# Patient Record
Sex: MALE | Race: WHITE | Employment: OTHER | URBAN - METROPOLITAN AREA
[De-identification: names, ages, dates, MRNs, and addresses within clinical notes are randomized per-mention and may not be internally consistent; named-entity substitution may affect disease eponyms.]

---

## 2020-06-01 NOTE — LETTER
[address], [place], [place]    Authorization for Surgical Operation and Procedure                               I hereby authorize Dr. Randall, my physician and his/her assistants (if applicable), which may include medical students, residents, and/or fellows, to perform the following surgical operation/ procedure and administer such anesthesia as may be determined necessary by my physician: Operation/Procedure name (s) LAPAROSCOPIC ASSISTED COLON RESECTION on Arnaldo Gutierrez   2.   I recognize that during the surgical operation/procedure, unforeseen conditions may necessitate additional or different procedures than those listed above.  I, therefore, further authorize and request that the above-named surgeon, assistants, or designees perform such procedures as are, in their judgment, necessary and desirable.    3.   My surgeon/physician has discussed prior to my surgery the potential benefits, risks and side effects of this procedure; the likelihood of achieving goals; and potential problems that might occur during recuperation.  They also discussed reasonable alternatives to the procedure, including risks, benefits, and side effects related to the alternatives and risks related to not receiving this procedure.  I have had all my questions answered and I acknowledge that no guarantee has been made as to the result that may be obtained.    4.   Should the need arise during my operation/procedure, which includes change of level of care prior to discharge, I also consent to the administration of blood and/or blood products.  Further, I understand that despite careful testing and screening of blood or blood products by collecting agencies, I may still be subject to ill effects as a result of receiving a blood transfusion and/or blood products.  The following are some, but not all, of the potential risks that can occur: fever and allergic reactions, hemolytic reactions, transmission of  Tolerated PHLEBOTOMY well. Reviewed discharge instruction, voiced understanding. Copies of AVS given. Pt discharged home. Pt to exit via ambulation.     Orders Placed This Encounter   Medications    DISCONTD: denosumab (PROLIA) SC injection 60 mg diseases such as Hepatitis, AIDS and Cytomegalovirus (CMV) and fluid overload.  In the event that I wish to have an autologous transfusion of my own blood, or a directed donor transfusion, I will discuss this with my physician.  Check only if Refusing Blood or Blood Products  I understand refusal of blood or blood products as deemed necessary by my physician may have serious consequences to my condition to include possible death. I hereby assume responsibility for my refusal and release the hospital, its personnel, and my physicians from any responsibility for the consequences of my refusal.    o  Refuse   5.   I authorize the use of any specimen, organs, tissues, body parts or foreign objects that may be removed from my body during the operation/procedure for diagnosis, research or teaching purposes and their subsequent disposal by hospital authorities.  I also authorize the release of specimen test results and/or written reports to my treating physician on the hospital medical staff or other referring or consulting physicians involved in my care, at the discretion of the Pathologist or my treating physician.    6.   I consent to the photographing or videotaping of the operations or procedures to be performed, including appropriate portions of my body for medical, scientific, or educational purposes, provided my identity is not revealed by the pictures or by descriptive texts accompanying them.  If the procedure has been photographed/videotaped, the surgeon will obtain the original picture, image, videotape or CD.  The hospital will not be responsible for storage, release or maintenance of the picture, image, tape or CD.    7.   I consent to the presence of a  or observers in the operating room as deemed necessary by my physician or their designees.    8.   I recognize that in the event my procedure results in extended X-Ray/fluoroscopy time, I may develop a skin reaction.    9. If I have a Do Not  Attempt Resuscitation (DNAR) order in place, that status will be suspended while in the operating room, procedural suite, and during the recovery period unless otherwise explicitly stated by me (or a person authorized to consent on my behalf). The surgeon or my attending physician will determine when the applicable recovery period ends for purposes of reinstating the DNAR order.  10. Patients having a sterilization procedure: I understand that if the procedure is successful the results will be permanent and it will therefore be impossible for me to inseminate, conceive, or bear children.  I also understand that the procedure is intended to result in sterility, although the result has not been guaranteed.   11. I acknowledge that my physician has explained sedation/analgesia administration to me including the risk and benefits I consent to the administration of sedation/analgesia as may be necessary or desirable in the judgment of my physician.    I CERTIFY THAT I HAVE READ AND FULLY UNDERSTAND THE ABOVE CONSENT TO OPERATION and/or OTHER PROCEDURE.     ____________________________________  _________________________________        ______________________________  Signature of Patient    Signature of Responsible Person                Printed Name of Responsible Person                                      ____________________________________  _____________________________                ________________________________  Signature of Witness        Date  Time         Relationship to Patient    STATEMENT OF PHYSICIAN My signature below affirms that prior to the time of the procedure; I have explained to the patient and/or his/her legal representative, the risks and benefits involved in the proposed treatment and any reasonable alternative to the proposed treatment. I have also explained the risks and benefits involved in refusal of the proposed treatment and alternatives to the proposed treatment and have answered the  patient's questions. If I have a significant financial interest in a co-management agreement or a significant financial interest in any product or implant, or other significant relationship used in this procedure/surgery, I have disclosed this and had a discussion with my patient.     _____________________________________________________              _____________________________  (Signature of Physician)                                                                                         (Date)                                   (Time)  Patient Name: Arnaldo Gutierrez      : 4/15/1945      Printed: 2025     Medical Record #: [id number]                                      Page 1 of 1

## 2024-03-16 ENCOUNTER — APPOINTMENT (OUTPATIENT)
Dept: GENERAL RADIOLOGY | Facility: HOSPITAL | Age: 79
End: 2024-03-16
Attending: EMERGENCY MEDICINE
Payer: MEDICARE

## 2024-03-16 ENCOUNTER — HOSPITAL ENCOUNTER (EMERGENCY)
Facility: HOSPITAL | Age: 79
Discharge: HOME | End: 2024-03-17
Attending: EMERGENCY MEDICINE
Payer: MEDICARE

## 2024-03-16 DIAGNOSIS — S89.91XA INJURY OF RIGHT SHIN, INITIAL ENCOUNTER: Primary | ICD-10-CM

## 2024-03-16 DIAGNOSIS — R79.89 ELEVATED SERUM CREATININE: ICD-10-CM

## 2024-03-16 DIAGNOSIS — L03.115 CELLULITIS OF RIGHT LEG: ICD-10-CM

## 2024-03-16 LAB
BASOPHILS # BLD AUTO: 0.05 X10(3) UL (ref 0–0.2)
BASOPHILS NFR BLD AUTO: 0.6 %
DEPRECATED RDW RBC AUTO: 53 FL (ref 35.1–46.3)
EOSINOPHIL # BLD AUTO: 0.15 X10(3) UL (ref 0–0.7)
EOSINOPHIL NFR BLD AUTO: 1.9 %
ERYTHROCYTE [DISTWIDTH] IN BLOOD BY AUTOMATED COUNT: 16 % (ref 11–15)
HCT VFR BLD AUTO: 33.4 %
HGB BLD-MCNC: 10.6 G/DL
IMM GRANULOCYTES # BLD AUTO: 0.03 X10(3) UL (ref 0–1)
IMM GRANULOCYTES NFR BLD: 0.4 %
LYMPHOCYTES # BLD AUTO: 2.68 X10(3) UL (ref 1–4)
LYMPHOCYTES NFR BLD AUTO: 33.9 %
MCH RBC QN AUTO: 29 PG (ref 26–34)
MCHC RBC AUTO-ENTMCNC: 31.7 G/DL (ref 31–37)
MCV RBC AUTO: 91.5 FL
MONOCYTES # BLD AUTO: 1 X10(3) UL (ref 0.1–1)
MONOCYTES NFR BLD AUTO: 12.6 %
NEUTROPHILS # BLD AUTO: 4 X10 (3) UL (ref 1.5–7.7)
NEUTROPHILS # BLD AUTO: 4 X10(3) UL (ref 1.5–7.7)
NEUTROPHILS NFR BLD AUTO: 50.6 %
PLATELET # BLD AUTO: 197 10(3)UL (ref 150–450)
RBC # BLD AUTO: 3.65 X10(6)UL
WBC # BLD AUTO: 7.9 X10(3) UL (ref 4–11)

## 2024-03-16 PROCEDURE — 99284 EMERGENCY DEPT VISIT MOD MDM: CPT

## 2024-03-16 PROCEDURE — 73590 X-RAY EXAM OF LOWER LEG: CPT | Performed by: EMERGENCY MEDICINE

## 2024-03-16 PROCEDURE — 36415 COLL VENOUS BLD VENIPUNCTURE: CPT

## 2024-03-16 PROCEDURE — 73502 X-RAY EXAM HIP UNI 2-3 VIEWS: CPT | Performed by: EMERGENCY MEDICINE

## 2024-03-16 PROCEDURE — 73562 X-RAY EXAM OF KNEE 3: CPT | Performed by: EMERGENCY MEDICINE

## 2024-03-16 PROCEDURE — 80048 BASIC METABOLIC PNL TOTAL CA: CPT | Performed by: EMERGENCY MEDICINE

## 2024-03-16 PROCEDURE — 96372 THER/PROPH/DIAG INJ SC/IM: CPT

## 2024-03-16 PROCEDURE — 85025 COMPLETE CBC W/AUTO DIFF WBC: CPT | Performed by: EMERGENCY MEDICINE

## 2024-03-16 PROCEDURE — 84145 PROCALCITONIN (PCT): CPT | Performed by: EMERGENCY MEDICINE

## 2024-03-16 PROCEDURE — 90471 IMMUNIZATION ADMIN: CPT

## 2024-03-16 RX ORDER — KETOROLAC TROMETHAMINE 30 MG/ML
30 INJECTION, SOLUTION INTRAMUSCULAR; INTRAVENOUS ONCE
Status: COMPLETED | OUTPATIENT
Start: 2024-03-16 | End: 2024-03-16

## 2024-03-17 VITALS
SYSTOLIC BLOOD PRESSURE: 106 MMHG | DIASTOLIC BLOOD PRESSURE: 58 MMHG | TEMPERATURE: 98 F | WEIGHT: 190 LBS | BODY MASS INDEX: 26.6 KG/M2 | HEIGHT: 71 IN | OXYGEN SATURATION: 96 % | RESPIRATION RATE: 19 BRPM | HEART RATE: 61 BPM

## 2024-03-17 LAB
ANION GAP SERPL CALC-SCNC: 5 MMOL/L (ref 0–18)
BUN BLD-MCNC: 36 MG/DL (ref 9–23)
BUN/CREAT SERPL: 15.6 (ref 10–20)
CALCIUM BLD-MCNC: 9 MG/DL (ref 8.7–10.4)
CHLORIDE SERPL-SCNC: 109 MMOL/L (ref 98–112)
CO2 SERPL-SCNC: 25 MMOL/L (ref 21–32)
CREAT BLD-MCNC: 2.31 MG/DL
EGFRCR SERPLBLD CKD-EPI 2021: 29 ML/MIN/1.73M2 (ref 60–?)
GLUCOSE BLD-MCNC: 135 MG/DL (ref 70–99)
OSMOLALITY SERPL CALC.SUM OF ELEC: 298 MOSM/KG (ref 275–295)
POTASSIUM SERPL-SCNC: 4.8 MMOL/L (ref 3.5–5.1)
PROCALCITONIN SERPL-MCNC: 0.15 NG/ML (ref ?–0.05)
SODIUM SERPL-SCNC: 139 MMOL/L (ref 136–145)

## 2024-03-17 RX ORDER — CLINDAMYCIN HYDROCHLORIDE 300 MG/1
600 CAPSULE ORAL ONCE
Status: COMPLETED | OUTPATIENT
Start: 2024-03-17 | End: 2024-03-17

## 2024-03-17 RX ORDER — CLINDAMYCIN HYDROCHLORIDE 300 MG/1
300 CAPSULE ORAL 3 TIMES DAILY
Qty: 21 CAPSULE | Refills: 0 | Status: SHIPPED | OUTPATIENT
Start: 2024-03-17 | End: 2024-03-24

## 2024-03-17 NOTE — ED INITIAL ASSESSMENT (HPI)
Pt reports fall at gym two days ago. +abrasion to right lower leg with redness, warmth, swelling noted to RLE. Pt denies fevers.

## 2024-03-17 NOTE — DISCHARGE INSTRUCTIONS
Thank you for seeking care at [facility] Emergency Department.  You have been seen and evaluated. Your findings were consistent with a skin infection called cellulitis.     We discussed the results of your workup   Please read the instructions provided   If given prescriptions, take as instructed   If you were prescribed antibiotics, ensure that you take these as instructed   If your infection was outlined in ink today, return to the emergency department if the redness extends beyond the area outlined by the pen, if the area affected is increasing, or spreading.    Drink plenty of fluids.    Remember, your care process does not end after your visit today. Please follow-up with your doctor within 1-2 days for a follow-up check to ensure you are  improving, to see if you need any further evaluation/testing, or to evaluate for any alternate diagnoses.    You were found to have an elevated creatinine which is a kidney function level today.  Your case was discussed with nephrologist who would like you to call their office to make an appointment for this coming Monday.    Please also follow up with the orthopedic surgeon within 1 week.     Please return to the emergency department immediately if you develop worsening pain or swelling at the site of your infection, if the redness of your skin is extending beyond the area where it is red today, increasing drainage (pus), worsening discoloration, if you develop high fevers or chills, nausea, vomiting, or severe diarrhea, feeling dizzy or lightheaded, or feeling very ill as these could be signs of worsening infection requiring further medical care. Call or return to the ER if you develop any other new or concerning symptoms as these could be signs of more serious medical illness.

## 2024-03-17 NOTE — ED QUICK NOTES
Pt to ED w/c of wound r/t fall 2 days ago while exercising in a treadmill at the gym. Redness noted around wound. The pt reports pain during ambulation.

## 2024-03-17 NOTE — ED PROVIDER NOTES
[facility] Emergency Department Note  Patient: Anraldo Gutierrez Age: 78 year old Sex: male      MRN: [id number]  : 4/15/1945    Patient Seen in: [facility] Emergency Department    History     Chief Complaint   Patient presents with    Trauma     Stated Complaint: Leg Injury    History obtained from: patient, family members - offered  however family members and pt prefer they assist with translation for pt     This is a very pleasant 78-year-old history of diabetes, hypertension, hyperlipidemia, anxiety and depression, not on blood thinners, presenting to the ER with complaints of a wound to his right shin with redness and pain with ambulating.  Patient states that 2 days ago he was at the treadmill at the gym and he excellently slipped and fell, scraping the front of his right shin against the treadmill.  He did not hit his head on the ground or pass out.  He states that since then he has been having pain in his right shin as well as pain in his right knee and hip and feels that the pain in the shin shoots up into his knee.  He denies numbness or tingling or weakness in his right leg.  He has noticed an increasing area of redness to the anterior portion of his shin.  No fever, chills, or body aches. No CP, SOB, abd pain, v/d, dysuria, difficulty urinating, or other complaints. He does not know last Tdap.       Review of Systems:  Review of Systems  Positive for stated complaint: Leg Injury. Constitutional and vital signs reviewed. All other systems reviewed and negative except as noted above.    Patient History:  Past Medical History:   Diagnosis Date    Anxiety     Depression     Diabetes (HCC)     Essential hypertension     Hyperlipidemia        History reviewed. No pertinent surgical history.     No family history on file.    Specific Social Determinants of Health:   Social History     Socioeconomic History    Marital status: Unknown   Tobacco Use    Smoking status: Never    Smokeless  tobacco: Never   Vaping Use    Vaping Use: Never used   Substance and Sexual Activity    Alcohol use: Never    Drug use: Not Currently           PSFH elements reviewed from today and agreed except as otherwise stated in HPI.    Physical Exam     ED Triage Vitals [03/16/24 2146]   /59   Pulse 64   Resp 19   Temp 97.7 °F (36.5 °C)   Temp src Oral   SpO2 97 %   O2 Device None (Room air)       Current:/58   Pulse 61   Temp 97.7 °F (36.5 °C) (Oral)   Resp 19   Ht 180.3 cm (5' 11\")   Wt 86.2 kg   SpO2 96%   BMI 26.50 kg/m²         Physical Exam  Vitals and nursing note reviewed.   Constitutional:       General: He is not in acute distress.     Appearance: He is well-developed. He is not ill-appearing.   HENT:      Head: Normocephalic and atraumatic.      Right Ear: External ear normal.      Left Ear: External ear normal.      Nose: Nose normal.      Mouth/Throat:      Mouth: Mucous membranes are moist.   Eyes:      Conjunctiva/sclera: Conjunctivae normal.   Cardiovascular:      Rate and Rhythm: Normal rate and regular rhythm.      Heart sounds: Normal heart sounds.      Comments: 2+ radial and DP pulses symmetric bilaterally   Pulmonary:      Effort: Pulmonary effort is normal. No respiratory distress.      Breath sounds: Normal breath sounds. No wheezing, rhonchi or rales.   Abdominal:      General: There is no distension.      Palpations: Abdomen is soft.      Tenderness: There is no abdominal tenderness. There is no guarding or rebound.   Musculoskeletal:         General: Swelling and tenderness present. No deformity. Normal range of motion.      Cervical back: Normal range of motion. No rigidity or tenderness.      Left lower leg: No edema.      Comments: There is mild TTP to the R ASIS without overlying bruising, pt able to flex/extend the L hip without difficulty but reports mild pain on ROM testing. No TTP to the R thigh, there is mild TTP to the R knee along anterior joint line, no deformity,  stable to varus and valgus and A/P drawer, no posterior calf ttp. R thigh and calf compartments soft throughout. There is an approx 5x2 cm superficial skin avulsion with small amount of granulation tissue/scabbing to the R mid shin, see image below, Surrounding area of erythema circumferentially that is warm and tender, no fluctuance, no crepitance throughout the RLE. No TTP to the R ankle or foot throughout and FROM preseved to the R knee, ankle and toes. FROM of entire LLE w/o ttp to the L hip, knee, shin, ankle or foot. superficial abrasion to the L anterior thigh, L thigh and calf compartments soft.    Skin:     General: Skin is warm and dry.   Neurological:      Mental Status: He is alert and oriented to person, place, and time.      Deep Tendon Reflexes: Reflexes are normal and symmetric.         ED Course   Labs:   Labs Reviewed   BASIC METABOLIC PANEL (8) - Abnormal; Notable for the following components:       Result Value    Glucose 135 (*)     BUN 36 (*)     Creatinine 2.31 (*)     Calculated Osmolality 298 (*)     eGFR-Cr 29 (*)     All other components within normal limits   PROCALCITONIN - Abnormal; Notable for the following components:    Procalcitonin 0.15 (*)     All other components within normal limits   CBC W/ DIFFERENTIAL - Abnormal; Notable for the following components:    RBC 3.65 (*)     HGB 10.6 (*)     HCT 33.4 (*)     RDW-SD 53.0 (*)     RDW 16.0 (*)     All other components within normal limits   CBC WITH DIFFERENTIAL WITH PLATELET    Narrative:     The following orders were created for panel order CBC With Differential With Platelet.  Procedure                               Abnormality         Status                     ---------                               -----------         ------                     CBC W/ DIFFERENTIAL[[id number]]          Abnormal            Final result                 Please view results for these tests on the individual orders.     Radiology findings:  I  personally reviewed the images.   No results found.      Cardiac Monitor: Interpreted by me.   Pulse Readings from Last 1 Encounters:   03/17/24 61         MDM   Very pleasant 78-year-old presenting to the ER with family due to wound to his right shin as well as pain in his right leg particularly his right shin worse with ambulating as well as redness and pain.  No fevers or chills.  Arrival patient afebrile hemodynamically stable well-appearing no distress.  Exam is as above.    Ddx  -skin tear/avulsion  -cellulitis  -no crepitance or ecchymosis or pain out of proportion to suggest necrotizing SSTI   -also considering possible fracture vs MSK strain given hip and knee tenderness     Plan: cbc, bmp, procal, XR R shin, knee and hip, toradol for pain, anticipate likely DC on antibiotics and discussed with pt/family importance of wound recheck in 48-72 hours to ensure no progression of cellulitic changes. Tdap updated in ED today as well.   ED Course as of 03/17/24 0809  ------------------------------------------------------------  Time: 03/16 2342  Value: Hemoglobin(!): 10.6  Comment: (Reviewed)  ------------------------------------------------------------  Time: 03/16 2342  Value: WBC: 7.9  Comment: No leukocytosis   ------------------------------------------------------------  Time: 03/16 2342  Comment: I independently reviewed and interpreted patient's imaging including XR shin, hip and knee, I do not see acute fracture or soft tissue gas. Hardware to distal R tib/fib appears intact.   ------------------------------------------------------------  Time: 03/17 0031  Value: PROCALCITONIN(!): 0.15  Comment: (Reviewed)  ------------------------------------------------------------  Time: 03/17 0041  Value: XR HIP W OR WO PELVIS 2 OR 3 VIEWS, RIGHT (CPT=73502)  Comment: X-ray pelvis and right hip, 3 views at 2333 hrs.  X-ray right tibia and fibula, 4 views at 2326 hrs.  X-ray right knee, 3 views at 2326  hrs.      IMPRESSION:  -No acute osseous abnormality identified.  -Plate-screw construct addressing the distal fibula and syndesmosis.  There is evidence of nondisplaced fracturing involving the most distal two trans-syndesmotic screws.    ------------------------------------------------------------  Time: 03/17 0045  Value: CREATININE(!): 2.31  Comment: (Reviewed)  ------------------------------------------------------------  Time: 03/17 0050  Comment: Case d/w simcock wbat and can give walking boot to use prn and f/u outpatient as fx is distal from pt injury site and likely incidental based on pt exam. Pt updated with this recommendation comfortale with plan for outpatient ortho follow up.   ------------------------------------------------------------  Time: 03/17 0100  Comment: Case d/w on call nephrology would like to call on Monday to have appointment with them as outpatient given pt does have reported hx of CKD but no other baseline labs in our system. Pt and family updated with this plan and are agreeable with this plan. Pt placed in postop shoe and given crutches. VSS at this time. First dose of clinda given here counseled pt on diarrhea and to monitor for signs of C diff or diarrheal ilness. Pt stable for DC at this time.             Procedures:  Procedures        Disposition and Plan     Clinical Impression:  1. Injury of right shin, initial encounter    2. Cellulitis of right leg    3. Elevated serum creatinine        Disposition:  Discharge    [facility]  [facility]  [address] [address] [phone]  Schedule an appointment as soon as possible for a visit in 1 week(s)      [facility]  [address]  [facility] [address]  [phone]  Go to  If symptoms worsen, immediately    Odilon Pink MD  [address]  [address]  [facility] [address]  [phone]    Schedule an appointment as soon as  possible for a visit in 2 day(s)  As needed if you do not have a primary doctor. Otherwise follow up with your    Bright Sow  [address] [address]  [phone]    Schedule an appointment as soon as possible for a visit in 1 day(s)        Medications Prescribed:  Discharge Medication List as of 3/17/2024  1:26 AM        START taking these medications    Details   clindamycin 300 MG Oral Cap Take 1 capsule (300 mg total) by mouth 3 (three) times daily for 7 days., Print, Disp-21 capsule, R-0               This note may have been created using voice dictation technology and may include inadvertent errors.      Maria Alejandra Ken,   Attending Physician   Emergency Medicine

## 2024-03-18 ENCOUNTER — TELEPHONE (OUTPATIENT)
Dept: NEPHROLOGY | Facility: CLINIC | Age: 79
End: 2024-03-18

## 2024-03-18 NOTE — TELEPHONE ENCOUNTER
----- Message from Bright Sow MD sent at 3/17/2024  9:57 AM CDT -----  He was recently seen in ED for an URSULA on CKD 3. Please make an appt in April with me. Thanks.

## 2024-03-19 ENCOUNTER — OFFICE VISIT (OUTPATIENT)
Dept: NEPHROLOGY | Facility: CLINIC | Age: 79
End: 2024-03-19

## 2024-03-19 VITALS
WEIGHT: 193 LBS | SYSTOLIC BLOOD PRESSURE: 89 MMHG | DIASTOLIC BLOOD PRESSURE: 47 MMHG | BODY MASS INDEX: 27 KG/M2 | HEART RATE: 76 BPM

## 2024-03-19 DIAGNOSIS — N18.31 STAGE 3A CHRONIC KIDNEY DISEASE (HCC): ICD-10-CM

## 2024-03-19 DIAGNOSIS — E78.2 MIXED HYPERLIPIDEMIA: ICD-10-CM

## 2024-03-19 DIAGNOSIS — N17.9 ACUTE KIDNEY INJURY (HCC): Primary | ICD-10-CM

## 2024-03-19 DIAGNOSIS — I10 PRIMARY HYPERTENSION: ICD-10-CM

## 2024-03-19 DIAGNOSIS — E11.21 DIABETIC NEPHROPATHY ASSOCIATED WITH TYPE 2 DIABETES MELLITUS (HCC): ICD-10-CM

## 2024-03-19 RX ORDER — TRAMADOL HYDROCHLORIDE 50 MG/1
50 TABLET ORAL 2 TIMES DAILY
COMMUNITY

## 2024-03-19 RX ORDER — DULOXETIN HYDROCHLORIDE 30 MG/1
30 CAPSULE, DELAYED RELEASE ORAL DAILY
COMMUNITY

## 2024-03-19 RX ORDER — TAMSULOSIN HYDROCHLORIDE 0.4 MG/1
0.4 CAPSULE ORAL DAILY
COMMUNITY

## 2024-03-19 RX ORDER — DIAZEPAM 10 MG/1
10 TABLET ORAL 2 TIMES DAILY
COMMUNITY

## 2024-03-19 RX ORDER — VIT C/E/CUPERIC/ZINC/LUTEIN 226-90-0.8
1 CAPSULE ORAL DAILY
COMMUNITY

## 2024-03-19 RX ORDER — OLMESARTAN MEDOXOMIL 20 MG/1
20 TABLET ORAL DAILY
COMMUNITY
End: 2024-03-24

## 2024-03-19 RX ORDER — FLUOXETINE 20 MG/1
20 TABLET, FILM COATED ORAL 3 TIMES DAILY
COMMUNITY

## 2024-03-19 RX ORDER — PREGABALIN 100 MG/1
100 CAPSULE ORAL 3 TIMES DAILY
COMMUNITY

## 2024-03-19 RX ORDER — SIMVASTATIN 40 MG
40 TABLET ORAL NIGHTLY
COMMUNITY

## 2024-03-19 RX ORDER — PANTOPRAZOLE SODIUM 40 MG/1
40 TABLET, DELAYED RELEASE ORAL
COMMUNITY

## 2024-03-24 NOTE — PROGRESS NOTES
Nephrology Consultation Note    Reason for Consult:   Chief Complaint   Patient presents with    Consult     Patient is here for consult for URSULA on CKD 3.         HPI:     78 year old male with past medical history of T2DM (dx 2005), HTN, HLD, CKD 3a, BPH, GERD, and anxiety/depression is referred from ED for URSULA on CKD 3.    He was seen in ED on 3/16 with R shin wound after trauma. He was discharged with Clindamycin.   Pt is accompanied by his wife and daughter.     2017: Cr 1.1 12/9  2018: Cr 1.2 4/5 2020: Cr 1.44 5/14, 1.24 8/25 2021: Cr 1.2 5/19 2023: Cr increased to 1.48 11/1 2024: Cr further increased to 2.31 3/16 (ED)    Denies NSAIDs use.     UA neg for protein and blood 12/10/21    T2DM: Taking Metformin 500 mg BID, Januvia 100 mg daily.   Recent a1c 7.3 11/2023. a1c has been in 7 range since 2015  DM is complicated by retinopathy, neuropathy, and nephropathy.    Following with endo at [facility].    HTN: Taking Olmesartan 20 mg daily. Bp 89/47 today. Complains of hypotension and dizziness at home.      HLD: No recent lipid panel available. Taking Simvastatin 40 mg daily.      Anemia: Hb 10.6 3/2024       FHx:   Mother (D)   Father (D)   No family history of CKD.      SHx: History of smoking 2 PPD x 10 yrs, quit 1980. Drinks alcohol rarely.   Lives with his wife. Accompanied by daughter.       HISTORY:  Past Medical History:   Diagnosis Date    Anxiety     BPH (benign prostatic hyperplasia)     CKD (chronic kidney disease) stage 3, GFR 30-59 ml/min (HCC)     Depression     Diabetes mellitus (HCC)     Essential hypertension     GERD (gastroesophageal reflux disease)     Hyperlipidemia       Past Surgical History:   Procedure Laterality Date    FOOT FRACTURE SURGERY Right     2012      Family History   Problem Relation Age of Onset    Other (Other) Father       Social History:   Social History     Socioeconomic History    Marital status: Unknown   Tobacco Use    Smoking status: Former     Packs/day: 2.00      Years: 10.00     Additional pack years: 0.00     Total pack years: 20.00     Types: Cigarettes     Quit date:      Years since quittin.2    Smokeless tobacco: Never   Vaping Use    Vaping Use: Never used   Substance and Sexual Activity    Alcohol use: Yes     Comment: rarely    Drug use: Not Currently        Medications (Active prior to today's visit):  Current Outpatient Medications   Medication Sig Dispense Refill    metFORMIN 500 MG Oral Tab Take 1 tablet (500 mg total) by mouth 2 (two) times daily with meals.      pregabalin 100 MG Oral Cap Take 1 capsule (100 mg total) by mouth in the morning, at noon, and at bedtime.      SITagliptin Phosphate 100 MG Oral Tab Take 1 tablet (100 mg total) by mouth daily.      pantoprazole 40 MG Oral Tab EC Take 1 tablet (40 mg total) by mouth every morning before breakfast.      traMADol 50 MG Oral Tab Take 1 tablet (50 mg total) by mouth in the morning and 1 tablet (50 mg total) before bedtime.      simvastatin 40 MG Oral Tab Take 1 tablet (40 mg total) by mouth nightly.      FLUoxetine HCl 20 MG Oral Tab Take 1 tablet (20 mg total) by mouth in the morning, at noon, and at bedtime.      diazePAM 10 MG Oral Tab Take 1 tablet (10 mg total) by mouth in the morning and 1 tablet (10 mg total) before bedtime.      tamsulosin 0.4 MG Oral Cap Take 1 capsule (0.4 mg total) by mouth daily.      DULoxetine 30 MG Oral Cap DR Particles Take 1 capsule (30 mg total) by mouth daily.      Multiple Vitamins-Minerals (PRESERVISION/LUTEIN) Oral Cap Take 1 capsule by mouth daily.      Polyethyl Glycol-Propyl Glycol (SYSTANE ULTRA OP) Apply 2 drops to eye in the morning and 2 drops before bedtime.      clindamycin 300 MG Oral Cap Take 1 capsule (300 mg total) by mouth 3 (three) times daily for 7 days. 21 capsule 0       Allergies:  No Known Allergies      ROS:     Review of Systems   Constitutional:  Positive for fatigue. Negative for appetite change, chills and fever.   HENT:  Negative  for ear pain, rhinorrhea, sore throat and trouble swallowing.    Eyes:  Negative for pain and discharge.   Respiratory:  Negative for cough, chest tightness and shortness of breath.    Cardiovascular:  Negative for chest pain and leg swelling.   Gastrointestinal:  Negative for abdominal pain, constipation, diarrhea and vomiting.   Genitourinary:  Negative for decreased urine volume, dysuria and flank pain.   Musculoskeletal:  Positive for arthralgias and myalgias (legs). Negative for back pain and gait problem.   Skin:  Negative for rash.   Neurological:  Positive for numbness (feet and legs). Negative for dizziness, speech difficulty, weakness and headaches.   Psychiatric/Behavioral:  Negative for agitation and confusion.           Vitals:    03/19/24 1612   BP: (!) 89/47   Pulse: 76       PHYSICAL EXAM:   Physical Exam  Constitutional:       Appearance: Normal appearance.   HENT:      Head: Normocephalic and atraumatic.      Nose: Nose normal.   Eyes:      General: No scleral icterus.  Cardiovascular:      Rate and Rhythm: Normal rate and regular rhythm.      Heart sounds: Normal heart sounds. No murmur heard.  Pulmonary:      Effort: Pulmonary effort is normal.      Breath sounds: Normal breath sounds.   Abdominal:      General: Bowel sounds are normal. There is no distension.      Palpations: Abdomen is soft.   Musculoskeletal:         General: No tenderness. Normal range of motion.      Cervical back: Normal range of motion and neck supple.      Comments: Neg edema   Skin:     General: Skin is warm and dry.      Findings: No rash.      Comments: R shin wound, healing.    Neurological:      General: No focal deficit present.      Mental Status: He is alert and oriented to person, place, and time. Mental status is at baseline.   Psychiatric:         Mood and Affect: Mood normal.         Behavior: Behavior normal.         Thought Content: Thought content normal.             ASSESSMENT/PLAN:   Assessment    Encounter Diagnoses   Name Primary?    Acute kidney injury (HCC) Yes    Stage 3a chronic kidney disease (HCC)     Diabetic nephropathy associated with type 2 diabetes mellitus (HCC)     Primary hypertension     Mixed hyperlipidemia        URSULA/CKD 3:   URSULA can be due to hypotension. Will stop Olmesartan.   CKD likely due to diabetic nephropathy  Repeat CMP and UA/Microalb/Cr ordered.   Renal US ordered.   Advised to avoid NSAIDs and take Tylenol/acetominophen for pain.   Asked to restrict sodium to 2 grams per day and instructions on low protein diet given.   Advised tight control of DM to prevent complications including progressive CKD, CAD or CVA.    T2DM:   Well controlled. Cont meds.      HTN:   Hypotensive, will stop Olmesartan.   Asked to check BP at home and call office if BP greater than 140/80.      HLD:   Cont statin.       Labs ordered to be done sone.   Follow up in 3 months.        Orders This Visit:  Orders Placed This Encounter   Procedures    CBC With Differential With Platelet    Comp Metabolic Panel (14)    Phosphorus    Vitamin D    PTH, Intact    Urinalysis, Routine    Microalb/Creat Ratio, Random Urine       Meds This Visit:  Requested Prescriptions      No prescriptions requested or ordered in this encounter       Imaging & Referrals:  [facility] (CPT=76770)       50 minutes spent in the care of the patient which included: reviewing prior records, obtaining history and examining patient, discussing lab results and treatment plan including diet, ordering labs, and documentation.      Bright Sow MD  3/19/2024

## 2024-03-29 ENCOUNTER — OFFICE VISIT (OUTPATIENT)
Dept: WOUND CARE | Facility: HOSPITAL | Age: 79
End: 2024-03-29
Attending: NURSE PRACTITIONER
Payer: MEDICARE

## 2024-03-29 VITALS
BODY MASS INDEX: 28.79 KG/M2 | SYSTOLIC BLOOD PRESSURE: 98 MMHG | WEIGHT: 190 LBS | HEIGHT: 68 IN | OXYGEN SATURATION: 94 % | TEMPERATURE: 98 F | RESPIRATION RATE: 17 BRPM | HEART RATE: 52 BPM | DIASTOLIC BLOOD PRESSURE: 58 MMHG

## 2024-03-29 DIAGNOSIS — E11.8 TYPE II DIABETES MELLITUS WITH MANIFESTATIONS (HCC): Primary | ICD-10-CM

## 2024-03-29 DIAGNOSIS — S80.811A ABRASION OF RIGHT LOWER EXTREMITY, INITIAL ENCOUNTER: ICD-10-CM

## 2024-03-29 PROBLEM — G89.29 CHRONIC PAIN OF BOTH KNEES: Status: ACTIVE | Noted: 2020-11-12

## 2024-03-29 PROBLEM — M54.9 ARTHRALGIA OF BACK: Status: ACTIVE | Noted: 2021-04-22

## 2024-03-29 PROBLEM — F41.8 DEPRESSION WITH ANXIETY: Status: ACTIVE | Noted: 2021-04-22

## 2024-03-29 PROBLEM — G89.29 CHRONIC LOW BACK PAIN: Status: ACTIVE | Noted: 2020-08-21

## 2024-03-29 PROBLEM — M25.561 CHRONIC PAIN OF BOTH KNEES: Status: ACTIVE | Noted: 2020-11-12

## 2024-03-29 PROBLEM — E78.5 HYPERLIPIDEMIA: Status: ACTIVE | Noted: 2018-04-04

## 2024-03-29 PROBLEM — I10 HYPERTENSION: Status: ACTIVE | Noted: 2018-04-04

## 2024-03-29 PROBLEM — S81.801A TRAUMATIC OPEN WOUND OF LOWER LEG, RIGHT, INITIAL ENCOUNTER: Status: ACTIVE | Noted: 2024-03-29

## 2024-03-29 PROBLEM — M25.562 CHRONIC PAIN OF BOTH KNEES: Status: ACTIVE | Noted: 2020-11-12

## 2024-03-29 PROBLEM — M54.50 CHRONIC LOW BACK PAIN: Status: ACTIVE | Noted: 2020-08-21

## 2024-03-29 PROCEDURE — 97597 DBRDMT OPN WND 1ST 20 CM/<: CPT | Performed by: NURSE PRACTITIONER

## 2024-03-29 PROCEDURE — 99215 OFFICE O/P EST HI 40 MIN: CPT | Performed by: NURSE PRACTITIONER

## 2024-03-29 RX ORDER — OLMESARTAN MEDOXOMIL 20 MG/1
20 TABLET ORAL DAILY
COMMUNITY

## 2024-03-29 NOTE — PROGRESS NOTES
Patient ID: Arnaldo Gutierrez is a 78 year old male.    Debridement Traumatic Right;Anterior;Lower Leg   Wound 03/29/24 1 Leg Right;Anterior;Lower    Performed by: Raji Rojas APRN  Authorized by: Raji Rojas APRN      Consent   Consent obtained? verbal  Consent given by: patient  Risks discussed? procedural risks discussed  Time out called at 3/29/2024 10:30 AM  Immediately prior to the procedure a time out was called and the performing provider verified the correct patient, procedure, equipment, support staff, and site/side marked as required.    Debridement Details  Performed by: NP  Debridement type: conservative sharp  Pain control: benzocaine 20%  Pain control administration type: topical    Pre-debridement measurements  Length (cm): 5  Width (cm): 1.1  Depth (cm): 0  Surface Area (cm^2): 5.5    Post-debridement measurements  Length (cm): 5  Width (cm): 0.9  Depth (cm): 0.1  Percent debrided: 100%  Surface Area (cm^2): 4.5  Area Debrided (cm^2): 4.5  Volume (cm^3): 0.45    Devitalized tissue debrided: exudate and fibrin  Instrument(s) utilized: blade  Bleeding: small  Hemostasis obtained with: pressure  Procedural pain (0-10): 3  Post-procedural pain: 1   Response to treatment: procedure was tolerated well

## 2024-03-29 NOTE — PROGRESS NOTES
Subjective   Arnaldo Gutierrez is a 78 year old male.    Chief Complaint   Patient presents with    Wound Care     Rt anterior lower leg, B/S this      HPI  3/29/2024: Patient is a 78-year-old male with history of diabetes, CKD stage 3, hypertension, hyperlipidemia, anxiety and depression with recent trauma injury to right leg on 3/14/2024. Patient states he was at the [place] using treadmill at the gym and he excellently slipped and fell, scraping the front of his right shin against the treadmill. He did not hit his head on the ground or pass out.  He is not on blood thinner. He went to [facility] ED for evaluation XR was negative for fracture, he was started on clindamycin 300mg tid for 7 days.   Last  a1c 7.1 on 11/9/2023     Review of Systems   Constitutional:  Negative for activity change, fatigue and fever.   HENT:  Negative for congestion.    Eyes:  Positive for visual disturbance.   Respiratory:  Negative for cough and shortness of breath.    Cardiovascular:  Negative for chest pain and leg swelling.   Gastrointestinal:  Negative for abdominal pain.   Musculoskeletal:  Positive for arthralgias.   Skin:  Positive for wound.        Right shin wound   Neurological:  Negative for weakness.   Psychiatric/Behavioral:  Negative for agitation.         Hx of anxiety and depression     Objective   Objective  Physical Exam  Vitals reviewed.   Constitutional:       Appearance: Normal appearance.   HENT:      Head: Normocephalic.   Cardiovascular:      Rate and Rhythm: Normal rate and regular rhythm.      Pulses: Normal pulses.   Pulmonary:      Effort: Pulmonary effort is normal.      Breath sounds: Normal breath sounds.   Abdominal:      General: Bowel sounds are normal.   Musculoskeletal:      Cervical back: Normal range of motion and neck supple.   Skin:     General: Skin is warm and dry.      Capillary Refill: Capillary refill takes 2 to 3 seconds.      Findings: No erythema.   Neurological:      Mental Status: He is  alert and oriented to person, place, and time.      Sensory: Sensory deficit present.   Psychiatric:         Mood and Affect: Mood normal.     Wound Assessment  Wound 03/29/24 1 Leg Right;Anterior;Lower (Active)   Date First Assessed: 03/29/24    Wound Number (Wound Clinic Only): 1  Primary Wound Type: Traumatic  Location: Leg  Wound Location Orientation: Right;Anterior;Lower      Assessments 3/29/2024 10:02 AM 3/29/2024 10:03 AM   Wound Image             Site Assessment Dry;Brown --   Drainage Amount None --   Drainage Description Scabbed --   Treatments Cleansed;Wound ;Honey Gel;Compression --   Dressing Changed New --   Dressing Status Clean;Dry;Intact --   Wound Length (cm) 5 cm 5 cm   Wound Width (cm) 1.1 cm 0.9 cm   Wound Surface Area (cm^2) 5.5 cm^2 4.5 cm^2   Wound Depth (cm) 0 cm 0.1 cm   Wound Volume (cm^3) 0 cm^3 0.45 cm^3   Margins Poorly defined --   Becca-wound Assessment Dry;Hemosiderin staining;Edema --   Wound Bed Granulation (%) 0 % --   Wound Bed Epithelium (%) 0 % --   Wound Bed Slough (%) 0 % --   Wound Bed Eschar (%) 0 % --   Wound Bed Fibrin (%) 100 % --   State of Healing Non-healing --   Wound Odor None --       Inactive Orders   Date Order Priority Status Authorizing Provider   03/29/24 1029 Debridement Traumatic Right;Anterior;Lower Leg Routine Completed Raji Rojas, FAIZAN      Vital Signs  Vital Signs    03/29/24 1015   BP: 98/58   Pulse: 52   Resp: 17   Temp: 97.9 °F (36.6 °C)   Allergies  No Known Allergies    Assessment   Right lower leg wound, trauma injury on 3/14/2024:  -dry scab debrided today  -skin discoloration on anterior shin, hemosiderin staining  -resolving erythema or other S&S of soft tissue infection  Warm feet with less than 3 sec cap refill indicating adequate circulation to heal these wounds.    Reviewed note, lab, imaging in Epic and Care Every Where.  Recent labs: 3/16/2024: H&H 10.6&33.4, BUN 36, Creatinine 2.31  11/9/2024: A1c 7.1  3/16/2024: right lower leg  XR:  Impression   CONCLUSION:  No acute fracture or dislocation.  Changes of prior distal syndesmotic fixation, with fracture of the 2 most inferior transsyndesmotic screws.  Chronic appearing osseous irregularity along the medial malleolus with overlying soft tissue swelling.  Correlate with point tenderness at this location to exclude fracture.  A preliminary report was issued by the [facility] teleradiology service. There are no clinically actionable discrepancies.     Encounter Diagnosis  1. Type II diabetes mellitus with manifestations (HCC)    2. Abrasion of right lower extremity, initial encounter      Problem List  Patient Active Problem List   Diagnosis    Traumatic open wound of lower leg, right, initial encounter    Abrasion of right leg    Arthralgia of back    BPH (benign prostatic hyperplasia)    Chronic low back pain    Chronic pain of both knees    Depression with anxiety    Generalized osteoarthritis    Hyperlipidemia    Hypertension    Type II diabetes mellitus with manifestations (HCC)     Plan  Orders  Orders Placed This Encounter   Procedures    Debridement Traumatic Right;Anterior;Lower Leg    OP Wound Dressing   Discussed the process of wound healing, creating moist, clean environment for wound healing, treatment of this wound that may include advanced dressing, compression therapy and series of debridement.   Initiated medi-honey for autolytic debridement and Hydrofera ready (non stick with antimicrobial property), utilizing two layer compression wraps for edema management.   Discussed signs and symptoms of infection, encourage patient to assess skin daily.  Discussed moisturizing skin, refrain from itching or scratching the skin.  Discussed nutrition for wound healing, encourage reduce carbohydrate intake, increase protein intake, and healthy diet. Recommended increase vitamin intake (either with foods or vitamin supplements), need vitamin A, Bs, C, D and zinc for wound healing.    Discussed the treatment plan with patient, patient verbalized understanding and agreed to these plan.  Total face to face time was 40min, more than 50% of the time was spent in counseling and/or coordination of care related to his diagnosis and plan of care.   Follow-Up  Return in about 1 week (around 4/5/2024) for APN x 30 minutes, Wrapped weekly.

## 2024-04-05 ENCOUNTER — OFFICE VISIT (OUTPATIENT)
Dept: WOUND CARE | Facility: HOSPITAL | Age: 79
End: 2024-04-05
Attending: NURSE PRACTITIONER
Payer: MEDICARE

## 2024-04-05 VITALS
HEART RATE: 73 BPM | TEMPERATURE: 98 F | OXYGEN SATURATION: 94 % | SYSTOLIC BLOOD PRESSURE: 92 MMHG | DIASTOLIC BLOOD PRESSURE: 55 MMHG | RESPIRATION RATE: 18 BRPM

## 2024-04-05 DIAGNOSIS — S80.811D ABRASION OF RIGHT LOWER EXTREMITY, SUBSEQUENT ENCOUNTER: Primary | ICD-10-CM

## 2024-04-05 DIAGNOSIS — E11.8 TYPE II DIABETES MELLITUS WITH MANIFESTATIONS (HCC): ICD-10-CM

## 2024-04-05 PROCEDURE — 99213 OFFICE O/P EST LOW 20 MIN: CPT | Performed by: NURSE PRACTITIONER

## 2024-04-05 NOTE — PROGRESS NOTES
Subjective   Arnaldo Gutierrez is a 78 year old male.    Chief Complaint   Patient presents with    Wound Recheck     Rt leg. Pts Bs are @ 112     HPI  4/5/2024: Patient is here for follow up, tolerated compression therapy well, no new concern.   3/29/2024: Patient is a 78-year-old male with history of diabetes, CKD stage 3, hypertension, hyperlipidemia, anxiety and depression with recent trauma injury to right leg on 3/14/2024. Patient states he was at the [employer] using treadmill at the gym and he excellently slipped and fell, scraping the front of his right shin against the treadmill. He did not hit his head on the ground or pass out.  He is not on blood thinner. He went to [facility] ED for evaluation XR was negative for fracture, he was started on clindamycin 300mg tid for 7 days.   Last  a1c 7.1 on 11/9/2023      Review of Systems   Constitutional:  Negative for activity change, fatigue and fever.   HENT:  Negative for congestion.    Eyes:  Positive for visual disturbance.   Respiratory:  Negative for cough and shortness of breath.    Cardiovascular:  Negative for chest pain and leg swelling.   Gastrointestinal:  Negative for abdominal pain.   Musculoskeletal:  Positive for arthralgias.   Skin:  Positive for wound.        Right shin wound   Neurological:  Negative for weakness.   Psychiatric/Behavioral:  Negative for agitation.         Hx of anxiety and depression      Objective  Physical Exam  Vitals reviewed.   Constitutional:       Appearance: Normal appearance.   HENT:      Head: Normocephalic.   Cardiovascular:      Rate and Rhythm: Normal rate and regular rhythm.      Pulses: Normal pulses.   Pulmonary:      Effort: Pulmonary effort is normal.      Breath sounds: Normal breath sounds.   Abdominal:      General: Bowel sounds are normal.   Musculoskeletal:      Cervical back: Normal range of motion and neck supple.   Skin:     General: Skin is warm and dry.      Capillary Refill: Capillary refill takes 2 to 3 seconds.       Findings: No erythema.   Neurological:      Mental Status: He is alert and oriented to person, place, and time.      Sensory: Sensory deficit present.   Psychiatric:         Mood and Affect: Mood normal.     Wound Assessment  Wound 03/29/24 1 Leg Right;Anterior;Lower (Active)   Date First Assessed: 03/29/24    Wound Number (Wound Clinic Only): 1  Primary Wound Type: Traumatic  Location: Leg  Wound Location Orientation: Right;Anterior;Lower      Assessments 4/5/2024  9:53 AM   Wound Image     Site Assessment Dry;Pink;Red   Closure Not approximated   Drainage Amount Small   Drainage Description Serosanguineous   Dressing Changed Changed   Dressing Status Clean;Dry;Intact   Wound Length (cm) 5 cm   Wound Width (cm) 1.2 cm   Wound Surface Area (cm^2) 6 cm^2   Wound Depth (cm) 0.1 cm   Wound Volume (cm^3) 0.6 cm^3   Margins Attached edges   Non-staged Wound Description Full thickness   Becca-wound Assessment Dry;Hemosiderin staining;Edema   Wound Granulation Tissue Pink   Wound Bed Granulation (%) 50 %   Wound Bed Epithelium (%) 50 %   Wound Bed Slough (%) 0 %   Wound Bed Eschar (%) 0 %   Wound Bed Fibrin (%) 0 %   State of Healing Early/partial granulation   Wound Odor None       Active Orders   Date Order Priority Status Authorizing Provider   03/29/24 1036 OP Wound Dressing Routine Active Raji Rojas APRN     - Cleansing:    Cleanse with normal saline or wound cleanser     - Dressing:    Honey gel     - Dressing:    Hydrofera transfer     - Dressing:    Kerlix     - Dressing:    Medipore tape (no substitution)     - Frequency:    Change dressing weekly       Inactive Orders   Date Order Priority Status Authorizing Provider   03/29/24 1029 Debridement Traumatic Right;Anterior;Lower Leg Routine Completed Raji Rojas APRN      Vital Signs  Vital Signs    04/05/24 0947   BP: 92/55   Pulse: 73   Resp: 18   Temp: 97.8 °F (36.6 °C)   PainSc: 0 - (None)   Allergies  No Known Allergies    Assessment   Right lower leg  wound, trauma injury on 3/14/2024:  -new fragile new skin   -skin discoloration on anterior shin, hemosiderin staining  -resolving erythema or other S&S of soft tissue infection  Warm feet with less than 3 sec cap refill indicating adequate circulation to heal these wounds.     Reviewed note, lab, imaging in [facility] and Care Every Where.  Recent labs: 3/16/2024: H&H 10.6&33.4, BUN 36, Creatinine 2.31  11/9/2024: A1c 7.1  3/16/2024: right lower leg XR:  Impression   CONCLUSION:  No acute fracture or dislocation.  Changes of prior distal syndesmotic fixation, with fracture of the 2 most inferior transsyndesmotic screws.  Chronic appearing osseous irregularity along the medial malleolus with overlying soft tissue swelling.  Correlate with point tenderness at this location to exclude fracture.  A preliminary report was issued by the [facility] teleradiology service. There are no clinically actionable discrepancies.     Encounter Diagnosis  1. Abrasion of right lower extremity, subsequent encounter    2. Type II diabetes mellitus with manifestations (HCC)      Problem List  Patient Active Problem List   Diagnosis    Traumatic open wound of lower leg, right, initial encounter    Abrasion of right leg    Arthralgia of back    BPH (benign prostatic hyperplasia)    Chronic low back pain    Chronic pain of both knees    Depression with anxiety    Generalized osteoarthritis    Hyperlipidemia    Hypertension    Type II diabetes mellitus with manifestations (HCC)     Plan  Orders  Orders Placed This Encounter   Procedures    OP Wound Dressing   Initiated xeroform dressing (non stick with antimicrobial property) to support the new skin, utilizing one layer compression wraps for edema management.   Monitor for signs and symptoms of infection, encourage patient to assess skin daily.  Continue moisturizing skin, refrain from itching or scratching the skin.  Continue nutrition for wound healing, encourage reduce carbohydrate  intake, increase protein intake, and healthy diet. Recommended increase vitamin intake (either with foods or vitamin supplements), need vitamin A, Bs, C, D and zinc for wound healing.   Discussed the treatment plan with patient, patient verbalized understanding and agreed to these plan.  Total face to face time was 20min, more than 50% of the time was spent in counseling and/or coordination of care related to his diagnosis and plan of care.   Follow-Up  Return in about 1 week (around 4/12/2024) for APN x 30 minutes, Wrapped weekly. Almost healed..

## 2024-04-10 ENCOUNTER — LAB ENCOUNTER (OUTPATIENT)
Dept: LAB | Facility: HOSPITAL | Age: 79
End: 2024-04-10
Attending: INTERNAL MEDICINE
Payer: MEDICARE

## 2024-04-10 ENCOUNTER — HOSPITAL ENCOUNTER (OUTPATIENT)
Dept: ULTRASOUND IMAGING | Facility: HOSPITAL | Age: 79
Discharge: HOME | End: 2024-04-10
Attending: INTERNAL MEDICINE
Payer: MEDICARE

## 2024-04-10 DIAGNOSIS — N17.9 ACUTE KIDNEY INJURY (HCC): ICD-10-CM

## 2024-04-10 DIAGNOSIS — N18.31 STAGE 3A CHRONIC KIDNEY DISEASE (HCC): ICD-10-CM

## 2024-04-10 DIAGNOSIS — E11.21 DIABETIC NEPHROPATHY ASSOCIATED WITH TYPE 2 DIABETES MELLITUS (HCC): ICD-10-CM

## 2024-04-10 LAB
ALBUMIN SERPL-MCNC: 4.2 G/DL (ref 3.2–4.8)
ALBUMIN/GLOB SERPL: 1.5 {RATIO} (ref 1–2)
ALP LIVER SERPL-CCNC: 78 U/L
ALT SERPL-CCNC: 12 U/L
ANION GAP SERPL CALC-SCNC: 2 MMOL/L (ref 0–18)
AST SERPL-CCNC: 14 U/L (ref ?–34)
BASOPHILS # BLD AUTO: 0.07 X10(3) UL (ref 0–0.2)
BASOPHILS NFR BLD AUTO: 1.1 %
BILIRUB SERPL-MCNC: 0.3 MG/DL (ref 0.2–1.1)
BILIRUB UR QL: NEGATIVE
BUN BLD-MCNC: 24 MG/DL (ref 9–23)
BUN/CREAT SERPL: 14.5 (ref 10–20)
CALCIUM BLD-MCNC: 9.5 MG/DL (ref 8.7–10.4)
CHLORIDE SERPL-SCNC: 109 MMOL/L (ref 98–112)
CLARITY UR: CLEAR
CO2 SERPL-SCNC: 27 MMOL/L (ref 21–32)
CREAT BLD-MCNC: 1.65 MG/DL
CREAT UR-SCNC: 71.9 MG/DL
DEPRECATED RDW RBC AUTO: 54.2 FL (ref 35.1–46.3)
EGFRCR SERPLBLD CKD-EPI 2021: 43 ML/MIN/1.73M2 (ref 60–?)
EOSINOPHIL # BLD AUTO: 0.11 X10(3) UL (ref 0–0.7)
EOSINOPHIL NFR BLD AUTO: 1.7 %
ERYTHROCYTE [DISTWIDTH] IN BLOOD BY AUTOMATED COUNT: 16.8 % (ref 11–15)
FASTING STATUS PATIENT QL REPORTED: NO
GLOBULIN PLAS-MCNC: 2.8 G/DL (ref 2.8–4.4)
GLUCOSE BLD-MCNC: 130 MG/DL (ref 70–99)
GLUCOSE UR-MCNC: >1000 MG/DL
HCT VFR BLD AUTO: 36.9 %
HGB BLD-MCNC: 11.9 G/DL
HGB UR QL STRIP.AUTO: NEGATIVE
IMM GRANULOCYTES # BLD AUTO: 0.03 X10(3) UL (ref 0–1)
IMM GRANULOCYTES NFR BLD: 0.5 %
KETONES UR-MCNC: NEGATIVE MG/DL
LEUKOCYTE ESTERASE UR QL STRIP.AUTO: NEGATIVE
LYMPHOCYTES # BLD AUTO: 2.15 X10(3) UL (ref 1–4)
LYMPHOCYTES NFR BLD AUTO: 32.7 %
MCH RBC QN AUTO: 28.7 PG (ref 26–34)
MCHC RBC AUTO-ENTMCNC: 32.2 G/DL (ref 31–37)
MCV RBC AUTO: 89.1 FL
MICROALBUMIN UR-MCNC: <0.3 MG/DL
MONOCYTES # BLD AUTO: 0.77 X10(3) UL (ref 0.1–1)
MONOCYTES NFR BLD AUTO: 11.7 %
NEUTROPHILS # BLD AUTO: 3.44 X10 (3) UL (ref 1.5–7.7)
NEUTROPHILS # BLD AUTO: 3.44 X10(3) UL (ref 1.5–7.7)
NEUTROPHILS NFR BLD AUTO: 52.3 %
NITRITE UR QL STRIP.AUTO: NEGATIVE
OSMOLALITY SERPL CALC.SUM OF ELEC: 292 MOSM/KG (ref 275–295)
PH UR: 6 [PH] (ref 5–8)
PHOSPHATE SERPL-MCNC: 3 MG/DL (ref 2.4–5.1)
PLATELET # BLD AUTO: 200 10(3)UL (ref 150–450)
POTASSIUM SERPL-SCNC: 5.2 MMOL/L (ref 3.5–5.1)
PROT SERPL-MCNC: 7 G/DL (ref 5.7–8.2)
PROT UR-MCNC: NEGATIVE MG/DL
PTH-INTACT SERPL-MCNC: 66.9 PG/ML (ref 18.5–88)
RBC # BLD AUTO: 4.14 X10(6)UL
SODIUM SERPL-SCNC: 138 MMOL/L (ref 136–145)
SP GR UR STRIP: 1.02 (ref 1–1.03)
UROBILINOGEN UR STRIP-ACNC: NORMAL
VIT D+METAB SERPL-MCNC: 31.6 NG/ML (ref 30–100)
WBC # BLD AUTO: 6.6 X10(3) UL (ref 4–11)

## 2024-04-10 PROCEDURE — 82570 ASSAY OF URINE CREATININE: CPT

## 2024-04-10 PROCEDURE — 80053 COMPREHEN METABOLIC PANEL: CPT | Performed by: INTERNAL MEDICINE

## 2024-04-10 PROCEDURE — 83970 ASSAY OF PARATHORMONE: CPT

## 2024-04-10 PROCEDURE — 82043 UR ALBUMIN QUANTITATIVE: CPT

## 2024-04-10 PROCEDURE — 76770 US EXAM ABDO BACK WALL COMP: CPT | Performed by: INTERNAL MEDICINE

## 2024-04-10 PROCEDURE — 85025 COMPLETE CBC W/AUTO DIFF WBC: CPT

## 2024-04-10 PROCEDURE — 84100 ASSAY OF PHOSPHORUS: CPT

## 2024-04-10 PROCEDURE — 36415 COLL VENOUS BLD VENIPUNCTURE: CPT

## 2024-04-10 PROCEDURE — 82306 VITAMIN D 25 HYDROXY: CPT

## 2024-04-10 PROCEDURE — 81003 URINALYSIS AUTO W/O SCOPE: CPT

## 2024-04-12 ENCOUNTER — OFFICE VISIT (OUTPATIENT)
Dept: WOUND CARE | Facility: HOSPITAL | Age: 79
End: 2024-04-12
Attending: NURSE PRACTITIONER
Payer: MEDICARE

## 2024-04-12 VITALS
DIASTOLIC BLOOD PRESSURE: 53 MMHG | SYSTOLIC BLOOD PRESSURE: 83 MMHG | OXYGEN SATURATION: 95 % | HEART RATE: 96 BPM | TEMPERATURE: 98 F | RESPIRATION RATE: 16 BRPM

## 2024-04-12 DIAGNOSIS — S80.811D ABRASION OF RIGHT LOWER EXTREMITY, SUBSEQUENT ENCOUNTER: ICD-10-CM

## 2024-04-12 DIAGNOSIS — S81.801A TRAUMATIC OPEN WOUND OF LOWER LEG, RIGHT, INITIAL ENCOUNTER: Primary | ICD-10-CM

## 2024-04-12 PROCEDURE — 99213 OFFICE O/P EST LOW 20 MIN: CPT | Performed by: NURSE PRACTITIONER

## 2024-04-12 NOTE — PROGRESS NOTES
Subjective   Arnaldo Gutierrez is a 78 year old male.    Chief Complaint   Patient presents with    Wound Care     Right Lower Leg     HPI  4/12/2024: Patient is here for follow up, no new concern.  4/5/2024: Patient is here for follow up, tolerated compression therapy well, no new concern.   3/29/2024: Patient is a 78-year-old male with history of diabetes, CKD stage 3, hypertension, hyperlipidemia, anxiety and depression with recent trauma injury to right leg on 3/14/2024. Patient states he was at the [employer] using treadmill at the gym and he excellently slipped and fell, scraping the front of his right shin against the treadmill. He did not hit his head on the ground or pass out.  He is not on blood thinner. He went to [facility] ED for evaluation XR was negative for fracture, he was started on clindamycin 300mg tid for 7 days.   Last  a1c 7.1 on 11/9/2023      Review of Systems   Constitutional:  Negative for activity change, fatigue and fever.   HENT:  Negative for congestion.    Eyes:  Positive for visual disturbance.   Respiratory:  Negative for cough and shortness of breath.    Cardiovascular:  Negative for chest pain and leg swelling.   Gastrointestinal:  Negative for abdominal pain.   Musculoskeletal:  Positive for arthralgias.   Skin:  Positive for wound.        Right shin wound   Neurological:  Negative for weakness.   Psychiatric/Behavioral:  Negative for agitation.         Hx of anxiety and depression      Objective  Physical Exam  Vitals reviewed.   Constitutional:       Appearance: Normal appearance.   HENT:      Head: Normocephalic.   Cardiovascular:      Rate and Rhythm: Normal rate and regular rhythm.      Pulses: Normal pulses.   Pulmonary:      Effort: Pulmonary effort is normal.      Breath sounds: Normal breath sounds.   Abdominal:      General: Bowel sounds are normal.   Musculoskeletal:      Cervical back: Normal range of motion and neck supple.   Skin:     General: Skin is warm and dry.      Capillary  Refill: Capillary refill takes 2 to 3 seconds.      Findings: No erythema.   Neurological:      Mental Status: He is alert and oriented to person, place, and time.      Sensory: Sensory deficit present.   Psychiatric:         Mood and Affect: Mood normal.  Wound Assessment  Wound 03/29/24 1 Leg Right;Anterior;Lower (Active)   Date First Assessed: 03/29/24    Wound Number (Wound Clinic Only): 1  Primary Wound Type: Traumatic  Location: Leg  Wound Location Orientation: Right;Anterior;Lower      Assessments 4/12/2024 11:16 AM   Wound Image     Site Assessment Clean;Dry;Intact;Pink   Drainage Amount None   Treatments Cleansed;Wound ;Compression;Compression Sleeve   Dressing Open to air   Dressing Status Removed   Wound Length (cm) 0 cm   Wound Width (cm) 0 cm   Wound Surface Area (cm^2) 0 cm^2   Wound Depth (cm) 0 cm   Wound Volume (cm^3) 0 cm^3   Margins Flat and Intact   Becca-wound Assessment Dry;Hemosiderin staining;Edema   Wound Bed Granulation (%) 0 %   Wound Bed Epithelium (%) 100 %   Wound Bed Slough (%) 0 %   Wound Bed Eschar (%) 0 %   Wound Bed Fibrin (%) 0 %   State of Healing Epithelialized   Wound Odor None       Active Orders   Date Order Priority Status Authorizing Provider   03/29/24 1036 OP Wound Dressing Routine Active Raji Rojas APRN     - Cleansing:    Cleanse with normal saline or wound cleanser     - Dressing:    Honey gel     - Dressing:    Hydrofera transfer     - Dressing:    Kerlix     - Dressing:    Medipore tape (no substitution)     - Frequency:    Change dressing weekly      Vital Signs  Vital Signs    04/12/24 1108   BP: (!) 83/53   Pulse: 96   Resp: 16   Temp: 97.9 °F (36.6 °C)   PainSc: 0 - (None)   Allergies  No Known Allergies  Assessment   Right lower leg wound, trauma injury on 3/14/2024:  -new new skin, closed wound for two weeks  -skin discoloration on anterior shin, hemosiderin staining  -resolving erythema or other S&S of soft tissue infection  Warm feet with less than  3 sec cap refill indicating adequate circulation to heal these wounds.     Reviewed note, lab, imaging in [facility].  Recent labs: 3/16/2024: H&H 10.6&33.4, BUN 36, Creatinine 2.31  11/9/2024: A1c 7.1  3/16/2024: right lower leg XR:  Impression   CONCLUSION:  No acute fracture or dislocation.  Changes of prior distal syndesmotic fixation, with fracture of the 2 most inferior transsyndesmotic screws.  Chronic appearing osseous irregularity along the medial malleolus with overlying soft tissue swelling.  Correlate with point tenderness at this location to exclude fracture.  A preliminary report was issued by the [facility] teleradiology service. There are no clinically actionable discrepancies.     Encounter Diagnosis  1. Traumatic open wound of lower leg, right, initial encounter    2. 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  At this time patient has no open wounds, therefore patient will be discharged from [facility] wound care clinic. Instructed patient to follow up with PCP for medical management.    Discussed discharge recommendation  with patient and his daughter, They verbalized understanding and agreed to these plan.    Thank you for referring this patient to our clinic and allowing me to be part of his care team.   Total face to face time was 20 min, more than 50% of the time was spent in counseling  and/or coordination of care related to his diagnosis and plan of care.   Follow-Up  Return Discharged.

## 2024-04-18 ENCOUNTER — LAB ENCOUNTER (OUTPATIENT)
Dept: LAB | Age: 79
End: 2024-04-18
Attending: PHYSICIAN ASSISTANT
Payer: MEDICARE

## 2024-04-18 ENCOUNTER — OFFICE VISIT (OUTPATIENT)
Dept: FAMILY MEDICINE CLINIC | Facility: CLINIC | Age: 79
End: 2024-04-18
Payer: COMMERCIAL

## 2024-04-18 VITALS
SYSTOLIC BLOOD PRESSURE: 94 MMHG | BODY MASS INDEX: 29.4 KG/M2 | HEIGHT: 68 IN | HEART RATE: 59 BPM | DIASTOLIC BLOOD PRESSURE: 58 MMHG | WEIGHT: 194 LBS

## 2024-04-18 DIAGNOSIS — D64.9 ANEMIA, UNSPECIFIED TYPE: ICD-10-CM

## 2024-04-18 DIAGNOSIS — E11.9 TYPE 2 DIABETES MELLITUS WITHOUT COMPLICATION, WITHOUT LONG-TERM CURRENT USE OF INSULIN (HCC): Primary | ICD-10-CM

## 2024-04-18 DIAGNOSIS — M25.562 CHRONIC PAIN OF BOTH KNEES: ICD-10-CM

## 2024-04-18 DIAGNOSIS — E78.2 MIXED HYPERLIPIDEMIA: ICD-10-CM

## 2024-04-18 DIAGNOSIS — M25.561 CHRONIC PAIN OF BOTH KNEES: ICD-10-CM

## 2024-04-18 DIAGNOSIS — F41.8 DEPRESSION WITH ANXIETY: ICD-10-CM

## 2024-04-18 DIAGNOSIS — G89.29 CHRONIC PAIN OF BOTH KNEES: ICD-10-CM

## 2024-04-18 DIAGNOSIS — I10 ESSENTIAL HYPERTENSION: ICD-10-CM

## 2024-04-18 DIAGNOSIS — R53.1 WEAKNESS: ICD-10-CM

## 2024-04-18 DIAGNOSIS — N40.0 BENIGN PROSTATIC HYPERPLASIA WITHOUT LOWER URINARY TRACT SYMPTOMS: ICD-10-CM

## 2024-04-18 DIAGNOSIS — M54.50 CHRONIC MIDLINE LOW BACK PAIN WITHOUT SCIATICA: ICD-10-CM

## 2024-04-18 DIAGNOSIS — M15.9 GENERALIZED OSTEOARTHRITIS: ICD-10-CM

## 2024-04-18 DIAGNOSIS — G89.29 CHRONIC MIDLINE LOW BACK PAIN WITHOUT SCIATICA: ICD-10-CM

## 2024-04-18 PROBLEM — M54.9 ARTHRALGIA OF BACK: Status: RESOLVED | Noted: 2021-04-22 | Resolved: 2024-04-18

## 2024-04-18 PROBLEM — S80.811A ABRASION OF RIGHT LEG: Status: RESOLVED | Noted: 2020-09-22 | Resolved: 2024-04-18

## 2024-04-18 PROBLEM — S81.801A TRAUMATIC OPEN WOUND OF LOWER LEG, RIGHT, INITIAL ENCOUNTER: Status: RESOLVED | Noted: 2024-03-29 | Resolved: 2024-04-18

## 2024-04-18 LAB
ANION GAP SERPL CALC-SCNC: 7 MMOL/L (ref 0–18)
BASOPHILS # BLD AUTO: 0.06 X10(3) UL (ref 0–0.2)
BASOPHILS NFR BLD AUTO: 0.8 %
BUN BLD-MCNC: 24 MG/DL (ref 9–23)
BUN/CREAT SERPL: 13 (ref 10–20)
CALCIUM BLD-MCNC: 9.5 MG/DL (ref 8.7–10.4)
CHLORIDE SERPL-SCNC: 107 MMOL/L (ref 98–112)
CO2 SERPL-SCNC: 27 MMOL/L (ref 21–32)
CREAT BLD-MCNC: 1.84 MG/DL
DEPRECATED HBV CORE AB SER IA-ACNC: 7.9 NG/ML
DEPRECATED RDW RBC AUTO: 54.4 FL (ref 35.1–46.3)
EGFRCR SERPLBLD CKD-EPI 2021: 37 ML/MIN/1.73M2 (ref 60–?)
EOSINOPHIL # BLD AUTO: 0.1 X10(3) UL (ref 0–0.7)
EOSINOPHIL NFR BLD AUTO: 1.3 %
ERYTHROCYTE [DISTWIDTH] IN BLOOD BY AUTOMATED COUNT: 16.9 % (ref 11–15)
FASTING STATUS PATIENT QL REPORTED: NO
GLUCOSE BLD-MCNC: 112 MG/DL (ref 70–99)
HCT VFR BLD AUTO: 36.8 %
HEMOGLOBIN A1C: 7 % (ref 4.3–5.6)
HGB BLD-MCNC: 11.8 G/DL
IMM GRANULOCYTES # BLD AUTO: 0.04 X10(3) UL (ref 0–1)
IMM GRANULOCYTES NFR BLD: 0.5 %
IRON SATN MFR SERPL: 9 %
IRON SERPL-MCNC: 29 UG/DL
LYMPHOCYTES # BLD AUTO: 2.46 X10(3) UL (ref 1–4)
LYMPHOCYTES NFR BLD AUTO: 32.7 %
MCH RBC QN AUTO: 28.2 PG (ref 26–34)
MCHC RBC AUTO-ENTMCNC: 32.1 G/DL (ref 31–37)
MCV RBC AUTO: 88 FL
MONOCYTES # BLD AUTO: 0.85 X10(3) UL (ref 0.1–1)
MONOCYTES NFR BLD AUTO: 11.3 %
NEUTROPHILS # BLD AUTO: 4.02 X10 (3) UL (ref 1.5–7.7)
NEUTROPHILS # BLD AUTO: 4.02 X10(3) UL (ref 1.5–7.7)
NEUTROPHILS NFR BLD AUTO: 53.4 %
OSMOLALITY SERPL CALC.SUM OF ELEC: 297 MOSM/KG (ref 275–295)
PLATELET # BLD AUTO: 209 10(3)UL (ref 150–450)
POTASSIUM SERPL-SCNC: 4.9 MMOL/L (ref 3.5–5.1)
RBC # BLD AUTO: 4.18 X10(6)UL
SODIUM SERPL-SCNC: 141 MMOL/L (ref 136–145)
TIBC SERPL-MCNC: 329 UG/DL (ref 250–425)
TRANSFERRIN SERPL-MCNC: 221 MG/DL (ref 215–365)
WBC # BLD AUTO: 7.5 X10(3) UL (ref 4–11)

## 2024-04-18 PROCEDURE — 85025 COMPLETE CBC W/AUTO DIFF WBC: CPT

## 2024-04-18 PROCEDURE — 3008F BODY MASS INDEX DOCD: CPT | Performed by: PHYSICIAN ASSISTANT

## 2024-04-18 PROCEDURE — 3078F DIAST BP <80 MM HG: CPT | Performed by: PHYSICIAN ASSISTANT

## 2024-04-18 PROCEDURE — 1159F MED LIST DOCD IN RCRD: CPT | Performed by: PHYSICIAN ASSISTANT

## 2024-04-18 PROCEDURE — 80048 BASIC METABOLIC PNL TOTAL CA: CPT

## 2024-04-18 PROCEDURE — 82728 ASSAY OF FERRITIN: CPT

## 2024-04-18 PROCEDURE — 3074F SYST BP LT 130 MM HG: CPT | Performed by: PHYSICIAN ASSISTANT

## 2024-04-18 PROCEDURE — 99203 OFFICE O/P NEW LOW 30 MIN: CPT | Performed by: PHYSICIAN ASSISTANT

## 2024-04-18 PROCEDURE — 36415 COLL VENOUS BLD VENIPUNCTURE: CPT

## 2024-04-18 PROCEDURE — 1126F AMNT PAIN NOTED NONE PRSNT: CPT | Performed by: PHYSICIAN ASSISTANT

## 2024-04-18 PROCEDURE — 83036 HEMOGLOBIN GLYCOSYLATED A1C: CPT | Performed by: PHYSICIAN ASSISTANT

## 2024-04-18 PROCEDURE — 84466 ASSAY OF TRANSFERRIN: CPT

## 2024-04-18 PROCEDURE — 83540 ASSAY OF IRON: CPT

## 2024-04-18 RX ORDER — FAMOTIDINE 20 MG/1
20 TABLET, FILM COATED ORAL 2 TIMES DAILY
Qty: 60 TABLET | Refills: 0 | Status: SHIPPED | OUTPATIENT
Start: 2024-04-18 | End: 2024-05-18

## 2024-04-18 RX ORDER — MULTIVIT-MIN/IRON/FOLIC ACID/K 18-600-40
CAPSULE ORAL
COMMUNITY

## 2024-04-18 RX ORDER — MULTIVITAMIN WITH IRON
50 TABLET ORAL DAILY
COMMUNITY

## 2024-04-18 RX ORDER — TIRZEPATIDE 2.5 MG/.5ML
2.5 INJECTION, SOLUTION SUBCUTANEOUS
Qty: 2 ML | Refills: 0 | Status: SHIPPED | OUTPATIENT
Start: 2024-04-18 | End: 2024-05-18

## 2024-04-18 RX ORDER — OLMESARTAN MEDOXOMIL 5 MG/1
5 TABLET ORAL DAILY
Qty: 90 TABLET | Refills: 3 | Status: SHIPPED | OUTPATIENT
Start: 2024-04-18 | End: 2024-07-17

## 2024-04-18 NOTE — PATIENT INSTRUCTIONS
Cómo controlar la presión arterial stephanie  La presión arterial stephanie (hipertensión) se conoce también matty el asesino silencioso. Se la llama así porque muchas personas la tienen sin saberlo. Puede ser muy peligrosa. La presión arterial stephanie puede aumentar el riesgo de ataques al corazón, derrames cerebrales, enfermedades del corazón o insuficiencia cardíaca. Controlar la presión arterial puede disminuir el riesgo de tener estos problemas. Es importantecontrolar la presión arterial de manera periódica. [place] puede salvarle la benitez.   Las mediciones de la presión arterial se indican con 2 números. La presión arterial sistólica es el número superior. Es la presión cuando el corazón se contrae. La presión arterial diastólica es el número inferior. Es la presión cuando el corazón se relaja entrelatidos.   La presión arterial se agrupa de la siguiente manera:  Presión arterial normal. Se llama de esta manera cuando la presión sistólica es becky que 120 y la diastólica es becky que 80 (120/80).  Presión arterial elevada. Se llama de esta manera cuando la presión sistólica es de 120 a 129 y la diastólica es becky que 80.  Presión arterial stephanie de etapa 1. La presión sistólica está entre 130 y 139 o la diastólica está entre 80 y 89.  Presión arterial stephanie de etapa 2. La presión sistólica es 140 o superior o la diastólica es 90 o superior.  Un estilo de benitez saludable para el corazón puede ayudarlo a controlar mcmanus presión arterial sin tener que gonzález medicamentos. Más abajo encontrará algunasrecomendaciones a fin de llevar un estilo de benitez saludable para el corazón.     Coma alimentos saludables para el corazón  Elija alimentos con bajo contenido de sodio y grasas. Limite la ingesta de sodio a 2,300 mg diarios o según la cantidad que le indique el proveedor de atención médica.  Limite el consumo de comidas enlatadas, secas, curadas, envasadas y las comidas rápidas. Pueden contener mucha sal.  Consuma entre ocho y valentine porciones  de frutas y verduras todos los ricarda.  Elija oscar de res magras, pescado o keila.  Coma pasta y arroz integrales y frijoles.  Consuma entre dos y elba porciones de productos lácteos descremados o bajos en grasa.  Consulte con mcmanus médico acerca del plan de alimentación DASH. Margaret plan ayuda a reducir la presión arterial.  Cuando coma en un restaurante, pida que le preparen los platos sin sal.    Mantenga un peso saludable  Pregunte a mcmanus proveedor de atención médica cuántas calorías puede consumir por día. Respete chriss cantidad.  Pregunte al proveedor cuáles son los límites de peso más adecuados para usted. Si tiene sobrepeso, perder entre un 3 % y un 5 % de mcmanus peso corporal puede ayudarlo a disminuir mcmanus presión arterial. Un buen objetivo es perder un 10 % de mcmanus peso corporal en un año.  Limite el consumo de tentempiés y dulces.  Amaris ejercicio con regularidad.    Amaris más actividad física  Busque actividades que le gusten. Puede hacerlas de forma individual, o con amigos o mcmanus jose d. Pruebe andar en bicicleta, bailar, caminar o trotar.  Estacione lejos de las entradas de los edificios para caminar más.  Use las escaleras en lugar del ascensor.  Cuando pueda, camine o vaya en bicicleta en vez de ir en automóvil.  [place] hojas, trabaje en mcmanus jardín o amaris reparaciones en la casa.  Amaris actividad física de moderada a intensa nasima, al menos, 30 minutos por día, un mínimo de nury días a la semana.     Controle el estrés  Tómese tiempo para relajarse y disfrutar de la benitez. Encuentre tiempo para reírse.  Comparta riya preocupaciones con riya seres queridos y con el proveedor de atención médica.  Visite a riya familiares y amigos, y mantenga riya pasatiempos favoritos.    Limite el alcohol y deje de fumar  Los hombres no deberían gonzález más de dos bebidas alcohólicas por día.  Las mujeres no deberían gonzález más de eduardo bebida alcohólica por día.  Si fuma, elabore un plan para dejar de hacerlo. [place] ayuda a mcmanus proveedor de  atención médica. Si fuma mucho, aumenta el riesgo de padecer enfermedades del corazón y derrames cerebrales. Consulte con el proveedor sobre programas para dejar de fumar y otros recursos de apoyo.    Medicamentos para la presión arterial  Si los cambios en el estilo de benitez no son suficientes, el proveedor de atención médica puede recetarle medicamentos para la presión arterial ruby. [place] todos los medicamentos según le indiquen. Si tiene alguna pregunta sobre los medicamentos, consulte al proveedor antes de dejar de tomarlos ocambiarlos.     © 2000-2022 [employer]. Todos los derechos reservados. Esta información no pretende sustituir la atención médica profesional. Sólo sumédico puede diagnosticar y tratar un problema de howie.    Video [employer]  ¿Qué es Presión Arterial Ruby?  Entender que es Presión Arterial, los riesgos en la howie al tener Presión Arterial Ruby, los factores que lo ponen a usted en riesgo de tener Presión Arterial [place] y la importancia de trabajar con mcmanus proveedor del cuidado de la howie para controlarla.  Para stella el video:  Escanee el código code  Utilizando mcmanus dispositivo móvil, escanee el siguiente código:  OR  Chang al sitio web:  [employer].com  Ingrese el código de receta:  3414S    © 2000-2022 [employer]. All rights reserved. This information is not intended as a substitute for professional medical care. Always follow your healthcare professional's instructions.    Video [employer]  [place] saud con presión arterial elevada  Ciertos alimentos pueden hacer que mcmanus presión suba demasiado. Jessica y aprenda lo fácil que es disfrutar de comidas deliciosas sin dañar mcmanus howie.     Para stella el video:  Escanee el código QR  Utilizando mcmanus dispositivo móvil, escanee el siguiente código:  FRANCES Downing al [facility] Medical[facility] web:  www.[employer]  Ingrese el código de receta:   DPX    © 3741-3357 [employer]. All rights reserved. This information is not  intended as a substitute for professional medical care. Always follow your healthcare professional's instructions.    Cómo tomarse la presión arterial  La presión arterial es la fuerza que la edilia ejerce contra las ibarra de los vasos al desplazarse por ellos. Usted puede tomarse mcmanus propia presión arterial con un medidor digital. Tómese la presión arterial a la misma hora y en elmismo brazo, tan a menudo matty le indique mcmanus proveedor de atención médica.   Acerca de los monitores de presión arterial   Los monitores de presión arterial están diseñados para diferentes edades y diferentes casos. Usted puede encontrar monitores para adultos mayores, para mujeres embarazadas y para niños. Asegúrese de que el que escoja es el adecuadopara mcmanus edad y situación.   La American Heart Association recomienda un monitor automatizado con manguito que se adapte al tamaño de mcmanus brazo (biceps). Mcmanus brazo debe caber saud dentro del manguito, ya que si es muy nesha o muy pequeño no suministrará eduardo lectura exacta. Mídase el grosor de mcmanus barazo paraencontrar la talla correcta para usted.   Los monitores que se ahieren a un dedo o a la hood no son tan precisos comolos que se adhieren a mcmanus brazo.   [place] a mcmanus proveedor de atención médica que le ayude a escoger un monitor. Traiga el monitor a mcmanus próxima pineda médica si necesita ayuda para aprender ausarlo correctamente.   Los pasos que se dan a continuación son instrucciones generales para usar unmonitor digital automatizado.     1. Relájese  Tómese la presión arterial a la misma hora todos los días, matty temprano en la mañana o al final de la tarde.  Espere al menos eduardo hora después de vincent fumado, comido o hecho ejercicio. No troy café, té, soda u otras bebidas cafeínadas antes de tomarse la presión.  Siéntese cómodamente a la sepulveda. Coloque el medidor digital cerca de usted.  Descanse nasima algunos minutos antes de empezar.    2. Coloque el manguito  Ponga el brazo sobre la sepulveda,  con la asencio de la mano hacia arriba. El brazo debe quedar a la altura del corazón. Ajuste el manguito alrededor del brazo, eh por encima del codo. Es mejor colocarlo sobre la piel desnuda, sin ropa. La mayoría de los manguitos le indicarán donde debe alinearlo con la arteria braquial (el vaso sanguíneo en la mitad del brazo en la parte interna del codo). Paula las instrucciones que vienen con mcmanus monitor para stella eduardo ilustración. También puede llevar mcmanus manguito a la pineda con mcmanus proveedor de atención médica para que le muestren cómo usarlo correctamente.    3. Infle el manguito  Oprima el botón para iniciar el bombeo automático.  El manguito mateo se aprieta y luego se afloja.  Los números cambian. Cuando paran de cambiar, el medidor indicará mcmanus presión arterial.  Tómese 2 o 3 lecturas con un minuto de diferencia entre eduardo y otra.      4. Anote los resultados  [place] nota de los números de mcmanus presión arterial, así matty de la fecha y la hora de la medición. Mantenga los resultados en un solo lugar, por ejemplo en un cuaderno. Incluso si mcmanus monitor tiene eduardo memoria incorporada, mantenga eduardo copia por escrito de los resultados.  Quite el manguito del brazo y apague la máquina.  Traiga los resultados de riya lecturas a cada eduardo de las citas con mcmanus proveedor de atención médica. También anote el días si cambió la dosis de mcmanus medicamento. [place] inforamación debe incuirse en los registros que amaris de mcmanus presión arterial para que le ayude a mcmanus proveedor de atención médica a evaluar qué tan saud están funcionando los cambios en el medicamento.  Pregúntele a mcmanus proveedor de atención médica a qué niveles debe obtener ayuda de inmediato.      © 7440-7077 [employer]. Todos los derechos reservados. Esta información no pretende sustituir la atención médica profesional. Sólo sumédico puede diagnosticar y tratar un problema de howie.

## 2024-04-18 NOTE — PROGRESS NOTES
HPI:     HPI  A 79-year-old male is here in the office for established care. The patient complains of generalized weakness for a couple of weeks.  The patient denies chest pain, SOB, N/V/C/D, fever, dizziness, syncope, and abdominal pain. There are no other concerns today.    Medications:     Current Outpatient Medications   Medication Sig Dispense Refill    vitamin B-12 50 MCG Oral Tab Take 1 tablet (50 mcg total) by mouth daily.      Cholecalciferol (VITAMIN D) 50 MCG (2000 UT) Oral Cap Take by mouth.      famotidine (PEPCID) 20 MG Oral Tab Take 1 tablet (20 mg total) by mouth 2 (two) times daily. 60 tablet 0    Tirzepatide (MOUNJARO) 2.5 MG/0.5ML Subcutaneous Solution Pen-injector Inject 2.5 mg into the skin every 7 days. 2 mL 0    olmesartan 5 MG Oral Tab Take 1 tablet (5 mg total) by mouth daily. 90 tablet 3    empagliflozin (JARDIANCE) 10 MG Oral Tab Take 1 tablet (10 mg total) by mouth daily.      metFORMIN 500 MG Oral Tab Take 1 tablet (500 mg total) by mouth 2 (two) times daily with meals.      pregabalin 100 MG Oral Cap Take 1 capsule (100 mg total) by mouth in the morning, at noon, and at bedtime.      pantoprazole 40 MG Oral Tab EC Take 1 tablet (40 mg total) by mouth every morning before breakfast.      traMADol 50 MG Oral Tab Take 1 tablet (50 mg total) by mouth in the morning and 1 tablet (50 mg total) before bedtime.      simvastatin 40 MG Oral Tab Take 1 tablet (40 mg total) by mouth nightly.      FLUoxetine HCl 20 MG Oral Tab Take 1 tablet (20 mg total) by mouth in the morning, at noon, and at bedtime.      diazePAM 10 MG Oral Tab Take 1 tablet (10 mg total) by mouth in the morning and 1 tablet (10 mg total) before bedtime.      tamsulosin 0.4 MG Oral Cap Take 1 capsule (0.4 mg total) by mouth daily.      Multiple Vitamins-Minerals (PRESERVISION/LUTEIN) Oral Cap Take 1 capsule by mouth daily.      Polyethyl Glycol-Propyl Glycol (SYSTANE ULTRA OP) Apply 2 drops to eye in the morning and 2 drops  before bedtime.         Allergies:   No Known Allergies    History:     Health Maintenance   Topic Date Due    Diabetes Care Foot Exam  Never done    Diabetes Care A1C  Never done    Diabetes Care Dilated Eye Exam  Never done    Zoster Vaccines (1 of 2) Never done    COVID-19 Vaccine ( season) 2023    MA Annual Health Assessment  Never done    Influenza Vaccine (Season Ended) 10/01/2024    Diabetes Care: GFR  04/10/2025    Diabetes Care: Microalb/Creat Ratio  04/10/2025    Annual Depression Screening  Completed    Fall Risk Screening (Annual)  Completed    Pneumococcal Vaccine: 65+ Years  Completed       No LMP for male patient.   Past Medical History:     Past Medical History:    Anxiety    BPH (benign prostatic hyperplasia)    CKD (chronic kidney disease) stage 3, GFR 30-59 ml/min ([facility])    Depression    Diabetes mellitus (HCC)    Essential hypertension    GERD (gastroesophageal reflux disease)    Hyperlipidemia       Past Surgical History:     Past Surgical History:   Procedure Laterality Date    Foot fracture surgery Right            Family History:     Family History   Problem Relation Age of Onset    Other (Other) Father        Social History:     Social History     Socioeconomic History    Marital status:      Spouse name: Not on file    Number of children: Not on file    Years of education: Not on file    Highest education level: Not on file   Occupational History    Not on file   Tobacco Use    Smoking status: Former     Current packs/day: 0.00     Average packs/day: 2.0 packs/day for 10.0 years (20.0 ttl pk-yrs)     Types: Cigarettes     Start date:      Quit date: 1980     Years since quittin.3    Smokeless tobacco: Never   Vaping Use    Vaping status: Never Used   Substance and Sexual Activity    Alcohol use: Yes     Comment: rarely    Drug use: Not Currently    Sexual activity: Not on file   Other Topics Concern    Not on file   Social History Narrative    Not on file      Social Determinants of Health     Financial Resource Strain: Unknown (10/2/2020)    Received from [facility]    Overall Financial Resource Strain (CARDIA)     Difficulty of Paying Living Expenses: Patient declined   Food Insecurity: Unknown (10/2/2020)    Received from [facility]    Hunger Vital Sign     Worried About Running Out of Food in the Last Year: Patient declined     Ran Out of Food in the Last Year: Patient declined   Transportation Needs: Unknown (10/2/2020)    Received from [facility]    PRAPARE - Transportation     Lack of Transportation (Medical): Patient declined     Lack of Transportation (Non-Medical): Patient declined   Physical Activity: Not on file   Stress: Not on file   Social Connections: Not on file   Housing Stability: Not on file       Review of Systems:   Review of Systems   Constitutional:  Negative for activity change, chills, fatigue and fever.   HENT:  Negative for congestion, ear discharge, ear pain, postnasal drip, rhinorrhea, sinus pressure, sinus pain and sore throat.    Respiratory:  Negative for cough, chest tightness, shortness of breath and wheezing.    Cardiovascular:  Negative for chest pain and palpitations.   Gastrointestinal:  Negative for abdominal distention, abdominal pain, blood in stool, constipation, diarrhea, nausea and vomiting.   Skin:  Negative for rash.   Neurological:  Positive for weakness.        Vitals:    04/18/24 1025   BP: 94/58   Pulse: 59   Weight: 194 lb (88 kg)   Height: 5' 8\" (1.727 m)     Body mass index is 29.5 kg/m².    Physical Exam:   Physical Exam  Vitals reviewed.   Constitutional:       General: He is not in acute distress.     Appearance: He is well-developed.   HENT:      Head: Normocephalic and atraumatic.      Right Ear: External ear normal.      Left Ear: External ear normal.      Nose: Nose normal.   Eyes:      General:         Right eye: No discharge.         Left  eye: No discharge.      Conjunctiva/sclera: Conjunctivae normal.   Cardiovascular:      Rate and Rhythm: Normal rate and regular rhythm.      Heart sounds: Normal heart sounds, S1 normal and S2 normal. No murmur heard.  Pulmonary:      Effort: Pulmonary effort is normal.      Breath sounds: Normal breath sounds. No wheezing or rales.   Chest:      Chest wall: No tenderness.   Lymphadenopathy:      Cervical: No cervical adenopathy.   Skin:     Findings: No rash.   Neurological:      Mental Status: He is alert and oriented to person, place, and time.   Psychiatric:         Behavior: Behavior is cooperative.          Assessment and Plan::     Problem List Items Addressed This Visit          HCC Problems    Type 2 diabetes mellitus without complication, without long-term current use of insulin (HCC) - Primary    Relevant Medications    Tirzepatide (MOUNJARO) 2.5 MG/0.5ML Subcutaneous Solution Pen-injector    olmesartan 5 MG Oral Tab    Other Relevant Orders    POC Glycohemoglobin [87947] (Completed)    CBC With Differential With Platelet (Completed)    Ferritin (Completed)    Iron And Tibc [E] (Completed)    Basic Metabolic Panel (8) (Completed)    Discussed HbA1C =7% today with patient and patient's family.  Instruct patient to monitor blood sugar pre and post meals.  Continue with Jardiance 10 mg QAM. Start Mounjaro: 2.5 mg subcutaneous Qweek for 4 weeks, then increase Mounjaro 5 mg subcutaneous Qweek.  Discontinue Januvia 100 mg PO every day and Metformin 500 mg BID.    Bilateral barefoot skin diabetic exam is normal, visualized feet and the appearance is normal.  Bilateral monofilament/sensation of both feet is normal.  Pulsation pedal pulse exam of both lower legs/feet is normal as well.           Cardiac and Vasculature    Hyperlipidemia    Relevant Medications    Stable. Continue current management.  Continue with Simvastatin 40 mg at bedtime      Essential hypertension    Relevant Medications    olmesartan 5 MG  Oral Tab  Decrease Olmesartan 5 mg every day due to hypotension.         Mental Health    Depression with anxiety  Stable. Continue current management.  Continue with Fluoxetine 20 mg QAM and Diazepam 10 mg BID.         Musculoskeletal and Injuries    Chronic low back pain    Relevant Medications    Cholecalciferol (VITAMIN D) 50 MCG (2000 UT) Oral Cap    Other Relevant Orders    Physical [facility] Referral - [facility]    Chronic pain of both knees    Relevant Medications    Cholecalciferol (VITAMIN D) 50 MCG (2000 UT) Oral Cap    Other Relevant Orders    Physical Therapy Referral - [facility] [place]    Generalized osteoarthritis    Relevant Medications        Other Relevant Orders    Physical Therapy Referral - [facility]       Genitourinary and Reproductive    BPH (benign prostatic hyperplasia)    Relevant Medications    Stable. Continue current management.  Continue with Flomax 0.4 mg every day.     Other Visit Diagnoses       Anemia, unspecified type        Relevant Medications    vitamin B-12 50 MCG Oral Tab    Other Relevant Orders    CBC With Differential With Platelet (Completed)    Ferritin (Completed)    Iron And Tibc [E] (Completed)    Basic Metabolic Panel (8) (Completed)    Weakness        Relevant Orders    Physical Therapy Referral - [facility] [place]          Discussed plan of care with pt and pt is in agreement.All questions answered. Pt to call with questions or concerns.

## 2024-04-19 ENCOUNTER — APPOINTMENT (OUTPATIENT)
Dept: WOUND CARE | Facility: HOSPITAL | Age: 79
End: 2024-04-19
Attending: NURSE PRACTITIONER
Payer: MEDICARE

## 2024-04-30 ENCOUNTER — PATIENT MESSAGE (OUTPATIENT)
Dept: FAMILY MEDICINE CLINIC | Facility: CLINIC | Age: 79
End: 2024-04-30

## 2024-05-02 DIAGNOSIS — G89.29 CHRONIC PAIN OF BOTH KNEES: Primary | ICD-10-CM

## 2024-05-02 DIAGNOSIS — I10 ESSENTIAL HYPERTENSION: ICD-10-CM

## 2024-05-02 DIAGNOSIS — M25.561 CHRONIC PAIN OF BOTH KNEES: Primary | ICD-10-CM

## 2024-05-02 DIAGNOSIS — F41.8 DEPRESSION WITH ANXIETY: ICD-10-CM

## 2024-05-02 DIAGNOSIS — M25.562 CHRONIC PAIN OF BOTH KNEES: Primary | ICD-10-CM

## 2024-05-02 NOTE — TELEPHONE ENCOUNTER
Patient called and requested for a .   Cristofer #[id number] was disconnected in the middle of the conversation.   RN got another  Maddison #[id number] ,patient requesting refills for all his medication, he does not know the names.   Per patient, his daughter sent a message regarding this and called the office but could not provide informations.   Instructed patient to tell his daughter to call us back, we need to verify   the medications, doses and frequency .Office number and hours  provided     Phone consent provided that we can talk to his daughter Hector (RN updated this encounter contact's information).        4/30/24 MyCbillyt ;  Arnaldo Gutierrez   to P Em Triage Support (supporting Umang Kee PA-C)   [place]      4/30/24 11:57 AM  Hi Dr. Kee   Good afternoon, I have one question regarding my others prescription, I have no refills.  I’m ok with the ones you change but my other regular medications. Would you be able to send the prescriptions to the pharmacy?    Thank you    Attachments   [email or web address]

## 2024-05-02 NOTE — TELEPHONE ENCOUNTER
Lars April, RN 5/2/2024 4:00 PM CDT        ----- Message -----  From: Arnaldo Gutierrez  Sent: 5/2/2024 3:10 PM CDT  To: Em Rn Triage  Subject: Medication Refills     Hi, these are the medications I need to continue and have no prescription.  Please any questions please feel free to call my daughter Yao at [phone].     Pregabalin 100mg  Pantropazole 40mg  Olmesartan 20mg  Tramadol 50mg  Simvastatin 40mg  Diazepan 10mg  Tamswosin 0.4  Jardiance 10mg    Thank you

## 2024-05-03 ENCOUNTER — APPOINTMENT (OUTPATIENT)
Dept: WOUND CARE | Facility: HOSPITAL | Age: 79
End: 2024-05-03
Attending: NURSE PRACTITIONER
Payer: MEDICARE

## 2024-05-03 RX ORDER — OLMESARTAN MEDOXOMIL 5 MG/1
5 TABLET ORAL DAILY
Qty: 90 TABLET | Refills: 3 | Status: SHIPPED | OUTPATIENT
Start: 2024-05-03 | End: 2024-08-01

## 2024-05-03 RX ORDER — DIAZEPAM 10 MG/1
10 TABLET ORAL 2 TIMES DAILY
Qty: 60 TABLET | Refills: 3 | Status: SHIPPED | OUTPATIENT
Start: 2024-05-03

## 2024-05-03 RX ORDER — SIMVASTATIN 40 MG
40 TABLET ORAL NIGHTLY
Qty: 90 TABLET | Refills: 1 | Status: SHIPPED | OUTPATIENT
Start: 2024-05-03

## 2024-05-03 RX ORDER — PREGABALIN 100 MG/1
100 CAPSULE ORAL 3 TIMES DAILY
Qty: 90 CAPSULE | Refills: 3 | Status: SHIPPED | OUTPATIENT
Start: 2024-05-03

## 2024-05-03 RX ORDER — TRAMADOL HYDROCHLORIDE 50 MG/1
50 TABLET ORAL 2 TIMES DAILY
Qty: 90 TABLET | Refills: 0 | Status: SHIPPED | OUTPATIENT
Start: 2024-05-03

## 2024-05-03 RX ORDER — PANTOPRAZOLE SODIUM 40 MG/1
40 TABLET, DELAYED RELEASE ORAL
Qty: 90 TABLET | Refills: 2 | Status: SHIPPED | OUTPATIENT
Start: 2024-05-03

## 2024-05-03 RX ORDER — TAMSULOSIN HYDROCHLORIDE 0.4 MG/1
0.4 CAPSULE ORAL DAILY
Qty: 90 CAPSULE | Refills: 2 | Status: SHIPPED | OUTPATIENT
Start: 2024-05-03

## 2024-05-03 NOTE — TELEPHONE ENCOUNTER
Patient's daughter/Yao called in Romanian [patient gave verbal authorization to speak with daughter related to this matter, see below]. Jardiance is also needed, see notes from last visit where it indicates he is to continue Jardiance 10 mg by mouth each morning--> added to list of pended prescriptions. I made her aware we are processing request. She verbalized understanding. No further questions or concerns at this time.     Routing for protocol

## 2024-05-03 NOTE — TELEPHONE ENCOUNTER
Please review; Protocol Failed/ no protocol.  Umang Kee PA-C to review and advise, sign pended - if appropriate [patient had established care with you recently]  Rxs Pended, authorize if appropriate    Requested Prescriptions   Pending Prescriptions Disp Refills    pregabalin 100 MG Oral Cap 90 capsule 3     Sig: Take 1 capsule (100 mg total) by mouth in the morning, at noon, and at bedtime.       Controlled Substance Medication Failed - 5/3/2024  9:04 AM        Failed - This medication is a controlled substance - forward to provider to refill       Neurology Medications Passed - 5/3/2024  9:04 AM        Passed - In person appointment or virtual visit in the past 6 mos or appointment in next 3 mos     Recent Outpatient Visits              2 weeks ago Type 2 diabetes mellitus without complication, without long-term current use of insulin ([facility])    [facility] [address], [place] Umang Kee PA-C    Office Visit    3 weeks ago Traumatic open wound of lower leg, right, initial encounter    [facility] Raji Rojas APRN    Office Visit    4 weeks ago Abrasion of right lower extremity, subsequent encounter    [facility]    Office Visit    1 month ago Type II diabetes mellitus with manifestations ([facility])    [facility]    Office Visit    1 month ago Acute kidney injury ([facility])    [facility] Bright Sow MD    Office Visit          Future Appointments         Provider Department Appt Notes    In 1 month Bright Sow MD [facility]     In 2 months Umang Kee PA-C [facility] [address], [place] 3 months follow up                      pantoprazole 40 MG Oral Tab EC 90 tablet 2     Sig: Take 1 tablet (40 mg total) by mouth every morning before breakfast.        Gastrointestional Medication Protocol Passed - 5/3/2024  9:04 AM        Passed - In person appointment or virtual visit in the past 12 mos or appointment in next 3 mos     Recent Outpatient Visits              2 weeks ago Type 2 diabetes mellitus without complication, without long-term current use of insulin ([facility])    [facility] [address], [place] Umang Kee PA-C    Office Visit    3 weeks ago Traumatic open wound of lower leg, right, initial encounter    [facility] Raji Rojas APRN    Office Visit    4 weeks ago Abrasion of right lower extremity, subsequent encounter    [facility] Raji Rojas, FAIZAN    Office Visit    1 month ago Type II diabetes mellitus with manifestations ([facility]    Office Visit    1 month ago Acute kidney injury ([facility])    [facility] Bright Sow MD    Office Visit          Future Appointments         Provider Department Appt Notes    In 1 month Bright Sow MD [facility]     In 2 months Umang Kee PA-C [facility] [address], [place] 3 months follow up                      olmesartan 5 MG Oral Tab 90 tablet 0     Sig: Take 1 tablet (5 mg total) by mouth daily.       Hypertension Medications Protocol Failed - 5/3/2024  9:04 AM        Failed - EGFRCR or GFRNAA > 50     GFR Evaluation  EGFRCR: 37 , resulted on 4/18/2024          Passed - CMP or BMP in past 12 months        Passed - Last BP reading less than 140/90     BP Readings from Last 1 Encounters:   04/18/24 94/58               Passed - In person appointment or virtual visit in the past 12 mos or appointment in next 3 mos     Recent Outpatient Visits              2 weeks ago Type 2 diabetes mellitus without complication, without long-term current use of insulin ([facility])     [facility] [address], [place] Umang Kee PA-C    Office Visit    3 weeks ago Traumatic open wound of lower leg, right, initial encounter    [facility] Raji Rojas, APRJOANIE    Office Visit    4 weeks ago Abrasion of right lower extremity, subsequent encounter    [facility] Raji Rojas, APRN    Office Visit    1 month ago Type II diabetes mellitus with manifestations (HCC)    [facility] Raji Rojas, APRN    Office Visit    1 month ago Acute kidney injury (HCC)    [facility] Bright Sow MD    Office Visit          Future Appointments         Provider Department Appt Notes    In 1 month Bright Sow MD [facility]     In 2 months Umang Kee PA-C [facility] [address], [place] 3 months follow up                      traMADol 50 MG Oral Tab 90 tablet 0     Sig: Take 1 tablet (50 mg total) by mouth in the morning and 1 tablet (50 mg total) before bedtime.       Controlled Substance Medication Failed - 5/3/2024  9:04 AM        Failed - This medication is a controlled substance - forward to provider to refill          simvastatin 40 MG Oral Tab 90 tablet 1     Sig: Take 1 tablet (40 mg total) by mouth nightly.       Cholesterol Medication Protocol Failed - 5/3/2024  9:04 AM        Failed - Lipid panel within past 12 months     No results found for: \"CHOLEST\", \"TRIG\", \"HDL\", \"LDL\", \"VLDL\", \"TCHDLRATIO\", \"NONHDLC\", \"CHOLHDLRATIO\"          Passed - ALT < 80     Lab Results   Component Value Date    ALT 12 04/10/2024             Passed - ALT resulted within past year        Passed - In person appointment or virtual visit in the past 12 mos or appointment in next 3 mos     Recent Outpatient Visits              2 weeks ago Type 2 diabetes mellitus without complication, without long-term current use  of insulin ([facility])    [facility] [address], [place] Umang Kee PA-C    Office Visit    3 weeks ago Traumatic open wound of lower leg, right, initial encounter    [facility] Raji Rojas, APRN    Office Visit    4 weeks ago Abrasion of right lower extremity, subsequent encounter    [facility] Raji Rojas, APRN    Office Visit    1 month ago Type II diabetes mellitus with manifestations ([facility])    [facility] Raji Rojas, APRN    Office Visit    1 month ago Acute kidney injury ([facility])    [facility] Bright Sow MD    Office Visit          Future Appointments         Provider Department Appt Notes    In 1 month Bright Sow MD [facility]     In 2 months Umang Kee PA-C [facility] [address], [place] 3 months follow up                      diazePAM 10 MG Oral Tab 60 tablet 1     Sig: Take 1 tablet (10 mg total) by mouth in the morning and 1 tablet (10 mg total) before bedtime.       Controlled Substance Medication Failed - 5/3/2024  9:04 AM        Failed - This medication is a controlled substance - forward to provider to refill          tamsulosin 0.4 MG Oral Cap 90 capsule 2     Sig: Take 1 capsule (0.4 mg total) by mouth daily.       Genitourinary Medications Passed - 5/3/2024  9:04 AM        Passed - Patient does not have pulmonary hypertension on problem list        Passed - In person appointment or virtual visit in the past 12 mos or appointment in next 3 mos     Recent Outpatient Visits              2 weeks ago Type 2 diabetes mellitus without complication, without long-term current use of insulin ([facility])    [facility] [address], [place] Umang Kee PA-C    Office Visit    3 weeks ago Traumatic open wound of lower leg, right, initial encounter    [place]  [facility] Raji Rojas, FAIZAN    Office Visit    4 weeks ago Abrasion of right lower extremity, subsequent encounter    [facility] Raji Rojas, FAIZAN    Office Visit    1 month ago Type II diabetes mellitus with manifestations (HCC)    [facility] Raji Rojas, FAIZAN    Office Visit    1 month ago Acute kidney injury (HCC)    [facility] Bright Sow MD    Office Visit          Future Appointments         Provider Department Appt Notes    In 1 month Bright Sow MD [facility]     In 2 months Umang Kee PA-C [facility] [address], [place] 3 months follow up                      empagliflozin (JARDIANCE) 10 MG Oral Tab 90 tablet 3     Sig: Take 1 tablet (10 mg total) by mouth daily. Every morning       Diabetes Medication Protocol Failed - 5/3/2024  9:04 AM        Failed - EGFRCR or GFRNAA > 50     GFR Evaluation  EGFRCR: 37 , resulted on 4/18/2024          Passed - Last A1C < 7.5 and within past 6 months     Lab Results   Component Value Date    A1C 7.0 (A) 04/18/2024             Passed - In person appointment or virtual visit in the past 6 mos or appointment in next 3 mos     Recent Outpatient Visits              2 weeks ago Type 2 diabetes mellitus without complication, without long-term current use of insulin (HCC)    [facility] [address], [place] Umang Kee PA-C    Office Visit    3 weeks ago Traumatic open wound of lower leg, right, initial encounter    [facility] Raji Rojas APRN    Office Visit    4 weeks ago Abrasion of right lower extremity, subsequent encounter    [facility] Raji Rojas, FAIZAN    Office Visit    1 month ago Type II diabetes mellitus with manifestations (HCC)    [facility] Raji Rojas APRN     Office Visit    1 month ago Acute kidney injury (HCC)    [facility] Bright Sow MD    Office Visit          Future Appointments         Provider Department Appt Notes    In 1 month Bright Sow MD [facility] [place]     In 2 months Umang Kee PA-C [facility] [address], [place] 3 months follow up                    Passed - Microalbumin procedure in past 12 months or taking ACE/ARB        Passed - GFR in the past 12 months

## 2024-05-10 ENCOUNTER — APPOINTMENT (OUTPATIENT)
Dept: WOUND CARE | Facility: HOSPITAL | Age: 79
End: 2024-05-10
Attending: NURSE PRACTITIONER
Payer: MEDICARE

## 2024-05-14 ENCOUNTER — LAB ENCOUNTER (OUTPATIENT)
Dept: LAB | Age: 79
End: 2024-05-14
Attending: PHYSICIAN ASSISTANT

## 2024-05-14 ENCOUNTER — OFFICE VISIT (OUTPATIENT)
Dept: FAMILY MEDICINE CLINIC | Facility: CLINIC | Age: 79
End: 2024-05-14

## 2024-05-14 VITALS
BODY MASS INDEX: 29.25 KG/M2 | WEIGHT: 193 LBS | SYSTOLIC BLOOD PRESSURE: 112 MMHG | HEIGHT: 68 IN | HEART RATE: 61 BPM | DIASTOLIC BLOOD PRESSURE: 63 MMHG

## 2024-05-14 DIAGNOSIS — I10 ESSENTIAL HYPERTENSION: ICD-10-CM

## 2024-05-14 DIAGNOSIS — I10 ESSENTIAL HYPERTENSION: Primary | ICD-10-CM

## 2024-05-14 DIAGNOSIS — R10.13 EPIGASTRIC PAIN: ICD-10-CM

## 2024-05-14 DIAGNOSIS — E11.9 TYPE 2 DIABETES MELLITUS WITHOUT COMPLICATION, WITHOUT LONG-TERM CURRENT USE OF INSULIN (HCC): ICD-10-CM

## 2024-05-14 LAB
ANION GAP SERPL CALC-SCNC: 4 MMOL/L (ref 0–18)
BASOPHILS # BLD AUTO: 0.06 X10(3) UL (ref 0–0.2)
BASOPHILS NFR BLD AUTO: 0.9 %
BUN BLD-MCNC: 20 MG/DL (ref 9–23)
BUN/CREAT SERPL: 13.7 (ref 10–20)
CALCIUM BLD-MCNC: 9.2 MG/DL (ref 8.7–10.4)
CHLORIDE SERPL-SCNC: 109 MMOL/L (ref 98–112)
CO2 SERPL-SCNC: 29 MMOL/L (ref 21–32)
CREAT BLD-MCNC: 1.46 MG/DL
DEPRECATED RDW RBC AUTO: 64.4 FL (ref 35.1–46.3)
EGFRCR SERPLBLD CKD-EPI 2021: 49 ML/MIN/1.73M2 (ref 60–?)
EOSINOPHIL # BLD AUTO: 0.07 X10(3) UL (ref 0–0.7)
EOSINOPHIL NFR BLD AUTO: 1 %
ERYTHROCYTE [DISTWIDTH] IN BLOOD BY AUTOMATED COUNT: 19.9 % (ref 11–15)
FASTING STATUS PATIENT QL REPORTED: YES
GLUCOSE BLD-MCNC: 114 MG/DL (ref 70–99)
HCT VFR BLD AUTO: 39.7 %
HGB BLD-MCNC: 12.9 G/DL
IMM GRANULOCYTES # BLD AUTO: 0.03 X10(3) UL (ref 0–1)
IMM GRANULOCYTES NFR BLD: 0.4 %
LYMPHOCYTES # BLD AUTO: 2.1 X10(3) UL (ref 1–4)
LYMPHOCYTES NFR BLD AUTO: 30.8 %
MCH RBC QN AUTO: 29.1 PG (ref 26–34)
MCHC RBC AUTO-ENTMCNC: 32.5 G/DL (ref 31–37)
MCV RBC AUTO: 89.4 FL
MONOCYTES # BLD AUTO: 0.6 X10(3) UL (ref 0.1–1)
MONOCYTES NFR BLD AUTO: 8.8 %
NEUTROPHILS # BLD AUTO: 3.95 X10 (3) UL (ref 1.5–7.7)
NEUTROPHILS # BLD AUTO: 3.95 X10(3) UL (ref 1.5–7.7)
NEUTROPHILS NFR BLD AUTO: 58.1 %
OSMOLALITY SERPL CALC.SUM OF ELEC: 297 MOSM/KG (ref 275–295)
PLATELET # BLD AUTO: 190 10(3)UL (ref 150–450)
POTASSIUM SERPL-SCNC: 4.9 MMOL/L (ref 3.5–5.1)
RBC # BLD AUTO: 4.44 X10(6)UL
SODIUM SERPL-SCNC: 142 MMOL/L (ref 136–145)
WBC # BLD AUTO: 6.8 X10(3) UL (ref 4–11)

## 2024-05-14 PROCEDURE — 80048 BASIC METABOLIC PNL TOTAL CA: CPT

## 2024-05-14 PROCEDURE — 3078F DIAST BP <80 MM HG: CPT | Performed by: PHYSICIAN ASSISTANT

## 2024-05-14 PROCEDURE — 1126F AMNT PAIN NOTED NONE PRSNT: CPT | Performed by: PHYSICIAN ASSISTANT

## 2024-05-14 PROCEDURE — 3008F BODY MASS INDEX DOCD: CPT | Performed by: PHYSICIAN ASSISTANT

## 2024-05-14 PROCEDURE — 3074F SYST BP LT 130 MM HG: CPT | Performed by: PHYSICIAN ASSISTANT

## 2024-05-14 PROCEDURE — 1159F MED LIST DOCD IN RCRD: CPT | Performed by: PHYSICIAN ASSISTANT

## 2024-05-14 PROCEDURE — 85025 COMPLETE CBC W/AUTO DIFF WBC: CPT

## 2024-05-14 PROCEDURE — 99213 OFFICE O/P EST LOW 20 MIN: CPT | Performed by: PHYSICIAN ASSISTANT

## 2024-05-14 PROCEDURE — 36415 COLL VENOUS BLD VENIPUNCTURE: CPT

## 2024-05-14 RX ORDER — BLOOD SUGAR DIAGNOSTIC
1 STRIP MISCELLANEOUS 2 TIMES DAILY
COMMUNITY
Start: 2024-04-18

## 2024-05-14 RX ORDER — TIRZEPATIDE 5 MG/.5ML
5 INJECTION, SOLUTION SUBCUTANEOUS WEEKLY
Qty: 6 ML | Refills: 1 | Status: SHIPPED | OUTPATIENT
Start: 2024-05-14 | End: 2024-08-12

## 2024-05-14 RX ORDER — LANCETS
EACH MISCELLANEOUS DAILY
COMMUNITY
Start: 2024-04-18

## 2024-05-14 NOTE — PROGRESS NOTES
HPI:     HPI  A 79-year-old male is here in the office for a follow-up. The patient complains of heart burn. The patient takes Pepcid without relief, then the patient resumes Pantoprazole with relief. The patient is doing fine at this time.  The patient denies chest pain, SOB, N/V/C/D, fever, dizziness, syncope, and abdominal pain. There are no other concerns today.    Medications:     Current Outpatient Medications   Medication Sig Dispense Refill    Tirzepatide (MOUNJARO) 5 MG/0.5ML Subcutaneous Solution Pen-injector Inject 5 mg into the skin once a week. 6 mL 1    pregabalin 100 MG Oral Cap Take 1 capsule (100 mg total) by mouth in the morning, at noon, and at bedtime. 90 capsule 3    pantoprazole 40 MG Oral Tab EC Take 1 tablet (40 mg total) by mouth every morning before breakfast. 90 tablet 2    olmesartan 5 MG Oral Tab Take 1 tablet (5 mg total) by mouth daily. 90 tablet 3    traMADol 50 MG Oral Tab Take 1 tablet (50 mg total) by mouth in the morning and 1 tablet (50 mg total) before bedtime. 90 tablet 0    simvastatin 40 MG Oral Tab Take 1 tablet (40 mg total) by mouth nightly. 90 tablet 1    diazePAM 10 MG Oral Tab Take 1 tablet (10 mg total) by mouth in the morning and 1 tablet (10 mg total) before bedtime. 60 tablet 3    tamsulosin 0.4 MG Oral Cap Take 1 capsule (0.4 mg total) by mouth daily. 90 capsule 2    empagliflozin (JARDIANCE) 10 MG Oral Tab Take 1 tablet (10 mg total) by mouth daily. Every morning 90 tablet 3    vitamin B-12 50 MCG Oral Tab Take 1 tablet (50 mcg total) by mouth daily.      Cholecalciferol (VITAMIN D) 50 MCG (2000 UT) Oral Cap Take by mouth.      Ferrous Sulfate 325 (65 Fe) MG Oral Tab Take 1 tablet (325 mg total) by mouth daily with breakfast. With orange juice. 90 tablet 1    FLUoxetine HCl 20 MG Oral Tab Take 1 tablet (20 mg total) by mouth in the morning, at noon, and at bedtime.      Multiple Vitamins-Minerals (PRESERVISION/LUTEIN) Oral Cap Take 1 capsule by mouth daily.       Polyethyl Glycol-Propyl Glycol (SYSTANE ULTRA OP) Apply 2 drops to eye in the morning and 2 drops before bedtime.      Glucose Blood (ACCU-CHEK GUIDE) In Vitro Strip 1 each by In Vitro route 2 (two) times daily.      Accu-Chek FastClix Lancets Does not apply Misc Apply topically daily.         Allergies:   No Known Allergies    History:     Health Maintenance   Topic Date Due    Diabetes Care Dilated Eye Exam  Never done    Zoster Vaccines (1 of 2) Never done    COVID-19 Vaccine ( season) 2023    MA Annual Health Assessment  Never done    Influenza Vaccine (Season Ended) 10/01/2024    Diabetes Care A1C  10/18/2024    Diabetes Care: Microalb/Creat Ratio  04/10/2025    Diabetes Care Foot Exam  2025    Diabetes Care: GFR  2025    Annual Depression Screening  Completed    Fall Risk Screening (Annual)  Completed    Pneumococcal Vaccine: 65+ Years  Completed       No LMP for male patient.   Past Medical History:     Past Medical History:    Anxiety    BPH (benign prostatic hyperplasia)    CKD (chronic kidney disease) stage 3, GFR 30-59 ml/min ([facility])    Depression    Diabetes mellitus (HCC)    Essential hypertension    GERD (gastroesophageal reflux disease)    Hyperlipidemia       Past Surgical History:     Past Surgical History:   Procedure Laterality Date    Foot fracture surgery Right     2012       Family History:     Family History   Problem Relation Age of Onset    Other (Other) Father        Social History:     Social History     Socioeconomic History    Marital status:      Spouse name: Not on file    Number of children: Not on file    Years of education: Not on file    Highest education level: Not on file   Occupational History    Not on file   Tobacco Use    Smoking status: Former     Current packs/day: 0.00     Average packs/day: 2.0 packs/day for 10.0 years (20.0 ttl pk-yrs)     Types: Cigarettes     Start date:      Quit date:      Years since quittin.3     Smokeless tobacco: Never   Vaping Use    Vaping status: Never Used   Substance and Sexual Activity    Alcohol use: Yes     Comment: rarely    Drug use: Not Currently    Sexual activity: Not on file   Other Topics Concern    Not on file   Social History Narrative    Not on file     Social Determinants of Health     Financial Resource Strain: Unknown (10/2/2020)    [facility]    Overall Financial Resource Strain (CARDIA)     Difficulty of Paying Living Expenses: Patient declined   Food Insecurity: Unknown (10/2/2020)    Received from [facility], [facility]    Hunger Vital Sign     Worried About Running Out of Food in the Last Year: Patient declined     Ran Out of Food in the Last Year: Patient declined   Transportation Needs: Unknown (10/2/2020)    Received from [facility], [facility]    PRAPARE - Transportation     Lack of Transportation (Medical): Patient declined     Lack of Transportation (Non-Medical): Patient declined   Physical Activity: Not on file   Stress: Not on file   Social Connections: Not on file   Housing Stability: Not on file       Review of Systems:   Review of Systems   Constitutional:  Negative for activity change, chills, fatigue and fever.   HENT:  Negative for congestion, ear discharge, ear pain, postnasal drip, rhinorrhea, sinus pressure, sinus pain and sore throat.    Respiratory:  Negative for cough, chest tightness, shortness of breath and wheezing.    Cardiovascular:  Negative for chest pain and palpitations.   Gastrointestinal:  Negative for abdominal distention, abdominal pain, blood in stool, constipation, diarrhea, nausea and vomiting.   Skin:  Negative for rash.        Vitals:    05/14/24 1150   BP: 112/63   Pulse: 61   Weight: 193 lb (87.5 kg)   Height: 5' 8\" (1.727 m)     Body mass index is 29.35 kg/m².    Physical Exam:    Physical Exam  Vitals reviewed.   Abdominal:      General: Abdomen is flat. Bowel sounds are normal. There is no distension.      Palpations: Abdomen is soft.      Tenderness: There is no abdominal tenderness. There is no right CVA tenderness or left CVA tenderness.          Assessment and Plan::     Problem List Items Addressed This Visit          HCC Problems    Type 2 diabetes mellitus without complication, without long-term current use of insulin (HCC)    Relevant Medications    Tirzepatide (MOUNJARO) 5 MG/0.5ML Subcutaneous Solution Pen-injector    Continue with Jardiance  10 mg QAM. Increase Mounjaro 5 mg Qweek.         Cardiac and Vasculature    Essential hypertension - Primary    Relevant Orders    CBC With Differential With Platelet    Basic Metabolic Panel (8)  Stable. Continue current management.       Other Visit Diagnoses       Epigastric pain        Relevant Orders    H. Pylori Stool Ag, EIA [E]  Continue with Pantoprazole.            Discussed plan of care with pt and pt is in agreement.All questions answered. Pt to call with questions or concerns.

## 2024-05-17 DIAGNOSIS — E11.9 TYPE 2 DIABETES MELLITUS WITHOUT COMPLICATION, WITHOUT LONG-TERM CURRENT USE OF INSULIN (HCC): ICD-10-CM

## 2024-05-17 NOTE — TELEPHONE ENCOUNTER
Patients daughter called to let dr know that they were able to find pharmacy that has mounjaro 2.5 in stock.   Script needs to be sent to [facility]. 2.5       [facility] [address] [place] [address]       2.5 ML   Medication Detail    Medication Quantity Refills Start End   Tirzepatide (MOUNJARO) 5 MG/0.5ML Subcutaneous Solution Pen-injector 6 mL 1 5/14/2024 8/12/2024   Sig:   Inject 5 mg into the skin once a week.     Route:   Subcutaneous     Order #:   [id number]

## 2024-05-19 ENCOUNTER — LAB ENCOUNTER (OUTPATIENT)
Dept: LAB | Age: 79
End: 2024-05-19
Attending: PHYSICIAN ASSISTANT

## 2024-05-19 DIAGNOSIS — R10.13 EPIGASTRIC PAIN: ICD-10-CM

## 2024-05-19 PROCEDURE — 87338 HPYLORI STOOL AG IA: CPT

## 2024-05-20 RX ORDER — TIRZEPATIDE 2.5 MG/.5ML
2.5 INJECTION, SOLUTION SUBCUTANEOUS
Qty: 2 ML | Refills: 0 | Status: SHIPPED | OUTPATIENT
Start: 2024-05-20 | End: 2024-06-19

## 2024-05-20 RX ORDER — TIRZEPATIDE 5 MG/.5ML
5 INJECTION, SOLUTION SUBCUTANEOUS WEEKLY
Qty: 6 ML | Refills: 1 | Status: CANCELLED | OUTPATIENT
Start: 2024-05-20 | End: 2024-08-18

## 2024-05-21 LAB — H PYLORI AG STL QL IA: NEGATIVE

## 2024-06-07 ENCOUNTER — OFFICE VISIT (OUTPATIENT)
Dept: NEPHROLOGY | Facility: CLINIC | Age: 79
End: 2024-06-07
Payer: COMMERCIAL

## 2024-06-07 VITALS
SYSTOLIC BLOOD PRESSURE: 82 MMHG | WEIGHT: 189 LBS | DIASTOLIC BLOOD PRESSURE: 42 MMHG | BODY MASS INDEX: 29 KG/M2 | HEART RATE: 62 BPM

## 2024-06-07 DIAGNOSIS — E78.2 MIXED HYPERLIPIDEMIA: ICD-10-CM

## 2024-06-07 DIAGNOSIS — N18.31 STAGE 3A CHRONIC KIDNEY DISEASE (HCC): Primary | ICD-10-CM

## 2024-06-07 DIAGNOSIS — I10 PRIMARY HYPERTENSION: ICD-10-CM

## 2024-06-07 DIAGNOSIS — E11.21 DIABETIC NEPHROPATHY ASSOCIATED WITH TYPE 2 DIABETES MELLITUS (HCC): ICD-10-CM

## 2024-06-07 PROCEDURE — 99214 OFFICE O/P EST MOD 30 MIN: CPT | Performed by: INTERNAL MEDICINE

## 2024-06-07 PROCEDURE — 3078F DIAST BP <80 MM HG: CPT | Performed by: INTERNAL MEDICINE

## 2024-06-07 PROCEDURE — 1160F RVW MEDS BY RX/DR IN RCRD: CPT | Performed by: INTERNAL MEDICINE

## 2024-06-07 PROCEDURE — 1126F AMNT PAIN NOTED NONE PRSNT: CPT | Performed by: INTERNAL MEDICINE

## 2024-06-07 PROCEDURE — 1159F MED LIST DOCD IN RCRD: CPT | Performed by: INTERNAL MEDICINE

## 2024-06-07 PROCEDURE — 3074F SYST BP LT 130 MM HG: CPT | Performed by: INTERNAL MEDICINE

## 2024-06-18 NOTE — PROGRESS NOTES
Chief Complaint   Patient presents with    Follow - Up     Patient here for a follow up of CKD.        Nephrology Progress Note     79 year old male with past medical history of T2DM (dx 2005), HTN, HLD, CKD 3a, BPH, GERD, and anxiety/depression is here for follow up of CKD 3a.     2017: Cr 1.1 12/9  2018: Cr 1.2 4/5 2020: Cr 1.44 5/14, 1.24 8/25 2021: Cr 1.2 5/19 2023: Cr increased to 1.48 11/1 2024: Cr further increased to 2.31 3/16. Olmesartan dose decreased. 1.65 4/10, 1.84 4/18, 1.46 5/14     Denies NSAIDs use.      UA neg for protein and blood 12/10/21, 4/10/24    Renal US (4/10/24) showed: R Kidney-10.13 cm, 2.1 cyst in lateral kidney; L Kidney-10.87 cm.     T2DM: Taking Mounjaro 5 mg weekly and Jardiance 10 mg daily.   Recent a1c 7 4/2024. a1c has been in 7 range since 2015  DM is complicated by retinopathy, neuropathy, and nephropathy.    Following with endo at [facility].     HTN: Taking Olmesartan 5 mg daily. BP 82/42 today. Complains of hypotension Denies dizziness.      HLD: No recent lipid panel available. Taking Simvastatin 40 mg daily.       Additional labs:   Hb 12.9 5/2024        FHx:   Mother (D)   Father (D)   No family history of CKD.      SHx: History of smoking 2 PPD x 10 yrs, quit 1980. Drinks alcohol rarely.   Lives with his wife.       HISTORY:  Past Medical History:    Anxiety    BPH (benign prostatic hyperplasia)    CKD (chronic kidney disease) stage 3, GFR 30-59 ml/min (HCC)    Depression    Diabetes mellitus (HCC)    Essential hypertension    GERD (gastroesophageal reflux disease)    Hyperlipidemia      Past Surgical History:   Procedure Laterality Date    Foot fracture surgery Right     2012           Medications (Active prior to today's visit):  Current Outpatient Medications   Medication Sig Dispense Refill    Tirzepatide (MOUNJARO) 5 MG/0.5ML Subcutaneous Solution Pen-injector Inject 5 mg into the skin once a week. 6 mL 1    pregabalin 100 MG Oral Cap Take 1 capsule (100 mg total) by  mouth in the morning, at noon, and at bedtime. 90 capsule 3    pantoprazole 40 MG Oral Tab EC Take 1 tablet (40 mg total) by mouth every morning before breakfast. 90 tablet 2    olmesartan 5 MG Oral Tab Take 1 tablet (5 mg total) by mouth daily. 90 tablet 3    traMADol 50 MG Oral Tab Take 1 tablet (50 mg total) by mouth in the morning and 1 tablet (50 mg total) before bedtime. 90 tablet 0    simvastatin 40 MG Oral Tab Take 1 tablet (40 mg total) by mouth nightly. 90 tablet 1    diazePAM 10 MG Oral Tab Take 1 tablet (10 mg total) by mouth in the morning and 1 tablet (10 mg total) before bedtime. 60 tablet 3    tamsulosin 0.4 MG Oral Cap Take 1 capsule (0.4 mg total) by mouth daily. 90 capsule 2    empagliflozin (JARDIANCE) 10 MG Oral Tab Take 1 tablet (10 mg total) by mouth daily. Every morning 90 tablet 3    vitamin B-12 50 MCG Oral Tab Take 1 tablet (50 mcg total) by mouth daily.      Ferrous Sulfate 325 (65 Fe) MG Oral Tab Take 1 tablet (325 mg total) by mouth daily with breakfast. With orange juice. 90 tablet 1    FLUoxetine HCl 20 MG Oral Tab Take 1 tablet (20 mg total) by mouth in the morning, at noon, and at bedtime.      Multiple Vitamins-Minerals (PRESERVISION/LUTEIN) Oral Cap Take 1 capsule by mouth daily.      Glucose Blood (ACCU-CHEK GUIDE) In Vitro Strip 1 each by In Vitro route 2 (two) times daily.      Accu-Chek FastClix Lancets Does not apply Misc Apply topically daily.      Polyethyl Glycol-Propyl Glycol (SYSTANE ULTRA OP) Apply 2 drops to eye in the morning and 2 drops before bedtime.         Allergies:  No Known Allergies      ROS:     Review of Systems   Constitutional:  Positive for fatigue. Negative for chills and fever.   Respiratory:  Negative for cough and shortness of breath.    Cardiovascular:  Negative for chest pain and leg swelling.   Gastrointestinal:  Negative for abdominal pain, constipation, diarrhea, nausea and vomiting.   Genitourinary:  Negative for difficulty urinating, dysuria  and flank pain.   Musculoskeletal:  Positive for arthralgias and myalgias (legs).   Neurological:  Positive for numbness (feet and legs).          Vitals:    06/07/24 1028   BP: (!) 82/42   Pulse: 62       PHYSICAL EXAM:   Physical Exam  Constitutional:       Appearance: Normal appearance.   Cardiovascular:      Rate and Rhythm: Normal rate and regular rhythm.      Heart sounds: Normal heart sounds.   Pulmonary:      Effort: Pulmonary effort is normal.      Breath sounds: Normal breath sounds.   Musculoskeletal:         General: Normal range of motion.   Skin:     General: Skin is warm and dry.   Neurological:      General: No focal deficit present.      Mental Status: He is alert and oriented to person, place, and time.   Psychiatric:         Mood and Affect: Mood normal.         Behavior: Behavior normal.             ASSESSMENT/PLAN:   Assessment   Encounter Diagnoses   Name Primary?    Stage 3a chronic kidney disease (HCC) Yes    Diabetic nephropathy associated with type 2 diabetes mellitus (HCC)     Primary hypertension     Mixed hyperlipidemia      CKD 3a:   Likely due to diabetic nephropathy.  Advised to avoid NSAIDs and take Tylenol/acetominophen for pain.   Asked to restrict sodium to 2 grams per day and instructions on low protein diet given, previously.    Advised tight control of DM to prevent complications including progressive CKD, CAD or CVA.     T2DM:   Well controlled. Cont meds.      HTN:   Hypotensive, asked to stop Olmesartan.   Asked to check BP at home and call office if BP greater than 140/80.      HLD:   Cont statin.         Follow up in 6 months.          Orders This Visit:  No orders of the defined types were placed in this encounter.      Meds This Visit:  Requested Prescriptions      No prescriptions requested or ordered in this encounter       Imaging & Referrals:  None       30 minutes spent in the care of the patient which included: reviewing prior records, obtaining history and  examining patient, discussing lab results and treatment plan, and documentation.      Bright Sow MD  6/7/2024

## 2024-07-15 ENCOUNTER — OFFICE VISIT (OUTPATIENT)
Dept: FAMILY MEDICINE CLINIC | Facility: CLINIC | Age: 79
End: 2024-07-15

## 2024-07-15 VITALS
SYSTOLIC BLOOD PRESSURE: 106 MMHG | WEIGHT: 187 LBS | TEMPERATURE: 98 F | RESPIRATION RATE: 16 BRPM | DIASTOLIC BLOOD PRESSURE: 56 MMHG | HEIGHT: 68 IN | BODY MASS INDEX: 28.34 KG/M2 | HEART RATE: 62 BPM

## 2024-07-15 DIAGNOSIS — D64.9 ANEMIA, UNSPECIFIED TYPE: ICD-10-CM

## 2024-07-15 DIAGNOSIS — G89.29 CHRONIC PAIN OF BOTH KNEES: ICD-10-CM

## 2024-07-15 DIAGNOSIS — M25.562 CHRONIC PAIN OF BOTH KNEES: ICD-10-CM

## 2024-07-15 DIAGNOSIS — E11.9 TYPE 2 DIABETES MELLITUS WITHOUT COMPLICATION, WITHOUT LONG-TERM CURRENT USE OF INSULIN (HCC): Primary | ICD-10-CM

## 2024-07-15 DIAGNOSIS — M25.561 CHRONIC PAIN OF BOTH KNEES: ICD-10-CM

## 2024-07-15 LAB — HEMOGLOBIN A1C: 6.3 % (ref 4.3–5.6)

## 2024-07-15 PROCEDURE — 1126F AMNT PAIN NOTED NONE PRSNT: CPT | Performed by: PHYSICIAN ASSISTANT

## 2024-07-15 PROCEDURE — 3078F DIAST BP <80 MM HG: CPT | Performed by: PHYSICIAN ASSISTANT

## 2024-07-15 PROCEDURE — 3074F SYST BP LT 130 MM HG: CPT | Performed by: PHYSICIAN ASSISTANT

## 2024-07-15 PROCEDURE — 99213 OFFICE O/P EST LOW 20 MIN: CPT | Performed by: PHYSICIAN ASSISTANT

## 2024-07-15 PROCEDURE — 3008F BODY MASS INDEX DOCD: CPT | Performed by: PHYSICIAN ASSISTANT

## 2024-07-15 PROCEDURE — 83036 HEMOGLOBIN GLYCOSYLATED A1C: CPT | Performed by: PHYSICIAN ASSISTANT

## 2024-07-15 PROCEDURE — 1159F MED LIST DOCD IN RCRD: CPT | Performed by: PHYSICIAN ASSISTANT

## 2024-07-15 RX ORDER — TRAMADOL HYDROCHLORIDE 50 MG/1
50 TABLET ORAL EVERY 8 HOURS PRN
Qty: 30 TABLET | Refills: 2 | Status: SHIPPED | OUTPATIENT
Start: 2024-07-15

## 2024-07-15 RX ORDER — SIMVASTATIN 40 MG
40 TABLET ORAL NIGHTLY
Qty: 90 TABLET | Refills: 3 | Status: SHIPPED | OUTPATIENT
Start: 2024-07-15

## 2024-07-15 RX ORDER — TIRZEPATIDE 5 MG/.5ML
5 INJECTION, SOLUTION SUBCUTANEOUS WEEKLY
Qty: 6 ML | Refills: 3 | Status: SHIPPED | OUTPATIENT
Start: 2024-07-15 | End: 2024-10-13

## 2024-07-15 NOTE — PROGRESS NOTES
HPI:     HPI  A 79-year-old male is in the office for a follow-up. The patient is doing fine at this time. The patient denies chest pain, SOB, N/V/C/D, fever, dizziness, syncope, and abdominal pain. There are no other concerns today.    Medications:     Current Outpatient Medications   Medication Sig Dispense Refill    raNITIdine HCl (RANITIDINE 150 MAX STRENGTH OR) Take 150 mg by mouth daily.      empagliflozin (JARDIANCE) 10 MG Oral Tab Take 1 tablet (10 mg total) by mouth daily. Every morning 90 tablet 3    simvastatin 40 MG Oral Tab Take 1 tablet (40 mg total) by mouth nightly. 90 tablet 3    traMADol 50 MG Oral Tab Take 1 tablet (50 mg total) by mouth every 8 (eight) hours as needed for Pain. 30 tablet 2    Tirzepatide (MOUNJARO) 5 MG/0.5ML Subcutaneous Solution Pen-injector Inject 5 mg into the skin once a week. 6 mL 3    Glucose Blood (ACCU-CHEK GUIDE) In Vitro Strip 1 each by In Vitro route 2 (two) times daily.      Accu-Chek FastClix Lancets Does not apply Misc Apply topically daily.      pregabalin 100 MG Oral Cap Take 1 capsule (100 mg total) by mouth in the morning, at noon, and at bedtime. 90 capsule 3    pantoprazole 40 MG Oral Tab EC Take 1 tablet (40 mg total) by mouth every morning before breakfast. 90 tablet 2    diazePAM 10 MG Oral Tab Take 1 tablet (10 mg total) by mouth in the morning and 1 tablet (10 mg total) before bedtime. 60 tablet 3    tamsulosin 0.4 MG Oral Cap Take 1 capsule (0.4 mg total) by mouth daily. 90 capsule 2    vitamin B-12 50 MCG Oral Tab Take 1 tablet (50 mcg total) by mouth daily.      Ferrous Sulfate 325 (65 Fe) MG Oral Tab Take 1 tablet (325 mg total) by mouth daily with breakfast. With orange juice. 90 tablet 1    FLUoxetine HCl 20 MG Oral Tab Take 1 tablet (20 mg total) by mouth in the morning, at noon, and at bedtime.      Multiple Vitamins-Minerals (PRESERVISION/LUTEIN) Oral Cap Take 1 capsule by mouth daily.      Polyethyl Glycol-Propyl Glycol (SYSTANE ULTRA OP) Apply  2 drops to eye in the morning and 2 drops before bedtime.         Allergies:   No Known Allergies    History:     Health Maintenance   Topic Date Due    Diabetes Care Dilated Eye Exam  Never done    COVID-19 Vaccine (2023- season) 2023    MA Annual Health Assessment  Never done    Influenza Vaccine (1) 10/01/2024    Diabetes Care A1C  10/18/2024    Diabetes Care: Microalb/Creat Ratio  04/10/2025    Diabetes Care Foot Exam  2025    Diabetes Care: GFR  2025    Annual Depression Screening  Completed    Fall Risk Screening (Annual)  Completed    Pneumococcal Vaccine: 65+ Years  Completed    Zoster Vaccines  Completed       No LMP for male patient.   Past Medical History:     Past Medical History:    Anxiety    BPH (benign prostatic hyperplasia)    CKD (chronic kidney disease) stage 3, GFR 30-59 ml/min ([facility])    Depression    Diabetes mellitus (HCC)    Essential hypertension    GERD (gastroesophageal reflux disease)    Hyperlipidemia       Past Surgical History:     Past Surgical History:   Procedure Laterality Date    Foot fracture surgery Right     2012       Family History:     Family History   Problem Relation Age of Onset    Other (Other) Father        Social History:     Social History     Socioeconomic History    Marital status:      Spouse name: Not on file    Number of children: Not on file    Years of education: Not on file    Highest education level: Not on file   Occupational History    Not on file   Tobacco Use    Smoking status: Former     Current packs/day: 0.00     Average packs/day: 2.0 packs/day for 10.0 years (20.0 ttl pk-yrs)     Types: Cigarettes     Start date:      Quit date: 1980     Years since quittin.5    Smokeless tobacco: Never   Vaping Use    Vaping status: Never Used   Substance and Sexual Activity    Alcohol use: Yes     Comment: rarely    Drug use: Not Currently    Sexual activity: Not on file   Other Topics Concern    Not on file   Social History  Narrative    Not on file     Social Determinants of Health     Financial Resource Strain: Unknown (10/2/2020)    [facility]    Overall Financial Resource Strain (CARDIA)     Difficulty of Paying Living Expenses: Patient declined   Food Insecurity: Unknown (10/2/2020)    [facility], [facility]    Hunger Vital Sign     Worried About Running Out of Food in the Last Year: Patient declined     Ran Out of Food in the Last Year: Patient declined   Transportation Needs: Unknown (10/2/2020)    [facility] from [facility], [facility]    PRAPARE - Transportation     Lack of Transportation (Medical): Patient declined     Lack of Transportation (Non-Medical): Patient declined   Physical Activity: Not on file   Stress: Not on file   Social Connections: Not on file   Housing Stability: Not on file       Review of Systems:   Review of Systems   Constitutional:  Negative for activity change, chills, fatigue and fever.   HENT:  Negative for congestion, ear discharge, ear pain, postnasal drip, rhinorrhea, sinus pressure, sinus pain and sore throat.    Respiratory:  Negative for cough, chest tightness, shortness of breath and wheezing.    Cardiovascular:  Negative for chest pain and palpitations.   Gastrointestinal:  Negative for abdominal distention, abdominal pain, blood in stool, constipation, diarrhea, nausea and vomiting.   Skin:  Negative for rash.        Vitals:    07/15/24 0954   BP: 106/56   Pulse: 62   Resp: 16   Temp: 98 °F (36.7 °C)   TempSrc: Tympanic   Weight: 187 lb (84.8 kg)   Height: 5' 8\" (1.727 m)     Body mass index is 28.43 kg/m².    Physical Exam:   Physical Exam  Vitals reviewed.   Constitutional:       General: He is not in acute distress.     Appearance: He is well-developed.   HENT:      Head: Normocephalic and atraumatic.      Right Ear:  External ear normal.      Left Ear: External ear normal.      Nose: Nose normal.   Eyes:      General:         Right eye: No discharge.         Left eye: No discharge.      Conjunctiva/sclera: Conjunctivae normal.   Cardiovascular:      Rate and Rhythm: Normal rate and regular rhythm.      Heart sounds: Normal heart sounds, S1 normal and S2 normal. No murmur heard.  Pulmonary:      Effort: Pulmonary effort is normal.      Breath sounds: Normal breath sounds. No wheezing or rales.   Chest:      Chest wall: No tenderness.   Lymphadenopathy:      Cervical: No cervical adenopathy.   Skin:     Findings: No rash.   Neurological:      Mental Status: He is alert and oriented to person, place, and time.   Psychiatric:         Behavior: Behavior is cooperative.          Assessment and Plan::     Problem List Items Addressed This Visit          HCC Problems    Type 2 diabetes mellitus without complication, without long-term current use of insulin (HCC) - Primary    Relevant Medications    empagliflozin (JARDIANCE) 10 MG Oral Tab    simvastatin 40 MG Oral Tab    Tirzepatide (MOUNJARO) 5 MG/0.5ML Subcutaneous Solution Pen-injector    Other Relevant Orders    POC Glycohemoglobin [74132] (Completed)    Diabetic Retinopathy Exam  OU - Both Eyes       Musculoskeletal and Injuries    Chronic pain of both knees    Relevant Medications    traMADol 50 MG Oral Tab     Other Visit Diagnoses       Anemia, unspecified type        Relevant Orders    CBC With Differential With Platelet    Iron And Tibc [E]    Ferritin [E]        Stable. Continue current management.    Discussed plan of care with pt and pt is in agreement.All questions answered. Pt to call with questions or concerns.

## 2024-08-25 ENCOUNTER — HOSPITAL ENCOUNTER (EMERGENCY)
Facility: HOSPITAL | Age: 79
Discharge: HOME | End: 2024-08-25
Attending: EMERGENCY MEDICINE
Payer: MEDICARE

## 2024-08-25 VITALS
RESPIRATION RATE: 19 BRPM | HEART RATE: 73 BPM | DIASTOLIC BLOOD PRESSURE: 55 MMHG | BODY MASS INDEX: 25.77 KG/M2 | TEMPERATURE: 98 F | WEIGHT: 180 LBS | SYSTOLIC BLOOD PRESSURE: 105 MMHG | OXYGEN SATURATION: 96 % | HEIGHT: 70 IN

## 2024-08-25 DIAGNOSIS — K29.00 ACUTE GASTRITIS WITHOUT HEMORRHAGE, UNSPECIFIED GASTRITIS TYPE: Primary | ICD-10-CM

## 2024-08-25 LAB
ALBUMIN SERPL-MCNC: 4.3 G/DL (ref 3.2–4.8)
ALP LIVER SERPL-CCNC: 94 U/L
ALT SERPL-CCNC: 18 U/L
ANION GAP SERPL CALC-SCNC: 11 MMOL/L (ref 0–18)
AST SERPL-CCNC: 18 U/L (ref ?–34)
BASOPHILS # BLD AUTO: 0.06 X10(3) UL (ref 0–0.2)
BASOPHILS NFR BLD AUTO: 0.5 %
BILIRUB DIRECT SERPL-MCNC: 0.2 MG/DL (ref ?–0.3)
BILIRUB SERPL-MCNC: 0.5 MG/DL (ref 0.2–1.1)
BUN BLD-MCNC: 23 MG/DL (ref 9–23)
BUN/CREAT SERPL: 16.1 (ref 10–20)
CALCIUM BLD-MCNC: 9.7 MG/DL (ref 8.7–10.4)
CHLORIDE SERPL-SCNC: 109 MMOL/L (ref 98–112)
CO2 SERPL-SCNC: 22 MMOL/L (ref 21–32)
CREAT BLD-MCNC: 1.43 MG/DL
DEPRECATED RDW RBC AUTO: 55 FL (ref 35.1–46.3)
EGFRCR SERPLBLD CKD-EPI 2021: 50 ML/MIN/1.73M2 (ref 60–?)
EOSINOPHIL # BLD AUTO: 0.01 X10(3) UL (ref 0–0.7)
EOSINOPHIL NFR BLD AUTO: 0.1 %
ERYTHROCYTE [DISTWIDTH] IN BLOOD BY AUTOMATED COUNT: 16.3 % (ref 11–15)
GLUCOSE BLD-MCNC: 149 MG/DL (ref 70–99)
HCT VFR BLD AUTO: 41.3 %
HGB BLD-MCNC: 13.9 G/DL
IMM GRANULOCYTES # BLD AUTO: 0.05 X10(3) UL (ref 0–1)
IMM GRANULOCYTES NFR BLD: 0.4 %
LIPASE SERPL-CCNC: 29 U/L (ref 12–53)
LYMPHOCYTES # BLD AUTO: 1.61 X10(3) UL (ref 1–4)
LYMPHOCYTES NFR BLD AUTO: 12.2 %
MCH RBC QN AUTO: 31.1 PG (ref 26–34)
MCHC RBC AUTO-ENTMCNC: 33.7 G/DL (ref 31–37)
MCV RBC AUTO: 92.4 FL
MONOCYTES # BLD AUTO: 1.11 X10(3) UL (ref 0.1–1)
MONOCYTES NFR BLD AUTO: 8.4 %
NEUTROPHILS # BLD AUTO: 10.34 X10 (3) UL (ref 1.5–7.7)
NEUTROPHILS # BLD AUTO: 10.34 X10(3) UL (ref 1.5–7.7)
NEUTROPHILS NFR BLD AUTO: 78.4 %
OSMOLALITY SERPL CALC.SUM OF ELEC: 300 MOSM/KG (ref 275–295)
PLATELET # BLD AUTO: 254 10(3)UL (ref 150–450)
POTASSIUM SERPL-SCNC: 4.5 MMOL/L (ref 3.5–5.1)
PROT SERPL-MCNC: 7.4 G/DL (ref 5.7–8.2)
RBC # BLD AUTO: 4.47 X10(6)UL
SODIUM SERPL-SCNC: 142 MMOL/L (ref 136–145)
WBC # BLD AUTO: 13.2 X10(3) UL (ref 4–11)

## 2024-08-25 PROCEDURE — 96361 HYDRATE IV INFUSION ADD-ON: CPT

## 2024-08-25 PROCEDURE — 85025 COMPLETE CBC W/AUTO DIFF WBC: CPT | Performed by: EMERGENCY MEDICINE

## 2024-08-25 PROCEDURE — 80048 BASIC METABOLIC PNL TOTAL CA: CPT | Performed by: EMERGENCY MEDICINE

## 2024-08-25 PROCEDURE — 83690 ASSAY OF LIPASE: CPT | Performed by: EMERGENCY MEDICINE

## 2024-08-25 PROCEDURE — 99284 EMERGENCY DEPT VISIT MOD MDM: CPT

## 2024-08-25 PROCEDURE — 80076 HEPATIC FUNCTION PANEL: CPT | Performed by: EMERGENCY MEDICINE

## 2024-08-25 PROCEDURE — 96374 THER/PROPH/DIAG INJ IV PUSH: CPT

## 2024-08-25 RX ORDER — ONDANSETRON 4 MG/1
4 TABLET, ORALLY DISINTEGRATING ORAL EVERY 4 HOURS PRN
Qty: 10 TABLET | Refills: 0 | Status: SHIPPED | OUTPATIENT
Start: 2024-08-25 | End: 2024-09-01

## 2024-08-25 RX ORDER — MAGNESIUM HYDROXIDE/ALUMINUM HYDROXICE/SIMETHICONE 120; 1200; 1200 MG/30ML; MG/30ML; MG/30ML
10 SUSPENSION ORAL 4 TIMES DAILY PRN
Qty: 200 ML | Refills: 0 | Status: SHIPPED | OUTPATIENT
Start: 2024-08-25

## 2024-08-25 RX ORDER — ONDANSETRON 2 MG/ML
4 INJECTION INTRAMUSCULAR; INTRAVENOUS ONCE
Status: COMPLETED | OUTPATIENT
Start: 2024-08-25 | End: 2024-08-25

## 2024-08-25 NOTE — ED PROVIDER NOTES
Patient Seen in: [facility] Emergency Department    History   No chief complaint on file.      HPI    79-year-old male presents the ER with nausea and vomiting.  Patient with past medical history of diabetes, hypertension, acid reflux.  Patient states epigastric pain is worse when he eats but is nauseous unable to keep any liquids down.  Patient states the pain feels like his acid reflux.  Patient denies any diarrhea    History reviewed.   Past Medical History:    Anxiety    BPH (benign prostatic hyperplasia)    CKD (chronic kidney disease) stage 3, GFR 30-59 ml/min (formerly [facility])    Depression    Diabetes mellitus (HCC)    Essential hypertension    GERD (gastroesophageal reflux disease)    Hyperlipidemia       History reviewed.   Past Surgical History:   Procedure Laterality Date    Foot fracture surgery Right              Medications :  (Not in a hospital admission)       Family History   Problem Relation Age of Onset    Other (Other) Father        Smoking Status:   Social History     Socioeconomic History    Marital status:    Tobacco Use    Smoking status: Former     Current packs/day: 0.00     Average packs/day: 2.0 packs/day for 10.0 years (20.0 ttl pk-yrs)     Types: Cigarettes     Start date:      Quit date: 1980     Years since quittin.6    Smokeless tobacco: Never   Vaping Use    Vaping status: Never Used   Substance and Sexual Activity    Alcohol use: Yes     Comment: rarely    Drug use: Not Currently       ROS  All pertinent positives for the review of systems are mentioned in the HPI  All other organ systems are reviewed and are negative.    Constitutional and vital signs reviewed.      Social History and Family History elements reviewed from today, pertinent positives to the presenting problem noted.    Physical Exam     ED Triage Vitals [24 1515]   /72   Pulse 110   Resp 19   Temp 97.9 °F (36.6 °C)   Temp src Oral   SpO2 97 %   O2 Device None (Room air)       All measures  to prevent infection transmission during my interaction with the patient were taken. The patient was already wearing a droplet mask on my arrival to the room. Personal protective equipment including droplet mask, eye protection, and gloves were worn throughout the duration of the exam.  Handwashing was performed prior to and after the exam.  Stethoscope and any equipment used during my examination was cleaned with super sani-cloth germicidal wipes following the exam.     Physical Exam  Vitals and nursing note reviewed.   Constitutional:       Appearance: He is well-developed.   HENT:      Head: Normocephalic and atraumatic.      Right Ear: External ear normal.      Left Ear: External ear normal.      Nose: Nose normal.   Eyes:      Conjunctiva/sclera: Conjunctivae normal.      Pupils: Pupils are equal, round, and reactive to light.   Cardiovascular:      Rate and Rhythm: Normal rate and regular rhythm.      Heart sounds: Normal heart sounds.   Pulmonary:      Effort: Pulmonary effort is normal.      Breath sounds: Normal breath sounds.   Abdominal:      General: Bowel sounds are normal.      Palpations: Abdomen is soft.      Tenderness: There is abdominal tenderness in the epigastric area. There is no right CVA tenderness, left CVA tenderness, guarding or rebound.   Musculoskeletal:         General: Normal range of motion.      Cervical back: Normal range of motion and neck supple.   Skin:     General: Skin is warm and dry.   Neurological:      Mental Status: He is alert and oriented to person, place, and time.      Deep Tendon Reflexes: Reflexes are normal and symmetric.   Psychiatric:         Behavior: Behavior normal.         Thought Content: Thought content normal.         Judgment: Judgment normal.         ED Course        Labs Reviewed   BASIC METABOLIC PANEL (8) - Abnormal; Notable for the following components:       Result Value    Glucose 149 (*)     Creatinine 1.43 (*)     Calculated Osmolality 300 (*)      eGFR-Cr 50 (*)     All other components within normal limits   CBC WITH DIFFERENTIAL WITH PLATELET - Abnormal; Notable for the following components:    WBC 13.2 (*)     RDW-SD 55.0 (*)     RDW 16.3 (*)     Neutrophil Absolute Prelim 10.34 (*)     Neutrophil Absolute 10.34 (*)     Monocyte Absolute 1.11 (*)     All other components within normal limits   HEPATIC FUNCTION PANEL (7) - Normal   LIPASE - Normal         Imaging Results Available and Reviewed while in ED: No results found.  ED Medications Administered:   Medications   sodium chloride 0.9 % IV bolus 1,000 mL (0 mL Intravenous Stopped 8/25/24 1710)   ondansetron (Zofran) 4 MG/2ML injection 4 mg (4 mg Intravenous Given 8/25/24 1610)   mylanta-dicyclomine-lidocaine 2% (G.I. Cocktail) oral liquid ( Oral Given 8/25/24 1648)         MDM     Vitals:    08/25/24 1515   BP: 112/72   Pulse: 110   Resp: 19   Temp: 97.9 °F (36.6 °C)   TempSrc: Oral   SpO2: 97%   Weight: 81.6 kg   Height: 177.8 cm (5' 10\")     *I personally reviewed and interpreted all ED vitals.  I also personally reviewed all labs and imaging if ordered    Pulse Ox: 97%, Room air, Normal     Monitor Interpretation:   normal sinus rhythm    Differential Diagnosis/ Diagnostic Considerations: Gastritis, gastroenteritis, pancreatitis,    Medical Record Review: I personally reviewed available prior medical records for any recent pertinent discharge summaries, testing, and procedures and reviewed those reports.    Complicating Factors: The patient already has does not have any pertinent problems on file. to contribute to the complexity of this ED evaluation.    Medical Decision Making  79-year-old male presents ER with complaint of epigastric pain nausea and vomiting.  Patient with a history of gastritis.  Patient given Zofran IV fluids as well as GI cocktail which made his pain resolved.  Patient's blood work within normal limits.  Patient to be discharged home instructed follow-up with PCP in if symptoms  continue.  Patient instructed to stick to a bland diet.  Patient given Zofran as well as Maalox for home.  Patient's pain member at bedside made of the discharge planning disposition.    Problems Addressed:  Acute gastritis without hemorrhage, unspecified gastritis type: acute illness or injury    Amount and/or Complexity of Data Reviewed  Independent Historian:      Details: Patient's daughter bedside and contributed the history because patient is Icelandic-speaking only.  Labs: ordered. Decision-making details documented in ED Course.    Risk  Prescription drug management.        Condition upon leaving the department: Stable    Disposition and Plan     Clinical Impression:  1. Acute gastritis without hemorrhage, unspecified gastritis type        Disposition:  Discharge    Follow-up:  Dubin, Leonard  [address] [place] [address] [id number] [phone]    Schedule an appointment as soon as possible for a visit  If symptoms worsen      Medications Prescribed:  Current Discharge Medication List        START taking these medications    Details   ondansetron 4 MG Oral Tablet Dispersible Take 1 tablet (4 mg total) by mouth every 4 (four) hours as needed for Nausea.  Qty: 10 tablet, Refills: 0      alum-mag hydroxide-simethicone 200-200-20 MG/5ML Oral Suspension Take 10 mL by mouth 4 (four) times daily as needed.  Qty: 200 mL, Refills: 0

## 2024-08-25 NOTE — ED INITIAL ASSESSMENT (HPI)
Patient arrives with daughter for nausea, vomiting and generalized abdominal pain since yesterday. Reports dysuria. No fevers, chills or body aches,or diarrhea.  Patient is Lao speaking.

## 2024-08-27 NOTE — Clinical Note
In office after MRI Population Health Chart Review & Patient Outreach Details      Additional Pop Health Notes:    MA GAP REPORT DUE FOR URINE SCREENING RECENTLY DONE 5/15/24.           Updates Requested / Reviewed:      Updated Care Coordination Note and Care Everywhere         Health Maintenance Topics Overdue:      VB Score: 0     Patient is not due for any topics at this time.                       Health Maintenance Topic(s) Outreach Outcomes & Actions Taken:    Lab(s) - Outreach Outcomes & Actions Taken  : MA GAP REPORT DUE FOR URINE SCREENING RECENTLY DONE 5/15/24.

## 2024-08-29 ENCOUNTER — OFFICE VISIT (OUTPATIENT)
Dept: FAMILY MEDICINE CLINIC | Facility: CLINIC | Age: 79
End: 2024-08-29

## 2024-08-29 ENCOUNTER — LAB ENCOUNTER (OUTPATIENT)
Dept: LAB | Age: 79
End: 2024-08-29
Attending: PHYSICIAN ASSISTANT
Payer: MEDICARE

## 2024-08-29 VITALS
WEIGHT: 174 LBS | BODY MASS INDEX: 24.91 KG/M2 | HEIGHT: 70 IN | SYSTOLIC BLOOD PRESSURE: 110 MMHG | DIASTOLIC BLOOD PRESSURE: 68 MMHG | HEART RATE: 60 BPM

## 2024-08-29 DIAGNOSIS — R10.11 RUQ PAIN: Primary | ICD-10-CM

## 2024-08-29 DIAGNOSIS — D64.9 ANEMIA, UNSPECIFIED TYPE: ICD-10-CM

## 2024-08-29 LAB
BASOPHILS # BLD AUTO: 0.07 X10(3) UL (ref 0–0.2)
BASOPHILS NFR BLD AUTO: 0.7 %
DEPRECATED HBV CORE AB SER IA-ACNC: 41.2 NG/ML
DEPRECATED RDW RBC AUTO: 52 FL (ref 35.1–46.3)
EOSINOPHIL # BLD AUTO: 0.07 X10(3) UL (ref 0–0.7)
EOSINOPHIL NFR BLD AUTO: 0.7 %
ERYTHROCYTE [DISTWIDTH] IN BLOOD BY AUTOMATED COUNT: 15.7 % (ref 11–15)
HCT VFR BLD AUTO: 42.1 %
HGB BLD-MCNC: 14.5 G/DL
IMM GRANULOCYTES # BLD AUTO: 0.03 X10(3) UL (ref 0–1)
IMM GRANULOCYTES NFR BLD: 0.3 %
IRON SATN MFR SERPL: 15 %
IRON SERPL-MCNC: 37 UG/DL
LYMPHOCYTES # BLD AUTO: 2.65 X10(3) UL (ref 1–4)
LYMPHOCYTES NFR BLD AUTO: 26.3 %
MCH RBC QN AUTO: 31.3 PG (ref 26–34)
MCHC RBC AUTO-ENTMCNC: 34.4 G/DL (ref 31–37)
MCV RBC AUTO: 90.9 FL
MONOCYTES # BLD AUTO: 0.75 X10(3) UL (ref 0.1–1)
MONOCYTES NFR BLD AUTO: 7.4 %
NEUTROPHILS # BLD AUTO: 6.5 X10 (3) UL (ref 1.5–7.7)
NEUTROPHILS # BLD AUTO: 6.5 X10(3) UL (ref 1.5–7.7)
NEUTROPHILS NFR BLD AUTO: 64.6 %
PLATELET # BLD AUTO: 229 10(3)UL (ref 150–450)
RBC # BLD AUTO: 4.63 X10(6)UL
TIBC SERPL-MCNC: 250 UG/DL (ref 250–425)
TRANSFERRIN SERPL-MCNC: 168 MG/DL (ref 215–365)
WBC # BLD AUTO: 10.1 X10(3) UL (ref 4–11)

## 2024-08-29 PROCEDURE — 36415 COLL VENOUS BLD VENIPUNCTURE: CPT

## 2024-08-29 PROCEDURE — 84466 ASSAY OF TRANSFERRIN: CPT

## 2024-08-29 PROCEDURE — 82728 ASSAY OF FERRITIN: CPT

## 2024-08-29 PROCEDURE — 83540 ASSAY OF IRON: CPT

## 2024-08-29 PROCEDURE — 85025 COMPLETE CBC W/AUTO DIFF WBC: CPT

## 2024-08-29 NOTE — PROGRESS NOTES
HPI:     HPI  A 79-year-old male is in the office for a follow-up post ER due to RUQ pain. The RUQ pain persists. The pain is at a severity of 4/10. The patient takes Pantoprazole and Pepcid daily.   The patient denies chest pain, SOB, N/V/C/D, fever, dizziness, syncope, and headache There are no other concerns today.    Medications:     Current Outpatient Medications   Medication Sig Dispense Refill    ondansetron 4 MG Oral Tablet Dispersible Take 1 tablet (4 mg total) by mouth every 4 (four) hours as needed for Nausea. 10 tablet 0    alum-mag hydroxide-simethicone 200-200-20 MG/5ML Oral Suspension Take 10 mL by mouth 4 (four) times daily as needed. 200 mL 0    raNITIdine HCl (RANITIDINE 150 MAX STRENGTH OR) Take 150 mg by mouth daily.      empagliflozin (JARDIANCE) 10 MG Oral Tab Take 1 tablet (10 mg total) by mouth daily. Every morning 90 tablet 3    simvastatin 40 MG Oral Tab Take 1 tablet (40 mg total) by mouth nightly. 90 tablet 3    traMADol 50 MG Oral Tab Take 1 tablet (50 mg total) by mouth every 8 (eight) hours as needed for Pain. 30 tablet 2    Tirzepatide (MOUNJARO) 5 MG/0.5ML Subcutaneous Solution Pen-injector Inject 5 mg into the skin once a week. 6 mL 3    Glucose Blood (ACCU-CHEK GUIDE) In Vitro Strip 1 each by In Vitro route 2 (two) times daily.      Accu-Chek FastClix Lancets Does not apply Misc Apply topically daily.      pregabalin 100 MG Oral Cap Take 1 capsule (100 mg total) by mouth in the morning, at noon, and at bedtime. 90 capsule 3    pantoprazole 40 MG Oral Tab EC Take 1 tablet (40 mg total) by mouth every morning before breakfast. 90 tablet 2    diazePAM 10 MG Oral Tab Take 1 tablet (10 mg total) by mouth in the morning and 1 tablet (10 mg total) before bedtime. 60 tablet 3    tamsulosin 0.4 MG Oral Cap Take 1 capsule (0.4 mg total) by mouth daily. 90 capsule 2    vitamin B-12 50 MCG Oral Tab Take 1 tablet (50 mcg total) by mouth daily.      Ferrous Sulfate 325 (65 Fe) MG Oral Tab Take 1  tablet (325 mg total) by mouth daily with breakfast. With orange juice. 90 tablet 1    FLUoxetine HCl 20 MG Oral Tab Take 1 tablet (20 mg total) by mouth in the morning, at noon, and at bedtime.      Multiple Vitamins-Minerals (PRESERVISION/LUTEIN) Oral Cap Take 1 capsule by mouth daily.      Polyethyl Glycol-Propyl Glycol (SYSTANE ULTRA OP) Apply 2 drops to eye in the morning and 2 drops before bedtime.         Allergies:   No Known Allergies    History:     Health Maintenance   Topic Date Due    Diabetes Care Dilated Eye Exam  Never done    COVID-19 Vaccine (5 - 2023-24 season) 09/01/2023    MA Annual Health Assessment  Never done    Influenza Vaccine (1) 10/01/2024    Diabetes Care A1C  01/15/2025    Diabetes Care: Microalb/Creat Ratio  04/10/2025    Diabetes Care Foot Exam  04/18/2025    Diabetes Care: GFR  08/25/2025    Annual Depression Screening  Completed    Fall Risk Screening (Annual)  Completed    Pneumococcal Vaccine: 65+ Years  Completed    Zoster Vaccines  Completed       No LMP for male patient.   Past Medical History:     Past Medical History:    Anxiety    BPH (benign prostatic hyperplasia)    CKD (chronic kidney disease) stage 3, GFR 30-59 ml/min ([facility])    Depression    Diabetes mellitus (HCC)    Essential hypertension    GERD (gastroesophageal reflux disease)    Hyperlipidemia       Past Surgical History:     Past Surgical History:   Procedure Laterality Date    Foot fracture surgery Right     2012       Family History:     Family History   Problem Relation Age of Onset    Other (Other) Father        Social History:     Social History     Socioeconomic History    Marital status:      Spouse name: Not on file    Number of children: Not on file    Years of education: Not on file    Highest education level: Not on file   Occupational History    Not on file   Tobacco Use    Smoking status: Former     Current packs/day: 0.00     Average packs/day: 2.0 packs/day for 10.0 years (20.0 ttl pk-yrs)      Types: Cigarettes     Start date:      Quit date: 1980     Years since quittin.6     Passive exposure: Past    Smokeless tobacco: Never   Vaping Use    Vaping status: Never Used   Substance and Sexual Activity    Alcohol use: Yes     Comment: rarely    Drug use: Not Currently    Sexual activity: Not on file   Other Topics Concern    Not on file   Social History Narrative    Not on file     Social Determinants of Health     Financial Resource Strain: Unknown (10/2/2020)    [facility]    Overall Financial Resource Strain (CARDIA)     Difficulty of Paying Living Expenses: Patient declined   Food Insecurity: Unknown (10/2/2020)    [facility]    Hunger Vital Sign     Worried About Running Out of Food in the Last Year: Patient declined     Ran Out of Food in the Last Year: Patient declined   Transportation Needs: Unknown (10/2/2020)    [facility]    PRAPARE - Transportation     Lack of Transportation (Medical): Patient declined     Lack of Transportation (Non-Medical): Patient declined   Physical Activity: Not on file   Stress: Not on file   Social Connections: Not on file   Housing Stability: Not on file       Review of Systems:   Review of Systems   Gastrointestinal:  Positive for abdominal pain.        Vitals:    24 1327   BP: 110/68   Pulse: 60   Weight: 174 lb (78.9 kg)   Height: 5' 10\" (1.778 m)     Body mass index is 24.97 kg/m².    Physical Exam:   Physical Exam  Vitals reviewed.   Cardiovascular:      Rate and Rhythm: Normal rate and regular rhythm.      Heart sounds: Normal heart sounds.   Pulmonary:      Breath sounds: Normal breath sounds.   Abdominal:      General: Abdomen is flat. Bowel sounds are normal. There is no distension.      Palpations: Abdomen is soft.      Tenderness: There  is abdominal tenderness in the right upper quadrant. There is no right CVA tenderness or left CVA tenderness.          Assessment and Plan::     Problem List Items Addressed This Visit    None  Visit Diagnoses       RUQ pain    -  Primary    Relevant Orders    US ABDOMEN LIMITED (CPT=76705)        Continue with Tylenol or Tramadol as needed for pain.  Reviewed and discussed lab results with the patient.    Discussed plan of care with pt and pt is in agreement.All questions answered. Pt to call with questions or concerns.

## 2024-09-09 DIAGNOSIS — M25.562 CHRONIC PAIN OF BOTH KNEES: ICD-10-CM

## 2024-09-09 DIAGNOSIS — M25.561 CHRONIC PAIN OF BOTH KNEES: ICD-10-CM

## 2024-09-09 DIAGNOSIS — G89.29 CHRONIC PAIN OF BOTH KNEES: ICD-10-CM

## 2024-09-11 ENCOUNTER — PATIENT MESSAGE (OUTPATIENT)
Dept: FAMILY MEDICINE CLINIC | Facility: CLINIC | Age: 79
End: 2024-09-11

## 2024-09-11 DIAGNOSIS — G89.29 CHRONIC PAIN OF BOTH KNEES: ICD-10-CM

## 2024-09-11 DIAGNOSIS — M25.561 CHRONIC PAIN OF BOTH KNEES: ICD-10-CM

## 2024-09-11 DIAGNOSIS — M25.562 CHRONIC PAIN OF BOTH KNEES: ICD-10-CM

## 2024-09-11 NOTE — TELEPHONE ENCOUNTER
Medication pended to run thru Protocol       From: Arnaldo Gutierrez  To: Eloisewale Kee  Sent: 9/11/2024 11:19 AM CDT  Subject: Medication     Cynthia Lopez is waiting on approval for this medication Fluoxetine HCI 20 mg. He has none right now.   Thank you

## 2024-09-12 RX ORDER — PREGABALIN 100 MG/1
100 CAPSULE ORAL 3 TIMES DAILY
Qty: 90 CAPSULE | Refills: 5 | Status: SHIPPED | OUTPATIENT
Start: 2024-09-12

## 2024-09-12 RX ORDER — PREGABALIN 100 MG/1
100 CAPSULE ORAL 3 TIMES DAILY
Qty: 90 CAPSULE | Refills: 0 | OUTPATIENT
Start: 2024-09-12

## 2024-09-12 RX ORDER — FLUOXETINE 20 MG/1
20 TABLET, FILM COATED ORAL 3 TIMES DAILY
Qty: 270 TABLET | Refills: 1 | Status: SHIPPED | OUTPATIENT
Start: 2024-09-12

## 2024-09-12 NOTE — TELEPHONE ENCOUNTER
Please review; protocol failed    Future Appointments   Date Time Provider Department Center   1/15/2025 10:45 AM Umang Kee PA-C [facility] [place]     LAST OFFICE VISIT: 8/29/2024  Patient established care 4/18/2024    Recent fill dates: 8/12/24, 7/15/24, and 6/12/24     For qty of: #90 each for a 30 day supply  Date of  last written prescription:      Date: 5/3/2024       Requested Prescriptions   Pending Prescriptions Disp Refills    pregabalin 100 MG Oral Cap 90 capsule 3     Sig: Take 1 capsule (100 mg total) by mouth in the morning, at noon, and at bedtime.       Controlled Substance Medication Failed - 9/11/2024 11:14 AM        Failed - This medication is a controlled substance - forward to provider to refill       Neurology Medications Passed - 9/11/2024 11:14 AM        Passed - In person appointment or virtual visit in the past 6 mos or appointment in next 3 mos     Recent Outpatient Visits              [facility]    [facility]    1 month ago Type 2 diabetes mellitus without complication, without long-term current use of insulin ([facility])    [facility] [place] Nguyen, Minhxuyen, PA-C    Office Visit    3 months ago Stage 3a chronic kidney disease ([facility])    [facility] Bright Sow MD    Office Visit    4 months ago Essential hypertension    [facility] [place] Nguyen, Minhxuyen, PA-C    Office Visit    4 months ago Type 2 diabetes mellitus without complication, without long-term current use of insulin ([facility])    [facility] [place] Nguyen, Minhxuyen, PA-C    Office Visit          Future Appointments         Provider Department Appt Notes    In 1 week [address] Ultrasound - [place] Test    In 3 months Bright Sow MD [facility],  [facility] 6 months    In 4 months Umang Kee PA-C [facility] [address], [place] 6 month                         Future Appointments         Provider Department Appt Notes    In 1 week [address] Ultrasound - [place] Test    In 3 months Bright Sow MD [facility] 6 months    In 4 months Umang Kee PA-C [facility] [address], [place] 6 month          Recent Outpatient Visits              2 weeks ago RUQ pain    [facility] [address], [place] Umang Kee, PAScooterC    Office Visit    1 month ago Type 2 diabetes mellitus without complication, without long-term current use of insulin (HCC)    [facility] [address], [place] Umang Kee, PAScooterC    Office Visit    3 months ago Stage 3a chronic kidney disease (HCC)    [facility] Bright Sow MD    Office Visit    4 months ago Essential hypertension    [facility] [address], [place] Umang Kee, PA-C    Office Visit    4 months ago Type 2 diabetes mellitus without complication, without long-term current use of insulin (HCC)    [facility] [address], [place] Umang Kee, PAScooterC    Office Visit

## 2024-09-12 NOTE — TELEPHONE ENCOUNTER
Please review: Medication was last written by patient's previous primary care provider, Dr. Leonard Dubin 5/2023    Future Appointments   Date Time Provider Department Center   1/15/2025 10:45 AM Umang Kee PA-C [facility] [place]     LAST OFFICE VISIT: 8/29/2024    Patient established care 4/18/24    Requested Prescriptions   Pending Prescriptions Disp Refills    FLUoxetine HCl 20 MG Oral Tab 90 tablet 1     Sig: Take 1 tablet (20 mg total) by mouth in the morning, at noon, and at bedtime.       Psychiatric Non-Scheduled (Anti-Anxiety) Passed - 9/11/2024  5:45 PM        Passed - In person appointment or virtual visit in the past 6 mos or appointment in next 3 mos     Recent Outpatient Visits              2 weeks ago RUQ pain    [facility] [address], [place] Umang Kee PA-C    Office Visit    1 month ago Type 2 diabetes mellitus without complication, without long-term current use of insulin ([facility])    [facility] [address], [place] Umang Kee PA-C    Office Visit    3 months ago Stage 3a chronic kidney disease ([facility])    [facility] Bright Sow MD    Office Visit    4 months ago Essential hypertension    [facility] [address], [place] Umang Kee PA-C    Office Visit    4 months ago Type 2 diabetes mellitus without complication, without long-term current use of insulin ([facility])    [facility] [address], [place] Umang Kee PA-C    Office Visit          Future Appointments         Provider Department Appt Notes    In 1 week [address] Ultrasound - [place] Test    In 3 months Bright Sow MD [facility] 6 months    In 4 months Umang Kee PA-C [facility] [address], [place] 6 month                    Passed - Depression Screening completed within the past 12  months             Future Appointments         Provider Department Appt Notes    In 1 week [address] Ultrasound - [place] Test    In 3 months Bright Sow MD [facility] 6 months    In 4 months Umang Kee PA-C [facility] [address], [place] 6 month          Recent Outpatient Visits              2 weeks ago RUQ pain    [facility] [address], [place] Umang Kee PA-C    Office Visit    1 month ago Type 2 diabetes mellitus without complication, without long-term current use of insulin ([facility])    [facility] [address], [place] Umang Kee PA-C    Office Visit    3 months ago Stage 3a chronic kidney disease (HCC)    [facility] Bright Sow MD    Office Visit    4 months ago Essential hypertension    [facility] [address], [place] Umang Kee PA-C    Office Visit    4 months ago Type 2 diabetes mellitus without complication, without long-term current use of insulin (HCC)    [facility] [address], [place] Umang Kee PA-C    Office Visit

## 2024-09-19 ENCOUNTER — HOSPITAL ENCOUNTER (OUTPATIENT)
Dept: ULTRASOUND IMAGING | Age: 79
Discharge: HOME | End: 2024-09-19
Attending: PHYSICIAN ASSISTANT
Payer: MEDICARE

## 2024-09-19 DIAGNOSIS — R10.11 RUQ PAIN: ICD-10-CM

## 2024-09-19 PROCEDURE — 76705 ECHO EXAM OF ABDOMEN: CPT | Performed by: PHYSICIAN ASSISTANT

## 2024-10-15 RX ORDER — FERROUS SULFATE 325(65) MG
1 TABLET ORAL
Qty: 90 TABLET | Refills: 1 | Status: SHIPPED | OUTPATIENT
Start: 2024-10-15

## 2024-10-15 NOTE — TELEPHONE ENCOUNTER
Please review. Protocol Failed; No Protocol    Requested Prescriptions   Pending Prescriptions Disp Refills    FEROSUL 325 (65 Fe) MG Oral Tab [Pharmacy Med Name: FERROUS SULFATE 325MG (5GR) TABS] 90 tablet 1     Sig: TAKE 1 TABLET BY MOUTH EVERY DAY WITH BREAKFAST AND ORANGE JUICE       There is no refill protocol information for this order            Future Appointments         Provider Department Appt Notes    In 2 months Bright Sow MD [facility] 6 months    In 3 months Umang Kee PA-C [facility] [address], [place] 6 month          Recent Outpatient Visits              1 month ago RUQ pain    [facility] [address], [place] Umang Kee PA-C    Office Visit    3 months ago Type 2 diabetes mellitus without complication, without long-term current use of insulin ([facility])    [facility] [address], [place] Umang Kee PA-C    Office Visit    4 months ago Stage 3a chronic kidney disease ([facility])    [facility] Bright Sow MD    Office Visit    5 months ago Essential hypertension    [facility] [address], [place] Umang Kee PA-C    Office Visit    6 months ago Type 2 diabetes mellitus without complication, without long-term current use of insulin ([facility])    [facility] [address], [place] Umang Kee PA-C    Office Visit

## 2024-11-21 ENCOUNTER — OFFICE VISIT (OUTPATIENT)
Dept: FAMILY MEDICINE CLINIC | Facility: CLINIC | Age: 79
End: 2024-11-21

## 2024-11-21 ENCOUNTER — HOSPITAL ENCOUNTER (OUTPATIENT)
Dept: GENERAL RADIOLOGY | Age: 79
Discharge: HOME | End: 2024-11-21
Attending: PHYSICIAN ASSISTANT
Payer: MEDICARE

## 2024-11-21 VITALS
SYSTOLIC BLOOD PRESSURE: 100 MMHG | HEART RATE: 59 BPM | BODY MASS INDEX: 24.48 KG/M2 | DIASTOLIC BLOOD PRESSURE: 61 MMHG | WEIGHT: 171 LBS | HEIGHT: 70 IN

## 2024-11-21 DIAGNOSIS — M54.9 BACK PAIN WITHOUT SCIATICA: Primary | ICD-10-CM

## 2024-11-21 DIAGNOSIS — M54.9 BACK PAIN WITHOUT SCIATICA: ICD-10-CM

## 2024-11-21 DIAGNOSIS — R26.81 UNSTEADY GAIT: ICD-10-CM

## 2024-11-21 PROCEDURE — 99213 OFFICE O/P EST LOW 20 MIN: CPT | Performed by: PHYSICIAN ASSISTANT

## 2024-11-21 PROCEDURE — 72110 X-RAY EXAM L-2 SPINE 4/>VWS: CPT | Performed by: PHYSICIAN ASSISTANT

## 2024-11-21 PROCEDURE — 1126F AMNT PAIN NOTED NONE PRSNT: CPT | Performed by: PHYSICIAN ASSISTANT

## 2024-11-21 PROCEDURE — 3078F DIAST BP <80 MM HG: CPT | Performed by: PHYSICIAN ASSISTANT

## 2024-11-21 PROCEDURE — 3074F SYST BP LT 130 MM HG: CPT | Performed by: PHYSICIAN ASSISTANT

## 2024-11-21 PROCEDURE — 3008F BODY MASS INDEX DOCD: CPT | Performed by: PHYSICIAN ASSISTANT

## 2024-11-21 PROCEDURE — 1159F MED LIST DOCD IN RCRD: CPT | Performed by: PHYSICIAN ASSISTANT

## 2024-11-21 RX ORDER — TIRZEPATIDE 5 MG/.5ML
INJECTION, SOLUTION SUBCUTANEOUS
COMMUNITY
Start: 2024-11-20

## 2024-11-21 NOTE — PROGRESS NOTES
HPI:     HPI  A 79-year-old male presents to the office with complaints of weakness in both legs in the morning, which has been occurring for the past six months. He reports that it takes him some time to stand up after waking up. Additionally, he experiences intermittent back pain, which does not radiate. He denies having any numbness, tingling sensations, or issues with bladder or rectal incontinence.      Medications:     Current Outpatient Medications   Medication Sig Dispense Refill    MOUNJARO 5 MG/0.5ML Subcutaneous Solution Auto-injector       Ferrous Sulfate (FEROSUL) 325 (65 Fe) MG Oral Tab Take 1 tablet (325 mg total) by mouth daily with breakfast. 90 tablet 1    pregabalin 100 MG Oral Cap Take 1 capsule (100 mg total) by mouth in the morning, at noon, and at bedtime. 90 capsule 5    FLUoxetine HCl 20 MG Oral Tab Take 1 tablet (20 mg total) by mouth in the morning, at noon, and at bedtime. 270 tablet 1    alum-mag hydroxide-simethicone 200-200-20 MG/5ML Oral Suspension Take 10 mL by mouth 4 (four) times daily as needed. 200 mL 0    raNITIdine HCl (RANITIDINE 150 MAX STRENGTH OR) Take 150 mg by mouth daily.      empagliflozin (JARDIANCE) 10 MG Oral Tab Take 1 tablet (10 mg total) by mouth daily. Every morning 90 tablet 3    simvastatin 40 MG Oral Tab Take 1 tablet (40 mg total) by mouth nightly. 90 tablet 3    traMADol 50 MG Oral Tab Take 1 tablet (50 mg total) by mouth every 8 (eight) hours as needed for Pain. 30 tablet 2    Glucose Blood (ACCU-CHEK GUIDE) In Vitro Strip 1 each by In Vitro route 2 (two) times daily.      Accu-Chek FastClix Lancets Does not apply Misc Apply topically daily.      pantoprazole 40 MG Oral Tab EC Take 1 tablet (40 mg total) by mouth every morning before breakfast. 90 tablet 2    diazePAM 10 MG Oral Tab Take 1 tablet (10 mg total) by mouth in the morning and 1 tablet (10 mg total) before bedtime. 60 tablet 3    tamsulosin 0.4 MG Oral Cap Take 1 capsule (0.4 mg total) by mouth  daily. 90 capsule 2    vitamin B-12 50 MCG Oral Tab Take 1 tablet (50 mcg total) by mouth daily.      Multiple Vitamins-Minerals (PRESERVISION/LUTEIN) Oral Cap Take 1 capsule by mouth daily.      Polyethyl Glycol-Propyl Glycol (SYSTANE ULTRA OP) Apply 2 drops to eye in the morning and 2 drops before bedtime.         Allergies:   Allergies[1]    History:     Health Maintenance   Topic Date Due    Diabetes Care Dilated Eye Exam  Never done    MA Annual Health Assessment  Never done    COVID-19 Vaccine ( season) 2024    Influenza Vaccine (1) 10/01/2024    Diabetes Care A1C  01/15/2025    Diabetes Care: Microalb/Creat Ratio  04/10/2025    Diabetes Care Foot Exam  2025    Diabetes Care: GFR  2025    Annual Depression Screening  Completed    Fall Risk Screening (Annual)  Completed    Pneumococcal Vaccine: 65+ Years  Completed    Zoster Vaccines  Completed       No LMP for male patient.   Past Medical History:     Past Medical History:    Anxiety    BPH (benign prostatic hyperplasia)    CKD (chronic kidney disease) stage 3, GFR 30-59 ml/min ([facility])    Depression    Diabetes mellitus (HCC)    Essential hypertension    GERD (gastroesophageal reflux disease)    Hyperlipidemia       Past Surgical History:     Past Surgical History:   Procedure Laterality Date    Foot fracture surgery Right     2012       Family History:     Family History   Problem Relation Age of Onset    Other (Other) Father        Social History:     Social History     Socioeconomic History    Marital status:      Spouse name: Not on file    Number of children: Not on file    Years of education: Not on file    Highest education level: Not on file   Occupational History    Not on file   Tobacco Use    Smoking status: Former     Current packs/day: 0.00     Average packs/day: 2.0 packs/day for 10.0 years (20.0 ttl pk-yrs)     Types: Cigarettes     Start date:      Quit date:      Years since quittin.9     Passive  exposure: Past    Smokeless tobacco: Never   Vaping Use    Vaping status: Never Used   Substance and Sexual Activity    Alcohol use: Yes     Comment: rarely    Drug use: Not Currently    Sexual activity: Not on file   Other Topics Concern    Not on file   Social History Narrative    Not on file     Social Drivers of Health     Financial Resource Strain: Unknown (10/2/2020)    Received from [facility]    Overall Financial Resource Strain (CARDIA)     Difficulty of Paying Living Expenses: Patient declined   Food Insecurity: Unknown (10/2/2020)    Received from [facility]    Hunger Vital Sign     Worried About Running Out of Food in the Last Year: Patient declined     Ran Out of Food in the Last Year: Patient declined   Transportation Needs: Unknown (10/2/2020)    Received from [facility]    PRAPARE - Transportation     Lack of Transportation (Medical): Patient declined     Lack of Transportation (Non-Medical): Patient declined   Physical Activity: Not on file   Stress: Not on file   Social Connections: Not on file   Housing Stability: Not on file       Review of Systems:   Review of Systems   Musculoskeletal:  Positive for back pain.        Vitals:    11/21/24 1314   BP: 100/61   Pulse: 59   Weight: 171 lb (77.6 kg)   Height: 5' 10\" (1.778 m)     Body mass index is 24.54 kg/m².    Physical Exam:   Physical Exam  Vitals reviewed.      Bilateral leg: no swelling, no erythema, no tenderness to palpation.    Assessment and Plan::     Problem List Items Addressed This Visit    None  Visit Diagnoses       Back pain without sciatica    -  Primary    Relevant Orders    XR LUMBAR SPINE (MIN 4 VIEWS) (CPT=72110) (Completed)    Physical Therapy Referral - [facility]    Unsteady gait        Relevant Orders    Physical Therapy Referral - [facility]         Advise patient to take Tylenol or Ibuprofen as needed for pain.    Discussed plan of care with pt and pt is in agreement.All questions answered. Pt to call with questions or concerns.         [1] No Known Allergies

## 2024-11-22 ENCOUNTER — TELEPHONE (OUTPATIENT)
Dept: PHYSICAL THERAPY | Facility: HOSPITAL | Age: 79
End: 2024-11-22

## 2024-11-25 ENCOUNTER — OFFICE VISIT (OUTPATIENT)
Dept: PHYSICAL THERAPY | Age: 79
End: 2024-11-25
Attending: PHYSICIAN ASSISTANT
Payer: MEDICARE

## 2024-11-25 DIAGNOSIS — R26.81 UNSTEADY GAIT: ICD-10-CM

## 2024-11-25 DIAGNOSIS — M54.9 BACK PAIN WITHOUT SCIATICA: Primary | ICD-10-CM

## 2024-11-25 PROCEDURE — 97162 PT EVAL MOD COMPLEX 30 MIN: CPT

## 2024-11-25 NOTE — PROGRESS NOTES
SPINE EVALUATION:     Diagnosis:    Back pain without sciatica (M54.9)  Unsteady gait (R26.81)      Referring Provider: Umang Kee  Date of Evaluation:    11/25/2024    Precautions:  Fall Risk Next MD visit:   1/15/24  Date of Surgery: n/a   Language Line used for translation (Mica [id number])  PATIENT SUMMARY   Arnaldo Gutierrez is a 79 year old male who presents to therapy today with complaints of low back pain and LE weakness.  He had an x-ray of his spine but does not know the results yet.  He has had back pain for several years and has been getting worse as the legs have felt weaker in the past 6 months.  He has been taking Tramadol which is helping a lot with the pain.   He reports that he has fallen several times and states that he feels very weak in his waist.  His most recent fall was about 3 months ago and he injured his right lower leg but is not sure of what caused his fall.  He states that another fall was while taking a shower and it took him a couple of hours to be able to get up because his legs are very weak.  Also, reports that he feels he has poor balance when he walks. He reports that his legs get weaker as he does more activities around the house.  He was prescribed a cane but has not gotten one yet.    Pt describes pain level current 0/10, at best 0/10, at worst 9/10.   Current functional limitations include Walking, lifting, standing, bending over (poor balance), prolonged sitting, household chores, sit to stand    Arnaldo describes prior level of function Lives alone in an elevator building; Will be having cleaning person start soon to help with household chores and doing laundry. Pt goals include To decrease his pain and improve his strength in legs.  Past medical history was reviewed with Arnaldo. Significant findings include Right ankle surgery, Diabetes, HTN       ASSESSMENT  Arnaldo presents to physical therapy evaluation with primary c/o low back pain and LE weakness. The results of  the objective tests and measures show decreased lumbar AROM in all planes, decreased hip and knee strength and balance deficits.  Functional deficits include but are not limited to prolonged sitting and standing, bending over, walking and household chores.  Signs and symptoms are consistent with diagnosis of Back pain and unsteady gait. Pt and PT discussed evaluation findings, pathology and POC.  Pt voiced understanding and performs HEP correctly without reported pain. Skilled Physical Therapy is medically necessary to address the above impairments and reach functional goals.     OBJECTIVE:   Observation/Posture: Forward head and bilat forward shoulders     Lumbar AROM: (* denotes performed with pain)  Flexion: To ankles* (Loss of balance)  Extension: Mild decrease*  Sidebending: R Mod decr*; L Mod decr*  Rotation: R Mod decr*; L Mod decr*    Palpation: Tendernat L4 and L5 spinous processes, left piriformis.  Adhesions at bilat QL and lumbar paraspinals but no tenderness.      Strength: (* denotes performed with pain)  LE   Hip flexion (L2): R 4-/5; L 4-/5  Hip abduction: R 2+/5; L 2+/5  Hip Extension: R 3+/5; L 3+/5   Knee Flexion: R 4-/5; L 4-/5   Knee extension (L3): R 4/5; L 4/5   DF (L4): R 4/5; L 4+/5  Great Toe Ext (L5): R 5/5, L 5/5  PF (S1): R 4/5; L 4/5     Flexibility:   LE   Hip Flexor: R WNL, L WNL  Hamstrings: R WNL; L WNL  Piriformis: R WNL; L mild decr  Quads: R WNL; L WNL       Special tests:   Sit to stand: needs UE assist  SLR neg bilat    Gait: pt ambulates on level ground with decreased step length bilat  and unsteadiness .  Balance: To be tested at next visit due to time    Today’s Treatment and Response:   Pt education was provided on exam findings, treatment diagnosis, treatment plan, expectations, and prognosis. Pt was also provided recommendations for  use of cane for walking  Patient was instructed in and issued a HEP for: To be instructed at next visit    Charges: PT Eval Moderate  Complexity      Total Timed Treatment: 5 min     Total Treatment Time: 45 min     Based on clinical rationale and outcome measures, this evaluation involved Moderate Complexity decision making due to 1-2 personal factors/comorbidities, 4+ body structures involved/activity limitations, and evolving symptoms including changing pain levels.  PLAN OF CARE:    Goals: (to be met in 10 visits)   Pt will improve transversus abdominis recruitment to perform proper isometric contraction without requiring verbal or tactile cuing to promote advancement of therex   Pt will demonstrate good understanding of proper posture and body mechanics to decrease pain and improve spinal safety   Pt will improve lumbar spine AROM flexion WNL to allow increase ease with bending forward to don shoes   Pt will improve hip strength at least to 3+/5 for improved pelvic stability for improved safety with gait to prevent falls  Pt will be independent and compliant with comprehensive HEP to maintain progress achieved in PT      *Balance goals to be added next visit    Frequency / Duration: Patient will be seen for 2 x/week or a total of 10 visits over a 90 day period. Treatment will include: Gait training, Manual Therapy, Neuromuscular Re-education, Therapeutic Activities, Therapeutic Exercise, and Home Exercise Program instruction    Education or treatment limitation: Communication  Rehab Potential:good    Patient/Family/Caregiver was advised of these findings, precautions, and treatment options and has agreed to actively participate in planning and for this course of care.    Thank you for your referral. Please co-sign or sign and return this letter via fax as soon as possible to [phone]. If you have any questions, please contact me at Dept: [phone]    Sincerely,  Electronically signed by therapist: Berencie Miles, PT    Physician's certification required: Yes  I certify the need for these services furnished under this plan of treatment  and while under my care.    X___________________________________________________ Date____________________    Certification From: 11/25/2024  To:2/23/2025

## 2024-11-27 ENCOUNTER — OFFICE VISIT (OUTPATIENT)
Dept: PHYSICAL THERAPY | Age: 79
End: 2024-11-27
Attending: PHYSICIAN ASSISTANT
Payer: MEDICARE

## 2024-11-27 PROCEDURE — 97112 NEUROMUSCULAR REEDUCATION: CPT

## 2024-11-27 PROCEDURE — 97110 THERAPEUTIC EXERCISES: CPT

## 2024-11-27 NOTE — PROGRESS NOTES
Diagnosis:    Back pain without sciatica (M54.9)  Unsteady gait (R26.81)      Referring Provider: Umang Kee  Date of Evaluation:    24    Precautions:  Fall Risk Next MD visit:   none scheduled  Date of Surgery: n/a   Insurance Primary/Secondary: [facility] MEDICARE / MEDICAID     # Auth Visits: 10 visits until 25            Subjective: Pt reports that he is feeling good today. He doesn't have pain today.    Pain: 0/10      Objective: Tx: See daily treatment log below  Balance Testing:  Romberg eyes open > 30 sec  Romberg eyes closed > 30 sec  Modified Tandem > 30 sec Right and Left   Tandem: R 2 sec; L 0 sec    Sit to  30 seconds: 4    TU sec      Assessment: Pt is unsteady with balance testing and balance activities and requires CGA for safety.  Pt purchased a cane but did not bring it to PT today but was encouraged to bring it to the next visit for instruction in how to use it.        Goals:    (to be met in 10 visits)   Pt will improve transversus abdominis recruitment to perform proper isometric contraction without requiring verbal or tactile cuing to promote advancement of therex   Pt will demonstrate good understanding of proper posture and body mechanics to decrease pain and improve spinal safety   Pt will improve lumbar spine AROM flexion WNL to allow increase ease with bending forward to don shoes   Pt will improve hip strength at least to 3+/5 for improved pelvic stability for improved safety with gait to prevent falls  Pt will improve TUG time to < or = to 16 seconds for improved safety with gait.  Pt will be independent and compliant with comprehensive HEP to maintain progress achieved in PT          Plan: Continue LE strengthening and balance activities.  Instruct pt on gait with use of cane.  Date: 2024  TX#: 2/10 Date:                 TX#: 3/ Date:                 TX#: 4/ Date:                 TX#: 5/ Date:   Tx#: 6/   Ther ex: 15 min  Seated  15  R/L  LAQ 2x10 R/L  Seated resisted hip abduction Red 2x10  Bridges 10x       NMR: 25 min  Balance testing  Stride balance with head turns 10x R/L (CGA)  Stride balance with UE reaches 10x R/L (CGA)                       HEP: 11/27/24: Bridges, seated marches, LAQ and seated resisted hip abd with red TB    Charges: 2 NMR, 1 TE       Total Timed Treatment: 40 min  Total Treatment Time: 40 min

## 2024-12-02 ENCOUNTER — TELEPHONE (OUTPATIENT)
Dept: PHYSICAL THERAPY | Age: 79
End: 2024-12-02

## 2024-12-03 RX ORDER — TAMSULOSIN HYDROCHLORIDE 0.4 MG/1
0.8 CAPSULE ORAL DAILY
Qty: 180 CAPSULE | Refills: 3 | Status: SHIPPED | OUTPATIENT
Start: 2024-12-03

## 2024-12-03 NOTE — TELEPHONE ENCOUNTER
Refill Per Protocol     Requested Prescriptions   Pending Prescriptions Disp Refills    TAMSULOSIN 0.4 MG Oral Cap [Pharmacy Med Name: TAMSULOSIN 0.4MG CAPSULES] 180 capsule 0     Sig: TAKE 2 CAPSULES BY MOUTH DAILY 30 MINUTES AFTER THE SAME MEAL       Genitourinary Medications Passed - 12/3/2024  2:00 PM        Passed - Patient does not have pulmonary hypertension on problem list        Passed - In person appointment or virtual visit in the past 12 mos or appointment in next 3 mos     Recent Outpatient Visits              6 days ago     [facility] in [place] EspinalBerenice, PT    Office Visit    1 week ago Back pain without sciatica    [facility] in [place] Espinal, Berenice, PT    Office Visit    1 week ago Back pain without sciatica    [facility] [address], [place] Umang Kee PA-C    Office Visit    3 months ago RUQ pain    [facility], [address], [place] Umang Kee PA-C    Office Visit    4 months ago Type 2 diabetes mellitus without complication, without long-term current use of insulin ([facility])    [facility], [address], [place] Umang Kee PA-C    Office Visit          Future Appointments         Provider Department Appt Notes    In 6 days Berenice Miles, PT [facility] in [place] 10 visits 11/25 to 2/17/25  [employer] Medicare  no c/p, no limit    In 1 week Berenice Miles, PT [facility] in [place] 10 visits 11/25 to 2/17/25  [employer] Medicare  no c/p, no limit    In 2 weeks Bright Sow MD [facility] 6 months    In 2 weeks Berenice Miles, PT [facility] in [place] 10 visits 11/25 to 2/17/25  [employer] Medicare  no c/p, no limit    In 1 month Berenice Miles, PT [facility] in [place] 10 visits 11/25 to 2/17/25  [employer] Medicare  no c/p, no limit    In 1 month Umang Kee PA-C [facility]  [address], [place] 6 month                           Future Appointments         Provider Department Appt Notes    In 6 days Miles, Berenice, PT [facility] in [place] 10 visits 11/25 to 2/17/25  [employer] Medicare  no c/p, no limit    In 1 week Miles, Berenice, PT [facility] in [place] 10 visits 11/25 to 2/17/25  [facility] Medicare  no c/p, no limit    In 2 weeks [facility]    In 2 weeks Miles, Berenice, PT [facility] in [place] 10 visits 11/25 to 2/17/25  [employer] Medicare  no c/p, no limit    In 1 month Miles, Berenice, PT [facility] in [place] 10 visits 11/25 to 2/17/25  [employer] Medicare  no c/p, no limit    In 1 month Umang Kee PA-C [facility], [address], [place] 6 month          Recent Outpatient Visits              6 days ago     [facility] in [place] Miles, Berenice, PT    Office Visit    1 week ago Back pain without sciatica    [facility] in [place] Miles, Berenice, PT    Office Visit    1 week ago Back pain without sciatica    [facility] [address], [place] Umang Kee PA-C    Office Visit    3 months ago RUQ pain    [facility] [address], [place] Umang Kee PA-C    Office Visit    4 months ago Type 2 diabetes mellitus without complication, without long-term current use of insulin (HCC)    [facility], [address], [place] Umang Kee PA-C    Office Visit

## 2024-12-09 ENCOUNTER — OFFICE VISIT (OUTPATIENT)
Dept: PHYSICAL THERAPY | Age: 79
End: 2024-12-09
Attending: PHYSICIAN ASSISTANT
Payer: MEDICARE

## 2024-12-09 PROCEDURE — 97112 NEUROMUSCULAR REEDUCATION: CPT

## 2024-12-09 PROCEDURE — 97110 THERAPEUTIC EXERCISES: CPT

## 2024-12-09 NOTE — PROGRESS NOTES
Diagnosis:    Back pain without sciatica (M54.9)  Unsteady gait (R26.81)      Referring Provider: Umang Kee  Date of Evaluation:    24    Precautions:  Fall Risk Next MD visit:   none scheduled  Date of Surgery: n/a   Insurance Primary/Secondary: [facility] MEDICARE / MEDICAID     # Auth Visits: 10 visits until 25          Pt declined use of Language Line for translation.  Subjective: Pt reports that he was very sick on Saturday and was vomiting and his body was shaking.  Denies a fever.  He tried doing his HEP.  He has been using his cane and feels good using it.      Pain: 0/10      Objective: Tx: See daily treatment log below  Reviewed HEP  Gait training with cane with cane in right hand      24:  Balance Testing:  Romberg eyes open > 30 sec  Romberg eyes closed > 30 sec  Modified Tandem > 30 sec Right and Left   Tandem: R 2 sec; L 0 sec  Sit to  30 seconds: 4  TU sec      Assessment: Pt has more difficulty with strengthening exercises with his left LE.  Practiced walking with cane in right hand and pt demonstrated good sequencing and improved stability with cane.        Goals:    (to be met in 10 visits)   Pt will improve transversus abdominis recruitment to perform proper isometric contraction without requiring verbal or tactile cuing to promote advancement of therex   Pt will demonstrate good understanding of proper posture and body mechanics to decrease pain and improve spinal safety   Pt will improve lumbar spine AROM flexion WNL to allow increase ease with bending forward to don shoes   Pt will improve hip strength at least to 3+/5 for improved pelvic stability for improved safety with gait to prevent falls  Pt will improve TUG time to < or = to 16 seconds for improved safety with gait.  Pt will be independent and compliant with comprehensive HEP to maintain progress achieved in PT          Plan: Continue LE strengthening and balance activities.    Date:  11/27/2024  TX#: 2/10 Date:  12/9/24               TX#: 3/10 Date:                 TX#: 4/ Date:                 TX#: 5/ Date:   Tx#: 6/   Ther ex: 15 min  Seated marches 15x R/L  LAQ 2x10 R/L  Seated resisted hip abduction Red 2x10  Bridges 10x Ther Ex: 23 min  Gait training with cane 20 ft x2  Seated marching 20x  LAQ 2x10 R/L  Seated resisted hip abduction Red 2x10  Bridges 10x  Supine hip adduction isometric with ball 5\" 10x  SAQ 2x10 R/L  Standing hip abduction 10x R/L  Standing hip extension 10x R/L      NMR: 25 min  Balance testing  Stride balance with head turns 10x R/L (CGA)  Stride balance with UE reaches 10x R/L (CGA)   NMR: 20 min   Side stepping at bar 2 laps  Stride balance with head turns 10x R/L (CGA)  Stride balance with UE reaches 10x R/L (CGA)  Narrow base of support balance 30\" 2x with CGA  Narrow base of support balance with head turns with CGA 10x R/L  Standing marching 10x R/L  Alternating foot taps up to 3 inch step (hold bar) 10x R/L                      HEP: 11/27/24: Bridges, seated marches, LAQ and seated resisted hip abd with red TB    Charges: 1 NMR, 2 TE       Total Timed Treatment: 43 min  Total Treatment Time: 43 min

## 2024-12-16 ENCOUNTER — OFFICE VISIT (OUTPATIENT)
Dept: PHYSICAL THERAPY | Age: 79
End: 2024-12-16
Attending: PHYSICIAN ASSISTANT
Payer: MEDICARE

## 2024-12-16 PROCEDURE — 97112 NEUROMUSCULAR REEDUCATION: CPT

## 2024-12-16 PROCEDURE — 97110 THERAPEUTIC EXERCISES: CPT

## 2024-12-16 NOTE — PROGRESS NOTES
Diagnosis:    Back pain without sciatica (M54.9)  Unsteady gait (R26.81)      Referring Provider: Umang Kee  Date of Evaluation:    24    Precautions:  Fall Risk Next MD visit:   none scheduled  Date of Surgery: n/a   Insurance Primary/Secondary: [facility] MEDICARE / MEDICAID     # Auth Visits: 10 visits until 25          Pt declined use of Language Line for translation.  Subjective: Pt reports that his whole body has hurt with the change of the weather due to his arthritis.  He is using his cane all of the time now.  States that he has not done much of the HEP because they are difficult to do at home..    Pain: 0/10      Objective: Tx: See daily treatment log below  Updated HEP    24:  Balance Testing:  Romberg eyes open > 30 sec  Romberg eyes closed > 30 sec  Modified Tandem > 30 sec Right and Left   Tandem: R 2 sec; L 0 sec  Sit to  30 seconds: 4  TU sec      Assessment:  Updated pt's HEP since he was having difficulty doing the previous ones at home.  Pt is ambulating with proper sequencing of cane but still ambulates with unsteadiness and will continue to benefit from more LE strengthening and balance activities for improved safety.      Goals:    (to be met in 10 visits)   Pt will improve transversus abdominis recruitment to perform proper isometric contraction without requiring verbal or tactile cuing to promote advancement of therex   Pt will demonstrate good understanding of proper posture and body mechanics to decrease pain and improve spinal safety   Pt will improve lumbar spine AROM flexion WNL to allow increase ease with bending forward to don shoes   Pt will improve hip strength at least to 3+/5 for improved pelvic stability for improved safety with gait to prevent falls  Pt will improve TUG time to < or = to 16 seconds for improved safety with gait.  Pt will be independent and compliant with comprehensive HEP to maintain progress achieved in PT           Plan: Continue LE strengthening and balance activities. Add more balance activities with Airex.   Date: 11/27/2024  TX#: 2/10 Date:  12/9/24               TX#: 3/10 Date:   12/16/24              TX#: 4/10 Date:                 TX#: 5/ Date:   Tx#: 6/   Ther ex: 15 min  Seated marches 15x R/L  LAQ 2x10 R/L  Seated resisted hip abduction Red 2x10  Bridges 10x Ther Ex: 23 min  Gait training with cane 20 ft x2  Seated marching 20x  LAQ 2x10 R/L  Seated resisted hip abduction Red 2x10  Bridges 10x  Supine hip adduction isometric with ball 5\" 10x  SAQ 2x10 R/L  Standing hip abduction 10x R/L  Standing hip extension 10x R/L Ther Ex: 25 min  Seated marching 20x  LAQ 2x10 R/L  Seated resisted hip abduction Red 2x10  Bridges 2x10  Supine hip adduction isometric with ball 5\" 15x  SAQ 2x10 R/L  Standing hip abduction 10x R/L  Standing hip extension 10x R/L     NMR: 25 min  Balance testing  Stride balance with head turns 10x R/L (CGA)  Stride balance with UE reaches 10x R/L (CGA)   NMR: 20 min   Side stepping at bar 2 laps  Stride balance with head turns 10x R/L (CGA)  Stride balance with UE reaches 10x R/L (CGA)  Narrow base of support balance 30\" 2x with CGA  Narrow base of support balance with head turns with CGA 10x R/L  Standing marching 10x R/L  Alternating foot taps up to 3 inch step (hold bar) 10x R/L   NMR: 18 min   Side stepping at bar 2 laps  Stride balance with head turns 10x R/L (CGA)  Stride balance with UE reaches 10x R/L (CGA)  Narrow base of support balance 30\" 2x with CGA  Narrow base of support balance with head turns with CGA 10x R/L  Standing marching 10x R/L  Weight shifting on Airex pad 30\"   Alternating foot taps up to 3 inch step (holding bar) 10x R/L                   HEP:   Access Code: [email or web address]  URL: [email or web address]  Date: 12/16/2024  - Seated March  - 1 x daily - 7 x weekly - 2 sets - 10 reps  - Standing Hip Abduction with Counter Support  - 1 x daily - 7 x weekly - 2  sets - 10 reps  - Standing Hip Extension with Counter Support  - 1 x daily - 7 x weekly - 2 sets - 10 reps  - Side Stepping with Counter Support  - 1 x daily - 7 x weekly - 1 sets - 10 reps    Charges: 1 NMR, 2 TE       Total Timed Treatment: 43 min  Total Treatment Time: 43 min

## 2024-12-20 ENCOUNTER — TELEPHONE (OUTPATIENT)
Dept: PHYSICAL THERAPY | Age: 79
End: 2024-12-20

## 2025-01-02 ENCOUNTER — OFFICE VISIT (OUTPATIENT)
Dept: PHYSICAL THERAPY | Age: 80
End: 2025-01-02
Attending: PHYSICIAN ASSISTANT
Payer: MEDICARE

## 2025-01-02 DIAGNOSIS — F41.8 DEPRESSION WITH ANXIETY: ICD-10-CM

## 2025-01-02 PROCEDURE — 97112 NEUROMUSCULAR REEDUCATION: CPT

## 2025-01-02 PROCEDURE — 97110 THERAPEUTIC EXERCISES: CPT

## 2025-01-02 NOTE — PROGRESS NOTES
Diagnosis:    Back pain without sciatica (M54.9)  Unsteady gait (R26.81)      Referring Provider: Umang Kee  Date of Evaluation:    24    Precautions:  Fall Risk Next MD visit:   none scheduled  Date of Surgery: n/a   Insurance Primary/Secondary: [facility] MEDICARE / MEDICAID     # Auth Visits: 10 visits until 25          Pt declined use of Language Line for translation.  Subjective: Pt reports that he was sick so missed the PT appointments.  He is feeling better now and has been doing the HEP when he can.  The tramadol is helping a lot for the back and hip pain.  He is having pain now at the left side of the neck.      Pain: 0/10 current at low back.        Objective: Tx: See daily treatment log below      24:  Balance Testing:  Romberg eyes open > 30 sec  Romberg eyes closed > 30 sec  Modified Tandem > 30 sec Right and Left   Tandem: R 2 sec; L 0 sec  Sit to  30 seconds: 4  TU sec      Assessment:  Pt had one loss of balance today while doing stride balance with head turns but was able to regain balance by grabbing bar.  He is having less difficulty with strengthening exercises but will continue to benefit from more LE strengthening and balance to improve his safety with gait.      Goals:    (to be met in 10 visits)   Pt will improve transversus abdominis recruitment to perform proper isometric contraction without requiring verbal or tactile cuing to promote advancement of therex   Pt will demonstrate good understanding of proper posture and body mechanics to decrease pain and improve spinal safety   Pt will improve lumbar spine AROM flexion WNL to allow increase ease with bending forward to don shoes   Pt will improve hip strength at least to 3+/5 for improved pelvic stability for improved safety with gait to prevent falls  Pt will improve TUG time to < or = to 16 seconds for improved safety with gait.  Pt will be independent and compliant with comprehensive HEP to  maintain progress achieved in PT          Plan: Continue LE strengthening and balance activities. .   Date: 11/27/2024  TX#: 2/10 Date:  12/9/24               TX#: 3/10 Date:   12/16/24              TX#: 4/10 Date:  1/2/25               TX#: 5/10 Date:   Tx#: 6/   Ther ex: 15 min  Seated marches 15x R/L  LAQ 2x10 R/L  Seated resisted hip abduction Red 2x10  Bridges 10x Ther Ex: 23 min  Gait training with cane 20 ft x2  Seated marching 20x  LAQ 2x10 R/L  Seated resisted hip abduction Red 2x10  Bridges 10x  Supine hip adduction isometric with ball 5\" 10x  SAQ 2x10 R/L  Standing hip abduction 10x R/L  Standing hip extension 10x R/L Ther Ex: 25 min  Seated marching 20x  LAQ 2x10 R/L  Seated resisted hip abduction Red 2x10  Bridges 2x10  Supine hip adduction isometric with ball 5\" 15x  SAQ 2x10 R/L  Standing hip abduction 10x R/L  Standing hip extension 10x R/L Ther Ex: 25 min  Seated marching 20x  LAQ 2x10 R/L  Seated resisted hip abduction Red 2x10  Bridges 2x10  Supine hip adduction isometric with ball 5\" 15x  SAQ 2x10 R/L  Standing hip abduction 10x R/L  Standing hip extension 10x R/L    NMR: 25 min  Balance testing  Stride balance with head turns 10x R/L (CGA)  Stride balance with UE reaches 10x R/L (CGA)   NMR: 20 min   Side stepping at bar 2 laps  Stride balance with head turns 10x R/L (CGA)  Stride balance with UE reaches 10x R/L (CGA)  Narrow base of support balance 30\" 2x with CGA  Narrow base of support balance with head turns with CGA 10x R/L  Standing marching 10x R/L  Alternating foot taps up to 3 inch step (hold bar) 10x R/L   NMR: 18 min   Side stepping at bar 2 laps  Stride balance with head turns 10x R/L (CGA)  Stride balance with UE reaches 10x R/L (CGA)  Narrow base of support balance 30\" 2x with CGA  Narrow base of support balance with head turns with CGA 10x R/L  Standing marching 10x R/L  Weight shifting on Airex pad 30\"   Alternating foot taps up to 3 inch step (holding bar) 10x R/L NMR: 18 min    Side stepping at bar 2 laps  Stride balance with head turns 10x R/L (CGA)  Stride balance with UE reaches 10x R/L (CGA)  Narrow base of support balance 30\" 2x with CGA  Narrow base of support balance with head turns with CGA 10x R/L  Standing marching on Airex pad 10x R/L  Weight shifting on Airex pad 30\"   Double leg heel raises on Airex pad 2x10  Alternating foot taps up to 3 inch step (holding bar) 10x R/L                  HEP:   Access Code: [email or web address]  URL: [email or web address]  Date: 12/16/2024  - Seated March  - 1 x daily - 7 x weekly - 2 sets - 10 reps  - Standing Hip Abduction with Counter Support  - 1 x daily - 7 x weekly - 2 sets - 10 reps  - Standing Hip Extension with Counter Support  - 1 x daily - 7 x weekly - 2 sets - 10 reps  - Side Stepping with Counter Support  - 1 x daily - 7 x weekly - 1 sets - 10 reps    Charges: 1 NMR, 2 TE       Total Timed Treatment: 43 min  Total Treatment Time: 43 min

## 2025-01-07 RX ORDER — DIAZEPAM 10 MG/1
10 TABLET ORAL 2 TIMES DAILY
Qty: 60 TABLET | Refills: 0 | Status: SHIPPED | OUTPATIENT
Start: 2025-01-07

## 2025-01-07 NOTE — TELEPHONE ENCOUNTER
Please review; protocol failed/ has no protocol      Recent fills: 05/03/2024  Last Rx written: 05/03/2024  Last Office Visit: 11/21/2024    Recent Visits  Date Type Provider Dept   11/21/24 Office Visit Umang Kee PA-C [facility]     Future Appointments  Date Type Provider Dept   01/16/25 Appointment Umang Kee PA-C [facility]   Showing future appointments within next 150 days with a meds authorizing provider and meeting all other requirements      Requested Prescriptions   Pending Prescriptions Disp Refills    DIAZEPAM 10 MG Oral Tab [Pharmacy Med Name: DIAZEPAM 10MG TABLETS] 60 tablet 0     Sig: TAKE 1 TABLET BY MOUTH EVERY MORNING AND EVERY NIGHT AT BEDTIME       Controlled Substance Medication Failed - 1/7/2025  3:07 PM        Failed - This medication is a controlled substance - forward to provider to refill           Recent Outpatient Visits              5 days ago     [facility] in [place] Espinal, Melissa, PT    Office Visit    3 weeks ago     [facility] in [place] Espinal, Melissa, PT    Office Visit    4 weeks ago     [facility] [place] Espinal, Melissa, PT    Office Visit    1 month ago     [facility] [place] Espinal, Melissa, PT    Office Visit    1 month ago Back pain without sciatica    [facility] in [place] Espinal, Melissa, PT    Office Visit          Future Appointments         Provider Department Appt Notes    In 1 week Umang Kee PA-C [facility], [address], [place] mpx, last px :none, policy informed    In 2 weeks MilesBerenice, PT [facility] in [place] 10 visits 11/25 to 2/17/25  [facility] Medicare  no c/p, no limit

## 2025-01-09 ENCOUNTER — TELEPHONE (OUTPATIENT)
Dept: PHYSICAL THERAPY | Facility: HOSPITAL | Age: 80
End: 2025-01-09

## 2025-01-16 ENCOUNTER — LAB ENCOUNTER (OUTPATIENT)
Dept: LAB | Age: 80
End: 2025-01-16
Attending: PHYSICIAN ASSISTANT
Payer: MEDICARE

## 2025-01-16 ENCOUNTER — OFFICE VISIT (OUTPATIENT)
Dept: FAMILY MEDICINE CLINIC | Facility: CLINIC | Age: 80
End: 2025-01-16

## 2025-01-16 VITALS
DIASTOLIC BLOOD PRESSURE: 60 MMHG | HEIGHT: 70 IN | HEART RATE: 75 BPM | BODY MASS INDEX: 24.2 KG/M2 | WEIGHT: 169 LBS | SYSTOLIC BLOOD PRESSURE: 102 MMHG

## 2025-01-16 DIAGNOSIS — R53.1 WEAKNESS: ICD-10-CM

## 2025-01-16 DIAGNOSIS — D64.9 ANEMIA, UNSPECIFIED TYPE: ICD-10-CM

## 2025-01-16 DIAGNOSIS — M25.562 CHRONIC PAIN OF BOTH KNEES: ICD-10-CM

## 2025-01-16 DIAGNOSIS — G89.29 CHRONIC MIDLINE LOW BACK PAIN WITHOUT SCIATICA: ICD-10-CM

## 2025-01-16 DIAGNOSIS — G89.29 CHRONIC PAIN OF BOTH KNEES: ICD-10-CM

## 2025-01-16 DIAGNOSIS — M54.50 CHRONIC MIDLINE LOW BACK PAIN WITHOUT SCIATICA: ICD-10-CM

## 2025-01-16 DIAGNOSIS — Z12.5 SCREENING FOR MALIGNANT NEOPLASM OF PROSTATE: ICD-10-CM

## 2025-01-16 DIAGNOSIS — R26.81 UNSTEADY GAIT: ICD-10-CM

## 2025-01-16 DIAGNOSIS — M25.561 CHRONIC PAIN OF BOTH KNEES: ICD-10-CM

## 2025-01-16 DIAGNOSIS — Z00.00 ROUTINE GENERAL MEDICAL EXAMINATION AT A HEALTH CARE FACILITY: Primary | ICD-10-CM

## 2025-01-16 DIAGNOSIS — E11.9 TYPE 2 DIABETES MELLITUS WITHOUT COMPLICATION, WITHOUT LONG-TERM CURRENT USE OF INSULIN (HCC): ICD-10-CM

## 2025-01-16 DIAGNOSIS — I10 ESSENTIAL HYPERTENSION: ICD-10-CM

## 2025-01-16 DIAGNOSIS — H91.93 BILATERAL HEARING LOSS, UNSPECIFIED HEARING LOSS TYPE: ICD-10-CM

## 2025-01-16 DIAGNOSIS — M15.9 GENERALIZED OSTEOARTHRITIS: ICD-10-CM

## 2025-01-16 DIAGNOSIS — F41.8 DEPRESSION WITH ANXIETY: ICD-10-CM

## 2025-01-16 DIAGNOSIS — R25.1 TREMOR: ICD-10-CM

## 2025-01-16 DIAGNOSIS — Z00.00 ROUTINE GENERAL MEDICAL EXAMINATION AT A HEALTH CARE FACILITY: ICD-10-CM

## 2025-01-16 DIAGNOSIS — N40.0 BENIGN PROSTATIC HYPERPLASIA WITHOUT LOWER URINARY TRACT SYMPTOMS: ICD-10-CM

## 2025-01-16 DIAGNOSIS — E78.2 MIXED HYPERLIPIDEMIA: ICD-10-CM

## 2025-01-16 PROBLEM — J41.0 SMOKERS' COUGH (HCC): Chronic | Status: ACTIVE | Noted: 2025-01-16

## 2025-01-16 PROBLEM — J41.0 SMOKERS' COUGH (HCC): Chronic | Status: RESOLVED | Noted: 2025-01-16 | Resolved: 2025-01-16

## 2025-01-16 LAB
ALBUMIN SERPL-MCNC: 3.9 G/DL (ref 3.2–4.8)
ALBUMIN/GLOB SERPL: 1.4 {RATIO} (ref 1–2)
ALP LIVER SERPL-CCNC: 83 U/L
ALT SERPL-CCNC: 10 U/L
ANION GAP SERPL CALC-SCNC: 8 MMOL/L (ref 0–18)
AST SERPL-CCNC: 14 U/L (ref ?–34)
BASOPHILS # BLD AUTO: 0.06 X10(3) UL (ref 0–0.2)
BASOPHILS NFR BLD AUTO: 0.6 %
BILIRUB SERPL-MCNC: 0.3 MG/DL (ref 0.2–1.1)
BILIRUB UR QL: NEGATIVE
BUN BLD-MCNC: 17 MG/DL (ref 9–23)
BUN/CREAT SERPL: 15 (ref 10–20)
CALCIUM BLD-MCNC: 9.4 MG/DL (ref 8.7–10.4)
CHLORIDE SERPL-SCNC: 102 MMOL/L (ref 98–112)
CHOLEST SERPL-MCNC: 115 MG/DL (ref ?–200)
CLARITY UR: CLEAR
CO2 SERPL-SCNC: 29 MMOL/L (ref 21–32)
COMPLEXED PSA SERPL-MCNC: 0.75 NG/ML (ref ?–4)
CREAT BLD-MCNC: 1.13 MG/DL
CREAT UR-SCNC: 56.5 MG/DL
DEPRECATED HBV CORE AB SER IA-ACNC: 45 NG/ML
DEPRECATED RDW RBC AUTO: 55.4 FL (ref 35.1–46.3)
EGFRCR SERPLBLD CKD-EPI 2021: 67 ML/MIN/1.73M2 (ref 60–?)
EOSINOPHIL # BLD AUTO: 0.05 X10(3) UL (ref 0–0.7)
EOSINOPHIL NFR BLD AUTO: 0.5 %
ERYTHROCYTE [DISTWIDTH] IN BLOOD BY AUTOMATED COUNT: 17 % (ref 11–15)
FASTING PATIENT LIPID ANSWER: NO
FASTING STATUS PATIENT QL REPORTED: NO
GLOBULIN PLAS-MCNC: 2.7 G/DL (ref 2–3.5)
GLUCOSE BLD-MCNC: 103 MG/DL (ref 70–99)
GLUCOSE UR-MCNC: >1000 MG/DL
HCT VFR BLD AUTO: 36.6 %
HDLC SERPL-MCNC: 48 MG/DL (ref 40–59)
HEMOGLOBIN A1C: 6.4 % (ref 4.3–5.6)
HGB BLD-MCNC: 11.8 G/DL
HGB UR QL STRIP.AUTO: NEGATIVE
IMM GRANULOCYTES # BLD AUTO: 0.05 X10(3) UL (ref 0–1)
IMM GRANULOCYTES NFR BLD: 0.5 %
IRON SATN MFR SERPL: 8 %
IRON SERPL-MCNC: 17 UG/DL
KETONES UR-MCNC: NEGATIVE MG/DL
LDLC SERPL CALC-MCNC: 47 MG/DL (ref ?–100)
LEUKOCYTE ESTERASE UR QL STRIP.AUTO: NEGATIVE
LYMPHOCYTES # BLD AUTO: 2.72 X10(3) UL (ref 1–4)
LYMPHOCYTES NFR BLD AUTO: 29.2 %
MCH RBC QN AUTO: 28.6 PG (ref 26–34)
MCHC RBC AUTO-ENTMCNC: 32.2 G/DL (ref 31–37)
MCV RBC AUTO: 88.6 FL
MICROALBUMIN UR-MCNC: <0.3 MG/DL
MONOCYTES # BLD AUTO: 0.98 X10(3) UL (ref 0.1–1)
MONOCYTES NFR BLD AUTO: 10.5 %
NEUTROPHILS # BLD AUTO: 5.45 X10 (3) UL (ref 1.5–7.7)
NEUTROPHILS # BLD AUTO: 5.45 X10(3) UL (ref 1.5–7.7)
NEUTROPHILS NFR BLD AUTO: 58.7 %
NITRITE UR QL STRIP.AUTO: NEGATIVE
NONHDLC SERPL-MCNC: 67 MG/DL (ref ?–130)
OSMOLALITY SERPL CALC.SUM OF ELEC: 290 MOSM/KG (ref 275–295)
PH UR: 6.5 [PH] (ref 5–8)
PLATELET # BLD AUTO: 304 10(3)UL (ref 150–450)
POTASSIUM SERPL-SCNC: 4.6 MMOL/L (ref 3.5–5.1)
PROT SERPL-MCNC: 6.6 G/DL (ref 5.7–8.2)
PROT UR-MCNC: NEGATIVE MG/DL
RBC # BLD AUTO: 4.13 X10(6)UL
SODIUM SERPL-SCNC: 139 MMOL/L (ref 136–145)
SP GR UR STRIP: 1.02 (ref 1–1.03)
TIBC SERPL-MCNC: 218 UG/DL (ref 250–425)
TRANSFERRIN SERPL-MCNC: 146 MG/DL (ref 215–365)
TRIGL SERPL-MCNC: 107 MG/DL (ref 30–149)
TSI SER-ACNC: 1.62 UIU/ML (ref 0.55–4.78)
UROBILINOGEN UR STRIP-ACNC: NORMAL
VLDLC SERPL CALC-MCNC: 15 MG/DL (ref 0–30)
WBC # BLD AUTO: 9.3 X10(3) UL (ref 4–11)

## 2025-01-16 PROCEDURE — 82043 UR ALBUMIN QUANTITATIVE: CPT

## 2025-01-16 PROCEDURE — 83540 ASSAY OF IRON: CPT

## 2025-01-16 PROCEDURE — 84466 ASSAY OF TRANSFERRIN: CPT

## 2025-01-16 PROCEDURE — 81003 URINALYSIS AUTO W/O SCOPE: CPT

## 2025-01-16 PROCEDURE — 82570 ASSAY OF URINE CREATININE: CPT

## 2025-01-16 PROCEDURE — 85025 COMPLETE CBC W/AUTO DIFF WBC: CPT

## 2025-01-16 PROCEDURE — 80053 COMPREHEN METABOLIC PANEL: CPT

## 2025-01-16 PROCEDURE — 36415 COLL VENOUS BLD VENIPUNCTURE: CPT

## 2025-01-16 PROCEDURE — 82728 ASSAY OF FERRITIN: CPT

## 2025-01-16 PROCEDURE — 80061 LIPID PANEL: CPT

## 2025-01-16 PROCEDURE — 84443 ASSAY THYROID STIM HORMONE: CPT

## 2025-01-16 RX ORDER — DIAZEPAM 10 MG/1
10 TABLET ORAL EVERY MORNING
COMMUNITY
Start: 2025-01-16

## 2025-01-16 RX ORDER — TRAMADOL HYDROCHLORIDE 50 MG/1
50 TABLET ORAL EVERY 8 HOURS PRN
Qty: 30 TABLET | Refills: 2 | Status: SHIPPED | OUTPATIENT
Start: 2025-01-16

## 2025-01-16 NOTE — PROGRESS NOTES
Subjective:   Arnaldo Gutierrez is a 79 year old male who presents for a MA AHA (Medicare Advantage Annual Health Assessment) and Initial Annual Wellness Visit (outside the first 12 months of Medicare eligibility, no prior AWV) and scheduled follow up of multiple significant but stable problems.     The patient reports experiencing tremors, weakness, and an unsteady gait, which has caused him to fall a couple of times this week. Fortunately, he did not hit his head during the falls, did not lose consciousness, and landed on his knees. Additionally, he has a hearing loss; his hearing aid has been broken for the past two years.  The patient denies chest pain, SOB, N/V/C/D, fever, dizziness, syncope, and abdominal pain. There are no other concerns today.      History/Other:   Fall Risk Assessment:   He has been screened for Falls and is High Risk. Fall Prevention information provided to patient in After Visit Summary.    Do you feel unsteady when standing or walking?: Yes (weak legs)  Do you worry about falling?: Yes  Have you fallen in the past year?: Yes  How many times have you fallen?: 2  Were you injured?: Yes (hit head and knee when falling)     Cognitive Assessment:   He had a completely normal cognitive assessment - see flowsheet entries     Functional Ability/Status:   Arnaldo Gutierrez has some abnormal functions as listed below:  He has difficulties Managing Money/Bills based on screening of functional status.He has difficulties Shopping for Groceries based on screening of functional status. He has difficulties Affording Meds based on screening of functional status. He has Walking problems based on screening of functional status.       Depression Screening (PHQ):  PHQ-2 SCORE: 0  , done 1/16/2025   Last [place] Suicide Screening on 1/16/2025 was No Risk.       Advanced Directives:   He does NOT have a Living Will. [Do you have a living will?: No]  He does NOT have a Power of  for Health Care. [Do you  have a healthcare power of ?: No]  Patient has Advance Care Planning documents but we do not have a copy in EMR. Discussed Advanced Care Planning with patient and instructed patient to get our office a copy to be scanned into EMR.      Patient Active Problem List   Diagnosis    BPH (benign prostatic hyperplasia)    Chronic low back pain    Chronic pain of both knees    Depression with anxiety    Generalized osteoarthritis    Hyperlipidemia    Type 2 diabetes mellitus without complication, without long-term current use of insulin (HCC)    Essential hypertension     Allergies:  He has No Known Allergies.    Current Medications:  Outpatient Medications Marked as Taking for the 1/16/25 encounter (Office Visit) with Umang Kee PA-C   Medication Sig    traMADol 50 MG Oral Tab Take 1 tablet (50 mg total) by mouth every 8 (eight) hours as needed for Pain.    diazePAM 10 MG Oral Tab Take 1 tablet (10 mg total) by mouth every morning.    tamsulosin 0.4 MG Oral Cap Take 2 capsules (0.8 mg total) by mouth daily. AFTER SAME MEAL EACH DAY    MOUNJARO 5 MG/0.5ML Subcutaneous Solution Auto-injector     pregabalin 100 MG Oral Cap Take 1 capsule (100 mg total) by mouth in the morning, at noon, and at bedtime.    FLUoxetine HCl 20 MG Oral Tab Take 1 tablet (20 mg total) by mouth in the morning, at noon, and at bedtime.    alum-mag hydroxide-simethicone 200-200-20 MG/5ML Oral Suspension Take 10 mL by mouth 4 (four) times daily as needed.    raNITIdine HCl (RANITIDINE 150 MAX STRENGTH OR) Take 150 mg by mouth daily.    empagliflozin (JARDIANCE) 10 MG Oral Tab Take 1 tablet (10 mg total) by mouth daily. Every morning    simvastatin 40 MG Oral Tab Take 1 tablet (40 mg total) by mouth nightly.    Glucose Blood (ACCU-CHEK GUIDE) In Vitro Strip 1 each by In Vitro route 2 (two) times daily.    Accu-Chek FastClix Lancets Does not apply Misc Apply topically daily.    pantoprazole 40 MG Oral Tab EC Take 1 tablet (40 mg total) by  mouth every morning before breakfast.    vitamin B-12 50 MCG Oral Tab Take 1 tablet (50 mcg total) by mouth daily.    Multiple Vitamins-Minerals (PRESERVISION/LUTEIN) Oral Cap Take 1 capsule by mouth daily.    Polyethyl Glycol-Propyl Glycol (SYSTANE ULTRA OP) Apply 2 drops to eye in the morning and 2 drops before bedtime.       Medical History:  He  has a past medical history of Anxiety, BPH (benign prostatic hyperplasia), CKD (chronic kidney disease) stage 3, GFR 30-59 ml/min ([facility]), Depression, Diabetes mellitus ([facility]), Essential hypertension, GERD (gastroesophageal reflux disease), and Hyperlipidemia.  Surgical History:  He  has a past surgical history that includes foot fracture surgery (Right).   Family History:  His family history includes Other in his father.  Social History:  He  reports that he quit smoking about 45 years ago. His smoking use included cigarettes. He started smoking about 55 years ago. He has a 20 pack-year smoking history. He has been exposed to tobacco smoke. He has never used smokeless tobacco. He reports current alcohol use. He reports that he does not currently use drugs.    Tobacco:  He smoked tobacco in the past but quit greater than 12 months ago.  Social History     Tobacco Use   Smoking Status Former    Current packs/day: 0.00    Average packs/day: 2.0 packs/day for 10.0 years (20.0 ttl pk-yrs)    Types: Cigarettes    Start date:     Quit date: 1980    Years since quittin.0    Passive exposure: Past   Smokeless Tobacco Never        CAGE Alcohol Screen:   CAGE screening score of 0 on 2025, showing low risk of alcohol abuse.      Patient Care Team:  Dubin, Leonard as PCP - General (Internal Medicine)    Review of Systems   Constitutional: Negative.    HENT: Negative.     Eyes: Negative.    Respiratory: Negative.     Cardiovascular: Negative.    Gastrointestinal: Negative.    Genitourinary: Negative.    Musculoskeletal: Negative.    Skin: Negative.    Neurological:   Positive for tremors and weakness. Negative for headaches.   Psychiatric/Behavioral: Negative.           Objective:   Physical Exam  Vitals reviewed.   Constitutional:       Appearance: He is well-developed.   HENT:      Head: Normocephalic and atraumatic.      Right Ear: Tympanic membrane, ear canal and external ear normal. There is no impacted cerumen.      Left Ear: Tympanic membrane, ear canal and external ear normal. There is no impacted cerumen.      Nose: Nose normal.      Mouth/Throat:      Mouth: Mucous membranes are moist.      Pharynx: Oropharynx is clear. No oropharyngeal exudate or posterior oropharyngeal erythema.   Eyes:      General:         Right eye: No discharge.         Left eye: No discharge.      Conjunctiva/sclera: Conjunctivae normal.   Cardiovascular:      Rate and Rhythm: Normal rate and regular rhythm.      Heart sounds: Normal heart sounds.   Pulmonary:      Effort: Pulmonary effort is normal.      Breath sounds: Normal breath sounds.   Abdominal:      General: Abdomen is flat. Bowel sounds are normal. There is no distension.      Palpations: Abdomen is soft.      Tenderness: There is no abdominal tenderness. There is no right CVA tenderness or left CVA tenderness.   Musculoskeletal:         General: Normal range of motion.      Cervical back: Normal range of motion and neck supple.   Skin:     Findings: No rash.   Neurological:      Mental Status: He is alert and oriented to person, place, and time.   Psychiatric:         Mood and Affect: Mood normal.         /60   Pulse 75   Ht 5' 10\" (1.778 m)   Wt 169 lb (76.7 kg)   BMI 24.25 kg/m²  Estimated body mass index is 24.25 kg/m² as calculated from the following:    Height as of this encounter: 5' 10\" (1.778 m).    Weight as of this encounter: 169 lb (76.7 kg).    Medicare Hearing Assessment:   Hearing Screening    Screening Method: Finger Rub  Finger Rub Result: Pass         Visual Acuity:   Right Eye Visual Acuity: Corrected  Right Eye Chart Acuity: 20/20   Left Eye Visual Acuity: Corrected Left Eye Chart Acuity: 20/25   Both Eyes Visual Acuity: Corrected Both Eyes Chart Acuity: 20/25   Able To Tolerate Visual Acuity: Yes        Assessment & Plan:   Arnaldo Gutierrez is a 79 year old male who presents for a Medicare Assessment.     1. Routine general medical examination at a health care facility (Primary)  -     CBC With Differential With Platelet; Future; Expected date: 01/16/2025  -     Comp Metabolic Panel (14); Future; Expected date: 01/16/2025  -     Lipid Panel; Future; Expected date: 01/16/2025  -     PSA Total, Screen; Future; Expected date: 01/16/2025  -     TSH W Reflex To Free T4; Future; Expected date: 01/16/2025  -     Iron And Tibc; Future; Expected date: 01/16/2025  -     Ferritin; Future; Expected date: 01/16/2025  -     Urinalysis with Culture Reflex; Future; Expected date: 01/16/2025  Overall health discussed, exercise/activity appropriate for age and health status, heathy diety, preventive care, and upcoming screening discussed. Routine labs ordered.    2. Type 2 diabetes mellitus without complication, without long-term current use of insulin ([facility])  -     POC Hemoglobin A1C  -     Microalb/Creat Ratio, Random Urine; Future; Expected date: 01/16/2025  -     Ophthalmology Referral - In Network  Discussed HbA1C result. Continue current management.    Bilateral barefoot skin diabetic exam is normal, visualized feet and the appearance is normal.  Bilateral monofilament/sensation of both feet is normal.  Pulsation pedal pulse exam of both lower legs/feet is normal as well.    Diabetes: A1c is 6.4 done 1/16/2025 which shows Excellent control. Continue current meds and lifestyle modification.   DM Meds: empagliflozin Tabs - 10 MG; Mounjaro Soaj - 5 MG/0.5ML   Oral Diabetes Med Adherence Excellent at 100%    Diabetic Complications: Hyperglycemia: A1c 6.4% 1/16/2025, .  CKD 3a (GFR 50).  None  Diabetes is : stable Continue  current treatment regimen. Reassess Diabetes in : in 6 months    3. Weakness  -     DME - External  -     Cane (Quad)  -     Walker Misc  Refer to Neurologist for further evaluation. Order: Quad Cane and manual walker.    4. Tremor  -     Neuro Referral - In Network    5. Unsteady gait  Refer to Neurologist for further evaluation. Order: Quad Cane and manual walker.    6. Anemia, unspecified type  -     CBC With Differential With Platelet; Future; Expected date: 01/16/2025  -     Iron And Tibc; Future; Expected date: 01/16/2025  -     Ferritin; Future; Expected date: 01/16/2025  Stable. Continue current management.    7. Screening for malignant neoplasm of prostate  -     PSA Total, Screen; Future; Expected date: 01/16/2025    8. Chronic pain of both knees  -     traMADol HCl; Take 1 tablet (50 mg total) by mouth every 8 (eight) hours as needed for Pain.  Dispense: 30 tablet; Refill: 2  Stable. Continue current management.    9. Depression with anxiety  Stable. Continue current management.    10. Bilateral hearing loss, unspecified hearing loss type  -     ENT Referral - [place] ([facility])    11. Chronic midline low back pain without sciatica  -     traMADol HCl; Take 1 tablet (50 mg total) by mouth every 8 (eight) hours as needed for Pain.  Dispense: 30 tablet; Refill: 2  Stable. Continue current management.    12. Essential hypertension  Stable. Continue current management.    13. Mixed hyperlipidemia  Stable. Continue current management.    14. Generalized osteoarthritis  Stable. Continue current management.    15. Benign prostatic hyperplasia without lower urinary tract symptoms  Stable. Continue current management.      The patient indicates understanding of these issues and agrees to the plan.  Consult ordered.  Continue with current treatment plan.  Lab work ordered.  Patient reassured.  Prescription medication ordered.  Reinforced healthy diet, lifestyle, and exercise.      No follow-ups on file.      Umang Kee PA-C, 1/16/2025     Supplementary Documentation:   General Health:  In the past six months, have you lost more than 10 pounds without trying?: 2 - No  Has your appetite been poor?: No  Type of Diet: Diabetic  How does the patient maintain a good energy level?: Other  How would you describe your daily physical activity?: None  How would you describe your current health state?: Fair  How do you maintain positive mental well-being?: Social Interaction;Puzzles  On a scale of 0 to 10, with 0 being no pain and 10 being severe pain, what is your pain level?: 0 - (None)  In the past six months, have you experienced urine leakage?: 0-No  At any time do you feel concerned for the safety/well-being of yourself and/or your children, in your home or elsewhere?: No    Health Maintenance   Topic Date Due    Diabetes Care Dilated Eye Exam  Never done    COVID-19 Vaccine (5 - 2024-25 season) 09/01/2024    Influenza Vaccine (1) 10/01/2024    Annual Well Visit  Never done    Annual Depression Screening  01/01/2025    Fall Risk Screening (Annual)  01/01/2025    Diabetes Care: Foot Exam (Annual)  01/01/2025    Diabetes Care: Microalb/Creat Ratio (Annual)  01/01/2025    Diabetes Care A1C  01/15/2025    Diabetes Care: GFR  08/25/2025    Pneumococcal Vaccine: 50+ Years  Completed    Zoster Vaccines  Completed    Meningococcal B Vaccine  Aged Out

## 2025-01-17 ENCOUNTER — TELEPHONE (OUTPATIENT)
Dept: FAMILY MEDICINE CLINIC | Facility: CLINIC | Age: 80
End: 2025-01-17

## 2025-01-17 NOTE — TELEPHONE ENCOUNTER
Shower chair order submitted via [employer]:    Shower Chair with Arms [$]  Shower Chair with Arms 300LBS Weight Capacity. -   Quantity  1  Supplier  [employer] [facility]

## 2025-01-17 NOTE — TELEPHONE ENCOUNTER
Patient will only be able to obtain 1 of the items ordered, either a cane or a walker - not both. Please advise which is preferred.    Also per Medicare guidelines please include criteria within chart notes for whichever item is preferred:    Do not copy and paste - must be patient specific -        Canes (, ) and crutches (, , , , , ) are covered if all of the following criteria (1-3) are met:    1. The beneficiary has a mobility limitation that significantly impairs his/her ability to participate in one or more mobility-related activities of daily living (MRADL) in the home.  The MRADLs to be considered in this and all other statements in this policy are toileting, feeding, dressing, grooming, and bathing performed in customary locations in the home.  A mobility limitation is one that:  Prevents the beneficiary from accomplishing the MRADL entirely, or,  Places the beneficiary at reasonably determined heightened risk of morbidity or mortality secondary to the attempts to perform an MRADL; or,  Prevents the beneficiary from completing the MRADL within a reasonable time frame;  And,  2. The beneficiary is able to safely use the cane; and,  3. The functional mobility deficit can be sufficiently resolved by use of a cane.

## 2025-01-17 NOTE — TELEPHONE ENCOUNTER
Please order a manual walker. At this time, the patient uses a regular cane but frequently falls due to an imbalance in gait.

## 2025-02-13 ENCOUNTER — OFFICE VISIT (OUTPATIENT)
Dept: OTOLARYNGOLOGY | Facility: CLINIC | Age: 80
End: 2025-02-13

## 2025-02-13 ENCOUNTER — OFFICE VISIT (OUTPATIENT)
Dept: AUDIOLOGY | Facility: CLINIC | Age: 80
End: 2025-02-13

## 2025-02-13 DIAGNOSIS — H90.3 SENSORINEURAL HEARING LOSS (SNHL) OF BOTH EARS: Primary | ICD-10-CM

## 2025-02-13 DIAGNOSIS — H91.90 HEARING LOSS, UNSPECIFIED HEARING LOSS TYPE, UNSPECIFIED LATERALITY: Primary | ICD-10-CM

## 2025-02-13 PROCEDURE — 1159F MED LIST DOCD IN RCRD: CPT | Performed by: OTOLARYNGOLOGY

## 2025-02-13 PROCEDURE — 1160F RVW MEDS BY RX/DR IN RCRD: CPT | Performed by: OTOLARYNGOLOGY

## 2025-02-13 PROCEDURE — 92557 COMPREHENSIVE HEARING TEST: CPT | Performed by: AUDIOLOGIST

## 2025-02-13 PROCEDURE — 99203 OFFICE O/P NEW LOW 30 MIN: CPT | Performed by: OTOLARYNGOLOGY

## 2025-02-13 PROCEDURE — 1159F MED LIST DOCD IN RCRD: CPT | Performed by: AUDIOLOGIST

## 2025-02-14 NOTE — TELEPHONE ENCOUNTER
Refill passed per [facility] protocol.  Requested Prescriptions   Pending Prescriptions Disp Refills    empagliflozin (JARDIANCE) 10 MG Oral Tab 90 tablet 3     Sig: Take 1 tablet (10 mg total) by mouth daily. Every morning       Diabetes Medication Protocol Passed - 2/14/2025 10:31 AM        Passed - Last A1C < 7.5 and within past 6 months     Lab Results   Component Value Date    A1C 6.4 (A) 01/16/2025             Passed - In person appointment or virtual visit in the past 6 mos or appointment in next 3 mos     Recent Outpatient Visits              Yesterday     [facility] [place]    Office Visit    Yesterday Hearing loss, unspecified hearing loss type, unspecified laterality    [facility] Henri Chicas MD    Office Visit    4 weeks ago Routine general medical examination at a health care facility    [facility] [address], [place] Umang Kee PA-C    Office Visit    1 month ago     [facility] in [place] Espinal, Melissa, PT    Office Visit    2 months ago     [facility] in [place] Espinal, Melissa, PT    Office Visit          Future Appointments         Provider Department Appt Notes    In 1 month Jacinto Barillas MD [facility] rfd by  Umang Kee PA/ amber / [facility] ma ppo+ medicaid    In 1 month Umang Kee PA-C [facility] [address], [place] follow up                    Passed - Microalbumin procedure in past 12 months or taking ACE/ARB        Passed - EGFRCR or GFRNAA > 50     GFR Evaluation  EGFRCR: 67 , resulted on 1/16/2025          Passed - GFR in the past 12 months        Passed - Medication is active on med list           Recent Outpatient Visits              Yesterday     [facility] [place] [facility]    Office Visit     Yesterday Hearing loss, unspecified hearing loss type, unspecified laterality    [facility] Henri Chicas MD    Office Visit    4 weeks ago Routine general medical examination at a health care facility    [facility], [address], [place] Umang Kee PA-C    Office Visit    1 month ago     [facility] in [place] Espinal, Melissa, PT    Office Visit    2 months ago     [facility] in [place] Espinal, Melissa, PT    Office Visit          Future Appointments         Provider Department Appt Notes    In 1 month Jacinto Barillas MD [facility] rfd by  Umang Kee PA/ amber / [facility] ma ppo+ medicaid    In 1 month Umang Kee PA-C [facility] [address], [place] follow up

## 2025-02-14 NOTE — PROGRESS NOTES
Arnaldo Gutierrez is a 79 year old male.    Chief Complaint   Patient presents with    Ear Problem     Patient is here for hearing loss        HISTORY OF PRESENT ILLNESS  He presents with a long history of progressive bilateral hearing loss.  States that he is having more difficulty understanding speech especially when any noise externally is present.  States that he has a difficult time understanding conversation when the TV is on or any loud noises present.  No other otologic signs or symptoms no dizziness or tinnitus.  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      Social History     Socioeconomic History    Marital status:    Tobacco Use    Smoking status: Former     Current packs/day: 0.00     Average packs/day: 2.0 packs/day for 10.0 years (20.0 ttl pk-yrs)     Types: Cigarettes     Start date:      Quit date:      Years since quittin.1     Passive exposure: Past    Smokeless tobacco: Never   Vaping Use    Vaping status: Never Used   Substance and Sexual Activity    Alcohol use: Yes     Comment: rarely    Drug use: Not Currently       Family History   Problem Relation Age of Onset    Other (Other) Father        Past Medical History:    Anxiety    BPH (benign prostatic hyperplasia)    CKD (chronic kidney disease) stage 3, GFR 30-59 ml/min (HCC)    Depression    Diabetes mellitus (HCC)    Essential hypertension    GERD (gastroesophageal reflux disease)    Hyperlipidemia       Past Surgical History:   Procedure Laterality Date    Foot fracture surgery Right              REVIEW OF SYSTEMS    System Neg/Pos Details   Constitutional Negative Fatigue, fever and weight loss.   ENMT Negative Drooling.   Eyes Negative Blurred vision and vision changes.   Respiratory Negative Dyspnea and wheezing.   Cardio Negative Chest pain,  irregular heartbeat/palpitations and syncope.   GI Negative Abdominal pain and diarrhea.   Endocrine Negative Cold intolerance and heat intolerance.   Neuro Negative Tremors.   Psych Negative Anxiety and depression.   Integumentary Negative Frequent skin infections, pigment change and rash.   Hema/Lymph Negative Easy bleeding and easy bruising.           PHYSICAL EXAM    There were no vitals taken for this visit.       Constitutional Normal Overall appearance - Normal.   Psychiatric Normal Orientation - Oriented to time, place, person & situation. Appropriate mood and affect.   Neck Exam Normal Inspection - Normal. Palpation - Normal. Parotid gland - Normal. Thyroid gland - Normal.   Eyes Normal Conjunctiva - Right: Normal, Left: Normal. Pupil - Right: Normal, Left: Normal. Fundus - Right: Normal, Left: Normal.   Neurological Normal Memory - Normal. Cranial nerves - Cranial nerves II through XII grossly intact.   Head/Face Normal Facial features - Normal. Eyebrows - Normal. Skull - Normal.        Nasopharynx Normal External nose - Normal. Lips/teeth/gums - Normal. Tonsils - Normal. Oropharynx - Normal.   Ears Normal Inspection - Right: Normal, Left: Normal. Canal - Right: Normal, Left: Normal. TM - Right: Normal, Left: Normal.   Skin Normal Inspection - Normal.        Lymph Detail Normal Submental. Submandibular. Anterior cervical. Posterior cervical. Supraclavicular.        Nose/Mouth/Throat Normal External nose - Normal. Lips/teeth/gums - Normal. Tonsils - Normal. Oropharynx - Normal.   Nose/Mouth/Throat Normal Nares - Right: Normal Left: Normal. Septum -Normal  Turbinates - Right: Normal, Left: Normal.       Current Outpatient Medications:     Ferrous Sulfate 325 (65 Fe) MG Oral Tab, Take 1 tablet (325 mg total) by mouth daily with breakfast. With orange juice., Disp: 90 tablet, Rfl: 1    traMADol 50 MG Oral Tab, Take 1 tablet (50 mg total) by mouth every 8 (eight) hours as needed for Pain., Disp: 30 tablet, Rfl:  2    diazePAM 10 MG Oral Tab, Take 1 tablet (10 mg total) by mouth every morning., Disp: , Rfl:     tamsulosin 0.4 MG Oral Cap, Take 2 capsules (0.8 mg total) by mouth daily. AFTER SAME MEAL EACH DAY, Disp: 180 capsule, Rfl: 3    MOUNJARO 5 MG/0.5ML Subcutaneous Solution Auto-injector, , Disp: , Rfl:     pregabalin 100 MG Oral Cap, Take 1 capsule (100 mg total) by mouth in the morning, at noon, and at bedtime., Disp: 90 capsule, Rfl: 5    FLUoxetine HCl 20 MG Oral Tab, Take 1 tablet (20 mg total) by mouth in the morning, at noon, and at bedtime., Disp: 270 tablet, Rfl: 1    alum-mag hydroxide-simethicone 200-200-20 MG/5ML Oral Suspension, Take 10 mL by mouth 4 (four) times daily as needed., Disp: 200 mL, Rfl: 0    raNITIdine HCl (RANITIDINE 150 MAX STRENGTH OR), Take 150 mg by mouth daily., Disp: , Rfl:     empagliflozin (JARDIANCE) 10 MG Oral Tab, Take 1 tablet (10 mg total) by mouth daily. Every morning, Disp: 90 tablet, Rfl: 3    simvastatin 40 MG Oral Tab, Take 1 tablet (40 mg total) by mouth nightly., Disp: 90 tablet, Rfl: 3    Glucose Blood (ACCU-CHEK GUIDE) In Vitro Strip, 1 each by In Vitro route 2 (two) times daily., Disp: , Rfl:     Accu-Chek FastClix Lancets Does not apply Misc, Apply topically daily., Disp: , Rfl:     pantoprazole 40 MG Oral Tab EC, Take 1 tablet (40 mg total) by mouth every morning before breakfast., Disp: 90 tablet, Rfl: 2    vitamin B-12 50 MCG Oral Tab, Take 1 tablet (50 mcg total) by mouth daily., Disp: , Rfl:     Multiple Vitamins-Minerals (PRESERVISION/LUTEIN) Oral Cap, Take 1 capsule by mouth daily., Disp: , Rfl:     Polyethyl Glycol-Propyl Glycol (SYSTANE ULTRA OP), Apply 2 drops to eye in the morning and 2 drops before bedtime., Disp: , Rfl:   ASSESSMENT AND PLAN    1. 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  Asymmetric speech discrimination scores of 36% on the right 60% on the left.   Despite she is asymmetry and based on these findings I did recommend that he consider amplification.  I have asked him to contact his insurance to see what type of hearing aid benefits are available to him.  He will follow-up in office in a year for repeat audiogram.        This note was prepared using Dragon [employer] voice recognition dictation software. As a result errors may occur. When identified these errors have been corrected. While every attempt is made to correct errors during dictation discrepancies may still exist    Henri Chicas MD    2/14/2025    4:57 AM

## 2025-03-07 DIAGNOSIS — M25.561 CHRONIC PAIN OF BOTH KNEES: ICD-10-CM

## 2025-03-07 DIAGNOSIS — M25.562 CHRONIC PAIN OF BOTH KNEES: ICD-10-CM

## 2025-03-07 DIAGNOSIS — G89.29 CHRONIC PAIN OF BOTH KNEES: ICD-10-CM

## 2025-03-11 ENCOUNTER — PATIENT MESSAGE (OUTPATIENT)
Dept: FAMILY MEDICINE CLINIC | Facility: CLINIC | Age: 80
End: 2025-03-11

## 2025-03-11 DIAGNOSIS — G89.29 CHRONIC PAIN OF BOTH KNEES: ICD-10-CM

## 2025-03-11 DIAGNOSIS — M25.561 CHRONIC PAIN OF BOTH KNEES: ICD-10-CM

## 2025-03-11 DIAGNOSIS — M25.562 CHRONIC PAIN OF BOTH KNEES: ICD-10-CM

## 2025-03-11 RX ORDER — FLUOXETINE 20 MG/1
20 TABLET, FILM COATED ORAL 3 TIMES DAILY
Qty: 270 TABLET | Refills: 1 | OUTPATIENT
Start: 2025-03-11

## 2025-03-11 RX ORDER — PREGABALIN 100 MG/1
100 CAPSULE ORAL 3 TIMES DAILY
Qty: 90 CAPSULE | Refills: 5 | OUTPATIENT
Start: 2025-03-11

## 2025-03-11 RX ORDER — PREGABALIN 100 MG/1
100 CAPSULE ORAL 3 TIMES DAILY
Qty: 90 CAPSULE | Refills: 5 | Status: SHIPPED | OUTPATIENT
Start: 2025-03-11

## 2025-03-11 NOTE — TELEPHONE ENCOUNTER
Please review.  Protocol Failed.    Pregabalin (Lyrica) Recent fills each # 90 : 12/9, 1/9, 2/8  Last prescription written: 9/12/24 #90 +5 refills  Last office visit:  1/16/2025    Future Appointments   Date Time Provider Department Center   4/8/2025 12:00 PM Umang Kee PA-C [facility] [place]       *Fluoxetine 20mg has been updated to capsules (more common than tablets for that strength)    Requested Prescriptions   Pending Prescriptions Disp Refills    pregabalin 100 MG Oral Cap [Pharmacy Med Name: PREGABALIN 100MG CAPSULES] 90 capsule 5     Sig: Take 1 capsule (100 mg total) by mouth in the morning, at noon, and at bedtime.       Controlled Substance Medication Failed - 3/11/2025 11:19 AM        Failed - This medication is a controlled substance - forward to provider to refill        Passed - Medication is active on med list       Neurology Medications Passed - 3/11/2025 11:19 AM        Passed - In person appointment or virtual visit in the past 6 mos or appointment in next 3 mos        Passed - Medication is active on med list          FLUoxetine 20 MG Oral Cap [Pharmacy Med Name: FLUOXETINE 20MG CAPSULES] 270 capsule 1     Sig: Take 1 capsule (20 mg total) by mouth in the morning, at noon, and at bedtime.       Psychiatric Non-Scheduled (Anti-Anxiety) Failed - 3/11/2025 11:19 AM        Failed - Medication is active on med list        Passed - In person appointment or virtual visit in the past 6 mos or appointment in next 3 mos        Passed - Depression Screening completed within the past 12 months

## 2025-03-12 RX ORDER — TIRZEPATIDE 5 MG/.5ML
5 INJECTION, SOLUTION SUBCUTANEOUS
Qty: 6 ML | Refills: 1 | Status: SHIPPED | OUTPATIENT
Start: 2025-03-12 | End: 2025-06-10

## 2025-03-12 NOTE — TELEPHONE ENCOUNTER
PRAVEEN Tran       Please see FUJIAN HAIYUAN message below and advise, thank you     Mounjaro is not on current med list     Medication is listed as patient reported, not yet prescribed by provider         Arnaldo SCHNEIDER Em Rn Triage (supporting Umang Kee PA-C)21 hours ago (10:22 AM)     SC  Good morning!      Pharmacy is waiting on approval for needles and Monjauro 5ml   Thank you so much       Last office visit 1/16/2025    Diabetes: A1c is 6.4 done 1/16/2025 which shows Excellent control. Continue current meds and lifestyle modification.   DM Meds: empagliflozin Tabs - 10 MG; Mounjaro Soaj - 5 MG/0.5ML   Oral Diabetes Med Adherence Excellent at 100%    Diabetic Complications: Hyperglycemia: A1c 6.4% 1/16/2025, .  CKD 3a (GFR 50).  None  Diabetes is : stable Continue current treatment regimen. Reassess Diabetes in : in 6 months

## 2025-03-18 ENCOUNTER — OFFICE VISIT (OUTPATIENT)
Dept: NEUROLOGY | Facility: CLINIC | Age: 80
End: 2025-03-18
Payer: COMMERCIAL

## 2025-03-18 VITALS — WEIGHT: 180 LBS | BODY MASS INDEX: 25.2 KG/M2 | HEIGHT: 71 IN

## 2025-03-18 DIAGNOSIS — M54.40 CHRONIC MIDLINE LOW BACK PAIN WITH SCIATICA, SCIATICA LATERALITY UNSPECIFIED: ICD-10-CM

## 2025-03-18 DIAGNOSIS — R25.1 TREMOR: Primary | ICD-10-CM

## 2025-03-18 DIAGNOSIS — R27.0 ATAXIA: ICD-10-CM

## 2025-03-18 DIAGNOSIS — G89.29 CHRONIC MIDLINE LOW BACK PAIN WITH SCIATICA, SCIATICA LATERALITY UNSPECIFIED: ICD-10-CM

## 2025-03-18 PROCEDURE — 1160F RVW MEDS BY RX/DR IN RCRD: CPT | Performed by: OTHER

## 2025-03-18 PROCEDURE — 3008F BODY MASS INDEX DOCD: CPT | Performed by: OTHER

## 2025-03-18 PROCEDURE — 99204 OFFICE O/P NEW MOD 45 MIN: CPT | Performed by: OTHER

## 2025-03-18 PROCEDURE — 1159F MED LIST DOCD IN RCRD: CPT | Performed by: OTHER

## 2025-03-18 RX ORDER — PROPRANOLOL HCL 20 MG
TABLET ORAL
Qty: 120 TABLET | Refills: 3 | Status: SHIPPED | OUTPATIENT
Start: 2025-03-18

## 2025-03-18 NOTE — PROGRESS NOTES
The following individual(s) verbally consented to be recorded using ambient AI listening technology and understand that they can each withdraw their consent to this listening technology at any point by asking the clinician to turn off or pause the recording:    Patient name: Arnaldo Gutierrez  Additional names:

## 2025-03-18 NOTE — PROGRESS NOTES
[facility] [address], [address]  [facility] [address]  [phone]            Neurology Initial Visit     Referred By: Dr. Kee    Chief Complaint:   Chief Complaint   Patient presents with    Neurologic Problem     Patient presents here today to establish care, patient was referred by  Umang Kee PA-C to evaluate and treat tremors, patient c/o tremors in hands. Patient states he gets a pain on his back that causes him to fall. Patient states he has an unsteady gait and uses a cane to walk.        HPI:     Arnaldo Gutierrez is a 79 year old male, who presents for emergent back pain.  History of Present Illness  Arnaldo Gutierrez is a 79 year old male who presents with back pain and tremors. He was referred by his doctor for evaluation of back pain and tremors.    He experiences persistent back pain that worsens with physical exertion, leading to tremors and weakness in his leg and foot, sometimes causing falls. The back pain has been a long-standing issue and is constant in nature.    Tremors in his hands have been present for about a year, with increased frequency when holding objects. The tremors are more pronounced in one hand, affecting his ability to hold a cup of coffee without assistance. No family history of tremors is noted due to limited contact with family.    He suffers from insomnia for several years, with current medication causing daytime drowsiness without improving nighttime sleep.    He experiences urinary urgency and occasional incontinence, which he attributes to prostate issues. No acting out of dreams at night is reported.    Socially, he lives alone in [place] and experiences significant depression, seeing his daughter and former wife only on Saturdays. He used to read frequently but has stopped due to his current condition.      Past Medical History:    Anxiety    BPH (benign prostatic hyperplasia)    CKD (chronic kidney disease) stage 3,  GFR 30-59 ml/min (HCC)    Depression    Diabetes mellitus (HCC)    Essential hypertension    GERD (gastroesophageal reflux disease)    Hyperlipidemia       Past Surgical History:   Procedure Laterality Date    Foot fracture surgery Right     2012       Social history:  History   Smoking Status    Former    Types: Cigarettes   Smokeless Tobacco    Never     History   Alcohol Use    Yes     Comment: rarely     History   Drug Use Unknown       Family History   Problem Relation Age of Onset    Other (Other) Father          Current Outpatient Medications:     propranolol 20 MG Oral Tab, Start with 1 pill daily for 1 week, then 1 pill BID for another week, then 2 pills BID, Disp: 120 tablet, Rfl: 3    Tirzepatide (MOUNJARO) 5 MG/0.5ML Subcutaneous Solution Auto-injector, Inject 5 mg into the skin every 7 days., Disp: 6 mL, Rfl: 1    pregabalin 100 MG Oral Cap, Take 1 capsule (100 mg total) by mouth in the morning, at noon, and at bedtime., Disp: 90 capsule, Rfl: 5    FLUoxetine 20 MG Oral Cap, Take 1 capsule (20 mg total) by mouth in the morning, at noon, and at bedtime., Disp: 270 capsule, Rfl: 3    empagliflozin (JARDIANCE) 10 MG Oral Tab, Take 1 tablet (10 mg total) by mouth daily. Every morning, Disp: 90 tablet, Rfl: 3    Ferrous Sulfate 325 (65 Fe) MG Oral Tab, Take 1 tablet (325 mg total) by mouth daily with breakfast. With orange juice., Disp: 90 tablet, Rfl: 1    traMADol 50 MG Oral Tab, Take 1 tablet (50 mg total) by mouth every 8 (eight) hours as needed for Pain., Disp: 30 tablet, Rfl: 2    diazePAM 10 MG Oral Tab, Take 1 tablet (10 mg total) by mouth every morning., Disp: , Rfl:     tamsulosin 0.4 MG Oral Cap, Take 2 capsules (0.8 mg total) by mouth daily. AFTER SAME MEAL EACH DAY, Disp: 180 capsule, Rfl: 3    MOUNJARO 5 MG/0.5ML Subcutaneous Solution Auto-injector, , Disp: , Rfl:     alum-mag hydroxide-simethicone 200-200-20 MG/5ML Oral Suspension, Take 10 mL by mouth 4 (four) times daily as needed., Disp: 200  mL, Rfl: 0    raNITIdine HCl (RANITIDINE 150 MAX STRENGTH OR), Take 150 mg by mouth daily., Disp: , Rfl:     simvastatin 40 MG Oral Tab, Take 1 tablet (40 mg total) by mouth nightly., Disp: 90 tablet, Rfl: 3    Glucose Blood (ACCU-CHEK GUIDE) In Vitro Strip, 1 each by In Vitro route 2 (two) times daily., Disp: , Rfl:     Accu-Chek FastClix Lancets Does not apply Misc, Apply topically daily., Disp: , Rfl:     pantoprazole 40 MG Oral Tab EC, Take 1 tablet (40 mg total) by mouth every morning before breakfast., Disp: 90 tablet, Rfl: 2    vitamin B-12 50 MCG Oral Tab, Take 1 tablet (50 mcg total) by mouth daily., Disp: , Rfl:     Multiple Vitamins-Minerals (PRESERVISION/LUTEIN) Oral Cap, Take 1 capsule by mouth daily., Disp: , Rfl:     Polyethyl Glycol-Propyl Glycol (SYSTANE ULTRA OP), Apply 2 drops to eye in the morning and 2 drops before bedtime., Disp: , Rfl:     Allergies[1]    ROS:   As in HPI, the rest of the 14 system review was done and was negative      Physical Exam:  Vitals:    03/18/25 1359   Weight: 180 lb (81.6 kg)   Height: 71\"       General: No apparent distress, well nourished, well groomed.  Head- Normocephalic, atraumatic  Eyes- No redness or swelling  ENT- Hearing intake, normal glutition  Neck- No masses or adenopathy  Cv: pulses were palpable and normal, no cyanosis or edema     Neurological:     Mental Status- Alert and oriented x3.  Normal attention span and concentration  Thought process intact  Memory intact- recent and remote  Mood intact  Fund of knowledge appropriate for education and age    Language intact including: comprehension, naming, repetition, vocabulary    Cranial Nerves:  Possibly somewhat increased bilaterally, possibly some minimal bradykinesia, tremors were present especially  III, IV, VI- EOM intact, TING  V. Facial sensation intact  VII. Face symmetric, no facial weakness  VIII. Hearing intact to whisper.  IX. Pallet elevates symmetrically.  XI. Shoulder shrug is  intact  XII. Tongue is midline    Motor Exam:  Muscle tone with holding items in either hand equally.  But at times some right sided resting tremor also noted when holding a cane.  No atrophy or fasciculations  Strength- upper extremities 5/5 proximally and distally                  - lower  extremities 5/5 proximally and distally    Sensory Exam:  Light touch sensation- intact in all 4 extremities    Coordination:  Finger to nose intact  Rapid alternating movements intact    Gait:  Unsteadiness slow gait.    Labs:    Lab Results   Component Value Date    TSH 1.625 01/16/2025     Lab Results   Component Value Date    HDL 48 01/16/2025    LDL 47 01/16/2025    TRIG 107 01/16/2025     Lab Results   Component Value Date    HGB 11.8 (L) 01/16/2025    HCT 36.6 (L) 01/16/2025    MCV 88.6 01/16/2025    WBC 9.3 01/16/2025    .0 01/16/2025      Lab Results   Component Value Date    BUN 17 01/16/2025    CA 9.4 01/16/2025    ALT 10 01/16/2025    AST 14 01/16/2025    ALB 3.9 01/16/2025     01/16/2025    K 4.6 01/16/2025     01/16/2025    CO2 29.0 01/16/2025      I have reviewed labs.        Assessment   1. Tremor    Assessment & Plan  Back pain with possible radiculopathy  Chronic back pain with leg and foot tremors and weakness, likely due to nerve root compression.  - Refer to physical therapy for management.  - Refer to back pain specialists for evaluation and potential injections.  - Order MRI of brain and cervical spine.    Tremors  Hand tremors for one year, differential includes Parkinson's disease and essential tremor. Initiated all with gradual dose increase.  - Order DaTscan scan for Parkinson's disease evaluation.  - Prescribe propranolol with gradual dose increase.  - Provide dosing instructions.  - Order blood work for differential diagnosis.    Urinary urgency  Possible spinal cord compression or normal pressure hydrocephalus related to balance issues.  - Include in MRI order to assess for spinal  cord compression or other causes.    Depression  Chronic depression worsened by social isolation.  - Encourage social engagement and support systems.            - NM BRAIN KARLEY IOFLUPANE (SINGLE) 1 DAY (CPT=78803); Future    2. Chronic midline low back pain with sciatica, sciatica laterality unspecified    - Physical Therapy Referral - [place] Locations  - Physiatry Referral - [place]    3. Ataxia    - Physical Therapy Referral - [place] Locations  - Physiatry Referral - [place]  - Vitamin E, Serum  - Vitamin B6  - Vitamin B12; Future  - TSH W Reflex To Free T4; Future  - Ceruloplasmin; Future  - MRI BRAIN (CPT=70551); Future  - MRI SPINE CERVICAL (CPT=72141); Future           Education and counseling provided to patient. Instructed patient to call my office or seek medical attention immediately if symptoms worsen.  Patient verbalized understanding of information given. All questions were answered. All side effects of drugs were discussed.       Return to clinic in: Return in about 2 months (around 5/18/2025).    Jacinto Barillas MD           [1] No Known Allergies

## 2025-04-08 ENCOUNTER — EKG ENCOUNTER (OUTPATIENT)
Dept: LAB | Age: 80
End: 2025-04-08
Attending: PHYSICIAN ASSISTANT
Payer: MEDICARE

## 2025-04-08 ENCOUNTER — OFFICE VISIT (OUTPATIENT)
Dept: FAMILY MEDICINE CLINIC | Facility: CLINIC | Age: 80
End: 2025-04-08

## 2025-04-08 ENCOUNTER — LAB ENCOUNTER (OUTPATIENT)
Dept: LAB | Age: 80
End: 2025-04-08
Attending: PHYSICIAN ASSISTANT
Payer: MEDICARE

## 2025-04-08 VITALS
DIASTOLIC BLOOD PRESSURE: 53 MMHG | HEART RATE: 58 BPM | SYSTOLIC BLOOD PRESSURE: 97 MMHG | HEIGHT: 71 IN | WEIGHT: 160 LBS | BODY MASS INDEX: 22.4 KG/M2

## 2025-04-08 DIAGNOSIS — F30.9 BIPOLAR I DISORDER, SINGLE MANIC EPISODE (HCC): ICD-10-CM

## 2025-04-08 DIAGNOSIS — R27.0 ATAXIA: ICD-10-CM

## 2025-04-08 DIAGNOSIS — R42 DIZZINESS: ICD-10-CM

## 2025-04-08 DIAGNOSIS — E11.9 TYPE 2 DIABETES MELLITUS WITHOUT COMPLICATION, WITHOUT LONG-TERM CURRENT USE OF INSULIN (HCC): Primary | ICD-10-CM

## 2025-04-08 LAB
ATRIAL RATE: 58 BPM
HEMOGLOBIN A1C: 6.4 % (ref 4.3–5.6)
P AXIS: 70 DEGREES
P-R INTERVAL: 204 MS
Q-T INTERVAL: 418 MS
QRS DURATION: 100 MS
QTC CALCULATION (BEZET): 410 MS
R AXIS: -22 DEGREES
T AXIS: 43 DEGREES
TSI SER-ACNC: 2.42 UIU/ML (ref 0.55–4.78)
VENTRICULAR RATE: 58 BPM
VIT B12 SERPL-MCNC: >2000 PG/ML (ref 211–911)

## 2025-04-08 PROCEDURE — 82390 ASSAY OF CERULOPLASMIN: CPT

## 2025-04-08 PROCEDURE — 3074F SYST BP LT 130 MM HG: CPT | Performed by: PHYSICIAN ASSISTANT

## 2025-04-08 PROCEDURE — 93005 ELECTROCARDIOGRAM TRACING: CPT

## 2025-04-08 PROCEDURE — 84446 ASSAY OF VITAMIN E: CPT | Performed by: OTHER

## 2025-04-08 PROCEDURE — 84443 ASSAY THYROID STIM HORMONE: CPT

## 2025-04-08 PROCEDURE — 82607 VITAMIN B-12: CPT

## 2025-04-08 PROCEDURE — 36415 COLL VENOUS BLD VENIPUNCTURE: CPT

## 2025-04-08 PROCEDURE — 1126F AMNT PAIN NOTED NONE PRSNT: CPT | Performed by: PHYSICIAN ASSISTANT

## 2025-04-08 PROCEDURE — 1159F MED LIST DOCD IN RCRD: CPT | Performed by: PHYSICIAN ASSISTANT

## 2025-04-08 PROCEDURE — 3078F DIAST BP <80 MM HG: CPT | Performed by: PHYSICIAN ASSISTANT

## 2025-04-08 PROCEDURE — 3008F BODY MASS INDEX DOCD: CPT | Performed by: PHYSICIAN ASSISTANT

## 2025-04-08 PROCEDURE — 83036 HEMOGLOBIN GLYCOSYLATED A1C: CPT | Performed by: PHYSICIAN ASSISTANT

## 2025-04-08 PROCEDURE — 84207 ASSAY OF VITAMIN B-6: CPT | Performed by: OTHER

## 2025-04-08 PROCEDURE — 99213 OFFICE O/P EST LOW 20 MIN: CPT | Performed by: PHYSICIAN ASSISTANT

## 2025-04-08 PROCEDURE — 93010 ELECTROCARDIOGRAM REPORT: CPT | Performed by: INTERNAL MEDICINE

## 2025-04-08 RX ORDER — LANCETS
100 EACH MISCELLANEOUS DAILY
Qty: 100 EACH | Refills: 3 | Status: SHIPPED | OUTPATIENT
Start: 2025-04-08

## 2025-04-08 RX ORDER — BLOOD SUGAR DIAGNOSTIC
STRIP MISCELLANEOUS
Qty: 100 STRIP | Refills: 3 | Status: SHIPPED | OUTPATIENT
Start: 2025-04-08

## 2025-04-08 NOTE — PROGRESS NOTES
HPI:     HPI  A 79-year-old man is in the office complaining of intermittent dizziness for the past month. He is currently feeling well.  The patient denies chest pain, SOB, N/V/C/D, fever, dizziness, syncope, and abdominal pain. There are no other concerns today.    Medications:     Current Outpatient Medications   Medication Sig Dispense Refill    Accu-Chek FastClix Lancets Does not apply Misc Apply 100 Lancets topically daily. 100 each 3    Glucose Blood (ACCU-CHEK GUIDE TEST) In Vitro Strip Use 1 time per day. 100 strip 3    propranolol 20 MG Oral Tab Start with 1 pill daily for 1 week, then 1 pill BID for another week, then 2 pills  tablet 3    Tirzepatide (MOUNJARO) 5 MG/0.5ML Subcutaneous Solution Auto-injector Inject 5 mg into the skin every 7 days. 6 mL 1    pregabalin 100 MG Oral Cap Take 1 capsule (100 mg total) by mouth in the morning, at noon, and at bedtime. 90 capsule 5    FLUoxetine 20 MG Oral Cap Take 1 capsule (20 mg total) by mouth in the morning, at noon, and at bedtime. 270 capsule 3    empagliflozin (JARDIANCE) 10 MG Oral Tab Take 1 tablet (10 mg total) by mouth daily. Every morning 90 tablet 3    Ferrous Sulfate 325 (65 Fe) MG Oral Tab Take 1 tablet (325 mg total) by mouth daily with breakfast. With orange juice. 90 tablet 1    traMADol 50 MG Oral Tab Take 1 tablet (50 mg total) by mouth every 8 (eight) hours as needed for Pain. 30 tablet 2    diazePAM 10 MG Oral Tab Take 1 tablet (10 mg total) by mouth every morning.      tamsulosin 0.4 MG Oral Cap Take 2 capsules (0.8 mg total) by mouth daily. AFTER SAME MEAL EACH  capsule 3    alum-mag hydroxide-simethicone 200-200-20 MG/5ML Oral Suspension Take 10 mL by mouth 4 (four) times daily as needed. 200 mL 0    raNITIdine HCl (RANITIDINE 150 MAX STRENGTH OR) Take 150 mg by mouth daily.      simvastatin 40 MG Oral Tab Take 1 tablet (40 mg total) by mouth nightly. 90 tablet 3    Glucose Blood (ACCU-CHEK GUIDE) In Vitro Strip 1 each by  In Vitro route 2 (two) times daily.      pantoprazole 40 MG Oral Tab EC Take 1 tablet (40 mg total) by mouth every morning before breakfast. 90 tablet 2    vitamin B-12 50 MCG Oral Tab Take 1 tablet (50 mcg total) by mouth daily.      Multiple Vitamins-Minerals (PRESERVISION/LUTEIN) Oral Cap Take 1 capsule by mouth daily.      Polyethyl Glycol-Propyl Glycol (SYSTANE ULTRA OP) Apply 2 drops to eye in the morning and 2 drops before bedtime.         Allergies:   Allergies[1]    History:     Health Maintenance   Topic Date Due    Diabetes Care Dilated Eye Exam  Never done    COVID-19 Vaccine (5 - 2024-25 season) 09/01/2024    Influenza Vaccine (Season Ended) 10/01/2025    Diabetes Care A1C  10/08/2025    Diabetes Care: GFR  01/16/2026    Annual Well Visit  Completed    Annual Depression Screening  Completed    Fall Risk Screening (Annual)  Completed    Diabetes Care: Foot Exam (Annual)  Completed    Diabetes Care: Microalb/Creat Ratio (Annual)  Completed    Pneumococcal Vaccine: 50+ Years  Completed    Zoster Vaccines  Completed    Meningococcal B Vaccine  Aged Out       No LMP for male patient.   Past Medical History:     Past Medical History:    Anxiety    BPH (benign prostatic hyperplasia)    CKD (chronic kidney disease) stage 3, GFR 30-59 ml/min ([facility])    Depression    Diabetes mellitus (HCC)    Essential hypertension    GERD (gastroesophageal reflux disease)    Hyperlipidemia       Past Surgical History:     Past Surgical History:   Procedure Laterality Date    Foot fracture surgery Right     2012       Family History:     Family History   Problem Relation Age of Onset    Other (Other) Father        Social History:     Social History     Socioeconomic History    Marital status:      Spouse name: Not on file    Number of children: Not on file    Years of education: Not on file    Highest education level: Not on file   Occupational History    Not on file   Tobacco Use    Smoking status: Former     Current  packs/day: 0.00     Average packs/day: 2.0 packs/day for 10.0 years (20.0 ttl pk-yrs)     Types: Cigarettes     Start date:      Quit date:      Years since quittin.2     Passive exposure: Past    Smokeless tobacco: Never   Vaping Use    Vaping status: Never Used   Substance and Sexual Activity    Alcohol use: Yes     Comment: rarely    Drug use: Not Currently    Sexual activity: Not on file   Other Topics Concern    Not on file   Social History Narrative    Not on file     Social Drivers of Health     Food Insecurity: Unknown (10/2/2020)    [facility]    Hunger Vital Sign     Worried About Running Out of Food in the Last Year: Patient declined     Ran Out of Food in the Last Year: Patient declined   Transportation Needs: Unknown (10/2/2020)    [facility]    PRAPARE - Transportation     Lack of Transportation (Medical): Patient declined     Lack of Transportation (Non-Medical): Patient declined   Stress: Not on file   Housing Stability: Not on file       Review of Systems:   Review of Systems   Constitutional:  Negative for activity change, chills, fatigue and fever.   HENT:  Negative for congestion, ear discharge, ear pain, postnasal drip, rhinorrhea, sinus pressure, sinus pain and sore throat.    Respiratory:  Negative for cough, chest tightness, shortness of breath and wheezing.    Cardiovascular:  Negative for chest pain and palpitations.   Gastrointestinal:  Negative for abdominal distention, abdominal pain, blood in stool, constipation, diarrhea, nausea and vomiting.   Skin:  Negative for rash.   Neurological:  Positive for dizziness.        Vitals:    25 1146   BP: 97/53   Pulse: 58   Weight: 160 lb (72.6 kg)   Height: 5' 11\" (1.803 m)     Body mass index is 22.32 kg/m².    Physical Exam:   Physical Exam  Vitals reviewed.   Cardiovascular:      Rate and  Rhythm: Normal rate and regular rhythm.      Heart sounds: Normal heart sounds.   Pulmonary:      Effort: Pulmonary effort is normal.      Breath sounds: Normal breath sounds.          Assessment and Plan::     Assessment & Plan  Type 2 diabetes mellitus without complication, without long-term current use of insulin (HCC)    Orders:    POC Glycohemoglobin [64990]    Accu-Chek FastClix Lancets Does not apply Misc; Apply 100 Lancets topically daily.    Glucose Blood (ACCU-CHEK GUIDE TEST) In Vitro Strip; Use 1 time per day.    Dizziness    Orders:    Cardio Referral - Internal    EKG 12 Lead; Future    Bipolar I disorder, single manic episode (HCC)  Stable. Continue current management.            Discussed plan of care with pt and pt is in agreement.All questions answered. Pt to call with questions or concerns.         [1] No Known Allergies

## 2025-04-09 ENCOUNTER — HOSPITAL ENCOUNTER (OUTPATIENT)
Dept: GENERAL RADIOLOGY | Facility: HOSPITAL | Age: 80
Discharge: HOME | End: 2025-04-09
Attending: PHYSICAL MEDICINE & REHABILITATION
Payer: MEDICARE

## 2025-04-09 ENCOUNTER — OFFICE VISIT (OUTPATIENT)
Dept: PHYSICAL MEDICINE AND REHAB | Facility: CLINIC | Age: 80
End: 2025-04-09
Payer: COMMERCIAL

## 2025-04-09 VITALS — BODY MASS INDEX: 22.4 KG/M2 | WEIGHT: 160 LBS | HEIGHT: 71 IN

## 2025-04-09 DIAGNOSIS — M54.12 CERVICAL RADICULOPATHY: ICD-10-CM

## 2025-04-09 DIAGNOSIS — M54.16 LEFT LUMBAR RADICULOPATHY: Primary | ICD-10-CM

## 2025-04-09 DIAGNOSIS — R53.1 WEAKNESS: ICD-10-CM

## 2025-04-09 DIAGNOSIS — M47.816 LUMBAR FACET ARTHROPATHY: ICD-10-CM

## 2025-04-09 DIAGNOSIS — M47.812 ARTHROPATHY OF CERVICAL FACET JOINT: ICD-10-CM

## 2025-04-09 DIAGNOSIS — R26.89 IMBALANCE: ICD-10-CM

## 2025-04-09 DIAGNOSIS — M54.16 LEFT LUMBAR RADICULOPATHY: ICD-10-CM

## 2025-04-09 LAB — CERULOPLASMIN SERPL-MCNC: 36 MG/DL (ref 20–60)

## 2025-04-09 PROCEDURE — 1160F RVW MEDS BY RX/DR IN RCRD: CPT | Performed by: PHYSICAL MEDICINE & REHABILITATION

## 2025-04-09 PROCEDURE — 72050 X-RAY EXAM NECK SPINE 4/5VWS: CPT | Performed by: PHYSICAL MEDICINE & REHABILITATION

## 2025-04-09 PROCEDURE — 1159F MED LIST DOCD IN RCRD: CPT | Performed by: PHYSICAL MEDICINE & REHABILITATION

## 2025-04-09 PROCEDURE — 3008F BODY MASS INDEX DOCD: CPT | Performed by: PHYSICAL MEDICINE & REHABILITATION

## 2025-04-09 PROCEDURE — 99204 OFFICE O/P NEW MOD 45 MIN: CPT | Performed by: PHYSICAL MEDICINE & REHABILITATION

## 2025-04-09 NOTE — PATIENT INSTRUCTIONS
-MRI of the cervical and lumbar spine and follow up after  -Pregabalin 100mg three times daily  -Tramadol to be continued  -Start PT and home exercises

## 2025-04-09 NOTE — PROGRESS NOTES
[facility]  NEW PATIENT EVALUATION    Consultation as a request of Dr. Barillas      HISTORY OF PRESENT ILLNESS:     Chief Complaint   Patient presents with    New Patient     New patient is here with complaints of left sided low back pain and radiates into legs  that started 1yr ago  and was referred by Dr. Liang . XR of lumbar spine was completed 11/21/24. No n/t. Pain depends on activity.No pain meds or muscle relaxer's. No current physical therapy .       History of Present Illness  The patient, with a history of right leg surgery after a fall from 15ft in [place] 10 years ago, presents with worsening back pain that radiates down the left leg. The pain is severe enough to cause weakness in the leg and disrupt daily activities such as bending over, doing laundry, and standing for extended periods. The patient has to perform tasks slowly and often needs to sit down due to the pain. The patient also reports occasional neck pain.  The neck pain is localized to the right side.  The patient uses a cane for support due to the pain and weakness.  He notices some weakness and imbalance.  He is seeing neurology as well and has tremors for workup of Parkinson's disease.  He denies any loss of bowel bladder control, any fevers chills or weight loss       PHYSICAL EXAM:   Ht 71\"   Wt 160 lb (72.6 kg)   BMI 22.32 kg/m²       Gait Imbalance noted patient using a cane right-sided.    CERVICAL AND LUMBAR SPINE:  Inspection: no erythema, swelling, or obvious deformity.  Their iliac crest and shoulder heights are symmetrical.     Palpation: Non tender to palpation of the spinous process.   ROM: Restricted in all planes  Strength: 5/5 in bilateral upper and lower extremities  Sensation: Intact to light touch in all dermatomes of the upper and lower extremities  Reflexes: 2/4 at L4 and S1 1/4 bilateral C5 and C6  Negative clonus and negative Deepika's bilaterally  Facet Loading:  no specific facet pain  Straight leg raise: negative for radicular pain symptoms  Slump test: negative for pain symptoms for radicular pain symptoms      IMAGING:   X-ray lumbar spine completed 11/21/2021 is notable for multilevel degenerative disc disease    All imaging results were reviewed and discussed with patient.      ASSESSMENT/PLAN:     1. Left lumbar radiculopathy    2. Cervical radiculopathy    3. Arthropathy of cervical facet joint    4. Lumbar facet arthropathy    5. Imbalance    6. Weakness        Assessment & Plan  Chronic low back pain with left leg radiculopathy  Chronic low back pain with left leg radiculopathy, likely lumbar radiculopathy or spinal stenosis. Previous right leg surgery with hardware in place.  - Order MRI of the lumbar spine.  - Initiate physical therapy.  - Consider injection if physical therapy is ineffective.  - Continue pregabalin 100 mg 3 times daily and tramadol    Neck pain with imbalance  Intermittent neck pain, possibly cervical spondylosis or cervical radiculopathy but given imbalance concern for cervical myelopathy as well.. Imaging required for further evaluation.  - Order MRI of the cervical spine.  - Order cervical spine X-ray.    Use of cane for ambulation  Cane used for left-sided weakness and pain. Physical therapy to assess and guide cane use.  - Assess cane use and provide guidance during physical therapy.         The patient verbalized understanding with the plan and was in agreement. All questions/concerns were addressed and there were no barriers to learning.  Please note Dragon dictation software was used to dictate this note and may result in inadvertent typos.    Yousif Whatley DO, FAAPMR & CAQSM  Physical Medicine and Rehabilitation  Sports and Spine Medicine    PAST MEDICAL HISTORY:   Past Medical History[1]      PAST SURGICAL HISTORY:   Past Surgical History[2]      CURRENT MEDICATIONS:   Current Medications[3]      ALLERGIES:   Allergies[4]      FAMILY  HISTORY:   Family History[5]       SOCIAL HISTORY:   Short Social Hx on File[6]       REVIEW OF SYSTEMS:   Patient-reported ROS  Constitutional  Sleep Disturbance: admits  Chills: denies  Fever: denies  Weight Gain: denies  Weight Loss: denies   Cardiovascular  Chest Pain: denies  Irregular Heartbeat: denies   Respiratory  Painful Breathing: denies  Wheezing: denies   Gastrointestinal  Bowel Incontinence: denies  Heartburn: denies  Abdominal Pain: denies  Blood in Stool : denies  Rectal Pain: denies   Hematology  Easy Bruising: denies  Easy Bleeding: denies   Genitourinary  Difficulty Urinating: denies  Bladder Incontinence: denies  Pelvic Pain: denies  Painful Urination: denies   Musculoskeletal  Joint Stiffness: admits  Painful Joints: admits  Tailbone Pain: denies  Swollen Joints: denies   Peripheral Vascular  Swelling of Legs/Feet: denies  Cold Extremities: denies   Skin  Open Sores: denies  Nodules or Lumps: denies  Rash: denies   Neurological  Loss of Strength Since last Visit: admits  Tingling/Numbness: denies  Balance: admits   Psychiatric  Anxiety: denies  Depressed Mood: denies       PHYSICAL EXAM:   General: No immediate distress  Head: Normocephalic/ Atraumatic  Eyes: Extra-occular movements intact.   Ears: No auricular hematoma or deformities  Mouth: No lesions or ulcerations  Heart: peripheral pulses intact. Normal capillary refill.   Lungs: Non-labored respirations  Abdomen: No abdominal guarding  Extremities: No lower extremity edema bilaterally   Skin: No lesions noted   Cognition: alert & oriented x 3, attentive, able to follow 2 step commands, comprehention intact, spontaneous speech intact  Psychiatric: Mood and affect appropriate      LABS:     Lab Results   Component Value Date    A1C 6.4 (A) 04/08/2025     Lab Results   Component Value Date    WBC 9.3 01/16/2025    RBC 4.13 01/16/2025    HGB 11.8 (L) 01/16/2025    HCT 36.6 (L) 01/16/2025    MCV 88.6 01/16/2025    MCH 28.6 01/16/2025    MCHC  32.2 01/16/2025    RDW 17.0 (H) 01/16/2025    .0 01/16/2025     Lab Results   Component Value Date     (H) 01/16/2025    BUN 17 01/16/2025    BUNCREA 15.0 01/16/2025    CREATSERUM 1.13 01/16/2025    ANIONGAP 8 01/16/2025    CA 9.4 01/16/2025    OSMOCALC 290 01/16/2025    ALKPHO 83 01/16/2025    AST 14 01/16/2025    ALT 10 01/16/2025    BILT 0.3 01/16/2025    TP 6.6 01/16/2025    ALB 3.9 01/16/2025    GLOBULIN 2.7 01/16/2025     01/16/2025    K 4.6 01/16/2025     01/16/2025    CO2 29.0 01/16/2025     No results found for: \"PTP\", \"PT\", \"INR\"  Lab Results   Component Value Date    VITD 31.6 04/10/2024                    [1]   Past Medical History:   Anxiety    BPH (benign prostatic hyperplasia)    CKD (chronic kidney disease) stage 3, GFR 30-59 ml/min ([facility])    Depression    Diabetes mellitus (HCC)    Essential hypertension    GERD (gastroesophageal reflux disease)    Hyperlipidemia   [2]   Past Surgical History:  Procedure Laterality Date    Foot fracture surgery Right     2012   [3]   Current Outpatient Medications   Medication Sig Dispense Refill    Accu-Chek FastClix Lancets Does not apply Misc Apply 100 Lancets topically daily. 100 each 3    Glucose Blood (ACCU-CHEK GUIDE TEST) In Vitro Strip Use 1 time per day. 100 strip 3    propranolol 20 MG Oral Tab Start with 1 pill daily for 1 week, then 1 pill BID for another week, then 2 pills  tablet 3    Tirzepatide (MOUNJARO) 5 MG/0.5ML Subcutaneous Solution Auto-injector Inject 5 mg into the skin every 7 days. 6 mL 1    pregabalin 100 MG Oral Cap Take 1 capsule (100 mg total) by mouth in the morning, at noon, and at bedtime. 90 capsule 5    FLUoxetine 20 MG Oral Cap Take 1 capsule (20 mg total) by mouth in the morning, at noon, and at bedtime. 270 capsule 3    empagliflozin (JARDIANCE) 10 MG Oral Tab Take 1 tablet (10 mg total) by mouth daily. Every morning 90 tablet 3    Ferrous Sulfate 325 (65 Fe) MG Oral Tab Take 1 tablet (325 mg  total) by mouth daily with breakfast. With orange juice. 90 tablet 1    traMADol 50 MG Oral Tab Take 1 tablet (50 mg total) by mouth every 8 (eight) hours as needed for Pain. 30 tablet 2    diazePAM 10 MG Oral Tab Take 1 tablet (10 mg total) by mouth every morning.      tamsulosin 0.4 MG Oral Cap Take 2 capsules (0.8 mg total) by mouth daily. AFTER SAME MEAL EACH  capsule 3    alum-mag hydroxide-simethicone 200-200-20 MG/5ML Oral Suspension Take 10 mL by mouth 4 (four) times daily as needed. 200 mL 0    raNITIdine HCl (RANITIDINE 150 MAX STRENGTH OR) Take 150 mg by mouth daily.      simvastatin 40 MG Oral Tab Take 1 tablet (40 mg total) by mouth nightly. 90 tablet 3    Glucose Blood (ACCU-CHEK GUIDE) In Vitro Strip 1 each by In Vitro route 2 (two) times daily.      pantoprazole 40 MG Oral Tab EC Take 1 tablet (40 mg total) by mouth every morning before breakfast. 90 tablet 2    vitamin B-12 50 MCG Oral Tab Take 1 tablet (50 mcg total) by mouth daily.      Multiple Vitamins-Minerals (PRESERVISION/LUTEIN) Oral Cap Take 1 capsule by mouth daily.      Polyethyl Glycol-Propyl Glycol (SYSTANE ULTRA OP) Apply 2 drops to eye in the morning and 2 drops before bedtime.     [4] No Known Allergies  [5]   Family History  Problem Relation Age of Onset    Other (Other) Father    [6]   Social History  Socioeconomic History    Marital status:    Tobacco Use    Smoking status: Former     Current packs/day: 0.00     Average packs/day: 2.0 packs/day for 10.0 years (20.0 ttl pk-yrs)     Types: Cigarettes     Start date:      Quit date: 1980     Years since quittin.3     Passive exposure: Past    Smokeless tobacco: Never   Vaping Use    Vaping status: Never Used   Substance and Sexual Activity    Alcohol use: Yes     Comment: rarely    Drug use: Not Currently     Social Drivers of Health     Food Insecurity: Unknown (10/2/2020)    Received from [facility]    Hunger Vital Sign     Worried About  Running Out of Food in the Last Year: Patient declined     Ran Out of Food in the Last Year: Patient declined   Transportation Needs: Unknown (10/2/2020)    Received from [facility]     Lack of Transportation (Medical): Patient declined     Lack of Transportation (Non-Medical): Patient declined

## 2025-04-11 ENCOUNTER — TELEPHONE (OUTPATIENT)
Dept: NEUROLOGY | Facility: CLINIC | Age: 80
End: 2025-04-11

## 2025-04-11 NOTE — TELEPHONE ENCOUNTER
----- Message from Jacinto Barillas sent at 4/10/2025  7:08 AM CDT -----  Please let the patient know that results of these particular lab tests so far were normal.    Thank you  ----- Message -----  From: Lab, Background User  Sent: 4/8/2025   4:50 PM CDT  To: Jacinto Barillas MD

## 2025-04-11 NOTE — TELEPHONE ENCOUNTER
Called pt, spoke to daughter Yao (ok per verbal release). Informed of B12 test result note from Dr. Barillas   \"Please let the patient know that results of these particular lab tests so far were normal.\"   Daughter said she saw result was high, she is not aware that pt is taking a vitamin supplement. Informed pt can discuss further during upcoming appt on 5/20/25.   Daughter verbalized understanding.

## 2025-04-13 LAB — VITAMIN B6: 8.9 UG/L

## 2025-04-16 LAB
VIT E ALPHA TOCO: 11.8 MG/L
VIT E GAMMA TOCO: 0.5 MG/L

## 2025-04-23 ENCOUNTER — TELEPHONE (OUTPATIENT)
Dept: PHYSICAL THERAPY | Facility: HOSPITAL | Age: 80
End: 2025-04-23

## 2025-04-24 ENCOUNTER — TELEPHONE (OUTPATIENT)
Dept: PHYSICAL THERAPY | Facility: HOSPITAL | Age: 80
End: 2025-04-24

## 2025-04-30 ENCOUNTER — APPOINTMENT (OUTPATIENT)
Dept: PHYSICAL THERAPY | Facility: HOSPITAL | Age: 80
End: 2025-04-30
Attending: Other
Payer: MEDICARE

## 2025-05-02 ENCOUNTER — APPOINTMENT (OUTPATIENT)
Dept: PHYSICAL THERAPY | Facility: HOSPITAL | Age: 80
End: 2025-05-02
Attending: Other
Payer: MEDICARE

## 2025-05-06 ENCOUNTER — APPOINTMENT (OUTPATIENT)
Dept: PHYSICAL THERAPY | Facility: HOSPITAL | Age: 80
End: 2025-05-06
Attending: Other
Payer: MEDICARE

## 2025-05-09 ENCOUNTER — APPOINTMENT (OUTPATIENT)
Dept: PHYSICAL THERAPY | Facility: HOSPITAL | Age: 80
End: 2025-05-09
Attending: Other
Payer: MEDICARE

## 2025-05-09 ENCOUNTER — TELEPHONE (OUTPATIENT)
Dept: PHYSICAL THERAPY | Facility: HOSPITAL | Age: 80
End: 2025-05-09

## 2025-05-13 ENCOUNTER — APPOINTMENT (OUTPATIENT)
Dept: PHYSICAL THERAPY | Facility: HOSPITAL | Age: 80
End: 2025-05-13
Attending: Other
Payer: MEDICARE

## 2025-05-16 ENCOUNTER — APPOINTMENT (OUTPATIENT)
Dept: PHYSICAL THERAPY | Facility: HOSPITAL | Age: 80
End: 2025-05-16
Attending: Other
Payer: MEDICARE

## 2025-05-21 ENCOUNTER — APPOINTMENT (OUTPATIENT)
Dept: PHYSICAL THERAPY | Facility: HOSPITAL | Age: 80
End: 2025-05-21
Attending: Other
Payer: MEDICARE

## 2025-05-22 ENCOUNTER — TELEPHONE (OUTPATIENT)
Dept: FAMILY MEDICINE CLINIC | Facility: CLINIC | Age: 80
End: 2025-05-22

## 2025-05-22 NOTE — TELEPHONE ENCOUNTER
Per , patient have been admitted to Home Health, please send LOV and Home health order fax to [phone].

## 2025-05-28 ENCOUNTER — OFFICE VISIT (OUTPATIENT)
Dept: FAMILY MEDICINE CLINIC | Facility: CLINIC | Age: 80
End: 2025-05-28
Payer: COMMERCIAL

## 2025-05-28 VITALS
HEIGHT: 71 IN | SYSTOLIC BLOOD PRESSURE: 102 MMHG | WEIGHT: 148 LBS | DIASTOLIC BLOOD PRESSURE: 67 MMHG | BODY MASS INDEX: 20.72 KG/M2 | HEART RATE: 81 BPM

## 2025-05-28 DIAGNOSIS — D64.9 ANEMIA, UNSPECIFIED TYPE: ICD-10-CM

## 2025-05-28 DIAGNOSIS — R53.1 WEAKNESS: ICD-10-CM

## 2025-05-28 DIAGNOSIS — E11.9 TYPE 2 DIABETES MELLITUS WITHOUT COMPLICATION, WITHOUT LONG-TERM CURRENT USE OF INSULIN (HCC): Primary | ICD-10-CM

## 2025-05-28 LAB — HEMOGLOBIN A1C: 6.9 % (ref 4.3–5.6)

## 2025-05-28 PROCEDURE — 1111F DSCHRG MED/CURRENT MED MERGE: CPT | Performed by: PHYSICIAN ASSISTANT

## 2025-05-28 PROCEDURE — 1126F AMNT PAIN NOTED NONE PRSNT: CPT | Performed by: PHYSICIAN ASSISTANT

## 2025-05-28 PROCEDURE — 99213 OFFICE O/P EST LOW 20 MIN: CPT | Performed by: PHYSICIAN ASSISTANT

## 2025-05-28 PROCEDURE — 3074F SYST BP LT 130 MM HG: CPT | Performed by: PHYSICIAN ASSISTANT

## 2025-05-28 PROCEDURE — 1159F MED LIST DOCD IN RCRD: CPT | Performed by: PHYSICIAN ASSISTANT

## 2025-05-28 PROCEDURE — 3008F BODY MASS INDEX DOCD: CPT | Performed by: PHYSICIAN ASSISTANT

## 2025-05-28 PROCEDURE — 3078F DIAST BP <80 MM HG: CPT | Performed by: PHYSICIAN ASSISTANT

## 2025-05-28 PROCEDURE — 83036 HEMOGLOBIN GLYCOSYLATED A1C: CPT | Performed by: PHYSICIAN ASSISTANT

## 2025-05-28 RX ORDER — LORATADINE 10 MG/1
10 TABLET ORAL DAILY
COMMUNITY
Start: 2025-05-03

## 2025-05-28 NOTE — ASSESSMENT & PLAN NOTE
Orders:    POC Hemoglobin A1C  Diabetes: A1c is 6.9 done 5/28/2025 which shows Excellent control. Continue current meds and lifestyle modification.   DM Meds: empagliflozin Tabs - 10 MG; Mounjaro Soaj - 5 MG/0.5ML   Oral Diabetes Med Adherence Excellent at 100%    Diabetic Complications: CKD 2 (GFR 67).

## 2025-05-28 NOTE — PROGRESS NOTES
HPI:     HPI  A 80-year-old man is in the office for a follow-up. He is feeling better. He was discharged from rehab a week ago. He has PT at home.  The patient denies chest pain, SOB, N/V/C/D, fever, dizziness, syncope, and abdominal pain. There are no other concerns today.    Medications:   Current Medications[1]    Allergies:   Allergies[2]    History:     Health Maintenance   Topic Date Due    Diabetes Care Dilated Eye Exam  Never done    COVID-19 Vaccine ( season) 2024    Influenza Vaccine (Season Ended) 10/01/2025    Diabetes Care A1C  10/08/2025    Diabetes Care Foot Exam  2026    Diabetes Care: GFR  2026    Annual Well Visit  Completed    Annual Depression Screening  Completed    Fall Risk Screening (Annual)  Completed    Diabetes Care: Microalb/Creat Ratio (Annual)  Completed    Pneumococcal Vaccine: 50+ Years  Completed    Zoster Vaccines  Completed    Meningococcal B Vaccine  Aged Out       No LMP for male patient.   Past Medical History:   Past Medical History[3]    Past Surgical History:   Past Surgical History[4]    Family History:   Family History[5]    Social History:     Social History     Socioeconomic History    Marital status:      Spouse name: Not on file    Number of children: Not on file    Years of education: Not on file    Highest education level: Not on file   Occupational History    Not on file   Tobacco Use    Smoking status: Former     Current packs/day: 0.00     Average packs/day: 2.0 packs/day for 10.0 years (20.0 ttl pk-yrs)     Types: Cigarettes     Start date:      Quit date: 1980     Years since quittin.4     Passive exposure: Past    Smokeless tobacco: Never   Vaping Use    Vaping status: Never Used   Substance and Sexual Activity    Alcohol use: Yes     Comment: rarely    Drug use: Not Currently    Sexual activity: Not on file   Other Topics Concern    Not on file   Social History Narrative    Not on file     Social Drivers of Health      Food Insecurity: Unknown (10/2/2020)    Received from [facility]    Hunger Vital Sign     Worried About Running Out of Food in the Last Year: Patient declined     Ran Out of Food in the Last Year: Patient declined   Transportation Needs: Unknown (10/2/2020)    Received from [facility]    PRAPARE - Transportation     Lack of Transportation (Medical): Patient declined     Lack of Transportation (Non-Medical): Patient declined   Stress: Not on file   Housing Stability: Not on file       Review of Systems:   Review of Systems   Constitutional:  Negative for activity change, chills, fatigue and fever.   HENT:  Negative for congestion, ear discharge, ear pain, postnasal drip, rhinorrhea, sinus pressure, sinus pain and sore throat.    Respiratory:  Negative for cough, chest tightness, shortness of breath and wheezing.    Cardiovascular:  Negative for chest pain and palpitations.   Gastrointestinal:  Negative for abdominal distention, abdominal pain, blood in stool, constipation, diarrhea, nausea and vomiting.   Skin:  Negative for rash.        Vitals:    05/28/25 1227   BP: 102/67   Pulse: 81   Weight: 148 lb (67.1 kg)   Height: 5' 11\" (1.803 m)     Body mass index is 20.64 kg/m².    Physical Exam:   Physical Exam  Vitals reviewed.   Cardiovascular:      Rate and Rhythm: Normal rate and regular rhythm.      Heart sounds: Normal heart sounds.   Pulmonary:      Effort: Pulmonary effort is normal.      Breath sounds: Normal breath sounds.          Assessment and Plan::     Assessment & Plan  Anemia, unspecified type    Orders:    CBC With Differential With Platelet; Future; Expected date: 06/28/2025    Ferritin; Future; Expected date: 06/28/2025    Iron And Tibc; Future; Expected date: 06/28/2025    Type 2 diabetes mellitus without complication, without long-term current use of insulin ([facility])    Orders:    POC Hemoglobin A1C  Diabetes: A1c is 6.9 done 5/28/2025 which shows  Excellent control. Continue current meds and lifestyle modification.   DM Meds: empagliflozin Tabs - 10 MG; Mounjaro Soaj - 5 MG/0.5ML   Oral Diabetes Med Adherence Excellent at 100%    Diabetic Complications: CKD 2 (GFR 67).          Weakness  Getting better. Continue with PT.  Reviewed and discussed lab results with the patient and the patient's daughter. Lab work was done on 5/5/25 at the rehab. CBC and CMP were normal.          Discussed plan of care with pt and pt is in agreement.All questions answered. Pt to call with questions or concerns.         [1]   Current Outpatient Medications   Medication Sig Dispense Refill    loratadine 10 MG Oral Tab Take 1 tablet (10 mg total) by mouth daily.      melatonin 3 MG Oral Tab Take 2 tablets (6 mg total) by mouth daily.      FLUoxetine 20 MG Oral Cap Take 1 capsule (20 mg total) by mouth daily. 90 capsule 3    Accu-Chek FastClix Lancets Does not apply Misc Apply 100 Lancets topically daily. 100 each 3    Glucose Blood (ACCU-CHEK GUIDE TEST) In Vitro Strip Use 1 time per day. 100 strip 3    Tirzepatide (MOUNJARO) 5 MG/0.5ML Subcutaneous Solution Auto-injector Inject 5 mg into the skin every 7 days. 6 mL 1    pregabalin 100 MG Oral Cap Take 1 capsule (100 mg total) by mouth in the morning, at noon, and at bedtime. 90 capsule 5    empagliflozin (JARDIANCE) 10 MG Oral Tab Take 1 tablet (10 mg total) by mouth daily. Every morning 90 tablet 3    Ferrous Sulfate 325 (65 Fe) MG Oral Tab Take 1 tablet (325 mg total) by mouth daily with breakfast. With orange juice. 90 tablet 1    traMADol 50 MG Oral Tab Take 1 tablet (50 mg total) by mouth every 8 (eight) hours as needed for Pain. 30 tablet 2    tamsulosin 0.4 MG Oral Cap Take 2 capsules (0.8 mg total) by mouth daily. AFTER SAME MEAL EACH  capsule 3    alum-mag hydroxide-simethicone 200-200-20 MG/5ML Oral Suspension Take 10 mL by mouth 4 (four) times daily as needed. 200 mL 0    raNITIdine HCl (RANITIDINE 150 MAX STRENGTH  OR) Take 150 mg by mouth daily.      simvastatin 40 MG Oral Tab Take 1 tablet (40 mg total) by mouth nightly. 90 tablet 3    Glucose Blood (ACCU-CHEK GUIDE) In Vitro Strip 1 each by In Vitro route 2 (two) times daily.      pantoprazole 40 MG Oral Tab EC Take 1 tablet (40 mg total) by mouth every morning before breakfast. 90 tablet 2    vitamin B-12 50 MCG Oral Tab Take 1 tablet (50 mcg total) by mouth daily.      Multiple Vitamins-Minerals (PRESERVISION/LUTEIN) Oral Cap Take 1 capsule by mouth daily.      Polyethyl Glycol-Propyl Glycol (SYSTANE ULTRA OP) Apply 2 drops to eye in the morning and 2 drops before bedtime.     [2] No Known Allergies  [3]   Past Medical History:   Anxiety    BPH (benign prostatic hyperplasia)    CKD (chronic kidney disease) stage 3, GFR 30-59 ml/min (HCC)    Depression    Diabetes mellitus (HCC)    Essential hypertension    GERD (gastroesophageal reflux disease)    Hyperlipidemia   [4]   Past Surgical History:  Procedure Laterality Date    Foot fracture surgery Right     2012   [5]   Family History  Problem Relation Age of Onset    Other (Other) Father

## 2025-06-05 ENCOUNTER — HOSPITAL ENCOUNTER (INPATIENT)
Facility: HOSPITAL | Age: 80
LOS: 19 days | Discharge: NURSING, REHAB OR LONG-TERM CARE | End: 2025-06-24
Attending: STUDENT IN AN ORGANIZED HEALTH CARE EDUCATION/TRAINING PROGRAM | Admitting: HOSPITALIST
Payer: MEDICARE

## 2025-06-05 ENCOUNTER — APPOINTMENT (OUTPATIENT)
Dept: CT IMAGING | Facility: HOSPITAL | Age: 80
End: 2025-06-05
Attending: STUDENT IN AN ORGANIZED HEALTH CARE EDUCATION/TRAINING PROGRAM
Payer: MEDICARE

## 2025-06-05 DIAGNOSIS — K56.609 LARGE BOWEL OBSTRUCTION (HCC): Primary | ICD-10-CM

## 2025-06-05 DIAGNOSIS — K63.89 COLONIC MASS: ICD-10-CM

## 2025-06-05 DIAGNOSIS — C83.398 DIFFUSE LARGE B-CELL LYMPHOMA OF SOLID ORGAN EXCLUDING SPLEEN: ICD-10-CM

## 2025-06-05 LAB
ALBUMIN SERPL-MCNC: 3.6 G/DL (ref 3.2–4.8)
ALBUMIN/GLOB SERPL: 1.4 {RATIO} (ref 1–2)
ALP LIVER SERPL-CCNC: 115 U/L (ref 45–117)
ALT SERPL-CCNC: 27 U/L (ref 10–49)
ANION GAP SERPL CALC-SCNC: 9 MMOL/L (ref 0–18)
AST SERPL-CCNC: 31 U/L (ref ?–34)
BASOPHILS # BLD AUTO: 0.04 X10(3) UL (ref 0–0.2)
BASOPHILS NFR BLD AUTO: 0.4 %
BILIRUB SERPL-MCNC: 0.3 MG/DL (ref 0.2–1.1)
BUN BLD-MCNC: 19 MG/DL (ref 9–23)
BUN/CREAT SERPL: 18.6 (ref 10–20)
CALCIUM BLD-MCNC: 8.7 MG/DL (ref 8.7–10.4)
CHLORIDE SERPL-SCNC: 103 MMOL/L (ref 98–112)
CO2 SERPL-SCNC: 24 MMOL/L (ref 21–32)
CREAT BLD-MCNC: 1.02 MG/DL (ref 0.7–1.3)
DEPRECATED RDW RBC AUTO: 59.7 FL (ref 35.1–46.3)
EGFRCR SERPLBLD CKD-EPI 2021: 75 ML/MIN/1.73M2 (ref 60–?)
EOSINOPHIL # BLD AUTO: 0.06 X10(3) UL (ref 0–0.7)
EOSINOPHIL NFR BLD AUTO: 0.6 %
ERYTHROCYTE [DISTWIDTH] IN BLOOD BY AUTOMATED COUNT: 20.1 % (ref 11–15)
GLOBULIN PLAS-MCNC: 2.6 G/DL (ref 2–3.5)
GLUCOSE BLD-MCNC: 126 MG/DL (ref 70–99)
GLUCOSE BLDC GLUCOMTR-MCNC: 102 MG/DL (ref 70–99)
HCT VFR BLD AUTO: 31.2 % (ref 39–53)
HGB BLD-MCNC: 10.1 G/DL (ref 13–17.5)
IMM GRANULOCYTES # BLD AUTO: 0.05 X10(3) UL (ref 0–1)
IMM GRANULOCYTES NFR BLD: 0.5 %
LIPASE SERPL-CCNC: 22 U/L (ref 12–53)
LYMPHOCYTES # BLD AUTO: 2.88 X10(3) UL (ref 1–4)
LYMPHOCYTES NFR BLD AUTO: 27.9 %
MCH RBC QN AUTO: 26.9 PG (ref 26–34)
MCHC RBC AUTO-ENTMCNC: 32.4 G/DL (ref 31–37)
MCV RBC AUTO: 83.2 FL (ref 80–100)
MONOCYTES # BLD AUTO: 1.04 X10(3) UL (ref 0.1–1)
MONOCYTES NFR BLD AUTO: 10.1 %
NEUTROPHILS # BLD AUTO: 6.27 X10 (3) UL (ref 1.5–7.7)
NEUTROPHILS # BLD AUTO: 6.27 X10(3) UL (ref 1.5–7.7)
NEUTROPHILS NFR BLD AUTO: 60.5 %
OSMOLALITY SERPL CALC.SUM OF ELEC: 286 MOSM/KG (ref 275–295)
PLATELET # BLD AUTO: 368 10(3)UL (ref 150–450)
POTASSIUM SERPL-SCNC: 3.7 MMOL/L (ref 3.5–5.1)
PROT SERPL-MCNC: 6.2 G/DL (ref 5.7–8.2)
RBC # BLD AUTO: 3.75 X10(6)UL (ref 3.8–5.8)
SODIUM SERPL-SCNC: 136 MMOL/L (ref 136–145)
WBC # BLD AUTO: 10.3 X10(3) UL (ref 4–11)

## 2025-06-05 PROCEDURE — 74177 CT ABD & PELVIS W/CONTRAST: CPT | Performed by: STUDENT IN AN ORGANIZED HEALTH CARE EDUCATION/TRAINING PROGRAM

## 2025-06-05 PROCEDURE — 99223 1ST HOSP IP/OBS HIGH 75: CPT | Performed by: HOSPITALIST

## 2025-06-05 RX ORDER — METOCLOPRAMIDE HYDROCHLORIDE 5 MG/ML
5 INJECTION INTRAMUSCULAR; INTRAVENOUS EVERY 8 HOURS PRN
Status: DISCONTINUED | OUTPATIENT
Start: 2025-06-05 | End: 2025-06-10

## 2025-06-05 RX ORDER — ONDANSETRON 2 MG/ML
4 INJECTION INTRAMUSCULAR; INTRAVENOUS ONCE
Status: COMPLETED | OUTPATIENT
Start: 2025-06-05 | End: 2025-06-05

## 2025-06-05 RX ORDER — ONDANSETRON 2 MG/ML
4 INJECTION INTRAMUSCULAR; INTRAVENOUS EVERY 6 HOURS PRN
Status: DISCONTINUED | OUTPATIENT
Start: 2025-06-05 | End: 2025-06-11

## 2025-06-05 RX ORDER — NICOTINE POLACRILEX 4 MG
15 LOZENGE BUCCAL
Status: DISCONTINUED | OUTPATIENT
Start: 2025-06-05 | End: 2025-06-24

## 2025-06-05 RX ORDER — SODIUM CHLORIDE 9 MG/ML
INJECTION, SOLUTION INTRAVENOUS CONTINUOUS
Status: ACTIVE | OUTPATIENT
Start: 2025-06-05 | End: 2025-06-12

## 2025-06-05 RX ORDER — MORPHINE SULFATE 4 MG/ML
4 INJECTION, SOLUTION INTRAMUSCULAR; INTRAVENOUS EVERY 2 HOUR PRN
Status: DISCONTINUED | OUTPATIENT
Start: 2025-06-05 | End: 2025-06-10

## 2025-06-05 RX ORDER — MORPHINE SULFATE 2 MG/ML
1 INJECTION, SOLUTION INTRAMUSCULAR; INTRAVENOUS EVERY 2 HOUR PRN
Status: DISCONTINUED | OUTPATIENT
Start: 2025-06-05 | End: 2025-06-10

## 2025-06-05 RX ORDER — MORPHINE SULFATE 2 MG/ML
2 INJECTION, SOLUTION INTRAMUSCULAR; INTRAVENOUS ONCE
Status: COMPLETED | OUTPATIENT
Start: 2025-06-05 | End: 2025-06-05

## 2025-06-05 RX ORDER — TEMAZEPAM 15 MG/1
15 CAPSULE ORAL NIGHTLY PRN
Status: DISCONTINUED | OUTPATIENT
Start: 2025-06-05 | End: 2025-06-10

## 2025-06-05 RX ORDER — DEXTROSE MONOHYDRATE 25 G/50ML
50 INJECTION, SOLUTION INTRAVENOUS
Status: DISCONTINUED | OUTPATIENT
Start: 2025-06-05 | End: 2025-06-24

## 2025-06-05 RX ORDER — NICOTINE POLACRILEX 4 MG
30 LOZENGE BUCCAL
Status: DISCONTINUED | OUTPATIENT
Start: 2025-06-05 | End: 2025-06-10

## 2025-06-05 RX ORDER — MORPHINE SULFATE 2 MG/ML
2 INJECTION, SOLUTION INTRAMUSCULAR; INTRAVENOUS EVERY 2 HOUR PRN
Status: DISCONTINUED | OUTPATIENT
Start: 2025-06-05 | End: 2025-06-10

## 2025-06-05 RX ORDER — ACETAMINOPHEN 500 MG
500 TABLET ORAL EVERY 4 HOURS PRN
Status: DISCONTINUED | OUTPATIENT
Start: 2025-06-05 | End: 2025-06-24

## 2025-06-05 NOTE — ED INITIAL ASSESSMENT (HPI)
Patient brought in by family for mid abdominal pain for the past 2 weeks. Per patient the pain has been getting more severe. +Nausea but no vomiting or diarrhea. +Fatigue

## 2025-06-05 NOTE — ED QUICK NOTES
Orders for admission, patient is aware of plan and ready to go upstairs. Any questions, please call ED RN Judy at extension [phone].     Patient Covid vaccination status: Fully vaccinated     COVID Test Ordered in ED: None    COVID Suspicion at Admission: N/A    Running Infusions: Medication Infusions[1]     Mental Status/LOC at time of transport: A&O x4    Other pertinent information:   CIWA score: N/A   NIH score:  N/A             [1]

## 2025-06-06 ENCOUNTER — ANESTHESIA EVENT (OUTPATIENT)
Dept: ENDOSCOPY | Facility: HOSPITAL | Age: 80
End: 2025-06-06
Payer: MEDICARE

## 2025-06-06 ENCOUNTER — ANESTHESIA (OUTPATIENT)
Dept: ENDOSCOPY | Facility: HOSPITAL | Age: 80
End: 2025-06-06
Payer: MEDICARE

## 2025-06-06 ENCOUNTER — APPOINTMENT (OUTPATIENT)
Dept: GENERAL RADIOLOGY | Facility: HOSPITAL | Age: 80
End: 2025-06-06
Attending: INTERNAL MEDICINE
Payer: MEDICARE

## 2025-06-06 LAB
ANION GAP SERPL CALC-SCNC: 6 MMOL/L (ref 0–18)
BASOPHILS # BLD AUTO: 0.05 X10(3) UL (ref 0–0.2)
BASOPHILS NFR BLD AUTO: 0.6 %
BUN BLD-MCNC: 13 MG/DL (ref 9–23)
BUN/CREAT SERPL: 14.6 (ref 10–20)
CALCIUM BLD-MCNC: 8.4 MG/DL (ref 8.7–10.4)
CHLORIDE SERPL-SCNC: 105 MMOL/L (ref 98–112)
CO2 SERPL-SCNC: 26 MMOL/L (ref 21–32)
CREAT BLD-MCNC: 0.89 MG/DL (ref 0.7–1.3)
DEPRECATED HBV CORE AB SER IA-ACNC: 57 NG/ML (ref 50–336)
DEPRECATED RDW RBC AUTO: 63.9 FL (ref 35.1–46.3)
EGFRCR SERPLBLD CKD-EPI 2021: 88 ML/MIN/1.73M2 (ref 60–?)
EOSINOPHIL # BLD AUTO: 0.08 X10(3) UL (ref 0–0.7)
EOSINOPHIL NFR BLD AUTO: 1 %
ERYTHROCYTE [DISTWIDTH] IN BLOOD BY AUTOMATED COUNT: 20.3 % (ref 11–15)
GLUCOSE BLD-MCNC: 106 MG/DL (ref 70–99)
GLUCOSE BLDC GLUCOMTR-MCNC: 101 MG/DL (ref 70–99)
GLUCOSE BLDC GLUCOMTR-MCNC: 107 MG/DL (ref 70–99)
GLUCOSE BLDC GLUCOMTR-MCNC: 86 MG/DL (ref 70–99)
GLUCOSE BLDC GLUCOMTR-MCNC: 92 MG/DL (ref 70–99)
HCT VFR BLD AUTO: 30.3 % (ref 39–53)
HGB BLD-MCNC: 9.5 G/DL (ref 13–17.5)
IMM GRANULOCYTES # BLD AUTO: 0.04 X10(3) UL (ref 0–1)
IMM GRANULOCYTES NFR BLD: 0.5 %
IRON SATN MFR SERPL: 6 % (ref 20–50)
IRON SERPL-MCNC: 10 UG/DL (ref 65–175)
LYMPHOCYTES # BLD AUTO: 1.62 X10(3) UL (ref 1–4)
LYMPHOCYTES NFR BLD AUTO: 19.9 %
MCH RBC QN AUTO: 27.1 PG (ref 26–34)
MCHC RBC AUTO-ENTMCNC: 31.4 G/DL (ref 31–37)
MCV RBC AUTO: 86.3 FL (ref 80–100)
MONOCYTES # BLD AUTO: 0.93 X10(3) UL (ref 0.1–1)
MONOCYTES NFR BLD AUTO: 11.4 %
NEUTROPHILS # BLD AUTO: 5.43 X10 (3) UL (ref 1.5–7.7)
NEUTROPHILS # BLD AUTO: 5.43 X10(3) UL (ref 1.5–7.7)
NEUTROPHILS NFR BLD AUTO: 66.6 %
OSMOLALITY SERPL CALC.SUM OF ELEC: 285 MOSM/KG (ref 275–295)
PLATELET # BLD AUTO: 327 10(3)UL (ref 150–450)
POTASSIUM SERPL-SCNC: 3.8 MMOL/L (ref 3.5–5.1)
RBC # BLD AUTO: 3.51 X10(6)UL (ref 3.8–5.8)
SODIUM SERPL-SCNC: 137 MMOL/L (ref 136–145)
TOTAL IRON BINDING CAPACITY: 171 UG/DL (ref 250–425)
TRANSFERRIN SERPL-MCNC: 112 MG/DL (ref 215–365)
WBC # BLD AUTO: 8.2 X10(3) UL (ref 4–11)

## 2025-06-06 PROCEDURE — 76000 FLUOROSCOPY <1 HR PHYS/QHP: CPT | Performed by: INTERNAL MEDICINE

## 2025-06-06 PROCEDURE — 99233 SBSQ HOSP IP/OBS HIGH 50: CPT | Performed by: HOSPITALIST

## 2025-06-06 PROCEDURE — 0D7L8DZ DILATION OF TRANSVERSE COLON WITH INTRALUMINAL DEVICE, VIA NATURAL OR ARTIFICIAL OPENING ENDOSCOPIC: ICD-10-PCS | Performed by: INTERNAL MEDICINE

## 2025-06-06 PROCEDURE — 45389 COLONOSCOPY W/STENT PLCMT: CPT | Performed by: INTERNAL MEDICINE

## 2025-06-06 PROCEDURE — 99223 1ST HOSP IP/OBS HIGH 75: CPT | Performed by: INTERNAL MEDICINE

## 2025-06-06 DEVICE — STENT SYSTEM WITH ANCHOR LOCK DELIVERY SYSTEM
Type: IMPLANTABLE DEVICE | Site: COLON | Status: FUNCTIONAL
Brand: WALLFLEX™ COLONIC

## 2025-06-06 RX ORDER — POLYETHYLENE GLYCOL 3350 17 G/17G
17 POWDER, FOR SOLUTION ORAL 3 TIMES DAILY
Status: DISCONTINUED | OUTPATIENT
Start: 2025-06-06 | End: 2025-06-10

## 2025-06-06 RX ORDER — TAMSULOSIN HYDROCHLORIDE 0.4 MG/1
0.8 CAPSULE ORAL DAILY
Status: DISCONTINUED | OUTPATIENT
Start: 2025-06-06 | End: 2025-06-10

## 2025-06-06 RX ORDER — SODIUM CHLORIDE, SODIUM LACTATE, POTASSIUM CHLORIDE, CALCIUM CHLORIDE 600; 310; 30; 20 MG/100ML; MG/100ML; MG/100ML; MG/100ML
INJECTION, SOLUTION INTRAVENOUS CONTINUOUS PRN
Status: DISCONTINUED | OUTPATIENT
Start: 2025-06-06 | End: 2025-06-06 | Stop reason: SURG

## 2025-06-06 RX ORDER — PREGABALIN 50 MG/1
100 CAPSULE ORAL 3 TIMES DAILY
Status: DISCONTINUED | OUTPATIENT
Start: 2025-06-06 | End: 2025-06-10

## 2025-06-06 RX ORDER — PHENYLEPHRINE HCL 10 MG/ML
VIAL (ML) INJECTION AS NEEDED
Status: DISCONTINUED | OUTPATIENT
Start: 2025-06-06 | End: 2025-06-06 | Stop reason: SURG

## 2025-06-06 RX ORDER — LIDOCAINE HYDROCHLORIDE 10 MG/ML
INJECTION, SOLUTION EPIDURAL; INFILTRATION; INTRACAUDAL; PERINEURAL AS NEEDED
Status: DISCONTINUED | OUTPATIENT
Start: 2025-06-06 | End: 2025-06-06 | Stop reason: SURG

## 2025-06-06 RX ORDER — NALOXONE HYDROCHLORIDE 0.4 MG/ML
0.08 INJECTION, SOLUTION INTRAMUSCULAR; INTRAVENOUS; SUBCUTANEOUS ONCE AS NEEDED
Status: DISCONTINUED | OUTPATIENT
Start: 2025-06-06 | End: 2025-06-06 | Stop reason: HOSPADM

## 2025-06-06 RX ORDER — SODIUM CHLORIDE, SODIUM LACTATE, POTASSIUM CHLORIDE, CALCIUM CHLORIDE 600; 310; 30; 20 MG/100ML; MG/100ML; MG/100ML; MG/100ML
INJECTION, SOLUTION INTRAVENOUS CONTINUOUS
Status: DISCONTINUED | OUTPATIENT
Start: 2025-06-06 | End: 2025-06-07

## 2025-06-06 RX ORDER — PANTOPRAZOLE SODIUM 40 MG/1
40 TABLET, DELAYED RELEASE ORAL
Status: DISCONTINUED | OUTPATIENT
Start: 2025-06-06 | End: 2025-06-11

## 2025-06-06 RX ORDER — EPHEDRINE SULFATE 50 MG/ML
INJECTION INTRAVENOUS AS NEEDED
Status: DISCONTINUED | OUTPATIENT
Start: 2025-06-06 | End: 2025-06-06 | Stop reason: SURG

## 2025-06-06 RX ADMIN — EPHEDRINE SULFATE 5 MG: 50 INJECTION INTRAVENOUS at 17:28:00

## 2025-06-06 RX ADMIN — SODIUM CHLORIDE, SODIUM LACTATE, POTASSIUM CHLORIDE, CALCIUM CHLORIDE: 600; 310; 30; 20 INJECTION, SOLUTION INTRAVENOUS at 18:07:00

## 2025-06-06 RX ADMIN — EPHEDRINE SULFATE 10 MG: 50 INJECTION INTRAVENOUS at 17:35:00

## 2025-06-06 RX ADMIN — PHENYLEPHRINE HCL 100 MCG: 10 MG/ML VIAL (ML) INJECTION at 17:23:00

## 2025-06-06 RX ADMIN — LIDOCAINE HYDROCHLORIDE 50 MG: 10 INJECTION, SOLUTION EPIDURAL; INFILTRATION; INTRACAUDAL; PERINEURAL at 17:15:00

## 2025-06-06 RX ADMIN — SODIUM CHLORIDE, SODIUM LACTATE, POTASSIUM CHLORIDE, CALCIUM CHLORIDE: 600; 310; 30; 20 INJECTION, SOLUTION INTRAVENOUS at 17:06:00

## 2025-06-06 RX ADMIN — EPHEDRINE SULFATE 10 MG: 50 INJECTION INTRAVENOUS at 16:52:00

## 2025-06-06 RX ADMIN — PHENYLEPHRINE HCL 100 MCG: 10 MG/ML VIAL (ML) INJECTION at 17:48:00

## 2025-06-06 RX ADMIN — PHENYLEPHRINE HCL 100 MCG: 10 MG/ML VIAL (ML) INJECTION at 17:26:00

## 2025-06-06 NOTE — CONSULTS
Is this a shared or split note between Advanced Practice Provider and Physician? Yes      [facility]   Gastroenterology Consultation Note    Arnaldo Gutierrez  Patient Status:    Inpatient  Date of Admission:         6/5/2025, Hospital day #1  Attending:   Og James MD  PCP:     PHYSICIAN NONSTAFF    Reason for Consultation:  Obstructive Colonic Mass    History of Present Illness:  Arnaldo Gutierrez is a 80 year old male w/ PMHx of BMI 21.03, DM2, HTN, GERD, Depression, BPH, Anxiety who presented to the ED with ABD bloating, nausea and poor appetite associated with unintentional weight loss.  GI consulted for abnormal CT A/P demonstrating 4 cm segment of constricting mass-like wall thickening at the proximal transverse colon with large upstream colonic fecal burden.    Language Line  ([place], [place] [id number]) utilized throughout visit.    Pt reports poor appetite associated with unintentional weight loss of ~80 pounds in the past 7-8 months with 30 pounds lost overt he past 3.  He also notes progressively worsening diffuse abdominal pain/ bloating, nausea and occasional vomiting.  He has had worsening constipation for the past several days with minimal stool but continues to pas gas so his daughter brought him to the ED.  He continues to have ABD pain, nausea.  No stool today.  No dyspepsia, difficult/painful swallowing, diarrhea, black/bloody stools, fever, chills, chest pain or SOB.  No NSAIDs.    Pertinent Family Hx:  - No known history of esophageal or gastric cancers,  Brother with throat and colon cancers (Dx <65 y.o.).  - No known IBD  - No known liver pathologies    Endoscopy Hx:  - Last colonoscopy ~ 10 years ago at [facility]  - No prior EGD    Social Hx:  - No current tobacco use  - + ETOH, social  - Denies cannabis/illicit drug use  - Denies NSAIDs  - Lives alone but daughter helps him  - Occupation: , high , most recently      History:  Past  Medical History[1]  Past Surgical History[2]  Family History[3]   reports that he quit smoking about 45 years ago. His smoking use included cigarettes. He started smoking about 55 years ago. He has a 20 pack-year smoking history. He has been exposed to tobacco smoke. He has never used smokeless tobacco. He reports current alcohol use. He reports that he does not currently use drugs.    Allergies:  Allergies[4]    Medications:  Current Hospital Medications[5]    Review of Systems:   CONSTITUTIONAL:  negative for fevers, chills, + unintentional weight loss   EYES:  negative for diplopia or change in vision   RESPIRATORY:  negative for severe shortness of breath  CARDIOVASCULAR:  negative for crushing sub-sternal chest pain  GASTROINTESTINAL:  see HPI  GENITOURINARY:  negative for dysuria or gross hematuria  INTEGUMENT/BREAST:  SKIN:  negative for jaundice or new rash   ALLERGIC/IMMUNOLOGIC:  negative for hay fever   ENDOCRINE:  negative for cold intolerance and heat intolerance  MUSCULOSKELETAL:  negative for joint effusion/severe erythema  NEURO: negative for new loss of consciousness or dizziness   BEHAVIOR/PSYCH:  negative for psychotic behavior    Physical Exam:    Blood pressure 93/59, pulse 73, temperature 98.1 °F (36.7 °C), temperature source Oral, resp. rate 18, height 5' 11\" (1.803 m), weight 150 lb 12.7 oz (68.4 kg), SpO2 97%. Body mass index is 21.03 kg/m².    Gen: awake, alert patient, NAD  HEENT: EOMI, the sclera appears anicteric, oropharynx clear, mucus membranes appear moist  CV: RRR  Lung: no conversational dyspnea   Abdomen: soft, diffuse TTP, worse in LLQ without rebound or guarding, Distended abdomen with tympanitic BS appreciated   Back: No CVA tenderness   Skin: dry, warm, no jaundice  Ext: no LE edema is evident  Neuro: Alert, oriented x4 and interactive  Psych: calm, cooperative    Wt Readings from Last 6 Encounters:   06/05/25 150 lb 12.7 oz (68.4 kg)   05/28/25 148 lb (67.1 kg)   04/09/25 160  lb (72.6 kg)   04/08/25 160 lb (72.6 kg)   03/18/25 180 lb (81.6 kg)   01/16/25 169 lb (76.7 kg)        Laboratory Data:  Lab Results   Component Value Date    WBC 8.2 06/06/2025    HGB 9.5 06/06/2025    HCT 30.3 06/06/2025    .0 06/06/2025    CREATSERUM 1.02 06/05/2025    BUN 19 06/05/2025     06/05/2025    K 3.7 06/05/2025     06/05/2025    CO2 24.0 06/05/2025     06/05/2025    CA 8.7 06/05/2025    ALB 3.6 06/05/2025    ALKPHO 115 06/05/2025    BILT 0.3 06/05/2025    TP 6.2 06/05/2025    AST 31 06/05/2025    ALT 27 06/05/2025    LIP 22 06/05/2025       Imaging:  CT ABDOMEN+PELVIS(CONTRAST ONLY)(CPT=74177)  Result Date: 6/5/2025  CONCLUSION:  1. Suspicious approximate 4 cm segment of constricting masslike wall thickening at the proximal transverse colon with large upstream colonic fecal burden.  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  Stellate morphology stranding and nodularity in the right upper quadrant mesentery adjacent to the constricting mass probably relates to direct extension of disease.  There are adjacent nonenlarged right upper quadrant mesenteric lymph nodes, which could be reactive or metastatic.  Gastroenterology evaluation and colonoscopy correlation is suggested.  No free intraperitoneal air or well-defined/drainable intra-abdominal collection. 2. Single mildly enlarged interaortocaval retroperitoneal lymph node with other prominent but nonenlarged retroperitoneal nodes.  These could be reactive or metastatic in nature. 3. Coronary and peripheral atherosclerosis with aortic annular calcifications. 4. Reticular opacities at the periphery of the right lung base probably relate to scarring. 5. Mild circumferential urinary bladder wall thickening.  Request urinalysis correlation to assess for potential cystitis.    elm-remote  Dictated by (CST): Jeremie Cedeño MD on 6/05/2025 at 5:47 PM     Finalized by (CST): Jeremie Cedeño MD on 6/05/2025 at  5:58 PM            Assessment & Plan   Arnaldo Gutierrez is a 80 year old male w/ PMHx of BMI 21.03, DM2, HTN, GERD, Depression, BPH, Anxiety who presented to the ED with ABD bloating, nausea and poor appetite associated with unintentional weight loss.  GI consulted for abnormal CT A/P demonstrating 4 cm segment of constricting mass-like wall thickening at the proximal transverse colon with large upstream colonic fecal burden.    #Obstructive colonic mass  -Labs on admission without electrolyte derangments, no leukocytosis, anemia with Hgb 10, baseline 10-12.  -CT A/P on admission with suspicious 4 cm segment of constricting mass-like wall thickening at the proximal transvers colon with large upstream colon fecal burden c/f obstructive neoplasm, no free intraperitoneal air or fluid collection  -Last colonoscopy ~10 years ago  -Etiology concerning for colonic neoplasm  -Recommend colonoscopy to further evaluate with possible colonic stent placement and biopsy.  Risks/benefits of procedure discussed.  Pt states understanding and is willing to proceed.  Unable to prep with obstruction.  Will given tap water enema pre-procedure.    Colonoscopy consent: I have discussed the risks, benefits, and alternatives to colonoscopy with the patient/primary decision maker [who demonstrated understanding], including but not limited to the risks of bleeding, infection, pain, death, as well as the risks of anesthesia and perforation all leading to prolonged hospitalization, surgical intervention, or even death. I also specifically mentioned the miss rate of colonoscopy of 5-10% in the best of all circumstances. The patient has agreed to sign an informed consent and elected to proceed with colonoscopy with possible intervention [i.e. polypectomy, stent placement, etc.] as indicated.    Recommend:  -Un prepped colonoscopy today  -TWE prior to procedure  -NPO    Thank you for the opportunity to participate in the care of this  patient.    Case discussed with An Fuller MD and Nayana GARCIA.    [facility]  6/6/2025          [1]   Past Medical History:   Anxiety    BPH (benign prostatic hyperplasia)    CKD (chronic kidney disease) stage 3, GFR 30-59 ml/min (HCC)    Depression    Diabetes mellitus (HCC)    Essential hypertension    GERD (gastroesophageal reflux disease)    Hyperlipidemia   [2]   Past Surgical History:  Procedure Laterality Date    Foot fracture surgery Right     2012   [3]   Family History  Problem Relation Age of Onset    Other (Other) Father    [4] No Known Allergies  [5]   Current Facility-Administered Medications:     sodium chloride 0.9% infusion, , Intravenous, Continuous    acetaminophen (Tylenol Extra Strength) tab 500 mg, 500 mg, Oral, Q4H PRN    ondansetron (Zofran) 4 MG/2ML injection 4 mg, 4 mg, Intravenous, Q6H PRN    metoclopramide (Reglan) 5 mg/mL injection 5 mg, 5 mg, Intravenous, Q8H PRN    temazepam (Restoril) cap 15 mg, 15 mg, Oral, Nightly PRN    morphINE PF 2 MG/ML injection 1 mg, 1 mg, Intravenous, Q2H PRN **OR** morphINE PF 2 MG/ML injection 2 mg, 2 mg, Intravenous, Q2H PRN **OR** morphINE PF 4 MG/ML injection 4 mg, 4 mg, Intravenous, Q2H PRN    glucose (Dex4) 15 GM/59ML oral liquid 15 g, 15 g, Oral, Q15 Min PRN **OR** glucose (Glutose) 40% oral gel 15 g, 15 g, Oral, Q15 Min PRN **OR** glucose-vitamin C (Dex-4) chewable tab 4 tablet, 4 tablet, Oral, Q15 Min PRN **OR** dextrose 50% injection 50 mL, 50 mL, Intravenous, Q15 Min PRN **OR** glucose (Dex4) 15 GM/59ML oral liquid 30 g, 30 g, Oral, Q15 Min PRN **OR** glucose (Glutose) 40% oral gel 30 g, 30 g, Oral, Q15 Min PRN **OR** glucose-vitamin C (Dex-4) chewable tab 8 tablet, 8 tablet, Oral, Q15 Min PRN    insulin aspart (NovoLOG) 100 Units/mL FlexPen 1-5 Units, 1-5 Units, Subcutaneous, TID CC

## 2025-06-06 NOTE — DIETARY NOTE
ADULT NUTRITION INITIAL ASSESSMENT    Pt is at high nutrition risk.  Pt meets severe malnutrition criteria.      RECOMMENDATIONS TO MD: See nutrition intervention for ONS (oral nutrition supplements)    ADMITTING DIAGNOSIS:  Large bowel obstruction (HCC) [K56.609]  Colonic mass [K63.89]  PERTINENT PAST MEDICAL HISTORY: Past Medical History[1]    PATIENT STATUS: Initial 06/06/25: Pt assessed due to screening at risk for MST of unsure weight loss. Pt admitted due to large bowel obstruction found. Past medical history significant for CKD, DM, GERD, HTN and hyperlipidemia. Chart reviewed. Attempted to visit patient bedside twice; out of room both times. Currently at colonoscopy. Pt has been NPO for unprepped colonoscopy with possible colonic stent and biopsy; unable to assess intake during admission. Reviewed MD notes and significant for reports of very decreased appetite x2 weeks due to abdominal pain after eating. Complaints of weight loss; ~60lbs in 4-5 months. EMR reviewed and weight loss noted; 1/16/25:169lbs (12% loss in 5 months) 6/7/24: 189lbs (20% lost in 1 yr); both weight losses clinically significant for malnutrition in time frame. Pt has had BM this AM. NFPE deferred. Will complete at reassessment.    FOOD/NUTRITION RELATED HISTORY:  Appetite: Poor  Intake: NPO  Intake Meeting Needs: NPO  Percent Meals Eaten (last 6 days)       None           Food Allergies: No Known Food Allergies (NKFA)  Cultural/Ethnic/Yarsanism Preferences: Not Obtained    GASTROINTESTINAL: +BM 6/6, constipation, and large bowel obstruction    MEDICATIONS: reviewed  Scheduled Medications[2]  LABS: reviewed  Recent Labs     06/05/25  1617 06/06/25  0741   * 106*   BUN 19 13   CREATSERUM 1.02 0.89   CA 8.7 8.4*    137   K 3.7 3.8    105   CO2 24.0 26.0   OSMOCALC 286 285       WEIGHT HISTORY:  Patient Weight(s) for the past 336 hrs:   Weight   06/05/25 2004 68.4 kg (150 lb 12.7 oz)   06/05/25 1608 67.1 kg (147 lb 14.9  oz)     Wt Readings from Last 10 Encounters:   06/05/25 68.4 kg (150 lb 12.7 oz)   05/28/25 67.1 kg (148 lb)   04/09/25 72.6 kg (160 lb)   04/08/25 72.6 kg (160 lb)   03/18/25 81.6 kg (180 lb)   01/16/25 76.7 kg (169 lb)   11/21/24 77.6 kg (171 lb)   08/29/24 78.9 kg (174 lb)   08/25/24 81.6 kg (180 lb)   07/15/24 84.8 kg (187 lb)       ANTHROPOMETRICS:  HT: 180.3 cm (5' 11\")  Wt Readings from Last 1 Encounters:   06/05/25 68.4 kg (150 lb 12.7 oz)     Last weight: Likely accurate  BMI: Body mass index is 21.03 kg/m².  Dosing Weight: 68.4 kg - recent weight, utilized for anthropometric calculations  BMI CLASSIFICATION: <22 considered underweight for advanced age (>65)  IBW/lbs (Calculated) Male: 172 lbs             87% IBW  Usual Body Wt: 189 lbs    1 yr ago  79% UBW    NUTRITION RELATED PHYSICAL FINDINGS:  - Nutrition Focused Physical Exam (NFPE): deferred  - Fluid Accumulation: none . See RN documentation for details  - Skin Integrity: RN documentation reviewed. See RN documentation for details    CRITERIA FOR MALNUTRITION DIAGNOSIS:  Criteria for severe malnutrition diagnosis: chronic illness related to wt loss greater than 10% in 6 months and energy intake less than 75% for greater than 1 month.    NUTRITION DIAGNOSIS/PROBLEM:   Severe Malnutrition related to Chronic - Physiological causes increasing nutrient needs due to illness or condition (colonic mass) as evidenced by wt loss greater than 10% in 6 months and energy intake less than 75% for greater than 1 month.    NUTRITION INTERVENTION:     NUTRITION PRESCRIPTION:   Estimated Nutrition needs: --dosing wt of 68.4 kg - wt taken on 6/5/25  Calories: 0861-1000 calories/day (30-35 calories per kg Dosing wt)  Protein:  g protein/day (1.2-1.5 g protein/kg Dosing wt)  Fluid Needs: 2052-2380mL (1mL/kcal)    - Diet:       Procedures    NPO      - Nutrition Care Plan: Will initiate ONS when diet advances or initiate alternative means of nutrition if warranted  -  ONS (Oral Nutrition Supplements)/Meals/Snacks: NPO   - Vitamin and mineral supplements: NPO/CLD  - Feeding assistance: NPO  - Nutrition education: not appropriate at this time    - Coordination of nutrition care: collaboration with other providers  - Discharge and transfer of nutrition care to new setting or provider: to be determined and monitor plans    MONITOR AND EVALUATE/NUTRITION GOALS:  - Food and Nutrient Intake:      Monitor: for PO initiation  - Food and Nutrient Administration:      Monitor: N/A  - Anthropometric Measurement:    Monitor weight  - Nutrition Goals:      diet initiation vs nutrition support within 24-48 hrs and improved GI status    DIETITIAN FOLLOW UP: RD to follow and monitor nutrition status  TUNDE Vance  Clinical Dietitian  P: [phone]           [1]   Past Medical History:   Anxiety    BPH (benign prostatic hyperplasia)    CKD (chronic kidney disease) stage 3, GFR 30-59 ml/min (HCC)    Depression    Diabetes mellitus (HCC)    Essential hypertension    GERD (gastroesophageal reflux disease)    Hyperlipidemia   [2]    sodium ferric gluconate  125 mg Intravenous Daily    FLUoxetine  20 mg Oral Daily    tamsulosin  0.8 mg Oral Daily    pantoprazole  40 mg Oral QAM AC    pregabalin  100 mg Oral TID    insulin aspart  1-5 Units Subcutaneous TID CC

## 2025-06-06 NOTE — PAYOR COMM NOTE
--------------  ADMISSION REVIEW     Payor: [employer]  Subscriber #:  [id number]  Authorization Number: [id number]    Admit date: 25  Admit time:  8:01 PM       REVIEW DOCUMENTATION:     ED Provider Notes        ED Provider Notes signed by Rupinder Vines MD at 2025  9:08 PM      [place] Emergency Department Note  Patient: Arnaldo Gutierrez Age: 80 year old Sex: male      MRN: [id number]  : 4/15/1945    Patient Seen in: [facility] 4w/sw/se    History     Chief Complaint   Patient presents with    Abdomen/Flank Pain     Stated Complaint: abd martha    History obtained from: Patient and patient's daughter    Patient is a 80-year-old male with past medical history of hypertension, hyperlipidemia, diabetes, CKD stage III, BPH, GERD, anxiety presenting today for evaluation of abdominal pain.  Patient states that he has been having abdominal pain over the past 2 weeks that is worse after eating.  Associated nausea but no vomiting or diarrhea.  States that pain has been worsening today prompting evaluation in the emergency department.  He states that he feels overall fatigued.  No fevers.    Review of Systems:  Review of Systems  Positive for stated complaint: abd martha. Constitutional and vital signs reviewed. All other systems reviewed and negative except as noted above.      PSFH elements reviewed from today and agreed except as otherwise stated in HPI.    Physical Exam     ED Triage Vitals [25 1608]   BP (!) 81/52   Pulse 89   Resp 18   Temp 98.1 °F (36.7 °C)   Temp src Oral   SpO2 96 %   O2 Device None (Room air)       Current:BP 98/60 (BP Location: Right arm)   Pulse 78   Temp 98.4 °F (36.9 °C) (Oral)   Resp 18   Ht 180.3 cm (5' 11\")   Wt 68.4 kg   SpO2 99%   BMI 21.03 kg/m²         Physical Exam  Constitutional:       Appearance: He is well-developed.   HENT:      Head: Normocephalic and atraumatic.      Right Ear: External ear normal.      Left Ear: External ear normal.       Nose: Nose normal.   Eyes:      Conjunctiva/sclera: Conjunctivae normal.      Pupils: Pupils are equal, round, and reactive to light.   Cardiovascular:      Rate and Rhythm: Normal rate and regular rhythm.      Heart sounds: Normal heart sounds.   Pulmonary:      Effort: Pulmonary effort is normal.      Breath sounds: Normal breath sounds.   Abdominal:      General: Bowel sounds are normal.      Palpations: Abdomen is soft.      Tenderness: There is generalized abdominal tenderness. There is guarding. There is no rebound.   Musculoskeletal:         General: Normal range of motion.      Cervical back: Normal range of motion and neck supple.   Skin:     General: Skin is warm and dry.      Findings: No rash.   Neurological:      General: No focal deficit present.      Mental Status: He is alert and oriented to person, place, and time.      Deep Tendon Reflexes: Reflexes are normal and symmetric.   Psychiatric:         Mood and Affect: Mood normal.         Behavior: Behavior normal.         ED Course   Labs:   Labs Reviewed   COMP METABOLIC PANEL (14) - Abnormal; Notable for the following components:       Result Value    Glucose 126 (*)     All other components within normal limits   CBC WITH DIFFERENTIAL WITH PLATELET - Abnormal; Notable for the following components:    RBC 3.75 (*)     HGB 10.1 (*)     HCT 31.2 (*)     RDW-SD 59.7 (*)     RDW 20.1 (*)     Monocyte Absolute 1.04 (*)     All other components within normal limits   LIPASE - Normal   RAINBOW DRAW LAVENDER   RAINBOW DRAW LIGHT GREEN   RAINBOW DRAW BLUE     Radiology findings:  I personally reviewed the images.   CT ABDOMEN+PELVIS(CONTRAST ONLY)(CPT=74177)  Result Date: 6/5/2025  CONCLUSION:  1. Suspicious approximate 4 cm segment of constricting masslike wall thickening at the proximal transverse colon with large upstream colonic fecal burden.  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  Stellate morphology stranding and  nodularity in the right upper quadrant mesentery adjacent to the constricting mass probably relates to direct extension of disease.  There are adjacent nonenlarged right upper quadrant mesenteric lymph nodes, which could be reactive or metastatic.  Gastroenterology evaluation and colonoscopy correlation is suggested.  No free intraperitoneal air or well-defined/drainable intra-abdominal collection. 2. Single mildly enlarged interaortocaval retroperitoneal lymph node with other prominent but nonenlarged retroperitoneal nodes.  These could be reactive or metastatic in nature. 3. Coronary and peripheral atherosclerosis with aortic annular calcifications. 4. Reticular opacities at the periphery of the right lung base probably relate to scarring. 5. Mild circumferential urinary bladder wall thickening.  Request urinalysis correlation to assess for potential cystitis.    elm-remote  Dictated by (CST): Jeremie Cedeño MD on 6/05/2025 at 5:47 PM     Finalized by (CST): Jeremie Cedeño MD on 6/05/2025 at 5:58 PM            Cardiac Monitor: Interpreted by me.   Pulse Readings from Last 1 Encounters:   06/05/25 78   , sinus,     External non-ED records reviewed independently by me: See note from 5/28/2025 reviewed confirming patient has a hemoglobin A1c of 6.9 performed on 5/28/2025    MDM   80-year-old male with a past medical history of hypertension, hyperlipidemia, diabetes, CKD stage III, BPH, GERD, anxiety presenting today for evaluation of 2 weeks of generalized abdominal pain.  Upon arrival emergency department, was hypotensive.  He has generalized abdominal tenderness with guarding.    Differential diagnoses considered includes, but is not limited to: Ulcer, perforated gastric ulcer, bowel obstruction, pancreatitis, biliary obstructive process, gastroenteritis, colitis    Will obtain the following tests: CBC, CMP, lipase, CT abdomen pelvis with IV contrast  Please see ED course for my independent review of these  tests/imaging results.    Initial Medications/Therapeutics administered: IV fluids, IV Protonix, IV Zofran, morphine    Chronic conditions affecting care: Hypertension, hyperlipidemia, diabetes, CKD stage III, BPH, GERD, anxiety      ED Course as of 06/05/25 2108  ------------------------------------------------------------  Time: 06/05 1810  Comment: Laboratory workup with electrolytes within normal limits.  Normal renal function.  No evidence of biliary obstructive process.  Lipase not elevated.  I independently reviewed the CT abdomen pelvis images that show evidence of a partial large bowel obstruction.  Agree with radiology read above which notes a 4 cm segment of masslike wall thickening at the proximal transverse colon concerning for possible malignancy.  Also with lymphadenopathy concerning for metastatic disease.  I discussed patient's case with gastroenterology, Dr. Palmer, who was made aware of new consult and agrees to evaluate patient.  [place] hospitalist, Dr. James, accepted patient for admission.            Disposition and Plan     Clinical Impression:  1. Large bowel obstruction (HCC)    2. Colonic mass        Disposition:  Admit    Hospital Problems       Present on Admission  Date Reviewed: 5/28/2025          ICD-10-CM Noted POA    * (Principal) Large bowel obstruction (HCC) K56.609 6/5/2025 Unknown    Colonic mass K63.89 6/5/2025 Unknown        This note may have been created using voice dictation technology and may include inadvertent errors.      Rupinder Vines MD  Emergency Medicine          Signed by Rupinder Vines MD on 6/5/2025  9:08 PM             CONSULT  Assessment & Plan   Arnaldo Gutierrez is a 80 year old male w/ PMHx of BMI 21.03, DM2, HTN, GERD, Depression, BPH, Anxiety who presented to the ED with ABD bloating, nausea and poor appetite associated with unintentional weight loss.  GI consulted for abnormal CT A/P demonstrating 4 cm segment of constricting mass-like wall thickening at the  proximal transverse colon with large upstream colonic fecal burden.     #Obstructive colonic mass  -Labs on admission without electrolyte derangments, no leukocytosis, anemia with Hgb 10, baseline 10-12.  -CT A/P on admission with suspicious 4 cm segment of constricting mass-like wall thickening at the proximal transvers colon with large upstream colon fecal burden c/f obstructive neoplasm, no free intraperitoneal air or fluid collection  -Last colonoscopy ~10 years ago  -Etiology concerning for colonic neoplasm  -Recommend colonoscopy to further evaluate with possible colonic stent placement and biopsy.  Risks/benefits of procedure discussed.  Pt states understanding and is willing to proceed.  Unable to prep with obstruction.  Will given tap water enema pre-procedure.     Colonoscopy consent: I have discussed the risks, benefits, and alternatives to colonoscopy with the patient/primary decision maker [who demonstrated understanding], including but not limited to the risks of bleeding, infection, pain, death, as well as the risks of anesthesia and perforation all leading to prolonged hospitalization, surgical intervention, or even death. I also specifically mentioned the miss rate of colonoscopy of 5-10% in the best of all circumstances. The patient has agreed to sign an informed consent and elected to proceed with colonoscopy with possible intervention [i.e. polypectomy, stent placement, etc.] as indicated.     Recommend:  -Un prepped colonoscopy today  -TWE prior to procedure  -NPO     Thank you for the opportunity to participate in the care of this patient.     Case discussed with An Fuller MD and Nayana GARCIA.     [facility], [facility]  6/6/2025 6/6          Date of Service: 6/6/2025 12:22 PM     Signed         [facility]  part of [place] [employer]     Progress Note           Subjective:      Pt had a BM this morning and has less abd pain now.  Awaits  colonoscopy.     Objective:   Blood pressure 96/57, pulse 70, temperature 98.3 °F (36.8 °C), temperature source Oral, resp. rate 16, height 71\", weight 150 lb 12.7 oz (68.4 kg), SpO2 98%.     Gen:   NAD.  A and O x 3  CV:   RRR, no m/g/r  Pulm:   CTA bilat  Abd:   +bs, soft, tender over mid upper abdomen to deep palpation, no rebound or guarding, ND  LE:   No c/c/e  Neuro:   nonfocal     Results:            Lab Results   Component Value Date     WBC 8.2 06/06/2025     HGB 9.5 (L) 06/06/2025     HCT 30.3 (L) 06/06/2025     .0 06/06/2025     CREATSERUM 0.89 06/06/2025     BUN 13 06/06/2025      06/06/2025     K 3.8 06/06/2025      06/06/2025     CO2 26.0 06/06/2025      (H) 06/06/2025     CA 8.4 (L) 06/06/2025     ALB 3.6 06/05/2025     ALKPHO 115 06/05/2025     BILT 0.3 06/05/2025     TP 6.2 06/05/2025     AST 31 06/05/2025     ALT 27 06/05/2025     TSH 2.417 04/08/2025     LIP 22 06/05/2025     PHOS 3.0 04/10/2024     B12 >2,000 (H) 04/08/2025         CT ABDOMEN+PELVIS(CONTRAST ONLY)(CPT=74177)  Result Date: 6/5/2025  CONCLUSION:               1. Suspicious approximate 4 cm segment of constricting masslike wall thickening at the proximal transverse colon with large upstream colonic fecal burden.  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  Stellate morphology stranding and nodularity in the right upper quadrant mesentery adjacent to the constricting mass probably relates to direct extension of disease.  There are adjacent nonenlarged right upper quadrant mesenteric lymph nodes, which could be reactive or metastatic.  Gastroenterology evaluation and colonoscopy correlation is suggested.  No free intraperitoneal air or well-defined/drainable intra-abdominal collection. 2. Single mildly enlarged interaortocaval retroperitoneal lymph node with other prominent but nonenlarged retroperitoneal nodes.  These could be reactive or metastatic in nature. 3. Coronary and  peripheral atherosclerosis with aortic annular calcifications. 4. Reticular opacities at the periphery of the right lung base probably relate to scarring. 5. Mild circumferential urinary bladder wall thickening.  Request urinalysis correlation to assess for potential cystitis.    elm-remote  Dictated by (CST): Jeremie Cedeño MD on 6/05/2025 at 5:47 PM     Finalized by (CST): Jeremie Cedeño MD on 6/05/2025 at 5:58 PM                  Assessment and Plan:      Proximal transverse colonic obstructing mass and progressive anemia with weight loss, highly suggestive of colonic malignancy.   Constipation.  Had BM today.  - NPO  - IVF  - pain control  - GI on consult  - unprepped colonoscopy today for bx     Hx of DM2  - ISS     Hx of HTN     Hx of HL     Hx of BPH  - flomax     dvt proph:    SCDs     Code status:    Full        MDM:    High Shekhar Schwarz MD  6/6/2025                        MEDICATIONS ADMINISTERED IN LAST 1 DAY:  iopamidol 76% (ISOVUE-370) injection for power injector       Date Action Dose Route User    6/5/2025 1734 Given 80 mL Intravenous MisRebekah young          morphINE PF 2 MG/ML injection 2 mg       Date Action Dose Route User    6/5/2025 1858 Given 2 mg Intravenous Isai Rice RN          morphINE PF 2 MG/ML injection 2 mg       Date Action Dose Route User    6/6/2025 1401 Given 2 mg Intravenous Nayana Wells RN    6/6/2025 0631 Given 2 mg Intravenous Mike Montgomery RN    6/6/2025 0139 Given 2 mg Intravenous Mike Montgomery RN    6/5/2025 2122 Given 2 mg Intravenous Mike Montgomery RN          ondansetron (Zofran) 4 MG/2ML injection 4 mg       Date Action Dose Route User    6/5/2025 1654 Given 4 mg Intravenous Judy Keith, JOSE          pantoprazole (Protonix) 40 mg in sodium chloride 0.9% PF 10 mL IV push       Date Action Dose Route User    6/5/2025 1657 Given 40 mg Intravenous Judy Keith, JOSE          sodium chloride 0.9% infusion       Date Action Dose Route User    6/5/2025  2122 New Bag (none) Intravenous Mike Montgomery, JOSE          sodium chloride 0.9 % IV bolus 1,000 mL       Date Action Dose Route User    6/5/2025 1653 New Bag 1,000 mL Intravenous Judy Keith, JOSE          sodium ferric gluconate (Ferrlecit) 125 mg in sodium chloride 0.9% 100mL IVPB premix       Date Action Dose Route User    6/6/2025 1153 New Bag 125 mg Intravenous Nayana Wells RN            Vitals (last day)       Date/Time Temp Pulse Resp BP SpO2 Weight O2 Device O2 Flow Rate (L/min) [facility]    06/06/25 1141 98.3 °F (36.8 °C) 70 16 96/57 98 % -- None (Room air) -- KB    06/06/25 0511 98.1 °F (36.7 °C) 73 18 93/59 97 % -- None (Room air) -- DA    06/05/25 2004 98.4 °F (36.9 °C) 78 18 98/60 99 % 150 lb 12.7 oz (68.4 kg) None (Room air) -- WM    06/05/25 1948 -- -- -- -- -- -- None (Room air) -- DB    06/05/25 1930 -- 84 22 99/69 97 % -- None (Room air) -- DB    06/05/25 1915 -- 69 21 90/54 97 % -- None (Room air) -- DB    06/05/25 1830 -- 76 22 99/57 99 % -- None (Room air) -- DB    06/05/25 1800 -- 79 15 101/55 99 % -- None (Room air) -- DB    06/05/25 1730 -- 77 21 100/58 99 % -- None (Room air) -- DB    06/05/25 1700 -- 72 23 94/57 98 % -- None (Room air) -- DB    06/05/25 1631 -- -- -- -- -- -- None (Room air) -- DB    06/05/25 1630 -- 75 17 99/59 97 % -- None (Room air) -- DB    06/05/25 1608 98.1 °F (36.7 °C) 89 18 81/52 96 % 147 lb 14.9 oz (67.1 kg) None (Room air) -- DL

## 2025-06-06 NOTE — CM/SW NOTE
MARY received for home health evaluation.    Patient discussed in nursing rounds.  Ambulating SBA with nursing staff.  Patient lives alone but has family support. Plan for colonoscopy with possible intervention today.    No anticipated discharge needs identified at this time.  CM sent tentative home healthcare referrals via [employer] in the event HH indicated at time of discharge.  Orders and F2F entered.    / to remain available for support and/or discharge planning.     Plan: Home with family support - pending course of stay, medical clearance    Tammie Raya RN, BSN  Nurse   [phone]

## 2025-06-06 NOTE — PLAN OF CARE
Patient alert and oriented x4. Vss. On room air. NPO for colonoscopy. IVF as ordered. Voiding freely. Tap water enema x2, patient with liquid brown stool after second enema- endoscopy aware. To colonoscopy.   Problem: Patient Centered Care  Goal: Patient preferences are identified and integrated in the patient's plan of care  Description: Interventions:  - What would you like us to know as we care for you?   - Provide timely, complete, and accurate information to patient/family  - Incorporate patient and family knowledge, values, beliefs, and cultural backgrounds into the planning and delivery of care  - Encourage patient/family to participate in care and decision-making at the level they choose  - Honor patient and family perspectives and choices  Outcome: Progressing     Problem: Diabetes/Glucose Control  Goal: Glucose maintained within prescribed range  Description: INTERVENTIONS:  - Monitor Blood Glucose as ordered  - Assess for signs and symptoms of hyperglycemia and hypoglycemia  - Administer ordered medications to maintain glucose within target range  - Assess barriers to adequate nutritional intake and initiate nutrition consult as needed  - Instruct patient on self management of diabetes  Outcome: Progressing     Problem: Patient/Family Goals  Goal: Patient/Family Long Term Goal  Description: Patient's Long Term Goal:     Interventions:  -   - See additional Care Plan goals for specific interventions  Outcome: Progressing  Goal: Patient/Family Short Term Goal  Description: Patient's Short Term Goal:     Interventions:   -   - See additional Care Plan goals for specific interventions  Outcome: Progressing     Problem: PAIN - ADULT  Goal: Verbalizes/displays adequate comfort level or patient's stated pain goal  Description: INTERVENTIONS:  - Encourage pt to monitor pain and request assistance  - Assess pain using appropriate pain scale  - Administer analgesics based on type and severity of pain and evaluate  response  - Implement non-pharmacological measures as appropriate and evaluate response  - Consider cultural and social influences on pain and pain management  - Manage/alleviate anxiety  - Utilize distraction and/or relaxation techniques  - Monitor for opioid side effects  - Notify MD/LIP if interventions unsuccessful or patient reports new pain  - Anticipate increased pain with activity and pre-medicate as appropriate  Outcome: Progressing     Problem: RISK FOR INFECTION - ADULT  Goal: Absence of fever/infection during anticipated neutropenic period  Description: INTERVENTIONS  - Monitor WBC  - Administer growth factors as ordered  - Implement neutropenic guidelines  Outcome: Progressing     Problem: SAFETY ADULT - FALL  Goal: Free from fall injury  Description: INTERVENTIONS:  - Assess pt frequently for physical needs  - Identify cognitive and physical deficits and behaviors that affect risk of falls.  - [place] fall precautions as indicated by assessment.  - Educate pt/family on patient safety including physical limitations  - Instruct pt to call for assistance with activity based on assessment  - Modify environment to reduce risk of injury  - Provide assistive devices as appropriate  - Consider OT/PT consult to assist with strengthening/mobility  - Encourage toileting schedule  Outcome: Progressing     Problem: DISCHARGE PLANNING  Goal: Discharge to home or other facility with appropriate resources  Description: INTERVENTIONS:  - Identify barriers to discharge w/pt and caregiver  - Include patient/family/discharge partner in discharge planning  - Arrange for needed discharge resources and transportation as appropriate  - Identify discharge learning needs (meds, wound care, etc)  - Arrange for interpreters to assist at discharge as needed  - Consider post-discharge preferences of patient/family/discharge partner  - Complete POLST form as appropriate  - Assess patient's ability to be responsible for managing  their own health  - Refer to Case Management Department for coordinating discharge planning if the patient needs post-hospital services based on physician/LIP order or complex needs related to functional status, cognitive ability or social support system  Outcome: Progressing

## 2025-06-06 NOTE — PLAN OF CARE
Patient is alert & oriented x4, Turkmen speaking. On room air. Vitals stable. IVF infusing. Pain being managed with prn morphine. Tolerating clear liquids. AC/HS. Voiding freely. Up SBA. Plan for GI consult in AM.        Problem: Patient Centered Care  Goal: Patient preferences are identified and integrated in the patient's plan of care  Description: Interventions:  - What would you like us to know as we care for you?   - Provide timely, complete, and accurate information to patient/family  - Incorporate patient and family knowledge, values, beliefs, and cultural backgrounds into the planning and delivery of care  - Encourage patient/family to participate in care and decision-making at the level they choose  - Honor patient and family perspectives and choices  Outcome: Progressing     Problem: Diabetes/Glucose Control  Goal: Glucose maintained within prescribed range  Description: INTERVENTIONS:  - Monitor Blood Glucose as ordered  - Assess for signs and symptoms of hyperglycemia and hypoglycemia  - Administer ordered medications to maintain glucose within target range  - Assess barriers to adequate nutritional intake and initiate nutrition consult as needed  - Instruct patient on self management of diabetes  Outcome: Progressing     Problem: Patient/Family Goals  Goal: Patient/Family Long Term Goal  Description: Patient's Long Term Goal:     Interventions:  -   - See additional Care Plan goals for specific interventions  Outcome: Progressing  Goal: Patient/Family Short Term Goal  Description: Patient's Short Term Goal:     Interventions:     - See additional Care Plan goals for specific interventions  Outcome: Progressing     Problem: PAIN - ADULT  Goal: Verbalizes/displays adequate comfort level or patient's stated pain goal  Description: INTERVENTIONS:  - Encourage pt to monitor pain and request assistance  - Assess pain using appropriate pain scale  - Administer analgesics based on type and severity of pain and  evaluate response  - Implement non-pharmacological measures as appropriate and evaluate response  - Consider cultural and social influences on pain and pain management  - Manage/alleviate anxiety  - Utilize distraction and/or relaxation techniques  - Monitor for opioid side effects  - Notify MD/LIP if interventions unsuccessful or patient reports new pain  - Anticipate increased pain with activity and pre-medicate as appropriate  Outcome: Progressing     Problem: RISK FOR INFECTION - ADULT  Goal: Absence of fever/infection during anticipated neutropenic period  Description: INTERVENTIONS  - Monitor WBC  - Administer growth factors as ordered  - Implement neutropenic guidelines  Outcome: Progressing     Problem: SAFETY ADULT - FALL  Goal: Free from fall injury  Description: INTERVENTIONS:  - Assess pt frequently for physical needs  - Identify cognitive and physical deficits and behaviors that affect risk of falls.  - [place] fall precautions as indicated by assessment.  - Educate pt/family on patient safety including physical limitations  - Instruct pt to call for assistance with activity based on assessment  - Modify environment to reduce risk of injury  - Provide assistive devices as appropriate  - Consider OT/PT consult to assist with strengthening/mobility  - Encourage toileting schedule  Outcome: Progressing     Problem: DISCHARGE PLANNING  Goal: Discharge to home or other facility with appropriate resources  Description: INTERVENTIONS:  - Identify barriers to discharge w/pt and caregiver  - Include patient/family/discharge partner in discharge planning  - Arrange for needed discharge resources and transportation as appropriate  - Identify discharge learning needs (meds, wound care, etc)  - Arrange for interpreters to assist at discharge as needed  - Consider post-discharge preferences of patient/family/discharge partner  - Complete POLST form as appropriate  - Assess patient's ability to be responsible for  managing their own health  - Refer to Case Management Department for coordinating discharge planning if the patient needs post-hospital services based on physician/LIP order or complex needs related to functional status, cognitive ability or social support system  Outcome: Progressing

## 2025-06-06 NOTE — PROGRESS NOTES
[facility]  part of [facility]    Progress Note    Arnaldo Gutierrez Patient Status:  Inpatient    4/15/1945 MRN [id number]   Location [facility] 4W/SW/SE Attending Shekhar Johnson MD   Hosp Day # 1 PCP PHYSICIAN NONSTAFF       Subjective:     Pt had a BM this morning and has less abd pain now.  Awaits colonoscopy.    Objective:   Blood pressure 96/57, pulse 70, temperature 98.3 °F (36.8 °C), temperature source Oral, resp. rate 16, height 71\", weight 150 lb 12.7 oz (68.4 kg), SpO2 98%.    Gen:   NAD.  A and O x 3  CV:   RRR, no m/g/r  Pulm:   CTA bilat  Abd:   +bs, soft, tender over mid upper abdomen to deep palpation, no rebound or guarding, ND  LE:   No c/c/e  Neuro:   nonfocal    Results:     Lab Results   Component Value Date    WBC 8.2 2025    HGB 9.5 (L) 2025    HCT 30.3 (L) 2025    .0 2025    CREATSERUM 0.89 2025    BUN 13 2025     2025    K 3.8 2025     2025    CO2 26.0 2025     (H) 2025    CA 8.4 (L) 2025    ALB 3.6 2025    ALKPHO 115 2025    BILT 0.3 2025    TP 6.2 2025    AST 31 2025    ALT 27 2025    TSH 2.417 2025    LIP 22 2025    PHOS 3.0 04/10/2024    B12 >2,000 (H) 2025       CT ABDOMEN+PELVIS(CONTRAST ONLY)(CPT=74177)  Result Date: 2025  CONCLUSION:  1. Suspicious approximate 4 cm segment of constricting masslike wall thickening at the proximal transverse colon with large upstream colonic fecal burden.  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  Stellate morphology stranding and nodularity in the right upper quadrant mesentery adjacent to the constricting mass probably relates to direct extension of disease.  There are adjacent nonenlarged right upper quadrant mesenteric lymph nodes, which could be reactive or metastatic.  Gastroenterology evaluation and colonoscopy correlation is  suggested.  No free intraperitoneal air or well-defined/drainable intra-abdominal collection. 2. Single mildly enlarged interaortocaval retroperitoneal lymph node with other prominent but nonenlarged retroperitoneal nodes.  These could be reactive or metastatic in nature. 3. Coronary and peripheral atherosclerosis with aortic annular calcifications. 4. Reticular opacities at the periphery of the right lung base probably relate to scarring. 5. Mild circumferential urinary bladder wall thickening.  Request urinalysis correlation to assess for potential cystitis.    elm-remote  Dictated by (CST): Jeermie Cedeño MD on 6/05/2025 at 5:47 PM     Finalized by (CST): Jeremie Cedeño MD on 6/05/2025 at 5:58 PM                Assessment and Plan:     Proximal transverse colonic obstructing mass and progressive anemia with weight loss, highly suggestive of colonic malignancy.   Constipation.  Had BM today.  - NPO  - IVF  - pain control  - GI on consult  - unprepped colonoscopy today for bx    Hx of DM2  - ISS    Hx of HTN    Hx of HL    Hx of BPH  - flomax    dvt proph:    SCDs    Code status:    Full       MDM:    High Shekhar Schwarz MD  6/6/2025

## 2025-06-06 NOTE — ED PROVIDER NOTES
[place] Emergency Department Note  Patient: Arnaldo Gutierrez Age: 80 year old Sex: male      MRN: [id number]  : 4/15/1945    Patient Seen in: [facility] 4w/sw/se    History     Chief Complaint   Patient presents with    Abdomen/Flank Pain     Stated Complaint: abd martha    History obtained from: Patient and patient's daughter    Patient is a 80-year-old male with past medical history of hypertension, hyperlipidemia, diabetes, CKD stage III, BPH, GERD, anxiety presenting today for evaluation of abdominal pain.  Patient states that he has been having abdominal pain over the past 2 weeks that is worse after eating.  Associated nausea but no vomiting or diarrhea.  States that pain has been worsening today prompting evaluation in the emergency department.  He states that he feels overall fatigued.  No fevers.    Review of Systems:  Review of Systems  Positive for stated complaint: abd martha. Constitutional and vital signs reviewed. All other systems reviewed and negative except as noted above.    Patient History:  Past Medical History[1]    Past Surgical History[2]     Family History[3]    Specific Social Determinants of Health:   Short Social Hx on File[4]        PSFH elements reviewed from today and agreed except as otherwise stated in HPI.    Physical Exam     ED Triage Vitals [25 1608]   BP (!) 81/52   Pulse 89   Resp 18   Temp 98.1 °F (36.7 °C)   Temp src Oral   SpO2 96 %   O2 Device None (Room air)       Current:BP 98/60 (BP Location: Right arm)   Pulse 78   Temp 98.4 °F (36.9 °C) (Oral)   Resp 18   Ht 180.3 cm (5' 11\")   Wt 68.4 kg   SpO2 99%   BMI 21.03 kg/m²         Physical Exam  Constitutional:       Appearance: He is well-developed.   HENT:      Head: Normocephalic and atraumatic.      Right Ear: External ear normal.      Left Ear: External ear normal.      Nose: Nose normal.   Eyes:      Conjunctiva/sclera: Conjunctivae normal.      Pupils: Pupils are equal, round, and reactive to  light.   Cardiovascular:      Rate and Rhythm: Normal rate and regular rhythm.      Heart sounds: Normal heart sounds.   Pulmonary:      Effort: Pulmonary effort is normal.      Breath sounds: Normal breath sounds.   Abdominal:      General: Bowel sounds are normal.      Palpations: Abdomen is soft.      Tenderness: There is generalized abdominal tenderness. There is guarding. There is no rebound.   Musculoskeletal:         General: Normal range of motion.      Cervical back: Normal range of motion and neck supple.   Skin:     General: Skin is warm and dry.      Findings: No rash.   Neurological:      General: No focal deficit present.      Mental Status: He is alert and oriented to person, place, and time.      Deep Tendon Reflexes: Reflexes are normal and symmetric.   Psychiatric:         Mood and Affect: Mood normal.         Behavior: Behavior normal.         ED Course   Labs:   Labs Reviewed   COMP METABOLIC PANEL (14) - Abnormal; Notable for the following components:       Result Value    Glucose 126 (*)     All other components within normal limits   CBC WITH DIFFERENTIAL WITH PLATELET - Abnormal; Notable for the following components:    RBC 3.75 (*)     HGB 10.1 (*)     HCT 31.2 (*)     RDW-SD 59.7 (*)     RDW 20.1 (*)     Monocyte Absolute 1.04 (*)     All other components within normal limits   LIPASE - Normal   RAINBOW DRAW LAVENDER   RAINBOW DRAW LIGHT GREEN   RAINBOW DRAW BLUE     Radiology findings:  I personally reviewed the images.   CT ABDOMEN+PELVIS(CONTRAST ONLY)(CPT=74177)  Result Date: 6/5/2025  CONCLUSION:  1. Suspicious approximate 4 cm segment of constricting masslike wall thickening at the proximal transverse colon with large upstream colonic fecal burden.  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  Stellate morphology stranding and nodularity in the right upper quadrant mesentery adjacent to the constricting mass probably relates to direct extension of disease.   There are adjacent nonenlarged right upper quadrant mesenteric lymph nodes, which could be reactive or metastatic.  Gastroenterology evaluation and colonoscopy correlation is suggested.  No free intraperitoneal air or well-defined/drainable intra-abdominal collection. 2. Single mildly enlarged interaortocaval retroperitoneal lymph node with other prominent but nonenlarged retroperitoneal nodes.  These could be reactive or metastatic in nature. 3. Coronary and peripheral atherosclerosis with aortic annular calcifications. 4. Reticular opacities at the periphery of the right lung base probably relate to scarring. 5. Mild circumferential urinary bladder wall thickening.  Request urinalysis correlation to assess for potential cystitis.    elm-remote  Dictated by (CST): Jeremie Cedeño MD on 6/05/2025 at 5:47 PM     Finalized by (CST): Jeremie Cedeño MD on 6/05/2025 at 5:58 PM            Cardiac Monitor: Interpreted by me.   Pulse Readings from Last 1 Encounters:   06/05/25 78   , sinus,     External non-ED records reviewed independently by me: See note from 5/28/2025 reviewed confirming patient has a hemoglobin A1c of 6.9 performed on 5/28/2025    MDM   80-year-old male with a past medical history of hypertension, hyperlipidemia, diabetes, CKD stage III, BPH, GERD, anxiety presenting today for evaluation of 2 weeks of generalized abdominal pain.  Upon arrival emergency department, was hypotensive.  He has generalized abdominal tenderness with guarding.    Differential diagnoses considered includes, but is not limited to: Ulcer, perforated gastric ulcer, bowel obstruction, pancreatitis, biliary obstructive process, gastroenteritis, colitis    Will obtain the following tests: CBC, CMP, lipase, CT abdomen pelvis with IV contrast  Please see ED course for my independent review of these tests/imaging results.    Initial Medications/Therapeutics administered: IV fluids, IV Protonix, IV Zofran, morphine    Chronic conditions  affecting care: Hypertension, hyperlipidemia, diabetes, CKD stage III, BPH, GERD, anxiety      ED Course as of 06/05/25 2108  ------------------------------------------------------------  Time: 06/05 1810  Comment: Laboratory workup with electrolytes within normal limits.  Normal renal function.  No evidence of biliary obstructive process.  Lipase not elevated.  I independently reviewed the CT abdomen pelvis images that show evidence of a partial large bowel obstruction.  Agree with radiology read above which notes a 4 cm segment of masslike wall thickening at the proximal transverse colon concerning for possible malignancy.  Also with lymphadenopathy concerning for metastatic disease.  I discussed patient's case with gastroenterology, Dr. Palmer, who was made aware of new consult and agrees to evaluate patient.  [place] hospitalist, Dr. James, accepted patient for admission.            Disposition and Plan     Clinical Impression:  1. Large bowel obstruction (HCC)    2. Colonic mass        Disposition:  Admit    Follow-up:  No follow-up provider specified.    Medications Prescribed:  Current Discharge Medication List          Hospital Problems       Present on Admission  Date Reviewed: 5/28/2025          ICD-10-CM Noted POA    * (Principal) Large bowel obstruction (HCC) K56.609 6/5/2025 Unknown    Colonic mass K63.89 6/5/2025 Unknown        This note may have been created using voice dictation technology and may include inadvertent errors.      Rupinder Vines MD  Emergency Medicine             [1]   Past Medical History:   Anxiety    BPH (benign prostatic hyperplasia)    CKD (chronic kidney disease) stage 3, GFR 30-59 ml/min (HCC)    Depression    Diabetes mellitus (HCC)    Essential hypertension    GERD (gastroesophageal reflux disease)    Hyperlipidemia   [2]   Past Surgical History:  Procedure Laterality Date    Foot fracture surgery Right     2012   [3]   Family History  Problem Relation Age of Onset    Other  (Other) Father    [4]   Social History  Socioeconomic History    Marital status:    Tobacco Use    Smoking status: Former     Current packs/day: 0.00     Average packs/day: 2.0 packs/day for 10.0 years (20.0 ttl pk-yrs)     Types: Cigarettes     Start date:      Quit date:      Years since quittin.4     Passive exposure: Past    Smokeless tobacco: Never   Vaping Use    Vaping status: Never Used   Substance and Sexual Activity    Alcohol use: Yes     Comment: rarely    Drug use: Not Currently     Social Drivers of Health     Food Insecurity: No Food Insecurity (2025)    NCSS - Food Insecurity     Worried About Running Out of Food in the Last Year: No     Ran Out of Food in the Last Year: No   Transportation Needs: No Transportation Needs (2025)    NCSS - Transportation     Lack of Transportation: No   Housing Stability: Not At Risk (2025)    NCSS - Housing/Utilities     Has Housing: Yes     Worried About Losing Housing: No     Unable to Get Utilities: No

## 2025-06-06 NOTE — H&P
[facility]    PATIENT'S NAME: MARCIN WOODARD   ATTENDING PHYSICIAN: Rupinder Vines MD   PATIENT ACCOUNT#:   [id number]    LOCATION:  [place] [address]  MEDICAL RECORD #:   [id number]       YOB: 1945  ADMISSION DATE:       06/05/2025    HISTORY AND PHYSICAL EXAMINATION    CHIEF COMPLAINT:  Obstructive colonic mass.    HISTORY OF PRESENT ILLNESS:  The patient is a 80-year-old  male who came into the emergency department for evaluation of abdominal bloating, nausea, and poor appetite with progressive weight loss.  CBC showed hemoglobin of 10.1.  Has been steadily dropping compared to January, it was 11, and August last year, it was 14.5.  Chemistry and liver function test were unremarkable.  Lipase was negative.  CT scan of the abdomen and pelvis showed 4 cm segment of constricting masslike wall thickening at the proximal transverse colon with large upstream colonic fecal burden.  Findings are concerning for partial obstructing colonic neoplasm, satellite morphology stranding and nodularity in the right lower quadrant mesentery adjacent to the constricting mass probably related to direct extension of disease.  There are adjacent nonenlarged right upper quadrant mesenteric lymph nodes which could be reactive or metastatic.  No free intraperitoneal air or well-defined drainable intra-abdominal collection.  Single mildly enlarged aortocaval retroperitoneal lymph node with other prominent but nonenlarged retroperitoneal nodes, could be reactive or metastatic.  Patient will be admitted to the hospital for further management.    PAST MEDICAL HISTORY:  Diabetes mellitus type 2, hypertension, gastroesophageal reflux disease, depression, benign prostate hypertrophy, anxiety.    PAST SURGICAL HISTORY:  Remote right foot or ankle surgery.  Patient reported that he had a colonoscopy around 10 years ago.     MEDICATIONS:  Please see medication reconciliation list.     ALLERGIES:  No known drug  allergies.    FAMILY HISTORY:  Positive for diabetes mellitus type 2.    SOCIAL HISTORY:  Ex-tobacco user.  Social alcohol.  No drug use.    REVIEW OF SYSTEMS:  Patient reports progressive weight loss for over 60 pounds in the last 4 to 5 months, progressive poor appetite, nausea, abdominal bloating especially when he eats, occasional vomiting.  He has not been having much of bowel movement in the last several days, and he has been passing small amount of gas.  Other 12-point review of systems is negative.       PHYSICAL EXAMINATION:    GENERAL:  Alert and oriented to time, place, and person.  Appears slightly cachectic, mild to moderate distress and pale.  VITAL SIGNS:  Temperature 98.1, pulse 76, respiratory rate 22, blood pressure 81/52, pulse ox 99% on room air.    HEENT:  Atraumatic.  Oropharynx clear.  Dry mucous membranes.  Ears, nose normal.  Eyes:  Anicteric sclerae.   NECK:  Supple.  No lymphadenopathy.  Trachea midline.   LUNGS:  Clear to auscultation bilaterally.  Normal respiratory effort.    HEART:  Regular rate, rhythm.  S1 and S2 auscultated.  No murmur.   ABDOMEN:  Soft.  Mild distention.  Discomfort to palpation, but no tenderness.  Positive bowel sounds.    EXTREMITIES:  No peripheral edema, clubbing, or cyanosis.  NEUROLOGIC:  Motor and sensory intact.     ASSESSMENT:    1.   Clinical findings with proximal transverse colonic obstructing mass and progressive anemia with weight loss, highly suggestive of colonic malignancy.   2.   Diabetes mellitus type 2.  3.   History of hypertension, hyperlipidemia, and benign prostate hypertrophy.    PLAN:  Will place patient on sips of clear liquid diet, IV fluids.  Pain control.  Gastroenterology consult.  Hold all oral medications.  Monitor Accu-Cheks.  Monitor hemodynamic status.  Findings suspicious for colon mass with possible regional metastatic lymph nodes.  Further recommendations to follow.     Dictated By Og James MD  d: 06/05/2025  18:50:33  t: 06/05/2025 19:02:59  Job [id number]  FB/    cc: Rupinder Vines MD

## 2025-06-06 NOTE — ANESTHESIA POSTPROCEDURE EVALUATION
Patient: Arnaldo Gutierrez    Procedure Summary       Date: 06/06/25 Room / Location: [facility] ENDOSCOPY 01 / [facility] ENDOSCOPY    Anesthesia Start: 1706 Anesthesia Stop:     Procedure: COLONOSCOPY with colonic stent placement Diagnosis: (colonic stricture, stent placement)    Surgeons: Eusebio Palmer MD Anesthesiologist: Ada Avilez MD    Anesthesia Type: MAC ASA Status: 3            Anesthesia Type: MAC    Vitals Value Taken Time   /64 06/06/25 18:07   Temp  06/06/25 18:08   Pulse 82 06/06/25 18:07   Resp 20 06/06/25 18:07   SpO2 96 % 06/06/25 18:07       [facility] AN Post Evaluation:   Patient Evaluated in PACU  Patient Participation: complete - patient participated  Level of Consciousness: awake  Pain Management: adequate  Airway Patency:patent  Dental exam unchanged from preop  Yes    Cardiovascular Status: acceptable  Respiratory Status: acceptable  Postoperative Hydration acceptable      ADA AVILEZ MD  6/6/2025 6:08 PM

## 2025-06-06 NOTE — ANESTHESIA PREPROCEDURE EVALUATION
Anesthesia PreOp Note    HPI:     Arnaldo Gutierrez is a 80 year old male who presents for preoperative consultation requested by: Euseibo Palmer MD    Date of Surgery: 6/6/2025    Procedure(s):  COLONOSCOPY with colonic stent placement  Indication: colon mass    Relevant Problems   No relevant active problems       NPO:                         History Review:  Patient Active Problem List    Diagnosis Date Noted    Large bowel obstruction (HCC) 06/05/2025    Colonic mass 06/05/2025    Bipolar I disorder, single manic episode (HCC) 04/08/2025    Type 2 diabetes mellitus without complication, without long-term current use of insulin (HCC) 04/18/2024    Essential hypertension 04/18/2024    Depression with anxiety 04/22/2021    Chronic pain of both knees 11/12/2020    Chronic low back pain 08/21/2020    Hyperlipidemia 04/04/2018    BPH (benign prostatic hyperplasia) 05/18/2016    Generalized osteoarthritis 05/18/2016       Past Medical History[1]    Past Surgical History[2]    Prescriptions Prior to Admission[3]  Current Medications and Prescriptions Ordered in Epic[4]    Allergies[5]    Family History[6]  Social Hx on file[7]    Available pre-op labs reviewed.  Lab Results   Component Value Date    WBC 8.2 06/06/2025    RBC 3.51 (L) 06/06/2025    HGB 9.5 (L) 06/06/2025    HCT 30.3 (L) 06/06/2025    MCV 86.3 06/06/2025    MCH 27.1 06/06/2025    MCHC 31.4 06/06/2025    RDW 20.3 (H) 06/06/2025    .0 06/06/2025     Lab Results   Component Value Date     06/06/2025    K 3.8 06/06/2025     06/06/2025    CO2 26.0 06/06/2025    BUN 13 06/06/2025    CREATSERUM 0.89 06/06/2025     (H) 06/06/2025    PGLU 101 (H) 06/06/2025    CA 8.4 (L) 06/06/2025          Vital Signs:  Body mass index is 21.03 kg/m².   height is 1.803 m (5' 11\") and weight is 68.4 kg (150 lb 12.7 oz). His oral temperature is 98.3 °F (36.8 °C). His blood pressure is 96/57 and his pulse is 70. His respiration is 16 and oxygen  saturation is 98%.   Vitals:    06/05/25 1930 06/05/25 2004 06/06/25 0511 06/06/25 1141   BP: 99/69 98/60 93/59 96/57   Pulse: 84 78 73 70   Resp: 22 18 18 16   Temp:  98.4 °F (36.9 °C) 98.1 °F (36.7 °C) 98.3 °F (36.8 °C)   TempSrc:  Oral Oral Oral   SpO2: 97% 99% 97% 98%   Weight:  68.4 kg (150 lb 12.7 oz)     Height:  1.803 m (5' 11\")          Anesthesia Evaluation     Patient summary reviewed and Nursing notes reviewed    Airway   Mallampati: II  TM distance: >3 FB  Neck ROM: full  Dental    (+) upper dentures    Pulmonary - normal exam   Cardiovascular   Exercise tolerance: poor (LOWER BACK PAIN THAT RADIATES DOWN LEG.. PARTICIPATES IN PHYSICAL THERAPY AND USES A CANE FOR AMBULATIMG)  (+) hypertension    ECG reviewed  Rhythm: regular  Rate: normal  ROS comment: Narrative  Performed by: [facility]  Sinus bradycardia with Premature atrial complexes  Otherwise normal ECG  No previous ECGs found in [facility]  Confirmed by DO Pratt Asif ([id number]) on 4/8/2025 9:36:36 PM  Specimen Collected: 04/08/25 12:44        Neuro/Psych    (+)  anxiety/panic attacks,  bipolar disorder, depression      GI/Hepatic/Renal    (+) GERD    Endo/Other    (+) diabetes mellitus type 2    Comments: GLUCOSE 106  Abdominal  - normal exam                 Anesthesia Plan:   ASA:  3  Plan:   MAC  Informed Consent Plan and Risks Discussed With:  Patient      I have informed Arnaldo Gutierrez and/or legal guardian or family member of the nature of the anesthetic plan, benefits, risks including possible dental damage if relevant, major complications, and any alternative forms of anesthetic management.   All of the patient's questions were answered to the best of my ability. The patient desires the anesthetic management as planned.  Janice Espino CRNA  6/6/2025 2:56 PM  Present on Admission:  **None**           [1]   Past Medical History:   Anxiety    BPH (benign prostatic hyperplasia)    CKD (chronic kidney disease) stage 3, GFR 30-59 ml/min ([facility])     Depression    Diabetes mellitus (HCC)    Essential hypertension    GERD (gastroesophageal reflux disease)    Hyperlipidemia   [2]   Past Surgical History:  Procedure Laterality Date    Foot fracture surgery Right     2012   [3]   Medications Prior to Admission   Medication Sig Dispense Refill Last Dose/Taking    loratadine 10 MG Oral Tab Take 1 tablet (10 mg total) by mouth daily as needed for Allergies.   Past Month    melatonin 3 MG Oral Tab Take 2 tablets (6 mg total) by mouth daily.   Past Month    FLUoxetine 20 MG Oral Cap Take 1 capsule (20 mg total) by mouth daily. 90 capsule 3 6/5/2025 Morning    Accu-Chek FastClix Lancets Does not apply Misc Apply 100 Lancets topically daily. 100 each 3 Taking    Glucose Blood (ACCU-CHEK GUIDE TEST) In Vitro Strip Use 1 time per day. 100 strip 3 Taking    Tirzepatide (MOUNJARO) 5 MG/0.5ML Subcutaneous Solution Auto-injector Inject 5 mg into the skin every 7 days. 6 mL 1 6/1/2025    pregabalin 100 MG Oral Cap Take 1 capsule (100 mg total) by mouth in the morning, at noon, and at bedtime. 90 capsule 5 6/5/2025 Morning    empagliflozin (JARDIANCE) 10 MG Oral Tab Take 1 tablet (10 mg total) by mouth daily. Every morning 90 tablet 3 6/5/2025 Morning    Ferrous Sulfate 325 (65 Fe) MG Oral Tab Take 1 tablet (325 mg total) by mouth daily with breakfast. With orange juice. 90 tablet 1 6/5/2025 Morning    traMADol 50 MG Oral Tab Take 1 tablet (50 mg total) by mouth every 8 (eight) hours as needed for Pain. 30 tablet 2 Past Month    tamsulosin 0.4 MG Oral Cap Take 2 capsules (0.8 mg total) by mouth daily. AFTER SAME MEAL EACH  capsule 3 6/4/2025 Bedtime    raNITIdine HCl (RANITIDINE 150 MAX STRENGTH OR) Take 150 mg by mouth in the morning.   6/5/2025 Morning    simvastatin 40 MG Oral Tab Take 1 tablet (40 mg total) by mouth nightly. 90 tablet 3 6/4/2025 Bedtime    Glucose Blood (ACCU-CHEK GUIDE) In Vitro Strip 1 each by In Vitro route in the morning and 1 each before bedtime.    Taking    pantoprazole 40 MG Oral Tab EC Take 1 tablet (40 mg total) by mouth every morning before breakfast. 90 tablet 2 6/5/2025 Morning    vitamin B-12 50 MCG Oral Tab Take 1 tablet (50 mcg total) by mouth in the morning.   6/5/2025 Morning    Polyethyl Glycol-Propyl Glycol (SYSTANE ULTRA OP) Apply 2 drops to eye in the morning and 2 drops before bedtime.   6/5/2025 Morning    alum-mag hydroxide-simethicone 200-200-20 MG/5ML Oral Suspension Take 10 mL by mouth 4 (four) times daily as needed. 200 mL 0     Multiple Vitamins-Minerals (PRESERVISION/LUTEIN) Oral Cap Take 1 capsule by mouth in the morning.      [4]   Current Facility-Administered Medications Ordered in Epic   Medication Dose Route Frequency Provider Last Rate Last Admin    sodium ferric gluconate (Ferrlecit) 125 mg in sodium chloride 0.9% 100mL IVPB premix  125 mg Intravenous Daily An Fluler  mL/hr at 06/06/25 1153 125 mg at 06/06/25 1153    FLUoxetine (PROzac) cap 20 mg  20 mg Oral Daily Shekhar Johnson MD        tamsulosin (Flomax) cap 0.8 mg  0.8 mg Oral Daily Shekhar Johnson MD        pantoprazole (Protonix) DR tab 40 mg  40 mg Oral QAM AC Shekhar Johnson MD        pregabalin (Lyrica) cap 100 mg  100 mg Oral TID Shekhar Johnson MD        sodium chloride 0.9% infusion   Intravenous Continuous Og James  mL/hr at 06/05/25 2122 New Bag at 06/05/25 2122    acetaminophen (Tylenol Extra Strength) tab 500 mg  500 mg Oral Q4H PRN Og James MD        ondansetron (Zofran) 4 MG/2ML injection 4 mg  4 mg Intravenous Q6H PRN Og James MD        metoclopramide (Reglan) 5 mg/mL injection 5 mg  5 mg Intravenous Q8H PRN Og James MD        temazepam (Restoril) cap 15 mg  15 mg Oral Nightly PRN Og James MD        morphINE PF 2 MG/ML injection 1 mg  1 mg Intravenous Q2H PRN Og James MD        Or    morphINE PF 2 MG/ML injection 2 mg  2 mg Intravenous Q2H PRN Og James MD   2 mg at 06/06/25 4804     Or    morphINE PF 4 MG/ML injection 4 mg  4 mg Intravenous Q2H PRN Og James MD        glucose (Dex4) 15 GM/59ML oral liquid 15 g  15 g Oral Q15 Min PRN Og James MD        Or    glucose (Glutose) 40% oral gel 15 g  15 g Oral Q15 Min PRN Og James MD        Or    glucose-vitamin C (Dex-4) chewable tab 4 tablet  4 tablet Oral Q15 Min PRN Og James MD        Or    dextrose 50% injection 50 mL  50 mL Intravenous Q15 Min PRN Og James MD        Or    glucose (Dex4) 15 GM/59ML oral liquid 30 g  30 g Oral Q15 Min PRN Og James MD        Or    glucose (Glutose) 40% oral gel 30 g  30 g Oral Q15 Min PRN Og James MD        Or    glucose-vitamin C (Dex-4) chewable tab 8 tablet  8 tablet Oral Q15 Min PRN Og James MD        insulin aspart (NovoLOG) 100 Units/mL FlexPen 1-5 Units  1-5 Units Subcutaneous TID CC Og James MD         No current [facility]-ordered outpatient medications on file.   [5] No Known Allergies  [6]   Family History  Problem Relation Age of Onset    Other (Other) Father    [7]   Social History  Socioeconomic History    Marital status:    Tobacco Use    Smoking status: Former     Current packs/day: 0.00     Average packs/day: 2.0 packs/day for 10.0 years (20.0 ttl pk-yrs)     Types: Cigarettes     Start date:      Quit date:      Years since quittin.4     Passive exposure: Past    Smokeless tobacco: Never   Vaping Use    Vaping status: Never Used   Substance and Sexual Activity    Alcohol use: Yes     Comment: rarely    Drug use: Not Currently

## 2025-06-06 NOTE — OPERATIVE REPORT
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     SURGEON:  Eusebio Palmer M.D.     SEDATION:      MAC anesthesia provided by the Anesthesia service.  MAC anesthesia was necessary for deep sedation for the anticipated colonic stent placement; Anesthesiologist availability for possible conversion to endotracheal intubation and General Anesthesia in this patient with large bowel obstruction.     COLONOSCOPY PROCEDURE:     After the nature and risks of colonoscopy examination with anticipated metallic colonic stent placement were discussed with Mr. Gutierrez and all questions answered, informed consent was obtained.  He was sedated by the anesthesiologist.    Digital rectal exam was performed which showed no masses.  The Olympus adult therapeutic gastroscope was placed in the patient's rectum and advanced under direct visualization to the transverse colon.  I encountered significant looping and a large volume of solid stool and could not reach the obstructing process with only a gastroscope.    The scope was withdrawn and we switched out for an adult colonoscope.    This adult colonoscope was lubricated, advanced back up while irrigating aggressively breaking up solid stool to advance through the rectum sigmoid colon descending colon and around the splenic flexure, up to an obstructing process likely mid or proximal transverse colon as per imaging.  The colonic lumen puckered and twisted down to a tight malignant stricture, with some limited views of ulcerations and a very tight lumen.    There was a sharp angulation within the irregular rigid lumen through this mass.  After several minutes of approaching from different angles, bowing the rubber tipped hydrophilic 0.035 inch  guidewire within the lumen to make it bend and snap around a turn we ultimately traversed the lesion with the guidewire.    A 15-18 mm ERCP biliary balloon catheter was advanced over the guidewire through the tumor lumen up above the suspected tumor, balloon inflated to 18 mm and pulled back.  Large volumes of contrast injected.  We saw a 4-5 cm long malignant stricture.    After establishing our landmarks and a good colonoscope position, the biliary balloon was deflated and backed out over the 0.035 inch guidewire.    A [employer] 25 x 120 mm uncoated metallic colonic stent was prepared and advanced up the 0.035 inch guidewire down through the colonoscope and across the stricture under good endoscopic and fluoroscopic visualization.    With optimal position, stent centered across the stricture the colonic stent was deployed in one steady motion under continuous visualization.    There was an immediate rush of liquid stool.  Fluoroscopy images and limited visualization showed good position of the stent with the \"waist\" of the tumor at its center.  I elected to stop here.      Estimated blood loss: none/insignificant       COLONOSCOPY FINDINGS:    Tight malignant colonic stricture as above; successful placement of [employer] 25 x 120 mm uncoated metallic colonic stent as above    RECOMMENDATIONS:  Clear liquid diet as tolerated  Close observation, serial abdominal exams  Start gentle MiraLAX laxative if condition improves      Above discussed with daughter Yao Arreola at [phone]

## 2025-06-07 LAB
ANION GAP SERPL CALC-SCNC: 10 MMOL/L (ref 0–18)
BASOPHILS # BLD AUTO: 0.03 X10(3) UL (ref 0–0.2)
BASOPHILS NFR BLD AUTO: 0.5 %
BUN BLD-MCNC: 12 MG/DL (ref 9–23)
BUN/CREAT SERPL: 13.8 (ref 10–20)
CALCIUM BLD-MCNC: 8.4 MG/DL (ref 8.7–10.4)
CHLORIDE SERPL-SCNC: 105 MMOL/L (ref 98–112)
CO2 SERPL-SCNC: 24 MMOL/L (ref 21–32)
CREAT BLD-MCNC: 0.87 MG/DL (ref 0.7–1.3)
DEPRECATED RDW RBC AUTO: 61.9 FL (ref 35.1–46.3)
EGFRCR SERPLBLD CKD-EPI 2021: 88 ML/MIN/1.73M2 (ref 60–?)
EOSINOPHIL # BLD AUTO: 0.08 X10(3) UL (ref 0–0.7)
EOSINOPHIL NFR BLD AUTO: 1.3 %
ERYTHROCYTE [DISTWIDTH] IN BLOOD BY AUTOMATED COUNT: 19.9 % (ref 11–15)
GLUCOSE BLD-MCNC: 69 MG/DL (ref 70–99)
GLUCOSE BLDC GLUCOMTR-MCNC: 161 MG/DL (ref 70–99)
GLUCOSE BLDC GLUCOMTR-MCNC: 80 MG/DL (ref 70–99)
GLUCOSE BLDC GLUCOMTR-MCNC: 83 MG/DL (ref 70–99)
GLUCOSE BLDC GLUCOMTR-MCNC: 95 MG/DL (ref 70–99)
HCT VFR BLD AUTO: 29.2 % (ref 39–53)
HGB BLD-MCNC: 9.2 G/DL (ref 13–17.5)
IMM GRANULOCYTES # BLD AUTO: 0.03 X10(3) UL (ref 0–1)
IMM GRANULOCYTES NFR BLD: 0.5 %
LYMPHOCYTES # BLD AUTO: 1.75 X10(3) UL (ref 1–4)
LYMPHOCYTES NFR BLD AUTO: 28.6 %
MCH RBC QN AUTO: 27.1 PG (ref 26–34)
MCHC RBC AUTO-ENTMCNC: 31.5 G/DL (ref 31–37)
MCV RBC AUTO: 86.1 FL (ref 80–100)
MONOCYTES # BLD AUTO: 0.78 X10(3) UL (ref 0.1–1)
MONOCYTES NFR BLD AUTO: 12.7 %
NEUTROPHILS # BLD AUTO: 3.45 X10 (3) UL (ref 1.5–7.7)
NEUTROPHILS # BLD AUTO: 3.45 X10(3) UL (ref 1.5–7.7)
NEUTROPHILS NFR BLD AUTO: 56.4 %
OSMOLALITY SERPL CALC.SUM OF ELEC: 286 MOSM/KG (ref 275–295)
PLATELET # BLD AUTO: 341 10(3)UL (ref 150–450)
POTASSIUM SERPL-SCNC: 3.8 MMOL/L (ref 3.5–5.1)
RBC # BLD AUTO: 3.39 X10(6)UL (ref 3.8–5.8)
SODIUM SERPL-SCNC: 139 MMOL/L (ref 136–145)
WBC # BLD AUTO: 6.1 X10(3) UL (ref 4–11)

## 2025-06-07 PROCEDURE — 99232 SBSQ HOSP IP/OBS MODERATE 35: CPT | Performed by: INTERNAL MEDICINE

## 2025-06-07 PROCEDURE — 99233 SBSQ HOSP IP/OBS HIGH 50: CPT | Performed by: HOSPITALIST

## 2025-06-07 RX ORDER — DOCUSATE SODIUM 100 MG/1
100 CAPSULE, LIQUID FILLED ORAL 2 TIMES DAILY
Status: DISCONTINUED | OUTPATIENT
Start: 2025-06-07 | End: 2025-06-10

## 2025-06-07 NOTE — PROGRESS NOTES
[facility]     Gastroenterology Progress Note    Arnaldo Gutierrez Patient Status:  Inpatient    4/15/1945 MRN [id number]   Location [facility] 4W/SW/SE Attending Shekhar Johnson MD   Hosp Day # 2 PCP PHYSICIAN NONSTAFF       Subjective:   Danish  utilized.   Notes some abdominal pain if he pushes on his abdomen in the RUQ.   No BMs.     Objective:   Blood pressure 92/61, pulse 71, temperature 97.8 °F (36.6 °C), temperature source Oral, resp. rate 16, height 5' 11\" (1.803 m), weight 150 lb 12.7 oz (68.4 kg), SpO2 100%. Body mass index is 21.03 kg/m².    Gen: appears comfortable and in no acute distress  HEENT: sclera appear anicteric, mucus membranes appear moist  CV: the extremities are warm and well-perfused   Lung: no increased work of breathing, no conversational dyspnea   Abd: soft, non-distended, TTP in RUQ and LLQ  Skin: no jaundice, no apparent rashes   Neuro: alert and interactive, no focal neuro deficits  Psych: cooperative, normal affect     Results:     Lab Results   Component Value Date    WBC 6.1 2025    HGB 9.2 (L) 2025    HCT 29.2 (L) 2025    .0 2025    CREATSERUM 0.87 2025    BUN 12 2025     2025    K 3.8 2025     2025    CO2 24.0 2025    GLU 69 (L) 2025    CA 8.4 (L) 2025    ALB 3.6 2025    ALKPHO 115 2025    BILT 0.3 2025    TP 6.2 2025    AST 31 2025    ALT 27 2025    TSH 2.417 2025    LIP 22 2025    PHOS 3.0 04/10/2024    B12 >2,000 (H) 2025       XR FLUOROSCOPY C-ARM TIME LESS THAN 1 HOUR (CPT=76000)  Result Date: 2025  CONCLUSION: Intraoperative exam. Please see operative report for further details.     Dictated by (CST): Frandy Starkey MD on 2025 at 8:55 PM     Finalized by (CST): Frandy Starkey MD on 2025 at 8:56 PM          CT ABDOMEN+PELVIS(CONTRAST ONLY)(CPT=74177)  Result Date:  6/5/2025  CONCLUSION:  1. Suspicious approximate 4 cm segment of constricting masslike wall thickening at the proximal transverse colon with large upstream colonic fecal burden.  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  Stellate morphology stranding and nodularity in the right upper quadrant mesentery adjacent to the constricting mass probably relates to direct extension of disease.  There are adjacent nonenlarged right upper quadrant mesenteric lymph nodes, which could be reactive or metastatic.  Gastroenterology evaluation and colonoscopy correlation is suggested.  No free intraperitoneal air or well-defined/drainable intra-abdominal collection. 2. Single mildly enlarged interaortocaval retroperitoneal lymph node with other prominent but nonenlarged retroperitoneal nodes.  These could be reactive or metastatic in nature. 3. Coronary and peripheral atherosclerosis with aortic annular calcifications. 4. Reticular opacities at the periphery of the right lung base probably relate to scarring. 5. Mild circumferential urinary bladder wall thickening.  Request urinalysis correlation to assess for potential cystitis.    elm-remote  Dictated by (CST): Jeremie Cedeño MD on 6/05/2025 at 5:47 PM     Finalized by (CST): Jeremie Cedeño MD on 6/05/2025 at 5:58 PM                Assessment and Plan:   80-year-old male remote history of colonoscopy who presents with unintentional weight loss, diffuse abdominal pain and bloating and altered rations in bowel habits.  Worsening constipation.  CT abdomen pelvis suggestive of a partially exclusive lesion at the proximal transverse colon.  Colonoscopy 6/6 with tight malignant colonic stricture in the mid or proximal transverse colon s/p placement of 25 x 120 mm uncoated metallic colonic stent. Feels well post-op, but no BMs. Continue CLD and miralax TID. Surgery consulted with plans for resection.     GI team will sign off at this time. Please call if we  can be of further assistance.     Gwendolyn Hawk MD

## 2025-06-07 NOTE — PLAN OF CARE
Patient is ambulating. Patient is on room air and a clear liquid diet. Patient has glucose monitoring ACHS. Patient is on IVF. Patient is on scheduled miralax. Safety measures are in place.       Problem: Patient Centered Care  Goal: Patient preferences are identified and integrated in the patient's plan of care  Description: Interventions:  - What would you like us to know as we care for you? I am from home.   - Provide timely, complete, and accurate information to patient/family  - Incorporate patient and family knowledge, values, beliefs, and cultural backgrounds into the planning and delivery of care  - Encourage patient/family to participate in care and decision-making at the level they choose  - Honor patient and family perspectives and choices  Outcome: Progressing     Problem: Diabetes/Glucose Control  Goal: Glucose maintained within prescribed range  Description: INTERVENTIONS:  - Monitor Blood Glucose as ordered  - Assess for signs and symptoms of hyperglycemia and hypoglycemia  - Administer ordered medications to maintain glucose within target range  - Assess barriers to adequate nutritional intake and initiate nutrition consult as needed  - Instruct patient on self management of diabetes  Outcome: Progressing     Problem: Patient/Family Goals  Goal: Patient/Family Long Term Goal  Description: Patient's Long Term Goal: Discharge home     Interventions:  - Monitor vitals  - Monitor labs  - IVF  - Glucose monitoring ACHS  - Clear liquid diet  - Scheduled miralax  - GI on consult  - See additional Care Plan goals for specific interventions  Outcome: Progressing  Goal: Patient/Family Short Term Goal  Description: Patient's Short Term Goal: Improve bowel function    Interventions:   - Full liquid diet  - GI on consult  - Scheduled miralax  - See additional Care Plan goals for specific interventions  Outcome: Progressing     Problem: PAIN - ADULT  Goal: Verbalizes/displays adequate comfort level or patient's  stated pain goal  Description: INTERVENTIONS:  - Encourage pt to monitor pain and request assistance  - Assess pain using appropriate pain scale  - Administer analgesics based on type and severity of pain and evaluate response  - Implement non-pharmacological measures as appropriate and evaluate response  - Consider cultural and social influences on pain and pain management  - Manage/alleviate anxiety  - Utilize distraction and/or relaxation techniques  - Monitor for opioid side effects  - Notify MD/LIP if interventions unsuccessful or patient reports new pain  - Anticipate increased pain with activity and pre-medicate as appropriate  Outcome: Progressing     Problem: RISK FOR INFECTION - ADULT  Goal: Absence of fever/infection during anticipated neutropenic period  Description: INTERVENTIONS  - Monitor WBC  - Administer growth factors as ordered  - Implement neutropenic guidelines  Outcome: Progressing     Problem: SAFETY ADULT - FALL  Goal: Free from fall injury  Description: INTERVENTIONS:  - Assess pt frequently for physical needs  - Identify cognitive and physical deficits and behaviors that affect risk of falls.  - [place] fall precautions as indicated by assessment.  - Educate pt/family on patient safety including physical limitations  - Instruct pt to call for assistance with activity based on assessment  - Modify environment to reduce risk of injury  - Provide assistive devices as appropriate  - Consider OT/PT consult to assist with strengthening/mobility  - Encourage toileting schedule  Outcome: Progressing     Problem: DISCHARGE PLANNING  Goal: Discharge to home or other facility with appropriate resources  Description: INTERVENTIONS:  - Identify barriers to discharge w/pt and caregiver  - Include patient/family/discharge partner in discharge planning  - Arrange for needed discharge resources and transportation as appropriate  - Identify discharge learning needs (meds, wound care, etc)  - Arrange for  interpreters to assist at discharge as needed  - Consider post-discharge preferences of patient/family/discharge partner  - Complete POLST form as appropriate  - Assess patient's ability to be responsible for managing their own health  - Refer to Case Management Department for coordinating discharge planning if the patient needs post-hospital services based on physician/LIP order or complex needs related to functional status, cognitive ability or social support system  Outcome: Progressing     Problem: Altered Communication/Language Barrier  Goal: Patient/Family is able to understand and participate in their care  Description: Interventions:  - Assess communication ability and preferred communication style  - Implement communication aides and strategies  - Use visual cues when possible  - Listen attentively, be patient, do not interrupt  - Minimize distractions  - Allow time for understanding and response  - Establish method for patient to ask for assistance (call light)  - Provide an  as needed  - Communicate barriers and strategies to overcome with those who interact with patient  Outcome: Progressing     Problem: GASTROINTESTINAL - ADULT  Goal: Minimal or absence of nausea and vomiting  Description: INTERVENTIONS:  - Maintain adequate hydration with IV or PO as ordered and tolerated  - Nasogastric tube to low intermittent suction as ordered  - Evaluate effectiveness of ordered antiemetic medications  - Provide nonpharmacologic comfort measures as appropriate  - Advance diet as tolerated, if ordered  - Obtain nutritional consult as needed  - Evaluate fluid balance  Outcome: Progressing  Goal: Maintains or returns to baseline bowel function  Description: INTERVENTIONS:  - Assess bowel function  - Maintain adequate hydration with IV or PO as ordered and tolerated  - Evaluate effectiveness of GI medications  - Encourage mobilization and activity  - Obtain nutritional consult as needed  - Establish a  toileting routine/schedule  - Consider collaborating with pharmacy to review patient's medication profile  Outcome: Progressing

## 2025-06-07 NOTE — PLAN OF CARE
A/O x 4. Morphine for pain. Tolerating clear liquid diet. Denies nausea. IVF infusing. Up ad eyad. Bed in lowest position, call light in reach, frequent rounding, nonskid footwear, fall precautions in place.

## 2025-06-07 NOTE — PROGRESS NOTES
[facility]  part of [facility]    Progress Note    Arnaldo Gutierrez Patient Status:  Inpatient    4/15/1945 MRN [id number]   Location [facility] 4W/SW/SE Attending Shekhar Johnson MD   Hosp Day # 2 PCP PHYSICIAN NONSTAFF       Subjective:     No abd pain.  No BM since yesterday.  We discussed surgical consult.    Objective:   Blood pressure 90/52, pulse 69, temperature 97.9 °F (36.6 °C), temperature source Oral, resp. rate 16, height 71\", weight 150 lb 12.7 oz (68.4 kg), SpO2 97%.    Gen:   NAD.  A and O x 3  CV:   RRR, no m/g/r  Pulm:   CTA bilat  Abd:   +bs, soft, tender over mid upper abdomen to deep palpation, no rebound or guarding, ND  LE:   No c/c/e  Neuro:   nonfocal    Results:     Lab Results   Component Value Date    WBC 6.1 2025    HGB 9.2 (L) 2025    HCT 29.2 (L) 2025    .0 2025    CREATSERUM 0.87 2025    BUN 12 2025     2025    K 3.8 2025     2025    CO2 24.0 2025    GLU 69 (L) 2025    CA 8.4 (L) 2025    ALB 3.6 2025    ALKPHO 115 2025    BILT 0.3 2025    TP 6.2 2025    AST 31 2025    ALT 27 2025    TSH 2.417 2025    LIP 22 2025    PHOS 3.0 04/10/2024    B12 >2,000 (H) 2025       XR FLUOROSCOPY C-ARM TIME LESS THAN 1 HOUR (CPT=76000)  Result Date: 2025  CONCLUSION: Intraoperative exam. Please see operative report for further details.     Dictated by (CST): Frandy Starkey MD on 2025 at 8:55 PM     Finalized by (CST): Frandy Starkey MD on 2025 at 8:56 PM          CT ABDOMEN+PELVIS(CONTRAST ONLY)(CPT=74177)  Result Date: 2025  CONCLUSION:  1. Suspicious approximate 4 cm segment of constricting masslike wall thickening at the proximal transverse colon with large upstream colonic fecal burden.  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  Stellate morphology stranding and nodularity in the  right upper quadrant mesentery adjacent to the constricting mass probably relates to direct extension of disease.  There are adjacent nonenlarged right upper quadrant mesenteric lymph nodes, which could be reactive or metastatic.  Gastroenterology evaluation and colonoscopy correlation is suggested.  No free intraperitoneal air or well-defined/drainable intra-abdominal collection. 2. Single mildly enlarged interaortocaval retroperitoneal lymph node with other prominent but nonenlarged retroperitoneal nodes.  These could be reactive or metastatic in nature. 3. Coronary and peripheral atherosclerosis with aortic annular calcifications. 4. Reticular opacities at the periphery of the right lung base probably relate to scarring. 5. Mild circumferential urinary bladder wall thickening.  Request urinalysis correlation to assess for potential cystitis.    elm-remote  Dictated by (CST): Jeremie Cedeño MD on 6/05/2025 at 5:47 PM     Finalized by (CST): Jeremie Cedeño MD on 6/05/2025 at 5:58 PM                Assessment and Plan:     Proximal transverse colonic obstructing mass and progressive anemia with weight loss, highly suggestive of colonic malignancy.   Constipation.  Normocytic anemia  Had BM 6/6.  - CLD  - gentle IVF  - pain control  - GI on consult  - S/p colonoscopy with metallic colon stent placed 6/6  - general surgery consult today  - follow CBC     Hx of DM2  - ISS     Hx of HTN     Hx of HL     Hx of BPH  - flomax     dvt proph:    SCDs     Code status:    Full       MDM:    High Shekhar Schwarz MD  6/7/2025

## 2025-06-07 NOTE — CONSULTS
[facility]  part of [facility]    Report of Consultation    Arnaldo Gutierrez Patient Status:  Inpatient    4/15/1945 MRN [id number]   Location [facility] 4W/SW/SE Attending Shekhar Johnson MD   Hosp Day # 2 PCP PHYSICIAN NONSTAFF     Date of Admission:  2025  Date of Consult:  2025    Reason for Consultation:  Abd pain    History of Present Illness:  Arnaldo Gutierrez is a a(n) 80 year old male. Wt loss   CKD GERD DM    abd becoming works over 2 weeks   some nausea  no vomiting  stool?    History:  Past Medical History[1]  Past Surgical History[2]  Family History[3]   reports that he quit smoking about 45 years ago. His smoking use included cigarettes. He started smoking about 55 years ago. He has a 20 pack-year smoking history. He has been exposed to tobacco smoke. He has never used smokeless tobacco. He reports current alcohol use. He reports that he does not currently use drugs.    Allergies:  Allergies[4]    Medications:  Current Hospital Medications[5]  Prescriptions Prior to Admission[6]    Review of Systems:  A ten point review of systems was negative except as noted.    Physical Exam:  Blood pressure 90/52, pulse 69, temperature 97.9 °F (36.6 °C), temperature source Oral, resp. rate 16, height 5' 11\" (1.803 m), weight 150 lb 12.7 oz (68.4 kg), SpO2 97%.    General: Alert, orientated x3.  Cooperative.  No apparent distress. thin  HEENT: Exam is unremarkable.    Lungs: per attending  Cardiac: per attending  Abdomen:  Soft, non-distended, non-tender, with no rebound or guarding.  No peritoneal signs. No ascites.   Skin: Normal texture and turgor  Neurologic: per attending  Laboratory Data:  Lab Results   Component Value Date    WBC 6.1 2025    HGB 9.2 (L) 2025    HCT 29.2 (L) 2025    .0 2025    CREATSERUM 0.87 2025    BUN 12 2025     2025    K 3.8 2025     2025    CO2 24.0 2025    GLU 69 (L)  06/07/2025    CA 8.4 (L) 06/07/2025    ALB 3.6 06/05/2025    ALKPHO 115 06/05/2025    BILT 0.3 06/05/2025    TP 6.2 06/05/2025    AST 31 06/05/2025    ALT 27 06/05/2025    TSH 2.417 04/08/2025    LIP 22 06/05/2025    PHOS 3.0 04/10/2024    B12 >2,000 (H) 04/08/2025       Imaging:  XR FLUOROSCOPY C-ARM TIME LESS THAN 1 HOUR (CPT=76000)  Result Date: 6/6/2025  PROCEDURE: XR FLUORO PHYSICIAN TIME< 1 HOUR (CPT=76000)  COMPARISON: [facility], CT ABDOMEN + PELVIS (CONTRAST ONLY) (CPT=74177), 6/05/2025, 5:22 PM.  INDICATIONS: COLONOSCOPY with colonic stent placement under fluoroscopy.  TECHNIQUE:   FLUOROSCOPY IMAGES OBTAINED:  2 FLUOROSCOPY TIME:  173.8 seconds RADIATION DOSE (Dose Area Product):  [id number]*m^2  FINDINGS:   Intraoperative exam was performed.  There has been placement of a proximal colonic stent.         CONCLUSION: Intraoperative exam. Please see operative report for further details.     Dictated by (CST): Frandy Starkey MD on 6/06/2025 at 8:55 PM     Finalized by (CST): Frandy Starkey MD on 6/06/2025 at 8:56 PM          CT ABDOMEN+PELVIS(CONTRAST ONLY)(CPT=74177)  Result Date: 6/5/2025  PROCEDURE: CT ABDOMEN + PELVIS (CONTRAST ONLY) (CPT=74177)  COMPARISON: [facility] ABDOMEN LIMITED (CPT=76705), 9/19/2024, 9:36 AM.  INDICATIONS: abd pain  TECHNIQUE: Multidetector CT images of the abdomen and pelvis were obtained with non-ionic intravenous contrast material. Automated exposure control for dose reduction was used. Adjustment of the mA and/or kV was done based on the patient's size. Iterative reconstruction technique for dose reduction was employed.  FINDINGS: LUNG BASES: The heart is normal in size.  Partially imaged coronary artery and aortic annular calcification.  Subpleural reticular opacities at the periphery of the right lower lobe. There is dependent subsegmental atelectasis bilaterally. LIVER: No enlargement, atrophy, abnormal density, or significant  focal lesion is identified.  BILIARY: The gallbladder is present. PANCREAS: No lesion, fluid collection, ductal dilatation, or atrophy.  Small periampullary duodenal diverticulum. SPLEEN: No enlargement.  Calcified granulomas in the spleen. ADRENALS:   No defined mass or abnormal enlargement.  KIDNEYS:   Symmetric enhancement is seen without evidence of hydronephrosis or underlying solid masses.  Simple-appearing 2.4 cm right posterior interpolar renal cyst.  Tiny 4 mm nonobstructing right interpolar renal calculus. GI/MESENTERY:  There is no evidence of bowel obstruction.  Mild to moderate gastric distention noted.  Approximate 4 cm constricting mass at the proximal transverse colon (just beyond the hepatic flexure with marked upstream ascending colonic distention;  there is also mild-to-moderate distal colonic fecal burden; there is fat stranding surrounding the aforementioned constricting mass with adjacent subcentimeter short axis right upper quadrant pericolonic lymph nodes.  Normal appendix. URINARY BLADDER: Well distended urinary bladder, which demonstrates mild wall thickening. PELVIC NODES: No lymphadenopathy.   PELVIC ORGANS: No visible mass. Pelvic organs appropriate for patient age.  VASCULATURE:   No aneurysm is detected.  Mild atherosclerosis. RETROPERITONEUM: Borderline enlarged 1 cm interaortocaval retroperitoneal lymph node with other prominent but nonenlarged retroperitoneal lymph nodes that measure less than 1 cm short axis. BONES:   Demineralization with levoscoliosis of the lumbar spine and lower lumbar spine degeneration.  Probable benign hemangioma in the L2 vertebral body. ABDOMINAL WALL: No mass or hernia is perceived. OTHER: No free air or fluid is seen in the abdomen or pelvis.          CONCLUSION:  1. Suspicious approximate 4 cm segment of constricting masslike wall thickening at the proximal transverse colon with large upstream colonic fecal burden.  Findings are concerning for a  partially obstructing colonic neoplasm.  Stellate morphology stranding and nodularity in the right upper quadrant mesentery adjacent to the constricting mass probably relates to direct extension of disease.  There are adjacent nonenlarged right upper quadrant mesenteric lymph nodes, which could be reactive or metastatic.  Gastroenterology evaluation and colonoscopy correlation is suggested.  No free intraperitoneal air or well-defined/drainable intra-abdominal collection. 2. Single mildly enlarged interaortocaval retroperitoneal lymph node with other prominent but nonenlarged retroperitoneal nodes.  These could be reactive or metastatic in nature. 3. Coronary and peripheral atherosclerosis with aortic annular calcifications. 4. Reticular opacities at the periphery of the right lung base probably relate to scarring. 5. Mild circumferential urinary bladder wall thickening.  Request urinalysis correlation to assess for potential cystitis.    elm-remote  Dictated by (CST): Jeremie Cedeño MD on 6/05/2025 at 5:47 PM     Finalized by (CST): Jeremie Cedeño MD on 6/05/2025 at 5:58 PM            Impression and Plan:  Problem List[7]9.2 hgb  CT  transverse colon mass  also stent placed  Possible faisal involvement  For resection    Continue bowel prep  Discussed with Dr Elizabeth ALSTON MD  6/7/2025  12:09 PM       [1]   Past Medical History:   Anxiety    BPH (benign prostatic hyperplasia)    CKD (chronic kidney disease) stage 3, GFR 30-59 ml/min (HCC)    Depression    Diabetes mellitus (HCC)    Essential hypertension    GERD (gastroesophageal reflux disease)    Hyperlipidemia   [2]   Past Surgical History:  Procedure Laterality Date    Foot fracture surgery Right     2012   [3]   Family History  Problem Relation Age of Onset    Other (Other) Father    [4] No Known Allergies  [5]   Current Facility-Administered Medications:     docusate sodium (Colace) cap 100 mg, 100 mg, Oral, BID    sodium ferric gluconate  (Ferrlecit) 125 mg in sodium chloride 0.9% 100mL IVPB premix, 125 mg, Intravenous, Daily    FLUoxetine (PROzac) cap 20 mg, 20 mg, Oral, Daily    tamsulosin (Flomax) cap 0.8 mg, 0.8 mg, Oral, Daily    pantoprazole (Protonix) DR tab 40 mg, 40 mg, Oral, QAM AC    pregabalin (Lyrica) cap 100 mg, 100 mg, Oral, TID    polyethylene glycol (PEG 3350) (Miralax) 17 g oral packet 17 g, 17 g, Oral, TID    sodium chloride 0.9% infusion, , Intravenous, Continuous    acetaminophen (Tylenol Extra Strength) tab 500 mg, 500 mg, Oral, Q4H PRN    ondansetron (Zofran) 4 MG/2ML injection 4 mg, 4 mg, Intravenous, Q6H PRN    metoclopramide (Reglan) 5 mg/mL injection 5 mg, 5 mg, Intravenous, Q8H PRN    temazepam (Restoril) cap 15 mg, 15 mg, Oral, Nightly PRN    morphINE PF 2 MG/ML injection 1 mg, 1 mg, Intravenous, Q2H PRN **OR** morphINE PF 2 MG/ML injection 2 mg, 2 mg, Intravenous, Q2H PRN **OR** morphINE PF 4 MG/ML injection 4 mg, 4 mg, Intravenous, Q2H PRN    glucose (Dex4) 15 GM/59ML oral liquid 15 g, 15 g, Oral, Q15 Min PRN **OR** glucose (Glutose) 40% oral gel 15 g, 15 g, Oral, Q15 Min PRN **OR** glucose-vitamin C (Dex-4) chewable tab 4 tablet, 4 tablet, Oral, Q15 Min PRN **OR** dextrose 50% injection 50 mL, 50 mL, Intravenous, Q15 Min PRN **OR** glucose (Dex4) 15 GM/59ML oral liquid 30 g, 30 g, Oral, Q15 Min PRN **OR** glucose (Glutose) 40% oral gel 30 g, 30 g, Oral, Q15 Min PRN **OR** glucose-vitamin C (Dex-4) chewable tab 8 tablet, 8 tablet, Oral, Q15 Min PRN    insulin aspart (NovoLOG) 100 Units/mL FlexPen 1-5 Units, 1-5 Units, Subcutaneous, TID CC  [6]   Medications Prior to Admission   Medication Sig Dispense Refill Last Dose/Taking    loratadine 10 MG Oral Tab Take 1 tablet (10 mg total) by mouth daily as needed for Allergies.   Past Month    melatonin 3 MG Oral Tab Take 2 tablets (6 mg total) by mouth daily.   Past Month    FLUoxetine 20 MG Oral Cap Take 1 capsule (20 mg total) by mouth daily. 90 capsule 3 6/5/2025 Morning     Accu-Chek FastClix Lancets Does not apply Misc Apply 100 Lancets topically daily. 100 each 3 Taking    Glucose Blood (ACCU-CHEK GUIDE TEST) In Vitro Strip Use 1 time per day. 100 strip 3 Taking    Tirzepatide (MOUNJARO) 5 MG/0.5ML Subcutaneous Solution Auto-injector Inject 5 mg into the skin every 7 days. 6 mL 1 6/1/2025    pregabalin 100 MG Oral Cap Take 1 capsule (100 mg total) by mouth in the morning, at noon, and at bedtime. 90 capsule 5 6/5/2025 Morning    empagliflozin (JARDIANCE) 10 MG Oral Tab Take 1 tablet (10 mg total) by mouth daily. Every morning 90 tablet 3 6/5/2025 Morning    Ferrous Sulfate 325 (65 Fe) MG Oral Tab Take 1 tablet (325 mg total) by mouth daily with breakfast. With orange juice. 90 tablet 1 6/5/2025 Morning    traMADol 50 MG Oral Tab Take 1 tablet (50 mg total) by mouth every 8 (eight) hours as needed for Pain. 30 tablet 2 Past Month    tamsulosin 0.4 MG Oral Cap Take 2 capsules (0.8 mg total) by mouth daily. AFTER SAME MEAL EACH  capsule 3 6/4/2025 Bedtime    raNITIdine HCl (RANITIDINE 150 MAX STRENGTH OR) Take 150 mg by mouth in the morning.   6/5/2025 Morning    simvastatin 40 MG Oral Tab Take 1 tablet (40 mg total) by mouth nightly. 90 tablet 3 6/4/2025 Bedtime    Glucose Blood (ACCU-CHEK GUIDE) In Vitro Strip 1 each by In Vitro route in the morning and 1 each before bedtime.   Taking    pantoprazole 40 MG Oral Tab EC Take 1 tablet (40 mg total) by mouth every morning before breakfast. 90 tablet 2 6/5/2025 Morning    vitamin B-12 50 MCG Oral Tab Take 1 tablet (50 mcg total) by mouth in the morning.   6/5/2025 Morning    Polyethyl Glycol-Propyl Glycol (SYSTANE ULTRA OP) Apply 2 drops to eye in the morning and 2 drops before bedtime.   6/5/2025 Morning    alum-mag hydroxide-simethicone 200-200-20 MG/5ML Oral Suspension Take 10 mL by mouth 4 (four) times daily as needed. 200 mL 0     Multiple Vitamins-Minerals (PRESERVISION/LUTEIN) Oral Cap Take 1 capsule by mouth in the morning.       [7]   Patient Active Problem List  Diagnosis    BPH (benign prostatic hyperplasia)    Chronic low back pain    Chronic pain of both knees    Depression with anxiety    Generalized osteoarthritis    Hyperlipidemia    Type 2 diabetes mellitus without complication, without long-term current use of insulin (HCC)    Essential hypertension    Bipolar I disorder, single manic episode (HCC)    Large bowel obstruction (HCC)    Colonic mass

## 2025-06-08 LAB
ANION GAP SERPL CALC-SCNC: 7 MMOL/L (ref 0–18)
BUN BLD-MCNC: 9 MG/DL (ref 9–23)
BUN/CREAT SERPL: 10 (ref 10–20)
CALCIUM BLD-MCNC: 8.2 MG/DL (ref 8.7–10.4)
CHLORIDE SERPL-SCNC: 107 MMOL/L (ref 98–112)
CO2 SERPL-SCNC: 26 MMOL/L (ref 21–32)
CREAT BLD-MCNC: 0.9 MG/DL (ref 0.7–1.3)
DEPRECATED RDW RBC AUTO: 63 FL (ref 35.1–46.3)
EGFRCR SERPLBLD CKD-EPI 2021: 87 ML/MIN/1.73M2 (ref 60–?)
ERYTHROCYTE [DISTWIDTH] IN BLOOD BY AUTOMATED COUNT: 20.7 % (ref 11–15)
GLUCOSE BLD-MCNC: 105 MG/DL (ref 70–99)
GLUCOSE BLDC GLUCOMTR-MCNC: 113 MG/DL (ref 70–99)
GLUCOSE BLDC GLUCOMTR-MCNC: 150 MG/DL (ref 70–99)
GLUCOSE BLDC GLUCOMTR-MCNC: 93 MG/DL (ref 70–99)
GLUCOSE BLDC GLUCOMTR-MCNC: 95 MG/DL (ref 70–99)
HCT VFR BLD AUTO: 30.8 % (ref 39–53)
HGB BLD-MCNC: 9.8 G/DL (ref 13–17.5)
MCH RBC QN AUTO: 26.8 PG (ref 26–34)
MCHC RBC AUTO-ENTMCNC: 31.8 G/DL (ref 31–37)
MCV RBC AUTO: 84.2 FL (ref 80–100)
OSMOLALITY SERPL CALC.SUM OF ELEC: 289 MOSM/KG (ref 275–295)
PLATELET # BLD AUTO: 358 10(3)UL (ref 150–450)
POTASSIUM SERPL-SCNC: 3.9 MMOL/L (ref 3.5–5.1)
RBC # BLD AUTO: 3.66 X10(6)UL (ref 3.8–5.8)
SODIUM SERPL-SCNC: 140 MMOL/L (ref 136–145)
WBC # BLD AUTO: 7.1 X10(3) UL (ref 4–11)

## 2025-06-08 PROCEDURE — 99233 SBSQ HOSP IP/OBS HIGH 50: CPT | Performed by: HOSPITALIST

## 2025-06-08 RX ORDER — METRONIDAZOLE 500 MG/1
500 TABLET ORAL ONCE
Status: COMPLETED | OUTPATIENT
Start: 2025-06-09 | End: 2025-06-09

## 2025-06-08 RX ORDER — NEOMYCIN SULFATE 500 MG/1
1000 TABLET ORAL ONCE
Status: COMPLETED | OUTPATIENT
Start: 2025-06-09 | End: 2025-06-09

## 2025-06-08 NOTE — PLAN OF CARE
Arnaldo is A&Ox4. Room air. Accuchecks monitored AC/HS. On bowel regimen, no BM overnight. Denies pain overnight. Tolerating clear liquids. IVF infusing.     Problem: Patient Centered Care  Goal: Patient preferences are identified and integrated in the patient's plan of care  Description: Interventions:  - What would you like us to know as we care for you? I am from home.   - Provide timely, complete, and accurate information to patient/family  - Incorporate patient and family knowledge, values, beliefs, and cultural backgrounds into the planning and delivery of care  - Encourage patient/family to participate in care and decision-making at the level they choose  - Honor patient and family perspectives and choices  Outcome: Progressing     Problem: Diabetes/Glucose Control  Goal: Glucose maintained within prescribed range  Description: INTERVENTIONS:  - Monitor Blood Glucose as ordered  - Assess for signs and symptoms of hyperglycemia and hypoglycemia  - Administer ordered medications to maintain glucose within target range  - Assess barriers to adequate nutritional intake and initiate nutrition consult as needed  - Instruct patient on self management of diabetes  Outcome: Progressing     Problem: Patient/Family Goals  Goal: Patient/Family Long Term Goal  Description: Patient's Long Term Goal: Discharge home     Interventions:  - Monitor vitals  - Monitor labs  - IVF  - Glucose monitoring ACHS  - Clear liquid diet  - Scheduled miralax  - GI on consult  - See additional Care Plan goals for specific interventions  Outcome: Progressing  Goal: Patient/Family Short Term Goal  Description: Patient's Short Term Goal: Improve bowel function    Interventions:   - Full liquid diet  - GI on consult  - Scheduled miralax  - See additional Care Plan goals for specific interventions  Outcome: Progressing     Problem: PAIN - ADULT  Goal: Verbalizes/displays adequate comfort level or patient's stated pain goal  Description:  INTERVENTIONS:  - Encourage pt to monitor pain and request assistance  - Assess pain using appropriate pain scale  - Administer analgesics based on type and severity of pain and evaluate response  - Implement non-pharmacological measures as appropriate and evaluate response  - Consider cultural and social influences on pain and pain management  - Manage/alleviate anxiety  - Utilize distraction and/or relaxation techniques  - Monitor for opioid side effects  - Notify MD/LIP if interventions unsuccessful or patient reports new pain  - Anticipate increased pain with activity and pre-medicate as appropriate  Outcome: Progressing     Problem: RISK FOR INFECTION - ADULT  Goal: Absence of fever/infection during anticipated neutropenic period  Description: INTERVENTIONS  - Monitor WBC  - Administer growth factors as ordered  - Implement neutropenic guidelines  Outcome: Progressing     Problem: SAFETY ADULT - FALL  Goal: Free from fall injury  Description: INTERVENTIONS:  - Assess pt frequently for physical needs  - Identify cognitive and physical deficits and behaviors that affect risk of falls.  - [place] fall precautions as indicated by assessment.  - Educate pt/family on patient safety including physical limitations  - Instruct pt to call for assistance with activity based on assessment  - Modify environment to reduce risk of injury  - Provide assistive devices as appropriate  - Consider OT/PT consult to assist with strengthening/mobility  - Encourage toileting schedule  Outcome: Progressing     Problem: DISCHARGE PLANNING  Goal: Discharge to home or other facility with appropriate resources  Description: INTERVENTIONS:  - Identify barriers to discharge w/pt and caregiver  - Include patient/family/discharge partner in discharge planning  - Arrange for needed discharge resources and transportation as appropriate  - Identify discharge learning needs (meds, wound care, etc)  - Arrange for interpreters to assist at  discharge as needed  - Consider post-discharge preferences of patient/family/discharge partner  - Complete POLST form as appropriate  - Assess patient's ability to be responsible for managing their own health  - Refer to Case Management Department for coordinating discharge planning if the patient needs post-hospital services based on physician/LIP order or complex needs related to functional status, cognitive ability or social support system  Outcome: Progressing     Problem: Altered Communication/Language Barrier  Goal: Patient/Family is able to understand and participate in their care  Description: Interventions:  - Assess communication ability and preferred communication style  - Implement communication aides and strategies  - Use visual cues when possible  - Listen attentively, be patient, do not interrupt  - Minimize distractions  - Allow time for understanding and response  - Establish method for patient to ask for assistance (call light)  - Provide an  as needed  - Communicate barriers and strategies to overcome with those who interact with patient  Outcome: Progressing     Problem: GASTROINTESTINAL - ADULT  Goal: Minimal or absence of nausea and vomiting  Description: INTERVENTIONS:  - Maintain adequate hydration with IV or PO as ordered and tolerated  - Nasogastric tube to low intermittent suction as ordered  - Evaluate effectiveness of ordered antiemetic medications  - Provide nonpharmacologic comfort measures as appropriate  - Advance diet as tolerated, if ordered  - Obtain nutritional consult as needed  - Evaluate fluid balance  Outcome: Progressing  Goal: Maintains or returns to baseline bowel function  Description: INTERVENTIONS:  - Assess bowel function  - Maintain adequate hydration with IV or PO as ordered and tolerated  - Evaluate effectiveness of GI medications  - Encourage mobilization and activity  - Obtain nutritional consult as needed  - Establish a toileting routine/schedule  -  Consider collaborating with pharmacy to review patient's medication profile  Outcome: Progressing

## 2025-06-08 NOTE — PROGRESS NOTES
[facility]  part of [facility]    Progress Note    Arnaldo Gutierrez Patient Status:  Inpatient    4/15/1945 MRN [id number]   Location [facility] 4W/SW/SE Attending Shekhar Johnson MD   Hosp Day # 3 PCP PHYSICIAN NONSTAFF     Subjective:  Feels ok   some pain   had bm     Objective/Physical Exam:  General: Alert, orientated x3.  Cooperative.  No apparent distress.  Vital Signs:  Blood pressure (!) 89/43, pulse 63, temperature 97.6 °F (36.4 °C), temperature source Oral, resp. rate 18, height 5' 11\" (1.803 m), weight 150 lb 12.7 oz (68.4 kg), SpO2 97%.  Abdomen:  Soft, non-distended, non-tender, with no rebound or guarding.  No peritoneal signs. No ascites.  Liver is within normal limits.  Spleen is not palpable.    Labs:  Lab Results   Component Value Date    WBC 7.1 2025    HGB 9.8 (L) 2025    HCT 30.8 (L) 2025    .0 2025    CREATSERUM 0.90 2025    BUN 9 2025     2025    K 3.9 2025     2025    CO2 26.0 2025     (H) 2025    CA 8.2 (L) 2025    ALB 3.6 2025    ALKPHO 115 2025    BILT 0.3 2025    TP 6.2 2025    AST 31 2025    ALT 27 2025    TSH 2.417 2025    LIP 22 2025    PHOS 3.0 04/10/2024    B12 >2,000 (H) 2025       Imaging:  XR FLUOROSCOPY C-ARM TIME LESS THAN 1 HOUR (CPT=76000)  Result Date: 2025  PROCEDURE: XR FLUORO PHYSICIAN TIME< 1 HOUR (CPT=76000)  COMPARISON: [facility], CT ABDOMEN + PELVIS (CONTRAST ONLY) (CPT=74177), 2025, 5:22 PM.  INDICATIONS: COLONOSCOPY with colonic stent placement under fluoroscopy.  TECHNIQUE:   FLUOROSCOPY IMAGES OBTAINED:  2 FLUOROSCOPY TIME:  173.8 seconds RADIATION DOSE (Dose Area Product):  [id number]*m^2  FINDINGS:   Intraoperative exam was performed.  There has been placement of a proximal colonic stent.         CONCLUSION: Intraoperative exam. Please see operative  report for further details.     Dictated by (CST): Frandy Starkey MD on 6/06/2025 at 8:55 PM     Finalized by (CST): Frandy Starkey MD on 6/06/2025 at 8:56 PM          CT ABDOMEN+PELVIS(CONTRAST ONLY)(CPT=74177)  Result Date: 6/5/2025  PROCEDURE: CT ABDOMEN + PELVIS (CONTRAST ONLY) (CPT=74177)  COMPARISON: [facility] in [facility] ABDOMEN LIMITED (CPT=76705), 9/19/2024, 9:36 AM.  INDICATIONS: abd pain  TECHNIQUE: Multidetector CT images of the abdomen and pelvis were obtained with non-ionic intravenous contrast material. Automated exposure control for dose reduction was used. Adjustment of the mA and/or kV was done based on the patient's size. Iterative reconstruction technique for dose reduction was employed.  FINDINGS: LUNG BASES: The heart is normal in size.  Partially imaged coronary artery and aortic annular calcification.  Subpleural reticular opacities at the periphery of the right lower lobe. There is dependent subsegmental atelectasis bilaterally. LIVER: No enlargement, atrophy, abnormal density, or significant focal lesion is identified.  BILIARY: The gallbladder is present. PANCREAS: No lesion, fluid collection, ductal dilatation, or atrophy.  Small periampullary duodenal diverticulum. SPLEEN: No enlargement.  Calcified granulomas in the spleen. ADRENALS:   No defined mass or abnormal enlargement.  KIDNEYS:   Symmetric enhancement is seen without evidence of hydronephrosis or underlying solid masses.  Simple-appearing 2.4 cm right posterior interpolar renal cyst.  Tiny 4 mm nonobstructing right interpolar renal calculus. GI/MESENTERY:  There is no evidence of bowel obstruction.  Mild to moderate gastric distention noted.  Approximate 4 cm constricting mass at the proximal transverse colon (just beyond the hepatic flexure with marked upstream ascending colonic distention;  there is also mild-to-moderate distal colonic fecal burden; there is fat stranding surrounding the aforementioned  constricting mass with adjacent subcentimeter short axis right upper quadrant pericolonic lymph nodes.  Normal appendix. URINARY BLADDER: Well distended urinary bladder, which demonstrates mild wall thickening. PELVIC NODES: No lymphadenopathy.   PELVIC ORGANS: No visible mass. Pelvic organs appropriate for patient age.  VASCULATURE:   No aneurysm is detected.  Mild atherosclerosis. RETROPERITONEUM: Borderline enlarged 1 cm interaortocaval retroperitoneal lymph node with other prominent but nonenlarged retroperitoneal lymph nodes that measure less than 1 cm short axis. BONES:   Demineralization with levoscoliosis of the lumbar spine and lower lumbar spine degeneration.  Probable benign hemangioma in the L2 vertebral body. ABDOMINAL WALL: No mass or hernia is perceived. OTHER: No free air or fluid is seen in the abdomen or pelvis.          CONCLUSION:  1. Suspicious approximate 4 cm segment of constricting masslike wall thickening at the proximal transverse colon with large upstream colonic fecal burden.  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  Stellate morphology stranding and nodularity in the right upper quadrant mesentery adjacent to the constricting mass probably relates to direct extension of disease.  There are adjacent nonenlarged right upper quadrant mesenteric lymph nodes, which could be reactive or metastatic.  Gastroenterology evaluation and colonoscopy correlation is suggested.  No free intraperitoneal air or well-defined/drainable intra-abdominal collection. 2. Single mildly enlarged interaortocaval retroperitoneal lymph node with other prominent but nonenlarged retroperitoneal nodes.  These could be reactive or metastatic in nature. 3. Coronary and peripheral atherosclerosis with aortic annular calcifications. 4. Reticular opacities at the periphery of the right lung base probably relate to scarring. 5. Mild circumferential urinary bladder wall thickening.  Request  urinalysis correlation to assess for potential cystitis.    elm-remote  Dictated by (CST): Jeremie Cedeño MD on 6/05/2025 at 5:47 PM     Finalized by (CST): Jeremie Cedeño MD on 6/05/2025 at 5:58 PM            Assessment/Plan:  Problem List[1]  Stable   stool +  For resection 6-10-25  Bowel prep in place  THUY ALSTON MD  6/8/2025  12:23 PM         [1]   Patient Active Problem List  Diagnosis    BPH (benign prostatic hyperplasia)    Chronic low back pain    Chronic pain of both knees    Depression with anxiety    Generalized osteoarthritis    Hyperlipidemia    Type 2 diabetes mellitus without complication, without long-term current use of insulin (HCC)    Essential hypertension    Bipolar I disorder, single manic episode (HCC)    Large bowel obstruction (HCC)    Colonic mass

## 2025-06-08 NOTE — PLAN OF CARE
A/O x 4. Denies pain. Tolerating clear liquid diet. Denies nausea. IVF infusing. Up ad eyad. Voiding freely. +BM. Bed in lowest position, call light in reach, frequent rounding, nonskid footwear, fall precautions in place.

## 2025-06-08 NOTE — PROGRESS NOTES
[facility]  part of [facility]    Progress Note    Arnaldo Gutierrez Patient Status:  Inpatient    4/15/1945 MRN [id number]   Location [facility] 4W/SW/SE Attending Shekhar Johnson MD   Hosp Day # 3 PCP PHYSICIAN NONSTAFF       Subjective:     No abd pain now.  Has not had BM in 1-2 dys now.    Objective:   Blood pressure (!) 89/43, pulse 63, temperature 97.6 °F (36.4 °C), temperature source Oral, resp. rate 18, height 71\", weight 150 lb 12.7 oz (68.4 kg), SpO2 97%.    Gen:   NAD.  A and O x 3  CV:   RRR, no m/g/r  Pulm:   CTA bilat  Abd:   +bs, soft, tender over mid upper abdomen to deep palpation, no rebound or guarding, ND  LE:   No c/c/e  Neuro:   nonfocal    Results:     Lab Results   Component Value Date    WBC 7.1 2025    HGB 9.8 (L) 2025    HCT 30.8 (L) 2025    .0 2025    CREATSERUM 0.90 2025    BUN 9 2025     2025    K 3.9 2025     2025    CO2 26.0 2025     (H) 2025    CA 8.2 (L) 2025    ALB 3.6 2025    ALKPHO 115 2025    BILT 0.3 2025    TP 6.2 2025    AST 31 2025    ALT 27 2025    TSH 2.417 2025    LIP 22 2025    PHOS 3.0 04/10/2024    B12 >2,000 (H) 2025       XR FLUOROSCOPY C-ARM TIME LESS THAN 1 HOUR (CPT=76000)  Result Date: 2025  CONCLUSION: Intraoperative exam. Please see operative report for further details.     Dictated by (CST): Frandy Starkey MD on 2025 at 8:55 PM     Finalized by (CST): Frandy Starkey MD on 2025 at 8:56 PM                Assessment and Plan:     Proximal transverse colonic obstructing mass and progressive anemia with weight loss, highly suggestive of colonic malignancy.   Constipation.  Normocytic anemia  Had BM .  - CLD  - gentle IVF  - pain control  - GI on consult  - S/p colonoscopy with metallic colon stent placed   - general surgery on consult.   Surgery Tuesday.  - follow  CBC     Hx of DM2  - ISS     Hx of HTN     Hx of HL     Hx of BPH  - flomax     dvt proph:    SCDs     Code status:    Full       MDM:    High Shekhar Schwarz MD  6/8/2025

## 2025-06-09 LAB
ANTIBODY SCREEN: NEGATIVE
GLUCOSE BLDC GLUCOMTR-MCNC: 103 MG/DL (ref 70–99)
GLUCOSE BLDC GLUCOMTR-MCNC: 106 MG/DL (ref 70–99)
GLUCOSE BLDC GLUCOMTR-MCNC: 184 MG/DL (ref 70–99)
GLUCOSE BLDC GLUCOMTR-MCNC: 93 MG/DL (ref 70–99)
RH BLOOD TYPE: POSITIVE
RH BLOOD TYPE: POSITIVE

## 2025-06-09 PROCEDURE — 99233 SBSQ HOSP IP/OBS HIGH 50: CPT | Performed by: HOSPITALIST

## 2025-06-09 NOTE — PROGRESS NOTES
[facility]  part of [facility]    Progress Note    Arnaldo Gutierrez Patient Status:  Inpatient    4/15/1945 MRN [id number]   Location [facility] 4W/SW/SE Attending Shekhar Johnson MD   Hosp Day # 4 PCP PHYSICIAN NONSTAFF       Subjective:     No abd pain.  Had a BM yesterday.  Awaits surgery tomorrow.    Objective:   Blood pressure (!) 86/45, pulse 69, temperature 97.9 °F (36.6 °C), temperature source Oral, resp. rate 18, height 71\", weight 150 lb 12.7 oz (68.4 kg), SpO2 98%.    Gen:   NAD.  A and O x 3  CV:   RRR, no m/g/r  Pulm:   CTA bilat  Abd:   +bs, soft, NT, ND  LE:   No c/c/e  Neuro:   nonfocal    Results:     Lab Results   Component Value Date    WBC 7.1 2025    HGB 9.8 (L) 2025    HCT 30.8 (L) 2025    .0 2025    CREATSERUM 0.90 2025    BUN 9 2025     2025    K 3.9 2025     2025    CO2 26.0 2025     (H) 2025    CA 8.2 (L) 2025    ALB 3.6 2025    ALKPHO 115 2025    BILT 0.3 2025    TP 6.2 2025    AST 31 2025    ALT 27 2025    TSH 2.417 2025    LIP 22 2025    PHOS 3.0 04/10/2024    B12 >2,000 (H) 2025       No results found.        Assessment and Plan:     Proximal transverse colonic obstructing mass and progressive anemia with weight loss, highly suggestive of colonic malignancy.   Constipation. - resolving  Normocytic anemia  Had BM .  - CLD  - gentle IVF  - pain control  - GI on consult  - S/p colonoscopy with metallic colon stent placed   - general surgery on consult.   Surgery tomorrow.  NPO after midnight.  - follow CBC     Hx of DM2  - ISS     Hx of HTN     Hx of HL     Hx of BPH  - flomax     dvt proph:    SCDs     Code status:    Full       MDM:    High Shekhar Schwarz MD  2025

## 2025-06-09 NOTE — PLAN OF CARE
Arnaldo is A&Ox4. Denies pain. Vitals stable. Tolerating clear liquids. ACHS blood sugars. NPO @ midnight for colon resection by Dr. Randall. Consent signed and placed in chart. Call light in reach. Safety measures maintained.     Problem: Patient Centered Care  Goal: Patient preferences are identified and integrated in the patient's plan of care  Description: Interventions:  - What would you like us to know as we care for you? I am from home.   - Provide timely, complete, and accurate information to patient/family  - Incorporate patient and family knowledge, values, beliefs, and cultural backgrounds into the planning and delivery of care  - Encourage patient/family to participate in care and decision-making at the level they choose  - Honor patient and family perspectives and choices  Outcome: Progressing     Problem: Diabetes/Glucose Control  Goal: Glucose maintained within prescribed range  Description: INTERVENTIONS:  - Monitor Blood Glucose as ordered  - Assess for signs and symptoms of hyperglycemia and hypoglycemia  - Administer ordered medications to maintain glucose within target range  - Assess barriers to adequate nutritional intake and initiate nutrition consult as needed  - Instruct patient on self management of diabetes  Outcome: Progressing     Problem: Patient/Family Goals  Goal: Patient/Family Long Term Goal  Description: Patient's Long Term Goal: Discharge home     Interventions:  - Monitor vitals  - Monitor labs  - IVF  - Glucose monitoring ACHS  - Clear liquid diet  - Scheduled miralax  - GI on consult  - See additional Care Plan goals for specific interventions  Outcome: Progressing  Goal: Patient/Family Short Term Goal  Description: Patient's Short Term Goal: Improve bowel function    Interventions:   - Full liquid diet  - GI on consult  - Scheduled miralax  - See additional Care Plan goals for specific interventions  Outcome: Progressing     Problem: PAIN - ADULT  Goal: Verbalizes/displays  adequate comfort level or patient's stated pain goal  Description: INTERVENTIONS:  - Encourage pt to monitor pain and request assistance  - Assess pain using appropriate pain scale  - Administer analgesics based on type and severity of pain and evaluate response  - Implement non-pharmacological measures as appropriate and evaluate response  - Consider cultural and social influences on pain and pain management  - Manage/alleviate anxiety  - Utilize distraction and/or relaxation techniques  - Monitor for opioid side effects  - Notify MD/LIP if interventions unsuccessful or patient reports new pain  - Anticipate increased pain with activity and pre-medicate as appropriate  Outcome: Progressing     Problem: RISK FOR INFECTION - ADULT  Goal: Absence of fever/infection during anticipated neutropenic period  Description: INTERVENTIONS  - Monitor WBC  - Administer growth factors as ordered  - Implement neutropenic guidelines  Outcome: Progressing     Problem: SAFETY ADULT - FALL  Goal: Free from fall injury  Description: INTERVENTIONS:  - Assess pt frequently for physical needs  - Identify cognitive and physical deficits and behaviors that affect risk of falls.  - Dorothy fall precautions as indicated by assessment.  - Educate pt/family on patient safety including physical limitations  - Instruct pt to call for assistance with activity based on assessment  - Modify environment to reduce risk of injury  - Provide assistive devices as appropriate  - Consider OT/PT consult to assist with strengthening/mobility  - Encourage toileting schedule  Outcome: Progressing     Problem: DISCHARGE PLANNING  Goal: Discharge to home or other facility with appropriate resources  Description: INTERVENTIONS:  - Identify barriers to discharge w/pt and caregiver  - Include patient/family/discharge partner in discharge planning  - Arrange for needed discharge resources and transportation as appropriate  - Identify discharge learning needs  (meds, wound care, etc)  - Arrange for interpreters to assist at discharge as needed  - Consider post-discharge preferences of patient/family/discharge partner  - Complete POLST form as appropriate  - Assess patient's ability to be responsible for managing their own health  - Refer to Case Management Department for coordinating discharge planning if the patient needs post-hospital services based on physician/LIP order or complex needs related to functional status, cognitive ability or social support system  Outcome: Progressing     Problem: Altered Communication/Language Barrier  Goal: Patient/Family is able to understand and participate in their care  Description: Interventions:  - Assess communication ability and preferred communication style  - Implement communication aides and strategies  - Use visual cues when possible  - Listen attentively, be patient, do not interrupt  - Minimize distractions  - Allow time for understanding and response  - Establish method for patient to ask for assistance (call light)  - Provide an  as needed  - Communicate barriers and strategies to overcome with those who interact with patient  Outcome: Progressing     Problem: GASTROINTESTINAL - ADULT  Goal: Minimal or absence of nausea and vomiting  Description: INTERVENTIONS:  - Maintain adequate hydration with IV or PO as ordered and tolerated  - Nasogastric tube to low intermittent suction as ordered  - Evaluate effectiveness of ordered antiemetic medications  - Provide nonpharmacologic comfort measures as appropriate  - Advance diet as tolerated, if ordered  - Obtain nutritional consult as needed  - Evaluate fluid balance  Outcome: Progressing  Goal: Maintains or returns to baseline bowel function  Description: INTERVENTIONS:  - Assess bowel function  - Maintain adequate hydration with IV or PO as ordered and tolerated  - Evaluate effectiveness of GI medications  - Encourage mobilization and activity  - Obtain nutritional  consult as needed  - Establish a toileting routine/schedule  - Consider collaborating with pharmacy to review patient's medication profile  Outcome: Progressing

## 2025-06-09 NOTE — PROGRESS NOTES
[facility]  part of [facility]    Progress Note    Arnaldo Gutierrez Patient Status:  Inpatient    4/15/1945 MRN [id number]   Location [facility] 4W/SW/SE Attending Shekhar Johnson MD   Hosp Day # 4 PCP PHYSICIAN NONSTAFF     Subjective:  Feels ok   stool+    Objective/Physical Exam:  General: Alert, orientated x3.  Cooperative.  No apparent distress.  Vital Signs:  Blood pressure 91/52, pulse 62, temperature 98 °F (36.7 °C), temperature source Oral, resp. rate 18, height 5' 11\" (1.803 m), weight 150 lb 12.7 oz (68.4 kg), SpO2 99%.    Labs:  Lab Results   Component Value Date    WBC 7.1 2025    HGB 9.8 (L) 2025    HCT 30.8 (L) 2025    .0 2025    CREATSERUM 0.90 2025    BUN 9 2025     2025    K 3.9 2025     2025    CO2 26.0 2025     (H) 2025    CA 8.2 (L) 2025    ALB 3.6 2025    ALKPHO 115 2025    BILT 0.3 2025    TP 6.2 2025    AST 31 2025    ALT 27 2025    TSH 2.417 2025    LIP 22 2025    PHOS 3.0 04/10/2024    B12 >2,000 (H) 2025       Imaging:  XR FLUOROSCOPY C-ARM TIME LESS THAN 1 HOUR (CPT=76000)  Result Date: 2025  PROCEDURE: XR FLUORO PHYSICIAN TIME< 1 HOUR (CPT=76000)  COMPARISON: [facility], CT ABDOMEN + PELVIS (CONTRAST ONLY) (CPT=74177), 2025, 5:22 PM.  INDICATIONS: COLONOSCOPY with colonic stent placement under fluoroscopy.  TECHNIQUE:   FLUOROSCOPY IMAGES OBTAINED:  2 FLUOROSCOPY TIME:  173.8 seconds RADIATION DOSE (Dose Area Product):  [id number]*m^2  FINDINGS:   Intraoperative exam was performed.  There has been placement of a proximal colonic stent.         CONCLUSION: Intraoperative exam. Please see operative report for further details.     Dictated by (CST): Frandy Starkey MD on 2025 at 8:55 PM     Finalized by (CST): Frandy Starkey MD on 2025 at 8:56 PM          CT  ABDOMEN+PELVIS(CONTRAST ONLY)(CPT=74177)  Result Date: 6/5/2025  PROCEDURE: CT ABDOMEN + PELVIS (CONTRAST ONLY) (CPT=74177)  COMPARISON: [facility] in [facility] ABDOMEN LIMITED (CPT=76705), 9/19/2024, 9:36 AM.  INDICATIONS: abd pain  TECHNIQUE: Multidetector CT images of the abdomen and pelvis were obtained with non-ionic intravenous contrast material. Automated exposure control for dose reduction was used. Adjustment of the mA and/or kV was done based on the patient's size. Iterative reconstruction technique for dose reduction was employed.  FINDINGS: LUNG BASES: The heart is normal in size.  Partially imaged coronary artery and aortic annular calcification.  Subpleural reticular opacities at the periphery of the right lower lobe. There is dependent subsegmental atelectasis bilaterally. LIVER: No enlargement, atrophy, abnormal density, or significant focal lesion is identified.  BILIARY: The gallbladder is present. PANCREAS: No lesion, fluid collection, ductal dilatation, or atrophy.  Small periampullary duodenal diverticulum. SPLEEN: No enlargement.  Calcified granulomas in the spleen. ADRENALS:   No defined mass or abnormal enlargement.  KIDNEYS:   Symmetric enhancement is seen without evidence of hydronephrosis or underlying solid masses.  Simple-appearing 2.4 cm right posterior interpolar renal cyst.  Tiny 4 mm nonobstructing right interpolar renal calculus. GI/MESENTERY:  There is no evidence of bowel obstruction.  Mild to moderate gastric distention noted.  Approximate 4 cm constricting mass at the proximal transverse colon (just beyond the hepatic flexure with marked upstream ascending colonic distention;  there is also mild-to-moderate distal colonic fecal burden; there is fat stranding surrounding the aforementioned constricting mass with adjacent subcentimeter short axis right upper quadrant pericolonic lymph nodes.  Normal appendix. URINARY BLADDER: Well distended urinary bladder,  which demonstrates mild wall thickening. PELVIC NODES: No lymphadenopathy.   PELVIC ORGANS: No visible mass. Pelvic organs appropriate for patient age.  VASCULATURE:   No aneurysm is detected.  Mild atherosclerosis. RETROPERITONEUM: Borderline enlarged 1 cm interaortocaval retroperitoneal lymph node with other prominent but nonenlarged retroperitoneal lymph nodes that measure less than 1 cm short axis. BONES:   Demineralization with levoscoliosis of the lumbar spine and lower lumbar spine degeneration.  Probable benign hemangioma in the L2 vertebral body. ABDOMINAL WALL: No mass or hernia is perceived. OTHER: No free air or fluid is seen in the abdomen or pelvis.          CONCLUSION:  1. Suspicious approximate 4 cm segment of constricting masslike wall thickening at the proximal transverse colon with large upstream colonic fecal burden.  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  Stellate morphology stranding and nodularity in the right upper quadrant mesentery adjacent to the constricting mass probably relates to direct extension of disease.  There are adjacent nonenlarged right upper quadrant mesenteric lymph nodes, which could be reactive or metastatic.  Gastroenterology evaluation and colonoscopy correlation is suggested.  No free intraperitoneal air or well-defined/drainable intra-abdominal collection. 2. Single mildly enlarged interaortocaval retroperitoneal lymph node with other prominent but nonenlarged retroperitoneal nodes.  These could be reactive or metastatic in nature. 3. Coronary and peripheral atherosclerosis with aortic annular calcifications. 4. Reticular opacities at the periphery of the right lung base probably relate to scarring. 5. Mild circumferential urinary bladder wall thickening.  Request urinalysis correlation to assess for potential cystitis.    elm-remote  Dictated by (CST): Jeremie Cedeño MD on 6/05/2025 at 5:47 PM     Finalized by (CST): Jeremie Cedeño MD  on 6/05/2025 at 5:58 PM            Assessment/Plan:  Problem List[1]  For OR tomorrow   Procedure and risks discussed  accepted  THUY ALSTON MD  6/9/2025  1:25 PM       [1]   Patient Active Problem List  Diagnosis    BPH (benign prostatic hyperplasia)    Chronic low back pain    Chronic pain of both knees    Depression with anxiety    Generalized osteoarthritis    Hyperlipidemia    Type 2 diabetes mellitus without complication, without long-term current use of insulin (HCC)    Essential hypertension    Bipolar I disorder, single manic episode (HCC)    Large bowel obstruction (HCC)    Colonic mass

## 2025-06-10 ENCOUNTER — ANESTHESIA EVENT (OUTPATIENT)
Dept: SURGERY | Facility: HOSPITAL | Age: 80
End: 2025-06-10
Payer: MEDICARE

## 2025-06-10 ENCOUNTER — ANESTHESIA (OUTPATIENT)
Dept: SURGERY | Facility: HOSPITAL | Age: 80
End: 2025-06-10
Payer: MEDICARE

## 2025-06-10 ENCOUNTER — APPOINTMENT (OUTPATIENT)
Dept: GENERAL RADIOLOGY | Facility: HOSPITAL | Age: 80
End: 2025-06-10
Attending: SPECIALIST
Payer: MEDICARE

## 2025-06-10 LAB
ANION GAP SERPL CALC-SCNC: 12 MMOL/L (ref 0–18)
BASOPHILS # BLD AUTO: 0.01 X10(3) UL (ref 0–0.2)
BASOPHILS # BLD AUTO: 0.02 X10(3) UL (ref 0–0.2)
BASOPHILS NFR BLD AUTO: 0.3 %
BASOPHILS NFR BLD AUTO: 0.3 %
BUN BLD-MCNC: 7 MG/DL (ref 9–23)
BUN/CREAT SERPL: 8 (ref 10–20)
CALCIUM BLD-MCNC: 7.2 MG/DL (ref 8.7–10.4)
CHLORIDE SERPL-SCNC: 109 MMOL/L (ref 98–112)
CO2 SERPL-SCNC: 19 MMOL/L (ref 21–32)
CREAT BLD-MCNC: 0.87 MG/DL (ref 0.7–1.3)
DEPRECATED RDW RBC AUTO: 58.8 FL (ref 35.1–46.3)
DEPRECATED RDW RBC AUTO: 61.5 FL (ref 35.1–46.3)
EGFRCR SERPLBLD CKD-EPI 2021: 88 ML/MIN/1.73M2 (ref 60–?)
EOSINOPHIL # BLD AUTO: 0 X10(3) UL (ref 0–0.7)
EOSINOPHIL # BLD AUTO: 0 X10(3) UL (ref 0–0.7)
EOSINOPHIL NFR BLD AUTO: 0 %
EOSINOPHIL NFR BLD AUTO: 0 %
ERYTHROCYTE [DISTWIDTH] IN BLOOD BY AUTOMATED COUNT: 18.3 % (ref 11–15)
ERYTHROCYTE [DISTWIDTH] IN BLOOD BY AUTOMATED COUNT: 18.9 % (ref 11–15)
GLUCOSE BLD-MCNC: 228 MG/DL (ref 70–99)
GLUCOSE BLDC GLUCOMTR-MCNC: 204 MG/DL (ref 70–99)
GLUCOSE BLDC GLUCOMTR-MCNC: 88 MG/DL (ref 70–99)
GLUCOSE BLDC GLUCOMTR-MCNC: 97 MG/DL (ref 70–99)
HCT VFR BLD AUTO: 30.5 % (ref 39–53)
HCT VFR BLD AUTO: 35.9 % (ref 39–53)
HGB BLD-MCNC: 11.3 G/DL (ref 13–17.5)
HGB BLD-MCNC: 9.5 G/DL (ref 13–17.5)
IMM GRANULOCYTES # BLD AUTO: 0.03 X10(3) UL (ref 0–1)
IMM GRANULOCYTES # BLD AUTO: 0.05 X10(3) UL (ref 0–1)
IMM GRANULOCYTES NFR BLD: 0.8 %
IMM GRANULOCYTES NFR BLD: 0.8 %
LACTATE SERPL-SCNC: 3.3 MMOL/L (ref 0.5–2)
LACTATE SERPL-SCNC: 4.6 MMOL/L (ref 0.5–2)
LYMPHOCYTES # BLD AUTO: 0.65 X10(3) UL (ref 1–4)
LYMPHOCYTES # BLD AUTO: 1.03 X10(3) UL (ref 1–4)
LYMPHOCYTES NFR BLD AUTO: 11 %
LYMPHOCYTES NFR BLD AUTO: 28.8 %
MCH RBC QN AUTO: 27.7 PG (ref 26–34)
MCH RBC QN AUTO: 27.8 PG (ref 26–34)
MCHC RBC AUTO-ENTMCNC: 31.1 G/DL (ref 31–37)
MCHC RBC AUTO-ENTMCNC: 31.5 G/DL (ref 31–37)
MCV RBC AUTO: 88.2 FL (ref 80–100)
MCV RBC AUTO: 88.9 FL (ref 80–100)
MONOCYTES # BLD AUTO: 0.21 X10(3) UL (ref 0.1–1)
MONOCYTES # BLD AUTO: 0.75 X10(3) UL (ref 0.1–1)
MONOCYTES NFR BLD AUTO: 12.7 %
MONOCYTES NFR BLD AUTO: 5.9 %
NEUTROPHILS # BLD AUTO: 2.3 X10 (3) UL (ref 1.5–7.7)
NEUTROPHILS # BLD AUTO: 2.3 X10(3) UL (ref 1.5–7.7)
NEUTROPHILS # BLD AUTO: 4.45 X10 (3) UL (ref 1.5–7.7)
NEUTROPHILS # BLD AUTO: 4.45 X10(3) UL (ref 1.5–7.7)
NEUTROPHILS NFR BLD AUTO: 64.2 %
NEUTROPHILS NFR BLD AUTO: 75.2 %
OSMOLALITY SERPL CALC.SUM OF ELEC: 295 MOSM/KG (ref 275–295)
PLATELET # BLD AUTO: 288 10(3)UL (ref 150–450)
PLATELET # BLD AUTO: 310 10(3)UL (ref 150–450)
POTASSIUM SERPL-SCNC: 3.6 MMOL/L (ref 3.5–5.1)
RBC # BLD AUTO: 3.43 X10(6)UL (ref 3.8–5.8)
RBC # BLD AUTO: 4.07 X10(6)UL (ref 3.8–5.8)
SODIUM SERPL-SCNC: 140 MMOL/L (ref 136–145)
WBC # BLD AUTO: 3.6 X10(3) UL (ref 4–11)
WBC # BLD AUTO: 5.9 X10(3) UL (ref 4–11)

## 2025-06-10 PROCEDURE — 36430 TRANSFUSION BLD/BLD COMPNT: CPT | Performed by: ANESTHESIOLOGY

## 2025-06-10 PROCEDURE — 0DBL0ZZ EXCISION OF TRANSVERSE COLON, OPEN APPROACH: ICD-10-PCS | Performed by: SPECIALIST

## 2025-06-10 PROCEDURE — 0DBA0ZZ EXCISION OF JEJUNUM, OPEN APPROACH: ICD-10-PCS | Performed by: SPECIALIST

## 2025-06-10 PROCEDURE — 0DBB0ZZ EXCISION OF ILEUM, OPEN APPROACH: ICD-10-PCS | Performed by: SPECIALIST

## 2025-06-10 PROCEDURE — 74018 RADEX ABDOMEN 1 VIEW: CPT | Performed by: SPECIALIST

## 2025-06-10 PROCEDURE — P9045 ALBUMIN (HUMAN), 5%, 250 ML: HCPCS

## 2025-06-10 PROCEDURE — 30233N1 TRANSFUSION OF NONAUTOLOGOUS RED BLOOD CELLS INTO PERIPHERAL VEIN, PERCUTANEOUS APPROACH: ICD-10-PCS | Performed by: HOSPITALIST

## 2025-06-10 RX ORDER — HYDROMORPHONE HYDROCHLORIDE 1 MG/ML
0.4 INJECTION, SOLUTION INTRAMUSCULAR; INTRAVENOUS; SUBCUTANEOUS EVERY 2 HOUR PRN
Status: DISCONTINUED | OUTPATIENT
Start: 2025-06-10 | End: 2025-06-19

## 2025-06-10 RX ORDER — VASOPRESSIN 20 [USP'U]/ML
INJECTION, SOLUTION INTRAVENOUS AS NEEDED
Status: DISCONTINUED | OUTPATIENT
Start: 2025-06-10 | End: 2025-06-10 | Stop reason: SURG

## 2025-06-10 RX ORDER — ONDANSETRON 2 MG/ML
INJECTION INTRAMUSCULAR; INTRAVENOUS AS NEEDED
Status: DISCONTINUED | OUTPATIENT
Start: 2025-06-10 | End: 2025-06-10

## 2025-06-10 RX ORDER — EPHEDRINE SULFATE 50 MG/ML
INJECTION, SOLUTION INTRAVENOUS AS NEEDED
Status: DISCONTINUED | OUTPATIENT
Start: 2025-06-10 | End: 2025-06-10 | Stop reason: SURG

## 2025-06-10 RX ORDER — MORPHINE SULFATE 4 MG/ML
2 INJECTION, SOLUTION INTRAMUSCULAR; INTRAVENOUS EVERY 10 MIN PRN
Status: DISCONTINUED | OUTPATIENT
Start: 2025-06-10 | End: 2025-06-10 | Stop reason: HOSPADM

## 2025-06-10 RX ORDER — ONDANSETRON 2 MG/ML
4 INJECTION INTRAMUSCULAR; INTRAVENOUS EVERY 6 HOURS PRN
Status: DISCONTINUED | OUTPATIENT
Start: 2025-06-10 | End: 2025-06-10 | Stop reason: HOSPADM

## 2025-06-10 RX ORDER — SODIUM CHLORIDE, SODIUM LACTATE, POTASSIUM CHLORIDE, CALCIUM CHLORIDE 600; 310; 30; 20 MG/100ML; MG/100ML; MG/100ML; MG/100ML
INJECTION, SOLUTION INTRAVENOUS CONTINUOUS
Status: DISCONTINUED | OUTPATIENT
Start: 2025-06-10 | End: 2025-06-10 | Stop reason: HOSPADM

## 2025-06-10 RX ORDER — ROCURONIUM BROMIDE 10 MG/ML
INJECTION, SOLUTION INTRAVENOUS AS NEEDED
Status: DISCONTINUED | OUTPATIENT
Start: 2025-06-10 | End: 2025-06-10 | Stop reason: SURG

## 2025-06-10 RX ORDER — HYDROMORPHONE HYDROCHLORIDE 1 MG/ML
0.4 INJECTION, SOLUTION INTRAMUSCULAR; INTRAVENOUS; SUBCUTANEOUS EVERY 5 MIN PRN
Status: DISCONTINUED | OUTPATIENT
Start: 2025-06-10 | End: 2025-06-10 | Stop reason: HOSPADM

## 2025-06-10 RX ORDER — MORPHINE SULFATE 4 MG/ML
4 INJECTION, SOLUTION INTRAMUSCULAR; INTRAVENOUS EVERY 10 MIN PRN
Status: DISCONTINUED | OUTPATIENT
Start: 2025-06-10 | End: 2025-06-10 | Stop reason: HOSPADM

## 2025-06-10 RX ORDER — OXYCODONE HYDROCHLORIDE 5 MG/1
2.5 TABLET ORAL EVERY 4 HOURS PRN
Status: DISCONTINUED | OUTPATIENT
Start: 2025-06-10 | End: 2025-06-24

## 2025-06-10 RX ORDER — HYDROMORPHONE HYDROCHLORIDE 1 MG/ML
0.2 INJECTION, SOLUTION INTRAMUSCULAR; INTRAVENOUS; SUBCUTANEOUS EVERY 2 HOUR PRN
Status: DISCONTINUED | OUTPATIENT
Start: 2025-06-10 | End: 2025-06-19

## 2025-06-10 RX ORDER — MORPHINE SULFATE 10 MG/ML
6 INJECTION, SOLUTION INTRAMUSCULAR; INTRAVENOUS EVERY 10 MIN PRN
Status: DISCONTINUED | OUTPATIENT
Start: 2025-06-10 | End: 2025-06-10 | Stop reason: HOSPADM

## 2025-06-10 RX ORDER — OXYCODONE HYDROCHLORIDE 5 MG/1
5 TABLET ORAL EVERY 4 HOURS PRN
Status: DISCONTINUED | OUTPATIENT
Start: 2025-06-10 | End: 2025-06-24

## 2025-06-10 RX ORDER — HYDROMORPHONE HYDROCHLORIDE 1 MG/ML
0.6 INJECTION, SOLUTION INTRAMUSCULAR; INTRAVENOUS; SUBCUTANEOUS EVERY 5 MIN PRN
Status: DISCONTINUED | OUTPATIENT
Start: 2025-06-10 | End: 2025-06-10 | Stop reason: HOSPADM

## 2025-06-10 RX ORDER — DEXTROSE MONOHYDRATE, SODIUM CHLORIDE, AND POTASSIUM CHLORIDE 50; 1.49; 4.5 G/1000ML; G/1000ML; G/1000ML
INJECTION, SOLUTION INTRAVENOUS CONTINUOUS
Status: DISCONTINUED | OUTPATIENT
Start: 2025-06-10 | End: 2025-06-11

## 2025-06-10 RX ORDER — METOCLOPRAMIDE HYDROCHLORIDE 5 MG/ML
10 INJECTION INTRAMUSCULAR; INTRAVENOUS EVERY 8 HOURS PRN
Status: DISCONTINUED | OUTPATIENT
Start: 2025-06-10 | End: 2025-06-10 | Stop reason: HOSPADM

## 2025-06-10 RX ORDER — SODIUM CHLORIDE 9 MG/ML
INJECTION, SOLUTION INTRAVENOUS ONCE
Status: DISCONTINUED | OUTPATIENT
Start: 2025-06-10 | End: 2025-06-11

## 2025-06-10 RX ORDER — SODIUM CHLORIDE, SODIUM LACTATE, POTASSIUM CHLORIDE, CALCIUM CHLORIDE 600; 310; 30; 20 MG/100ML; MG/100ML; MG/100ML; MG/100ML
INJECTION, SOLUTION INTRAVENOUS CONTINUOUS PRN
Status: DISCONTINUED | OUTPATIENT
Start: 2025-06-10 | End: 2025-06-10 | Stop reason: SURG

## 2025-06-10 RX ORDER — PHENYLEPHRINE HCL 10 MG/ML
VIAL (ML) INJECTION AS NEEDED
Status: DISCONTINUED | OUTPATIENT
Start: 2025-06-10 | End: 2025-06-10 | Stop reason: SURG

## 2025-06-10 RX ORDER — NALOXONE HYDROCHLORIDE 0.4 MG/ML
0.08 INJECTION, SOLUTION INTRAMUSCULAR; INTRAVENOUS; SUBCUTANEOUS AS NEEDED
Status: DISCONTINUED | OUTPATIENT
Start: 2025-06-10 | End: 2025-06-10 | Stop reason: HOSPADM

## 2025-06-10 RX ORDER — ACETAMINOPHEN 10 MG/ML
1000 INJECTION, SOLUTION INTRAVENOUS ONCE
Status: COMPLETED | OUTPATIENT
Start: 2025-06-10 | End: 2025-06-10

## 2025-06-10 RX ORDER — HYDROMORPHONE HYDROCHLORIDE 1 MG/ML
0.2 INJECTION, SOLUTION INTRAMUSCULAR; INTRAVENOUS; SUBCUTANEOUS EVERY 5 MIN PRN
Status: DISCONTINUED | OUTPATIENT
Start: 2025-06-10 | End: 2025-06-10 | Stop reason: HOSPADM

## 2025-06-10 RX ORDER — ALBUMIN HUMAN 50 G/1000ML
SOLUTION INTRAVENOUS CONTINUOUS PRN
Status: DISCONTINUED | OUTPATIENT
Start: 2025-06-10 | End: 2025-06-10 | Stop reason: SURG

## 2025-06-10 RX ADMIN — SODIUM CHLORIDE, SODIUM LACTATE, POTASSIUM CHLORIDE, CALCIUM CHLORIDE: 600; 310; 30; 20 INJECTION, SOLUTION INTRAVENOUS at 16:55:00

## 2025-06-10 RX ADMIN — SODIUM CHLORIDE, SODIUM LACTATE, POTASSIUM CHLORIDE, CALCIUM CHLORIDE: 600; 310; 30; 20 INJECTION, SOLUTION INTRAVENOUS at 18:18:00

## 2025-06-10 RX ADMIN — ROCURONIUM BROMIDE 20 MG: 10 INJECTION, SOLUTION INTRAVENOUS at 16:07:00

## 2025-06-10 RX ADMIN — EPHEDRINE SULFATE 5 MG: 50 INJECTION, SOLUTION INTRAVENOUS at 15:08:00

## 2025-06-10 RX ADMIN — PHENYLEPHRINE HCL 200 MCG: 10 MG/ML VIAL (ML) INJECTION at 16:00:00

## 2025-06-10 RX ADMIN — EPHEDRINE SULFATE 5 MG: 50 INJECTION, SOLUTION INTRAVENOUS at 14:57:00

## 2025-06-10 RX ADMIN — PHENYLEPHRINE HCL 100 MCG: 10 MG/ML VIAL (ML) INJECTION at 17:10:00

## 2025-06-10 RX ADMIN — ALBUMIN HUMAN: 50 SOLUTION INTRAVENOUS at 16:02:00

## 2025-06-10 RX ADMIN — SODIUM CHLORIDE, SODIUM LACTATE, POTASSIUM CHLORIDE, CALCIUM CHLORIDE: 600; 310; 30; 20 INJECTION, SOLUTION INTRAVENOUS at 14:39:00

## 2025-06-10 RX ADMIN — PHENYLEPHRINE HCL 2 MCG: 10 MG/ML VIAL (ML) INJECTION at 16:08:00

## 2025-06-10 RX ADMIN — VASOPRESSIN 2 UNITS: 20 INJECTION, SOLUTION INTRAVENOUS at 16:03:00

## 2025-06-10 RX ADMIN — VASOPRESSIN 2 UNITS: 20 INJECTION, SOLUTION INTRAVENOUS at 16:15:00

## 2025-06-10 RX ADMIN — ROCURONIUM BROMIDE 50 MG: 10 INJECTION, SOLUTION INTRAVENOUS at 14:45:00

## 2025-06-10 RX ADMIN — PHENYLEPHRINE HCL 100 MCG: 10 MG/ML VIAL (ML) INJECTION at 16:03:00

## 2025-06-10 RX ADMIN — EPHEDRINE SULFATE 5 MG: 50 INJECTION, SOLUTION INTRAVENOUS at 14:50:00

## 2025-06-10 RX ADMIN — PHENYLEPHRINE HCL 100 MCG: 10 MG/ML VIAL (ML) INJECTION at 15:57:00

## 2025-06-10 RX ADMIN — VASOPRESSIN 1 UNITS: 20 INJECTION, SOLUTION INTRAVENOUS at 17:05:00

## 2025-06-10 NOTE — PLAN OF CARE
Problem: Patient Centered Care  Goal: Patient preferences are identified and integrated in the patient's plan of care  Description: Interventions:  - What would you like us to know as we care for you? I am from home.   - Provide timely, complete, and accurate information to patient/family  - Incorporate patient and family knowledge, values, beliefs, and cultural backgrounds into the planning and delivery of care  - Encourage patient/family to participate in care and decision-making at the level they choose  - Honor patient and family perspectives and choices  Outcome: Progressing     Problem: Diabetes/Glucose Control  Goal: Glucose maintained within prescribed range  Description: INTERVENTIONS:  - Monitor Blood Glucose as ordered  - Assess for signs and symptoms of hyperglycemia and hypoglycemia  - Administer ordered medications to maintain glucose within target range  - Assess barriers to adequate nutritional intake and initiate nutrition consult as needed  - Instruct patient on self management of diabetes  Outcome: Progressing     Problem: Patient/Family Goals  Goal: Patient/Family Long Term Goal  Description: Patient's Long Term Goal: Discharge home     Interventions:  - Monitor vitals  - Monitor labs  - IVF  - Glucose monitoring ACHS  - Clear liquid diet  - Scheduled miralax  - GI on consult  - See additional Care Plan goals for specific interventions  Outcome: Progressing  Goal: Patient/Family Short Term Goal  Description: Patient's Short Term Goal: Improve bowel function    Interventions:   - Full liquid diet  - GI on consult  - Scheduled miralax  - See additional Care Plan goals for specific interventions  Outcome: Progressing     Problem: PAIN - ADULT  Goal: Verbalizes/displays adequate comfort level or patient's stated pain goal  Description: INTERVENTIONS:  - Encourage pt to monitor pain and request assistance  - Assess pain using appropriate pain scale  - Administer analgesics based on type and severity  of pain and evaluate response  - Implement non-pharmacological measures as appropriate and evaluate response  - Consider cultural and social influences on pain and pain management  - Manage/alleviate anxiety  - Utilize distraction and/or relaxation techniques  - Monitor for opioid side effects  - Notify MD/LIP if interventions unsuccessful or patient reports new pain  - Anticipate increased pain with activity and pre-medicate as appropriate  Outcome: Progressing     Problem: RISK FOR INFECTION - ADULT  Goal: Absence of fever/infection during anticipated neutropenic period  Description: INTERVENTIONS  - Monitor WBC  - Administer growth factors as ordered  - Implement neutropenic guidelines  Outcome: Progressing     Problem: SAFETY ADULT - FALL  Goal: Free from fall injury  Description: INTERVENTIONS:  - Assess pt frequently for physical needs  - Identify cognitive and physical deficits and behaviors that affect risk of falls.  - Sully fall precautions as indicated by assessment.  - Educate pt/family on patient safety including physical limitations  - Instruct pt to call for assistance with activity based on assessment  - Modify environment to reduce risk of injury  - Provide assistive devices as appropriate  - Consider OT/PT consult to assist with strengthening/mobility  - Encourage toileting schedule  Outcome: Progressing     Problem: DISCHARGE PLANNING  Goal: Discharge to home or other facility with appropriate resources  Description: INTERVENTIONS:  - Identify barriers to discharge w/pt and caregiver  - Include patient/family/discharge partner in discharge planning  - Arrange for needed discharge resources and transportation as appropriate  - Identify discharge learning needs (meds, wound care, etc)  - Arrange for interpreters to assist at discharge as needed  - Consider post-discharge preferences of patient/family/discharge partner  - Complete POLST form as appropriate  - Assess patient's ability to be  responsible for managing their own health  - Refer to Case Management Department for coordinating discharge planning if the patient needs post-hospital services based on physician/LIP order or complex needs related to functional status, cognitive ability or social support system  Outcome: Progressing     Problem: Altered Communication/Language Barrier  Goal: Patient/Family is able to understand and participate in their care  Description: Interventions:  - Assess communication ability and preferred communication style  - Implement communication aides and strategies  - Use visual cues when possible  - Listen attentively, be patient, do not interrupt  - Minimize distractions  - Allow time for understanding and response  - Establish method for patient to ask for assistance (call light)  - Provide an  as needed  - Communicate barriers and strategies to overcome with those who interact with patient  Outcome: Progressing     Problem: GASTROINTESTINAL - ADULT  Goal: Minimal or absence of nausea and vomiting  Description: INTERVENTIONS:  - Maintain adequate hydration with IV or PO as ordered and tolerated  - Nasogastric tube to low intermittent suction as ordered  - Evaluate effectiveness of ordered antiemetic medications  - Provide nonpharmacologic comfort measures as appropriate  - Advance diet as tolerated, if ordered  - Obtain nutritional consult as needed  - Evaluate fluid balance  Outcome: Progressing  Goal: Maintains or returns to baseline bowel function  Description: INTERVENTIONS:  - Assess bowel function  - Maintain adequate hydration with IV or PO as ordered and tolerated  - Evaluate effectiveness of GI medications  - Encourage mobilization and activity  - Obtain nutritional consult as needed  - Establish a toileting routine/schedule  - Consider collaborating with pharmacy to review patient's medication profile  Outcome: Progressing

## 2025-06-10 NOTE — ANESTHESIA PROCEDURE NOTES
Airway  Date/Time: 6/10/2025 2:44 PM  Reason: Elective      General Information and Staff   Patient location during procedure: OR  Anesthesiologist: Edson Archibald MD  Performed: anesthesiologist   Performed by: Edson Archibald MD  Authorized by: Edson Archibald MD        Indications and Patient Condition  Indications for airway management: anesthesia  Sedation level: deep      Preoxygenated: yesPatient position: sniffing    Mask difficulty assessment: 1 - vent by mask    Final Airway Details    Final airway type: endotracheal airway    Successful airway: ETT  Cuffed: yes   Successful intubation technique: direct laryngoscopy  Endotracheal tube insertion site: oral  Blade: Munira  Blade size: #4  ETT size (mm): 7.5    Cormack-Lehane Classification: grade IIA - partial view of glottis  Placement verified by: capnometry   Measured from: teeth  Number of attempts at approach: 1        
Peripheral IV  Date/Time: 6/10/2025 2:47 PM  Inserted by: Edson Archibald MD    Placement  Needle size: 18 G  Laterality: left  Location: hand  Local anesthetic: none  Site prep: alcohol  Technique: anatomical landmarks  Attempts: 1      
severe
Advancement Flap (Double) Text: The defect edges were debeveled with a #15 scalpel blade.  Given the location of the defect and the proximity to free margins a double advancement flap was deemed most appropriate.  Using a sterile surgical marker, the appropriate advancement flaps were drawn incorporating the defect and placing the expected incisions within the relaxed skin tension lines where possible.    The area thus outlined was incised deep to adipose tissue with a #15 scalpel blade.  The skin margins were undermined to an appropriate distance in all directions utilizing iris scissors.

## 2025-06-10 NOTE — PAYOR COMM NOTE
--------------  CONTINUED STAY REVIEW FOR 6/7 TO 6/9 PATIENT IS STILL IN HOUSE AND RECEIVING HOSPITAL SERVICES      Payor: [employer]  Subscriber #:  [id number]  Authorization Number: [id number]    Admit date: 6/5/25  Admit time:  8:01 PM      OR REPORT 6/6      Operative Report signed by Eusebio Palmer MD at 6/6/2025  6:28 PM    Author: Eusebio Palmer MD Service: Gastroenterology Author Type: Physician   Filed: 6/6/2025  6:28 PM Date of Service: 6/6/2025  5:18 PM Status: Addendum   : Eusebio Palmer MD (Physician)   Related Notes: Original Note by Eusebio Palmer MD (Physician) filed at 6/6/2025  6:25 PM     COLONOSCOPY WITH COLONIC STENT PLACEMENT PROCEDURE REPORT     DATE OF PROCEDURE:  6/6/2025      PCP: PHYSICIAN NONSTAFF     PREOPERATIVE DIAGNOSIS: Colonic obstruction, abnormal colon on CT scan, 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     SURGEON:  Eusebio Palmer M.D.     SEDATION:       MAC anesthesia provided by the Anesthesia service.  MAC anesthesia was necessary for deep sedation for the anticipated colonic stent placement; Anesthesiologist availability for possible conversion to endotracheal intubation and General Anesthesia in this patient with large bowel obstruction.     COLONOSCOPY PROCEDURE:      After the nature and risks of colonoscopy examination with anticipated metallic colonic stent placement were discussed with Mr. Gutierrez and all questions answered, informed consent was obtained.  He was sedated by the anesthesiologist.     Digital rectal exam was performed which showed no masses.  The Olympus adult therapeutic gastroscope was placed in the patient's rectum and advanced under direct visualization to the transverse colon.  I encountered significant looping and a large volume of solid stool and could not reach the obstructing process with only a gastroscope.     The scope  was withdrawn and we switched out for an adult colonoscope.     This adult colonoscope was lubricated, advanced back up while irrigating aggressively breaking up solid stool to advance through the rectum sigmoid colon descending colon and around the splenic flexure, up to an obstructing process likely mid or proximal transverse colon as per imaging.  The colonic lumen puckered and twisted down to a tight malignant stricture, with some limited views of ulcerations and a very tight lumen.     There was a sharp angulation within the irregular rigid lumen through this mass.  After several minutes of approaching from different angles, bowing the rubber tipped hydrophilic 0.035 inch guidewire within the lumen to make it bend and snap around a turn we ultimately traversed the lesion with the guidewire.     A 15-18 mm ERCP biliary balloon catheter was advanced over the guidewire through the tumor lumen up above the suspected tumor, balloon inflated to 18 mm and pulled back.  Large volumes of contrast injected.  We saw a 4-5 cm long malignant stricture.     After establishing our landmarks and a good colonoscope position, the biliary balloon was deflated and backed out over the 0.035 inch guidewire.     A [employer] 25 x 120 mm uncoated metallic colonic stent was prepared and advanced up the 0.035 inch guidewire down through the colonoscope and across the stricture under good endoscopic and fluoroscopic visualization.     With optimal position, stent centered across the stricture the colonic stent was deployed in one steady motion under continuous visualization.     There was an immediate rush of liquid stool.  Fluoroscopy images and limited visualization showed good position of the stent with the \"waist\" of the tumor at its center.  I elected to stop here.        Estimated blood loss: none/insignificant        COLONOSCOPY FINDINGS:    Tight malignant colonic stricture as above; successful placement of [employer] 25 x 120 mm uncoated metallic colonic stent as above     RECOMMENDATIONS:  Clear liquid diet as tolerated  Close observation, serial abdominal exams  Start gentle MiraLAX laxative if condition improves        Above discussed with daughter Yao Arreola at [phone]          6/7           Date of Service: 6/7/2025 11:48 AM     Signed         [facility]  part of [place] [facility]     Progress Note           Subjective:      No abd pain.  No BM since yesterday.  We discussed surgical consult.     Objective:   Blood pressure 90/52, pulse 69, temperature 97.9 °F (36.6 °C), temperature source Oral, resp. rate 16, height 71\", weight 150 lb 12.7 oz (68.4 kg), SpO2 97%.     Gen:   NAD.  A and O x 3  CV:   RRR, no m/g/r  Pulm:   CTA bilat  Abd:   +bs, soft, tender over mid upper abdomen to deep palpation, no rebound or guarding, ND  LE:   No c/c/e  Neuro:   nonfocal     Results:            Lab Results   Component Value Date     WBC 6.1 06/07/2025     HGB 9.2 (L) 06/07/2025     HCT 29.2 (L) 06/07/2025     .0 06/07/2025     CREATSERUM 0.87 06/07/2025     BUN 12 06/07/2025      06/07/2025     K 3.8 06/07/2025      06/07/2025     CO2 24.0 06/07/2025     GLU 69 (L) 06/07/2025     CA 8.4 (L) 06/07/2025     ALB 3.6 06/05/2025     ALKPHO 115 06/05/2025     BILT 0.3 06/05/2025     TP 6.2 06/05/2025     AST 31 06/05/2025     ALT 27 06/05/2025     TSH 2.417 04/08/2025     LIP 22 06/05/2025     PHOS 3.0 04/10/2024     B12 >2,000 (H) 04/08/2025         XR FLUOROSCOPY C-ARM TIME LESS THAN 1 HOUR (CPT=76000)  Result Date: 6/6/2025  CONCLUSION:              Intraoperative exam. Please see operative report for further details.     Dictated by (CST): Frandy Starkey MD on 6/06/2025 at 8:55 PM     Finalized by (CST): Frandy Starkey MD on 6/06/2025 at 8:56 PM           CT ABDOMEN+PELVIS(CONTRAST ONLY)(CPT=74177)  Result Date: 6/5/2025  CONCLUSION:               1. Suspicious approximate 4 cm segment of  constricting masslike wall thickening at the proximal transverse colon with large upstream colonic fecal burden.  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  Stellate morphology stranding and nodularity in the right upper quadrant mesentery adjacent to the constricting mass probably relates to direct extension of disease.  There are adjacent nonenlarged right upper quadrant mesenteric lymph nodes, which could be reactive or metastatic.  Gastroenterology evaluation and colonoscopy correlation is suggested.  No free intraperitoneal air or well-defined/drainable intra-abdominal collection. 2. Single mildly enlarged interaortocaval retroperitoneal lymph node with other prominent but nonenlarged retroperitoneal nodes.  These could be reactive or metastatic in nature. 3. Coronary and peripheral atherosclerosis with aortic annular calcifications. 4. Reticular opacities at the periphery of the right lung base probably relate to scarring. 5. Mild circumferential urinary bladder wall thickening.  Request urinalysis correlation to assess for potential cystitis.    elm-remote  Dictated by (CST): Jeremie Cedeño MD on 6/05/2025 at 5:47 PM     Finalized by (CST): Jeremie Cedeño MD on 6/05/2025 at 5:58 PM                  Assessment and Plan:      Proximal transverse colonic obstructing mass and progressive anemia with weight loss, highly suggestive of colonic malignancy.   Constipation.  Normocytic anemia  Had BM 6/6.  - CLD  - gentle IVF  - pain control  - GI on consult  - S/p colonoscopy with metallic colon stent placed 6/6  - general surgery consult today  - follow CBC     Hx of DM2  - ISS     Hx of HTN     Hx of HL     Hx of BPH  - flomax     dvt proph:    SCDs     Code status:    Full        MDM:    High Shekhar Schwarz MD  6/7/2025 6/8           Date of Service: 6/8/2025 12:19 PM     Signed         [facility]  part of [facility]     Progress Note         Subjective:      No abd pain now.  Has not had BM in 1-2 dys now.     Objective:   Blood pressure (!) 89/43, pulse 63, temperature 97.6 °F (36.4 °C), temperature source Oral, resp. rate 18, height 71\", weight 150 lb 12.7 oz (68.4 kg), SpO2 97%.     Gen:   NAD.  A and O x 3  CV:   RRR, no m/g/r  Pulm:   CTA bilat  Abd:   +bs, soft, tender over mid upper abdomen to deep palpation, no rebound or guarding, ND  LE:   No c/c/e  Neuro:   nonfocal     Results:            Lab Results   Component Value Date     WBC 7.1 06/08/2025     HGB 9.8 (L) 06/08/2025     HCT 30.8 (L) 06/08/2025     .0 06/08/2025     CREATSERUM 0.90 06/08/2025     BUN 9 06/08/2025      06/08/2025     K 3.9 06/08/2025      06/08/2025     CO2 26.0 06/08/2025      (H) 06/08/2025     CA 8.2 (L) 06/08/2025     ALB 3.6 06/05/2025     ALKPHO 115 06/05/2025     BILT 0.3 06/05/2025     TP 6.2 06/05/2025     AST 31 06/05/2025     ALT 27 06/05/2025     TSH 2.417 04/08/2025     LIP 22 06/05/2025     PHOS 3.0 04/10/2024     B12 >2,000 (H) 04/08/2025         XR FLUOROSCOPY C-ARM TIME LESS THAN 1 HOUR (CPT=76000)  Result Date: 6/6/2025  CONCLUSION:              Intraoperative exam. Please see operative report for further details.     Dictated by (CST): Frandy Starkey MD on 6/06/2025 at 8:55 PM     Finalized by (CST): Frandy Starkey MD on 6/06/2025 at 8:56 PM                  Assessment and Plan:      Proximal transverse colonic obstructing mass and progressive anemia with weight loss, highly suggestive of colonic malignancy.   Constipation.  Normocytic anemia  Had BM 6/6.  - CLD  - gentle IVF  - pain control  - GI on consult  - S/p colonoscopy with metallic colon stent placed 6/6  - general surgery on consult.   Surgery Tuesday.  - follow CBC     Hx of DM2  - ISS     Hx of HTN     Hx of HL     Hx of BPH  - flomax     dvt proph:    SCDs     Code status:    Full        MDM:    High Shekhar Schwarz MD  6/8/2025 6/9            Date of Service: 6/9/2025 11:55 AM     Signed         [facility]  part of [employer]     Progress Note           Subjective:      No abd pain.  Had a BM yesterday.  Awaits surgery tomorrow.     Objective:   Blood pressure (!) 86/45, pulse 69, temperature 97.9 °F (36.6 °C), temperature source Oral, resp. rate 18, height 71\", weight 150 lb 12.7 oz (68.4 kg), SpO2 98%.     Gen:   NAD.  A and O x 3  CV:   RRR, no m/g/r  Pulm:   CTA bilat  Abd:   +bs, soft, NT, ND  LE:   No c/c/e  Neuro:   nonfocal     Results:            Lab Results   Component Value Date     WBC 7.1 06/08/2025     HGB 9.8 (L) 06/08/2025     HCT 30.8 (L) 06/08/2025     .0 06/08/2025     CREATSERUM 0.90 06/08/2025     BUN 9 06/08/2025      06/08/2025     K 3.9 06/08/2025      06/08/2025     CO2 26.0 06/08/2025      (H) 06/08/2025     CA 8.2 (L) 06/08/2025     ALB 3.6 06/05/2025     ALKPHO 115 06/05/2025     BILT 0.3 06/05/2025     TP 6.2 06/05/2025     AST 31 06/05/2025     ALT 27 06/05/2025     TSH 2.417 04/08/2025     LIP 22 06/05/2025     PHOS 3.0 04/10/2024     B12 >2,000 (H) 04/08/2025         No results found.         Assessment and Plan:      Proximal transverse colonic obstructing mass and progressive anemia with weight loss, highly suggestive of colonic malignancy.   Constipation. - resolving  Normocytic anemia  Had BM 6/6.  - CLD  - gentle IVF  - pain control  - GI on consult  - S/p colonoscopy with metallic colon stent placed 6/6  - general surgery on consult.   Surgery tomorrow.  NPO after midnight.  - follow CBC     Hx of DM2  - ISS     Hx of HTN     Hx of HL     Hx of BPH  - flomax     dvt proph:    SCDs     Code status:    Full        MDM:    High Shekhar Schwarz MD  6/9/2025                       Medication Administration Report  for Arnaldo Gutierrez as of 06/01/25 through 06/10/25  Legend:         Medications 06/01 06/02 06/03 06/04 06/05 06/06 06/07 06/08 06/09 06/10                                                                                                                   docusate sodium (Colace) cap 100 mg  Dose: 100 mg  Freq: 2 times daily Route: OR  Start: 06/07/25 1200   Admin Instructions:   Do not crush          25440     15864      87920     (2056) [C]4      79032     21376 0900 2100        FLUoxetine (PROzac) cap 20 mg  Dose: 20 mg  Freq: Daily Route: OR  Start: 06/06/25 1230         1230)7      83685 46789 600020      0930                                            Or   morphINE PF 2 MG/ML injection 2 mg  Dose: 2 mg  Freq: Every 2 hour PRN Route: IV  PRN Reason: moderate pain  Start: 06/05/25 2002   Admin Instructions:   Use PRN reason as a guide and follow range order policy. If oral pain meds are ordered and patient can tolerate oral intake, start with PRN oral pain medications first.        722837      442708     375460     947220      616225      055691                                 pantoprazole (Protonix) DR tab 40 mg  Dose: 40 mg  Freq: Every morning before breakfast Route: OR  Start: 06/06/25 1245   Admin Instructions:   Do not crush         (1245)41      475895      271604      876190      (0700)45        polyethylene glycol (PEG 3350) (Miralax) 17 g oral packet 17 g  Dose: 17 g  Freq: 3 times daily Route: OR  Start: 06/06/25 2100   Admin Instructions:   1 packet=17gm=1 heaping tablespoon; Dissolve in 8 oz of water  1 packet=17gm=1 heaping tablespoon; Dissolve in 8 oz of water         139775      723232     946058     301163      290058     516829     (2056) [C]52      (0900)53     (1500)54     983796      0900 1500 2100        pregabalin (Lyrica) cap 100 mg  Dose: 100 mg  Freq: 3 times daily Route: OR  Start: 06/06/25 1500         (1500)56     173507      925398     919659     497447      303338     532560     979005      326829     553611     6940406 0900 1500     2100        sodium chloride 0.9% infusion  Rate: 50 mL/hr  Freq:  Continuous Route: IV  Start: 06/05/25 2015        072436      410636      085018     332587      430995      799955      546415        tamsulosin (Flomax) cap 0.8 mg  Dose: 0.8 mg  Freq: Daily Route: OR  Start: 06/06/25 1236 (8100)46      191868      109446      135589      0903                     Completed Medications   Medications 06/01 06/02 06/03 06/04 06/05 06/06 06/07 06/08 06/09 06/10   iopamidol 76% (ISOVUE-370) injection for power injector  Dose: 80 mL  Freq: IMG once as needed Route: IV  PRN Reason: contrast  Start: 06/05/25 1734 End: 06/05/25 1734 173478             metroNIDAZOLE (Flagyl) tab 500 mg  Dose: 500 mg  Freq: Once Route: OR  Start: 06/09/25 2200 End: 06/09/25 2138   Admin Instructions:   Administer day before surgery at 2200.   Order specific questions:               375593         metroNIDAZOLE (Flagyl) tab 500 mg  Dose: 500 mg  Freq: Once Route: OR  Start: 06/09/25 1500 End: 06/09/25 1721   Admin Instructions:   Administer day before surgery at 1500.   Order specific questions:               [id number]         metroNIDAZOLE (Flagyl) tab 500 mg  Dose: 500 mg  Freq: Once Route: OR  Start: 06/09/25 1400 End: 06/09/25 1459   Admin Instructions:   Administer day before surgery at 1400.   Order specific questions:               195922         morphINE PF 2 MG/ML injection 2 mg  Dose: 2 mg  Freq: Once Route: IV  Start: 06/05/25 1847 End: 06/05/25 1858        102178             neomycin (Mycifradin) tab 1,000 mg  Dose: 1,000 mg  Freq: Once Route: OR  Start: 06/09/25 2200 End: 06/09/25 2141   Admin Instructions:   Administer day before surgery at 2200.   Order specific questions:               334968         neomycin (Mycifradin) tab 1,000 mg  Dose: 1,000 mg  Freq: Once Route: OR  Start: 06/09/25 1500 End: 06/09/25 1721   Admin Instructions:   Administer day before surgery at 1500.   Order specific questions:               106595         neomycin (Mycifradin) tab 1,000 mg  Dose: 1,000  mg  Freq: Once Route: OR  Start: 06/09/25 1400 End: 06/09/25 1536   Admin Instructions:   Administer day before surgery at 1400.    Order specific questions:               315588         ondansetron (Zofran) 4 MG/2ML injection 4 mg  Dose: 4 mg  Freq: Once Route: IV  Start: 06/05/25 1646 End: 06/05/25 1654        967852             pantoprazole (Protonix) 40 mg in sodium chloride 0.9% PF 10 mL IV push  Dose: 40 mg  Freq: Once Route: IV  Start: 06/05/25 1646 End: 06/05/25 1659   Admin Instructions:   Dilute with 10 ml NS; IV push over 2 minutes        901825             sodium chloride 0.9 % IV bolus 1,000 mL  Dose: 1,000 mL  Freq: Once Route: IV  Start: 06/05/25 1646 End: 06/05/25 1753        920528     857413             sodium chloride 0.9 % IV bolus 250 mL  Dose: 250 mL  Freq: Once Route: IV  Start: 06/08/25 0615 End: 06/08/25 0820           443011          sodium ferric gluconate (Ferrlecit) 125 mg in sodium chloride 0.9% 100mL IVPB premix  Dose: 125 mg  Freq: Daily Route: IV  Start: 06/06/25 1115 End: 06/08/25 1015         704216      086673      146550              Vitals (since admission)    Date/Time Temp Pulse Resp BP SpO2 Weight O2 Device O2 Flow Rate (L/min) [facility]   06/10/25 0508 98.6 °F (37 °C) 66 20 95/52 97 % -- None (Room air) -- KB   06/09/25 2041 98 °F (36.7 °C) 69 18 100/56 100 % -- None (Room air) -- MM   06/09/25 1254 98 °F (36.7 °C) 62 18 91/52 99 % -- None (Room air) -- KM   06/09/25 0459 97.9 °F (36.6 °C) 69 18 86/45 Abnormal  98 % -- None (Room air) -- DA   06/08/25 2100 98.6 °F (37 °C) 70 18 91/44 97 % -- None (Room air) -- DA   06/08/25 1255 98 °F (36.7 °C) 64 18 95/55 98 % -- None (Room air) -- MK   06/08/25 0504 97.6 °F (36.4 °C) 63 18 -- 97 % -- None (Room air) -- DA   06/08/25 0504 -- -- -- 89/43 Abnormal  -- -- -- -- MF   06/07/25 2038 98.2 °F (36.8 °C) 70 16 92/51 98 % -- None (Room air) -- DA   06/07/25 1233 97.8 °F (36.6 °C) 71 16 92/61 100 % -- None (Room air) -- RH   06/07/25 0350  97.9 °F (36.6 °C) 69 16 90/52 97 % -- None (Room air) -- BS   06/06/25 2053 98 °F (36.7 °C) 79 18 91/55 98 % -- None (Room air) -- BS   06/06/25 1830 -- 79 21 123/58 97 % -- None (Room air) -- EC   06/06/25 1825 -- 71 24 103/63 99 % -- None (Room air) -- EC   06/06/25 1820 -- 79 25 111/61 96 % -- None (Room air) -- EC   06/06/25 1815 -- 80 17 112/65 98 % -- None (Room air) -- EC   06/06/25 1810 -- 80 26 107/61 96 % -- None (Room air) -- EC   06/06/25 1807 -- -- -- -- -- -- None (Room air) -- EC   06/06/25 1807 -- 82 20 100/64 96 % -- -- -- JG   06/06/25 1617 -- 70 15 102/62 98 % -- None (Room air) -- TS   06/06/25 1141 98.3 °F (36.8 °C) 70 16 96/57 98 % -- None (Room air) -- KBA   06/06/25 0511 98.1 °F (36.7 °C) 73 18 93/59 97 % -- None (Room air) -- DA   06/05/25 2004 98.4 °F (36.9 °C) 78 18 98/60 99 % 150 lb 12.7 oz (68.4 kg) None (Room air) -- WM   06/05/25 1948 -- -- -- -- -- -- None (Room air) -- DB   06/05/25 1930 -- 84 22 99/69 97 % -- None (Room air) -- DB   06/05/25 1915 -- 69 21 90/54 97 % -- None (Room air) -- DB   06/05/25 1830 -- 76 22 99/57 99 % -- None (Room air) -- DB   06/05/25 1800 -- 79 15 101/55 99 % -- None (Room air) -- DB   06/05/25 1730 -- 77 21 100/58 99 % -- None (Room air) -- DB   06/05/25 1700 -- 72 23 94/57 98 % -- None (Room air) -- DB   06/05/25 1631 -- -- -- -- -- -- None (Room air) -- DB   06/05/25 1630 -- 75 17 99/59 97 % -- None (Room air) -- DB   06/05/25 1608 98.1 °F (36.7 °C) 89 18 81/52 Abnormal  96 % 147 lb 14.9 oz (67.1 kg) None (Room air) -- DL

## 2025-06-10 NOTE — ANESTHESIA PREPROCEDURE EVALUATION
Anesthesia PreOp Note    HPI:     Arnaldo Gutierrez is a 80 year old male who presents for preoperative consultation requested by: Ruben Randall MD    Date of Surgery: 6/5/2025 - 6/10/2025    Procedure(s):  Laparoscopic assisted colon resection  Indication: Large bowel obstruction    Relevant Problems   No relevant active problems       NPO:  Last Liquid Consumption Date: 06/06/25  Last Liquid Consumption Time: 0800  Last Solid Consumption Date: 06/05/25  Last Solid Consumption Time: 0800  Last Liquid Consumption Date: 06/06/25          History Review:  Patient Active Problem List    Diagnosis Date Noted    Large bowel obstruction (HCC) 06/05/2025    Colonic mass 06/05/2025    Bipolar I disorder, single manic episode (HCC) 04/08/2025    Type 2 diabetes mellitus without complication, without long-term current use of insulin (HCC) 04/18/2024    Essential hypertension 04/18/2024    Depression with anxiety 04/22/2021    Chronic pain of both knees 11/12/2020    Chronic low back pain 08/21/2020    Hyperlipidemia 04/04/2018    BPH (benign prostatic hyperplasia) 05/18/2016    Generalized osteoarthritis 05/18/2016       Past Medical History[1]    Past Surgical History[2]    Prescriptions Prior to Admission[3]  Current Medications and Prescriptions Ordered in Epic[4]    Allergies[5]    Family History[6]  Social Hx on file[7]    Available pre-op labs reviewed.  Lab Results   Component Value Date    WBC 7.1 06/08/2025    RBC 3.66 (L) 06/08/2025    HGB 9.8 (L) 06/08/2025    HCT 30.8 (L) 06/08/2025    MCV 84.2 06/08/2025    MCH 26.8 06/08/2025    MCHC 31.8 06/08/2025    RDW 20.7 (H) 06/08/2025    .0 06/08/2025     Lab Results   Component Value Date     06/08/2025    K 3.9 06/08/2025     06/08/2025    CO2 26.0 06/08/2025    BUN 9 06/08/2025    CREATSERUM 0.90 06/08/2025     (H) 06/08/2025    PGLU 88 06/10/2025    CA 8.2 (L) 06/08/2025          Vital Signs:  Body mass index is 21.03 kg/m².   height is  1.803 m (5' 11\") and weight is 68.4 kg (150 lb 12.7 oz). His temporal temperature is 97.8 °F (36.6 °C). His blood pressure is 98/50 and his pulse is 61. His respiration is 18 and oxygen saturation is 100%.   Vitals:    06/09/25 1254 06/09/25 2041 06/10/25 0508 06/10/25 1205   BP: 91/52 100/56 95/52 98/50   Pulse: 62 69 66 61   Resp: 18 18 20 18   Temp: 98 °F (36.7 °C) 98 °F (36.7 °C) 98.6 °F (37 °C) 97.8 °F (36.6 °C)   TempSrc: Oral Oral Oral Temporal   SpO2: 99% 100% 97% 100%   Weight:       Height:            Anesthesia Evaluation     Patient summary reviewed and Nursing notes reviewed    No history of anesthetic complications   Airway   Mallampati: III  TM distance: >3 FB  Neck ROM: full  Dental      Pulmonary - normal exam    breath sounds clear to auscultation  Cardiovascular   Exercise tolerance: poor  (+) hypertension well controlled    ECG reviewed  Rhythm: regular  Rate: normal    Neuro/Psych    (+)  anxiety/panic attacks,  bipolar disorder, depression      GI/Hepatic/Renal    (+) GERD well controlled    Endo/Other    (+) diabetes mellitus type 2 well controlled  Abdominal      Other findings: No upper teeth, 6 lower teeth.            Anesthesia Plan:   ASA:  3  Plan:   General  Airway:  ETT  Post-op Pain Management: IV analgesics  Plan Comments: EKG: SB with PAC's.  Notes, labs, recent procedure reviewed.  NG.  Second IV if needed, blood if needed.  BS 88 @ 12:18.  A Pos.  CKG stage 3.  H/o right ankle surgery.  Nonsmoker.  Blood OK if needed, second IV if needed.  Informed Consent Plan and Risks Discussed With:  Patient and spouse  Use of Blood Products Discussed With:  Patient and spouse      I have informed Arnaldo Gutierrez and/or legal guardian or family member of the nature of the anesthetic plan, benefits, risks including possible dental damage if relevant, major complications, and any alternative forms of anesthetic management.   All of the patient's questions were answered to the best of my  ability. The patient desires the anesthetic management as planned.  Henri Luis MD  6/10/2025 1:43 PM  Present on Admission:  **None**           [1]   Past Medical History:   Anxiety    BPH (benign prostatic hyperplasia)    CKD (chronic kidney disease) stage 3, GFR 30-59 ml/min (HCC)    Depression    Diabetes mellitus (HCC)    Essential hypertension    GERD (gastroesophageal reflux disease)    Hyperlipidemia   [2]   Past Surgical History:  Procedure Laterality Date    Colonoscopy N/A 6/6/2025    Procedure: COLONOSCOPY with colonic stent placement;  Surgeon: Eusebio Palmer MD;  Location: [facility] ENDOSCOPY    Foot fracture surgery Right     2012   [3]   Medications Prior to Admission   Medication Sig Dispense Refill Last Dose/Taking    loratadine 10 MG Oral Tab Take 1 tablet (10 mg total) by mouth daily as needed for Allergies.   Past Month    melatonin 3 MG Oral Tab Take 2 tablets (6 mg total) by mouth daily.   Past Month    FLUoxetine 20 MG Oral Cap Take 1 capsule (20 mg total) by mouth daily. 90 capsule 3 6/5/2025 Morning    Accu-Chek FastClix Lancets Does not apply Misc Apply 100 Lancets topically daily. 100 each 3 Taking    Glucose Blood (ACCU-CHEK GUIDE TEST) In Vitro Strip Use 1 time per day. 100 strip 3 Taking    Tirzepatide (MOUNJARO) 5 MG/0.5ML Subcutaneous Solution Auto-injector Inject 5 mg into the skin every 7 days. 6 mL 1 6/1/2025    pregabalin 100 MG Oral Cap Take 1 capsule (100 mg total) by mouth in the morning, at noon, and at bedtime. 90 capsule 5 6/5/2025 Morning    empagliflozin (JARDIANCE) 10 MG Oral Tab Take 1 tablet (10 mg total) by mouth daily. Every morning 90 tablet 3 6/5/2025 Morning    Ferrous Sulfate 325 (65 Fe) MG Oral Tab Take 1 tablet (325 mg total) by mouth daily with breakfast. With orange juice. 90 tablet 1 6/5/2025 Morning    traMADol 50 MG Oral Tab Take 1 tablet (50 mg total) by mouth every 8 (eight) hours as needed for Pain. 30 tablet 2 Past Month    tamsulosin 0.4 MG  Oral Cap Take 2 capsules (0.8 mg total) by mouth daily. AFTER SAME MEAL EACH  capsule 3 6/4/2025 Bedtime    raNITIdine HCl (RANITIDINE 150 MAX STRENGTH OR) Take 150 mg by mouth in the morning.   6/5/2025 Morning    simvastatin 40 MG Oral Tab Take 1 tablet (40 mg total) by mouth nightly. 90 tablet 3 6/4/2025 Bedtime    Glucose Blood (ACCU-CHEK GUIDE) In Vitro Strip 1 each by In Vitro route in the morning and 1 each before bedtime.   Taking    pantoprazole 40 MG Oral Tab EC Take 1 tablet (40 mg total) by mouth every morning before breakfast. 90 tablet 2 6/5/2025 Morning    vitamin B-12 50 MCG Oral Tab Take 1 tablet (50 mcg total) by mouth in the morning.   6/5/2025 Morning    Polyethyl Glycol-Propyl Glycol (SYSTANE ULTRA OP) Apply 2 drops to eye in the morning and 2 drops before bedtime.   6/5/2025 Morning    alum-mag hydroxide-simethicone 200-200-20 MG/5ML Oral Suspension Take 10 mL by mouth 4 (four) times daily as needed. 200 mL 0     Multiple Vitamins-Minerals (PRESERVISION/LUTEIN) Oral Cap Take 1 capsule by mouth in the morning.      [4]   Current Facility-Administered Medications Ordered in Epic   Medication Dose Route Frequency Provider Last Rate Last Admin    docusate sodium (Colace) cap 100 mg  100 mg Oral BID Shekhar Johnson MD   100 mg at 06/09/25 2137    FLUoxetine (PROzac) cap 20 mg  20 mg Oral Daily Shekhar Johnson MD   20 mg at 06/09/25 0935    tamsulosin (Flomax) cap 0.8 mg  0.8 mg Oral Daily Shekhar Johnson MD   0.8 mg at 06/09/25 0935    pantoprazole (Protonix) DR tab 40 mg  40 mg Oral QAM AC Shekhar Johnson MD   40 mg at 06/09/25 0532    pregabalin (Lyrica) cap 100 mg  100 mg Oral TID Shekhar Johnson MD   100 mg at 06/09/25 2138    polyethylene glycol (PEG 3350) (Miralax) 17 g oral packet 17 g  17 g Oral TID Eusebio Palmer MD   17 g at 06/09/25 2138    sodium chloride 0.9% infusion   Intravenous Continuous Shekhar Johnson MD 50 mL/hr at 06/10/25 0511 New Bag at 06/10/25 0511     acetaminophen (Tylenol Extra Strength) tab 500 mg  500 mg Oral Q4H PRN Og James MD        ondansetron (Zofran) 4 MG/2ML injection 4 mg  4 mg Intravenous Q6H PRN Og James MD        metoclopramide (Reglan) 5 mg/mL injection 5 mg  5 mg Intravenous Q8H PRN Og James MD        temazepam (Restoril) cap 15 mg  15 mg Oral Nightly PRN Og James MD        morphINE PF 2 MG/ML injection 1 mg  1 mg Intravenous Q2H PRN Og James MD        Or    morphINE PF 2 MG/ML injection 2 mg  2 mg Intravenous Q2H PRN Og James MD   2 mg at 25    Or    morphINE PF 4 MG/ML injection 4 mg  4 mg Intravenous Q2H PRN Og James MD        glucose (Dex4) 15 GM/59ML oral liquid 15 g  15 g Oral Q15 Min PRN Og James MD        Or    glucose (Glutose) 40% oral gel 15 g  15 g Oral Q15 Min PRN Og James MD        Or    glucose-vitamin C (Dex-4) chewable tab 4 tablet  4 tablet Oral Q15 Min PRN Og James MD        Or    dextrose 50% injection 50 mL  50 mL Intravenous Q15 Min PRN Og James MD        Or    glucose (Dex4) 15 GM/59ML oral liquid 30 g  30 g Oral Q15 Min PRN Og James MD        Or    glucose (Glutose) 40% oral gel 30 g  30 g Oral Q15 Min PRN Og James MD        Or    glucose-vitamin C (Dex-4) chewable tab 8 tablet  8 tablet Oral Q15 Min PRN Og James MD        insulin aspart (NovoLOG) 100 Units/mL FlexPen 1-5 Units  1-5 Units Subcutaneous TID CC Og James MD         No current [facility]-ordered outpatient medications on file.   [5] No Known Allergies  [6]   Family History  Problem Relation Age of Onset    Other (Other) Father    [7]   Social History  Socioeconomic History    Marital status:    Tobacco Use    Smoking status: Former     Current packs/day: 0.00     Average packs/day: 2.0 packs/day for 10.0 years (20.0 ttl pk-yrs)     Types: Cigarettes     Start date:      Quit date:      Years since quittin.4      Passive exposure: Past    Smokeless tobacco: Never   Vaping Use    Vaping status: Never Used   Substance and Sexual Activity    Alcohol use: Yes     Comment: rarely    Drug use: Not Currently

## 2025-06-10 NOTE — ANESTHESIA POSTPROCEDURE EVALUATION
Patient: Arnaldo Gutierrez    Procedure Summary       Date: 06/10/25 Room / Location: [facility] MAIN OR  / [facility] MAIN OR    Anesthesia Start: 1439 Anesthesia Stop:     Procedure: Laparoscopic assisted tranverse colon resection, small bowel resection x2, resection of tumor nodule on small bowel (Abdomen) Diagnosis: (Large bowel obstruction)    Surgeons: Ruben Randall MD Anesthesiologist: Ada Avilez MD    Anesthesia Type: general ASA Status: 3            Anesthesia Type: general    Vitals Value Taken Time   /60 06/10/25 18:19   Temp 98.6 °F (37 °C) 06/10/25 18:19   Pulse 97 06/10/25 18:19   Resp 18 06/10/25 18:19   SpO2 96 % 06/10/25 18:19       [facility] AN Post Evaluation:   Patient Evaluated in PACU  Patient Participation: complete - patient participated  Level of Consciousness: awake  Pain Management: adequate  Airway Patency:patent  Dental exam unchanged from preop  Yes    Cardiovascular Status: acceptable  Respiratory Status: acceptable  Postoperative Hydration acceptable      ADA AVILEZ MD  6/10/2025 6:19 PM

## 2025-06-10 NOTE — PLAN OF CARE
Pt is alert/oriented, Mexican speaking. Vitals stable on room air. NPO for surgery. Sent to pre op this afternoon, independent in room. IVF infusing. No pain. All safety measures maintained.     Problem: Patient Centered Care  Goal: Patient preferences are identified and integrated in the patient's plan of care  Description: Interventions:  - What would you like us to know as we care for you? I am from home.   - Provide timely, complete, and accurate information to patient/family  - Incorporate patient and family knowledge, values, beliefs, and cultural backgrounds into the planning and delivery of care  - Encourage patient/family to participate in care and decision-making at the level they choose  - Honor patient and family perspectives and choices  Outcome: Progressing     Problem: Diabetes/Glucose Control  Goal: Glucose maintained within prescribed range  Description: INTERVENTIONS:  - Monitor Blood Glucose as ordered  - Assess for signs and symptoms of hyperglycemia and hypoglycemia  - Administer ordered medications to maintain glucose within target range  - Assess barriers to adequate nutritional intake and initiate nutrition consult as needed  - Instruct patient on self management of diabetes  Outcome: Progressing     Problem: Patient/Family Goals  Goal: Patient/Family Long Term Goal  Description: Patient's Long Term Goal: Discharge home     Interventions:  - Monitor vitals  - Monitor labs  - IVF  - Glucose monitoring ACHS  - Clear liquid diet  - Scheduled miralax  - GI on consult  - See additional Care Plan goals for specific interventions  Outcome: Progressing  Goal: Patient/Family Short Term Goal  Description: Patient's Short Term Goal: Improve bowel function    Interventions:   - Full liquid diet  - GI on consult  - Scheduled miralax  - See additional Care Plan goals for specific interventions  Outcome: Progressing     Problem: PAIN - ADULT  Goal: Verbalizes/displays adequate comfort level or patient's  stated pain goal  Description: INTERVENTIONS:  - Encourage pt to monitor pain and request assistance  - Assess pain using appropriate pain scale  - Administer analgesics based on type and severity of pain and evaluate response  - Implement non-pharmacological measures as appropriate and evaluate response  - Consider cultural and social influences on pain and pain management  - Manage/alleviate anxiety  - Utilize distraction and/or relaxation techniques  - Monitor for opioid side effects  - Notify MD/LIP if interventions unsuccessful or patient reports new pain  - Anticipate increased pain with activity and pre-medicate as appropriate  Outcome: Progressing     Problem: RISK FOR INFECTION - ADULT  Goal: Absence of fever/infection during anticipated neutropenic period  Description: INTERVENTIONS  - Monitor WBC  - Administer growth factors as ordered  - Implement neutropenic guidelines  Outcome: Progressing     Problem: SAFETY ADULT - FALL  Goal: Free from fall injury  Description: INTERVENTIONS:  - Assess pt frequently for physical needs  - Identify cognitive and physical deficits and behaviors that affect risk of falls.  - [place] fall precautions as indicated by assessment.  - Educate pt/family on patient safety including physical limitations  - Instruct pt to call for assistance with activity based on assessment  - Modify environment to reduce risk of injury  - Provide assistive devices as appropriate  - Consider OT/PT consult to assist with strengthening/mobility  - Encourage toileting schedule  Outcome: Progressing     Problem: DISCHARGE PLANNING  Goal: Discharge to home or other facility with appropriate resources  Description: INTERVENTIONS:  - Identify barriers to discharge w/pt and caregiver  - Include patient/family/discharge partner in discharge planning  - Arrange for needed discharge resources and transportation as appropriate  - Identify discharge learning needs (meds, wound care, etc)  - Arrange for  interpreters to assist at discharge as needed  - Consider post-discharge preferences of patient/family/discharge partner  - Complete POLST form as appropriate  - Assess patient's ability to be responsible for managing their own health  - Refer to Case Management Department for coordinating discharge planning if the patient needs post-hospital services based on physician/LIP order or complex needs related to functional status, cognitive ability or social support system  Outcome: Progressing     Problem: Altered Communication/Language Barrier  Goal: Patient/Family is able to understand and participate in their care  Description: Interventions:  - Assess communication ability and preferred communication style  - Implement communication aides and strategies  - Use visual cues when possible  - Listen attentively, be patient, do not interrupt  - Minimize distractions  - Allow time for understanding and response  - Establish method for patient to ask for assistance (call light)  - Provide an  as needed  - Communicate barriers and strategies to overcome with those who interact with patient  Outcome: Progressing     Problem: GASTROINTESTINAL - ADULT  Goal: Minimal or absence of nausea and vomiting  Description: INTERVENTIONS:  - Maintain adequate hydration with IV or PO as ordered and tolerated  - Nasogastric tube to low intermittent suction as ordered  - Evaluate effectiveness of ordered antiemetic medications  - Provide nonpharmacologic comfort measures as appropriate  - Advance diet as tolerated, if ordered  - Obtain nutritional consult as needed  - Evaluate fluid balance  Outcome: Progressing  Goal: Maintains or returns to baseline bowel function  Description: INTERVENTIONS:  - Assess bowel function  - Maintain adequate hydration with IV or PO as ordered and tolerated  - Evaluate effectiveness of GI medications  - Encourage mobilization and activity  - Obtain nutritional consult as needed  - Establish a  toileting routine/schedule  - Consider collaborating with pharmacy to review patient's medication profile  Outcome: Progressing

## 2025-06-10 NOTE — BRIEF OP NOTE
[facility]  part of [facility]    Brief Operative Report    Arnaldo Gutierrez Patient Status:  Inpatient    4/15/1945 MRN [id number]   Location OR [facility] [id number]   Admission Date 2025 Attending Shekhar Johnson MD   Hosp Day # 5 Operative Physician THUY ALSTON MD     Operative Date 6/10/2025     Pre-Operative Diagnosis: Large bowel obstruction    Post-Operative Diagnosis: Same as above.    Procedure Performed: Laparoscopic assisted tranverse colon resection, small bowel resection x2, resection of tumor nodule on small bowel  laparoscopic take down of splenic flexure    Surgeon: THUY ALSTON MD    Assistants: Bebeto Costa    Anesthesia: General    Attending:  Elizabeth    EBL: [id number]    Findings: as above    Specimens:    ID Type Source Tests Collected by Time Destination   1 : 1. transverse colon Tissue Colon transverse SURGICAL PATHOLOGY TISSUE Thuy Alston MD 6/10/2025 1552    2 : 2. small bowel Tissue Small intestine SURGICAL PATHOLOGY TISSUE Thuy Alston MD 6/10/2025 1720    3 : 3. lesion of the upper small bowel Tissue Small intestine SURGICAL PATHOLOGY TISSUE Thuy Alston MD 6/10/2025 1721        Drains: none    Complications: None    Disposition: stable     THUY ALSTON MD  6/10/2025  6:13 [facility]  part of [facility]

## 2025-06-10 NOTE — DIETARY NOTE
ADULT NUTRITION REASSESSMENT    Pt is at high nutrition risk.  Pt meets severe malnutrition criteria.      RECOMMENDATIONS TO MD: Advance diet as medically safe post op, And or Nutrition Support (in view of pre-existing Malnturition) if unable to advance diet beyond clear.    ADMITTING DIAGNOSIS:  Large bowel obstruction (HCC) [K56.609]  Colonic mass [K63.89]  PERTINENT PAST MEDICAL HISTORY: Past Medical History[1]    PATIENT STATUS: Initial 06/06/25: Pt assessed due to screening at risk for MST of unsure weight loss. Pt admitted due to large bowel obstruction found. Past medical history significant for CKD, DM, GERD, HTN and hyperlipidemia. Chart reviewed. Attempted to visit patient bedside twice; out of room both times. Currently at colonoscopy. Pt has been NPO for unprepped colonoscopy with possible colonic stent and biopsy; unable to assess intake during admission. Reviewed MD notes and significant for reports of very decreased appetite x2 weeks due to abdominal pain after eating. Complaints of weight loss; ~60lbs in 4-5 months. EMR reviewed and weight loss noted; 1/16/25:169lbs (12% loss in 5 months) 6/7/24: 189lbs (20% lost in 1 yr); both weight losses clinically significant for malnutrition in time frame. Pt has had BM this AM. NFPE deferred. Will complete at reassessment.     6/10/2025 Update:      NPO/Cl liq x 5 days. NPO today for Surgery ( Lap Assisted Colon Resection).  Pt was taking Cl liq 100% prior to NPO. Re-visited pt. Utilized Language line (# [id number], Jimmie). Pt reports was tolerating liquid diet well. + BM this am.   Nutrition history reveals decreased po for > 3 months d/t  loss of appetite r/t depression and recently d/t symptoms of acute GI illness. Wt history reported usual wt of 200# last year (189# in emr) , lost wt to 150-162#. 162# 3 months ago, and lost more wt down to 150# ( this admit). Based on above initial RD eval, 39# or 20.6% significant wt loss in the past year. Nutrition Focus  Physical Exam ( NFPE) completed. Pt met Severe Malnutrition criteria.    Will monitor GI and NPO status, await diet advance as safe. Otherwise, can consider Nutrition Support Therapy (NST) if unable to advance diet beyond cl liq given pre-existing Malnutrition.       FOOD/NUTRITION RELATED HISTORY:  Appetite: Poor  Intake: NPO  Intake Meeting Needs: NPO  Percent Meals Eaten (last 6 days)       Date/Time Percent Meals Eaten (%)    06/07/25 2047 100 %    06/08/25 1300 100 %    06/09/25 1500 100 %    06/10/25 1000 0 %           Food Allergies: No Known Food Allergies (NKFA)  Cultural/Ethnic/Christian Preferences: Not Obtained    GASTROINTESTINAL: +BM 6/6, constipation, and large bowel obstruction--surgery today 6/10    MEDICATIONS: reviewed  Scheduled Medications[2]  LABS: reviewed  Recent Labs     06/07/25  0653 06/08/25  0713   GLU 69* 105*   BUN 12 9   CREATSERUM 0.87 0.90   CA 8.4* 8.2*    140   K 3.8 3.9    107   CO2 24.0 26.0   OSMOCALC 286 289       WEIGHT HISTORY:  Patient Weight(s) for the past 336 hrs:   Weight   06/05/25 2004 68.4 kg (150 lb 12.7 oz)   06/05/25 1608 67.1 kg (147 lb 14.9 oz)     Wt Readings from Last 10 Encounters:   06/05/25 68.4 kg (150 lb 12.7 oz)   05/28/25 67.1 kg (148 lb)   04/09/25 72.6 kg (160 lb)   04/08/25 72.6 kg (160 lb)   03/18/25 81.6 kg (180 lb)   01/16/25 76.7 kg (169 lb)   11/21/24 77.6 kg (171 lb)   08/29/24 78.9 kg (174 lb)   08/25/24 81.6 kg (180 lb)   07/15/24 84.8 kg (187 lb)       ANTHROPOMETRICS:  HT: 180.3 cm (5' 11\")  Wt Readings from Last 1 Encounters:   06/05/25 68.4 kg (150 lb 12.7 oz)     Last weight: Likely accurate  BMI: Body mass index is 21.03 kg/m².  Dosing Weight: 68.4 kg - recent weight, utilized for anthropometric calculations  BMI CLASSIFICATION: <22 considered underweight for advanced age (>65)  IBW/lbs (Calculated) Male: 172 lbs             87% IBW  Usual Body Wt: 189 lbs    1 yr ago  79% UBW    NUTRITION RELATED PHYSICAL FINDINGS:  -  Nutrition Focused Physical Exam (NFPE): deferred  - Fluid Accumulation: none . See RN documentation for details  - Skin Integrity: RN documentation reviewed. See RN documentation for details    CRITERIA FOR MALNUTRITION DIAGNOSIS:  Criteria for severe malnutrition diagnosis: chronic illness related to wt loss greater than 10% in 6 months and energy intake less than 75% for greater than 1 month.    NUTRITION DIAGNOSIS/PROBLEM:   Severe Malnutrition related to Chronic - Physiological causes increasing nutrient needs due to illness or condition (colonic mass) as evidenced by wt loss greater than 10% in 6 months and energy intake less than 75% for greater than 1 month.   Nutrition Diagnosis Progress: No Improvement (continue) --NPO for OR      NUTRITION INTERVENTION:     NUTRITION PRESCRIPTION:   Estimated Nutrition needs: --dosing wt of 68.4 kg - wt taken on 6/5/25  Calories: 8563-6505 calories/day (30-35 calories per kg Dosing wt)  Protein:  g protein/day (1.2-1.5 g protein/kg Dosing wt)  Fluid Needs: 2052-2380mL (1mL/kcal)    - Diet:       Procedures    NPO    - Nutrition Care Plan: Will initiate ONS when diet advances or initiate Nutrition Support if unable to advance diet beyond cl liq.   - ONS (Oral Nutrition Supplements)/Meals/Snacks: RD to add when diet initiated and NPO   - Vitamin and mineral supplements: NPO  - Feeding assistance: NPO  - Nutrition education: not appropriate at this time  - Coordination of nutrition care: collaboration with other providers  - Discharge and transfer of nutrition care to new setting or provider: to be determined and monitor plans      MONITOR AND EVALUATE/NUTRITION GOALS:  - Food and Nutrient Intake:    Monitor: for PO initiation  - Food and Nutrient Administration:    Monitor: need for temporary nutrition support if unable to advance diet beyond cl liq in the next 48 hrs  - Anthropometric Measurement:    Monitor weight  - Nutrition Goals:    halt wt loss, gradual wt gain  as able, diet initiation vs nutrition support within 48 hrs, labs within acceptable limits, minimize lean body mass loss, support body systems, and improved GI status      DIETITIAN FOLLOW UP: RD to follow and monitor nutrition status       Elana Collins RD, LDN, [facility]  Clinical Dietitian  [phone]             [1]   Past Medical History:   Anxiety    BPH (benign prostatic hyperplasia)    CKD (chronic kidney disease) stage 3, GFR 30-59 ml/min (HCC)    Depression    Diabetes (HCC)    Diabetes mellitus ([facility])    Essential hypertension    GERD (gastroesophageal reflux disease)    High cholesterol    Hyperlipidemia   [2]    [Transfer Hold] docusate sodium  100 mg Oral BID    [Transfer Hold] FLUoxetine  20 mg Oral Daily    [Transfer Hold] tamsulosin  0.8 mg Oral Daily    [Transfer Hold] pantoprazole  40 mg Oral QAM AC    [Transfer Hold] pregabalin  100 mg Oral TID    [Transfer Hold] polyethylene glycol (PEG 3350)  17 g Oral TID    [Transfer Hold] insulin aspart  1-5 Units Subcutaneous TID CC

## 2025-06-11 ENCOUNTER — APPOINTMENT (OUTPATIENT)
Dept: PICC SERVICES | Facility: HOSPITAL | Age: 80
End: 2025-06-11
Payer: MEDICARE

## 2025-06-11 LAB
ANION GAP SERPL CALC-SCNC: 10 MMOL/L (ref 0–18)
ANION GAP SERPL CALC-SCNC: 8 MMOL/L (ref 0–18)
BASOPHILS # BLD: 0 X10(3) UL (ref 0–0.2)
BASOPHILS # BLD: 0 X10(3) UL (ref 0–0.2)
BASOPHILS NFR BLD: 0 %
BASOPHILS NFR BLD: 0 %
BUN BLD-MCNC: 10 MG/DL (ref 9–23)
BUN BLD-MCNC: 7 MG/DL (ref 9–23)
BUN/CREAT SERPL: 10.1 (ref 10–20)
BUN/CREAT SERPL: 7 (ref 10–20)
CALCIUM BLD-MCNC: 6.3 MG/DL (ref 8.7–10.4)
CALCIUM BLD-MCNC: 7.7 MG/DL (ref 8.7–10.4)
CHLORIDE SERPL-SCNC: 109 MMOL/L (ref 98–112)
CHLORIDE SERPL-SCNC: 98 MMOL/L (ref 98–112)
CO2 SERPL-SCNC: 17 MMOL/L (ref 21–32)
CO2 SERPL-SCNC: 23 MMOL/L (ref 21–32)
CREAT BLD-MCNC: 0.99 MG/DL (ref 0.7–1.3)
CREAT BLD-MCNC: 1 MG/DL (ref 0.7–1.3)
DEPRECATED RDW RBC AUTO: 56.9 FL (ref 35.1–46.3)
DEPRECATED RDW RBC AUTO: 63.6 FL (ref 35.1–46.3)
EGFRCR SERPLBLD CKD-EPI 2021: 77 ML/MIN/1.73M2 (ref 60–?)
EGFRCR SERPLBLD CKD-EPI 2021: 78 ML/MIN/1.73M2 (ref 60–?)
EOSINOPHIL # BLD: 0 X10(3) UL (ref 0–0.7)
EOSINOPHIL # BLD: 0.06 X10(3) UL (ref 0–0.7)
EOSINOPHIL NFR BLD: 0 %
EOSINOPHIL NFR BLD: 2 %
ERYTHROCYTE [DISTWIDTH] IN BLOOD BY AUTOMATED COUNT: 17.4 % (ref 11–15)
ERYTHROCYTE [DISTWIDTH] IN BLOOD BY AUTOMATED COUNT: 18.6 % (ref 11–15)
GLUCOSE BLD-MCNC: 142 MG/DL (ref 70–99)
GLUCOSE BLD-MCNC: 738 MG/DL (ref 70–99)
GLUCOSE BLDC GLUCOMTR-MCNC: 105 MG/DL (ref 70–99)
GLUCOSE BLDC GLUCOMTR-MCNC: 112 MG/DL (ref 70–99)
GLUCOSE BLDC GLUCOMTR-MCNC: 134 MG/DL (ref 70–99)
GLUCOSE BLDC GLUCOMTR-MCNC: 169 MG/DL (ref 70–99)
GLUCOSE BLDC GLUCOMTR-MCNC: 239 MG/DL (ref 70–99)
GLUCOSE BLDC GLUCOMTR-MCNC: 323 MG/DL (ref 70–99)
GLUCOSE BLDC GLUCOMTR-MCNC: 333 MG/DL (ref 70–99)
HCT VFR BLD AUTO: 30 % (ref 39–53)
HCT VFR BLD AUTO: 41.2 % (ref 39–53)
HGB BLD-MCNC: 11.4 G/DL (ref 13–17.5)
HGB BLD-MCNC: 12.4 G/DL (ref 13–17.5)
HGB BLD-MCNC: 13.2 G/DL (ref 13–17.5)
HGB BLD-MCNC: 9.2 G/DL (ref 13–17.5)
LACTATE SERPL-SCNC: 3.8 MMOL/L (ref 0.5–2)
LACTATE SERPL-SCNC: 4.4 MMOL/L (ref 0.5–2)
LACTATE SERPL-SCNC: 4.9 MMOL/L (ref 0.5–2)
LACTATE SERPL-SCNC: 5.9 MMOL/L (ref 0.5–2)
LYMPHOCYTES NFR BLD: 0.28 X10(3) UL (ref 1–4)
LYMPHOCYTES NFR BLD: 0.42 X10(3) UL (ref 1–4)
LYMPHOCYTES NFR BLD: 10 %
LYMPHOCYTES NFR BLD: 4 %
MCH RBC QN AUTO: 28.7 PG (ref 26–34)
MCH RBC QN AUTO: 29.1 PG (ref 26–34)
MCHC RBC AUTO-ENTMCNC: 30.7 G/DL (ref 31–37)
MCHC RBC AUTO-ENTMCNC: 32 G/DL (ref 31–37)
MCV RBC AUTO: 90.7 FL (ref 80–100)
MCV RBC AUTO: 93.5 FL (ref 80–100)
MONOCYTES # BLD: 0.14 X10(3) UL (ref 0.1–1)
MONOCYTES # BLD: 0.21 X10(3) UL (ref 0.1–1)
MONOCYTES NFR BLD: 2 %
MONOCYTES NFR BLD: 5 %
NEUTROPHILS # BLD AUTO: 2.18 X10 (3) UL (ref 1.5–7.7)
NEUTROPHILS # BLD AUTO: 9.28 X10 (3) UL (ref 1.5–7.7)
NEUTROPHILS NFR BLD: 55 %
NEUTROPHILS NFR BLD: 75 %
NEUTS BAND NFR BLD: 19 %
NEUTS BAND NFR BLD: 28 %
NEUTS HYPERSEG # BLD: 2.32 X10(3) UL (ref 1.5–7.7)
NEUTS HYPERSEG # BLD: 9.96 X10(3) UL (ref 1.5–7.7)
NEUTS VAC BLD QL SMEAR: PRESENT
OSMOLALITY SERPL CALC.SUM OF ELEC: 291 MOSM/KG (ref 275–295)
OSMOLALITY SERPL CALC.SUM OF ELEC: 294 MOSM/KG (ref 275–295)
PLATELET # BLD AUTO: 220 10(3)UL (ref 150–450)
PLATELET # BLD AUTO: 280 10(3)UL (ref 150–450)
PLATELET MORPHOLOGY: NORMAL
PLATELET MORPHOLOGY: NORMAL
PLATELETS.RETICULATED NFR BLD AUTO: 1.5 % (ref 0–7)
POTASSIUM SERPL-SCNC: 3.1 MMOL/L (ref 3.5–5.1)
POTASSIUM SERPL-SCNC: 3.9 MMOL/L (ref 3.5–5.1)
RBC # BLD AUTO: 3.21 X10(6)UL (ref 3.8–5.8)
RBC # BLD AUTO: 4.54 X10(6)UL (ref 3.8–5.8)
SODIUM SERPL-SCNC: 125 MMOL/L (ref 136–145)
SODIUM SERPL-SCNC: 140 MMOL/L (ref 136–145)
TOTAL CELLS COUNTED BLD: 100
TOTAL CELLS COUNTED BLD: 100
WBC # BLD AUTO: 10.6 X10(3) UL (ref 4–11)
WBC # BLD AUTO: 2.8 X10(3) UL (ref 4–11)

## 2025-06-11 PROCEDURE — 36620 INSERTION CATHETER ARTERY: CPT

## 2025-06-11 PROCEDURE — 99223 1ST HOSP IP/OBS HIGH 75: CPT | Performed by: STUDENT IN AN ORGANIZED HEALTH CARE EDUCATION/TRAINING PROGRAM

## 2025-06-11 PROCEDURE — 02HV33Z INSERTION OF INFUSION DEVICE INTO SUPERIOR VENA CAVA, PERCUTANEOUS APPROACH: ICD-10-PCS | Performed by: HOSPITALIST

## 2025-06-11 PROCEDURE — B518YZA FLUOROSCOPY OF SUPERIOR VENA CAVA USING OTHER CONTRAST, GUIDANCE: ICD-10-PCS | Performed by: HOSPITALIST

## 2025-06-11 PROCEDURE — 99233 SBSQ HOSP IP/OBS HIGH 50: CPT | Performed by: INTERNAL MEDICINE

## 2025-06-11 PROCEDURE — 04HY32Z INSERTION OF MONITORING DEVICE INTO LOWER ARTERY, PERCUTANEOUS APPROACH: ICD-10-PCS

## 2025-06-11 PROCEDURE — 99233 SBSQ HOSP IP/OBS HIGH 50: CPT | Performed by: HOSPITALIST

## 2025-06-11 RX ORDER — ONDANSETRON 2 MG/ML
4 INJECTION INTRAMUSCULAR; INTRAVENOUS EVERY 4 HOURS PRN
Status: DISCONTINUED | OUTPATIENT
Start: 2025-06-11 | End: 2025-06-24

## 2025-06-11 RX ORDER — PHENTOLAMINE MESYLATE 5 MG/ML
10 INJECTION, POWDER, FOR SOLUTION INTRAMUSCULAR; INTRAVENOUS
Status: COMPLETED | OUTPATIENT
Start: 2025-06-11 | End: 2025-06-11

## 2025-06-11 RX ORDER — LIDOCAINE HYDROCHLORIDE 10 MG/ML
5 INJECTION, SOLUTION EPIDURAL; INFILTRATION; INTRACAUDAL; PERINEURAL
Status: COMPLETED | OUTPATIENT
Start: 2025-06-11 | End: 2025-06-11

## 2025-06-11 RX ORDER — ACETAMINOPHEN 10 MG/ML
1000 INJECTION, SOLUTION INTRAVENOUS EVERY 6 HOURS PRN
Status: DISCONTINUED | OUTPATIENT
Start: 2025-06-11 | End: 2025-06-24

## 2025-06-11 NOTE — PROGRESS NOTES
Provider Clarification     Additional information on the patient's nutritional status.    The patient has severe malnutrition    This note is part of the patient's medical record.

## 2025-06-11 NOTE — PROGRESS NOTES
[facility]  Progress Note     Arnaldo Gutierrez  : 4/15/1945    Status: Inpatient  Day #: 6    Attending: Boy Mccabe MD  PCP: PHYSICIAN NONSTAFF     Assessment and Plan:    Suspected Metastatic Colon Cancer  Large Bowel obstruction 2/2 Proximal transverse colonic mass  - CT A/P reviewed  - GI and general surgery consulted  -  s/p colonoscopy with metallic colon stent  - 6/10/25 - s/p laparoscopic transverse colon resection, small bowel resection x2, resection of tumor nodule on small bowel. Patient appeared to have metastatic disease per Dr. Randall.   - NGT in place post-op  - f/u pathology  - oncology consult    Acute postoperative blood loss anemia with hypotension  - EBL from surgery 600ml  - s/p 2 units PRBC - hgb improved to 13.2  - cont pressor support    DM2  - ISS     Other:  H/o HTN - BP low now and on pressors  HL  BPH - flomax when able to take PO    Dietitian Malnutrition Assessment    Evaluation for Malnutrition: Criteria for severe malnutrition diagnosis- chronic illness related to   Wt loss greater than 10% in 6 months., Energy intake less than 75% for greater than 1 month.               RD Malnutrition Care Plan:      Body Fat/Muscle Mass:          Physician Assessment     Patient has a diagnosis of severe malnutrition       DVT Mechanical Prophylaxis:   SCDs,    DVT Pharmacologic Prophylaxis   Medication   None               Subjective:      Initial Chief Complaint:  abd discomfort, nausea, weight loss    Abd pain post-op.     10 point ROS completed and was negative, except for pertinent positive and negatives stated in subjective.      Objective:      Temp:  [96.4 °F (35.8 °C)-98.6 °F (37 °C)] 97.1 °F (36.2 °C)  Pulse:  [58-97] 95  Resp:  [13-33] 22  BP: ()/(48-69) 88/57  SpO2:  [92 %-100 %] 93 %  General:  Alert, no distress  HEENT:  Normocephalic, atraumatic  Cardiac:  Regular rate, regular rhythm  Pulmonary:  Clear to auscultation bilaterally, respirations  unlabored  Gastrointestinal:  hypoactive BS  Musculoskeletal:  No joint swelling  Extremities:  No edema, no cyanosis, no clubbing  Neurologic:  nonfocal  Psychiatric:  Normal affect, calm and appropriate    Intake/Output Summary (Last 24 hours) at 6/11/2025 1044  Last data filed at 6/11/2025 0600  Gross per 24 hour   Intake 3425 ml   Output 1550 ml   Net 1875 ml         Recent Labs   Lab 06/10/25  2141 06/11/25  0126 06/11/25  0836   WBC 5.9 2.8* 10.6   HGB 11.3* 9.2* 13.2   HCT 35.9* 30.0* 41.2   .0 220.0 280.0   RBC 4.07 3.21* 4.54   MCV 88.2 93.5 90.7   MCH 27.8 28.7 29.1   MCHC 31.5 30.7* 32.0   RDW 18.3* 18.6* 17.4*   NEPRELIM 4.45 2.18 9.28*     Recent Labs   Lab 06/05/25  1617 06/06/25  0741 06/08/25  0713 06/10/25  1936 06/11/25  0126   BUN 19   < > 9 7* 7*   CREATSERUM 1.02   < > 0.90 0.87 1.00   CA 8.7   < > 8.2* 7.2* 6.3*   ALB 3.6  --   --   --   --       < > 140 140 125*   K 3.7   < > 3.9 3.6 3.1*      < > 107 109 98   CO2 24.0   < > 26.0 19.0* 17.0*   *   < > 105* 228* 738*   BILT 0.3  --   --   --   --    AST 31  --   --   --   --    ALT 27  --   --   --   --    ALKPHO 115  --   --   --   --    TP 6.2  --   --   --   --    LIP 22  --   --   --   --     < > = values in this interval not displayed.       XR ABDOMEN (1 VIEW) (CPT=74018)  Result Date: 6/11/2025  CONCLUSION:  1. No retained unexpected surgical foreign body. 2. Postsurgical changes with anastomotic staple lines and skin staples. 3. Catheter in the bladder.    Dictated by (CST): Chong Valdez MD on 6/11/2025 at 1:22 AM     Finalized by (CST): Chong Valdez MD on 6/11/2025 at 1:28 AM              Medications:  Scheduled Medications[1]   PRN Meds: PRN Medications[2]    Supplementary Documentation:   DVT Mechanical Prophylaxis:   SCDs,    DVT Pharmacologic Prophylaxis   Medication   None                Code Status: Not on file  Muñoz: Muñoz catheter in place  Muñoz Duration (in days): 2  Central line:    ANNELISE:  6/13/2025        **Certification      PHYSICIAN Certification of Need for Inpatient Hospitalization - Initial Certification    Patient will require inpatient services that will reasonably be expected to span two midnight's based on the clinical documentation in H+P.   Based on patients current state of illness, I anticipate that, after discharge, patient will require TBD.        Boy Mccabe MD         [1]    insulin regular human  1-11 Units Subcutaneous Q6H    pantoprazole  40 mg Intravenous Daily    piperacillin-tazobactam  3.375 g Intravenous Q8H   [2]   oxyCODONE **OR** oxyCODONE    HYDROmorphone **OR** HYDROmorphone    acetaminophen    ondansetron    glucose **OR** glucose **OR** [DISCONTINUED] glucose-vitamin C **OR** dextrose **OR** [DISCONTINUED] glucose **OR** [DISCONTINUED] glucose **OR** [DISCONTINUED] glucose-vitamin C

## 2025-06-11 NOTE — PLAN OF CARE
Patient is axox4. Romanian speaking. Patient came up due to hypotension from pacu. On 2nd unit of blood and levo 20mcg when transferred. Lactic acid: 4.6; was given 2L bolus. Labs drawn. 1 unit of blood given.   Problem: Patient Centered Care  Goal: Patient preferences are identified and integrated in the patient's plan of care  Description: Interventions:  - What would you like us to know as we care for you? I am from home.   - Provide timely, complete, and accurate information to patient/family  - Incorporate patient and family knowledge, values, beliefs, and cultural backgrounds into the planning and delivery of care  - Encourage patient/family to participate in care and decision-making at the level they choose  - Honor patient and family perspectives and choices  Outcome: Progressing     Problem: Diabetes/Glucose Control  Goal: Glucose maintained within prescribed range  Description: INTERVENTIONS:  - Monitor Blood Glucose as ordered  - Assess for signs and symptoms of hyperglycemia and hypoglycemia  - Administer ordered medications to maintain glucose within target range  - Assess barriers to adequate nutritional intake and initiate nutrition consult as needed  - Instruct patient on self management of diabetes  Outcome: Progressing     Problem: Patient/Family Goals  Goal: Patient/Family Long Term Goal  Description: Patient's Long Term Goal: Discharge home     Interventions:  - Monitor vitals  - Monitor labs  - IVF  - Glucose monitoring ACHS  - Clear liquid diet  - Scheduled miralax  - GI on consult  - See additional Care Plan goals for specific interventions  Outcome: Progressing  Goal: Patient/Family Short Term Goal  Description: Patient's Short Term Goal: Improve bowel function    Interventions:   - Full liquid diet  - GI on consult  - Scheduled miralax  - See additional Care Plan goals for specific interventions  Outcome: Progressing     Problem: PAIN - ADULT  Goal: Verbalizes/displays adequate comfort level  or patient's stated pain goal  Description: INTERVENTIONS:  - Encourage pt to monitor pain and request assistance  - Assess pain using appropriate pain scale  - Administer analgesics based on type and severity of pain and evaluate response  - Implement non-pharmacological measures as appropriate and evaluate response  - Consider cultural and social influences on pain and pain management  - Manage/alleviate anxiety  - Utilize distraction and/or relaxation techniques  - Monitor for opioid side effects  - Notify MD/LIP if interventions unsuccessful or patient reports new pain  - Anticipate increased pain with activity and pre-medicate as appropriate  Outcome: Progressing     Problem: RISK FOR INFECTION - ADULT  Goal: Absence of fever/infection during anticipated neutropenic period  Description: INTERVENTIONS  - Monitor WBC  - Administer growth factors as ordered  - Implement neutropenic guidelines  Outcome: Progressing     Problem: SAFETY ADULT - FALL  Goal: Free from fall injury  Description: INTERVENTIONS:  - Assess pt frequently for physical needs  - Identify cognitive and physical deficits and behaviors that affect risk of falls.  - [place] fall precautions as indicated by assessment.  - Educate pt/family on patient safety including physical limitations  - Instruct pt to call for assistance with activity based on assessment  - Modify environment to reduce risk of injury  - Provide assistive devices as appropriate  - Consider OT/PT consult to assist with strengthening/mobility  - Encourage toileting schedule  Outcome: Progressing    Problem: DISCHARGE PLANNING  Goal: Discharge to home or other facility with appropriate resources  Description: INTERVENTIONS:  - Identify barriers to discharge w/pt and caregiver  - Include patient/family/discharge partner in discharge planning  - Arrange for needed discharge resources and transportation as appropriate  - Identify discharge learning needs (meds, wound care, etc)  -  Arrange for interpreters to assist at discharge as needed  - Consider post-discharge preferences of patient/family/discharge partner  - Complete POLST form as appropriate  - Assess patient's ability to be responsible for managing their own health  - Refer to Case Management Department for coordinating discharge planning if the patient needs post-hospital services based on physician/LIP order or complex needs related to functional status, cognitive ability or social support system  Outcome: Progressing     Problem: Altered Communication/Language Barrier  Goal: Patient/Family is able to understand and participate in their care  Description: Interventions:  - Assess communication ability and preferred communication style  - Implement communication aides and strategies  - Use visual cues when possible  - Listen attentively, be patient, do not interrupt  - Minimize distractions  - Allow time for understanding and response  - Establish method for patient to ask for assistance (call light)  - Provide an  as needed  - Communicate barriers and strategies to overcome with those who interact with patient  Outcome: Progressing     Problem: GASTROINTESTINAL - ADULT  Goal: Minimal or absence of nausea and vomiting  Description: INTERVENTIONS:  - Maintain adequate hydration with IV or PO as ordered and tolerated  - Nasogastric tube to low intermittent suction as ordered  - Evaluate effectiveness of ordered antiemetic medications  - Provide nonpharmacologic comfort measures as appropriate  - Advance diet as tolerated, if ordered  - Obtain nutritional consult as needed  - Evaluate fluid balance  Outcome: Progressing  Goal: Maintains or returns to baseline bowel function  Description: INTERVENTIONS:  - Assess bowel function  - Maintain adequate hydration with IV or PO as ordered and tolerated  - Evaluate effectiveness of GI medications  - Encourage mobilization and activity  - Obtain nutritional consult as needed  -  Establish a toileting routine/schedule  - Consider collaborating with pharmacy to review patient's medication profile  Outcome: Progressing

## 2025-06-11 NOTE — PROGRESS NOTES
Called to evaluate patient in room 212    On arrival patient lying in bed complaining of upper abdominal pain, currently with NG to low intermittent suction.  Rates his pain as a 10 out of 10 in severity aggravated by movement with no relieving factors.    On exam  Temperature 96.9  Pulse 88  Respiration 18  BP 94/62  Pulse ox 96% on room air  Alert oriented in mild to moderate distress secondary to pain  Chest clear  Heart regular rate  Abdomen firm tender  Extremities no edema  No focal neurological deficit.    Assessment and plan    Postop hemorrhage with hypotension  Patient transfused 2 units packed RBC, started on Levophed as well, will continue same, increase IV fluids to 125 mL/h.  Repeat CBC later.    Metabolic acidosis  Likely secondary to volume loss, continue IV fluids, monitor lactate trend.  Check BMP later.    Colon/small bowel resection  Surgery on board, currently exquisitely tender, use pain medication as needed.    35 minutes of critical care time spent with patient including coordinating care with ancillary staff.

## 2025-06-11 NOTE — CONSULTS
[facility]  part of [facility]    Report of Consultation    Arnaldo Gutierrez Patient Status:  Inpatient    4/15/1945 MRN [id number]   Location [facility] 2W/SW Attending Boy Mccabe MD   Hosp Day # 6 PCP PHYSICIAN NONSTAFF     Date of Admission:  2025    Reason for Consultation:   Shock state, postop management    History of Present Illness:   Patient is a 80-year-old  male with past medical history significant for diabetes mellitus, hypertension who presented with chief complaint of abdominal pain nausea with progressive worsening weight loss.  CT abdomen pelvis revealed evidence of 4 cm segment of masslike wall thickening with concern for partial obstruction secondary to neoplasm.  Eval by general surgery status post laparoscopic assisted transverse colon resection small bowel resection and resection of tumor on small bowel on 6/10/2025.  Currently on pressor support with norepinephrine.  Admits to generalized postoperative abdominal pain.  Denies dyspnea.    Past Medical History  Past Medical History[1]    Past Surgical History  Past Surgical History[2]    Family History  Family History[3]    Social History  Social Hx on file[4]        Current Medications:  Current Hospital Medications[5]  Prescriptions Prior to Admission[6]    Allergies  Allergies[7]    Review of Systems:   Constitutional: denies fevers, chills, weakness, fatigue, recent illness  HEENT: denies headache, sore throat, vision loss  Cardio: denies chest pain, chest pressure, palpitations  Respiratory: denies dyspnea, cough, wheezing, hemoptysis   GI: denies nausea, vomiting, + abdominal pain  : denies dysuria, hematuria  Musculoskeletal: denies arthralgia, myalgia  Integumentary: denies rash, itching  Neurological: denies syncope, weakness, dizziness,   Psychiatric: denies depression, anxiety  Hematologic: denies bruising        Physical Exam:   Blood pressure 102/54, pulse 107, temperature 97.1 °F (36.2  °C), temperature source Temporal, resp. rate 22, height 5' 11\" (1.803 m), weight 145 lb 15.1 oz (66.2 kg), SpO2 96%.    Constitutional: Mild distress  Eyes: PERRL  ENT: nares patent  Neck: neck supple, no JVD  Cardio: RRR, S1 S2  Respiratory: Basilar crackles  GI: abdomen dressing in place, tenderness to palpation  Extremities: no clubbing, cyanosis, edema  Neurologic: no gross motor deficits  Skin: warm, dry    Results:   Laboratory Data  Lab Results   Component Value Date    WBC 10.6 06/11/2025    HGB 13.2 06/11/2025    HCT 41.2 06/11/2025    .0 06/11/2025    CREATSERUM 1.00 06/11/2025    BUN 7 (L) 06/11/2025     (L) 06/11/2025    K 3.1 (L) 06/11/2025    CL 98 06/11/2025    CO2 17.0 (L) 06/11/2025     (HH) 06/11/2025    CA 6.3 (L) 06/11/2025    ALB 3.6 06/05/2025    ALKPHO 115 06/05/2025    TP 6.2 06/05/2025    AST 31 06/05/2025    ALT 27 06/05/2025    TSH 2.417 04/08/2025    LIP 22 06/05/2025    PHOS 3.0 04/10/2024    B12 >2,000 (H) 04/08/2025         Imaging  XR ABDOMEN (1 VIEW) (CPT=74018)  Result Date: 6/11/2025  CONCLUSION:  1. No retained unexpected surgical foreign body. 2. Postsurgical changes with anastomotic staple lines and skin staples. 3. Catheter in the bladder.    Dictated by (CST): Chong Valdez MD on 6/11/2025 at 1:22 AM     Finalized by (CST): Chong Valdez MD on 6/11/2025 at 1:28 AM            Assessment   1.  POD #1 status post transverse colon resection, small bowel resection and resection of tumor on small bowel  2.  Shock state  3.  Lactic acidosis  4.  Diabetes mellitus  5.  Hyperlipidemia  6.  BPH  7.  Hyponatremia    Plan   -Patient presented with evidence of weight loss abdominal discomfort nausea found to have evidence of obstruction secondary to mass seen now status post transverse colon resection, small bowel resection and resection of tumor on small bowel on 6/10/2025.  - Recommendations per general surgery.  Closely monitor hemoglobin.  Transfuse for hemoglobin  below 7  - Pain control  - Await pathology results  - Still with significant pressor requirements with norepinephrine.  Will add vasopressin.  Maintain MAP above 65.  - Empiric antibiotic therapy with Zosyn at this time  - DVT prophylaxis: SCDs  - Reviewed vitals, labs and imaging    Upon my evaluation, this patient had a high probability of imminent or life-threatening deterioration due to shock, which required my direct attention, intervention, and personal management.    I have personally provided 30 minutes of critical care time, exclusive of time spent on separately billable procedures.  Time includes review of all pertinent laboratory/radiology results, discussion with consultants, and monitoring for potential decompensation.  Performed interventions included pressor drugs.      Wilner Parrish DO  Pulmonary Critical Care Medicine  [facility]  2025  11:35 AM        [1]   Past Medical History:   Anxiety    BPH (benign prostatic hyperplasia)    CKD (chronic kidney disease) stage 3, GFR 30-59 ml/min (HCC)    Depression    Diabetes (HCC)    Diabetes mellitus (HCC)    Essential hypertension    GERD (gastroesophageal reflux disease)    High cholesterol    Hyperlipidemia   [2]   Past Surgical History:  Procedure Laterality Date    Colonoscopy N/A 2025    Procedure: COLONOSCOPY with colonic stent placement;  Surgeon: Eusebio Palmer MD;  Location: [facility] ENDOSCOPY    Foot fracture surgery Right        [3]   Family History  Problem Relation Age of Onset    Other (Other) Father    [4]   Social History  Socioeconomic History    Marital status:    Tobacco Use    Smoking status: Former     Current packs/day: 0.00     Average packs/day: 2.0 packs/day for 10.0 years (20.0 ttl pk-yrs)     Types: Cigarettes     Start date:      Quit date:      Years since quittin.4     Passive exposure: Past    Smokeless tobacco: Never   Vaping Use    Vaping status: Never Used   Substance and  Sexual Activity    Alcohol use: Yes     Comment: rarely    Drug use: Not Currently   [5]   Current Facility-Administered Medications   Medication Dose Route Frequency    insulin regular human (Novolin R, Humulin R) 100 UNIT/ML injection 1-11 Units  1-11 Units Subcutaneous Q6H    pantoprazole (Protonix) 40 mg in sodium chloride 0.9% PF 10 mL IV push  40 mg Intravenous Daily    vasopressin (Vasostrict) 20 Units in sodium chloride 0.9% 100 mL infusion for non-septic shock  0.03 Units/min Intravenous Continuous    piperacillin-tazobactam (Zosyn) 4.5 g in dextrose 5% 100 mL IVPB-ADDV  4.5 g Intravenous Q8H    oxyCODONE immediate release tab 2.5 mg  2.5 mg Oral Q4H PRN    Or    oxyCODONE immediate release tab 5 mg  5 mg Oral Q4H PRN    HYDROmorphone (Dilaudid) 1 MG/ML injection 0.2 mg  0.2 mg Intravenous Q2H PRN    Or    HYDROmorphone (Dilaudid) 1 MG/ML injection 0.4 mg  0.4 mg Intravenous Q2H PRN    norepinephrine (Levophed) 4 mg/250mL infusion premix  0.5-50 mcg/min Intravenous Continuous    sodium chloride 0.9% infusion   Intravenous Continuous    acetaminophen (Tylenol Extra Strength) tab 500 mg  500 mg Oral Q4H PRN    ondansetron (Zofran) 4 MG/2ML injection 4 mg  4 mg Intravenous Q6H PRN    glucose (Dex4) 15 GM/59ML oral liquid 15 g  15 g Oral Q15 Min PRN    Or    glucose (Glutose) 40% oral gel 15 g  15 g Oral Q15 Min PRN    Or    dextrose 50% injection 50 mL  50 mL Intravenous Q15 Min PRN   [6]   Medications Prior to Admission   Medication Sig    loratadine 10 MG Oral Tab Take 1 tablet (10 mg total) by mouth daily as needed for Allergies.    melatonin 3 MG Oral Tab Take 2 tablets (6 mg total) by mouth daily.    FLUoxetine 20 MG Oral Cap Take 1 capsule (20 mg total) by mouth daily.    Accu-Chek FastClix Lancets Does not apply Misc Apply 100 Lancets topically daily.    Glucose Blood (ACCU-CHEK GUIDE TEST) In Vitro Strip Use 1 time per day.    [] Tirzepatide (MOUNJARO) 5 MG/0.5ML Subcutaneous Solution  Auto-injector Inject 5 mg into the skin every 7 days.    pregabalin 100 MG Oral Cap Take 1 capsule (100 mg total) by mouth in the morning, at noon, and at bedtime.    empagliflozin (JARDIANCE) 10 MG Oral Tab Take 1 tablet (10 mg total) by mouth daily. Every morning    Ferrous Sulfate 325 (65 Fe) MG Oral Tab Take 1 tablet (325 mg total) by mouth daily with breakfast. With orange juice.    traMADol 50 MG Oral Tab Take 1 tablet (50 mg total) by mouth every 8 (eight) hours as needed for Pain.    tamsulosin 0.4 MG Oral Cap Take 2 capsules (0.8 mg total) by mouth daily. AFTER SAME MEAL EACH DAY    raNITIdine HCl (RANITIDINE 150 MAX STRENGTH OR) Take 150 mg by mouth in the morning.    simvastatin 40 MG Oral Tab Take 1 tablet (40 mg total) by mouth nightly.    Glucose Blood (ACCU-CHEK GUIDE) In Vitro Strip 1 each by In Vitro route in the morning and 1 each before bedtime.    pantoprazole 40 MG Oral Tab EC Take 1 tablet (40 mg total) by mouth every morning before breakfast.    vitamin B-12 50 MCG Oral Tab Take 1 tablet (50 mcg total) by mouth in the morning.    Polyethyl Glycol-Propyl Glycol (SYSTANE ULTRA OP) Apply 2 drops to eye in the morning and 2 drops before bedtime.    alum-mag hydroxide-simethicone 200-200-20 MG/5ML Oral Suspension Take 10 mL by mouth 4 (four) times daily as needed.    Multiple Vitamins-Minerals (PRESERVISION/LUTEIN) Oral Cap Take 1 capsule by mouth in the morning.   [7] No Known Allergies

## 2025-06-11 NOTE — PLAN OF CARE
Patient hypotensive this AM, Levo and Vaso running (see MAR). Vesicant extravasation occurred in left arm, arm was swollen/burning. Antidote and warm compress given and left arm swelling/burning subsided. ART line and PICC line placed. Dilaudid and Zofran given PRN (see MAR). Call light within reach, safety measures maintained.        Problem: Patient Centered Care  Goal: Patient preferences are identified and integrated in the patient's plan of care  Description: Interventions:  - What would you like us to know as we care for you? I am from home.   - Provide timely, complete, and accurate information to patient/family  - Incorporate patient and family knowledge, values, beliefs, and cultural backgrounds into the planning and delivery of care  - Encourage patient/family to participate in care and decision-making at the level they choose  - Honor patient and family perspectives and choices  Outcome: Progressing     Problem: Diabetes/Glucose Control  Goal: Glucose maintained within prescribed range  Description: INTERVENTIONS:  - Monitor Blood Glucose as ordered  - Assess for signs and symptoms of hyperglycemia and hypoglycemia  - Administer ordered medications to maintain glucose within target range  - Assess barriers to adequate nutritional intake and initiate nutrition consult as needed  - Instruct patient on self management of diabetes  Outcome: Progressing     Problem: PAIN - ADULT  Goal: Verbalizes/displays adequate comfort level or patient's stated pain goal  Description: INTERVENTIONS:  - Encourage pt to monitor pain and request assistance  - Assess pain using appropriate pain scale  - Administer analgesics based on type and severity of pain and evaluate response  - Implement non-pharmacological measures as appropriate and evaluate response  - Consider cultural and social influences on pain and pain management  - Manage/alleviate anxiety  - Utilize distraction and/or relaxation techniques  - Monitor for opioid  side effects  - Notify MD/LIP if interventions unsuccessful or patient reports new pain  - Anticipate increased pain with activity and pre-medicate as appropriate  Outcome: Progressing     Problem: RISK FOR INFECTION - ADULT  Goal: Absence of fever/infection during anticipated neutropenic period  Description: INTERVENTIONS  - Monitor WBC  - Administer growth factors as ordered  - Implement neutropenic guidelines  Outcome: Progressing     Problem: SAFETY ADULT - FALL  Goal: Free from fall injury  Description: INTERVENTIONS:  - Assess pt frequently for physical needs  - Identify cognitive and physical deficits and behaviors that affect risk of falls.  - [place] fall precautions as indicated by assessment.  - Educate pt/family on patient safety including physical limitations  - Instruct pt to call for assistance with activity based on assessment  - Modify environment to reduce risk of injury  - Provide assistive devices as appropriate  - Consider OT/PT consult to assist with strengthening/mobility  - Encourage toileting schedule  Outcome: Progressing     Problem: DISCHARGE PLANNING  Goal: Discharge to home or other facility with appropriate resources  Description: INTERVENTIONS:  - Identify barriers to discharge w/pt and caregiver  - Include patient/family/discharge partner in discharge planning  - Arrange for needed discharge resources and transportation as appropriate  - Identify discharge learning needs (meds, wound care, etc)  - Arrange for interpreters to assist at discharge as needed  - Consider post-discharge preferences of patient/family/discharge partner  - Complete POLST form as appropriate  - Assess patient's ability to be responsible for managing their own health  - Refer to Case Management Department for coordinating discharge planning if the patient needs post-hospital services based on physician/LIP order or complex needs related to functional status, cognitive ability or social support  system  Outcome: Progressing     Problem: Altered Communication/Language Barrier  Goal: Patient/Family is able to understand and participate in their care  Description: Interventions:  - Assess communication ability and preferred communication style  - Implement communication aides and strategies  - Use visual cues when possible  - Listen attentively, be patient, do not interrupt  - Minimize distractions  - Allow time for understanding and response  - Establish method for patient to ask for assistance (call light)  - Provide an  as needed  - Communicate barriers and strategies to overcome with those who interact with patient  Outcome: Progressing     Problem: GASTROINTESTINAL - ADULT  Goal: Minimal or absence of nausea and vomiting  Description: INTERVENTIONS:  - Maintain adequate hydration with IV or PO as ordered and tolerated  - Nasogastric tube to low intermittent suction as ordered  - Evaluate effectiveness of ordered antiemetic medications  - Provide nonpharmacologic comfort measures as appropriate  - Advance diet as tolerated, if ordered  - Obtain nutritional consult as needed  - Evaluate fluid balance  Outcome: Progressing  Goal: Maintains or returns to baseline bowel function  Description: INTERVENTIONS:  - Assess bowel function  - Maintain adequate hydration with IV or PO as ordered and tolerated  - Evaluate effectiveness of GI medications  - Encourage mobilization and activity  - Obtain nutritional consult as needed  - Establish a toileting routine/schedule  - Consider collaborating with pharmacy to review patient's medication profile  Outcome: Progressing

## 2025-06-11 NOTE — VASCULAR ACCESS
Patient on vasopressors and starting TPN requiring central line. Blood cultures drawn today. Normal WBC. No fevers. Per Missy Beck APJOANIE elevated lactic non infection related. Okay to proceed with PICC line insertion

## 2025-06-11 NOTE — PROCEDURES
[facility]    Arnaldo Gutierrez Patient Status:  Inpatient    4/15/1945 MRN [id number]   Location [facility] 2W/SW Attending Boy Mccabe MD   Hosp Day # 6 PCP PHYSICIAN RIAZTAMARJORIE     Arnaldo Gutierrez is a 80 year old male patient.  1. Large bowel obstruction (HCC)    2. Colonic mass      Past Medical History[1]  Blood pressure 135/48, pulse 79, temperature 97.5 °F (36.4 °C), temperature source Temporal, resp. rate 16, height 180.3 cm (5' 11\"), weight 154 lb 5.2 oz (70 kg), SpO2 96%.    Arterial Line Insertion (bedside)    Date/Time: 2025 3:43 PM    Performed by: Missy Beck APRN  Authorized by: Missy Beck APRN  Consent: Verbal consent obtained  Risks and benefits: risks, benefits and alternatives were discussed  Consent given by: patient  Patient understanding: patient states understanding of the procedure being performed  Relevant documents: relevant documents present and verified  Test results: test results available and properly labeled  Site marked: the operative site was marked  Patient identity confirmed: arm band and provided demographic data  Preparation: Patient was prepped and draped in the usual sterile fashion.  Indications: multiple ABGs and hemodynamic monitoring  Location: right radial    Sedation:  Patient sedated: no    Needle gauge: 20  Seldinger technique: Seldinger technique used  Number of attempts: 1  Post-procedure: dressing applied  Post-procedure CMS: normal  Patient tolerance: patient tolerated the procedure well with no immediate complications          FAIZAN Neves  2025         [1]   Past Medical History:   Anxiety    BPH (benign prostatic hyperplasia)    CKD (chronic kidney disease) stage 3, GFR 30-59 ml/min (HCC)    Depression    Diabetes (HCC)    Diabetes mellitus (HCC)    Essential hypertension    GERD (gastroesophageal reflux disease)    High cholesterol    Hyperlipidemia

## 2025-06-11 NOTE — PROGRESS NOTES
[facility]  part of [facility]    Progress Note    Arnaldo Gutierrez Patient Status:  Inpatient    4/15/1945 MRN [id number]   Location [facility] 2W/SW Attending Boy Mccabe MD   Hosp Day # 6 PCP PHYSICIAN NONSTAFF     Subjective:  Events as above    incisional pain on pressors    Objective/Physical Exam:  General: Alert, orientated x3.  Cooperative.  No apparent distress.  Vital Signs:  Blood pressure 110/69, pulse 77, temperature 97.1 °F (36.2 °C), temperature source Temporal, resp. rate 16, height 5' 11\" (1.803 m), weight 145 lb 15.1 oz (66.2 kg), SpO2 98%.  Abdomen:  Soft, dressing intact   Labs:  Lab Results   Component Value Date    WBC 2.8 (L) 2025    HGB 9.2 (L) 2025    HCT 30.0 (L) 2025    .0 2025    CREATSERUM 1.00 2025    BUN 7 (L) 2025     (L) 2025    K 3.1 (L) 2025    CL 98 2025    CO2 17.0 (L) 2025     (HH) 2025    CA 6.3 (L) 2025    ALB 3.6 2025    ALKPHO 115 2025    BILT 0.3 2025    TP 6.2 2025    AST 31 2025    ALT 27 2025    TSH 2.417 2025    LIP 22 2025    PHOS 3.0 04/10/2024    B12 >2,000 (H) 2025       Imaging:  XR ABDOMEN (1 VIEW) (CPT=74018)  Result Date: 2025  PROCEDURE: XR ABDOMEN (1 VIEW) (CPT=74018)  COMPARISON: [facility], CT ABDOMEN + PELVIS (CONTRAST ONLY) (CPT=74177), 2025, 5:22 PM.  INDICATIONS: Partially obstructing colon tumor.  Opene tray. image taken due to protocol. Evaluate for foreign body.  TECHNIQUE:   Single view.           CONCLUSION:  1. No retained unexpected surgical foreign body. 2. Postsurgical changes with anastomotic staple lines and skin staples. 3. Catheter in the bladder.    Dictated by (CST): Chong Valdez MD on 2025 at 1:22 AM     Finalized by (CST): Chong Valdez MD on 2025 at 1:28 AM          XR FLUOROSCOPY C-ARM TIME LESS THAN 1 HOUR  (CPT=76000)  Result Date: 6/6/2025  PROCEDURE: XR FLUORO PHYSICIAN TIME< 1 HOUR (CPT=76000)  COMPARISON: [facility], CT ABDOMEN + PELVIS (CONTRAST ONLY) (CPT=74177), 6/05/2025, 5:22 PM.  INDICATIONS: COLONOSCOPY with colonic stent placement under fluoroscopy.  TECHNIQUE:   FLUOROSCOPY IMAGES OBTAINED:  2 FLUOROSCOPY TIME:  173.8 seconds RADIATION DOSE (Dose Area Product):  [id number]-[id number]*m^2  FINDINGS:   Intraoperative exam was performed.  There has been placement of a proximal colonic stent.         CONCLUSION: Intraoperative exam. Please see operative report for further details.     Dictated by (CST): Frandy Starkey MD on 6/06/2025 at 8:55 PM     Finalized by (CST): Frandy Starkey MD on 6/06/2025 at 8:56 PM          CT ABDOMEN+PELVIS(CONTRAST ONLY)(CPT=74177)  Result Date: 6/5/2025  PROCEDURE: CT ABDOMEN + PELVIS (CONTRAST ONLY) (CPT=74177)  COMPARISON: [facility] ABDOMEN LIMITED (CPT=76705), 9/19/2024, 9:36 AM.  INDICATIONS: abd pain  TECHNIQUE: Multidetector CT images of the abdomen and pelvis were obtained with non-ionic intravenous contrast material. Automated exposure control for dose reduction was used. Adjustment of the mA and/or kV was done based on the patient's size. Iterative reconstruction technique for dose reduction was employed.  FINDINGS: LUNG BASES: The heart is normal in size.  Partially imaged coronary artery and aortic annular calcification.  Subpleural reticular opacities at the periphery of the right lower lobe. There is dependent subsegmental atelectasis bilaterally. LIVER: No enlargement, atrophy, abnormal density, or significant focal lesion is identified.  BILIARY: The gallbladder is present. PANCREAS: No lesion, fluid collection, ductal dilatation, or atrophy.  Small periampullary duodenal diverticulum. SPLEEN: No enlargement.  Calcified granulomas in the spleen. ADRENALS:   No defined mass or abnormal enlargement.  KIDNEYS:   Symmetric  enhancement is seen without evidence of hydronephrosis or underlying solid masses.  Simple-appearing 2.4 cm right posterior interpolar renal cyst.  Tiny 4 mm nonobstructing right interpolar renal calculus. GI/MESENTERY:  There is no evidence of bowel obstruction.  Mild to moderate gastric distention noted.  Approximate 4 cm constricting mass at the proximal transverse colon (just beyond the hepatic flexure with marked upstream ascending colonic distention;  there is also mild-to-moderate distal colonic fecal burden; there is fat stranding surrounding the aforementioned constricting mass with adjacent subcentimeter short axis right upper quadrant pericolonic lymph nodes.  Normal appendix. URINARY BLADDER: Well distended urinary bladder, which demonstrates mild wall thickening. PELVIC NODES: No lymphadenopathy.   PELVIC ORGANS: No visible mass. Pelvic organs appropriate for patient age.  VASCULATURE:   No aneurysm is detected.  Mild atherosclerosis. RETROPERITONEUM: Borderline enlarged 1 cm interaortocaval retroperitoneal lymph node with other prominent but nonenlarged retroperitoneal lymph nodes that measure less than 1 cm short axis. BONES:   Demineralization with levoscoliosis of the lumbar spine and lower lumbar spine degeneration.  Probable benign hemangioma in the L2 vertebral body. ABDOMINAL WALL: No mass or hernia is perceived. OTHER: No free air or fluid is seen in the abdomen or pelvis.          CONCLUSION:  1. Suspicious approximate 4 cm segment of constricting masslike wall thickening at the proximal transverse colon with large upstream colonic fecal burden.  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  Stellate morphology stranding and nodularity in the right upper quadrant mesentery adjacent to the constricting mass probably relates to direct extension of disease.  There are adjacent nonenlarged right upper quadrant mesenteric lymph nodes, which could be reactive or metastatic.   Gastroenterology evaluation and colonoscopy correlation is suggested.  No free intraperitoneal air or well-defined/drainable intra-abdominal collection. 2. Single mildly enlarged interaortocaval retroperitoneal lymph node with other prominent but nonenlarged retroperitoneal nodes.  These could be reactive or metastatic in nature. 3. Coronary and peripheral atherosclerosis with aortic annular calcifications. 4. Reticular opacities at the periphery of the right lung base probably relate to scarring. 5. Mild circumferential urinary bladder wall thickening.  Request urinalysis correlation to assess for potential cystitis.    [facility]  Dictated by (CST): Jeremie Cedeño MD on 6/05/2025 at 5:47 PM     Finalized by (CST): Jeremie Cedeño MD on 6/05/2025 at 5:58 PM            Assessment/Plan:  Problem List[1]  9.2 hgb     Discussed with Dr. Mccabe  Oncology evaluation    Obs    serial HGB  To discuss further with family  THUY ALSTON MD  6/11/2025  9:30 AM       [1]   Patient Active Problem List  Diagnosis    BPH (benign prostatic hyperplasia)    Chronic low back pain    Chronic pain of both knees    Depression with anxiety    Generalized osteoarthritis    Hyperlipidemia    Type 2 diabetes mellitus without complication, without long-term current use of insulin (HCC)    Essential hypertension    Bipolar I disorder, single manic episode (HCC)    Large bowel obstruction (HCC)    Colonic mass

## 2025-06-11 NOTE — CONSULTS
Located within [facility] Hematology Oncology  Initial Inpatient Consult Note    Arnaldo Gutierrez Patient Status:  Inpatient    4/15/1945 MRN [id number]   Location [facility] 2W/SW Attending Boy Mccabe MD   Hosp Day # 6 PCP PHYSICIAN NONSTAFF     Subjective:   Arnaldo Gutierrez is a 80 year old male with a history of diabetes hypertension GERD and other comorbidities who was admitted 2025 with iron deficiency anemia and partial large bowel obstruction with a mass in the transverse colon.  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  He went for transverse colon resection 6/10 and reportedly there were numerous peritoneal or omental metastasis and enlarged lymph nodes.  Pathology is still pending but oncology was consulted today.    The patient is in the ICU on norepinephrine pressor support.    Review of Systems:  Hematology/Oncology ROS performed and negative except as above in HPI    History/Other:   Past Medical History:  Past Medical History[1]    Past Surgical History:  Past Surgical History[2]    Current Medications:  Current Medications[3]    Allergies:   Allergies[4]    Family Medical History:  Family History[5]    Social History:  Social History     Socioeconomic History    Marital status:      Spouse name: Not on file    Number of children: Not on file    Years of education: Not on file    Highest education level: Not on file   Occupational History    Not on file   Tobacco Use    Smoking status: Former     Current packs/day: 0.00     Average packs/day: 2.0 packs/day for 10.0 years (20.0 ttl pk-yrs)     Types: Cigarettes     Start date:      Quit date: 1980     Years since quittin.4     Passive exposure: Past    Smokeless tobacco: Never   Vaping Use    Vaping status: Never Used   Substance and Sexual Activity    Alcohol use: Yes     Comment: rarely    Drug use: Not Currently    Sexual activity: Not on file    Other Topics Concern    Not on file   Social History Narrative    Not on file     Social Drivers of Health     Food Insecurity: No Food Insecurity (6/5/2025)    NCSS - Food Insecurity     Worried About Running Out of Food in the Last Year: No     Ran Out of Food in the Last Year: No   Transportation Needs: No Transportation Needs (6/5/2025)    NCSS - Transportation     Lack of Transportation: No   Housing Stability: Not At Risk (6/5/2025)    NCSS - Housing/Utilities     Has Housing: Yes     Worried About Losing Housing: No     Unable to Get Utilities: No       Objective:    /55 (BP Location: Right leg)   Pulse 91   Temp 97.6 °F (36.4 °C) (Temporal)   Resp 17   Ht 1.803 m (5' 11\")   Wt 70 kg (154 lb 5.2 oz)   SpO2 95%   BMI 21.52 kg/m²   Physical Exam:  General: A&Ox3, mild distress   HEENT: PERRL  CV: tachycardic   Pulm:  normal effort  Abd: bandaged   Extremities: no edema or calf tenderness  Neurological: Grossly intact    Labs:  Lab Results   Component Value Date/Time    WBC 10.6 06/11/2025 08:36 AM    RBC 4.54 06/11/2025 08:36 AM    HGB 12.4 (L) 06/11/2025 12:01 PM    HCT 41.2 06/11/2025 08:36 AM    MCV 90.7 06/11/2025 08:36 AM    MCH 29.1 06/11/2025 08:36 AM    MCHC 32.0 06/11/2025 08:36 AM    RDW 17.4 (H) 06/11/2025 08:36 AM    NEPRELIM 9.28 (H) 06/11/2025 08:36 AM    .0 06/11/2025 08:36 AM       Lab Results   Component Value Date/Time     (H) 06/11/2025 12:01 PM    BUN 10 06/11/2025 12:01 PM    CREATSERUM 0.99 06/11/2025 12:01 PM    CA 7.7 (L) 06/11/2025 12:01 PM    ALB 3.6 06/05/2025 04:17 PM     06/11/2025 12:01 PM    K 3.9 06/11/2025 12:01 PM     06/11/2025 12:01 PM    CO2 23.0 06/11/2025 12:01 PM    ALKPHO 115 06/05/2025 04:17 PM    AST 31 06/05/2025 04:17 PM    ALT 27 06/05/2025 04:17 PM       Imaging:  XR ABDOMEN (1 VIEW) (CPT=74018)  Result Date: 6/11/2025  CONCLUSION:  1. No retained unexpected surgical foreign body. 2. Postsurgical changes with anastomotic  staple lines and skin staples. 3. Catheter in the bladder.    Dictated by (CST): Chong Valdez MD on 6/11/2025 at 1:22 AM     Finalized by (CST): Chong Valdez MD on 6/11/2025 at 1:28 AM           XR ABDOMEN (1 VIEW) (CPT=74018)  Result Date: 6/11/2025  CONCLUSION:  1. No retained unexpected surgical foreign body. 2. Postsurgical changes with anastomotic staple lines and skin staples. 3. Catheter in the bladder.    Dictated by (CST): Chong Valdez MD on 6/11/2025 at 1:22 AM     Finalized by (CST): Chong Valdez MD on 6/11/2025 at 1:28 AM          XR FLUOROSCOPY C-ARM TIME LESS THAN 1 HOUR (CPT=76000)  Result Date: 6/6/2025  CONCLUSION: Intraoperative exam. Please see operative report for further details.     Dictated by (CST): Frandy Starkey MD on 6/06/2025 at 8:55 PM     Finalized by (CST): Frandy Starkey MD on 6/06/2025 at 8:56 PM          CT ABDOMEN+PELVIS(CONTRAST ONLY)(CPT=74177)  Result Date: 6/5/2025  CONCLUSION:  1. Suspicious approximate 4 cm segment of constricting masslike wall thickening at the proximal transverse colon with large upstream colonic fecal burden.  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  Stellate morphology stranding and nodularity in the right upper quadrant mesentery adjacent to the constricting mass probably relates to direct extension of disease.  There are adjacent nonenlarged right upper quadrant mesenteric lymph nodes, which could be reactive or metastatic.  Gastroenterology evaluation and colonoscopy correlation is suggested.  No free intraperitoneal air or well-defined/drainable intra-abdominal collection. 2. Single mildly enlarged interaortocaval retroperitoneal lymph node with other prominent but nonenlarged retroperitoneal nodes.  These could be reactive or metastatic in nature. 3. Coronary and peripheral atherosclerosis with aortic annular calcifications. 4. Reticular opacities at the periphery of the right lung base probably relate to scarring.  5. Mild circumferential urinary bladder wall thickening.  Request urinalysis correlation to assess for potential cystitis.    [facility]  Dictated by (CST): Jeremie Cedeño MD on 6/05/2025 at 5:47 PM     Finalized by (CST): Jeremie Cedeño MD on 6/05/2025 at 5:58 PM            Assessment & Plan:    Arnaldo Gutierrez is a 80 year old male with a history of diabetes hypertension GERD and other comorbidities who was admitted 6/5/2025 with iron deficiency anemia and partial large bowel obstruction with a mass in the transverse colon.  Went for colonoscopy 6/6 and found to have a malignant stricture of the transverse colon and underwent colonic stenting.  He went for transverse colon resection 6/10 and reportedly there were numerous peritoneal or omental metastasis and enlarged lymph nodes.  Pathology is still pending but oncology was consulted today.    I did not yet discuss diagnosis with the patient given his acute distress in the ICU nor ever called family as I await operative note describing peritoneal metastasis and the pathology report.  I will follow-up pending pathology report.  Pending his clinical course will need to complete staging with CT chest.    I plan to follow-up with the patient and family on Friday.    Norris Lazaro, DO    [facility] Hematology/Oncology  [facility]     This note was created using a voice-recognition transcribing system. Incorrect words or phrases may have been missed during proofreading. Please interpret accordingly.       [1]   Past Medical History:   Anxiety    BPH (benign prostatic hyperplasia)    CKD (chronic kidney disease) stage 3, GFR 30-59 ml/min (HCC)    Depression    Diabetes (HCC)    Diabetes mellitus (HCC)    Essential hypertension    GERD (gastroesophageal reflux disease)    High cholesterol    Hyperlipidemia   [2]   Past Surgical History:  Procedure Laterality Date    Colonoscopy N/A 6/6/2025    Procedure: COLONOSCOPY with colonic stent placement;   Surgeon: Eusebio Palmer MD;  Location: [facility] ENDOSCOPY    Foot fracture surgery Right     2012   [3]    insulin regular human (Novolin R, Humulin R) 100 UNIT/ML injection 1-11 Units  1-11 Units Subcutaneous Q6H    [COMPLETED] insulin regular human (Novolin R, Humulin R) 100 UNIT/ML injection 10 Units  10 Units Subcutaneous Once    [COMPLETED] potassium chloride 40 mEq in 250mL sodium chloride 0.9% IVPB premix  40 mEq Intravenous Once    pantoprazole (Protonix) 40 mg in sodium chloride 0.9% PF 10 mL IV push  40 mg Intravenous Daily    vasopressin (Vasostrict) 20 Units in sodium chloride 0.9% 100 mL infusion for non-septic shock  0.03 Units/min Intravenous Continuous    piperacillin-tazobactam (Zosyn) 4.5 g in dextrose 5% 100 mL IVPB-ADDV  4.5 g Intravenous Q8H    ondansetron (Zofran) 4 MG/2ML injection 4 mg  4 mg Intravenous Q4H PRN    acetaminophen (Ofirmev) 10 mg/mL infusion premix 1,000 mg  1,000 mg Intravenous Q6H PRN    [COMPLETED] phentolamine (Regitine) 10 mg in sodium chloride 0.9% syringe (ADULT EXTRAVASATION)  10 mg Subcutaneous See Admin Instructions    [COMPLETED] lidocaine PF (Xylocaine-MPF) 1% injection  5 mL Intradermal Once    oxyCODONE immediate release tab 2.5 mg  2.5 mg Oral Q4H PRN    Or    oxyCODONE immediate release tab 5 mg  5 mg Oral Q4H PRN    HYDROmorphone (Dilaudid) 1 MG/ML injection 0.2 mg  0.2 mg Intravenous Q2H PRN    Or    HYDROmorphone (Dilaudid) 1 MG/ML injection 0.4 mg  0.4 mg Intravenous Q2H PRN    norepinephrine (Levophed) 4 mg/250mL infusion premix  0.5-50 mcg/min Intravenous Continuous    [COMPLETED] acetaminophen (Ofirmev) 10 mg/mL infusion premix 1,000 mg  1,000 mg Intravenous Once    [COMPLETED] sodium chloride 0.9 % IV bolus 1,986 mL  30 mL/kg Intravenous Once    [COMPLETED] sodium chloride 0.9 % IV bolus 250 mL  250 mL Intravenous Once    [COMPLETED] neomycin (Mycifradin) tab 1,000 mg  1,000 mg Oral Once    [COMPLETED] neomycin (Mycifradin) tab 1,000 mg  1,000 mg  Oral Once    [COMPLETED] neomycin (Mycifradin) tab 1,000 mg  1,000 mg Oral Once    [COMPLETED] metroNIDAZOLE (Flagyl) tab 500 mg  500 mg Oral Once    [COMPLETED] metroNIDAZOLE (Flagyl) tab 500 mg  500 mg Oral Once    [COMPLETED] metroNIDAZOLE (Flagyl) tab 500 mg  500 mg Oral Once    [COMPLETED] sodium ferric gluconate (Ferrlecit) 125 mg in sodium chloride 0.9% 100mL IVPB premix  125 mg Intravenous Daily    [COMPLETED] pantoprazole (Protonix) 40 mg in sodium chloride 0.9% PF 10 mL IV push  40 mg Intravenous Once    [COMPLETED] sodium chloride 0.9 % IV bolus 1,000 mL  1,000 mL Intravenous Once    [COMPLETED] ondansetron (Zofran) 4 MG/2ML injection 4 mg  4 mg Intravenous Once    [COMPLETED] iopamidol 76% (ISOVUE-370) injection for power injector  80 mL Intravenous ONCE PRN    [COMPLETED] morphINE PF 2 MG/ML injection 2 mg  2 mg Intravenous Once    sodium chloride 0.9% infusion   Intravenous Continuous    acetaminophen (Tylenol Extra Strength) tab 500 mg  500 mg Oral Q4H PRN    glucose (Dex4) 15 GM/59ML oral liquid 15 g  15 g Oral Q15 Min PRN    Or    glucose (Glutose) 40% oral gel 15 g  15 g Oral Q15 Min PRN    Or    dextrose 50% injection 50 mL  50 mL Intravenous Q15 Min PRN   [4] No Known Allergies  [5]   Family History  Problem Relation Age of Onset    Other (Other) Father

## 2025-06-12 LAB
ANION GAP SERPL CALC-SCNC: 10 MMOL/L (ref 0–18)
BASOPHILS # BLD: 0 X10(3) UL (ref 0–0.2)
BASOPHILS NFR BLD: 0 %
BLOOD TYPE BARCODE: 6200
BLOOD TYPE BARCODE: 6200
BUN BLD-MCNC: 11 MG/DL (ref 9–23)
BUN/CREAT SERPL: 14.5 (ref 10–20)
CALCIUM BLD-MCNC: 6.8 MG/DL (ref 8.7–10.4)
CHLORIDE SERPL-SCNC: 108 MMOL/L (ref 98–112)
CO2 SERPL-SCNC: 21 MMOL/L (ref 21–32)
CREAT BLD-MCNC: 0.76 MG/DL (ref 0.7–1.3)
DEPRECATED RDW RBC AUTO: 55 FL (ref 35.1–46.3)
EGFRCR SERPLBLD CKD-EPI 2021: 92 ML/MIN/1.73M2 (ref 60–?)
EOSINOPHIL # BLD: 0 X10(3) UL (ref 0–0.7)
EOSINOPHIL NFR BLD: 0 %
ERYTHROCYTE [DISTWIDTH] IN BLOOD BY AUTOMATED COUNT: 17.6 % (ref 11–15)
GLUCOSE BLD-MCNC: 186 MG/DL (ref 70–99)
GLUCOSE BLDC GLUCOMTR-MCNC: 102 MG/DL (ref 70–99)
GLUCOSE BLDC GLUCOMTR-MCNC: 117 MG/DL (ref 70–99)
GLUCOSE BLDC GLUCOMTR-MCNC: 139 MG/DL (ref 70–99)
GLUCOSE BLDC GLUCOMTR-MCNC: 142 MG/DL (ref 70–99)
HCT VFR BLD AUTO: 28.2 % (ref 39–53)
HGB BLD-MCNC: 10.1 G/DL (ref 13–17.5)
HGB BLD-MCNC: 9.4 G/DL (ref 13–17.5)
HGB BLD-MCNC: 9.5 G/DL (ref 13–17.5)
HGB BLD-MCNC: 9.5 G/DL (ref 13–17.5)
HGB BLD-MCNC: 9.9 G/DL (ref 13–17.5)
LYMPHOCYTES NFR BLD: 10 %
LYMPHOCYTES NFR BLD: 2.16 X10(3) UL (ref 1–4)
MAGNESIUM SERPL-MCNC: 1.2 MG/DL (ref 1.6–2.6)
MCH RBC QN AUTO: 29 PG (ref 26–34)
MCHC RBC AUTO-ENTMCNC: 33.7 G/DL (ref 31–37)
MCV RBC AUTO: 86 FL (ref 80–100)
METAMYELOCYTES # BLD: 0.59 X10(3) UL (ref ?–0.01)
METAMYELOCYTES NFR BLD: 3 %
MONOCYTES # BLD: 0.59 X10(3) UL (ref 0.1–1)
MONOCYTES NFR BLD: 3 %
NEUTROPHILS # BLD AUTO: 16.78 X10 (3) UL (ref 1.5–7.7)
NEUTROPHILS NFR BLD: 61 %
NEUTS BAND NFR BLD: 22 %
NEUTS HYPERSEG # BLD: 16.27 X10(3) UL (ref 1.5–7.7)
NEUTS VAC BLD QL SMEAR: PRESENT
OSMOLALITY SERPL CALC.SUM OF ELEC: 292 MOSM/KG (ref 275–295)
PHOSPHATE SERPL-MCNC: 2.6 MG/DL (ref 2.4–5.1)
PLATELET # BLD AUTO: 167 10(3)UL (ref 150–450)
PLATELET MORPHOLOGY: NORMAL
POTASSIUM SERPL-SCNC: 3.5 MMOL/L (ref 3.5–5.1)
POTASSIUM SERPL-SCNC: 3.5 MMOL/L (ref 3.5–5.1)
RBC # BLD AUTO: 3.28 X10(6)UL (ref 3.8–5.8)
SODIUM SERPL-SCNC: 139 MMOL/L (ref 136–145)
TOTAL CELLS COUNTED BLD: 100
UNIT VOLUME: 350 ML
UNIT VOLUME: 350 ML
VARIANT LYMPHS NFR BLD MANUAL: 1 % (ref ?–4)
WBC # BLD AUTO: 19.6 X10(3) UL (ref 4–11)

## 2025-06-12 PROCEDURE — 99233 SBSQ HOSP IP/OBS HIGH 50: CPT | Performed by: HOSPITALIST

## 2025-06-12 PROCEDURE — 99233 SBSQ HOSP IP/OBS HIGH 50: CPT | Performed by: INTERNAL MEDICINE

## 2025-06-12 NOTE — DIETARY NOTE
ADULT NUTRITION REASSESSMENT    Pt is at high nutrition risk.  Pt meets severe malnutrition criteria.      RECOMMENDATIONS TO MD:   RD ordered CPN to start tonight at 2100 hrs per protocol. High refeeding risk due to prolonged NPO. Appropriate labs ordered for monitoring - replace electrolytes per protocol PRN.  See Nutrition Intervention for central parenteral nutrition (CPN) specifics      ADMITTING DIAGNOSIS:  Large bowel obstruction (HCC) [K56.609]  Colonic mass [K63.89]  PERTINENT PAST MEDICAL HISTORY: Past Medical History[1]    PATIENT STATUS:   Initial 06/06/25: Pt assessed due to screening at risk for MST of unsure weight loss. Pt admitted due to large bowel obstruction found. Past medical history significant for CKD, DM, GERD, HTN and hyperlipidemia. Chart reviewed. Attempted to visit patient bedside twice; out of room both times. Currently at colonoscopy. Pt has been NPO for unprepped colonoscopy with possible colonic stent and biopsy; unable to assess intake during admission. Reviewed MD notes and significant for reports of very decreased appetite x2 weeks due to abdominal pain after eating. Complaints of weight loss; ~60lbs in 4-5 months. EMR reviewed and weight loss noted; 1/16/25:169lbs (12% loss in 5 months) 6/7/24: 189lbs (20% lost in 1 yr); both weight losses clinically significant for malnutrition in time frame. Pt has had BM this AM. NFPE deferred. Will complete at reassessment.  6/10/2025 Update: NPO/Cl liq x 5 days. NPO today for Surgery ( Lap Assisted Colon Resection).  Pt was taking Cl liq 100% prior to NPO. Re-visited pt. Utilized Language line (# [id number], Jimmie). Pt reports was tolerating liquid diet well. + BM this am.   Nutrition history reveals decreased po for > 3 months d/t  loss of appetite r/t depression and recently d/t symptoms of acute GI illness. Wt history reported usual wt of 200# last year (189# in emr) , lost wt to 150-162#. 162# 3 months ago, and lost more wt down to 150# (  this admit). Based on above initial RD eval, 39# or 20.6% significant wt loss in the past year. Nutrition Focus Physical Exam ( NFPE) completed. Pt met Severe Malnutrition criteria. Will monitor GI and NPO status, await diet advance as safe. Otherwise, can consider Nutrition Support Therapy (NST) if unable to advance diet beyond cl liq given pre-existing Malnutrition.   06/12/25 UPDATE: Early reassessment for CPN start. POD #2 s/p lap transverse colon resection, small bowel resection x2, resection of tumor nodule on small bowel. Per surgery, pt appears to have metastatic disease - oncology on consult. Weaned off pressor support. Stable on room air with family and RN at bedside at time of visit. Discussed CPN start with family.     FOOD/NUTRITION RELATED HISTORY:  Appetite: Poor  Intake: NPO/CL x7 days; CPN initiated 6/12  Intake Meeting Needs: TPN will meet needs once at goal rate  Percent Meals Eaten (last 6 days)       Date/Time Percent Meals Eaten (%)    06/07/25 2047 100 %    06/08/25 1300 100 %    06/09/25 1500 100 %    06/10/25 1000 0 %    06/11/25 1551 0 %        Food Allergies: No Known Food Allergies (NKFA)  Cultural/Ethnic/Catholic Preferences: Not Obtained    GASTROINTESTINAL: +BM last documented stool output 6/8, NGT to LIS, and  with 600 ml output NGT / 24 hrs; abdomen is soft, rounded, tender with hypoactive bowel sounds    UO: 575 ml output past 24 hrs  I/Os: +5.7 L since admission     MEDICATIONS: reviewed; Noted non-cardiac electrolyte replacement protocol ordered; IVF of 0.9NS @ 125 ml/hr (3L); Zosyn in 300 ml D5W (15 g dextrose, 51 kcal); high dose Novolin R corrective scale (0 units / 24 hrs)   sodium chloride 125 mL/hr at 06/12/25 0737      magnesium sulfate  4 g Intravenous Once    pantoprazole  40 mg Intravenous Daily    piperacillin-tazobactam  4.5 g Intravenous Q8H    insulin regular human  1-11 Units Subcutaneous Q6H     LABS: reviewed; noted K and P low-normal not warranting replacement;  hypomagnesemia with  replacement ordered per protocol; recent A1C 6.9 on 5/28/25; POC Glucose 105,134,117,102  Recent Labs     06/11/25  0126 06/11/25  1201 06/12/25  0546   * 142* 186*   BUN 7* 10 11   CREATSERUM 1.00 0.99 0.76   CA 6.3* 7.7* 6.8*   MG  --   --  1.2*   * 140 139   K 3.1* 3.9 3.5  3.5   CL 98 109 108   CO2 17.0* 23.0 21.0   PHOS  --   --  2.6   OSMOCALC 294 291 292     WEIGHT HISTORY:  Patient Weight(s) for the past 336 hrs:   Weight   06/11/25 1200 70 kg (154 lb 5.2 oz)   06/10/25 2245 66.2 kg (145 lb 15.1 oz)   06/05/25 2004 68.4 kg (150 lb 12.7 oz)   06/05/25 1608 67.1 kg (147 lb 14.9 oz)     Wt Readings from Last 10 Encounters:   06/11/25 70 kg (154 lb 5.2 oz)   05/28/25 67.1 kg (148 lb)   04/09/25 72.6 kg (160 lb)   04/08/25 72.6 kg (160 lb)   03/18/25 81.6 kg (180 lb)   01/16/25 76.7 kg (169 lb)   11/21/24 77.6 kg (171 lb)   08/29/24 78.9 kg (174 lb)   08/25/24 81.6 kg (180 lb)   07/15/24 84.8 kg (187 lb)     ANTHROPOMETRICS:  HT: 180.3 cm (5' 11\")  Wt Readings from Last 1 Encounters:   06/11/25 70 kg (154 lb 5.2 oz)   Last weight: Wt up 5% (8 lbs) from lowest wt this admission    Dosing Weight: 66.2 kg (146 lbs) - recent weight / lowest wt this admission taken on 6/10/25, utilized for anthropometric calculations  BMI: Body mass index is 20.36 kg/m².  BMI CLASSIFICATION: <22 considered underweight for advanced age (>65)  IBW/lbs (Calculated) Male: 172 lbs           85% IBW  Usual Body Wt: 189 lbs    1 yr ago  77% UBW    NUTRITION RELATED PHYSICAL FINDINGS:  - Nutrition Focused Physical Exam (NFPE): deferred  - Fluid Accumulation: none . See RN documentation for details  - Skin Integrity: RN documentation reviewed. See RN documentation for details    CRITERIA FOR MALNUTRITION DIAGNOSIS:  Criteria for severe malnutrition diagnosis: chronic illness related to wt loss greater than 10% in 6 months and energy intake less than 75% for greater than 1 month.    NUTRITION  DIAGNOSIS/PROBLEM:   Severe Malnutrition related to Chronic - Physiological causes increasing nutrient needs due to illness or condition (colonic mass) as evidenced by wt loss greater than 10% in 6 months and energy intake less than 75% for greater than 1 month.    Nutrition Diagnosis Progress: No Improvement (continue) --NPO/CL x7 days however CPN initiating this evening    NUTRITION INTERVENTION:     NUTRITION PRESCRIPTION:   Estimated Nutrition needs: --dosing wt of 66.2 kg - wt taken on 6/10/25  Calories: 8034-8303 calories/day (30-35 calories per kg Dosing wt)  Protein: 79-99 g protein/day (1.2-1.5 g protein/kg Dosing wt)  Fluid Needs: 6422-3951 mL (1mL/kcal)    - Diet:       Procedures    NPO     Parenteral Nutrition: RD ordered the following  CPN via right basilic triple lumen PICC, 2000 ml total volume, 80 g protein, 510 kcal dextrose (150 g) & 500 kcal lipid. Total energy = 1330 kcal.   Additives and electrolytes ordered. 5 units regular insulin in bag.   Meets 67% of min estimated kcal and 100% of min estimated protein needs. Will advance PN as tolerated to reach full nutrition.   Discussed above orders with RN and MD. Plan to discontinue IV fluids once CPN hung - MD to send RN communication.     - Nutrition Care Plan: Nutrition support via CPN to meet >80% of nutritional needs  - ONS (Oral Nutrition Supplements)/Meals/Snacks: RD to add when diet initiated and NPO   - Vitamin and mineral supplements: NPO  - Feeding assistance: NPO  - Nutrition education: not appropriate at this time  - Coordination of nutrition care: collaboration with other providers - discussed with MD and RN via secure message  - Discharge and transfer of nutrition care to new setting or provider: to be determined and monitor plans    MONITOR AND EVALUATE/NUTRITION GOALS:  - Food and Nutrient Intake:    Monitor: for PO initiation  - Food and Nutrient Administration:    Monitor: TPN initiation, TPN tolerance, adequacy of TPN, and for TPN  adjustment  - Nutrition Goals:    TPN to meet greater than 80% nutrition needs, labs within acceptable limits, minimize lean body mass loss, support wound healing, and improved GI status      DIETITIAN FOLLOW UP: RD to follow and monitor nutrition status    Noemy Prakash MS, RD, LDN, [facility]  [id number]         [1]   Past Medical History:   Anxiety    BPH (benign prostatic hyperplasia)    CKD (chronic kidney disease) stage 3, GFR 30-59 ml/min (HCC)    Depression    Diabetes (HCC)    Diabetes mellitus (HCC)    Essential hypertension    GERD (gastroesophageal reflux disease)    High cholesterol    Hyperlipidemia

## 2025-06-12 NOTE — PROGRESS NOTES
[facility]  Progress Note     Arnaldo Gutierrez  : 4/15/1945    Status: Inpatient  Day #: 7    Attending: Boy Mccabe MD  PCP: PHYSICIAN NONSTAFF     Assessment and Plan:    Suspected Metastatic Colon Cancer  Large Bowel obstruction 2/2 Proximal transverse colonic mass  - CT A/P reviewed  - GI and general surgery consulted  -  s/p colonoscopy with metallic colon stent  - 6/10/25 - s/p laparoscopic transverse colon resection, small bowel resection x2, resection of tumor nodule on small bowel. Patient appeared to have metastatic disease per Dr. Randall.   - NGT in place post-op  - f/u pathology  - oncology consulted  - TPN to start    Acute postoperative blood loss anemia with hypotension  - EBL from surgery 600ml  - s/p 2 units PRBC - hgb improved  - weaned off pressors this morning. Cont monitor in PCU today.    DM2  - ISS     Other:  H/o HTN - BP low now and on pressors  HL  BPH - flomax when able to take PO    Dietitian Malnutrition Assessment    Evaluation for Malnutrition: Criteria for severe malnutrition diagnosis- chronic illness related to   Wt loss greater than 10% in 6 months., Energy intake less than 75% for greater than 1 month.               RD Malnutrition Care Plan:      Body Fat/Muscle Mass:          Physician Assessment     Patient has a diagnosis of severe malnutrition       DVT Mechanical Prophylaxis:   SCDs,    DVT Pharmacologic Prophylaxis   Medication   None               Subjective:      Initial Chief Complaint:  abd discomfort, nausea, weight loss    Abd pain post-op, stable.     10 point ROS completed and was negative, except for pertinent positive and negatives stated in subjective.      Objective:      Temp:  [97.5 °F (36.4 °C)-98.3 °F (36.8 °C)] 97.9 °F (36.6 °C)  Pulse:  [] 97  Resp:  [10-26] 26  BP: ()/() 129/81  SpO2:  [93 %-98 %] 96 %  AO: ()/(40-84) 117/53  General:  Alert, no distress  HEENT:  Normocephalic, atraumatic  Cardiac:  Regular  rate, regular rhythm  Pulmonary:  Clear to auscultation bilaterally, respirations unlabored  Gastrointestinal:  hypoactive BS  Musculoskeletal:  No joint swelling  Extremities:  No edema, no cyanosis, no clubbing  Neurologic:  nonfocal  Psychiatric:  Normal affect, calm and appropriate    Intake/Output Summary (Last 24 hours) at 6/12/2025 1156  Last data filed at 6/12/2025 0600  Gross per 24 hour   Intake 4592.3 ml   Output 1175 ml   Net 3417.3 ml         Recent Labs   Lab 06/11/25  0126 06/11/25  0836 06/11/25  1201 06/11/25  1747 06/12/25  0017 06/12/25  0546   WBC 2.8* 10.6  --   --   --  19.6*   HGB 9.2* 13.2   < > 11.4* 10.1* 9.5*  9.4*   HCT 30.0* 41.2  --   --   --  28.2*   .0 280.0  --   --   --  167.0   RBC 3.21* 4.54  --   --   --  3.28*   MCV 93.5 90.7  --   --   --  86.0   MCH 28.7 29.1  --   --   --  29.0   MCHC 30.7* 32.0  --   --   --  33.7   RDW 18.6* 17.4*  --   --   --  17.6*   NEPRELIM 2.18 9.28*  --   --   --  16.78*    < > = values in this interval not displayed.     Recent Labs   Lab 06/05/25  1617 06/06/25  0741 06/11/25  0126 06/11/25  1201 06/12/25  0546   BUN 19   < > 7* 10 11   CREATSERUM 1.02   < > 1.00 0.99 0.76   CA 8.7   < > 6.3* 7.7* 6.8*   ALB 3.6  --   --   --   --       < > 125* 140 139   K 3.7   < > 3.1* 3.9 3.5  3.5      < > 98 109 108   CO2 24.0   < > 17.0* 23.0 21.0   *   < > 738* 142* 186*   MG  --   --   --   --  1.2*   PHOS  --   --   --   --  2.6   BILT 0.3  --   --   --   --    AST 31  --   --   --   --    ALT 27  --   --   --   --    ALKPHO 115  --   --   --   --    TP 6.2  --   --   --   --    LIP 22  --   --   --   --     < > = values in this interval not displayed.       XR ABDOMEN (1 VIEW) (CPT=74018)  Result Date: 6/11/2025  CONCLUSION:  1. No retained unexpected surgical foreign body. 2. Postsurgical changes with anastomotic staple lines and skin staples. 3. Catheter in the bladder.    Dictated by (CST): Chong Valdez MD on 6/11/2025 at  1:22 AM     Finalized by (CST): Chong Valdez MD on 6/11/2025 at 1:28 AM              Medications:  Scheduled Medications[1]   PRN Meds: PRN Medications[2]    Supplementary Documentation:   DVT Mechanical Prophylaxis:   SCDs,    DVT Pharmacologic Prophylaxis   Medication   None                Code Status: Not on file  Muñoz: Muñoz catheter in place  Muñoz Duration (in days): 2  Central line:    ANNELISE:         **Certification      PHYSICIAN Certification of Need for Inpatient Hospitalization - Initial Certification    Patient will require inpatient services that will reasonably be expected to span two midnight's based on the clinical documentation in H+P.   Based on patients current state of illness, I anticipate that, after discharge, patient will require TBD.        Boy Mccabe MD         [1]    magnesium sulfate  4 g Intravenous Once    pantoprazole  40 mg Intravenous Daily    piperacillin-tazobactam  4.5 g Intravenous Q8H    insulin regular human  1-11 Units Subcutaneous Q6H   [2]   ondansetron    acetaminophen    oxyCODONE **OR** oxyCODONE    HYDROmorphone **OR** HYDROmorphone    acetaminophen    glucose **OR** glucose **OR** [DISCONTINUED] glucose-vitamin C **OR** dextrose **OR** [DISCONTINUED] glucose **OR** [DISCONTINUED] glucose **OR** [DISCONTINUED] glucose-vitamin C

## 2025-06-12 NOTE — SPIRITUAL CARE NOTE
Spiritual Care Visit Note    Patient Name: Arnlado Gutierrez Date of Spiritual Care Visit: 25   : 4/15/1945 Primary Dx: Large bowel obstruction (HCC)       Referred By: Referral From: Care Coordination    Spiritual Care Taxonomy:    Intended Effects: Build relationship of care and support    Methods: Accompany someone in their spiritual/Catholic practice outside your south tradition, Collaborate with care team member, Encourage self care, Encourage self reflection, Encourage sharing of feelings, Offer emotional support, Offer spiritual/Catholic support, Offer support    Interventions: Acknowledge current situation, Acknowledge response to difficult experience, Active listening, Explain  role, Provide hospitality, Provide Catholic music, [place]    Visit Type/Summary:     - Spiritual Care: Responded to a request for spiritual care and assessed patient  for spiritual care needs. Consulted with RN prior to visit. Offered empathic listening and emotional support. Patient and family members expressed appreciation for  visit. Provided information regarding how to contact Spiritual Care and left a Spiritual Care information card. Patient is alert and feels better after surgery. He has family support. Faithful and hopefull that everything is going to be fine.   remains available as needed for follow up.    Spiritual Care support can be requested via an Epic consult. For urgent/immediate needs, please contact the On Call  at: [place]: ext [phone]    Hamilton SCHNEIDER  Chaplain Resident  [id number]

## 2025-06-12 NOTE — PROGRESS NOTES
LATE ENTRY:    LUE edema identified during evaluation for PICC insertion 6/11/25 2PM  1 left AC 18g PIV infiltrated  1 LUE extended IV infiltrated  2 vasopressor medications infusing through extended IV site  Arm is edematous with good CME, positive pulses. No signs of hypoperfusion   Tender and warm,slightly pink. C/o burning pain to upper arm     Plan:  Warm pack  Elevate arm   Monitor site  Administered Regitine in setting of vasoactive maedication extravasation -notified intensivist   PICC line placed to right arm by VAT team   Arterial line placed to right radial to wean down vasopressors as able     Addendum:   0800 6/12/25- arm with significant decrease in swelling, pain resolved. Good CMS.   1800-swelling nearly completely resolved. ROM fully intact     Missy Beck NP  PCCU

## 2025-06-12 NOTE — PROGRESS NOTES
[facility]  part of [facility]    Progress Note    Arnaldo Gutierrez Patient Status:  Inpatient    4/15/1945 MRN [id number]   Location [facility] 2W/SW Attending Boy Mccabe MD   Hosp Day # 7 PCP PHYSICIAN NONSTAFF     Subjective:  Incisional pain    stool?    Objective/Physical Exam:  General: Alert, orientated x3.  Cooperative.  No apparent distress.  Vital Signs:  Blood pressure 98/38, pulse 100, temperature 97.9 °F (36.6 °C), temperature source Temporal, resp. rate 25, height 5' 11\" (1.803 m), weight 154 lb 5.2 oz (70 kg), SpO2 95%.  Abdomen:  Soft, non-distended wound ok  Labs:  Lab Results   Component Value Date    WBC 19.6 (H) 2025    HGB 9.5 (L) 2025    HGB 9.4 (L) 2025    HCT 28.2 (L) 2025    .0 2025    CREATSERUM 0.76 2025    BUN 11 2025     2025    K 3.5 2025    K 3.5 2025     2025    CO2 21.0 2025     (H) 2025    CA 6.8 (L) 2025    ALB 3.6 2025    ALKPHO 115 2025    BILT 0.3 2025    TP 6.2 2025    AST 31 2025    ALT 27 2025    TSH 2.417 2025    LIP 22 2025    MG 1.2 (L) 2025    PHOS 2.6 2025    B12 >2,000 (H) 2025       Imaging:  XR ABDOMEN (1 VIEW) (CPT=74018)  Result Date: 2025  PROCEDURE: XR ABDOMEN (1 VIEW) (CPT=74018)  COMPARISON: [facility], CT ABDOMEN + PELVIS (CONTRAST ONLY) (CPT=74177), 2025, 5:22 PM.  INDICATIONS: Partially obstructing colon tumor.  Opene tray. image taken due to protocol. Evaluate for foreign body.  TECHNIQUE:   Single view.           CONCLUSION:  1. No retained unexpected surgical foreign body. 2. Postsurgical changes with anastomotic staple lines and skin staples. 3. Catheter in the bladder.    Dictated by (CST): Chong Valdez MD on 2025 at 1:22 AM     Finalized by (CST): Chong Valdez MD on 2025 at 1:28 AM          XR FLUOROSCOPY C-ARM  TIME LESS THAN 1 HOUR (CPT=76000)  Result Date: 6/6/2025  PROCEDURE: XR FLUORO PHYSICIAN TIME< 1 HOUR (CPT=76000)  COMPARISON: [facility], CT ABDOMEN + PELVIS (CONTRAST ONLY) (CPT=74177), 6/05/2025, 5:22 PM.  INDICATIONS: COLONOSCOPY with colonic stent placement under fluoroscopy.  TECHNIQUE:   FLUOROSCOPY IMAGES OBTAINED:  2 FLUOROSCOPY TIME:  173.8 seconds RADIATION DOSE (Dose Area Product):  [id number]-tVb*m^2  FINDINGS:   Intraoperative exam was performed.  There has been placement of a proximal colonic stent.         CONCLUSION: Intraoperative exam. Please see operative report for further details.     Dictated by (CST): Frandy Starkey MD on 6/06/2025 at 8:55 PM     Finalized by (CST): Frandy Starkey MD on 6/06/2025 at 8:56 PM          CT ABDOMEN+PELVIS(CONTRAST ONLY)(CPT=74177)  Result Date: 6/5/2025  PROCEDURE: CT ABDOMEN + PELVIS (CONTRAST ONLY) (CPT=74177)  COMPARISON: [facility] ABDOMEN LIMITED (CPT=76705), 9/19/2024, 9:36 AM.  INDICATIONS: abd pain  TECHNIQUE: Multidetector CT images of the abdomen and pelvis were obtained with non-ionic intravenous contrast material. Automated exposure control for dose reduction was used. Adjustment of the mA and/or kV was done based on the patient's size. Iterative reconstruction technique for dose reduction was employed.  FINDINGS: LUNG BASES: The heart is normal in size.  Partially imaged coronary artery and aortic annular calcification.  Subpleural reticular opacities at the periphery of the right lower lobe. There is dependent subsegmental atelectasis bilaterally. LIVER: No enlargement, atrophy, abnormal density, or significant focal lesion is identified.  BILIARY: The gallbladder is present. PANCREAS: No lesion, fluid collection, ductal dilatation, or atrophy.  Small periampullary duodenal diverticulum. SPLEEN: No enlargement.  Calcified granulomas in the spleen. ADRENALS:   No defined mass or abnormal enlargement.  KIDNEYS:    Symmetric enhancement is seen without evidence of hydronephrosis or underlying solid masses.  Simple-appearing 2.4 cm right posterior interpolar renal cyst.  Tiny 4 mm nonobstructing right interpolar renal calculus. GI/MESENTERY:  There is no evidence of bowel obstruction.  Mild to moderate gastric distention noted.  Approximate 4 cm constricting mass at the proximal transverse colon (just beyond the hepatic flexure with marked upstream ascending colonic distention;  there is also mild-to-moderate distal colonic fecal burden; there is fat stranding surrounding the aforementioned constricting mass with adjacent subcentimeter short axis right upper quadrant pericolonic lymph nodes.  Normal appendix. URINARY BLADDER: Well distended urinary bladder, which demonstrates mild wall thickening. PELVIC NODES: No lymphadenopathy.   PELVIC ORGANS: No visible mass. Pelvic organs appropriate for patient age.  VASCULATURE:   No aneurysm is detected.  Mild atherosclerosis. RETROPERITONEUM: Borderline enlarged 1 cm interaortocaval retroperitoneal lymph node with other prominent but nonenlarged retroperitoneal lymph nodes that measure less than 1 cm short axis. BONES:   Demineralization with levoscoliosis of the lumbar spine and lower lumbar spine degeneration.  Probable benign hemangioma in the L2 vertebral body. ABDOMINAL WALL: No mass or hernia is perceived. OTHER: No free air or fluid is seen in the abdomen or pelvis.          CONCLUSION:  1. Suspicious approximate 4 cm segment of constricting masslike wall thickening at the proximal transverse colon with large upstream colonic fecal burden.  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  Stellate morphology stranding and nodularity in the right upper quadrant mesentery adjacent to the constricting mass probably relates to direct extension of disease.  There are adjacent nonenlarged right upper quadrant mesenteric lymph nodes, which could be reactive or  metastatic.  Gastroenterology evaluation and colonoscopy correlation is suggested.  No free intraperitoneal air or well-defined/drainable intra-abdominal collection. 2. Single mildly enlarged interaortocaval retroperitoneal lymph node with other prominent but nonenlarged retroperitoneal nodes.  These could be reactive or metastatic in nature. 3. Coronary and peripheral atherosclerosis with aortic annular calcifications. 4. Reticular opacities at the periphery of the right lung base probably relate to scarring. 5. Mild circumferential urinary bladder wall thickening.  Request urinalysis correlation to assess for potential cystitis.    elm-remote  Dictated by (CST): Jeremie Cedeño MD on 6/05/2025 at 5:47 PM     Finalized by (CST): Jeremie Cedeño MD on 6/05/2025 at 5:58 PM            Assessment/Plan:  Problem List[1]  19.6 wbc  9.5 hgb  Stable     Agree with TPN  THUY ALSTON MD  6/12/2025  10:18 AM       [1]   Patient Active Problem List  Diagnosis    BPH (benign prostatic hyperplasia)    Chronic low back pain    Chronic pain of both knees    Depression with anxiety    Generalized osteoarthritis    Hyperlipidemia    Type 2 diabetes mellitus without complication, without long-term current use of insulin (HCC)    Essential hypertension    Bipolar I disorder, single manic episode (HCC)    Large bowel obstruction (HCC)    Colonic mass

## 2025-06-12 NOTE — PLAN OF CARE
Patient is axox3. Levo paused. Given prn pain medication  Centered Care  Goal: Patient preferences are identified and integrated in the patient's plan of care  Description: Interventions:  - What would you like us to know as we care for you? I am from home.   - Provide timely, complete, and accurate information to patient/family  - Incorporate patient and family knowledge, values, beliefs, and cultural backgrounds into the planning and delivery of care  - Encourage patient/family to participate in care and decision-making at the level they choose  - Honor patient and family perspectives and choices  Outcome: Progressing     Problem: Diabetes/Glucose Control  Goal: Glucose maintained within prescribed range  Description: INTERVENTIONS:  - Monitor Blood Glucose as ordered  - Assess for signs and symptoms of hyperglycemia and hypoglycemia  - Administer ordered medications to maintain glucose within target range  - Assess barriers to adequate nutritional intake and initiate nutrition consult as needed  - Instruct patient on self management of diabetes  Outcome: Progressing     Problem: Patient/Family Goals  Goal: Patient/Family Long Term Goal  Description: Patient's Long Term Goal: Discharge home     Interventions:  - Monitor vitals  - Monitor labs  - IVF  - Glucose monitoring ACHS  - Clear liquid diet  - Scheduled miralax  - GI on consult  - See additional Care Plan goals for specific interventions  Outcome: Progressing  Goal: Patient/Family Short Term Goal  Description: Patient's Short Term Goal: Improve bowel function    Interventions:   - Full liquid diet  - GI on consult  - Scheduled miralax  - See additional Care Plan goals for specific interventions  Outcome: Progressing     Problem: PAIN - ADULT  Goal: Verbalizes/displays adequate comfort level or patient's stated pain goal  Description: INTERVENTIONS:  - Encourage pt to monitor pain and request assistance  - Assess pain using appropriate pain scale  -  Administer analgesics based on type and severity of pain and evaluate response  - Implement non-pharmacological measures as appropriate and evaluate response  - Consider cultural and social influences on pain and pain management  - Manage/alleviate anxiety  - Utilize distraction and/or relaxation techniques  - Monitor for opioid side effects  - Notify MD/LIP if interventions unsuccessful or patient reports new pain  - Anticipate increased pain with activity and pre-medicate as appropriate  Outcome: Progressing     Problem: RISK FOR INFECTION - ADULT  Goal: Absence of fever/infection during anticipated neutropenic period  Description: INTERVENTIONS  - Monitor WBC  - Administer growth factors as ordered  - Implement neutropenic guidelines  Outcome: Progressing     Problem: SAFETY ADULT - FALL  Goal: Free from fall injury  Description: INTERVENTIONS:  - Assess pt frequently for physical needs  - Identify cognitive and physical deficits and behaviors that affect risk of falls.  - [place] fall precautions as indicated by assessment.  - Educate pt/family on patient safety including physical limitations  - Instruct pt to call for assistance with activity based on assessment  - Modify environment to reduce risk of injury  - Provide assistive devices as appropriate  - Consider OT/PT consult to assist with strengthening/mobility  - Encourage toileting schedule  Outcome: Progressing     Problem: DISCHARGE PLANNING  Goal: Discharge to home or other facility with appropriate resources  Description: INTERVENTIONS:  - Identify barriers to discharge w/pt and caregiver  - Include patient/family/discharge partner in discharge planning  - Arrange for needed discharge resources and transportation as appropriate  - Identify discharge learning needs (meds, wound care, etc)  - Arrange for interpreters to assist at discharge as needed  - Consider post-discharge preferences of patient/family/discharge partner  - Complete POLST form as  appropriate  - Assess patient's ability to be responsible for managing their own health  - Refer to Case Management Department for coordinating discharge planning if the patient needs post-hospital services based on physician/LIP order or complex needs related to functional status, cognitive ability or social support system  Outcome: Progressing     Problem: Altered Communication/Language Barrier  Goal: Patient/Family is able to understand and participate in their care  Description: Interventions:  - Assess communication ability and preferred communication style  - Implement communication aides and strategies  - Use visual cues when possible  - Listen attentively, be patient, do not interrupt  - Minimize distractions  - Allow time for understanding and response  - Establish method for patient to ask for assistance (call light)  - Provide an  as needed  - Communicate barriers and strategies to overcome with those who interact with patient  Outcome: Progressing     Problem: GASTROINTESTINAL - ADULT  Goal: Minimal or absence of nausea and vomiting  Description: INTERVENTIONS:  - Maintain adequate hydration with IV or PO as ordered and tolerated  - Nasogastric tube to low intermittent suction as ordered  - Evaluate effectiveness of ordered antiemetic medications  - Provide nonpharmacologic comfort measures as appropriate  - Advance diet as tolerated, if ordered  - Obtain nutritional consult as needed  - Evaluate fluid balance  Outcome: Progressing  Goal: Maintains or returns to baseline bowel function  Description: INTERVENTIONS:  - Assess bowel function  - Maintain adequate hydration with IV or PO as ordered and tolerated  - Evaluate effectiveness of GI medications  - Encourage mobilization and activity  - Obtain nutritional consult as needed  - Establish a toileting routine/schedule  - Consider collaborating with pharmacy to review patient's medication profile  Outcome: Progressing      2+ B/L

## 2025-06-12 NOTE — CM/SW NOTE
Care Progression Note:    Length of stay: 7  GMLOS:    Avoidable Delays:   Code Status: Full    Acute Medical Issue/Factors:     Suspected Metastatic Colon Cancer/Large Bowel obstruction 2/2 Proximal transverse colonic mass-pending pathology report, Oncology on consult, Zosyn every 8 hours, Protonix daily, IV fluids, IV Dilaudid, IV Zofran PRN, may start TPN    Acute postoperative blood loss anemia with hypotension-Resolved Off pressure support, Hgb 9.5 this AM    DM2-being managed, currently on ISS,  POC Glucose 117 this AM    Patient has not been up since admission, requested PT/OT evals for discharge recommendations    HH referrals sent previously by CM, there is 1 accepting [facility]. [facility] [place]. New F2[facility] sent.     PLAN: TBD    Discharge Barriers: Physical/emotional and/or cognitive functioning   Expected discharge date: TBD   Expected next site of care: TBD.     / available for discharge planning.     Marcy NUNNN RN   Nurse    [phone]

## 2025-06-12 NOTE — PLAN OF CARE
Patient A&Ox3, mainly [place] speaking. Interpretor/family used for translation. Off pressors all day until afternoon when BP dropped 80s/30s- levo restarted and titrated to keep MAP >65. 500 ml bolus given as ordered. IV tylenol and dilaudid given for pain control. Midline incision now TREVOR with staples. Remains NPO with NG to LIS. TPN to be started tonight. Plan of care discussed with family at bedside. Muñoz removed per protocol- DTV. Up to chair x1 with assist. PT/OT to eval tomorrow. Left arm extravasation site improving, slightly red/ warm to touch. Mild edema noted, weeping. Arm elevated. See assessments. Safety maintained. Will monitor.     Problem: Patient Centered Care  Goal: Patient preferences are identified and integrated in the patient's plan of care  Description: Interventions:  - What would you like us to know as we care for you? I am from home.   - Provide timely, complete, and accurate information to patient/family  - Incorporate patient and family knowledge, values, beliefs, and cultural backgrounds into the planning and delivery of care  - Encourage patient/family to participate in care and decision-making at the level they choose  - Honor patient and family perspectives and choices  Outcome: Progressing     Problem: Diabetes/Glucose Control  Goal: Glucose maintained within prescribed range  Description: INTERVENTIONS:  - Monitor Blood Glucose as ordered  - Assess for signs and symptoms of hyperglycemia and hypoglycemia  - Administer ordered medications to maintain glucose within target range  - Assess barriers to adequate nutritional intake and initiate nutrition consult as needed  - Instruct patient on self management of diabetes  Outcome: Progressing     Problem: Patient/Family Goals  Goal: Patient/Family Long Term Goal  Description: Patient's Long Term Goal: Discharge home     Interventions:  - Monitor vitals  - Monitor labs  - IVF  - Glucose monitoring ACHS  - Clear liquid diet  - Scheduled  miralax  - GI on consult  - See additional Care Plan goals for specific interventions  Outcome: Progressing  Goal: Patient/Family Short Term Goal  Description: Patient's Short Term Goal: Improve bowel function    Interventions:   - Full liquid diet  - GI on consult  - Scheduled miralax  - See additional Care Plan goals for specific interventions  Outcome: Progressing     Problem: PAIN - ADULT  Goal: Verbalizes/displays adequate comfort level or patient's stated pain goal  Description: INTERVENTIONS:  - Encourage pt to monitor pain and request assistance  - Assess pain using appropriate pain scale  - Administer analgesics based on type and severity of pain and evaluate response  - Implement non-pharmacological measures as appropriate and evaluate response  - Consider cultural and social influences on pain and pain management  - Manage/alleviate anxiety  - Utilize distraction and/or relaxation techniques  - Monitor for opioid side effects  - Notify MD/LIP if interventions unsuccessful or patient reports new pain  - Anticipate increased pain with activity and pre-medicate as appropriate  Outcome: Progressing     Problem: RISK FOR INFECTION - ADULT  Goal: Absence of fever/infection during anticipated neutropenic period  Description: INTERVENTIONS  - Monitor WBC  - Administer growth factors as ordered  - Implement neutropenic guidelines  Outcome: Progressing     Problem: SAFETY ADULT - FALL  Goal: Free from fall injury  Description: INTERVENTIONS:  - Assess pt frequently for physical needs  - Identify cognitive and physical deficits and behaviors that affect risk of falls.  - [place] fall precautions as indicated by assessment.  - Educate pt/family on patient safety including physical limitations  - Instruct pt to call for assistance with activity based on assessment  - Modify environment to reduce risk of injury  - Provide assistive devices as appropriate  - Consider OT/PT consult to assist with  strengthening/mobility  - Encourage toileting schedule  Outcome: Progressing     Problem: DISCHARGE PLANNING  Goal: Discharge to home or other facility with appropriate resources  Description: INTERVENTIONS:  - Identify barriers to discharge w/pt and caregiver  - Include patient/family/discharge partner in discharge planning  - Arrange for needed discharge resources and transportation as appropriate  - Identify discharge learning needs (meds, wound care, etc)  - Arrange for interpreters to assist at discharge as needed  - Consider post-discharge preferences of patient/family/discharge partner  - Complete POLST form as appropriate  - Assess patient's ability to be responsible for managing their own health  - Refer to Case Management Department for coordinating discharge planning if the patient needs post-hospital services based on physician/LIP order or complex needs related to functional status, cognitive ability or social support system  Outcome: Progressing     Problem: Altered Communication/Language Barrier  Goal: Patient/Family is able to understand and participate in their care  Description: Interventions:  - Assess communication ability and preferred communication style  - Implement communication aides and strategies  - Use visual cues when possible  - Listen attentively, be patient, do not interrupt  - Minimize distractions  - Allow time for understanding and response  - Establish method for patient to ask for assistance (call light)  - Provide an  as needed  - Communicate barriers and strategies to overcome with those who interact with patient  Outcome: Progressing     Problem: GASTROINTESTINAL - ADULT  Goal: Minimal or absence of nausea and vomiting  Description: INTERVENTIONS:  - Maintain adequate hydration with IV or PO as ordered and tolerated  - Nasogastric tube to low intermittent suction as ordered  - Evaluate effectiveness of ordered antiemetic medications  - Provide nonpharmacologic  comfort measures as appropriate  - Advance diet as tolerated, if ordered  - Obtain nutritional consult as needed  - Evaluate fluid balance  Outcome: Progressing  Goal: Maintains or returns to baseline bowel function  Description: INTERVENTIONS:  - Assess bowel function  - Maintain adequate hydration with IV or PO as ordered and tolerated  - Evaluate effectiveness of GI medications  - Encourage mobilization and activity  - Obtain nutritional consult as needed  - Establish a toileting routine/schedule  - Consider collaborating with pharmacy to review patient's medication profile  Outcome: Progressing

## 2025-06-12 NOTE — PROGRESS NOTES
[facility]  part of [facility]     Progress Note    Arnaldo Gutierrez Patient Status:  Inpatient    4/15/1945 MRN [id number]   Location [facility] 2W/SW Attending Boy Mccabe MD   Hosp Day # 7 PCP PHYSICIAN NONSTAFF       Subjective:   Patient seen and examined.  Admits to some postoperative pain.  Weaned off pressor support    Objective:   Blood pressure 115/57, pulse 85, temperature 97.9 °F (36.6 °C), temperature source Temporal, resp. rate 21, height 5' 11\" (1.803 m), weight 154 lb 5.2 oz (70 kg), SpO2 95%.  Intake/Output:   Last 3 shifts: I/O last 3 completed shifts:  In: 4917.3 [I.V.:4392.3; Blood:325; IV PIGGYBACK:200]  Out:  [Urine:1525; Emesis/NG output:600]   This shift: No intake/output data recorded.     Vent Settings:      Hemodynamic parameters (last 24 hours):      Scheduled Meds: Current Hospital Medications[1]    Continuous Infusions: Medication Infusions[2]    Physical Exam  Constitutional: no acute distress  Eyes: PERRL  ENT: nares pateint  Neck: supple, no JVD  Cardio: RRR, S1 S2  Respiratory: clear to auscultation bilaterally, no wheezing, rales, rhonchi, crackles  GI: abdomen soft, mild tenderness to palpation, abdominal dressing in place  Extremities: no clubbing, cyanosis, edema  Neurologic: no gross motor deficits  Skin: warm, dry      Results:     Lab Results   Component Value Date    WBC 19.6 2025    HGB 9.5 2025    HGB 9.4 2025    HCT 28.2 2025    .0 2025    CREATSERUM 0.76 2025    BUN 11 2025     2025    K 3.5 2025    K 3.5 2025     2025    CO2 21.0 2025     2025    CA 6.8 2025    MG 1.2 2025    PHOS 2.6 2025       XR ABDOMEN (1 VIEW) (CPT=74018)  Result Date: 2025  CONCLUSION:  1. No retained unexpected surgical foreign body. 2. Postsurgical changes with anastomotic staple lines and skin staples. 3. Catheter in the bladder.     Dictated by (CST): Chong Valdez MD on 6/11/2025 at 1:22 AM     Finalized by (CST): Chong Valdez MD on 6/11/2025 at 1:28 AM                  Assessment   1.  POD #2 status post transverse colon resection, small bowel resection and resection of tumor on small bowel  2.  Shock state, improved  3.  Lactic acidosis  4.  Diabetes mellitus  5.  Hyperlipidemia  6.  BPH  7.  Hyponatremia     Plan   -Patient presented with evidence of weight loss abdominal discomfort nausea found to have evidence of obstruction secondary to mass seen now status post transverse colon resection, small bowel resection and resection of tumor on small bowel on 6/10/2025.  - Recommendations per general surgery.  Closely monitor hemoglobin.  Transfuse for hemoglobin below 7  - Pain control  - Await pathology results  - Weaned off pressor support  - Empiric antibiotic therapy with Zosyn at this time  - DVT prophylaxis: Rosalba Parrish,   Pulmonary Critical Care Medicine  [facility]        [1]   Current Facility-Administered Medications   Medication Dose Route Frequency    pantoprazole (Protonix) 40 mg in sodium chloride 0.9% PF 10 mL IV push  40 mg Intravenous Daily    vasopressin (Vasostrict) 20 Units in sodium chloride 0.9% 100 mL infusion for non-septic shock  0.03 Units/min Intravenous Continuous    piperacillin-tazobactam (Zosyn) 4.5 g in dextrose 5% 100 mL IVPB-ADDV  4.5 g Intravenous Q8H    ondansetron (Zofran) 4 MG/2ML injection 4 mg  4 mg Intravenous Q4H PRN    acetaminophen (Ofirmev) 10 mg/mL infusion premix 1,000 mg  1,000 mg Intravenous Q6H PRN    insulin regular human (Novolin R, Humulin R) 100 UNIT/ML injection 1-11 Units  1-11 Units Subcutaneous Q6H    oxyCODONE immediate release tab 2.5 mg  2.5 mg Oral Q4H PRN    Or    oxyCODONE immediate release tab 5 mg  5 mg Oral Q4H PRN    HYDROmorphone (Dilaudid) 1 MG/ML injection 0.2 mg  0.2 mg Intravenous Q2H PRN    Or    HYDROmorphone (Dilaudid) 1 MG/ML injection 0.4 mg  0.4  mg Intravenous Q2H PRN    norepinephrine (Levophed) 4 mg/250mL infusion premix  0.5-50 mcg/min Intravenous Continuous    sodium chloride 0.9% infusion   Intravenous Continuous    acetaminophen (Tylenol Extra Strength) tab 500 mg  500 mg Oral Q4H PRN    glucose (Dex4) 15 GM/59ML oral liquid 15 g  15 g Oral Q15 Min PRN    Or    glucose (Glutose) 40% oral gel 15 g  15 g Oral Q15 Min PRN    Or    dextrose 50% injection 50 mL  50 mL Intravenous Q15 Min PRN   [2]    vasopressin (Vasostrict) 20 Units in sodium chloride 0.9% 100 mL infusion for non-septic shock Stopped (06/11/25 1430)    norepinephrine Stopped (06/12/25 0630)    sodium chloride 125 mL/hr at 06/12/25 0737

## 2025-06-13 ENCOUNTER — APPOINTMENT (OUTPATIENT)
Dept: GENERAL RADIOLOGY | Facility: HOSPITAL | Age: 80
End: 2025-06-13
Payer: MEDICARE

## 2025-06-13 ENCOUNTER — APPOINTMENT (OUTPATIENT)
Dept: GENERAL RADIOLOGY | Facility: HOSPITAL | Age: 80
End: 2025-06-13
Attending: NURSE PRACTITIONER
Payer: MEDICARE

## 2025-06-13 ENCOUNTER — APPOINTMENT (OUTPATIENT)
Dept: GENERAL RADIOLOGY | Facility: HOSPITAL | Age: 80
End: 2025-06-13
Attending: SPECIALIST
Payer: MEDICARE

## 2025-06-13 LAB
ALBUMIN SERPL-MCNC: 2.4 G/DL (ref 3.2–4.8)
ALP LIVER SERPL-CCNC: 55 U/L (ref 45–117)
ALT SERPL-CCNC: 14 U/L (ref 10–49)
ANION GAP SERPL CALC-SCNC: 6 MMOL/L (ref 0–18)
AST SERPL-CCNC: 13 U/L (ref ?–34)
BASOPHILS # BLD: 0 X10(3) UL (ref 0–0.2)
BASOPHILS NFR BLD: 0 %
BILIRUB DIRECT SERPL-MCNC: 0.2 MG/DL (ref ?–0.3)
BILIRUB SERPL-MCNC: 0.4 MG/DL (ref 0.2–1.1)
BUN BLD-MCNC: 13 MG/DL (ref 9–23)
BUN/CREAT SERPL: 15.3 (ref 10–20)
CALCIUM BLD-MCNC: 7.4 MG/DL (ref 8.7–10.4)
CEA SERPL-MCNC: 1.2 NG/ML (ref ?–5)
CHLORIDE SERPL-SCNC: 107 MMOL/L (ref 98–112)
CO2 SERPL-SCNC: 24 MMOL/L (ref 21–32)
CREAT BLD-MCNC: 0.85 MG/DL (ref 0.7–1.3)
DEPRECATED RDW RBC AUTO: 61.2 FL (ref 35.1–46.3)
EGFRCR SERPLBLD CKD-EPI 2021: 89 ML/MIN/1.73M2 (ref 60–?)
EOSINOPHIL # BLD: 0 X10(3) UL (ref 0–0.7)
EOSINOPHIL NFR BLD: 0 %
ERYTHROCYTE [DISTWIDTH] IN BLOOD BY AUTOMATED COUNT: 18.6 % (ref 11–15)
GLUCOSE BLD-MCNC: 261 MG/DL (ref 70–99)
GLUCOSE BLDC GLUCOMTR-MCNC: 123 MG/DL (ref 70–99)
GLUCOSE BLDC GLUCOMTR-MCNC: 148 MG/DL (ref 70–99)
GLUCOSE BLDC GLUCOMTR-MCNC: 168 MG/DL (ref 70–99)
GLUCOSE BLDC GLUCOMTR-MCNC: 172 MG/DL (ref 70–99)
HCT VFR BLD AUTO: 26.8 % (ref 39–53)
HGB BLD-MCNC: 8.4 G/DL (ref 13–17.5)
HGB BLD-MCNC: 8.7 G/DL (ref 13–17.5)
HGB BLD-MCNC: 9.1 G/DL (ref 13–17.5)
HGB BLD-MCNC: 9.3 G/DL (ref 13–17.5)
HGB BLD-MCNC: 9.5 G/DL (ref 13–17.5)
LDH SERPL L TO P-CCNC: 185 U/L (ref 120–246)
LYMPHOCYTES NFR BLD: 1.09 X10(3) UL (ref 1–4)
LYMPHOCYTES NFR BLD: 5 %
MAGNESIUM SERPL-MCNC: 1.9 MG/DL (ref 1.6–2.6)
MCH RBC QN AUTO: 31 PG (ref 26–34)
MCHC RBC AUTO-ENTMCNC: 34 G/DL (ref 31–37)
MCV RBC AUTO: 91.2 FL (ref 80–100)
MONOCYTES # BLD: 0.65 X10(3) UL (ref 0.1–1)
MONOCYTES NFR BLD: 3 %
NEUTROPHILS # BLD AUTO: 19.84 X10 (3) UL (ref 1.5–7.7)
NEUTROPHILS NFR BLD: 77 %
NEUTS BAND NFR BLD: 15 %
NEUTS HYPERSEG # BLD: 19.96 X10(3) UL (ref 1.5–7.7)
NEUTS VAC BLD QL SMEAR: PRESENT
OSMOLALITY SERPL CALC.SUM OF ELEC: 293 MOSM/KG (ref 275–295)
PHOSPHATE SERPL-MCNC: 2.2 MG/DL (ref 2.4–5.1)
PLATELET # BLD AUTO: 136 10(3)UL (ref 150–450)
PLATELET MORPHOLOGY: NORMAL
POTASSIUM SERPL-SCNC: 3.2 MMOL/L (ref 3.5–5.1)
PROT SERPL-MCNC: 4.2 G/DL (ref 5.7–8.2)
RBC # BLD AUTO: 2.94 X10(6)UL (ref 3.8–5.8)
SODIUM SERPL-SCNC: 137 MMOL/L (ref 136–145)
TOTAL CELLS COUNTED BLD: 100
TRIGL SERPL-MCNC: 61 MG/DL (ref 30–149)
URATE SERPL-MCNC: 1.5 MG/DL (ref 3.7–9.2)
WBC # BLD AUTO: 21.7 X10(3) UL (ref 4–11)

## 2025-06-13 PROCEDURE — 99233 SBSQ HOSP IP/OBS HIGH 50: CPT | Performed by: HOSPITALIST

## 2025-06-13 PROCEDURE — 99233 SBSQ HOSP IP/OBS HIGH 50: CPT | Performed by: STUDENT IN AN ORGANIZED HEALTH CARE EDUCATION/TRAINING PROGRAM

## 2025-06-13 PROCEDURE — 74018 RADEX ABDOMEN 1 VIEW: CPT | Performed by: SPECIALIST

## 2025-06-13 PROCEDURE — 71045 X-RAY EXAM CHEST 1 VIEW: CPT | Performed by: NURSE PRACTITIONER

## 2025-06-13 PROCEDURE — 71045 X-RAY EXAM CHEST 1 VIEW: CPT

## 2025-06-13 PROCEDURE — 99233 SBSQ HOSP IP/OBS HIGH 50: CPT | Performed by: INTERNAL MEDICINE

## 2025-06-13 RX ORDER — PANTOPRAZOLE SODIUM 40 MG/1
40 TABLET, DELAYED RELEASE ORAL
Qty: 90 TABLET | Refills: 3 | Status: SHIPPED | OUTPATIENT
Start: 2025-06-13

## 2025-06-13 RX ORDER — POTASSIUM CHLORIDE 14.9 MG/ML
20 INJECTION INTRAVENOUS ONCE
Status: COMPLETED | OUTPATIENT
Start: 2025-06-13 | End: 2025-06-13

## 2025-06-13 NOTE — PROGRESS NOTES
[facility]  part of [facility]    Progress Note    Arnaldo Gutierrez Patient Status:  Inpatient    4/15/1945 MRN [id number]   Location [facility] 2W/SW Attending Boy Mccabe MD   Hosp Day # 8 PCP PHYSICIAN NONSTAFF     Subjective:  Small dose of pressor      c/o pain   no  flatus /stool       Objective/Physical Exam:  General: Alert, orientated x3.  Cooperative.  No apparent distress.  Vital Signs:  Blood pressure 114/62, pulse 77, temperature 97.6 °F (36.4 °C), temperature source Temporal, resp. rate 10, height 5' 11\" (1.803 m), weight 161 lb 2.5 oz (73.1 kg), SpO2 92%.  Abdomen:  Soft, non-distended, incisional tendernes  wound ok  Labs:  Lab Results   Component Value Date    WBC 21.7 (H) 2025    HGB 9.1 (L) 2025    HCT 26.8 (L) 2025    .0 (L) 2025    CREATSERUM 0.85 2025    BUN 13 2025     2025    K 3.2 (L) 2025     2025    CO2 24.0 2025     (H) 2025    CA 7.4 (L) 2025    ALB 3.6 2025    ALKPHO 115 2025    BILT 0.3 2025    TP 6.2 2025    AST 31 2025    ALT 27 2025    TSH 2.417 2025    LIP 22 2025    MG 1.2 (L) 2025    PHOS 2.2 (L) 2025    B12 >2,000 (H) 2025       Imaging:  XR CHEST AP PORTABLE  (CPT=71045)  Result Date: 2025  PROCEDURE: XR CHEST AP PORTABLE  (CPT=71045) TIME: 8:59.   COMPARISON: [facility], CT ABDOMEN + PELVIS (CONTRAST ONLY) (CPT=74177), 2025, 5:22 PM.  [facility], XR CHEST AP PORTABLE (CPT=71045), 2025, 6:40 AM.  INDICATIONS: Post NG advancement.  Metastatic colon cancer status post transverse colonic resection on 06/10/2025.  Acute postoperative blood loss with hypotension.  TECHNIQUE:   Single view.   FINDINGS:  CARDIAC/VASC: The cardiac silhouette is not enlarged.  There is calcification of the aortic knob.  Unremarkable pulmonary vasculature.   MEDIAST/KRYSTA:   No visible mass or adenopathy. LUNGS/PLEURA: The lungs are again hypoinflated.  Streaky opacities at the lung bases with elevation of the right hemidiaphragm are all unchanged.  No pleural effusion or pneumothorax. BONES: There are mild spondylotic changes in the thoracic spine. OTHER: An enteric tube has been advanced.  The tip in distal side hole project over the gastric bubble in the left upper quadrant of the abdomen.  A right-sided PICC tip projects over the mid SVC.  Free air is again noted beneath the right hemidiaphragm,  likely related to the recent surgery.         CONCLUSION:  1. Enteric tube has been advanced with tip and distal side hole now projecting over the gastric bubble, in customary position. 2. Expiratory chest with stable atelectasis at both lung bases. 3. Persistent pneumoperitoneum, likely related to the recent abdominal surgery.    Dictated by (CST): Chuy Schmid MD on 6/13/2025 at 9:19 AM     Finalized by (CST): Chuy Schmid MD on 6/13/2025 at 9:21 AM          XR CHEST AP PORTABLE  (CPT=71045)  Result Date: 6/13/2025  PROCEDURE: XR CHEST AP PORTABLE  (CPT=71045) TIME: 6:43.   COMPARISON: [facility], XR ABDOMEN (1 VIEW) (CPT=74018), 6/10/2025, 6:07 PM.  [facility], CT ABDOMEN + PELVIS (CONTRAST ONLY) (CPT=74177), 6/05/2025, 5:22 PM.  INDICATIONS: Hypoxia.  Suspected metastatic colon cancer status post transverse colonic resection on 06/10/2025.  Acute postoperative blood loss with hypotension.  TECHNIQUE:   Single view.   FINDINGS:  CARDIAC/VASC: Normal.  No cardiac silhouette abnormality or cardiomegaly.  Unremarkable pulmonary vasculature.  MEDIAST/KRYSTA:   No visible mass or adenopathy. LUNGS/PLEURA: The lungs are hypoinflated.  Streaky opacities have developed at the lung bases with elevation of the right hemidiaphragm.  No pleural effusion or pneumothorax. BONES: There are mild spondylotic changes in the thoracic spine.  OTHER: An enteric tube tip again projects over the gastric bubble.  The distal side hole projects over the expected location of the distal esophagus.  A right-sided PICC tip projects over the mid SVC.  Free air is noted beneath the right hemidiaphragm, likely related to the recent surgery.         CONCLUSION:  1. Enteric tube tip projects over the stomach, although the distal side hole projects over the distal esophagus.  Consider advancing approximately 7.0 cm into the stomach. 2. Expiratory chest with the development of atelectasis at both lung bases. 3. Pneumoperitoneum, likely related to the recent abdominal surgery.    Dictated by (CST): Chuy Schmid MD on 6/13/2025 at 7:16 AM     Finalized by (CST): Chuy Schmid MD on 6/13/2025 at 7:19 AM          XR ABDOMEN (1 VIEW) (CPT=74018)  Result Date: 6/11/2025  PROCEDURE: XR ABDOMEN (1 VIEW) (CPT=74018)  COMPARISON: [facility], CT ABDOMEN + PELVIS (CONTRAST ONLY) (CPT=74177), 6/05/2025, 5:22 PM.  INDICATIONS: Partially obstructing colon tumor.  Opene tray. image taken due to protocol. Evaluate for foreign body.  TECHNIQUE:   Single view.           CONCLUSION:  1. No retained unexpected surgical foreign body. 2. Postsurgical changes with anastomotic staple lines and skin staples. 3. Catheter in the bladder.    Dictated by (CST): Chong Valdez MD on 6/11/2025 at 1:22 AM     Finalized by (CST): Chong Valdez MD on 6/11/2025 at 1:28 AM          XR FLUOROSCOPY C-ARM TIME LESS THAN 1 HOUR (CPT=76000)  Result Date: 6/6/2025  PROCEDURE: XR FLUORO PHYSICIAN TIME< 1 HOUR (CPT=76000)  COMPARISON: [facility], CT ABDOMEN + PELVIS (CONTRAST ONLY) (CPT=74177), 6/05/2025, 5:22 PM.  INDICATIONS: COLONOSCOPY with colonic stent placement under fluoroscopy.  TECHNIQUE:   FLUOROSCOPY IMAGES OBTAINED:  2 FLUOROSCOPY TIME:  173.8 seconds RADIATION DOSE (Dose Area Product):  [id number]-[id number]*m^2  FINDINGS:   Intraoperative exam was performed.  There  has been placement of a proximal colonic stent.         CONCLUSION: Intraoperative exam. Please see operative report for further details.     Dictated by (CST): Frandy Starkey MD on 6/06/2025 at 8:55 PM     Finalized by (CST): Frandy Starkey MD on 6/06/2025 at 8:56 PM          CT ABDOMEN+PELVIS(CONTRAST ONLY)(CPT=74177)  Result Date: 6/5/2025  PROCEDURE: CT ABDOMEN + PELVIS (CONTRAST ONLY) (CPT=74177)  COMPARISON: [facility] ABDOMEN LIMITED (CPT=76705), 9/19/2024, 9:36 AM.  INDICATIONS: abd pain  TECHNIQUE: Multidetector CT images of the abdomen and pelvis were obtained with non-ionic intravenous contrast material. Automated exposure control for dose reduction was used. Adjustment of the mA and/or kV was done based on the patient's size. Iterative reconstruction technique for dose reduction was employed.  FINDINGS: LUNG BASES: The heart is normal in size.  Partially imaged coronary artery and aortic annular calcification.  Subpleural reticular opacities at the periphery of the right lower lobe. There is dependent subsegmental atelectasis bilaterally. LIVER: No enlargement, atrophy, abnormal density, or significant focal lesion is identified.  BILIARY: The gallbladder is present. PANCREAS: No lesion, fluid collection, ductal dilatation, or atrophy.  Small periampullary duodenal diverticulum. SPLEEN: No enlargement.  Calcified granulomas in the spleen. ADRENALS:   No defined mass or abnormal enlargement.  KIDNEYS:   Symmetric enhancement is seen without evidence of hydronephrosis or underlying solid masses.  Simple-appearing 2.4 cm right posterior interpolar renal cyst.  Tiny 4 mm nonobstructing right interpolar renal calculus. GI/MESENTERY:  There is no evidence of bowel obstruction.  Mild to moderate gastric distention noted.  Approximate 4 cm constricting mass at the proximal transverse colon (just beyond the hepatic flexure with marked upstream ascending colonic distention;  there is  also mild-to-moderate distal colonic fecal burden; there is fat stranding surrounding the aforementioned constricting mass with adjacent subcentimeter short axis right upper quadrant pericolonic lymph nodes.  Normal appendix. URINARY BLADDER: Well distended urinary bladder, which demonstrates mild wall thickening. PELVIC NODES: No lymphadenopathy.   PELVIC ORGANS: No visible mass. Pelvic organs appropriate for patient age.  VASCULATURE:   No aneurysm is detected.  Mild atherosclerosis. RETROPERITONEUM: Borderline enlarged 1 cm interaortocaval retroperitoneal lymph node with other prominent but nonenlarged retroperitoneal lymph nodes that measure less than 1 cm short axis. BONES:   Demineralization with levoscoliosis of the lumbar spine and lower lumbar spine degeneration.  Probable benign hemangioma in the L2 vertebral body. ABDOMINAL WALL: No mass or hernia is perceived. OTHER: No free air or fluid is seen in the abdomen or pelvis.          CONCLUSION:  1. Suspicious approximate 4 cm segment of constricting masslike wall thickening at the proximal transverse colon with large upstream colonic fecal burden.  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  Stellate morphology stranding and nodularity in the right upper quadrant mesentery adjacent to the constricting mass probably relates to direct extension of disease.  There are adjacent nonenlarged right upper quadrant mesenteric lymph nodes, which could be reactive or metastatic.  Gastroenterology evaluation and colonoscopy correlation is suggested.  No free intraperitoneal air or well-defined/drainable intra-abdominal collection. 2. Single mildly enlarged interaortocaval retroperitoneal lymph node with other prominent but nonenlarged retroperitoneal nodes.  These could be reactive or metastatic in nature. 3. Coronary and peripheral atherosclerosis with aortic annular calcifications. 4. Reticular opacities at the periphery of the right lung  base probably relate to scarring. 5. Mild circumferential urinary bladder wall thickening.  Request urinalysis correlation to assess for potential cystitis.    elm-remote  Dictated by (CST): Jeremie Cedeño MD on 6/05/2025 at 5:47 PM     Finalized by (CST): Jeremie Cedeño MD on 6/05/2025 at 5:58 PM            Assessment/Plan:  Problem List[1]  21.7 wbc  hgb  9.1  On TPN   cpm  THUY ALSTON MD  6/13/2025  9:35 AM       [1]   Patient Active Problem List  Diagnosis    BPH (benign prostatic hyperplasia)    Chronic low back pain    Chronic pain of both knees    Depression with anxiety    Generalized osteoarthritis    Hyperlipidemia    Type 2 diabetes mellitus without complication, without long-term current use of insulin (HCC)    Essential hypertension    Bipolar I disorder, single manic episode (HCC)    Large bowel obstruction (HCC)    Colonic mass

## 2025-06-13 NOTE — OPERATIVE REPORT
[facility]    PATIENT'S NAME: MARCIN WOODARD   ATTENDING PHYSICIAN: Boy Mccabe MD   OPERATING PHYSICIAN: Ruben Randall MD   PATIENT ACCOUNT#:   [id number]    LOCATION:  [address] [place] [address]  MEDICAL RECORD #:   [id number]       YOB: 1945  ADMISSION DATE:       06/05/2025      OPERATION DATE:  06/10/2025    OPERATIVE REPORT    PREOPERATIVE DIAGNOSIS:  Large bowel obstruction.  POSTOPERATIVE DIAGNOSIS:  Large bowel obstruction.  PROCEDURE:  Laparoscopic-assisted transverse colon resection, small bowel resection x2, resection of tumor nodule on small bowel.    ASSISTANT:  NADINE Bowie     ANESTHESIA:  General.    BLOOD LOSS:  600 mL.     SPECIMENS:  Transverse colon portion, 2 portions of small intestine.    DRAINS:  None.    COMPLICATIONS:  None.    DISPOSITION:  Stable.    INDICATIONS:  Patient presenting with obstructing transverse colon tumor.  He did have an endoscopically placed stent in the colon.  Comes to surgery for resection.      FINDINGS:  As above.    OPERATIVE TECHNIQUE:  He came into the operating room, underwent general endotracheal anesthesia.  Compression boots were placed.  NG tube was also placed.  We prepped and draped the abdomen.  We began by making a supraumbilical incision under direct vision, inserted a 5 mm bladeless trocar.  The right upper and lower bladeless 5 mm trocars placed under direct vision, as well as a left lower quadrant trocar, 5 mm.  We inspected the abdomen at this point.  There was some dilatation of the small bowel.  We could see a big mass in the midline with omentum over this, which was attached to the anterior abdominal wall.  This was the area in question of the transverse colon.  On camera, we looked around.  There was no evidence of gross spread of tumor in the peritoneal cavity.  We mobilized the colon by taking down the transverse colon laparoscopically, taking down its attachment near the spleen, going down to the white line of Toldt  on the left.  We went down as far as we could on the white line of Toldt toward the sigmoid.  We then released the hepatico flexure laparoscopically after taking down the right colon laparoscopically.  As stated, the tumor mass had the omentum over it stuck to the anterior abdominal wall.  At this point, we felt that we should do the open portion of the resection.  We, therefore, made a small upper midline incision, placed the Bookwalter retractor.  We could see the omentum had, as stated, adhered to the anterior abdominal wall.  This was taken down with dissection.  Difficult to tell if the tumor actually went to the anterior abdominal wall.  It appeared that the omentum was over this, though direct tumor extension cannot be ruled out.  We could see the stent which was actually rupturing through the transverse colon at this point.  We placed clamps proximal and distal to the tumor at this point.  We then did a formal exploration of the abdomen.  There was no carcinomatosis.  However, there were very large faisal masses in the mesentery of the small bowel throughout.  Also as we ran the small bowel, we saw in the upper small bowel there was a tumor mass which was adhesed between a loop of small bowel which was causing a partial obstruction.  Also on 1 part just proximal to this, there was a tumor nodule on a portion of the jejunum.  Also distally, there was another tumor mass which was near obstructing in a loop of small bowel, actually 2 areas of tumor.  We felt the wisest course at this time would be to resect the transverse colon, number 1, resect these other tumor areas.  The faisal masses we could not treat.  We started with the colon.  At this point, we transected proximal and distal to the tumor mass, more near the hepatic flexure and the splenic flexure.  This was taken with a HELEN stapler.  At this point, we took down the mesentery with the LigaSure device.  We then did a side-to-side colocolo stapled  anastomosis reinforced with silk, closed the mesentery with silks.  Had a good lumen afterwards at this point.  We then turned our attention to the proximal small bowel mass.  There was a loop of bowel between where the mass had been located.  We, therefore, felt we would take this en bloc.  We went proximal and distal on the small bowel to the loop with the obstruction and transected at this point.  The mesentery taken down with the LigaSure.  At this point, we also did a side-to-side jejunojejunostomy, closed the rent in the mesentery with silks, and reinforced staple lines.  As stated, there was a lesion proximal to this on the jejunum.  This was not obstructing, appeared to be relatively superficial.  This was taken by applying a TA stapler, amputating the tumor, and reinforcing this with silks.  We had a good lumen afterwards.  We turned our attention to the distal ileal loop of small bowel with the obstructions.  This was also resected, taking proximal and distal to this and removing the ileum involved, also taking the LigaSure for the mesentery.  This was also sewn with a side-to-side ileoileal anastomosis.  Rent in the mesentery closed with silk.  We then ran the small bowel again.  All anastomoses were patent.  We could not see any other tumor involvement on the bowel itself.  It should be noted that the first loop of proximal small bowel and jejunum had a very large faisal mass.  We tried to take this as much as we could when we took the mesentery for anastomosis; however, we could palpate, as stated, diffuse involvement of the mesenteric lymph nodes throughout the small bowel.  We then copiously irrigated the abdomen and then closed with running PDS double-stranded sutures and staples.  The patient did receive 2 units of blood, was stable.  Left the operating room in a stable condition.  Incidentally, palpation of the liver and visualization of the liver did not appear to have any tumor.    Dictated By  Ruben Randall MD  d: 06/12/2025 10:30:15  t: 06/12/2025 15:59:47  [facility] [id number]  [facility]/

## 2025-06-13 NOTE — PROGRESS NOTES
[facility]  part of [facility]     Progress Note    Arnaldo Gutierrez Patient Status:  Inpatient    4/15/1945 MRN [id number]   Location [facility] 2W/SW Attending Boy Mccabe MD   Hosp Day # 8 PCP PHYSICIAN NONSTAFF       Subjective:   Patient seen and examined.  Admits to some postoperative pain.  Requiring pressor support with norepinephrine.    Objective:   Blood pressure 114/62, pulse 77, temperature 97.6 °F (36.4 °C), temperature source Temporal, resp. rate 10, height 5' 11\" (1.803 m), weight 161 lb 2.5 oz (73.1 kg), SpO2 92%.  Intake/Output:   Last 3 shifts: I/O last 3 completed shifts:  In: 4106 [I.V.:3312; IV PIGGYBACK:200]  Out: 3100 [Urine:2325; Emesis/NG output:775]   This shift: No intake/output data recorded.     Vent Settings:      Hemodynamic parameters (last 24 hours):      Scheduled Meds: Current Hospital Medications[1]    Continuous Infusions: Medication Infusions[2]    Physical Exam  Constitutional: no acute distress  Eyes: PERRL  ENT: nares pateint  Neck: supple, no JVD  Cardio: RRR, S1 S2  Respiratory: clear to auscultation bilaterally, no wheezing, rales, rhonchi, crackles  GI: abdomen soft, mild tenderness to palpation, abdominal dressing in place  Extremities: no clubbing, cyanosis, edema  Neurologic: no gross motor deficits  Skin: warm, dry      Results:     Lab Results   Component Value Date    WBC 21.7 2025    HGB 9.1 2025    HCT 26.8 2025    .0 2025    CREATSERUM 0.85 2025    BUN 13 2025     2025    K 3.2 2025     2025    CO2 24.0 2025     2025    CA 7.4 2025    PHOS 2.2 2025       XR CHEST AP PORTABLE  (CPT=71045)  Result Date: 2025  CONCLUSION:  1. Enteric tube tip projects over the stomach, although the distal side hole projects over the distal esophagus.  Consider advancing approximately 7.0 cm into the stomach. 2. Expiratory chest with the  development of atelectasis at both lung bases. 3. Pneumoperitoneum, likely related to the recent abdominal surgery.    Dictated by (CST): Chuy Schmid MD on 6/13/2025 at 7:16 AM     Finalized by (CST): Chuy Schmid MD on 6/13/2025 at 7:19 AM                  Assessment   1.  POD #3 status post transverse colon resection, small bowel resection and resection of tumor on small bowel  2.  Shock state  3.  Lactic acidosis  4.  Diabetes mellitus  5.  Hyperlipidemia  6.  BPH  7.  Hyponatremia     Plan   -Patient presented with evidence of weight loss abdominal discomfort nausea found to have evidence of obstruction secondary to mass seen now status post transverse colon resection, small bowel resection and resection of tumor on small bowel on 6/10/2025.  - Recommendations per general surgery.  Closely monitor hemoglobin.  Transfuse for hemoglobin below 7  - Pain control  - Await pathology results  - Attempt to wean pressor support with norepinephrine  - Empiric antibiotic therapy with Zosyn at this time  -Continue TPN  - DVT prophylaxis: Rosalba Parrish DO  Pulmonary Critical Care Medicine  [facility]          [1]   Current Facility-Administered Medications   Medication Dose Route Frequency    potassium phosphate dibasic 15 mmol in sodium chloride 0.9% 250 mL IVPB  15 mmol Intravenous Once    Followed by    potassium chloride 20 mEq/100mL IVPB premix 20 mEq  20 mEq Intravenous Once    dextrose 10% infusion (TPN no rate)   Intravenous Continuous PRN    adult 3 in 1 TPN   Intravenous Continuous TPN    norepinephrine (Levophed) 4 mg/250mL infusion premix  0.5-30 mcg/min Intravenous Continuous    pantoprazole (Protonix) 40 mg in sodium chloride 0.9% PF 10 mL IV push  40 mg Intravenous Daily    piperacillin-tazobactam (Zosyn) 4.5 g in dextrose 5% 100 mL IVPB-ADDV  4.5 g Intravenous Q8H    ondansetron (Zofran) 4 MG/2ML injection 4 mg  4 mg Intravenous Q4H PRN    acetaminophen (Ofirmev) 10 mg/mL infusion  premix 1,000 mg  1,000 mg Intravenous Q6H PRN    insulin regular human (Novolin R, Humulin R) 100 UNIT/ML injection 1-11 Units  1-11 Units Subcutaneous Q6H    oxyCODONE immediate release tab 2.5 mg  2.5 mg Oral Q4H PRN    Or    oxyCODONE immediate release tab 5 mg  5 mg Oral Q4H PRN    HYDROmorphone (Dilaudid) 1 MG/ML injection 0.2 mg  0.2 mg Intravenous Q2H PRN    Or    HYDROmorphone (Dilaudid) 1 MG/ML injection 0.4 mg  0.4 mg Intravenous Q2H PRN    acetaminophen (Tylenol Extra Strength) tab 500 mg  500 mg Oral Q4H PRN    glucose (Dex4) 15 GM/59ML oral liquid 15 g  15 g Oral Q15 Min PRN    Or    glucose (Glutose) 40% oral gel 15 g  15 g Oral Q15 Min PRN    Or    dextrose 50% injection 50 mL  50 mL Intravenous Q15 Min PRN   [2]    dextrose 10%      adult 3 in 1 TPN 83.3 mL/hr at 06/12/25 2146    norepinephrine 11 mcg/min (06/13/25 0505)

## 2025-06-13 NOTE — OCCUPATIONAL THERAPY NOTE
OCCUPATIONAL THERAPY EVALUATION - INPATIENT     Room Number: 212/212-A  Evaluation Date: 6/13/2025  Type of Evaluation: Initial  Presenting Problem: Large bowel obstruction    Physician Order: IP Consult to Occupational Therapy  Reason for Therapy: ADL/IADL Dysfunction and Discharge Planning    OCCUPATIONAL THERAPY ASSESSMENT   RN cleared pt for participation in OT session, which was completed in collaboration with PT. Upon arrival, pt was supine in bed and agreeable to activity. Family present during session, denied  services. Pt was left in bedside chair and alarm was activated. Call light and all needs left in reach. Handoff given to RN.    Patient is a 80 year old male admitted 6/5/2025 for large bowel obstruction s/p ex lap 06/12.  Prior to admission, pt was I with ADLs and IADLs using cane and RW for mobility.  Patient is currently requiring up to max A for ADLs overall along with min A for supine to sit, for sitting at EOB, for STS, and for SPT. Pt has the following impairments: pain, impaired standing balance, decreased muscular endurance, and medical status.    Education provided  Educated pt about role of OT and hospital therapy process as well as proper safety techniques including proper hand placement, body mechanics, safety techniques, importance of repositioning, abdominal protective strategies, log rolling. Pt/family verbalized/demonstrated good carryover.    Patient will benefit from continued skilled OT Services to promote return to prior level of function and safety with continuous assistance and gradual rehabilitative therapy    PLAN  OT Treatment Plan: Balance activities;Energy conservation/work simplification techniques;Continued evaluation;Compensatory technique education;Fine motor coordination activities;Neuromuscluar reeducation;Equipment eval/education;Patient/Family training;Patient/Family education;Cognitive reorientation;Endurance training;UE strengthening/ROM;Functional  transfer training;Visual perceptual training;IADL training;ADL training      OCCUPATIONAL THERAPY MEDICAL/SOCIAL HISTORY     Problem List  Principal Problem:    Large bowel obstruction (HCC)  Active Problems:    Colonic mass    Past Medical History  Past Medical History[1]    Past Surgical History  Past Surgical History[2]    HOME SITUATION  Type of Home: Apartment  Home Layout: One level  Lives With: Alone     Toilet and Equipment: Standard height toilet  Shower/Tub and Equipment: Shower chair  Patient Regularly Uses: Cane, Rolling walker    SUBJECTIVE  Agreeable    OCCUPATIONAL THERAPY EXAMINATION      OBJECTIVE  Precautions: Abdominal protective strategies, Bed/chair alarm,  needed (Sing[place]an)  Fall Risk: High fall risk    PAIN ASSESSMENT  Rating: Unable to rate  Location: Abdomen    COGNITION  Alert and oriented X4    RANGE OF MOTION   Upper extremity ROM is within functional limits     STRENGTH ASSESSMENT  Upper extremity strength is within functional limits     COORDINATION  Gross Motor: WFL   Fine Motor: WFL     ACTIVITIES OF DAILY LIVING ASSESSMENT  AM-PAC ‘6-Clicks’ Inpatient Daily Activity Short Form  How much help from another person does the patient currently need…  -   Putting on and taking off regular lower body clothing?: A Lot  -   Bathing (including washing, rinsing, drying)?: A Lot  -   Toileting, which includes using toilet, bedpan or urinal? : A Lot  -   Putting on and taking off regular upper body clothing?: A Little  -   Taking care of personal grooming such as brushing teeth?: A Little  -   Eating meals?: A Little    AM-PAC Score:  Score: 15  Approx Degree of Impairment: 56.46%  Standardized Score (AM-PAC Scale): 34.69  CMS Modifier (G-Code): CK    FUNCTIONAL TRANSFER ASSESSMENT  Supine to Sit : Minimal Assist  STS: min A  SPT: min A    FUNCTIONAL ADL ASSESSMENT  Feeding: NG to suction; pt requiring TPN    Grooming: Set up A seated     Bathing: mod A radu area, buttocks, thighs, RLE  and feet     UB dressing: min A retrieval and pull down/adjustment    LB dressing: mod A for retrieval, pull up/over, socks and shoes    Toileting: mod A for pericare, bowel hygiene, clothing management     The patient's Approx Degree of Impairment: 56.46% has been calculated based on documentation in the [facility] '6 clicks' Inpatient Daily Activity Short Form.  Research supports that patients with this level of impairment may benefit from rehab. Final disposition will be made by interdisciplinary medical team.    OT Goals  Patients self stated goal is: none stated     Patient will complete functional transfer with SBA  Comment:     Patient will complete toileting with SBA  Comment:     Patient will tolerate standing for 2 minutes in prep for adls with SBA   Comment:          Goals  on: 25  Frequency: 3-5x/week    Patient Evaluation Complexity Level:   Occupational Profile/Medical History MODERATE - Expanded review of history including review of medical or therapy record   Specific performance deficits impacting engagement in ADL/IADL MODERATE  3 - 5 performance deficits   Client Assessment/Performance Deficits MODERATE - Comorbidities and min to mod modifications of tasks    Clinical Decision Making MODERATE - Analysis of occupational profile, detailed assessments, several treatment options    Overall Complexity MODERATE     OT Session Time: 20 minutes  Self-Care Home Management: 10 minutes    ELISEO Wilhelm  [facility]  [facility]  Occupational Therapy  [phone]         [1]   Past Medical History:   Anxiety    BPH (benign prostatic hyperplasia)    CKD (chronic kidney disease) stage 3, GFR 30-59 ml/min (HCC)    Depression    Diabetes (HCC)    Diabetes mellitus (HCC)    Essential hypertension    GERD (gastroesophageal reflux disease)    High cholesterol    Hyperlipidemia   [2]   Past Surgical History:  Procedure Laterality Date    Colonoscopy N/A  6/6/2025    Procedure: COLONOSCOPY with colonic stent placement;  Surgeon: Eusebio Palmer MD;  Location: [facility] ENDOSCOPY    Foot fracture surgery Right     2012

## 2025-06-13 NOTE — PROGRESS NOTES
[facility] Hematology/Oncology Group  Inpatient Progress Note    Arnaldo Gutierrez Patient Status:  Inpatient    4/15/1945 MRN [id number]   Location [facility] 2W/SW Attending Boy Mccabe MD   Hosp Day # 8 PCP PHYSICIAN NONSTAFF     Subjective:   Arnaldo Gutierrez is a 80 year old male with a history of diabetes hypertension GERD and other comorbidities who was admitted 2025 with iron deficiency anemia and partial large bowel obstruction with a mass in the transverse colon.  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  He went for transverse colon resection 6/10 and reportedly there were numerous peritoneal or omental metastasis and enlarged lymph nodes.  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    Clinically the patient is improving.  Intermittently on Levophed but he appears more comfortable on TPN.  Family at the bedside today.    Review of Systems:  Hematology/Oncology ROS performed and negative except as above in HPI    Current Medications[1]    Objective:    /60 (BP Location: Left arm)   Pulse 72   Temp 97.7 °F (36.5 °C) (Temporal)   Resp 13   Ht 1.803 m (5' 11\")   Wt 73.1 kg (161 lb 2.5 oz)   SpO2 92%   BMI 22.48 kg/m²   Physical Exam:  General: A&Ox3, clinically improving   HEENT: PERRL  CV: tachycardic   Pulm:  normal effort  Abd: appropriately tender   Extremities: no edema  Neurological: Grossly intact    Labs:  Lab Results   Component Value Date    WBC 21.7 2025    HGB 9.3 2025    HCT 26.8 2025    .0 2025    CREATSERUM 0.85 2025    BUN 13 2025     2025    K 3.2 2025     2025    CO2 24.0 2025     2025    CA 7.4 2025    ALB 2.4 2025    ALKPHO 55 2025    BILT 0.4 2025    TP 4.2 2025    AST 13 2025    ALT 14 2025    MG 1.9  06/13/2025    PHOS 2.2 06/13/2025       Imaging:  XR ABDOMEN (1 VIEW) (CPT=74018)  Result Date: 6/13/2025  CONCLUSION:  1. Postoperative changes from a recent transverse colonic resection and partial small bowel resection including persistent pneumoperitoneum. 2. Nonobstructive bowel gas pattern.     Dictated by (CST): Chuy Schmid MD on 6/13/2025 at 11:38 AM     Finalized by (CST): Chuy Schmid MD on 6/13/2025 at 11:41 AM          XR CHEST AP PORTABLE  (CPT=71045)  Result Date: 6/13/2025  CONCLUSION:  1. Enteric tube has been advanced with tip and distal side hole now projecting over the gastric bubble, in customary position. 2. Expiratory chest with stable atelectasis at both lung bases. 3. Persistent pneumoperitoneum, likely related to the recent abdominal surgery.    Dictated by (CST): Chuy Schmid MD on 6/13/2025 at 9:19 AM     Finalized by (CST): Chuy Schmid MD on 6/13/2025 at 9:21 AM          XR CHEST AP PORTABLE  (CPT=71045)  Result Date: 6/13/2025  CONCLUSION:  1. Enteric tube tip projects over the stomach, although the distal side hole projects over the distal esophagus.  Consider advancing approximately 7.0 cm into the stomach. 2. Expiratory chest with the development of atelectasis at both lung bases. 3. Pneumoperitoneum, likely related to the recent abdominal surgery.    Dictated by (CST): Chuy Schmid MD on 6/13/2025 at 7:16 AM     Finalized by (CST): Chuy Schmid MD on 6/13/2025 at 7:19 AM           XR ABDOMEN (1 VIEW) (CPT=74018)  Result Date: 6/13/2025  CONCLUSION:  1. Postoperative changes from a recent transverse colonic resection and partial small bowel resection including persistent pneumoperitoneum. 2. Nonobstructive bowel gas pattern.     Dictated by (CST): Chuy Schmid MD on 6/13/2025 at 11:38 AM     Finalized by (CST): Chuy Schmid MD on 6/13/2025 at 11:41 AM          XR CHEST AP PORTABLE  (CPT=71045)  Result Date: 6/13/2025  CONCLUSION:  1.  Enteric tube has been advanced with tip and distal side hole now projecting over the gastric bubble, in customary position. 2. Expiratory chest with stable atelectasis at both lung bases. 3. Persistent pneumoperitoneum, likely related to the recent abdominal surgery.    Dictated by (CST): Chuy Schmid MD on 6/13/2025 at 9:19 AM     Finalized by (CST): Chuy Schmid MD on 6/13/2025 at 9:21 AM          XR CHEST AP PORTABLE  (CPT=71045)  Result Date: 6/13/2025  CONCLUSION:  1. Enteric tube tip projects over the stomach, although the distal side hole projects over the distal esophagus.  Consider advancing approximately 7.0 cm into the stomach. 2. Expiratory chest with the development of atelectasis at both lung bases. 3. Pneumoperitoneum, likely related to the recent abdominal surgery.    Dictated by (CST): Chuy Schmid MD on 6/13/2025 at 7:16 AM     Finalized by (CST): Chuy Schmid MD on 6/13/2025 at 7:19 AM          XR ABDOMEN (1 VIEW) (CPT=74018)  Result Date: 6/11/2025  CONCLUSION:  1. No retained unexpected surgical foreign body. 2. Postsurgical changes with anastomotic staple lines and skin staples. 3. Catheter in the bladder.    Dictated by (CST): Chong Valdez MD on 6/11/2025 at 1:22 AM     Finalized by (CST): Chong Valdez MD on 6/11/2025 at 1:28 AM          XR FLUOROSCOPY C-ARM TIME LESS THAN 1 HOUR (CPT=76000)  Result Date: 6/6/2025  CONCLUSION: Intraoperative exam. Please see operative report for further details.     Dictated by (CST): Frandy Starkey MD on 6/06/2025 at 8:55 PM     Finalized by (CST): Frandy Starkey MD on 6/06/2025 at 8:56 PM          CT ABDOMEN+PELVIS(CONTRAST ONLY)(CPT=74177)  Result Date: 6/5/2025  CONCLUSION:  1. Suspicious approximate 4 cm segment of constricting masslike wall thickening at the proximal transverse colon with large upstream colonic fecal burden.  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  Stellate morphology stranding and  nodularity in the right upper quadrant mesentery adjacent to the constricting mass probably relates to direct extension of disease.  There are adjacent nonenlarged right upper quadrant mesenteric lymph nodes, which could be reactive or metastatic.  Gastroenterology evaluation and colonoscopy correlation is suggested.  No free intraperitoneal air or well-defined/drainable intra-abdominal collection. 2. Single mildly enlarged interaortocaval retroperitoneal lymph node with other prominent but nonenlarged retroperitoneal nodes.  These could be reactive or metastatic in nature. 3. Coronary and peripheral atherosclerosis with aortic annular calcifications. 4. Reticular opacities at the periphery of the right lung base probably relate to scarring. 5. Mild circumferential urinary bladder wall thickening.  Request urinalysis correlation to assess for potential cystitis.    elm-remote  Dictated by (CST): Jeremie Cedeño MD on 6/05/2025 at 5:47 PM     Finalized by (CST): Jeremie Cedeño MD on 6/05/2025 at 5:58 PM          Assessment & Plan:    Pt is a 80 year old male with a history of diabetes hypertension GERD and other comorbidities who was admitted 6/5/2025 with iron deficiency anemia and partial large bowel obstruction with a mass in the transverse colon.  Went for colonoscopy 6/6 and found to have a malignant stricture of the transverse colon and underwent colonic stenting.  He went for transverse colon resection 6/10 and reportedly there were numerous peritoneal or omental metastasis and enlarged lymph nodes.  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    I reviewed the pathology report at the bedside with the patient, his wife daughter and son-in-law.  I discussed the natural history of diffuse large B-cell lymphoma.  LDH uric acid within normal limits, no emergent indications for inpatient chemotherapy at this time now that obstruction has been surgically relieved.  We will  await surgical recovery and tentatively plan for outpatient PET/CT while we await for aggressive FISH panel to help determine optimal chemotherapy regimen.  Prior to admission, the patient was generally healthy with a fair performance status when hopeful that as he improves from his obstruction he will get to a point where he will be able to tolerate chemotherapy with curative intent.    Dr. Smart is covering over the weekend though I do not anticipate any urgent oncologic needs.  I will plan to follow-up with the patient early next week.    Norris Lazaro DO    [facility]ematology[facility]Oncology [facility]  [facility]    This note was created using a voice-recognition transcribing system. Incorrect words or phrases may have been missed during proofreading. Please interpret accordingly.       [1]    [COMPLETED] potassium phosphate dibasic 15 mmol in sodium chloride 0.9% 250 mL IVPB  15 mmol Intravenous Once    Followed by    [COMPLETED] potassium chloride 20 mEq/100mL IVPB premix 20 mEq  20 mEq Intravenous Once    adult 3 in 1 TPN   Intravenous Continuous TPN    [COMPLETED] magnesium sulfate 4 g/100mL IVPB premix 4 g  4 g Intravenous Once    dextrose 10% infusion (TPN no rate)   Intravenous Continuous PRN    adult 3 in 1 TPN   Intravenous Continuous TPN    [COMPLETED] sodium chloride 0.9 % IV bolus 500 mL  500 mL Intravenous Once    norepinephrine (Levophed) 4 mg/250mL infusion premix  0.5-30 mcg/min Intravenous Continuous    [COMPLETED] sodium chloride 0.9 % IV bolus 500 mL  500 mL Intravenous Once    [COMPLETED] insulin regular human (Novolin R, Humulin R) 100 UNIT/ML injection 10 Units  10 Units Subcutaneous Once    [COMPLETED] potassium chloride 40 mEq in 250mL sodium chloride 0.9% IVPB premix  40 mEq Intravenous Once    pantoprazole (Protonix) 40 mg in sodium chloride 0.9% PF 10 mL IV push  40 mg Intravenous Daily    piperacillin-tazobactam (Zosyn) 4.5 g in dextrose 5% 100 mL IVPB-ADDV  4.5 g  Intravenous Q8H    ondansetron (Zofran) 4 MG/2ML injection 4 mg  4 mg Intravenous Q4H PRN    acetaminophen (Ofirmev) 10 mg/mL infusion premix 1,000 mg  1,000 mg Intravenous Q6H PRN    [COMPLETED] phentolamine (Regitine) 10 mg in sodium chloride 0.9% syringe (ADULT EXTRAVASATION)  10 mg Subcutaneous See Admin Instructions    [COMPLETED] lidocaine PF (Xylocaine-MPF) 1% injection  5 mL Intradermal Once    insulin regular human (Novolin R, Humulin R) 100 UNIT/ML injection 1-11 Units  1-11 Units Subcutaneous Q6H    oxyCODONE immediate release tab 2.5 mg  2.5 mg Oral Q4H PRN    Or    oxyCODONE immediate release tab 5 mg  5 mg Oral Q4H PRN    HYDROmorphone (Dilaudid) 1 MG/ML injection 0.2 mg  0.2 mg Intravenous Q2H PRN    Or    HYDROmorphone (Dilaudid) 1 MG/ML injection 0.4 mg  0.4 mg Intravenous Q2H PRN    [COMPLETED] acetaminophen (Ofirmev) 10 mg/mL infusion premix 1,000 mg  1,000 mg Intravenous Once    [COMPLETED] sodium chloride 0.9 % IV bolus 1,986 mL  30 mL/kg Intravenous Once    [COMPLETED] sodium chloride 0.9 % IV bolus 250 mL  250 mL Intravenous Once    [COMPLETED] neomycin (Mycifradin) tab 1,000 mg  1,000 mg Oral Once    [COMPLETED] neomycin (Mycifradin) tab 1,000 mg  1,000 mg Oral Once    [COMPLETED] neomycin (Mycifradin) tab 1,000 mg  1,000 mg Oral Once    [COMPLETED] metroNIDAZOLE (Flagyl) tab 500 mg  500 mg Oral Once    [COMPLETED] metroNIDAZOLE (Flagyl) tab 500 mg  500 mg Oral Once    [COMPLETED] metroNIDAZOLE (Flagyl) tab 500 mg  500 mg Oral Once    [COMPLETED] sodium ferric gluconate (Ferrlecit) 125 mg in sodium chloride 0.9% 100mL IVPB premix  125 mg Intravenous Daily    [COMPLETED] pantoprazole (Protonix) 40 mg in sodium chloride 0.9% PF 10 mL IV push  40 mg Intravenous Once    [COMPLETED] sodium chloride 0.9 % IV bolus 1,000 mL  1,000 mL Intravenous Once    [COMPLETED] ondansetron (Zofran) 4 MG/2ML injection 4 mg  4 mg Intravenous Once    [COMPLETED] iopamidol 76% (ISOVUE-370) injection for power  injector  80 mL Intravenous ONCE PRN    [COMPLETED] morphINE PF 2 MG/ML injection 2 mg  2 mg Intravenous Once    [] sodium chloride 0.9% infusion   Intravenous Continuous    acetaminophen (Tylenol Extra Strength) tab 500 mg  500 mg Oral Q4H PRN    glucose (Dex4) 15 GM/59ML oral liquid 15 g  15 g Oral Q15 Min PRN    Or    glucose (Glutose) 40% oral gel 15 g  15 g Oral Q15 Min PRN    Or    dextrose 50% injection 50 mL  50 mL Intravenous Q15 Min PRN

## 2025-06-13 NOTE — PAYOR COMM NOTE
--------------  CONTINUED STAY REVIEW    Payor: [employer]  Subscriber #:  [id number]  Authorization Number: [id number]    Admit date: 6/5/25  Admit time:  8:01 PM    REVIEW DOCUMENTATION:     Arnaldo Gutierrez #[id number] (80 year old M) (Adm: 06/05/25)  [facility] [id number]       Date of Service: 6/13/2025  2:51 PM     Signed       Expand All Collapse All    [facility]  Progress Note       Assessment and Plan:     Diffuse large B-cell lymphoma  Large Bowel obstruction 2/2 Proximal transverse colonic mass  - CT A/P reviewed  - GI and general surgery consulted  - 6/6 s/p colonoscopy with metallic colon stent  - 6/10/25 - s/p laparoscopic transverse colon resection, small bowel resection x2, resection of tumor nodule on small bowel. Patient appeared to have metastatic disease per Dr. Randall.   - NGT in place post-op  - pathology - transverse colon - diffuse large B-cell lymphoma. Separate segment with tubular adenoma. Small intestine with extensive involvement of diffuse large B-cell lymphoma  - oncology consulted, appreciate recs  - cont TPN  - on zosyn IV empirically. Duration per general surgery.     Acute postoperative blood loss anemia with hypotension  - EBL from surgery 600ml  - s/p 2 units PRBC - hgb stable now  - weaned off pressors. BP stable now.     DM2  - ISS     Other:  H/o HTN   HL  BPH - flomax when able to take PO     Dietitian Malnutrition Assessment     Evaluation for Malnutrition: Criteria for severe malnutrition diagnosis- chronic illness related to   Wt loss greater than 10% in 6 months., Energy intake less than 75% for greater than 1 month.                RD Malnutrition Care Plan:       Body Fat/Muscle Mass:           Physician Assessment      Patient has a diagnosis of severe malnutrition        DVT Mechanical Prophylaxis:   SCDs,    DVT Pharmacologic Prophylaxis   Medication   None               Subjective:  Initial Chief Complaint:  abd discomfort, nausea,  weight loss     Abd pain controlled. No flatus or BM. No SOB.      10 point ROS completed and was negative, except for pertinent positive and negatives stated in subjective.        Objective:  Temp:  [97.6 °F (36.4 °C)-97.9 °F (36.6 °C)] 97.6 °F (36.4 °C)  Pulse:  [] 77  Resp:  [10-19] 10  BP: ()/(45-77) 114/62  SpO2:  [90 %-95 %] 92 %  AO: ()/(35-54) 113/46  General:  Alert, no distress  HEENT:  Normocephalic, atraumatic  Cardiac:  Regular rate, regular rhythm  Pulmonary:  Clear to auscultation bilaterally, respirations unlabored  Gastrointestinal:  hypoactive BS  Musculoskeletal:  No joint swelling  Extremities:  No edema, no cyanosis, no clubbing  Neurologic:  nonfocal  Psychiatric:  Normal affect, calm and appropriate     Intake/Output Summary (Last 24 hours) at 6/13/2025 1451  Last data filed at 6/13/2025 0459      Gross per 24 hour   Intake 4106 ml   Output 1875 ml   Net 2231 ml                      Recent Labs   Lab 06/11/25  0836 06/11/25  1201 06/12/25  0546 06/12/25  1211 06/13/25  0020 06/13/25  0459 06/13/25  1250   WBC 10.6  --  19.6*  --   --  21.7*  --    HGB 13.2   < > 9.5*  9.4*   < > 9.5* 9.1* 9.3*   HCT 41.2  --  28.2*  --   --  26.8*  --    .0  --  167.0  --   --  136.0*  --    RBC 4.54  --  3.28*  --   --  2.94*  --    MCV 90.7  --  86.0  --   --  91.2  --    MCH 29.1  --  29.0  --   --  31.0  --    MCHC 32.0  --  33.7  --   --  34.0  --    RDW 17.4*  --  17.6*  --   --  18.6*  --    NEPRELIM 9.28*  --  16.78*  --   --  19.84*  --     < > = values in this interval not displayed.            Recent Labs   Lab 06/11/25  1201 06/12/25  0546 06/13/25  0725   BUN 10 11 13   CREATSERUM 0.99 0.76 0.85   CA 7.7* 6.8* 7.4*   ALB  --   --  2.4*    139 137   K 3.9 3.5  3.5 3.2*    108 107   CO2 23.0 21.0 24.0   * 186* 261*   MG  --  1.2* 1.9   PHOS  --  2.6 2.2*   BILT  --   --  0.4   AST  --   --  13   ALT  --   --  14   ALKPHO  --   --  55   TP  --   --  4.2*          XR ABDOMEN (1 VIEW) (CPT=74018)  Result Date: 6/13/2025  CONCLUSION:                                                                                           1. Postoperative changes from a recent transverse colonic resection and partial small bowel resection including persistent pneumoperitoneum. 2. Nonobstructive bowel gas pattern.     Dictated by (CST): Chuy Schmid MD on 6/13/2025 at 11:38 AM     Finalized by (CST): Chuy Schmid MD on 6/13/2025 at 11:41 AM           XR CHEST AP PORTABLE  (CPT=71045)  Result Date: 6/13/2025  CONCLUSION:                                                                                           1. Enteric tube has been advanced with tip and distal side hole now projecting over the gastric bubble, in customary position. 2. Expiratory chest with stable atelectasis at both lung bases. 3. Persistent pneumoperitoneum, likely related to the recent abdominal surgery.    Dictated by (CST): Chuy Schmid MD on 6/13/2025 at 9:19 AM     Finalized by (CST): Chuy Schmid MD on 6/13/2025 at 9:21 AM           XR CHEST AP PORTABLE  (CPT=71045)  Result Date: 6/13/2025  CONCLUSION:                                                                                           1. Enteric tube tip projects over the stomach, although the distal side hole projects over the distal esophagus.  Consider advancing approximately 7.0 cm into the stomach. 2. Expiratory chest with the development of atelectasis at both lung bases. 3. Pneumoperitoneum, likely related to the recent abdominal surgery.    Dictated by (CST): Chuy Schmid MD on 6/13/2025 at 7:16 AM     Finalized by (CST): Chuy Schmid MD on 6/13/2025 at 7:19 AM               Medications:  [Scheduled Medications]     [Scheduled Medications]   potassium chloride  20 mEq Intravenous Once    pantoprazole  40 mg Intravenous Daily    piperacillin-tazobactam  4.5 g Intravenous Q8H    insulin regular human  1-11 Units  Subcutaneous Q6H     PRN Meds: [PRN Medications]    [PRN Medications]    dextrose 10%    ondansetron    acetaminophen    oxyCODONE **OR** oxyCODONE    HYDROmorphone **OR** HYDROmorphone    acetaminophen    glucose **OR** glucose **OR** [DISCONTINUED] glucose-vitamin C **OR** dextrose **OR** [DISCONTINUED] glucose **OR** [DISCONTINUED] glucose **OR** [DISCONTINUED] glucose-vitamin C     Supplementary Documentation:  DVT Mechanical Prophylaxis:   SCDs,    DVT Pharmacologic Prophylaxis   Medication   None                 Code Status: Not on file  Muñoz: External urinary catheter in place  Muñoz Duration (in days): 2  Central line:    ANNELISE:            **Certification        PHYSICIAN Certification of Need for Inpatient Hospitalization - Initial Certification     Patient will require inpatient services that will reasonably be expected to span two midnight's based on the clinical documentation in H+P.   Based on patients current state of illness, I anticipate that, after discharge, patient will require TBD.           Boy Mccabe MD                    MEDICATIONS ADMINISTERED IN LAST 1 DAY:  acetaminophen (Ofirmev) 10 mg/mL infusion premix 1,000 mg       Date Action Dose Route User    6/13/2025 1408 New Bag 1,000 mg Intravenous Magda Garcia RN    6/13/2025 0820 New Bag 1,000 mg Intravenous Magda Garcia RN          adult 3 in 1 TPN       Date Action Dose Route User    6/12/2025 2146 New Bag (none) Intravenous Loi Araya RN          HYDROmorphone (Dilaudid) 1 MG/ML injection 0.2 mg       Date Action Dose Route User    6/13/2025 1513 Given 0.2 mg Intravenous Magda Garcia RN    6/13/2025 1238 Given 0.2 mg Intravenous Magda Garcia RN          HYDROmorphone (Dilaudid) 1 MG/ML injection 0.4 mg       Date Action Dose Route User    6/13/2025 1003 Given 0.4 mg Intravenous Magda Garcia RN    6/13/2025 0809 Given 0.4 mg Intravenous Magda Garcia RN    6/13/2025 0449 Given 0.4 mg Intravenous  Loi Araya RN    6/13/2025 0231 Given 0.4 mg Intravenous Katherin Sandoval RN    6/13/2025 0016 Given 0.4 mg Intravenous Loi Araya RN    6/12/2025 2151 Given 0.4 mg Intravenous Loi Araya, RN    6/12/2025 2037 Given 0.4 mg Intravenous Loi Araya, RN    6/12/2025 1821 Given 0.4 mg Intravenous Ashley Mcintyre RN          insulin regular human (Novolin R, Humulin R) 100 UNIT/ML injection 1-11 Units       Date Action Dose Route User    6/13/2025 1240 Given 2 Units Subcutaneous (Right Upper Arm) Magda Garcia RN    6/13/2025 0546 Given 2 Units Subcutaneous (Left Upper Arm) Loi Araya RN    6/13/2025 0015 Given 1 Units Subcutaneous (Left Upper Arm) Loi Araya, JOSE          norepinephrine (Levophed) 4 mg/250mL infusion premix       Date Action Dose Route User    6/13/2025 1600 Rate/Dose Change 4 mcg/min Intravenous Magda Garcia RN    6/13/2025 1129 New Bag 11 mcg/min Intravenous Magda Garcia RN    6/13/2025 0505 New Bag 11 mcg/min Intravenous Loi Araya RN    6/13/2025 0101 Rate/Dose Change 11 mcg/min Intravenous Loi Araya, JOSE    6/13/2025 0056 Rate/Dose Change 13 mcg/min Intravenous Loi Araya, JOSE    6/12/2025 2322 New Bag 10 mcg/min Intravenous Loi Araya, JOSE    6/12/2025 2300 Rate/Dose Change 10 mcg/min Intravenous Loi Araya, JOSE    6/12/2025 2228 Rate/Dose Change 15 mcg/min Intravenous Loi Araya, JOSE    6/12/2025 2115 Rate/Dose Change 10 mcg/min Intravenous Loi Araya, JOSE    6/12/2025 1927 Rate/Dose Change 6 mcg/min Intravenous Ashley Mcintyre RN    6/12/2025 1920 Rate/Dose Change 8 mcg/min Intravenous Ashley Mcintyre RN    6/12/2025 1704 Rate/Dose Change 10 mcg/min Intravenous Ashley Mcintyre, JOSE          ondansetron (Zofran) 4 MG/2ML injection 4 mg       Date Action Dose Route User    6/13/2025 0539 Given 4 mg Intravenous Loi Araya, RN          pantoprazole (Protonix) 40 mg in sodium chloride 0.9% PF 10 mL IV push        Date Action Dose Route User    6/13/2025 0831 Given 40 mg Intravenous Magda Garcia RN          piperacillin-tazobactam (Zosyn) 4.5 g in dextrose 5% 100 mL IVPB-ADDV       Date Action Dose Route User    6/13/2025 1037 New Bag 4.5 g Intravenous Magda Garcia RN    6/13/2025 0213 New Bag 4.5 g Intravenous Loi Araya RN    6/12/2025 1845 New Bag 4.5 g Intravenous Ashley Mcintyre RN          potassium chloride 20 mEq/100mL IVPB premix 20 mEq       Date Action Dose Route User    6/13/2025 1328 New Bag 20 mEq Intravenous Magda Garcia RN          potassium phosphate dibasic 15 mmol in sodium chloride 0.9% 250 mL IVPB       Date Action Dose Route User    6/13/2025 0847 New Bag 15 mmol Intravenous Magda Garcia RN          sodium chloride 0.9 % IV bolus 500 mL       Date Action Dose Route User    6/12/2025 1921 New Bag 500 mL Intravenous Ashley Mcintyre RN            Vitals (last day)       Date/Time Temp Pulse Resp BP SpO2 Weight O2 Device O2 Flow Rate (L/min) [facility]    06/13/25 1600 97.7 °F (36.5 °C) 72 13 104/60 92 % -- None (Room air) --     06/13/25 1530 -- 71 13 95/58 92 % -- None (Room air) --     06/13/25 1500 -- 72 11 91/54 91 % -- None (Room air) --     06/13/25 1400 -- 83 15 123/60 95 % -- None (Room air) --     06/13/25 1300 -- 73 10 102/58 91 % -- None (Room air) --     06/13/25 1200 97.8 °F (36.6 °C) 74 15 108/56 93 % -- None (Room air) --     06/13/25 1100 -- 78 19 121/66 93 % -- None (Room air) --     06/13/25 1000 -- 81 19 116/60 93 % -- None (Room air) --     06/13/25 0930 -- 75 8 106/59 92 % -- None (Room air) --     06/13/25 0915 -- -- 20 -- -- -- -- --     06/13/25 0900 -- 75 25 78/55 93 % -- None (Room air) --     06/13/25 0800 97.8 °F (36.6 °C) 74 10 112/67 93 % -- None (Room air) --     06/13/25 0700 -- 78 11 116/63 93 % -- None (Room air) --     06/13/25 0600 -- 77 10 114/62 92 % -- None (Room air) --     06/13/25 0500 -- 78 16  119/58 91 % -- None (Room air) --     06/13/25 0459 -- -- -- -- -- 161 lb 2.5 oz (73.1 kg) -- --     06/13/25 0400 97.6 °F (36.4 °C) 87 18 134/73 92 % -- None (Room air) --     06/13/25 0300 -- 76 10 100/60 92 % -- None (Room air) --     06/13/25 0200 -- 79 13 113/54 93 % -- None (Room air) --     06/13/25 0100 -- 91 14 132/73 91 % -- None (Room air) --     06/13/25 0000 97.9 °F (36.6 °C) 80 19 107/57 91 % -- None (Room air) --     06/12/25 2300 -- 101 17 130/59 90 % -- None (Room air) --     06/12/25 2200 -- 79 11 111/51 90 % -- None (Room air) --     06/12/25 2100 -- 89 19 107/77 92 % -- None (Room air) --     06/12/25 2000 97.9 °F (36.6 °C) 86 17 117/56 91 % -- None (Room air) --     06/12/25 1900 -- 81 12 108/54 92 % -- None (Room air) --     06/12/25 1800 -- 85 14 129/62 91 % -- None (Room air) --     06/12/25 1726 -- 82 13 114/60 92 % -- None (Room air) --     06/12/25 1700 -- 77 12 96/45 93 % -- None (Room air) --     06/12/25 1600 97.6 °F (36.4 °C) 83 15 109/59 95 % -- None (Room air) --     06/12/25 1500 -- 92 17 142/69 95 % -- None (Room air) --     06/12/25 1400 -- 81 17 131/63 94 % -- None (Room air) --     06/12/25 1351 -- 85 20 121/60 92 % -- None (Room air) --     06/12/25 1300 -- 87 21 126/70 94 % -- None (Room air) --     06/12/25 1200 97.1 °F (36.2 °C) 84 18 115/59 95 % -- None (Room air) --     06/12/25 1100 -- 97 26 129/81 96 % -- None (Room air) --     06/12/25 1015 -- 100 25 -- 95 % -- None (Room air) --     06/12/25 1000 -- 80 14 98/38 93 % -- None (Room air) --     06/12/25 0900 -- 83 16 108/48 94 % -- None (Room air) --     06/12/25 0800 97.9 °F (36.6 °C) 85 21 115/57 95 % -- None (Room air) --     06/12/25 0700 -- 84 19 118/54 95 % -- None (Room air) --     06/12/25 0600 -- 83 18 110/54 96 % -- None (Room air) --     06/12/25 0500 -- 85 14 114/74 95 % -- -- --     06/12/25 0400 97.8 °F (36.6 °C) 84 15 123/51 96 % -- None (Room air)  --     06/12/25 0300 -- 84 19 135/102 95 % -- None (Room air) --     06/12/25 0200 -- 86 19 110/58 96 % -- None (Room air) --     06/12/25 0100 -- 88 14 128/55 96 % -- None (Room air) --     06/12/25 0000 98.1 °F (36.7 °C) 82 20 109/50 96 % -- None (Room air) --           CIWA Scores (since admission)       None          Blood Transfusion Record       Product Unit Status Volume Start End            Transfuse RBC       25  [id number]  [id number] Completed 06/11/25 0450 325 mL 06/11/25 0235 06/11/25 0445     [place] [address]  [id number]  [id number] Completed 06/10/25 2114  06/10/25 1824 06/10/25 2224     [facility]  [facility]  [id number]  [id number] Completed 06/10/25 1820 350 mL 06/10/25 1758 06/10/25 1808

## 2025-06-13 NOTE — PHYSICAL THERAPY NOTE
PHYSICAL THERAPY EVALUATION - INPATIENT     Room Number: 212/212-A  Evaluation Date: 6/13/2025  Type of Evaluation: Initial   Physician Order: PT Eval and Treat    Presenting Problem: large bowel obstruction post ex lap     Reason for Therapy: Mobility Dysfunction and Discharge Planning    PHYSICAL THERAPY ASSESSMENT   Patient is a 80 year old male admitted 6/5/2025 for large bowel obstruction post ex lap performed 6/12.  Prior to admission, patient's baseline is independent at home, history of LACHO and active with HH after a previous hospitalization.  Patient is currently functioning below baseline with bed mobility, transfers, and gait.  Patient is requiring contact guard assist and minimal assist as a result of the following impairments: decreased functional strength, decreased endurance/aerobic capacity, and medical status.  Physical Therapy will continue to follow for duration of hospitalization.    Patient will benefit from continued skilled PT Services to promote return to prior level of function and safety with continuous assistance and gradual rehabilitative therapy .    PLAN DURING HOSPITALIZATION  Nursing Mobility Recommendation : 1 Assist  PT Device Recommendation: Rolling walker  PT Treatment Plan: Bed mobility, Body mechanics, Endurance, Energy conservation, Family education, Gait training, Range of motion, Stoop training, Transfer training, Balance training  Rehab Potential : Fair  Frequency (Obs):  (5-7x week)     PHYSICAL THERAPY MEDICAL/SOCIAL HISTORY   History related to current admission: colonic mass     Problem List  Principal Problem:    Large bowel obstruction (HCC)  Active Problems:    Colonic mass      HOME SITUATION  Type of Home: Apartment  Home Layout: One level  Stairs to Enter : 0        Stairs to Bedroom: 0         Lives With: Alone              Prior Level of [place]: independent     SUBJECTIVE  Agreeable with encouragement.     PHYSICAL THERAPY EXAMINATION    OBJECTIVE  Precautions: Bed/chair alarm, Abdominal protective strategies,  needed (Croatian)  Fall Risk: High fall risk    WEIGHT BEARING RESTRICTION       PAIN ASSESSMENT     Location: abdominal mild       COGNITION  Overall Cognitive Status:  WFL - within functional limits    BALANCE  Static Sitting: Fair +  Dynamic Sitting: Fair  Static Standing: Fair -  Dynamic Standing: Poor +    AM-PAC '6-Clicks' INPATIENT SHORT FORM - BASIC MOBILITY  How much difficulty does the patient currently have...  Patient Difficulty: Turning over in bed (including adjusting bedclothes, sheets and blankets)?: A Little   Patient Difficulty: Sitting down on and standing up from a chair with arms (e.g., wheelchair, bedside commode, etc.): A Little   Patient Difficulty: Moving from lying on back to sitting on the side of the bed?: A Little   How much help from another person does the patient currently need...   Help from Another: Moving to and from a bed to a chair (including a wheelchair)?: A Little   Help from Another: Need to walk in hospital room?: A Lot   Help from Another: Climbing 3-5 steps with a railing?: Total     AM-PAC Score:  Raw Score: 15   Approx Degree of Impairment: 57.7%   Standardized Score (AM-PAC Scale): 39.45   CMS Modifier (G-Code): CK    FUNCTIONAL ABILITY STATUS  Functional Mobility/Gait Assessment  Gait Assistance: Minimum assistance (side stepping pivot, x 2 steps)  Distance (ft): -  Rolling: contact guard assist  Supine to Sit: minimal assist  Sit to Supine: minimal assist  Sit to Stand: minimal assist    Exercise/Education Provided:  Bed mobility  Body mechanics  Functional activity tolerated  Transfer training    The patient's Approx Degree of Impairment: 57.7% has been calculated based on documentation in the [facility] '6 clicks' Inpatient Basic Mobility Short Form.  Research supports that patients with this level of impairment may benefit from LACHO.  Final disposition will be made by interdisciplinary  medical team.    Patient End of Session: Up in chair, Needs met, Call light within reach, RN aware of session/findings, All patient questions and concerns addressed, Family present, Discussed recommendations with /    CURRENT GOALS  Goals to be met by: 7/10  Patient Goal Patient's self-stated goal is: unstated   Goal #1 Patient is able to demonstrate supine - sit EOB @ level: supervision     Goal #1   Current Status    Goal #2 Patient is able to demonstrate transfers Sit to/from Stand at assistance level: supervision with walker - rolling     Goal #2  Current Status    Goal #3 Patient is able to ambulate 50 feet with assist device: walker - rolling at assistance level: minimum assistance   Goal #3   Current Status            Goal #5 Patient to demonstrate independence with home activity/exercise instructions provided to patient in preparation for discharge.   Goal #5   Current Status    Goal #6    Goal #6  Current Status      Patient Evaluation Complexity Level:  History Moderate - 1 or 2 personal factors and/or co-morbidities   Examination of body systems Moderate - addressing a total of 3 or more elements   Clinical Presentation  Moderate - Evolving   Clinical Decision Making  Moderate Complexity     Therapeutic Activity:  15 minutes

## 2025-06-13 NOTE — CM/SW NOTE
CM met with patient and his family at bedside.     PT rec LACHO. Discussed LACHO as an option, family is agreeable. LACHO referral sent via [employer]. CLRC done. PASSR uploaded in [employer]. Will need list/choice/auth closer to discharge.     / to remain available for support and/or discharge planning     PLAN: MAURA NUNNN RN   Nurse    [phone]

## 2025-06-13 NOTE — PLAN OF CARE
Problem: Patient Centered Care  Goal: Patient preferences are identified and integrated in the patient's plan of care  Description: Interventions:  - What would you like us to know as we care for you? I am from home.   - Provide timely, complete, and accurate information to patient/family  - Incorporate patient and family knowledge, values, beliefs, and cultural backgrounds into the planning and delivery of care  - Encourage patient/family to participate in care and decision-making at the level they choose  - Honor patient and family perspectives and choices  Outcome: Progressing     Problem: Diabetes/Glucose Control  Goal: Glucose maintained within prescribed range  Description: INTERVENTIONS:  - Monitor Blood Glucose as ordered  - Assess for signs and symptoms of hyperglycemia and hypoglycemia  - Administer ordered medications to maintain glucose within target range  - Assess barriers to adequate nutritional intake and initiate nutrition consult as needed  - Instruct patient on self management of diabetes  Outcome: Progressing     Problem: Patient/Family Goals  Goal: Patient/Family Long Term Goal  Description: Patient's Long Term Goal: Discharge home     Interventions:  - Monitor vitals  - Monitor labs  - IVF  - Glucose monitoring ACHS  - Clear liquid diet  - Scheduled miralax  - GI on consult  - See additional Care Plan goals for specific interventions  Outcome: Progressing  Goal: Patient/Family Short Term Goal  Description: Patient's Short Term Goal: Improve bowel function    Interventions:   - Full liquid diet  - GI on consult  - Scheduled miralax  - See additional Care Plan goals for specific interventions  Outcome: Progressing     Problem: PAIN - ADULT  Goal: Verbalizes/displays adequate comfort level or patient's stated pain goal  Description: INTERVENTIONS:  - Encourage pt to monitor pain and request assistance  - Assess pain using appropriate pain scale  - Administer analgesics based on type and severity  of pain and evaluate response  - Implement non-pharmacological measures as appropriate and evaluate response  - Consider cultural and social influences on pain and pain management  - Manage/alleviate anxiety  - Utilize distraction and/or relaxation techniques  - Monitor for opioid side effects  - Notify MD/LIP if interventions unsuccessful or patient reports new pain  - Anticipate increased pain with activity and pre-medicate as appropriate  Outcome: Progressing     Problem: RISK FOR INFECTION - ADULT  Goal: Absence of fever/infection during anticipated neutropenic period  Description: INTERVENTIONS  - Monitor WBC  - Administer growth factors as ordered  - Implement neutropenic guidelines  Outcome: Progressing     Problem: SAFETY ADULT - FALL  Goal: Free from fall injury  Description: INTERVENTIONS:  - Assess pt frequently for physical needs  - Identify cognitive and physical deficits and behaviors that affect risk of falls.  - [place] fall precautions as indicated by assessment.  - Educate pt/family on patient safety including physical limitations  - Instruct pt to call for assistance with activity based on assessment  - Modify environment to reduce risk of injury  - Provide assistive devices as appropriate  - Consider OT/PT consult to assist with strengthening/mobility  - Encourage toileting schedule  Outcome: Progressing     Problem: DISCHARGE PLANNING  Goal: Discharge to home or other facility with appropriate resources  Description: INTERVENTIONS:  - Identify barriers to discharge w/pt and caregiver  - Include patient/family/discharge partner in discharge planning  - Arrange for needed discharge resources and transportation as appropriate  - Identify discharge learning needs (meds, wound care, etc)  - Arrange for interpreters to assist at discharge as needed  - Consider post-discharge preferences of patient/family/discharge partner  - Complete POLST form as appropriate  - Assess patient's ability to be  responsible for managing their own health  - Refer to Case Management Department for coordinating discharge planning if the patient needs post-hospital services based on physician/LIP order or complex needs related to functional status, cognitive ability or social support system  Outcome: Progressing     Problem: Altered Communication/Language Barrier  Goal: Patient/Family is able to understand and participate in their care  Description: Interventions:  - Assess communication ability and preferred communication style  - Implement communication aides and strategies  - Use visual cues when possible  - Listen attentively, be patient, do not interrupt  - Minimize distractions  - Allow time for understanding and response  - Establish method for patient to ask for assistance (call light)  - Provide an  as needed  - Communicate barriers and strategies to overcome with those who interact with patient  Outcome: Progressing     Problem: GASTROINTESTINAL - ADULT  Goal: Minimal or absence of nausea and vomiting  Description: INTERVENTIONS:  - Maintain adequate hydration with IV or PO as ordered and tolerated  - Nasogastric tube to low intermittent suction as ordered  - Evaluate effectiveness of ordered antiemetic medications  - Provide nonpharmacologic comfort measures as appropriate  - Advance diet as tolerated, if ordered  - Obtain nutritional consult as needed  - Evaluate fluid balance  Outcome: Progressing  Goal: Maintains or returns to baseline bowel function  Description: INTERVENTIONS:  - Assess bowel function  - Maintain adequate hydration with IV or PO as ordered and tolerated  - Evaluate effectiveness of GI medications  - Encourage mobilization and activity  - Obtain nutritional consult as needed  - Establish a toileting routine/schedule  - Consider collaborating with pharmacy to review patient's medication profile  Outcome: Progressing

## 2025-06-13 NOTE — PROGRESS NOTES
[facility]  Progress Note     Arnaldo Gutierrez  : 4/15/1945    Status: Inpatient  Day #: 8    Attending: Boy Mccabe MD  PCP: PHYSICIAN NONSTAFF     Assessment and Plan:    Diffuse large B-cell lymphoma  Large Bowel obstruction 2/2 Proximal transverse colonic mass  - CT A/P reviewed  - GI and general surgery consulted  -  s/p colonoscopy with metallic colon stent  - 6/10/25 - s/p laparoscopic transverse colon resection, small bowel resection x2, resection of tumor nodule on small bowel. Patient appeared to have metastatic disease per Dr. Randall.   - NGT in place post-op  - pathology - transverse colon - diffuse large B-cell lymphoma. Separate segment with tubular adenoma. Small intestine with extensive involvement of diffuse large B-cell lymphoma  - oncology consulted, appreciate recs  - cont TPN  - on zosyn IV empirically. Duration per general surgery.    Acute postoperative blood loss anemia with hypotension  - EBL from surgery 600ml  - s/p 2 units PRBC - hgb stable now  - weaned off pressors. BP stable now.    DM2  - ISS     Other:  H/o HTN   HL  BPH - flomax when able to take PO    Dietitian Malnutrition Assessment    Evaluation for Malnutrition: Criteria for severe malnutrition diagnosis- chronic illness related to   Wt loss greater than 10% in 6 months., Energy intake less than 75% for greater than 1 month.               RD Malnutrition Care Plan:      Body Fat/Muscle Mass:          Physician Assessment     Patient has a diagnosis of severe malnutrition       DVT Mechanical Prophylaxis:   SCDs,    DVT Pharmacologic Prophylaxis   Medication   None               Subjective:      Initial Chief Complaint:  abd discomfort, nausea, weight loss    Abd pain controlled. No flatus or BM. No SOB.     10 point ROS completed and was negative, except for pertinent positive and negatives stated in subjective.      Objective:      Temp:  [97.6 °F (36.4 °C)-97.9 °F (36.6 °C)] 97.6 °F (36.4 °C)  Pulse:   [] 77  Resp:  [10-19] 10  BP: ()/(45-77) 114/62  SpO2:  [90 %-95 %] 92 %  AO: ()/(35-54) 113/46  General:  Alert, no distress  HEENT:  Normocephalic, atraumatic  Cardiac:  Regular rate, regular rhythm  Pulmonary:  Clear to auscultation bilaterally, respirations unlabored  Gastrointestinal:  hypoactive BS  Musculoskeletal:  No joint swelling  Extremities:  No edema, no cyanosis, no clubbing  Neurologic:  nonfocal  Psychiatric:  Normal affect, calm and appropriate    Intake/Output Summary (Last 24 hours) at 6/13/2025 1451  Last data filed at 6/13/2025 0459  Gross per 24 hour   Intake 4106 ml   Output 1875 ml   Net 2231 ml         Recent Labs   Lab 06/11/25  0836 06/11/25  1201 06/12/25  0546 06/12/25  1211 06/13/25  0020 06/13/25  0459 06/13/25  1250   WBC 10.6  --  19.6*  --   --  21.7*  --    HGB 13.2   < > 9.5*  9.4*   < > 9.5* 9.1* 9.3*   HCT 41.2  --  28.2*  --   --  26.8*  --    .0  --  167.0  --   --  136.0*  --    RBC 4.54  --  3.28*  --   --  2.94*  --    MCV 90.7  --  86.0  --   --  91.2  --    MCH 29.1  --  29.0  --   --  31.0  --    MCHC 32.0  --  33.7  --   --  34.0  --    RDW 17.4*  --  17.6*  --   --  18.6*  --    NEPRELIM 9.28*  --  16.78*  --   --  19.84*  --     < > = values in this interval not displayed.     Recent Labs   Lab 06/11/25  1201 06/12/25  0546 06/13/25  0725   BUN 10 11 13   CREATSERUM 0.99 0.76 0.85   CA 7.7* 6.8* 7.4*   ALB  --   --  2.4*    139 137   K 3.9 3.5  3.5 3.2*    108 107   CO2 23.0 21.0 24.0   * 186* 261*   MG  --  1.2* 1.9   PHOS  --  2.6 2.2*   BILT  --   --  0.4   AST  --   --  13   ALT  --   --  14   ALKPHO  --   --  55   TP  --   --  4.2*       XR ABDOMEN (1 VIEW) (CPT=74018)  Result Date: 6/13/2025  CONCLUSION:  1. Postoperative changes from a recent transverse colonic resection and partial small bowel resection including persistent pneumoperitoneum. 2. Nonobstructive bowel gas pattern.     Dictated by (CST): Binu  MD Chuy on 6/13/2025 at 11:38 AM     Finalized by (CST): Chuy Schmid MD on 6/13/2025 at 11:41 AM          XR CHEST AP PORTABLE  (CPT=71045)  Result Date: 6/13/2025  CONCLUSION:  1. Enteric tube has been advanced with tip and distal side hole now projecting over the gastric bubble, in customary position. 2. Expiratory chest with stable atelectasis at both lung bases. 3. Persistent pneumoperitoneum, likely related to the recent abdominal surgery.    Dictated by (CST): Chuy Schmid MD on 6/13/2025 at 9:19 AM     Finalized by (CST): Chuy Schmid MD on 6/13/2025 at 9:21 AM          XR CHEST AP PORTABLE  (CPT=71045)  Result Date: 6/13/2025  CONCLUSION:  1. Enteric tube tip projects over the stomach, although the distal side hole projects over the distal esophagus.  Consider advancing approximately 7.0 cm into the stomach. 2. Expiratory chest with the development of atelectasis at both lung bases. 3. Pneumoperitoneum, likely related to the recent abdominal surgery.    Dictated by (CST): Chuy Schmid MD on 6/13/2025 at 7:16 AM     Finalized by (CST): Chuy Schmid MD on 6/13/2025 at 7:19 AM              Medications:  Scheduled Medications[1]   PRN Meds: PRN Medications[2]    Supplementary Documentation:   DVT Mechanical Prophylaxis:   SCDs,    DVT Pharmacologic Prophylaxis   Medication   None                Code Status: Not on file  Muñoz: External urinary catheter in place  Muñoz Duration (in days): 2  Central line:    ANNELISE:         **Certification      PHYSICIAN Certification of Need for Inpatient Hospitalization - Initial Certification    Patient will require inpatient services that will reasonably be expected to span two midnight's based on the clinical documentation in H+P.   Based on patients current state of illness, I anticipate that, after discharge, patient will require TBD.        Boy Mccabe MD         [1]    potassium chloride  20 mEq Intravenous Once    pantoprazole  40 mg  Intravenous Daily    piperacillin-tazobactam  4.5 g Intravenous Q8H    insulin regular human  1-11 Units Subcutaneous Q6H   [2]   dextrose 10%    ondansetron    acetaminophen    oxyCODONE **OR** oxyCODONE    HYDROmorphone **OR** HYDROmorphone    acetaminophen    glucose **OR** glucose **OR** [DISCONTINUED] glucose-vitamin C **OR** dextrose **OR** [DISCONTINUED] glucose **OR** [DISCONTINUED] glucose **OR** [DISCONTINUED] glucose-vitamin C

## 2025-06-13 NOTE — PLAN OF CARE
Patient is A&Ox4, on RA, has art line and on levo gtt. On TPN. Pain managed with PRN meds. Family updated on plan of care.  Problem: Patient Centered Care  Goal: Patient preferences are identified and integrated in the patient's plan of care  Description: Interventions:  - What would you like us to know as we care for you? I am from home.   - Provide timely, complete, and accurate information to patient/family  - Incorporate patient and family knowledge, values, beliefs, and cultural backgrounds into the planning and delivery of care  - Encourage patient/family to participate in care and decision-making at the level they choose  - Honor patient and family perspectives and choices  Outcome: Progressing     Problem: Diabetes/Glucose Control  Goal: Glucose maintained within prescribed range  Description: INTERVENTIONS:  - Monitor Blood Glucose as ordered  - Assess for signs and symptoms of hyperglycemia and hypoglycemia  - Administer ordered medications to maintain glucose within target range  - Assess barriers to adequate nutritional intake and initiate nutrition consult as needed  - Instruct patient on self management of diabetes  Outcome: Progressing     Problem: Patient/Family Goals  Goal: Patient/Family Long Term Goal  Description: Patient's Long Term Goal: Discharge home     Interventions:  - Monitor vitals  - Monitor labs  - IVF  - Glucose monitoring ACHS  - Clear liquid diet  - Scheduled miralax  - GI on consult  - See additional Care Plan goals for specific interventions  Outcome: Progressing  Goal: Patient/Family Short Term Goal  Description: Patient's Short Term Goal: Improve bowel function    Interventions:   - Full liquid diet  - GI on consult  - Scheduled miralax  - See additional Care Plan goals for specific interventions  Outcome: Progressing     Problem: PAIN - ADULT  Goal: Verbalizes/displays adequate comfort level or patient's stated pain goal  Description: INTERVENTIONS:  - Encourage pt to monitor  pain and request assistance  - Assess pain using appropriate pain scale  - Administer analgesics based on type and severity of pain and evaluate response  - Implement non-pharmacological measures as appropriate and evaluate response  - Consider cultural and social influences on pain and pain management  - Manage/alleviate anxiety  - Utilize distraction and/or relaxation techniques  - Monitor for opioid side effects  - Notify MD/LIP if interventions unsuccessful or patient reports new pain  - Anticipate increased pain with activity and pre-medicate as appropriate  Outcome: Progressing     Problem: RISK FOR INFECTION - ADULT  Goal: Absence of fever/infection during anticipated neutropenic period  Description: INTERVENTIONS  - Monitor WBC  - Administer growth factors as ordered  - Implement neutropenic guidelines  Outcome: Progressing     Problem: SAFETY ADULT - FALL  Goal: Free from fall injury  Description: INTERVENTIONS:  - Assess pt frequently for physical needs  - Identify cognitive and physical deficits and behaviors that affect risk of falls.  - [place] fall precautions as indicated by assessment.  - Educate pt/family on patient safety including physical limitations  - Instruct pt to call for assistance with activity based on assessment  - Modify environment to reduce risk of injury  - Provide assistive devices as appropriate  - Consider OT/PT consult to assist with strengthening/mobility  - Encourage toileting schedule  Outcome: Progressing     Problem: DISCHARGE PLANNING  Goal: Discharge to home or other facility with appropriate resources  Description: INTERVENTIONS:  - Identify barriers to discharge w/pt and caregiver  - Include patient/family/discharge partner in discharge planning  - Arrange for needed discharge resources and transportation as appropriate  - Identify discharge learning needs (meds, wound care, etc)  - Arrange for interpreters to assist at discharge as needed  - Consider post-discharge  preferences of patient/family/discharge partner  - Complete POLST form as appropriate  - Assess patient's ability to be responsible for managing their own health  - Refer to Case Management Department for coordinating discharge planning if the patient needs post-hospital services based on physician/LIP order or complex needs related to functional status, cognitive ability or social support system  Outcome: Progressing     Problem: Altered Communication/Language Barrier  Goal: Patient/Family is able to understand and participate in their care  Description: Interventions:  - Assess communication ability and preferred communication style  - Implement communication aides and strategies  - Use visual cues when possible  - Listen attentively, be patient, do not interrupt  - Minimize distractions  - Allow time for understanding and response  - Establish method for patient to ask for assistance (call light)  - Provide an  as needed  - Communicate barriers and strategies to overcome with those who interact with patient  Outcome: Progressing     Problem: GASTROINTESTINAL - ADULT  Goal: Minimal or absence of nausea and vomiting  Description: INTERVENTIONS:  - Maintain adequate hydration with IV or PO as ordered and tolerated  - Nasogastric tube to low intermittent suction as ordered  - Evaluate effectiveness of ordered antiemetic medications  - Provide nonpharmacologic comfort measures as appropriate  - Advance diet as tolerated, if ordered  - Obtain nutritional consult as needed  - Evaluate fluid balance  Outcome: Progressing  Goal: Maintains or returns to baseline bowel function  Description: INTERVENTIONS:  - Assess bowel function  - Maintain adequate hydration with IV or PO as ordered and tolerated  - Evaluate effectiveness of GI medications  - Encourage mobilization and activity  - Obtain nutritional consult as needed  - Establish a toileting routine/schedule  - Consider collaborating with pharmacy to review  patient's medication profile  Outcome: Progressing

## 2025-06-14 LAB
ANION GAP SERPL CALC-SCNC: 7 MMOL/L (ref 0–18)
BASOPHILS # BLD AUTO: 0.01 X10(3) UL (ref 0–0.2)
BASOPHILS NFR BLD AUTO: 0.1 %
BUN BLD-MCNC: 14 MG/DL (ref 9–23)
BUN/CREAT SERPL: 19.4 (ref 10–20)
CALCIUM BLD-MCNC: 7.7 MG/DL (ref 8.7–10.4)
CHLORIDE SERPL-SCNC: 102 MMOL/L (ref 98–112)
CO2 SERPL-SCNC: 28 MMOL/L (ref 21–32)
CREAT BLD-MCNC: 0.72 MG/DL (ref 0.7–1.3)
DEPRECATED RDW RBC AUTO: 55.4 FL (ref 35.1–46.3)
EGFRCR SERPLBLD CKD-EPI 2021: 94 ML/MIN/1.73M2 (ref 60–?)
EOSINOPHIL # BLD AUTO: 0 X10(3) UL (ref 0–0.7)
EOSINOPHIL NFR BLD AUTO: 0 %
ERYTHROCYTE [DISTWIDTH] IN BLOOD BY AUTOMATED COUNT: 18.1 % (ref 11–15)
GLUCOSE BLD-MCNC: 245 MG/DL (ref 70–99)
GLUCOSE BLDC GLUCOMTR-MCNC: 168 MG/DL (ref 70–99)
GLUCOSE BLDC GLUCOMTR-MCNC: 173 MG/DL (ref 70–99)
GLUCOSE BLDC GLUCOMTR-MCNC: 178 MG/DL (ref 70–99)
HCT VFR BLD AUTO: 26.6 % (ref 39–53)
HGB BLD-MCNC: 8.1 G/DL (ref 13–17.5)
HGB BLD-MCNC: 8.8 G/DL (ref 13–17.5)
HGB BLD-MCNC: 9 G/DL (ref 13–17.5)
HGB BLD-MCNC: 9 G/DL (ref 13–17.5)
IMM GRANULOCYTES # BLD AUTO: 0.06 X10(3) UL (ref 0–1)
IMM GRANULOCYTES NFR BLD: 0.4 %
LYMPHOCYTES # BLD AUTO: 1.03 X10(3) UL (ref 1–4)
LYMPHOCYTES NFR BLD AUTO: 6.3 %
MAGNESIUM SERPL-MCNC: 1.8 MG/DL (ref 1.6–2.6)
MCH RBC QN AUTO: 28.3 PG (ref 26–34)
MCHC RBC AUTO-ENTMCNC: 33.8 G/DL (ref 31–37)
MCV RBC AUTO: 83.6 FL (ref 80–100)
MONOCYTES # BLD AUTO: 0.74 X10(3) UL (ref 0.1–1)
MONOCYTES NFR BLD AUTO: 4.5 %
NEUTROPHILS # BLD AUTO: 14.54 X10 (3) UL (ref 1.5–7.7)
NEUTROPHILS # BLD AUTO: 14.54 X10(3) UL (ref 1.5–7.7)
NEUTROPHILS NFR BLD AUTO: 88.7 %
OSMOLALITY SERPL CALC.SUM OF ELEC: 293 MOSM/KG (ref 275–295)
PHOSPHATE SERPL-MCNC: 2 MG/DL (ref 2.4–5.1)
PLATELET # BLD AUTO: 130 10(3)UL (ref 150–450)
PLATELETS.RETICULATED NFR BLD AUTO: 3.4 % (ref 0–7)
POTASSIUM SERPL-SCNC: 3.5 MMOL/L (ref 3.5–5.1)
RBC # BLD AUTO: 3.18 X10(6)UL (ref 3.8–5.8)
SODIUM SERPL-SCNC: 137 MMOL/L (ref 136–145)
WBC # BLD AUTO: 16.4 X10(3) UL (ref 4–11)

## 2025-06-14 PROCEDURE — 99233 SBSQ HOSP IP/OBS HIGH 50: CPT | Performed by: HOSPITALIST

## 2025-06-14 PROCEDURE — 99233 SBSQ HOSP IP/OBS HIGH 50: CPT | Performed by: INTERNAL MEDICINE

## 2025-06-14 RX ORDER — MAGNESIUM SULFATE HEPTAHYDRATE 40 MG/ML
2 INJECTION, SOLUTION INTRAVENOUS ONCE
Status: COMPLETED | OUTPATIENT
Start: 2025-06-14 | End: 2025-06-14

## 2025-06-14 NOTE — PLAN OF CARE
Problem: Patient Centered Care  Goal: Patient preferences are identified and integrated in the patient's plan of care  Description: Interventions:  - What would you like us to know as we care for you? I am from home.   - Provide timely, complete, and accurate information to patient/family  - Incorporate patient and family knowledge, values, beliefs, and cultural backgrounds into the planning and delivery of care  - Encourage patient/family to participate in care and decision-making at the level they choose  - Honor patient and family perspectives and choices  Outcome: Progressing     Problem: Diabetes/Glucose Control  Goal: Glucose maintained within prescribed range  Description: INTERVENTIONS:  - Monitor Blood Glucose as ordered  - Assess for signs and symptoms of hyperglycemia and hypoglycemia  - Administer ordered medications to maintain glucose within target range  - Assess barriers to adequate nutritional intake and initiate nutrition consult as needed  - Instruct patient on self management of diabetes  Outcome: Progressing     Problem: Patient/Family Goals  Goal: Patient/Family Long Term Goal  Description: Patient's Long Term Goal: Discharge home     Interventions:  - Monitor vitals  - Monitor labs  - IVF  - Glucose monitoring ACHS  - Clear liquid diet  - Scheduled miralax  - GI on consult  - See additional Care Plan goals for specific interventions  Outcome: Progressing  Goal: Patient/Family Short Term Goal  Description: Patient's Short Term Goal: Improve bowel function    Interventions:   - Full liquid diet  - GI on consult  - Scheduled miralax  - See additional Care Plan goals for specific interventions  Outcome: Progressing     Problem: PAIN - ADULT  Goal: Verbalizes/displays adequate comfort level or patient's stated pain goal  Description: INTERVENTIONS:  - Encourage pt to monitor pain and request assistance  - Assess pain using appropriate pain scale  - Administer analgesics based on type and severity  of pain and evaluate response  - Implement non-pharmacological measures as appropriate and evaluate response  - Consider cultural and social influences on pain and pain management  - Manage/alleviate anxiety  - Utilize distraction and/or relaxation techniques  - Monitor for opioid side effects  - Notify MD/LIP if interventions unsuccessful or patient reports new pain  - Anticipate increased pain with activity and pre-medicate as appropriate  Outcome: Progressing     Problem: RISK FOR INFECTION - ADULT  Goal: Absence of fever/infection during anticipated neutropenic period  Description: INTERVENTIONS  - Monitor WBC  - Administer growth factors as ordered  - Implement neutropenic guidelines  Outcome: Progressing     Problem: SAFETY ADULT - FALL  Goal: Free from fall injury  Description: INTERVENTIONS:  - Assess pt frequently for physical needs  - Identify cognitive and physical deficits and behaviors that affect risk of falls.  - [place] fall precautions as indicated by assessment.  - Educate pt/family on patient safety including physical limitations  - Instruct pt to call for assistance with activity based on assessment  - Modify environment to reduce risk of injury  - Provide assistive devices as appropriate  - Consider OT/PT consult to assist with strengthening/mobility  - Encourage toileting schedule  Outcome: Progressing     Problem: DISCHARGE PLANNING  Goal: Discharge to home or other facility with appropriate resources  Description: INTERVENTIONS:  - Identify barriers to discharge w/pt and caregiver  - Include patient/family/discharge partner in discharge planning  - Arrange for needed discharge resources and transportation as appropriate  - Identify discharge learning needs (meds, wound care, etc)  - Arrange for interpreters to assist at discharge as needed  - Consider post-discharge preferences of patient/family/discharge partner  - Complete POLST form as appropriate  - Assess patient's ability to be  responsible for managing their own health  - Refer to Case Management Department for coordinating discharge planning if the patient needs post-hospital services based on physician/LIP order or complex needs related to functional status, cognitive ability or social support system  Outcome: Progressing     Problem: Altered Communication/Language Barrier  Goal: Patient/Family is able to understand and participate in their care  Description: Interventions:  - Assess communication ability and preferred communication style  - Implement communication aides and strategies  - Use visual cues when possible  - Listen attentively, be patient, do not interrupt  - Minimize distractions  - Allow time for understanding and response  - Establish method for patient to ask for assistance (call light)  - Provide an  as needed  - Communicate barriers and strategies to overcome with those who interact with patient  Outcome: Progressing     Problem: GASTROINTESTINAL - ADULT  Goal: Minimal or absence of nausea and vomiting  Description: INTERVENTIONS:  - Maintain adequate hydration with IV or PO as ordered and tolerated  - Nasogastric tube to low intermittent suction as ordered  - Evaluate effectiveness of ordered antiemetic medications  - Provide nonpharmacologic comfort measures as appropriate  - Advance diet as tolerated, if ordered  - Obtain nutritional consult as needed  - Evaluate fluid balance  Outcome: Progressing  Goal: Maintains or returns to baseline bowel function  Description: INTERVENTIONS:  - Assess bowel function  - Maintain adequate hydration with IV or PO as ordered and tolerated  - Evaluate effectiveness of GI medications  - Encourage mobilization and activity  - Obtain nutritional consult as needed  - Establish a toileting routine/schedule  - Consider collaborating with pharmacy to review patient's medication profile  Outcome: Progressing

## 2025-06-14 NOTE — PROGRESS NOTES
[facility]  Progress Note     Arnaldo Gutierrez  : 4/15/1945    Status: Inpatient  Day #: 9    Attending: Boy Mccabe MD  PCP: PHYSICIAN NONSTAFF     Assessment and Plan:    Diffuse large B-cell lymphoma  Large Bowel obstruction 2/2 Proximal transverse colonic mass  - CT A/P reviewed  - GI and general surgery consulted  -  s/p colonoscopy with metallic colon stent  - 6/10/25 - s/p laparoscopic transverse colon resection, small bowel resection x2, resection of tumor nodule on small bowel. Patient appeared to have metastatic disease per Dr. Randall.   - NGT in place post-op  - pathology - transverse colon - diffuse large B-cell lymphoma. Separate segment with tubular adenoma. Small intestine with extensive involvement of diffuse large B-cell lymphoma  - oncology consulted, appreciate recs. Plans for chemo with curative intent in the future once bowel issues stabilized.  - cont TPN  - on zosyn IV empirically. Duration per general surgery.    Acute postoperative blood loss anemia with hypotension  - EBL from surgery 600ml  - s/p 2 units PRBC - hgb stable now    Hypotension - unclear reason  - required pressors again, hopefully can wean off today    DM2  - ISS     Other:  H/o HTN   HL  BPH - flomax when able to take PO    Dietitian Malnutrition Assessment    Evaluation for Malnutrition: Criteria for severe malnutrition diagnosis- chronic illness related to   Wt loss greater than 10% in 6 months., Energy intake less than 75% for greater than 1 month.               RD Malnutrition Care Plan:      Body Fat/Muscle Mass:          Physician Assessment     Patient has a diagnosis of severe malnutrition       DVT Mechanical Prophylaxis:   SCDs,    DVT Pharmacologic Prophylaxis   Medication   None               Subjective:      Initial Chief Complaint:  abd discomfort, nausea, weight loss    Abd pain controlled. No flatus or BM. No SOB.     10 point ROS completed and was negative, except for pertinent positive  and negatives stated in subjective.      Objective:      Temp:  [97.7 °F (36.5 °C)-99.3 °F (37.4 °C)] 98.4 °F (36.9 °C)  Pulse:  [] 100  Resp:  [10-28] 17  BP: ()/(39-85) 106/85  SpO2:  [90 %-95 %] 91 %  AO: ()/(40-56) 108/49  General:  Alert, no distress  HEENT:  Normocephalic, atraumatic  Cardiac:  Regular rate, regular rhythm  Pulmonary:  Clear to auscultation bilaterally, respirations unlabored  Gastrointestinal:  hypoactive BS  Musculoskeletal:  No joint swelling  Extremities:  No edema, no cyanosis, no clubbing  Neurologic:  nonfocal  Psychiatric:  Normal affect, calm and appropriate    Intake/Output Summary (Last 24 hours) at 6/14/2025 1358  Last data filed at 6/14/2025 0600  Gross per 24 hour   Intake 3324.9 ml   Output 2580 ml   Net 744.9 ml         Recent Labs   Lab 06/12/25  0546 06/12/25  1211 06/13/25  0459 06/13/25  1250 06/13/25  2344 06/14/25  0605 06/14/25  1308   WBC 19.6*  --  21.7*  --   --  16.4*  --    HGB 9.5*  9.4*   < > 9.1*   < > 8.7* 9.0*  9.0* 8.8*   HCT 28.2*  --  26.8*  --   --  26.6*  --    .0  --  136.0*  --   --  130.0*  --    RBC 3.28*  --  2.94*  --   --  3.18*  --    MCV 86.0  --  91.2  --   --  83.6  --    MCH 29.0  --  31.0  --   --  28.3  --    MCHC 33.7  --  34.0  --   --  33.8  --    RDW 17.6*  --  18.6*  --   --  18.1*  --    NEPRELIM 16.78*  --  19.84*  --   --  14.54*  --     < > = values in this interval not displayed.     Recent Labs   Lab 06/12/25  0546 06/13/25  0725 06/14/25  0605   BUN 11 13 14   CREATSERUM 0.76 0.85 0.72   CA 6.8* 7.4* 7.7*   ALB  --  2.4*  --     137 137   K 3.5  3.5 3.2* 3.5    107 102   CO2 21.0 24.0 28.0   * 261* 245*   MG 1.2* 1.9 1.8   PHOS 2.6 2.2* 2.0*   BILT  --  0.4  --    AST  --  13  --    ALT  --  14  --    ALKPHO  --  55  --    TP  --  4.2*  --        XR ABDOMEN (1 VIEW) (CPT=74018)  Result Date: 6/13/2025  CONCLUSION:  1. Postoperative changes from a recent transverse colonic resection  and partial small bowel resection including persistent pneumoperitoneum. 2. Nonobstructive bowel gas pattern.     Dictated by (CST): Chuy Schmid MD on 6/13/2025 at 11:38 AM     Finalized by (CST): Chuy Schmid MD on 6/13/2025 at 11:41 AM          XR CHEST AP PORTABLE  (CPT=71045)  Result Date: 6/13/2025  CONCLUSION:  1. Enteric tube has been advanced with tip and distal side hole now projecting over the gastric bubble, in customary position. 2. Expiratory chest with stable atelectasis at both lung bases. 3. Persistent pneumoperitoneum, likely related to the recent abdominal surgery.    Dictated by (CST): Chuy Schmid MD on 6/13/2025 at 9:19 AM     Finalized by (CST): Chuy Schmid MD on 6/13/2025 at 9:21 AM          XR CHEST AP PORTABLE  (CPT=71045)  Result Date: 6/13/2025  CONCLUSION:  1. Enteric tube tip projects over the stomach, although the distal side hole projects over the distal esophagus.  Consider advancing approximately 7.0 cm into the stomach. 2. Expiratory chest with the development of atelectasis at both lung bases. 3. Pneumoperitoneum, likely related to the recent abdominal surgery.    Dictated by (CST): Chuy Schmid MD on 6/13/2025 at 7:16 AM     Finalized by (CST): Chuy Schmid MD on 6/13/2025 at 7:19 AM              Medications:  Scheduled Medications[1]   PRN Meds: PRN Medications[2]    Supplementary Documentation:   DVT Mechanical Prophylaxis:   SCDs,    DVT Pharmacologic Prophylaxis   Medication   None                Code Status: Not on file  Muñoz: External urinary catheter in place  Muñoz Duration (in days): 2  Central line:    ANNELISE:         **Certification      PHYSICIAN Certification of Need for Inpatient Hospitalization - Initial Certification    Patient will require inpatient services that will reasonably be expected to span two midnight's based on the clinical documentation in H+P.   Based on patients current state of illness, I anticipate that, after  discharge, patient will require TBD.        Boy Mccabe MD         [1]    pantoprazole  40 mg Intravenous Daily    piperacillin-tazobactam  4.5 g Intravenous Q8H    insulin regular human  1-11 Units Subcutaneous Q6H   [2]   dextrose 10%    ondansetron    acetaminophen    oxyCODONE **OR** oxyCODONE    HYDROmorphone **OR** HYDROmorphone    acetaminophen    glucose **OR** glucose **OR** [DISCONTINUED] glucose-vitamin C **OR** dextrose **OR** [DISCONTINUED] glucose **OR** [DISCONTINUED] glucose **OR** [DISCONTINUED] glucose-vitamin C

## 2025-06-14 NOTE — PLAN OF CARE
Pt alert, vitals stable. Up to chair several times today. X2 assist. Prn dilaudid given for pain. Pt has not passed gas yet. Keep npo except ice chips per general surgery.   Problem: Patient Centered Care  Goal: Patient preferences are identified and integrated in the patient's plan of care  Description: Interventions:  - What would you like us to know as we care for you? I am from home.   - Provide timely, complete, and accurate information to patient/family  - Incorporate patient and family knowledge, values, beliefs, and cultural backgrounds into the planning and delivery of care  - Encourage patient/family to participate in care and decision-making at the level they choose  - Honor patient and family perspectives and choices  Outcome: Progressing     Problem: Diabetes/Glucose Control  Goal: Glucose maintained within prescribed range  Description: INTERVENTIONS:  - Monitor Blood Glucose as ordered  - Assess for signs and symptoms of hyperglycemia and hypoglycemia  - Administer ordered medications to maintain glucose within target range  - Assess barriers to adequate nutritional intake and initiate nutrition consult as needed  - Instruct patient on self management of diabetes  Outcome: Progressing     Problem: Patient/Family Goals  Goal: Patient/Family Long Term Goal  Description: Patient's Long Term Goal: Discharge home     Interventions:  - Monitor vitals  - Monitor labs  - IVF  - Glucose monitoring ACHS  - Clear liquid diet  - Scheduled miralax  - GI on consult  - See additional Care Plan goals for specific interventions  Outcome: Progressing  Goal: Patient/Family Short Term Goal  Description: Patient's Short Term Goal: Improve bowel function    Interventions:   - Full liquid diet  - GI on consult  - Scheduled miralax  - See additional Care Plan goals for specific interventions  Outcome: Progressing     Problem: PAIN - ADULT  Goal: Verbalizes/displays adequate comfort level or patient's stated pain  goal  Description: INTERVENTIONS:  - Encourage pt to monitor pain and request assistance  - Assess pain using appropriate pain scale  - Administer analgesics based on type and severity of pain and evaluate response  - Implement non-pharmacological measures as appropriate and evaluate response  - Consider cultural and social influences on pain and pain management  - Manage/alleviate anxiety  - Utilize distraction and/or relaxation techniques  - Monitor for opioid side effects  - Notify MD/LIP if interventions unsuccessful or patient reports new pain  - Anticipate increased pain with activity and pre-medicate as appropriate  Outcome: Progressing     Problem: RISK FOR INFECTION - ADULT  Goal: Absence of fever/infection during anticipated neutropenic period  Description: INTERVENTIONS  - Monitor WBC  - Administer growth factors as ordered  - Implement neutropenic guidelines  Outcome: Progressing     Problem: DISCHARGE PLANNING  Goal: Discharge to home or other facility with appropriate resources  Description: INTERVENTIONS:  - Identify barriers to discharge w/pt and caregiver  - Include patient/family/discharge partner in discharge planning  - Arrange for needed discharge resources and transportation as appropriate  - Identify discharge learning needs (meds, wound care, etc)  - Arrange for interpreters to assist at discharge as needed  - Consider post-discharge preferences of patient/family/discharge partner  - Complete POLST form as appropriate  - Assess patient's ability to be responsible for managing their own health  - Refer to Case Management Department for coordinating discharge planning if the patient needs post-hospital services based on physician/LIP order or complex needs related to functional status, cognitive ability or social support system  Outcome: Progressing     Problem: Altered Communication/Language Barrier  Goal: Patient/Family is able to understand and participate in their care  Description:  Interventions:  - Assess communication ability and preferred communication style  - Implement communication aides and strategies  - Use visual cues when possible  - Listen attentively, be patient, do not interrupt  - Minimize distractions  - Allow time for understanding and response  - Establish method for patient to ask for assistance (call light)  - Provide an  as needed  - Communicate barriers and strategies to overcome with those who interact with patient  Outcome: Progressing

## 2025-06-14 NOTE — PROGRESS NOTES
[facility]  part of [facility]     Progress Note    Arnaldo Gutierrez Patient Status:  Inpatient    4/15/1945 MRN [id number]   Location [facility] 2W/SW Attending Boy Mccabe MD   Hosp Day # 9 PCP PHYSICIAN NONSTAFF       Subjective:   Patient seen and examined.  Admits to some postoperative pain.  Requiring pressor support with norepinephrine.  No significant distress    Objective:   Blood pressure 106/85, pulse 100, temperature 98.4 °F (36.9 °C), temperature source Temporal, resp. rate 17, height 5' 11\" (1.803 m), weight 161 lb 6 oz (73.2 kg), SpO2 91%.  Intake/Output:   Last 3 shifts: I/O last 3 completed shifts:  In: 6005.9 [I.V.:3234.3; IV PIGGYBACK:100]  Out: 4180 [Urine:3600; Emesis/NG output:580]   This shift: No intake/output data recorded.     Vent Settings:      Hemodynamic parameters (last 24 hours):      Scheduled Meds: Current Hospital Medications[1]    Continuous Infusions: Medication Infusions[2]    Physical Exam  Constitutional: no acute distress  Eyes: PERRL  ENT: nares pateint  Neck: supple, no JVD  Cardio: RRR, S1 S2  Respiratory: clear to auscultation bilaterally, no wheezing, rales, rhonchi, crackles  GI: abdomen soft, mild tenderness to palpation, abdominal dressing in place  Extremities: no clubbing, cyanosis, edema  Neurologic: no gross motor deficits  Skin: warm, dry      Results:     Lab Results   Component Value Date    WBC 16.4 2025    HGB 9.0 2025    HGB 9.0 2025    HCT 26.6 2025    .0 2025    CREATSERUM 0.72 2025    BUN 14 2025     2025    K 3.5 2025     2025    CO2 28.0 2025     2025    CA 7.7 2025    MG 1.8 2025    PHOS 2.0 2025       XR ABDOMEN (1 VIEW) (CPT=74018)  Result Date: 2025  CONCLUSION:  1. Postoperative changes from a recent transverse colonic resection and partial small bowel resection including persistent pneumoperitoneum.  2. Nonobstructive bowel gas pattern.     Dictated by (CST): Chuy Schmid MD on 6/13/2025 at 11:38 AM     Finalized by (CST): Chuy Schmid MD on 6/13/2025 at 11:41 AM          XR CHEST AP PORTABLE  (CPT=71045)  Result Date: 6/13/2025  CONCLUSION:  1. Enteric tube has been advanced with tip and distal side hole now projecting over the gastric bubble, in customary position. 2. Expiratory chest with stable atelectasis at both lung bases. 3. Persistent pneumoperitoneum, likely related to the recent abdominal surgery.    Dictated by (CST): Chuy Schmid MD on 6/13/2025 at 9:19 AM     Finalized by (CST): Chuy Schmid MD on 6/13/2025 at 9:21 AM          XR CHEST AP PORTABLE  (CPT=71045)  Result Date: 6/13/2025  CONCLUSION:  1. Enteric tube tip projects over the stomach, although the distal side hole projects over the distal esophagus.  Consider advancing approximately 7.0 cm into the stomach. 2. Expiratory chest with the development of atelectasis at both lung bases. 3. Pneumoperitoneum, likely related to the recent abdominal surgery.    Dictated by (CST): Chuy Schmid MD on 6/13/2025 at 7:16 AM     Finalized by (CST): Chuy Schmid MD on 6/13/2025 at 7:19 AM                  Assessment   1.  POD #3 status post transverse colon resection, small bowel resection and resection of tumor on small bowel  2.  Shock state  3.  Lactic acidosis  4.  Diabetes mellitus  5.  Hyperlipidemia  6.  BPH  7.  Hyponatremia     Plan   -Patient presented with evidence of weight loss abdominal discomfort nausea found to have evidence of obstruction secondary to mass seen now status post transverse colon resection, small bowel resection and resection of tumor on small bowel on 6/10/2025.  - Recommendations per general surgery.  Closely monitor hemoglobin.  Transfuse for hemoglobin below 7  - Pain control  - Await pathology results  - Attempt to wean pressor support with norepinephrine  - Empiric antibiotic therapy  with Zosyn at this time  -Continue TPN  - DVT prophylaxis: KIELs    Wilner Parrish DO  Pulmonary Critical Care Medicine  [facility]          [1]   Current Facility-Administered Medications   Medication Dose Route Frequency    sodium phosphate 15 mmol in 0.9% NaCl 100mL IVPB premix  15 mmol Intravenous Once    adult 3 in 1 TPN   Intravenous Continuous TPN    magnesium sulfate in sterile water for injection 2 g/50mL IVPB premix 2 g  2 g Intravenous Once    adult 3 in 1 TPN   Intravenous Continuous TPN    dextrose 10% infusion (TPN no rate)   Intravenous Continuous PRN    norepinephrine (Levophed) 4 mg/250mL infusion premix  0.5-30 mcg/min Intravenous Continuous    pantoprazole (Protonix) 40 mg in sodium chloride 0.9% PF 10 mL IV push  40 mg Intravenous Daily    piperacillin-tazobactam (Zosyn) 4.5 g in dextrose 5% 100 mL IVPB-ADDV  4.5 g Intravenous Q8H    ondansetron (Zofran) 4 MG/2ML injection 4 mg  4 mg Intravenous Q4H PRN    acetaminophen (Ofirmev) 10 mg/mL infusion premix 1,000 mg  1,000 mg Intravenous Q6H PRN    insulin regular human (Novolin R, Humulin R) 100 UNIT/ML injection 1-11 Units  1-11 Units Subcutaneous Q6H    oxyCODONE immediate release tab 2.5 mg  2.5 mg Oral Q4H PRN    Or    oxyCODONE immediate release tab 5 mg  5 mg Oral Q4H PRN    HYDROmorphone (Dilaudid) 1 MG/ML injection 0.2 mg  0.2 mg Intravenous Q2H PRN    Or    HYDROmorphone (Dilaudid) 1 MG/ML injection 0.4 mg  0.4 mg Intravenous Q2H PRN    acetaminophen (Tylenol Extra Strength) tab 500 mg  500 mg Oral Q4H PRN    glucose (Dex4) 15 GM/59ML oral liquid 15 g  15 g Oral Q15 Min PRN    Or    glucose (Glutose) 40% oral gel 15 g  15 g Oral Q15 Min PRN    Or    dextrose 50% injection 50 mL  50 mL Intravenous Q15 Min PRN   [2]    adult 3 in 1 TPN      adult 3 in 1 TPN 83.3 mL/hr at 06/13/25 2048    dextrose 10%      norepinephrine 4 mcg/min (06/14/25 0845)

## 2025-06-14 NOTE — PROGRESS NOTES
[facility]  Progress Note    Arnaldo Gutierrez Patient Status:  Inpatient    4/15/1945 MRN [id number]   Location [facility] 2W/SW Attending Boy Mccabe MD   Hosp Day # 9 PCP PHYSICIAN NONSTAFF     Subjective:  Patient sitting up in bed.  Examined with bedside RN.  Reports he feels okay today, does not have abdominal pain at the moment.  Denies nausea or vomiting.  He is not passing gas, has not had a bowel movement.  Plan to up to the chair today.  NGT to LIS with bloody output, has been n.p.o. Currently on Levophed, vss.    Objective/Physical Exam:  General: Alert, orientated x3.  Cooperative.  No apparent distress.  Vital Signs:  Blood pressure 106/85, pulse 100, temperature 98.4 °F (36.9 °C), temperature source Temporal, resp. rate 17, height 5' 11\" (1.803 m), weight 161 lb 6 oz (73.2 kg), SpO2 91%.  Wt Readings from Last 3 Encounters:   25 161 lb 6 oz (73.2 kg)   25 148 lb (67.1 kg)   25 160 lb (72.6 kg)     Lungs: No respiratory distress.  Cardiac: Regular rate and rhythm.   Abdomen:  Soft, not distended, minimally tender, with no rebound or guarding.  No peritoneal signs.   Extremities:  No lower extremity edema noted.    Incision: Clean, dry, intact, no erythema. Midline and lap site incisions with staples in place.     Intake/Output:    Intake/Output Summary (Last 24 hours) at 2025 1221  Last data filed at 2025 0600  Gross per 24 hour   Intake 3324.9 ml   Output 2580 ml   Net 744.9 ml     I/O last 3 completed shifts:  In: 6005.9 [I.V.:3234.3; IV PIGGYBACK:100]  Out: 4180 [Urine:3600; Emesis/NG output:580]  No intake/output data recorded.    Medications: Scheduled Medications[1]    Labs:  Lab Results   Component Value Date    WBC 16.4 2025    HGB 9.0 2025    HGB 9.0 2025    HCT 26.6 2025    .0 2025     Lab Results   Component Value Date     2025    K 3.5 2025     2025    CO2 28.0 2025     BUN 14 06/14/2025    CREATSERUM 0.72 06/14/2025     06/14/2025    CA 7.7 06/14/2025     No results found for: \"PT\", \"INR\"      Assessment  Problem List[2]    Large bowel obstruction s/p laparoscopic assisted transverse colon resection, small bowel resection X2, resection of tubulovillous small bowel  Pathology c/w diffuse large B-cell lymphoma, separate segment of colon with tubular adenoma.   Leukocytosis, improving 16  Anemia, hemoglobin stable 9.0    Plan:  Keep n.p.o., okay for ice chips.  Continue TPN as per diet.  Continue NGT to LIS until signs of bowel function return.  Multimodal pain control with IV Tylenol, Dilaudid as needed  Continue care as per CCU  Pulmonology and oncology following, appreciate recommendations  Ambulate and up to chair as able  DVT prophylaxis with SCDs  GI prophylaxis with protonix IV    Quality:  DVT Mechanical Prophylaxis:   SCDs,    DVT Pharmacologic Prophylaxis   Medication   None                Code Status: Not on file  Muñoz: External urinary catheter in place  Muñoz Duration (in days):   Central line:    ANNELISE:         PRAVEEN Maria  6/14/2025  12:21 PM               [1]    sodium phosphate  15 mmol Intravenous Once    pantoprazole  40 mg Intravenous Daily    piperacillin-tazobactam  4.5 g Intravenous Q8H    insulin regular human  1-11 Units Subcutaneous Q6H   [2]   Patient Active Problem List  Diagnosis    BPH (benign prostatic hyperplasia)    Chronic low back pain    Chronic pain of both knees    Depression with anxiety    Generalized osteoarthritis    Hyperlipidemia    Type 2 diabetes mellitus without complication, without long-term current use of insulin (HCC)    Essential hypertension    Bipolar I disorder, single manic episode (HCC)    Large bowel obstruction (HCC)    Colonic mass

## 2025-06-15 LAB
ANION GAP SERPL CALC-SCNC: 6 MMOL/L (ref 0–18)
BASOPHILS # BLD AUTO: 0.01 X10(3) UL (ref 0–0.2)
BASOPHILS NFR BLD AUTO: 0.1 %
BUN BLD-MCNC: 18 MG/DL (ref 9–23)
BUN/CREAT SERPL: 27.3 (ref 10–20)
CALCIUM BLD-MCNC: 7.5 MG/DL (ref 8.7–10.4)
CHLORIDE SERPL-SCNC: 105 MMOL/L (ref 98–112)
CO2 SERPL-SCNC: 30 MMOL/L (ref 21–32)
CREAT BLD-MCNC: 0.66 MG/DL (ref 0.7–1.3)
DEPRECATED RDW RBC AUTO: 56.3 FL (ref 35.1–46.3)
EGFRCR SERPLBLD CKD-EPI 2021: 96 ML/MIN/1.73M2 (ref 60–?)
EOSINOPHIL # BLD AUTO: 0 X10(3) UL (ref 0–0.7)
EOSINOPHIL NFR BLD AUTO: 0 %
ERYTHROCYTE [DISTWIDTH] IN BLOOD BY AUTOMATED COUNT: 18.4 % (ref 11–15)
GLUCOSE BLD-MCNC: 161 MG/DL (ref 70–99)
GLUCOSE BLDC GLUCOMTR-MCNC: 135 MG/DL (ref 70–99)
GLUCOSE BLDC GLUCOMTR-MCNC: 154 MG/DL (ref 70–99)
GLUCOSE BLDC GLUCOMTR-MCNC: 160 MG/DL (ref 70–99)
GLUCOSE BLDC GLUCOMTR-MCNC: 173 MG/DL (ref 70–99)
GLUCOSE BLDC GLUCOMTR-MCNC: 174 MG/DL (ref 70–99)
HCT VFR BLD AUTO: 22.1 % (ref 39–53)
HGB BLD-MCNC: 7 G/DL (ref 13–17.5)
HGB BLD-MCNC: 7.4 G/DL (ref 13–17.5)
HGB BLD-MCNC: 7.4 G/DL (ref 13–17.5)
HGB BLD-MCNC: 7.7 G/DL (ref 13–17.5)
HGB BLD-MCNC: 7.8 G/DL (ref 13–17.5)
IMM GRANULOCYTES # BLD AUTO: 0.06 X10(3) UL (ref 0–1)
IMM GRANULOCYTES NFR BLD: 0.7 %
LYMPHOCYTES # BLD AUTO: 0.92 X10(3) UL (ref 1–4)
LYMPHOCYTES NFR BLD AUTO: 10.3 %
MAGNESIUM SERPL-MCNC: 2 MG/DL (ref 1.6–2.6)
MCH RBC QN AUTO: 28.4 PG (ref 26–34)
MCHC RBC AUTO-ENTMCNC: 33.5 G/DL (ref 31–37)
MCV RBC AUTO: 84.7 FL (ref 80–100)
MONOCYTES # BLD AUTO: 0.77 X10(3) UL (ref 0.1–1)
MONOCYTES NFR BLD AUTO: 8.7 %
NEUTROPHILS # BLD AUTO: 7.14 X10 (3) UL (ref 1.5–7.7)
NEUTROPHILS # BLD AUTO: 7.14 X10(3) UL (ref 1.5–7.7)
NEUTROPHILS NFR BLD AUTO: 80.2 %
OSMOLALITY SERPL CALC.SUM OF ELEC: 297 MOSM/KG (ref 275–295)
PHOSPHATE SERPL-MCNC: 3.1 MG/DL (ref 2.4–5.1)
PLATELET # BLD AUTO: 117 10(3)UL (ref 150–450)
PLATELETS.RETICULATED NFR BLD AUTO: 3.3 % (ref 0–7)
POTASSIUM SERPL-SCNC: 3.4 MMOL/L (ref 3.5–5.1)
RBC # BLD AUTO: 2.61 X10(6)UL (ref 3.8–5.8)
SODIUM SERPL-SCNC: 141 MMOL/L (ref 136–145)
WBC # BLD AUTO: 8.9 X10(3) UL (ref 4–11)

## 2025-06-15 PROCEDURE — 99233 SBSQ HOSP IP/OBS HIGH 50: CPT | Performed by: INTERNAL MEDICINE

## 2025-06-15 PROCEDURE — 99233 SBSQ HOSP IP/OBS HIGH 50: CPT | Performed by: HOSPITALIST

## 2025-06-15 NOTE — PLAN OF CARE
Problem: PAIN - ADULT  Goal: Verbalizes/displays adequate comfort level or patient's stated pain goal  Description: INTERVENTIONS:  - Encourage pt to monitor pain and request assistance  - Assess pain using appropriate pain scale  - Administer analgesics based on type and severity of pain and evaluate response  - Implement non-pharmacological measures as appropriate and evaluate response  - Consider cultural and social influences on pain and pain management  - Manage/alleviate anxiety  - Utilize distraction and/or relaxation techniques  - Monitor for opioid side effects  - Notify MD/LIP if interventions unsuccessful or patient reports new pain  - Anticipate increased pain with activity and pre-medicate as appropriate  Outcome: Progressing     Problem: DISCHARGE PLANNING  Goal: Discharge to home or other facility with appropriate resources  Description: INTERVENTIONS:  - Identify barriers to discharge w/pt and caregiver  - Include patient/family/discharge partner in discharge planning  - Arrange for needed discharge resources and transportation as appropriate  - Identify discharge learning needs (meds, wound care, etc)  - Arrange for interpreters to assist at discharge as needed  - Consider post-discharge preferences of patient/family/discharge partner  - Complete POLST form as appropriate  - Assess patient's ability to be responsible for managing their own health  - Refer to Case Management Department for coordinating discharge planning if the patient needs post-hospital services based on physician/LIP order or complex needs related to functional status, cognitive ability or social support system  Outcome: Progressing     Problem: GASTROINTESTINAL - ADULT  Goal: Minimal or absence of nausea and vomiting  Description: INTERVENTIONS:  - Maintain adequate hydration with IV or PO as ordered and tolerated  - Nasogastric tube to low intermittent suction as ordered  - Evaluate effectiveness of ordered antiemetic  medications  - Provide nonpharmacologic comfort measures as appropriate  - Advance diet as tolerated, if ordered  - Obtain nutritional consult as needed  - Evaluate fluid balance  Outcome: Progressing  Goal: Maintains or returns to baseline bowel function  Description: INTERVENTIONS:  - Assess bowel function  - Maintain adequate hydration with IV or PO as ordered and tolerated  - Evaluate effectiveness of GI medications  - Encourage mobilization and activity  - Obtain nutritional consult as needed  - Establish a toileting routine/schedule  - Consider collaborating with pharmacy to review patient's medication profile  Outcome: Progressing     Patient is alert and oriented, primarily Italian speaking but able to understand simple questions.  Patient is currently in room air, denies shortness of breathing nor chest pain.  Patient turns and repositions with min assist.  Patient still has the NGT to his left nostrils in to LIS.  Patient has a TPN running on his right upper arm PICC.  Uses the primofit suction catheter for urinary incontinence.  Denies pain at this time.  Patient plans to go home when stable.

## 2025-06-15 NOTE — PROGRESS NOTES
[facility]  Progress Note     Arnaldo Gutierrez  : 4/15/1945    Status: Inpatient  Day #: 10    Attending: Boy Mccabe MD  PCP: PHYSICIAN NONSTAFF     Assessment and Plan:    Diffuse large B-cell lymphoma  Large Bowel obstruction 2/2 Proximal transverse colonic mass  - CT A/P reviewed  - GI and general surgery consulted  -  s/p colonoscopy with metallic colon stent  - 6/10 - s/p laparoscopic transverse colon resection, small bowel resection x2, resection of tumor nodule on small bowel. Patient appeared to have metastatic disease per Dr. Randall.   - NGT in place post-op  - pathology - transverse colon - diffuse large B-cell lymphoma. Separate segment with tubular adenoma. Small intestine with extensive involvement of diffuse large B-cell lymphoma  - oncology consulted, appreciate recs. Plans for chemo with curative intent in the future once bowel issues stabilized.  - cont TPN  - on zosyn IV empirically. Duration per general surgery.  - await ROBF    Acute postoperative blood loss anemia with hypotension  - EBL from surgery 600ml  - s/p 2 units PRBC   - hgb drifting down a bit, cont monitor    Hypotension - unclear reason  - weaned off pressors    DM2  - ISS     Other:  H/o HTN   HL  BPH - flomax when able to take PO    Dietitian Malnutrition Assessment    Evaluation for Malnutrition: Criteria for severe malnutrition diagnosis- chronic illness related to   Wt loss greater than 10% in 6 months., Energy intake less than 75% for greater than 1 month.               RD Malnutrition Care Plan:      Body Fat/Muscle Mass:          Physician Assessment     Patient has a diagnosis of severe malnutrition       DVT Mechanical Prophylaxis:   SCDs,    DVT Pharmacologic Prophylaxis   Medication   None               Subjective:      Initial Chief Complaint:  abd discomfort, nausea, weight loss    Abd pain controlled. No flatus or BM. Some coughing today    10 point ROS completed and was negative, except for  pertinent positive and negatives stated in subjective.      Objective:      Temp:  [97.4 °F (36.3 °C)-99 °F (37.2 °C)] 98.8 °F (37.1 °C)  Pulse:  [77-87] 79  Resp:  [13-22] 19  BP: ()/(49-64) 104/59  SpO2:  [90 %-96 %] 94 %  General:  Alert, no distress  HEENT:  Normocephalic, atraumatic  Cardiac:  Regular rate, regular rhythm  Pulmonary:  Clear to auscultation bilaterally, respirations unlabored  Gastrointestinal:  hypoactive BS  Musculoskeletal:  No joint swelling  Extremities:  No edema, no cyanosis, no clubbing  Neurologic:  nonfocal  Psychiatric:  Normal affect, calm and appropriate    Intake/Output Summary (Last 24 hours) at 6/15/2025 0920  Last data filed at 6/14/2025 1800  Gross per 24 hour   Intake 1330 ml   Output 470 ml   Net 860 ml         Recent Labs   Lab 06/13/25  0459 06/13/25  1250 06/14/25  0605 06/14/25  1308 06/14/25  1832 06/15/25  0049 06/15/25  0455   WBC 21.7*  --  16.4*  --   --   --  8.9   HGB 9.1*   < > 9.0*  9.0*   < > 8.1* 7.7* 7.4*  7.4*   HCT 26.8*  --  26.6*  --   --   --  22.1*   .0*  --  130.0*  --   --   --  117.0*   RBC 2.94*  --  3.18*  --   --   --  2.61*   MCV 91.2  --  83.6  --   --   --  84.7   MCH 31.0  --  28.3  --   --   --  28.4   MCHC 34.0  --  33.8  --   --   --  33.5   RDW 18.6*  --  18.1*  --   --   --  18.4*   NEPRELIM 19.84*  --  14.54*  --   --   --  7.14    < > = values in this interval not displayed.     Recent Labs   Lab 06/13/25  0725 06/14/25  0605 06/15/25  0455   BUN 13 14 18   CREATSERUM 0.85 0.72 0.66*   CA 7.4* 7.7* 7.5*   ALB 2.4*  --   --     137 141   K 3.2* 3.5 3.4*    102 105   CO2 24.0 28.0 30.0   * 245* 161*   MG 1.9 1.8 2.0   PHOS 2.2* 2.0* 3.1   BILT 0.4  --   --    AST 13  --   --    ALT 14  --   --    ALKPHO 55  --   --    TP 4.2*  --   --        XR ABDOMEN (1 VIEW) (CPT=74018)  Result Date: 6/13/2025  CONCLUSION:  1. Postoperative changes from a recent transverse colonic resection and partial small bowel  resection including persistent pneumoperitoneum. 2. Nonobstructive bowel gas pattern.     Dictated by (CST): Chuy Schmid MD on 6/13/2025 at 11:38 AM     Finalized by (CST): Chuy Schmid MD on 6/13/2025 at 11:41 AM              Medications:  Scheduled Medications[1]   PRN Meds: PRN Medications[2]    Supplementary Documentation:   DVT Mechanical Prophylaxis:   SCDs,    DVT Pharmacologic Prophylaxis   Medication   None                Code Status: Not on file  Muñoz: External urinary catheter in place  Muñoz Duration (in days): 2  Central line:    ANNELISE:         **Certification      PHYSICIAN Certification of Need for Inpatient Hospitalization - Initial Certification    Patient will require inpatient services that will reasonably be expected to span two midnight's based on the clinical documentation in H+P.   Based on patients current state of illness, I anticipate that, after discharge, patient will require TBD.        Boy Mccabe MD         [1]    potassium chloride  40 mEq Intravenous Once    pantoprazole  40 mg Intravenous Daily    piperacillin-tazobactam  4.5 g Intravenous Q8H    insulin regular human  1-11 Units Subcutaneous Q6H   [2]   dextrose 10%    ondansetron    acetaminophen    oxyCODONE **OR** oxyCODONE    HYDROmorphone **OR** HYDROmorphone    acetaminophen    glucose **OR** glucose **OR** [DISCONTINUED] glucose-vitamin C **OR** dextrose **OR** [DISCONTINUED] glucose **OR** [DISCONTINUED] glucose **OR** [DISCONTINUED] glucose-vitamin C

## 2025-06-15 NOTE — PLAN OF CARE
Patient axox3. 3L NC at night for low o2 while sleeping.   Problem: Patient Centered Care  Goal: Patient preferences are identified and integrated in the patient's plan of care  Description: Interventions:  - What would you like us to know as we care for you? I am from home.   - Provide timely, complete, and accurate information to patient/family  - Incorporate patient and family knowledge, values, beliefs, and cultural backgrounds into the planning and delivery of care  - Encourage patient/family to participate in care and decision-making at the level they choose  - Honor patient and family perspectives and choices  Outcome: Progressing     Problem: Diabetes/Glucose Control  Goal: Glucose maintained within prescribed range  Description: INTERVENTIONS:  - Monitor Blood Glucose as ordered  - Assess for signs and symptoms of hyperglycemia and hypoglycemia  - Administer ordered medications to maintain glucose within target range  - Assess barriers to adequate nutritional intake and initiate nutrition consult as needed  - Instruct patient on self management of diabetes  Outcome: Progressing     Problem: Patient/Family Goals  Goal: Patient/Family Long Term Goal  Description: Patient's Long Term Goal: Discharge home     Interventions:  - Monitor vitals  - Monitor labs  - IVF  - Glucose monitoring ACHS  - Clear liquid diet  - Scheduled miralax  - GI on consult  - See additional Care Plan goals for specific interventions  Outcome: Progressing  Goal: Patient/Family Short Term Goal  Description: Patient's Short Term Goal: Improve bowel function    Interventions:   - Full liquid diet  - GI on consult  - Scheduled miralax  - See additional Care Plan goals for specific interventions  Outcome: Progressing     Problem: PAIN - ADULT  Goal: Verbalizes/displays adequate comfort level or patient's stated pain goal  Description: INTERVENTIONS:  - Encourage pt to monitor pain and request assistance  - Assess pain using appropriate pain  scale  - Administer analgesics based on type and severity of pain and evaluate response  - Implement non-pharmacological measures as appropriate and evaluate response  - Consider cultural and social influences on pain and pain management  - Manage/alleviate anxiety  - Utilize distraction and/or relaxation techniques  - Monitor for opioid side effects  - Notify MD/LIP if interventions unsuccessful or patient reports new pain  - Anticipate increased pain with activity and pre-medicate as appropriate  Outcome: Progressing     Problem: RISK FOR INFECTION - ADULT  Goal: Absence of fever/infection during anticipated neutropenic period  Description: INTERVENTIONS  - Monitor WBC  - Administer growth factors as ordered  - Implement neutropenic guidelines  Outcome: Progressing     Problem: SAFETY ADULT - FALL  Goal: Free from fall injury  Description: INTERVENTIONS:  - Assess pt frequently for physical needs  - Identify cognitive and physical deficits and behaviors that affect risk of falls.  - [place] fall precautions as indicated by assessment.  - Educate pt/family on patient safety including physical limitations  - Instruct pt to call for assistance with activity based on assessment  - Modify environment to reduce risk of injury  - Provide assistive devices as appropriate  - Consider OT/PT consult to assist with strengthening/mobility  - Encourage toileting schedule  Outcome: Progressing     Problem: DISCHARGE PLANNING  Goal: Discharge to home or other facility with appropriate resources  Description: INTERVENTIONS:  - Identify barriers to discharge w/pt and caregiver  - Include patient/family/discharge partner in discharge planning  - Arrange for needed discharge resources and transportation as appropriate  - Identify discharge learning needs (meds, wound care, etc)  - Arrange for interpreters to assist at discharge as needed  - Consider post-discharge preferences of patient/family/discharge partner  - Complete POLST  form as appropriate  - Assess patient's ability to be responsible for managing their own health  - Refer to Case Management Department for coordinating discharge planning if the patient needs post-hospital services based on physician/LIP order or complex needs related to functional status, cognitive ability or social support system  Outcome: Progressing     Problem: Altered Communication/Language Barrier  Goal: Patient/Family is able to understand and participate in their care  Description: Interventions:  - Assess communication ability and preferred communication style  - Implement communication aides and strategies  - Use visual cues when possible  - Listen attentively, be patient, do not interrupt  - Minimize distractions  - Allow time for understanding and response  - Establish method for patient to ask for assistance (call light)  - Provide an  as needed  - Communicate barriers and strategies to overcome with those who interact with patient  Outcome: Progressing     Problem: GASTROINTESTINAL - ADULT  Goal: Minimal or absence of nausea and vomiting  Description: INTERVENTIONS:  - Maintain adequate hydration with IV or PO as ordered and tolerated  - Nasogastric tube to low intermittent suction as ordered  - Evaluate effectiveness of ordered antiemetic medications  - Provide nonpharmacologic comfort measures as appropriate  - Advance diet as tolerated, if ordered  - Obtain nutritional consult as needed  - Evaluate fluid balance  Outcome: Progressing  Goal: Maintains or returns to baseline bowel function  Description: INTERVENTIONS:  - Assess bowel function  - Maintain adequate hydration with IV or PO as ordered and tolerated  - Evaluate effectiveness of GI medications  - Encourage mobilization and activity  - Obtain nutritional consult as needed  - Establish a toileting routine/schedule  - Consider collaborating with pharmacy to review patient's medication profile  Outcome: Progressing

## 2025-06-15 NOTE — PROGRESS NOTES
[facility]  part of [facility]     Progress Note    Arnaldo Gutierrez Patient Status:  Inpatient    4/15/1945 MRN [id number]   Location [facility] 2W/SW Attending Boy Mccabe MD   Hosp Day # 10 PCP PHYSICIAN NONSTAFF       Subjective:   Patient seen and examined.  Admits to some postoperative pain is gradually improved.  Weaned off pressor support.    Objective:   Blood pressure 94/52, pulse 75, temperature 97.9 °F (36.6 °C), temperature source Temporal, resp. rate 20, height 5' 11\" (1.803 m), weight 161 lb 6 oz (73.2 kg), SpO2 93%.  Intake/Output:   Last 3 shifts: I/O last 3 completed shifts:  In: 3057.9 [I.V.:1710.3]  Out: 2420 [Urine:2000; Emesis/NG output:420]   This shift: I/O this shift:  In: 200 [IV PIGGYBACK:200]  Out: -      Vent Settings:      Hemodynamic parameters (last 24 hours):      Scheduled Meds: Current Hospital Medications[1]    Continuous Infusions: Medication Infusions[2]    Physical Exam  Constitutional: no acute distress  Eyes: PERRL  ENT: nares pateint  Neck: supple, no JVD  Cardio: RRR, S1 S2  Respiratory: clear to auscultation bilaterally, no wheezing, rales, rhonchi, crackles  GI: abdomen soft, mild tenderness to palpation, abdominal dressing in place  Extremities: no clubbing, cyanosis, edema  Neurologic: no gross motor deficits  Skin: warm, dry      Results:     Lab Results   Component Value Date    WBC 8.9 06/15/2025    HGB 7.0 06/15/2025    HCT 22.1 06/15/2025    .0 06/15/2025    CREATSERUM 0.66 06/15/2025    BUN 18 06/15/2025     06/15/2025    K 3.4 06/15/2025     06/15/2025    CO2 30.0 06/15/2025     06/15/2025    CA 7.5 06/15/2025    MG 2.0 06/15/2025    PHOS 3.1 06/15/2025       No results found.            Assessment   1.  POD #4 status post transverse colon resection, small bowel resection and resection of tumor on small bowel  2.  Shock state  3.  Lactic acidosis  4.  Diabetes mellitus  5.  Hyperlipidemia  6.  BPH  7.   Hyponatremia  8.  Diffuse large B-cell lymphoma     Plan   -Patient presented with evidence of weight loss abdominal discomfort nausea found to have evidence of obstruction secondary to mass seen now status post transverse colon resection, small bowel resection and resection of tumor on small bowel on 6/10/2025.  - Recommendations per general surgery.  Closely monitor hemoglobin.  Transfuse for hemoglobin below 7  - Pain control  - Pathology with evidence of diffuse large B-cell lymphoma  - Weaned off pressor support  - Empiric antibiotic therapy with Zosyn at this time  -Continue TPN  - DVT prophylaxis: KIELs    Wilner Parrish,   Pulmonary Critical Care Medicine  [facility]          [1]   Current Facility-Administered Medications   Medication Dose Route Frequency    adult 3 in 1 TPN   Intravenous Continuous TPN    phenol (Chloraseptic) 1.4 % oral liquid spray   Oral Q2H PRN    adult 3 in 1 TPN   Intravenous Continuous TPN    dextrose 10% infusion (TPN no rate)   Intravenous Continuous PRN    pantoprazole (Protonix) 40 mg in sodium chloride 0.9% PF 10 mL IV push  40 mg Intravenous Daily    piperacillin-tazobactam (Zosyn) 4.5 g in dextrose 5% 100 mL IVPB-ADDV  4.5 g Intravenous Q8H    ondansetron (Zofran) 4 MG/2ML injection 4 mg  4 mg Intravenous Q4H PRN    acetaminophen (Ofirmev) 10 mg/mL infusion premix 1,000 mg  1,000 mg Intravenous Q6H PRN    insulin regular human (Novolin R, Humulin R) 100 UNIT/ML injection 1-11 Units  1-11 Units Subcutaneous Q6H    oxyCODONE immediate release tab 2.5 mg  2.5 mg Oral Q4H PRN    Or    oxyCODONE immediate release tab 5 mg  5 mg Oral Q4H PRN    HYDROmorphone (Dilaudid) 1 MG/ML injection 0.2 mg  0.2 mg Intravenous Q2H PRN    Or    HYDROmorphone (Dilaudid) 1 MG/ML injection 0.4 mg  0.4 mg Intravenous Q2H PRN    acetaminophen (Tylenol Extra Strength) tab 500 mg  500 mg Oral Q4H PRN    glucose (Dex4) 15 GM/59ML oral liquid 15 g  15 g Oral Q15 Min PRN    Or    glucose (Glutose)  40% oral gel 15 g  15 g Oral Q15 Min PRN    Or    dextrose 50% injection 50 mL  50 mL Intravenous Q15 Min PRN   [2]    adult 3 in 1 TPN      adult 3 in 1 TPN 83.3 mL/hr at 06/14/25 2051    dextrose 10%

## 2025-06-15 NOTE — PROGRESS NOTES
[facility]  Progress Note    Arnaldo Gutierrez Patient Status:  Inpatient    4/15/1945 MRN [id number]   Location [facility] 2W/SW Attending Boy Mccabe MD   Hosp Day # 10 PCP PHYSICIAN NONSTAFF     Subjective:  Sitting up in chair. No BM or flatus. Pain well controlled. Off pressors    Objective/Physical Exam:  General: Alert, orientated x3.  Cooperative.  No apparent distress.  Vital Signs:  Blood pressure 104/59, pulse 79, temperature 98.8 °F (37.1 °C), temperature source Temporal, resp. rate 19, height 5' 11\" (1.803 m), weight 161 lb 6 oz (73.2 kg), SpO2 94%.  Wt Readings from Last 3 Encounters:   25 161 lb 6 oz (73.2 kg)   25 148 lb (67.1 kg)   25 160 lb (72.6 kg)     Lungs: No respiratory distress.  Cardiac: Regular rate and rhythm.   Abdomen:  Soft, not distended, minimally tender, with no rebound or guarding.  No peritoneal signs.   Extremities:  No lower extremity edema noted.    Incision: Clean, dry, intact, no erythema. Midline and lap site incisions with staples in place.     Intake/Output:    Intake/Output Summary (Last 24 hours) at 6/15/2025 1027  Last data filed at 2025 1800  Gross per 24 hour   Intake 1330 ml   Output 470 ml   Net 860 ml     I/O last 3 completed shifts:  In: 3057.9 [I.V.:1710.3]  Out: 2420 [Urine:2000; Emesis/NG output:420]  No intake/output data recorded.    Medications: Scheduled Medications[1]    Labs:  Lab Results   Component Value Date    WBC 8.9 06/15/2025    HGB 7.4 06/15/2025    HGB 7.4 06/15/2025    HCT 22.1 06/15/2025    .0 06/15/2025     Lab Results   Component Value Date     06/15/2025    K 3.4 06/15/2025     06/15/2025    CO2 30.0 06/15/2025    BUN 18 06/15/2025    CREATSERUM 0.66 06/15/2025     06/15/2025    CA 7.5 06/15/2025     No results found for: \"PT\", \"INR\"      Assessment  Problem List[2]    Large bowel obstruction s/p laparoscopic assisted transverse colon resection, small bowel resection  X2, resection of tubulovillous small bowel  Pathology c/w diffuse large B-cell lymphoma, separate segment of colon with tubular adenoma.   Leukocytosis resolved   Anemia, hemoglobin stable 9.0    Plan:  Keep n.p.o., okay for ice chips.  Continue TPN as per diet.  Continue NGT to LIS until signs of bowel function return.  Multimodal pain control with IV Tylenol, Dilaudid as needed  Continue care as per CCU  Pulmonology and oncology following, appreciate recommendations  Ambulate and up to chair as able  DVT prophylaxis with SCDs  GI prophylaxis with protonix IV    Quality:  DVT Mechanical Prophylaxis:   SCDs,    DVT Pharmacologic Prophylaxis   Medication   None                Code Status: Not on file  Muñoz: External urinary catheter in place  Muñoz Duration (in days):   Central line:    ANNELISE:       Mayela Hood MD  [facility]   [address] [place] [address]  p [phone]              [1]    potassium chloride  40 mEq Intravenous Once    pantoprazole  40 mg Intravenous Daily    piperacillin-tazobactam  4.5 g Intravenous Q8H    insulin regular human  1-11 Units Subcutaneous Q6H   [2]   Patient Active Problem List  Diagnosis    BPH (benign prostatic hyperplasia)    Chronic low back pain    Chronic pain of both knees    Depression with anxiety    Generalized osteoarthritis    Hyperlipidemia    Type 2 diabetes mellitus without complication, without long-term current use of insulin (HCC)    Essential hypertension    Bipolar I disorder, single manic episode (HCC)    Large bowel obstruction (HCC)    Colonic mass

## 2025-06-16 PROBLEM — C83.398 DIFFUSE LARGE B-CELL LYMPHOMA OF SOLID ORGAN EXCLUDING SPLEEN: Status: ACTIVE | Noted: 2025-06-16

## 2025-06-16 LAB
ANION GAP SERPL CALC-SCNC: 6 MMOL/L (ref 0–18)
BUN BLD-MCNC: 21 MG/DL (ref 9–23)
BUN/CREAT SERPL: 30.9 (ref 10–20)
CALCIUM BLD-MCNC: 7.9 MG/DL (ref 8.7–10.4)
CHLORIDE SERPL-SCNC: 107 MMOL/L (ref 98–112)
CO2 SERPL-SCNC: 27 MMOL/L (ref 21–32)
CREAT BLD-MCNC: 0.68 MG/DL (ref 0.7–1.3)
EGFRCR SERPLBLD CKD-EPI 2021: 95 ML/MIN/1.73M2 (ref 60–?)
GLUCOSE BLD-MCNC: 168 MG/DL (ref 70–99)
GLUCOSE BLDC GLUCOMTR-MCNC: 125 MG/DL (ref 70–99)
GLUCOSE BLDC GLUCOMTR-MCNC: 192 MG/DL (ref 70–99)
GLUCOSE BLDC GLUCOMTR-MCNC: 195 MG/DL (ref 70–99)
GLUCOSE BLDC GLUCOMTR-MCNC: 220 MG/DL (ref 70–99)
HGB BLD-MCNC: 7 G/DL (ref 13–17.5)
HGB BLD-MCNC: 7.1 G/DL (ref 13–17.5)
HGB BLD-MCNC: 7.1 G/DL (ref 13–17.5)
HGB BLD-MCNC: 7.6 G/DL (ref 13–17.5)
MAGNESIUM SERPL-MCNC: 1.9 MG/DL (ref 1.6–2.6)
OSMOLALITY SERPL CALC.SUM OF ELEC: 297 MOSM/KG (ref 275–295)
PHOSPHATE SERPL-MCNC: 3.5 MG/DL (ref 2.4–5.1)
POTASSIUM SERPL-SCNC: 4.1 MMOL/L (ref 3.5–5.1)
POTASSIUM SERPL-SCNC: 4.1 MMOL/L (ref 3.5–5.1)
SODIUM SERPL-SCNC: 140 MMOL/L (ref 136–145)

## 2025-06-16 PROCEDURE — 99233 SBSQ HOSP IP/OBS HIGH 50: CPT | Performed by: HOSPITALIST

## 2025-06-16 PROCEDURE — 99232 SBSQ HOSP IP/OBS MODERATE 35: CPT | Performed by: PHYSICIAN ASSISTANT

## 2025-06-16 PROCEDURE — 99233 SBSQ HOSP IP/OBS HIGH 50: CPT | Performed by: STUDENT IN AN ORGANIZED HEALTH CARE EDUCATION/TRAINING PROGRAM

## 2025-06-16 RX ORDER — FOLIC ACID 1 MG/1
1 TABLET ORAL DAILY
Status: DISCONTINUED | OUTPATIENT
Start: 2025-06-17 | End: 2025-06-24

## 2025-06-16 NOTE — PROGRESS NOTES
[facility]  part of [facility]    Progress Note    Arnaldo Gutierrez Patient Status:  Inpatient    4/15/1945 MRN [id number]   Location [facility] 4W/SW/SE Attending Boy Mccabe MD   Hosp Day # 11 PCP PHYSICIAN NONSTAFF     Subjective:  Diarrhea   feels abd pain    Objective/Physical Exam:  General: Alert, orientated x3.  Cooperative.  No apparent distress.  Vital Signs:  Blood pressure 103/57, pulse 64, temperature 97.5 °F (36.4 °C), temperature source Oral, resp. rate 20, height 5' 11\" (1.803 m), weight 163 lb 4 oz (74 kg), SpO2 98%.  ABD   wound ok    Lab Results   Component Value Date    WBC 8.9 06/15/2025    HGB 7.6 (L) 2025    HCT 22.1 (L) 06/15/2025    .0 (L) 06/15/2025    CREATSERUM 0.68 (L) 2025    BUN 21 2025     2025    K 4.1 2025    K 4.1 2025     2025    CO2 27.0 2025     (H) 2025    CA 7.9 (L) 2025    ALB 2.4 (L) 2025    ALKPHO 55 2025    BILT 0.4 2025    TP 4.2 (L) 2025    AST 13 2025    ALT 14 2025    TSH 2.417 2025    LIP 22 2025    MG 1.9 2025    PHOS 3.5 2025    B12 >2,000 (H) 2025       Imaging:  XR ABDOMEN (1 VIEW) (CPT=74018)  Result Date: 2025  PROCEDURE: XR ABDOMEN (1 VIEW) (CPT=74018)  COMPARISON: [facility], XR CHEST AP PORTABLE (CPT=71045), 2025, 8:58 AM.  [facility], CT ABDOMEN + PELVIS (CONTRAST ONLY) (CPT=74177), 2025, 5:22 PM.  [facility], XR ABDOMEN (1 VIEW) (CPT=74018), 6/10/2025, 6:07 PM.  INDICATIONS: History of presumed colon cancer status post transverse colonic resection and partial small bowel resection on 06/10/2025.  TECHNIQUE:   Single view.   FINDINGS:  BOWEL GAS PATTERN: An enteric tube tip in distal side hole project over the left upper quadrant the abdomen in the expected location of the stomach.  There is bowel gas throughout the  abdomen, including the rectum.  No dilated gas-filled loops of bowel are seen to suggest obstruction.  Anastomotic staple lines are again noted in the right upper and left lower quadrants. SOFT TISSUES: Normal.  No masses or organomegaly.  CALCIFICATIONS: None significant. BONES: The osseous structures are unchanged. OTHER: Vertically oriented skin staples again project over the central abdomen.  Additional skin staples project over both lower quadrants and pelvis.  There is ill-defined lucency throughout the abdomen, which is consistent with the pneumoperitoneum visible on the preceding chest x-ray.         CONCLUSION:  1. Postoperative changes from a recent transverse colonic resection and partial small bowel resection including persistent pneumoperitoneum. 2. Nonobstructive bowel gas pattern.     Dictated by (CST): Chuy Schmid MD on 6/13/2025 at 11:38 AM     Finalized by (CST): Chuy Schmid MD on 6/13/2025 at 11:41 AM          XR CHEST AP PORTABLE  (CPT=71045)  Result Date: 6/13/2025  PROCEDURE: XR CHEST AP PORTABLE  (CPT=71045) TIME: 8:59.   COMPARISON: [facility], CT ABDOMEN + PELVIS (CONTRAST ONLY) (CPT=74177), 6/05/2025, 5:22 PM.  [facility], XR CHEST AP PORTABLE (CPT=71045), 6/13/2025, 6:40 AM.  INDICATIONS: Post NG advancement.  Metastatic colon cancer status post transverse colonic resection on 06/10/2025.  Acute postoperative blood loss with hypotension.  TECHNIQUE:   Single view.   FINDINGS:  CARDIAC/VASC: The cardiac silhouette is not enlarged.  There is calcification of the aortic knob.  Unremarkable pulmonary vasculature.  MEDIAST/KRYSTA:   No visible mass or adenopathy. LUNGS/PLEURA: The lungs are again hypoinflated.  Streaky opacities at the lung bases with elevation of the right hemidiaphragm are all unchanged.  No pleural effusion or pneumothorax. BONES: There are mild spondylotic changes in the thoracic spine. OTHER: An enteric tube has been advanced.   The tip in distal side hole project over the gastric bubble in the left upper quadrant of the abdomen.  A right-sided PICC tip projects over the mid SVC.  Free air is again noted beneath the right hemidiaphragm,  likely related to the recent surgery.         CONCLUSION:  1. Enteric tube has been advanced with tip and distal side hole now projecting over the gastric bubble, in customary position. 2. Expiratory chest with stable atelectasis at both lung bases. 3. Persistent pneumoperitoneum, likely related to the recent abdominal surgery.    Dictated by (CST): Chuy Schmid MD on 6/13/2025 at 9:19 AM     Finalized by (CST): Chuy Schmid MD on 6/13/2025 at 9:21 AM          XR CHEST AP PORTABLE  (CPT=71045)  Result Date: 6/13/2025  PROCEDURE: XR CHEST AP PORTABLE  (CPT=71045) TIME: 6:43.   COMPARISON: [facility], XR ABDOMEN (1 VIEW) (CPT=74018), 6/10/2025, 6:07 PM.  [facility], CT ABDOMEN + PELVIS (CONTRAST ONLY) (CPT=74177), 6/05/2025, 5:22 PM.  INDICATIONS: Hypoxia.  Suspected metastatic colon cancer status post transverse colonic resection on 06/10/2025.  Acute postoperative blood loss with hypotension.  TECHNIQUE:   Single view.   FINDINGS:  CARDIAC/VASC: Normal.  No cardiac silhouette abnormality or cardiomegaly.  Unremarkable pulmonary vasculature.  MEDIAST/KRYSTA:   No visible mass or adenopathy. LUNGS/PLEURA: The lungs are hypoinflated.  Streaky opacities have developed at the lung bases with elevation of the right hemidiaphragm.  No pleural effusion or pneumothorax. BONES: There are mild spondylotic changes in the thoracic spine. OTHER: An enteric tube tip again projects over the gastric bubble.  The distal side hole projects over the expected location of the distal esophagus.  A right-sided PICC tip projects over the mid SVC.  Free air is noted beneath the right hemidiaphragm, likely related to the recent surgery.         CONCLUSION:  1. Enteric tube tip projects over  the stomach, although the distal side hole projects over the distal esophagus.  Consider advancing approximately 7.0 cm into the stomach. 2. Expiratory chest with the development of atelectasis at both lung bases. 3. Pneumoperitoneum, likely related to the recent abdominal surgery.    Dictated by (CST): Chuy Schmid MD on 6/13/2025 at 7:16 AM     Finalized by (CST): Chuy Schmid MD on 6/13/2025 at 7:19 AM          XR ABDOMEN (1 VIEW) (CPT=74018)  Result Date: 6/11/2025  PROCEDURE: XR ABDOMEN (1 VIEW) (CPT=74018)  COMPARISON: [facility], CT ABDOMEN + PELVIS (CONTRAST ONLY) (CPT=74177), 6/05/2025, 5:22 PM.  INDICATIONS: Partially obstructing colon tumor.  Opene tray. image taken due to protocol. Evaluate for foreign body.  TECHNIQUE:   Single view.           CONCLUSION:  1. No retained unexpected surgical foreign body. 2. Postsurgical changes with anastomotic staple lines and skin staples. 3. Catheter in the bladder.    Dictated by (CST): Chong Valdez MD on 6/11/2025 at 1:22 AM     Finalized by (CST): Chong Valdez MD on 6/11/2025 at 1:28 AM          XR FLUOROSCOPY C-ARM TIME LESS THAN 1 HOUR (CPT=76000)  Result Date: 6/6/2025  PROCEDURE: XR FLUORO PHYSICIAN TIME< 1 HOUR (CPT=76000)  COMPARISON: [facility], CT ABDOMEN + PELVIS (CONTRAST ONLY) (CPT=74177), 6/05/2025, 5:22 PM.  INDICATIONS: COLONOSCOPY with colonic stent placement under fluoroscopy.  TECHNIQUE:   FLUOROSCOPY IMAGES OBTAINED:  2 FLUOROSCOPY TIME:  173.8 seconds RADIATION DOSE (Dose Area Product):  [id number]*m^2  FINDINGS:   Intraoperative exam was performed.  There has been placement of a proximal colonic stent.         CONCLUSION: Intraoperative exam. Please see operative report for further details.     Dictated by (CST): Frandy Starkey MD on 6/06/2025 at 8:55 PM     Finalized by (CST): Frandy Starkey MD on 6/06/2025 at 8:56 PM          CT ABDOMEN+PELVIS(CONTRAST ONLY)(CPT=74177)  Result Date:  6/5/2025  PROCEDURE: CT ABDOMEN + PELVIS (CONTRAST ONLY) (CPT=74177)  COMPARISON: [facility] ABDOMEN LIMITED (CPT=76705), 9/19/2024, 9:36 AM.  INDICATIONS: abd pain  TECHNIQUE: Multidetector CT images of the abdomen and pelvis were obtained with non-ionic intravenous contrast material. Automated exposure control for dose reduction was used. Adjustment of the mA and/or kV was done based on the patient's size. Iterative reconstruction technique for dose reduction was employed.  FINDINGS: LUNG BASES: The heart is normal in size.  Partially imaged coronary artery and aortic annular calcification.  Subpleural reticular opacities at the periphery of the right lower lobe. There is dependent subsegmental atelectasis bilaterally. LIVER: No enlargement, atrophy, abnormal density, or significant focal lesion is identified.  BILIARY: The gallbladder is present. PANCREAS: No lesion, fluid collection, ductal dilatation, or atrophy.  Small periampullary duodenal diverticulum. SPLEEN: No enlargement.  Calcified granulomas in the spleen. ADRENALS:   No defined mass or abnormal enlargement.  KIDNEYS:   Symmetric enhancement is seen without evidence of hydronephrosis or underlying solid masses.  Simple-appearing 2.4 cm right posterior interpolar renal cyst.  Tiny 4 mm nonobstructing right interpolar renal calculus. GI/MESENTERY:  There is no evidence of bowel obstruction.  Mild to moderate gastric distention noted.  Approximate 4 cm constricting mass at the proximal transverse colon (just beyond the hepatic flexure with marked upstream ascending colonic distention;  there is also mild-to-moderate distal colonic fecal burden; there is fat stranding surrounding the aforementioned constricting mass with adjacent subcentimeter short axis right upper quadrant pericolonic lymph nodes.  Normal appendix. URINARY BLADDER: Well distended urinary bladder, which demonstrates mild wall thickening. PELVIC NODES: No  lymphadenopathy.   PELVIC ORGANS: No visible mass. Pelvic organs appropriate for patient age.  VASCULATURE:   No aneurysm is detected.  Mild atherosclerosis. RETROPERITONEUM: Borderline enlarged 1 cm interaortocaval retroperitoneal lymph node with other prominent but nonenlarged retroperitoneal lymph nodes that measure less than 1 cm short axis. BONES:   Demineralization with levoscoliosis of the lumbar spine and lower lumbar spine degeneration.  Probable benign hemangioma in the L2 vertebral body. ABDOMINAL WALL: No mass or hernia is perceived. OTHER: No free air or fluid is seen in the abdomen or pelvis.          CONCLUSION:  1. Suspicious approximate 4 cm segment of constricting masslike wall thickening at the proximal transverse colon with large upstream colonic fecal burden.  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  Stellate morphology stranding and nodularity in the right upper quadrant mesentery adjacent to the constricting mass probably relates to direct extension of disease.  There are adjacent nonenlarged right upper quadrant mesenteric lymph nodes, which could be reactive or metastatic.  Gastroenterology evaluation and colonoscopy correlation is suggested.  No free intraperitoneal air or well-defined/drainable intra-abdominal collection. 2. Single mildly enlarged interaortocaval retroperitoneal lymph node with other prominent but nonenlarged retroperitoneal nodes.  These could be reactive or metastatic in nature. 3. Coronary and peripheral atherosclerosis with aortic annular calcifications. 4. Reticular opacities at the periphery of the right lung base probably relate to scarring. 5. Mild circumferential urinary bladder wall thickening.  Request urinalysis correlation to assess for potential cystitis.    elm-remote  Dictated by (CST): Jeremie Cedeño MD on 6/05/2025 at 5:47 PM     Finalized by (CST): Jeremie Cedeño MD on 6/05/2025 at 5:58 PM             Assessment/Plan:  Problem List[1]250cc ng     increase stool   Remove ng   clears    THUY ALSTON MD  6/16/2025  2:20 PM       [1]   Patient Active Problem List  Diagnosis    BPH (benign prostatic hyperplasia)    Chronic low back pain    Chronic pain of both knees    Depression with anxiety    Generalized osteoarthritis    Hyperlipidemia    Type 2 diabetes mellitus without complication, without long-term current use of insulin (HCC)    Essential hypertension    Bipolar I disorder, single manic episode (HCC)    Large bowel obstruction (HCC)    Colonic mass

## 2025-06-16 NOTE — PROGRESS NOTES
EKG   [facility]  Inpatient Progress Note    Arnaldo Gutierrez Patient Status:  Inpatient    4/15/1945 MRN [id number]   Location [facility] 4W/SW/SE Attending Boy Mccabe MD   Hosp Day # 11 PCP PHYSICIAN NONSTAFF     Subjective:   Arnaldo Gutierrez is a 80 year old male with a history of diabetes hypertension GERD and other comorbidities who was admitted 2025 with iron deficiency anemia and partial large bowel obstruction with a mass in the transverse colon.  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  He went for transverse colon resection 6/10 and reportedly there were numerous peritoneal or omental metastasis and enlarged lymph nodes.  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    Interval history:  Improving.  Appears comfortable today.    Review of Systems:  Hematology/Oncology ROS performed and negative except as above in HPI    Current Medications[1]    Objective:    /57 (BP Location: Right arm)   Pulse 64   Temp 97.5 °F (36.4 °C) (Oral)   Resp 20   Ht 1.803 m (5' 11\")   Wt 74 kg (163 lb 4 oz)   SpO2 98%   BMI 22.77 kg/m²   Physical Exam:  General: A&Ox3, clinically improving   HEENT: PERRL  CV: RRR   Pulm:  normal effort  Abd: appropriately tender   Extremities: no edema  Neurological: Grossly intact    Labs:  Lab Results   Component Value Date    HGB 7.6 2025    CREATSERUM 0.68 2025    BUN 21 2025     2025    K 4.1 2025    K 4.1 2025     2025    CO2 27.0 2025     2025    CA 7.9 2025    MG 1.9 2025    PHOS 3.5 2025       Imaging:  XR ABDOMEN (1 VIEW) (CPT=74018)  Result Date: 2025  CONCLUSION:  1. Postoperative changes from a recent transverse colonic resection and partial small bowel resection including persistent pneumoperitoneum. 2. Nonobstructive bowel  gas pattern.     Dictated by (CST): Chuy Schmid MD on 6/13/2025 at 11:38 AM     Finalized by (CST): Chuy Schmid MD on 6/13/2025 at 11:41 AM          XR CHEST AP PORTABLE  (CPT=71045)  Result Date: 6/13/2025  CONCLUSION:  1. Enteric tube has been advanced with tip and distal side hole now projecting over the gastric bubble, in customary position. 2. Expiratory chest with stable atelectasis at both lung bases. 3. Persistent pneumoperitoneum, likely related to the recent abdominal surgery.    Dictated by (CST): Chuy Schmid MD on 6/13/2025 at 9:19 AM     Finalized by (CST): Chuy Scmhid MD on 6/13/2025 at 9:21 AM          XR CHEST AP PORTABLE  (CPT=71045)  Result Date: 6/13/2025  CONCLUSION:  1. Enteric tube tip projects over the stomach, although the distal side hole projects over the distal esophagus.  Consider advancing approximately 7.0 cm into the stomach. 2. Expiratory chest with the development of atelectasis at both lung bases. 3. Pneumoperitoneum, likely related to the recent abdominal surgery.    Dictated by (CST): Chuy Schmid MD on 6/13/2025 at 7:16 AM     Finalized by (CST): Chuy Schmid MD on 6/13/2025 at 7:19 AM          XR ABDOMEN (1 VIEW) (CPT=74018)  Result Date: 6/11/2025  CONCLUSION:  1. No retained unexpected surgical foreign body. 2. Postsurgical changes with anastomotic staple lines and skin staples. 3. Catheter in the bladder.    Dictated by (CST): Chong Valdez MD on 6/11/2025 at 1:22 AM     Finalized by (CST): Chong Valdez MD on 6/11/2025 at 1:28 AM          XR FLUOROSCOPY C-ARM TIME LESS THAN 1 HOUR (CPT=76000)  Result Date: 6/6/2025  CONCLUSION: Intraoperative exam. Please see operative report for further details.     Dictated by (CST): Frandy Starkey MD on 6/06/2025 at 8:55 PM     Finalized by (CST): Frandy Starkey MD on 6/06/2025 at 8:56 PM          CT ABDOMEN+PELVIS(CONTRAST ONLY)(CPT=74177)  Result Date: 6/5/2025  CONCLUSION:  1. Suspicious  approximate 4 cm segment of constricting masslike wall thickening at the proximal transverse colon with large upstream colonic fecal burden.  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  Stellate morphology stranding and nodularity in the right upper quadrant mesentery adjacent to the constricting mass probably relates to direct extension of disease.  There are adjacent nonenlarged right upper quadrant mesenteric lymph nodes, which could be reactive or metastatic.  Gastroenterology evaluation and colonoscopy correlation is suggested.  No free intraperitoneal air or well-defined/drainable intra-abdominal collection. 2. Single mildly enlarged interaortocaval retroperitoneal lymph node with other prominent but nonenlarged retroperitoneal nodes.  These could be reactive or metastatic in nature. 3. Coronary and peripheral atherosclerosis with aortic annular calcifications. 4. Reticular opacities at the periphery of the right lung base probably relate to scarring. 5. Mild circumferential urinary bladder wall thickening.  Request urinalysis correlation to assess for potential cystitis.    elm-remote  Dictated by (CST): Jeremie Cedeño MD on 6/05/2025 at 5:47 PM     Finalized by (CST): Jeremie Cedeño MD on 6/05/2025 at 5:58 PM            Assessment & Plan:    Pt is a 80 year old male with a history of diabetes hypertension GERD and other comorbidities who was admitted 6/5/2025 with iron deficiency anemia and partial large bowel obstruction with a mass in the transverse colon.  Went for colonoscopy 6/6 and found to have a malignant stricture of the transverse colon and underwent colonic stenting.  He went for transverse colon resection 6/10 and reportedly there were numerous peritoneal or omental metastasis and enlarged lymph nodes.  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     I previously reviewed the pathology report at the bedside with the patient, his wife  daughter and son-in-law.  I discussed the natural history of diffuse large B-cell lymphoma.  LDH uric acid within normal limits, no emergent indications for inpatient chemotherapy at this time now that obstruction has been surgically relieved.  We will await surgical recovery and tentatively plan for outpatient PET/CT while we await for aggressive FISH panel to help determine optimal chemotherapy regimen.  Prior to admission, the patient was generally healthy with a fair performance status when hopeful that as he improves from his obstruction he will get to a point where he will be able to tolerate chemotherapy with curative intent.     He continues to clinically improve.  I will follow intermittently while he remains admitted, plan for outpatient PET/CT and follow-up to discuss chemotherapy for his lymphoma in the outpatient setting.    Norris Lazaro DO    [facility]  [place] [facility]    This note was created using a voice-recognition transcribing system. Incorrect words or phrases may have been missed during proofreading. Please interpret accordingly.       [1]    adult 3 in 1 TPN   Intravenous Continuous TPN    piperacillin-tazobactam (Zosyn) 3.375 g in dextrose 5% 100 mL IVPB-ADDV  3.375 g Intravenous Q8H    [COMPLETED] potassium chloride 40 mEq in 250mL sodium chloride 0.9% IVPB premix  40 mEq Intravenous Once    adult 3 in 1 TPN   Intravenous Continuous TPN    phenol (Chloraseptic) 1.4 % oral liquid spray   Oral Q2H PRN    [COMPLETED] sodium phosphate 15 mmol in 0.9% NaCl 100mL IVPB premix  15 mmol Intravenous Once    [] adult 3 in 1 TPN   Intravenous Continuous TPN    [COMPLETED] magnesium sulfate in sterile water for injection 2 g/50mL IVPB premix 2 g  2 g Intravenous Once    [COMPLETED] potassium phosphate dibasic 15 mmol in sodium chloride 0.9% 250 mL IVPB  15 mmol Intravenous Once    Followed by    [COMPLETED] potassium chloride 20 mEq/100mL IVPB  premix 20 mEq  20 mEq Intravenous Once    [] adult 3 in 1 TPN   Intravenous Continuous TPN    [COMPLETED] magnesium sulfate 4 g/100mL IVPB premix 4 g  4 g Intravenous Once    dextrose 10% infusion (TPN no rate)   Intravenous Continuous PRN    [] adult 3 in 1 TPN   Intravenous Continuous TPN    [COMPLETED] sodium chloride 0.9 % IV bolus 500 mL  500 mL Intravenous Once    [COMPLETED] sodium chloride 0.9 % IV bolus 500 mL  500 mL Intravenous Once    [COMPLETED] insulin regular human (Novolin R, Humulin R) 100 UNIT/ML injection 10 Units  10 Units Subcutaneous Once    [COMPLETED] potassium chloride 40 mEq in 250mL sodium chloride 0.9% IVPB premix  40 mEq Intravenous Once    pantoprazole (Protonix) 40 mg in sodium chloride 0.9% PF 10 mL IV push  40 mg Intravenous Daily    ondansetron (Zofran) 4 MG/2ML injection 4 mg  4 mg Intravenous Q4H PRN    acetaminophen (Ofirmev) 10 mg/mL infusion premix 1,000 mg  1,000 mg Intravenous Q6H PRN    [COMPLETED] phentolamine (Regitine) 10 mg in sodium chloride 0.9% syringe (ADULT EXTRAVASATION)  10 mg Subcutaneous See Admin Instructions    [COMPLETED] lidocaine PF (Xylocaine-MPF) 1% injection  5 mL Intradermal Once    insulin regular human (Novolin R, Humulin R) 100 UNIT/ML injection 1-11 Units  1-11 Units Subcutaneous Q6H    oxyCODONE immediate release tab 2.5 mg  2.5 mg Oral Q4H PRN    Or    oxyCODONE immediate release tab 5 mg  5 mg Oral Q4H PRN    HYDROmorphone (Dilaudid) 1 MG/ML injection 0.2 mg  0.2 mg Intravenous Q2H PRN    Or    HYDROmorphone (Dilaudid) 1 MG/ML injection 0.4 mg  0.4 mg Intravenous Q2H PRN    [COMPLETED] acetaminophen (Ofirmev) 10 mg/mL infusion premix 1,000 mg  1,000 mg Intravenous Once    [COMPLETED] sodium chloride 0.9 % IV bolus 1,986 mL  30 mL/kg Intravenous Once    [COMPLETED] sodium chloride 0.9 % IV bolus 250 mL  250 mL Intravenous Once    [COMPLETED] neomycin (Mycifradin) tab 1,000 mg  1,000 mg Oral Once    [COMPLETED] neomycin (Mycifradin)  tab 1,000 mg  1,000 mg Oral Once    [COMPLETED] neomycin (Mycifradin) tab 1,000 mg  1,000 mg Oral Once    [COMPLETED] metroNIDAZOLE (Flagyl) tab 500 mg  500 mg Oral Once    [COMPLETED] metroNIDAZOLE (Flagyl) tab 500 mg  500 mg Oral Once    [COMPLETED] metroNIDAZOLE (Flagyl) tab 500 mg  500 mg Oral Once    [COMPLETED] sodium ferric gluconate (Ferrlecit) 125 mg in sodium chloride 0.9% 100mL IVPB premix  125 mg Intravenous Daily    [COMPLETED] pantoprazole (Protonix) 40 mg in sodium chloride 0.9% PF 10 mL IV push  40 mg Intravenous Once    [COMPLETED] sodium chloride 0.9 % IV bolus 1,000 mL  1,000 mL Intravenous Once    [COMPLETED] ondansetron (Zofran) 4 MG/2ML injection 4 mg  4 mg Intravenous Once    [COMPLETED] iopamidol 76% (ISOVUE-370) injection for power injector  80 mL Intravenous ONCE PRN    [COMPLETED] morphINE PF 2 MG/ML injection 2 mg  2 mg Intravenous Once    [] sodium chloride 0.9% infusion   Intravenous Continuous    acetaminophen (Tylenol Extra Strength) tab 500 mg  500 mg Oral Q4H PRN    glucose (Dex4) 15 GM/59ML oral liquid 15 g  15 g Oral Q15 Min PRN    Or    glucose (Glutose) 40% oral gel 15 g  15 g Oral Q15 Min PRN    Or    dextrose 50% injection 50 mL  50 mL Intravenous Q15 Min PRN

## 2025-06-16 NOTE — OCCUPATIONAL THERAPY NOTE
OCCUPATIONAL THERAPY TREATMENT NOTE - INPATIENT        Room Number: 445/445-A     Presenting Problem: Large bowel obstruction    Problem List  Principal Problem:    Large bowel obstruction (HCC)  Active Problems:    Colonic mass      OCCUPATIONAL THERAPY ASSESSMENT   Patient demonstrates good  progress this session, goals remain in progress.    Patient is requiring minimal assist as a result of the following impairments: pain (back pain), limited reach, decreased standing tolerance, fatigue.    Patient continues to function below baseline with ADLs and functional mobility.  Next session anticipate patient to progress LE dressing, functional mobility, toilet transfer.  Occupational Therapy will continue to follow patient for duration of hospitalization.    Patient continues to benefit from continued skilled OT services: to promote return to prior level of function and safety with continuous assistance and gradual rehabilitative therapy.     PLAN DURING HOSPITALIZATION  OT Device Recommendations: TBD  OT Treatment Plan: Balance activities, ADL training, Functional transfer training, Patient/Family education, Equipment eval/education, Patient/Family training, Compensatory technique education     SUBJECTIVE  Agreeable to activity     OBJECTIVE  Precautions: Abdominal protective strategies, Bed/chair alarm,  needed (Danish)    WEIGHT BEARING RESTRICTION     PAIN ASSESSMENT  Rating: Unable to rate  Location: Abdomen    ACTIVITY TOLERANCE  Room air 107/54  93% 1.5L    ACTIVITIES OF DAILY LIVING ASSESSMENT  AM-PAC ‘6-Clicks’ Inpatient Daily Activity Short Form  How much help from another person does the patient currently need…  -   Putting on and taking off regular lower body clothing?: A Lot  -   Bathing (including washing, rinsing, drying)?: A Lot  -   Toileting, which includes using toilet, bedpan or urinal? : A Lot  -   Putting on and taking off regular upper body clothing?: A Little  -   Taking care of  personal grooming such as brushing teeth?: A Little  -   Eating meals?: A Little    AM-PAC Score:  Score: 15  Approx Degree of Impairment: 56.46%  Standardized Score (AM-PAC Scale): 34.69  CMS Modifier (G-Code): CK    FUNCTIONAL TRANSFER ASSESSMENT  Sit to Stand: Edge of Bed  Edge of Bed: Contact Guard Assist  Toilet Transfer: Not Tested    BED MOBILITY  Rolling: Minimal Assist  Supine to Sit : Minimal Assist      FUNCTIONAL ADL ASSESSMENT  Grooming Seated: Independent    Skilled Therapy Provided: RN cleared pt for participation in occupational therapy session, which was completed in collaboration with PT. Upon arrival, pt was supine in bed and agreeable to activity. Pt's supportive family present during session. Pt benefited from additional time, verbal cues, RW to maximize participation.    Pt Niuean speaking - language line offered but family wished to interpret instead.  Pt completed activity with overall Min A, no loss of balance with activity. Improved from prior session - demonstrated functional mobility to walk to and from washroom in prep for ADL tasks. Vitals stable with activity.     EDUCATION PROVIDED  Patient Education : Role of Occupational Therapy; Plan of Care; Other (abdominal sparing strategies)  Patient's Response to Education: Verbalized Understanding; Returned Demonstration    The patient's Approx Degree of Impairment: 56.46% has been calculated based on documentation in the [facility] '6 clicks' Inpatient Daily Activity Short Form.  Research supports that patients with this level of impairment may benefit from rehab.  Final disposition will be made by interdisciplinary medical team.    Patient End of Session: Up in chair, Needs met, Call light within reach, RN aware of session/findings, Family present    OT Goals:  Patients self stated goal is: none stated      Patient will complete functional transfer with SBA  Comment: ongoing     Patient will complete toileting with SBA  Comment: ongoing      Patient will tolerate standing for 2 minutes in prep for adls with SBA   Comment: ongoing             Goals  on: 25  Frequency: 3-5x/week    OT Session Time: 25 minutes  Self-Care Home Management: 15 minutes  Therapeutic Activity: 10 minutes

## 2025-06-16 NOTE — PHYSICAL THERAPY NOTE
PHYSICAL THERAPY TREATMENT NOTE - INPATIENT     Room Number: 445/445-A       Presenting Problem: large bowel obstruction post ex lap       Problem List  Principal Problem:    Large bowel obstruction (HCC)  Active Problems:    Colonic mass      PHYSICAL THERAPY ASSESSMENT   Patient demonstrates good  progress this session, goals  remain in progress.      Patient is requiring contact guard assist and minimal assist as a result of the following impairments: pain, decreased muscular endurance, and medical status.     Patient continues to function below baseline with bed mobility, gait, and stair negotiation.  Next session anticipate patient to progress bed mobility, gait, and stair negotiation.  Physical Therapy will continue to follow patient for duration of hospitalization.    Patient continues to benefit from continued skilled PT services: to promote return to prior level of function and safety with continuous assistance and gradual rehabilitative therapy pending further progress.    PLAN DURING HOSPITALIZATION  Nursing Mobility Recommendation : 1 Assist  PT Device Recommendation: Rolling walker  PT Treatment Plan: Bed mobility, Body mechanics, Endurance, Energy conservation, Family education, Gait training, Range of motion, Stoop training, Transfer training, Balance training  Frequency (Obs): 5x/week     SUBJECTIVE  Patient agreeable to therapy, preferring to sit up towards R side of bed.    OBJECTIVE  Precautions: Abdominal protective strategies, Bed/chair alarm,  needed (Uzbek)    WEIGHT BEARING RESTRICTION  none    PAIN ASSESSMENT      Location: abdominal mild       BALANCE  Static Sitting: Fair +  Dynamic Sitting: Fair  Static Standing: Fair -  Dynamic Standing: Poor +    ACTIVITY TOLERANCE  Pulse: 75  Heart Rate Source: Monitor     BP: 107/54  BP Location: Left arm  BP Method: Automatic  Patient Position: Semi-Valerio     O2 WALK  Oxygen Therapy  SPO2% on Room Air at Rest: 91  SPO2% on Oxygen at  Rest: 1.5  SPO2% Ambulation on Oxygen: 93  Ambulation oxygen flow (liters per minute): 1.5    AM-PAC '6-Clicks' INPATIENT SHORT FORM - BASIC MOBILITY  How much difficulty does the patient currently have...  Patient Difficulty: Turning over in bed (including adjusting bedclothes, sheets and blankets)?: A Little   Patient Difficulty: Sitting down on and standing up from a chair with arms (e.g., wheelchair, bedside commode, etc.): A Little   Patient Difficulty: Moving from lying on back to sitting on the side of the bed?: A Little   How much help from another person does the patient currently need...   Help from Another: Moving to and from a bed to a chair (including a wheelchair)?: A Little   Help from Another: Need to walk in hospital room?: A Little   Help from Another: Climbing 3-5 steps with a railing?: A Lot     AM-PAC Score:  Raw Score: 17   Approx Degree of Impairment: 50.57%   Standardized Score (AM-PAC Scale): 42.13   CMS Modifier (G-Code): CK    FUNCTIONAL ABILITY STATUS  Functional Mobility/Gait Assessment  Gait Assistance: Contact guard assist  Distance (ft): 32  Assistive Device: Rolling walker  Pattern: Comment (step-through pattern, kyphotic posture, slow pace with narrow base of support, wide turning radius, no loss of balance)  Rolling: minimal assist  Supine to Sit: minimal assist  Sit to Stand: contact guard assist    Skilled Therapy Provided: Since previous session patient has progressed, requiring decreased assist with transfers and short household distances of ambulation using walker. Patient continues to require additional assist with bed mobility. Patient's wife, daughter, son-in-law present, reporting that patient is living alone and will not have anyone with him at discharge, would like to continue pursuing LACHO at discharge unless completely independent. Patient demonstrates potential to progress to being able to go home.    Patient received semi-fowlers in bed, agreeable to physical therapy.  Vital signs monitored as noted above, no adverse symptoms and patient stable during session. Anticipated therapy needs remain appropriate based on the patient's performance, personal factors, and remaining functional impairments.    PATIENT EDUCATION  Education Provided To: Patient, Family/Caregiver  Patient Education: Role of Physical Therapy, Plan of Care, DME Recommendations, Functional Transfer Techniques, Energy Conservation, Proper Body Mechanics, Gait Training  Patient's Response to Education: Verbalized Understanding, Returned Demonstration, Requires Further Education  Family/Caregiver Education: Role of Physical Therapy, Plan of Care, Discharge Recommendations  Family/Caregiver's Response to Education: Verbalized Understanding     The patient's Approx Degree of Impairment: 50.57% has been calculated based on documentation in the [facility] '6 clicks' Inpatient Daily Activity Short Form.  Research supports that patients with this level of impairment may benefit from a rehab facility.  Final disposition will be made by interdisciplinary medical team.      Patient End of Session: Up in chair, Needs met, Call light within reach, RN aware of session/findings, All patient questions and concerns addressed, Hospital anti-slip socks, Alarm set, Family present    CURRENT GOALS   Goals to be met by: 7/10  Patient Goal Patient's self-stated goal is: unstated   Goal #1 Patient is able to demonstrate supine - sit EOB @ level: supervision     Goal #1   Current Status NOT MET/IN PROGRESS    Goal #2 Patient is able to demonstrate transfers Sit to/from Stand at assistance level: supervision with walker - rolling     Goal #2  Current Status NOT MET/IN PROGRESS    Goal #3 Patient is able to ambulate 50 feet with assist device: walker - rolling at assistance level: minimum assistance   Goal #3   Current Status NOT MET/IN PROGRESS            Goal #5 Patient to demonstrate independence with home activity/exercise instructions provided  to patient in preparation for discharge.   Goal #5   Current Status IN PROGRESS/ONGOING    Goal #6    Goal #6  Current Status      Therapeutic Activity: 25 minutes      Jia Sullivan, PT, DPT  [facility]  Rehab Services - Physical Therapy  [id number]

## 2025-06-16 NOTE — PLAN OF CARE
Arnaldo is aox4, tolerating CLD after NG was removed per orders, voiding via primofit, multiple incontinent BMs today, and ambulating SBA w/ walker. Pain managed with dilaudid. Denies nausea. TPN continued. IV Abx continued. Q6 Hgb. Family at bedside. Call light within reach, calls appropriately. Safety precautions in place.    Problem: Patient Centered Care  Goal: Patient preferences are identified and integrated in the patient's plan of care  Description: Interventions:  - What would you like us to know as we care for you? I am from home.   - Provide timely, complete, and accurate information to patient/family  - Incorporate patient and family knowledge, values, beliefs, and cultural backgrounds into the planning and delivery of care  - Encourage patient/family to participate in care and decision-making at the level they choose  - Honor patient and family perspectives and choices  Outcome: Progressing     Problem: Diabetes/Glucose Control  Goal: Glucose maintained within prescribed range  Description: INTERVENTIONS:  - Monitor Blood Glucose as ordered  - Assess for signs and symptoms of hyperglycemia and hypoglycemia  - Administer ordered medications to maintain glucose within target range  - Assess barriers to adequate nutritional intake and initiate nutrition consult as needed  - Instruct patient on self management of diabetes  Outcome: Progressing     Problem: Patient/Family Goals  Goal: Patient/Family Long Term Goal  Description: Patient's Long Term Goal: Discharge home     Interventions:  - Monitor vitals  - Monitor labs  - IVF  - Glucose monitoring ACHS  - Clear liquid diet  - Scheduled miralax  - GI on consult  - See additional Care Plan goals for specific interventions  Outcome: Progressing  Goal: Patient/Family Short Term Goal  Description: Patient's Short Term Goal: Improve bowel function    Interventions:   - Full liquid diet  - GI on consult  - Scheduled miralax  - See additional Care Plan goals for  specific interventions  Outcome: Progressing     Problem: PAIN - ADULT  Goal: Verbalizes/displays adequate comfort level or patient's stated pain goal  Description: INTERVENTIONS:  - Encourage pt to monitor pain and request assistance  - Assess pain using appropriate pain scale  - Administer analgesics based on type and severity of pain and evaluate response  - Implement non-pharmacological measures as appropriate and evaluate response  - Consider cultural and social influences on pain and pain management  - Manage/alleviate anxiety  - Utilize distraction and/or relaxation techniques  - Monitor for opioid side effects  - Notify MD/LIP if interventions unsuccessful or patient reports new pain  - Anticipate increased pain with activity and pre-medicate as appropriate  Outcome: Progressing     Problem: RISK FOR INFECTION - ADULT  Goal: Absence of fever/infection during anticipated neutropenic period  Description: INTERVENTIONS  - Monitor WBC  - Administer growth factors as ordered  - Implement neutropenic guidelines  Outcome: Progressing     Problem: SAFETY ADULT - FALL  Goal: Free from fall injury  Description: INTERVENTIONS:  - Assess pt frequently for physical needs  - Identify cognitive and physical deficits and behaviors that affect risk of falls.  - Velma fall precautions as indicated by assessment.  - Educate pt/family on patient safety including physical limitations  - Instruct pt to call for assistance with activity based on assessment  - Modify environment to reduce risk of injury  - Provide assistive devices as appropriate  - Consider OT/PT consult to assist with strengthening/mobility  - Encourage toileting schedule  Outcome: Progressing     Problem: DISCHARGE PLANNING  Goal: Discharge to home or other facility with appropriate resources  Description: INTERVENTIONS:  - Identify barriers to discharge w/pt and caregiver  - Include patient/family/discharge partner in discharge planning  - Arrange for  needed discharge resources and transportation as appropriate  - Identify discharge learning needs (meds, wound care, etc)  - Arrange for interpreters to assist at discharge as needed  - Consider post-discharge preferences of patient/family/discharge partner  - Complete POLST form as appropriate  - Assess patient's ability to be responsible for managing their own health  - Refer to Case Management Department for coordinating discharge planning if the patient needs post-hospital services based on physician/LIP order or complex needs related to functional status, cognitive ability or social support system  Outcome: Progressing     Problem: Altered Communication/Language Barrier  Goal: Patient/Family is able to understand and participate in their care  Description: Interventions:  - Assess communication ability and preferred communication style  - Implement communication aides and strategies  - Use visual cues when possible  - Listen attentively, be patient, do not interrupt  - Minimize distractions  - Allow time for understanding and response  - Establish method for patient to ask for assistance (call light)  - Provide an  as needed  - Communicate barriers and strategies to overcome with those who interact with patient  Outcome: Progressing     Problem: GASTROINTESTINAL - ADULT  Goal: Minimal or absence of nausea and vomiting  Description: INTERVENTIONS:  - Maintain adequate hydration with IV or PO as ordered and tolerated  - Nasogastric tube to low intermittent suction as ordered  - Evaluate effectiveness of ordered antiemetic medications  - Provide nonpharmacologic comfort measures as appropriate  - Advance diet as tolerated, if ordered  - Obtain nutritional consult as needed  - Evaluate fluid balance  Outcome: Progressing  Goal: Maintains or returns to baseline bowel function  Description: INTERVENTIONS:  - Assess bowel function  - Maintain adequate hydration with IV or PO as ordered and tolerated  -  Evaluate effectiveness of GI medications  - Encourage mobilization and activity  - Obtain nutritional consult as needed  - Establish a toileting routine/schedule  - Consider collaborating with pharmacy to review patient's medication profile  Outcome: Progressing

## 2025-06-16 NOTE — PLAN OF CARE
Problem: GASTROINTESTINAL - ADULT  Goal: Minimal or absence of nausea and vomiting  Description: INTERVENTIONS:  - Maintain adequate hydration with IV or PO as ordered and tolerated  - Nasogastric tube to low intermittent suction as ordered  - Evaluate effectiveness of ordered antiemetic medications  - Provide nonpharmacologic comfort measures as appropriate  - Advance diet as tolerated, if ordered  - Obtain nutritional consult as needed  - Evaluate fluid balance  Outcome: Progressing

## 2025-06-16 NOTE — DIETARY NOTE
ADULT NUTRITION REASSESSMENT    Pt is at high nutrition risk.  Pt meets severe malnutrition criteria.      RECOMMENDATIONS TO MD:   See Nutrition Intervention for central parenteral nutrition (CPN) specifics      ADMITTING DIAGNOSIS:  Large bowel obstruction (HCC) [K56.609]  Colonic mass [K63.89]  PERTINENT PAST MEDICAL HISTORY:   Past Medical History:    Anxiety    BPH (benign prostatic hyperplasia)    CKD (chronic kidney disease) stage 3, GFR 30-59 ml/min (HCC)    Depression    Diabetes (HCC)    Diabetes mellitus (HCC)    Essential hypertension    GERD (gastroesophageal reflux disease)    High cholesterol    Hyperlipidemia       PATIENT STATUS:   Initial 06/06/25: Pt assessed due to screening at risk for MST of unsure weight loss. Pt admitted due to large bowel obstruction found. Past medical history significant for CKD, DM, GERD, HTN and hyperlipidemia. Chart reviewed. Attempted to visit patient bedside twice; out of room both times. Currently at colonoscopy. Pt has been NPO for unprepped colonoscopy with possible colonic stent and biopsy; unable to assess intake during admission. Reviewed MD notes and significant for reports of very decreased appetite x2 weeks due to abdominal pain after eating. Complaints of weight loss; ~60lbs in 4-5 months. EMR reviewed and weight loss noted; 1/16/25:169lbs (12% loss in 5 months) 6/7/24: 189lbs (20% lost in 1 yr); both weight losses clinically significant for malnutrition in time frame. Pt has had BM this AM. NFPE deferred. Will complete at reassessment.  6/10/2025 Update: NPO/Cl liq x 5 days. NPO today for Surgery ( Lap Assisted Colon Resection).  Pt was taking Cl liq 100% prior to NPO. Re-visited pt. Utilized Language line (# [id number], Jimmie). Pt reports was tolerating liquid diet well. + BM this am.   Nutrition history reveals decreased po for > 3 months d/t  loss of appetite r/t depression and recently d/t symptoms of acute GI illness. Wt history reported usual wt of  200# last year (189# in emr) , lost wt to 150-162#. 162# 3 months ago, and lost more wt down to 150# ( this admit). Based on above initial RD eval, 39# or 20.6% significant wt loss in the past year. Nutrition Focus Physical Exam ( NFPE) completed. Pt met Severe Malnutrition criteria. Will monitor GI and NPO status, await diet advance as safe. Otherwise, can consider Nutrition Support Therapy (NST) if unable to advance diet beyond cl liq given pre-existing Malnutrition.   06/12/25 UPDATE: Early reassessment for CPN start. POD #2 s/p lap transverse colon resection, small bowel resection x2, resection of tumor nodule on small bowel. Per surgery, pt appears to have metastatic disease - oncology on consult. Weaned off pressor support. Stable on room air with family and RN at bedside at time of visit. Discussed CPN start with family.   6/16/25 UPDATE: CPN in place--progressing to goal, but slow progression due to elevated glucose. POD #5 transverse colon resection, SBR and resection of tumur on small bowel--diffuse large B-cell lymphoma.  NGT to LIS continues--monitor for clamping trial and diet progression.      FOOD/NUTRITION RELATED HISTORY:  Appetite: Poor  Intake: NPO/CL x7 days; CPN initiated 6/12  Intake Meeting Needs: TPN will meet needs once at goal rate  Percent Meals Eaten (last 6 days)       Date/Time Percent Meals Eaten (%)    06/10/25 1000 0 %    06/11/25 1551 0 %    06/15/25 0800 --     Percent Meals Eaten (%): NPO/ ice chips at 06/15/25 0800        Food Allergies: No Known Food Allergies (NKFA)  Cultural/Ethnic/Pentec[employer]alism Preferences: Not Obtained    GASTROINTESTINAL: +BM this am--large loose per RN documentation.      MEDICATIONS: reviewed;   adult 3 in 1 TPN      adult 3 in 1 TPN 87.5 mL/hr at 06/15/25 2240    dextrose 10%        pantoprazole  40 mg Intravenous Daily    piperacillin-tazobactam  4.5 g Intravenous Q8H    insulin regular human  1-11 Units Subcutaneous Q6H     LABS: reviewed , 154,  174, 192  Recent Labs     06/14/25  0605 06/15/25  0455 06/16/25  0616   * 161* 168*   BUN 14 18 21   CREATSERUM 0.72 0.66* 0.68*   CA 7.7* 7.5* 7.9*   MG 1.8 2.0 1.9    141 140   K 3.5 3.4* 4.1  4.1    105 107   CO2 28.0 30.0 27.0   PHOS 2.0* 3.1 3.5   OSMOCALC 293 297* 297*     WEIGHT HISTORY:  Patient Weight(s) for the past 336 hrs:   Weight   06/16/25 0600 74 kg (163 lb 4 oz)   06/14/25 0600 73.2 kg (161 lb 6 oz)   06/13/25 0459 73.1 kg (161 lb 2.5 oz)   06/11/25 1200 70 kg (154 lb 5.2 oz)   06/10/25 2245 66.2 kg (145 lb 15.1 oz)   06/05/25 2004 68.4 kg (150 lb 12.7 oz)   06/05/25 1608 67.1 kg (147 lb 14.9 oz)     Wt Readings from Last 10 Encounters:   06/16/25 74 kg (163 lb 4 oz)   05/28/25 67.1 kg (148 lb)   04/09/25 72.6 kg (160 lb)   04/08/25 72.6 kg (160 lb)   03/18/25 81.6 kg (180 lb)   01/16/25 76.7 kg (169 lb)   11/21/24 77.6 kg (171 lb)   08/29/24 78.9 kg (174 lb)   08/25/24 81.6 kg (180 lb)   07/15/24 84.8 kg (187 lb)     ANTHROPOMETRICS:  HT: 180.3 cm (5' 11\")  Wt Readings from Last 1 Encounters:   06/16/25 74 kg (163 lb 4 oz)   Last weight: close to recent wt. Was low of 145# ? Accurate?   Dosing Weight: 66.2 kg (146 lbs) - recent weight / lowest wt this admission taken on 6/10/25, utilized for anthropometric calculations  BMI: Body mass index is 20.36 kg/m².  BMI CLASSIFICATION: <22 considered underweight for advanced age (>65)  IBW/lbs (Calculated) Male: 172 lbs           85% IBW  Usual Body Wt: 189 lbs    1 yr ago  77% UBW    NUTRITION RELATED PHYSICAL FINDINGS:  - Nutrition Focused Physical Exam (NFPE): deferred--will obtain as able.   - Fluid Accumulation: none . See RN documentation for details  - Skin Integrity: at risk and surgical wound(s). See RN documentation for details    CRITERIA FOR MALNUTRITION DIAGNOSIS:  Criteria for severe malnutrition diagnosis: chronic illness related to wt loss greater than 10% in 6 months and energy intake less than 75% for greater than 1  month.    NUTRITION DIAGNOSIS/PROBLEM:   Severe Malnutrition related to Chronic - Physiological causes increasing nutrient needs due to illness or condition (colonic mass) as evidenced by wt loss greater than 10% in 6 months and energy intake less than 75% for greater than 1 month.    NUTRITION DIAGNOSIS PROGRESS:  Improvement (unresolved)--has been on CPN 4 days      NUTRITION INTERVENTION:     NUTRITION PRESCRIPTION:   Estimated Nutrition needs: --dosing wt of 66.2 kg - wt taken on 6/10/25  Calories: 7722-2778 calories/day (30-35 calories per kg Dosing wt)  Protein: 79-99 g protein/day (1.2-1.5 g protein/kg Dosing wt)  Fluid Needs: 2336-0818 mL (1mL/kcal)    - Diet:       Procedures    NPO     Parenteral Nutrition: RD ordered the following  CPN via right basilic triple lumen PICC, 2100 ml total volume, 100 g protein, 900 kcal dextrose (265 g) & 615 kcal lipid. Total energy = 1915 kcal.   Additives and electrolytes ordered. 25 units regular insulin in bag.   Meets 96 % of min estimated kcal and 100% of min estimated protein needs. Will advance PN as tolerated to reach full nutrition.     - Nutrition Care Plan: Nutrition support via CPN to meet >80% of nutritional needs  - ONS (Oral Nutrition Supplements)/Meals/Snacks: RD to add when diet initiated and NPO   - Vitamin and mineral supplements: NPO  - Feeding assistance: NPO  - Nutrition education: not appropriate at this time  - Coordination of nutrition care: collaboration with other providers   - Discharge and transfer of nutrition care to new setting or provider: to be determined and monitor plans    MONITOR AND EVALUATE/NUTRITION GOALS:  - Food and Nutrient Intake:    Monitor: for PO initiation  - Food and Nutrient Administration:    Monitor: TPN tolerance, adequacy of TPN, and for TPN adjustment  - Nutrition Goals:    TPN to meet greater than 80% nutrition needs, labs within acceptable limits, minimize lean body mass loss, support wound healing, and improved GI  status      DIETITIAN FOLLOW UP: RD to follow and monitor nutrition status/RD to manage TPN daily.      Chioma Eric RD, LDN   Clinical Dietitian [phone]

## 2025-06-16 NOTE — PROGRESS NOTES
[facility]  part of [facility]    Progress Note    Arnaldo Gutierrez Patient Status:  Inpatient    4/15/1945 MRN [id number]   Location [facility] 4W/SW/SE Attending Boy Mccabe MD   Hosp Day # 11 PCP PHYSICIAN NONSTAFF     Subjective:   Doing better. Had bowel movements and now NGT removed. No abdominal pain. No nausea or vomiting. No fever or chills. No shortness of breath. On room air.    Objective:   Blood pressure 103/57, pulse 64, temperature 97.5 °F (36.4 °C), temperature source Oral, resp. rate 20, height 5' 11\" (1.803 m), weight 163 lb 4 oz (74 kg), SpO2 98%.  Physical Exam  Vitals and nursing note reviewed.   Constitutional:       General: He is awake. He is not in acute distress.     Appearance: He is ill-appearing.   HENT:      Head: Normocephalic and atraumatic.   Cardiovascular:      Rate and Rhythm: Normal rate and regular rhythm.   Pulmonary:      Effort: Pulmonary effort is normal. No respiratory distress.      Breath sounds: Normal breath sounds. No wheezing, rhonchi or rales.   Abdominal:      General: There is no distension.      Palpations: Abdomen is soft.      Tenderness: There is no abdominal tenderness.   Musculoskeletal:      Cervical back: Normal range of motion and neck supple.      Right lower leg: No edema.      Left lower leg: No edema.   Skin:     General: Skin is warm and dry.   Neurological:      General: No focal deficit present.      Mental Status: He is alert and oriented to person, place, and time.   Psychiatric:         Mood and Affect: Mood normal.         Behavior: Behavior is cooperative.       Results:   Lab Results   Component Value Date    WBC 8.9 06/15/2025    HGB 7.6 (L) 2025    HCT 22.1 (L) 06/15/2025    .0 (L) 06/15/2025    CREATSERUM 0.68 (L) 2025    BUN 21 2025     2025    K 4.1 2025    K 4.1 2025     2025    CO2 27.0 2025     (H) 2025    CA 7.9 (L)  06/16/2025    ALB 2.4 (L) 06/13/2025    ALKPHO 55 06/13/2025    BILT 0.4 06/13/2025    TP 4.2 (L) 06/13/2025    AST 13 06/13/2025    ALT 14 06/13/2025    TSH 2.417 04/08/2025    LIP 22 06/05/2025    MG 1.9 06/16/2025    PHOS 3.5 06/16/2025    B12 >2,000 (H) 04/08/2025     Assessment & Plan:   Obstructing colonic mass  Diffuse large B-cell lymphoma  Presented with abdominal pain, nausea, weight loss  CT A/P with 4 cm segment of constricting mass-like wall thickening at proximal transverse colon  S/p laparoscopic assisted transverse colon resection, small bowel resection x2, resection of tumor nodule on small bowel on 6/10/25  Pathology c/w diffuse large B-cell lymphoma  Oncology following - eventual plans for oncology  Plan:  -Empiric IV Zosyn as per general surgery  -Advance diet as tolerated  -F/u oncology    Acute blood loss anemia  Postop  S/p 2 units pRBC  Plan:  -Follow    DVT prophylaxis: SCDs    D/w RN.    Pulmonary/critical care issues have resolved. Will sign off. Please call if questions/concerns.    Kumar Del Castillo PA-C  Pulmonary Medicine  6/16/2025

## 2025-06-16 NOTE — CONGREGATE LIVING REVIEW
[facility] Living Authorization    The [facility] Review Committee has reviewed this case and the patient IS APPROVED for discharge to a facility for Short Term Skilled once the following procedure is followed:     - The physician discharge instructions (contained within the [facility] note for SNF) must inlcude the below appropriate and approved COVID instructions to the facility    For questions regarding CLRC approval process, please contact the CM assigned to the case.  For questions regarding RN discharge workflow, please contact the unit Clinical Leader.

## 2025-06-16 NOTE — PROGRESS NOTES
[facility]  Progress Note     Arnaldo Gutierrez  : 4/15/1945    Status: Inpatient  Day #: 11    Attending: Boy Mccabe MD  PCP: PHYSICIAN NONSTAFF     Assessment and Plan:    Diffuse large B-cell lymphoma  Large Bowel obstruction 2/2 Proximal transverse colonic mass  - CT A/P reviewed  - GI and general surgery consulted  -  s/p colonoscopy with metallic colon stent  - 6/10 - s/p laparoscopic transverse colon resection, small bowel resection x2, resection of tumor nodule on small bowel. Patient appeared to have metastatic disease per Dr. Randall  - pathology - transverse colon - diffuse large B-cell lymphoma. Separate segment with tubular adenoma. Small intestine with extensive involvement of diffuse large B-cell lymphoma  - oncology consulted, appreciate recs. Plans for chemo with curative intent in the future once bowel issues stabilized  - cont TPN  - on zosyn IV empirically. Duration per general surgery  - started having BMs last night  - NGT per general surgery    Acute postoperative blood loss anemia with hypotension  - EBL from surgery 600ml  - s/p 2 units PRBC   - hgb drifted down. Transfuse if hgb <7    Hypotension - resolved  - weaned off pressors    DM2  - ISS    Other:  H/o HTN   HL  BPH - flomax when able to take PO    Dietitian Malnutrition Assessment    Evaluation for Malnutrition: Criteria for severe malnutrition diagnosis- chronic illness related to   Wt loss greater than 10% in 6 months., Energy intake less than 75% for greater than 1 month.               RD Malnutrition Care Plan:      Body Fat/Muscle Mass:          Physician Assessment     Patient has a diagnosis of severe malnutrition       DVT Mechanical Prophylaxis:   SCDs,    DVT Pharmacologic Prophylaxis   Medication   None               Subjective:      Initial Chief Complaint:  abd discomfort, nausea, weight loss    Started having BMs last night. Abd pain controlled.     10 point ROS completed and was negative, except for  pertinent positive and negatives stated in subjective.      Objective:      Temp:  [97.9 °F (36.6 °C)-99.2 °F (37.3 °C)] 99.1 °F (37.3 °C)  Pulse:  [74-88] 74  Resp:  [18-23] 18  BP: ()/(51-67) 100/55  SpO2:  [91 %-94 %] 93 %  General:  Alert, no distress  HEENT:  Normocephalic, atraumatic  Cardiac:  Regular rate, regular rhythm  Pulmonary:  Clear to auscultation bilaterally, respirations unlabored  Gastrointestinal:  +BS  Musculoskeletal:  No joint swelling  Extremities:  No edema, no cyanosis, no clubbing  Neurologic:  nonfocal  Psychiatric:  Normal affect, calm and appropriate    Intake/Output Summary (Last 24 hours) at 6/16/2025 1020  Last data filed at 6/16/2025 0600  Gross per 24 hour   Intake 2021 ml   Output 1500 ml   Net 521 ml         Recent Labs   Lab 06/13/25  0459 06/13/25  1250 06/14/25  0605 06/14/25  1308 06/15/25  0455 06/15/25  1302 06/15/25  1757 06/16/25  0026 06/16/25  0616   WBC 21.7*  --  16.4*  --  8.9  --   --   --   --    HGB 9.1*   < > 9.0*  9.0*   < > 7.4*  7.4*   < > 7.8* 7.0* 7.1*   HCT 26.8*  --  26.6*  --  22.1*  --   --   --   --    .0*  --  130.0*  --  117.0*  --   --   --   --    RBC 2.94*  --  3.18*  --  2.61*  --   --   --   --    MCV 91.2  --  83.6  --  84.7  --   --   --   --    MCH 31.0  --  28.3  --  28.4  --   --   --   --    MCHC 34.0  --  33.8  --  33.5  --   --   --   --    RDW 18.6*  --  18.1*  --  18.4*  --   --   --   --    NEPRELIM 19.84*  --  14.54*  --  7.14  --   --   --   --     < > = values in this interval not displayed.     Recent Labs   Lab 06/13/25  0725 06/14/25  0605 06/15/25  0455 06/16/25  0616   BUN 13 14 18 21   CREATSERUM 0.85 0.72 0.66* 0.68*   CA 7.4* 7.7* 7.5* 7.9*   ALB 2.4*  --   --   --     137 141 140   K 3.2* 3.5 3.4* 4.1  4.1    102 105 107   CO2 24.0 28.0 30.0 27.0   * 245* 161* 168*   MG 1.9 1.8 2.0 1.9   PHOS 2.2* 2.0* 3.1 3.5   BILT 0.4  --   --   --    AST 13  --   --   --    ALT 14  --   --   --     ALKPHO 55  --   --   --    TP 4.2*  --   --   --        No results found.        Medications:  Scheduled Medications[1]   PRN Meds: PRN Medications[2]    Supplementary Documentation:   DVT Mechanical Prophylaxis:   SCDs,    DVT Pharmacologic Prophylaxis   Medication   None                Code Status: Not on file  Muñoz: External urinary catheter in place  Muñoz Duration (in days): 2  Central line:    ANNELISE: 6/18/2025        **Certification      PHYSICIAN Certification of Need for Inpatient Hospitalization - Initial Certification    Patient will require inpatient services that will reasonably be expected to span two midnight's based on the clinical documentation in H+P.   Based on patients current state of illness, I anticipate that, after discharge, patient will require TBD.        Boy Mccabe MD         [1]    pantoprazole  40 mg Intravenous Daily    piperacillin-tazobactam  4.5 g Intravenous Q8H    insulin regular human  1-11 Units Subcutaneous Q6H   [2]   phenol    dextrose 10%    ondansetron    acetaminophen    oxyCODONE **OR** oxyCODONE    HYDROmorphone **OR** HYDROmorphone    acetaminophen    glucose **OR** glucose **OR** [DISCONTINUED] glucose-vitamin C **OR** dextrose **OR** [DISCONTINUED] glucose **OR** [DISCONTINUED] glucose **OR** [DISCONTINUED] glucose-vitamin C

## 2025-06-17 LAB
ANION GAP SERPL CALC-SCNC: 7 MMOL/L (ref 0–18)
BASOPHILS # BLD AUTO: 0 X10(3) UL (ref 0–0.2)
BASOPHILS NFR BLD AUTO: 0 %
BUN BLD-MCNC: 19 MG/DL (ref 9–23)
BUN/CREAT SERPL: 27.5 (ref 10–20)
CALCIUM BLD-MCNC: 7.9 MG/DL (ref 8.7–10.4)
CHLORIDE SERPL-SCNC: 107 MMOL/L (ref 98–112)
CO2 SERPL-SCNC: 25 MMOL/L (ref 21–32)
CREAT BLD-MCNC: 0.69 MG/DL (ref 0.7–1.3)
DEPRECATED RDW RBC AUTO: 59 FL (ref 35.1–46.3)
EGFRCR SERPLBLD CKD-EPI 2021: 95 ML/MIN/1.73M2 (ref 60–?)
EOSINOPHIL # BLD AUTO: 0.06 X10(3) UL (ref 0–0.7)
EOSINOPHIL NFR BLD AUTO: 0.8 %
ERYTHROCYTE [DISTWIDTH] IN BLOOD BY AUTOMATED COUNT: 18.3 % (ref 11–15)
FOLATE SERPL-MCNC: 6.1 NG/ML (ref 5.4–?)
GLUCOSE BLD-MCNC: 179 MG/DL (ref 70–99)
GLUCOSE BLDC GLUCOMTR-MCNC: 149 MG/DL (ref 70–99)
GLUCOSE BLDC GLUCOMTR-MCNC: 150 MG/DL (ref 70–99)
GLUCOSE BLDC GLUCOMTR-MCNC: 179 MG/DL (ref 70–99)
GLUCOSE BLDC GLUCOMTR-MCNC: 201 MG/DL (ref 70–99)
GLUCOSE BLDC GLUCOMTR-MCNC: 215 MG/DL (ref 70–99)
HCT VFR BLD AUTO: 22.2 % (ref 39–53)
HGB BLD-MCNC: 7.1 G/DL (ref 13–17.5)
HGB BLD-MCNC: 7.2 G/DL (ref 13–17.5)
HGB BLD-MCNC: 7.2 G/DL (ref 13–17.5)
IMM GRANULOCYTES # BLD AUTO: 0.13 X10(3) UL (ref 0–1)
IMM GRANULOCYTES NFR BLD: 1.7 %
LYMPHOCYTES # BLD AUTO: 1.05 X10(3) UL (ref 1–4)
LYMPHOCYTES NFR BLD AUTO: 13.5 %
MAGNESIUM SERPL-MCNC: 1.8 MG/DL (ref 1.6–2.6)
MCH RBC QN AUTO: 28.7 PG (ref 26–34)
MCHC RBC AUTO-ENTMCNC: 32.4 G/DL (ref 31–37)
MCV RBC AUTO: 88.4 FL (ref 80–100)
MONOCYTES # BLD AUTO: 0.82 X10(3) UL (ref 0.1–1)
MONOCYTES NFR BLD AUTO: 10.5 %
NEUTROPHILS # BLD AUTO: 5.72 X10 (3) UL (ref 1.5–7.7)
NEUTROPHILS # BLD AUTO: 5.72 X10(3) UL (ref 1.5–7.7)
NEUTROPHILS NFR BLD AUTO: 73.5 %
OSMOLALITY SERPL CALC.SUM OF ELEC: 295 MOSM/KG (ref 275–295)
PHOSPHATE SERPL-MCNC: 3.2 MG/DL (ref 2.4–5.1)
PHOSPHATE SERPL-MCNC: 3.4 MG/DL (ref 2.4–5.1)
PLATELET # BLD AUTO: 156 10(3)UL (ref 150–450)
POTASSIUM SERPL-SCNC: 4.2 MMOL/L (ref 3.5–5.1)
RBC # BLD AUTO: 2.51 X10(6)UL (ref 3.8–5.8)
SODIUM SERPL-SCNC: 139 MMOL/L (ref 136–145)
VIT B12 SERPL-MCNC: 1020 PG/ML (ref 211–911)
WBC # BLD AUTO: 7.8 X10(3) UL (ref 4–11)

## 2025-06-17 PROCEDURE — 99233 SBSQ HOSP IP/OBS HIGH 50: CPT | Performed by: HOSPITALIST

## 2025-06-17 RX ORDER — TAMSULOSIN HYDROCHLORIDE 0.4 MG/1
0.8 CAPSULE ORAL DAILY
Status: DISCONTINUED | OUTPATIENT
Start: 2025-06-17 | End: 2025-06-24

## 2025-06-17 RX ORDER — PREGABALIN 50 MG/1
100 CAPSULE ORAL 3 TIMES DAILY
Status: DISCONTINUED | OUTPATIENT
Start: 2025-06-17 | End: 2025-06-24

## 2025-06-17 RX ORDER — UBIDECARENONE 75 MG
50 CAPSULE ORAL DAILY
Status: DISCONTINUED | OUTPATIENT
Start: 2025-06-17 | End: 2025-06-24

## 2025-06-17 RX ORDER — MAGNESIUM SULFATE HEPTAHYDRATE 40 MG/ML
2 INJECTION, SOLUTION INTRAVENOUS ONCE
Status: COMPLETED | OUTPATIENT
Start: 2025-06-17 | End: 2025-06-17

## 2025-06-17 NOTE — PROGRESS NOTES
[facility]  part of [facility]    Progress Note    Arnaldo Gutierrez Patient Status:  Inpatient    4/15/1945 MRN [id number]   Location [facility] 4W/SW/SE Attending Boy Mccabe MD   Hosp Day # 12 PCP PHYSICIAN NONSTAFF     Subjective:  Feels ok tolerated liq    Objective/Physical Exam:  General: Alert, orientated x3.  Cooperative.  No apparent distress.  Vital Signs:  Blood pressure 114/55, pulse 81, temperature 98.6 °F (37 °C), temperature source Oral, resp. rate 16, height 5' 11\" (1.803 m), weight 171 lb 4.8 oz (77.7 kg), SpO2 91%.  Abdomen:  Soft, non-distended, non-tender, with no rebound or guarding.  Labs:  Lab Results   Component Value Date    WBC 7.8 2025    HGB 7.2 (L) 2025    HCT 22.2 (L) 2025    .0 2025    CREATSERUM 0.69 (L) 2025    BUN 19 2025     2025    K 4.2 2025     2025    CO2 25.0 2025     (H) 2025    CA 7.9 (L) 2025    ALB 2.4 (L) 2025    ALKPHO 55 2025    BILT 0.4 2025    TP 4.2 (L) 2025    AST 13 2025    ALT 14 2025    TSH 2.417 2025    LIP 22 2025    MG 1.8 2025    PHOS 3.4 2025    B12 1,020 (H) 2025       Imaging:  XR ABDOMEN (1 VIEW) (CPT=74018)  Result Date: 2025  PROCEDURE: XR ABDOMEN (1 VIEW) (CPT=74018)  COMPARISON: [facility], XR CHEST AP PORTABLE (CPT=71045), 2025, 8:58 AM.  [facility], CT ABDOMEN + PELVIS (CONTRAST ONLY) (CPT=74177), 2025, 5:22 PM.  [facility], XR ABDOMEN (1 VIEW) (CPT=74018), 6/10/2025, 6:07 PM.  INDICATIONS: History of presumed colon cancer status post transverse colonic resection and partial small bowel resection on 06/10/2025.  TECHNIQUE:   Single view.   FINDINGS:  BOWEL GAS PATTERN: An enteric tube tip in distal side hole project over the left upper quadrant the abdomen in the expected location of the stomach.   There is bowel gas throughout the abdomen, including the rectum.  No dilated gas-filled loops of bowel are seen to suggest obstruction.  Anastomotic staple lines are again noted in the right upper and left lower quadrants. SOFT TISSUES: Normal.  No masses or organomegaly.  CALCIFICATIONS: None significant. BONES: The osseous structures are unchanged. OTHER: Vertically oriented skin staples again project over the central abdomen.  Additional skin staples project over both lower quadrants and pelvis.  There is ill-defined lucency throughout the abdomen, which is consistent with the pneumoperitoneum visible on the preceding chest x-ray.         CONCLUSION:  1. Postoperative changes from a recent transverse colonic resection and partial small bowel resection including persistent pneumoperitoneum. 2. Nonobstructive bowel gas pattern.     Dictated by (CST): Chuy Schmid MD on 6/13/2025 at 11:38 AM     Finalized by (CST): Chuy Schmid MD on 6/13/2025 at 11:41 AM          XR CHEST AP PORTABLE  (CPT=71045)  Result Date: 6/13/2025  PROCEDURE: XR CHEST AP PORTABLE  (CPT=71045) TIME: 8:59.   COMPARISON: [facility], CT ABDOMEN + PELVIS (CONTRAST ONLY) (CPT=74177), 6/05/2025, 5:22 PM.  [facility], XR CHEST AP PORTABLE (CPT=71045), 6/13/2025, 6:40 AM.  INDICATIONS: Post NG advancement.  Metastatic colon cancer status post transverse colonic resection on 06/10/2025.  Acute postoperative blood loss with hypotension.  TECHNIQUE:   Single view.   FINDINGS:  CARDIAC/VASC: The cardiac silhouette is not enlarged.  There is calcification of the aortic knob.  Unremarkable pulmonary vasculature.  MEDIAST/KRYSTA:   No visible mass or adenopathy. LUNGS/PLEURA: The lungs are again hypoinflated.  Streaky opacities at the lung bases with elevation of the right hemidiaphragm are all unchanged.  No pleural effusion or pneumothorax. BONES: There are mild spondylotic changes in the thoracic spine. OTHER: An  enteric tube has been advanced.  The tip in distal side hole project over the gastric bubble in the left upper quadrant of the abdomen.  A right-sided PICC tip projects over the mid SVC.  Free air is again noted beneath the right hemidiaphragm,  likely related to the recent surgery.         CONCLUSION:  1. Enteric tube has been advanced with tip and distal side hole now projecting over the gastric bubble, in customary position. 2. Expiratory chest with stable atelectasis at both lung bases. 3. Persistent pneumoperitoneum, likely related to the recent abdominal surgery.    Dictated by (CST): Chuy Schmid MD on 6/13/2025 at 9:19 AM     Finalized by (CST): Chuy Schmid MD on 6/13/2025 at 9:21 AM          XR CHEST AP PORTABLE  (CPT=71045)  Result Date: 6/13/2025  PROCEDURE: XR CHEST AP PORTABLE  (CPT=71045) TIME: 6:43.   COMPARISON: [facility], XR ABDOMEN (1 VIEW) (CPT=74018), 6/10/2025, 6:07 PM.  [facility], CT ABDOMEN + PELVIS (CONTRAST ONLY) (CPT=74177), 6/05/2025, 5:22 PM.  INDICATIONS: Hypoxia.  Suspected metastatic colon cancer status post transverse colonic resection on 06/10/2025.  Acute postoperative blood loss with hypotension.  TECHNIQUE:   Single view.   FINDINGS:  CARDIAC/VASC: Normal.  No cardiac silhouette abnormality or cardiomegaly.  Unremarkable pulmonary vasculature.  MEDIAST/KRYSTA:   No visible mass or adenopathy. LUNGS/PLEURA: The lungs are hypoinflated.  Streaky opacities have developed at the lung bases with elevation of the right hemidiaphragm.  No pleural effusion or pneumothorax. BONES: There are mild spondylotic changes in the thoracic spine. OTHER: An enteric tube tip again projects over the gastric bubble.  The distal side hole projects over the expected location of the distal esophagus.  A right-sided PICC tip projects over the mid SVC.  Free air is noted beneath the right hemidiaphragm, likely related to the recent surgery.         CONCLUSION:  1.  Enteric tube tip projects over the stomach, although the distal side hole projects over the distal esophagus.  Consider advancing approximately 7.0 cm into the stomach. 2. Expiratory chest with the development of atelectasis at both lung bases. 3. Pneumoperitoneum, likely related to the recent abdominal surgery.    Dictated by (CST): Chuy Schmid MD on 6/13/2025 at 7:16 AM     Finalized by (CST): Chuy Schmid MD on 6/13/2025 at 7:19 AM          XR ABDOMEN (1 VIEW) (CPT=74018)  Result Date: 6/11/2025  PROCEDURE: XR ABDOMEN (1 VIEW) (CPT=74018)  COMPARISON: [facility], CT ABDOMEN + PELVIS (CONTRAST ONLY) (CPT=74177), 6/05/2025, 5:22 PM.  INDICATIONS: Partially obstructing colon tumor.  Opene tray. image taken due to protocol. Evaluate for foreign body.  TECHNIQUE:   Single view.           CONCLUSION:  1. No retained unexpected surgical foreign body. 2. Postsurgical changes with anastomotic staple lines and skin staples. 3. Catheter in the bladder.    Dictated by (CST): Chong Valdez MD on 6/11/2025 at 1:22 AM     Finalized by (CST): Chong Valdez MD on 6/11/2025 at 1:28 AM          XR FLUOROSCOPY C-ARM TIME LESS THAN 1 HOUR (CPT=76000)  Result Date: 6/6/2025  PROCEDURE: XR FLUORO PHYSICIAN TIME< 1 HOUR (CPT=76000)  COMPARISON: [facility], CT ABDOMEN + PELVIS (CONTRAST ONLY) (CPT=74177), 6/05/2025, 5:22 PM.  INDICATIONS: COLONOSCOPY with colonic stent placement under fluoroscopy.  TECHNIQUE:   FLUOROSCOPY IMAGES OBTAINED:  2 FLUOROSCOPY TIME:  173.8 seconds RADIATION DOSE (Dose Area Product):  [id number]-[id number]*m^2  FINDINGS:   Intraoperative exam was performed.  There has been placement of a proximal colonic stent.         CONCLUSION: Intraoperative exam. Please see operative report for further details.     Dictated by (CST): Frandy Starkey MD on 6/06/2025 at 8:55 PM     Finalized by (CST): Frandy Starkey MD on 6/06/2025 at 8:56 PM          CT ABDOMEN+PELVIS(CONTRAST  ONLY)(CPT=74177)  Result Date: 6/5/2025  PROCEDURE: CT ABDOMEN + PELVIS (CONTRAST ONLY) (CPT=74177)  COMPARISON: [facility] in [facility] ABDOMEN LIMITED (CPT=76705), 9/19/2024, 9:36 AM.  INDICATIONS: abd pain  TECHNIQUE: Multidetector CT images of the abdomen and pelvis were obtained with non-ionic intravenous contrast material. Automated exposure control for dose reduction was used. Adjustment of the mA and/or kV was done based on the patient's size. Iterative reconstruction technique for dose reduction was employed.  FINDINGS: LUNG BASES: The heart is normal in size.  Partially imaged coronary artery and aortic annular calcification.  Subpleural reticular opacities at the periphery of the right lower lobe. There is dependent subsegmental atelectasis bilaterally. LIVER: No enlargement, atrophy, abnormal density, or significant focal lesion is identified.  BILIARY: The gallbladder is present. PANCREAS: No lesion, fluid collection, ductal dilatation, or atrophy.  Small periampullary duodenal diverticulum. SPLEEN: No enlargement.  Calcified granulomas in the spleen. ADRENALS:   No defined mass or abnormal enlargement.  KIDNEYS:   Symmetric enhancement is seen without evidence of hydronephrosis or underlying solid masses.  Simple-appearing 2.4 cm right posterior interpolar renal cyst.  Tiny 4 mm nonobstructing right interpolar renal calculus. GI/MESENTERY:  There is no evidence of bowel obstruction.  Mild to moderate gastric distention noted.  Approximate 4 cm constricting mass at the proximal transverse colon (just beyond the hepatic flexure with marked upstream ascending colonic distention;  there is also mild-to-moderate distal colonic fecal burden; there is fat stranding surrounding the aforementioned constricting mass with adjacent subcentimeter short axis right upper quadrant pericolonic lymph nodes.  Normal appendix. URINARY BLADDER: Well distended urinary bladder, which demonstrates mild wall  thickening. PELVIC NODES: No lymphadenopathy.   PELVIC ORGANS: No visible mass. Pelvic organs appropriate for patient age.  VASCULATURE:   No aneurysm is detected.  Mild atherosclerosis. RETROPERITONEUM: Borderline enlarged 1 cm interaortocaval retroperitoneal lymph node with other prominent but nonenlarged retroperitoneal lymph nodes that measure less than 1 cm short axis. BONES:   Demineralization with levoscoliosis of the lumbar spine and lower lumbar spine degeneration.  Probable benign hemangioma in the L2 vertebral body. ABDOMINAL WALL: No mass or hernia is perceived. OTHER: No free air or fluid is seen in the abdomen or pelvis.          CONCLUSION:  1. Suspicious approximate 4 cm segment of constricting masslike wall thickening at the proximal transverse colon with large upstream colonic fecal burden.  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  Stellate morphology stranding and nodularity in the right upper quadrant mesentery adjacent to the constricting mass probably relates to direct extension of disease.  There are adjacent nonenlarged right upper quadrant mesenteric lymph nodes, which could be reactive or metastatic.  Gastroenterology evaluation and colonoscopy correlation is suggested.  No free intraperitoneal air or well-defined/drainable intra-abdominal collection. 2. Single mildly enlarged interaortocaval retroperitoneal lymph node with other prominent but nonenlarged retroperitoneal nodes.  These could be reactive or metastatic in nature. 3. Coronary and peripheral atherosclerosis with aortic annular calcifications. 4. Reticular opacities at the periphery of the right lung base probably relate to scarring. 5. Mild circumferential urinary bladder wall thickening.  Request urinalysis correlation to assess for potential cystitis.    elm-remote  Dictated by (CST): Jeremie Cedeño MD on 6/05/2025 at 5:47 PM     Finalized by (CST): Jeremie Cedeño MD on 6/05/2025 at 5:58 PM             Assessment/Plan:  Problem List[1]  Doing well   increase to solids   If tolerates start discharge planning  THUY ALSTON MD  6/17/2025  10:38 AM       [1]   Patient Active Problem List  Diagnosis    BPH (benign prostatic hyperplasia)    Chronic low back pain    Chronic pain of both knees    Depression with anxiety    Generalized osteoarthritis    Hyperlipidemia    Type 2 diabetes mellitus without complication, without long-term current use of insulin (HCC)    Essential hypertension    Bipolar I disorder, single manic episode (HCC)    Large bowel obstruction (HCC)    Colonic mass    Diffuse large B-cell lymphoma of solid organ excluding spleen

## 2025-06-17 NOTE — PAYOR COMM NOTE
--------------  CONTINUED STAY REVIEW    Payor: [employer]  Subscriber #:  [id number]  Authorization Number: [id number]      6/17:     Hospitalist Progress Note:   Assessment and Plan:     Diffuse large B-cell lymphoma  Large Bowel obstruction 2/2 Proximal transverse colonic mass  - CT A/P reviewed  - GI and general surgery consulted  - 6/6 s/p colonoscopy with metallic colon stent  - 6/10 - s/p laparoscopic transverse colon resection, small bowel resection x2, resection of tumor nodule on small bowel. Patient appeared to have metastatic disease per Dr. Randall  - pathology - transverse colon - diffuse large B-cell lymphoma. Separate segment with tubular adenoma. Small intestine with extensive involvement of diffuse large B-cell lymphoma  - oncology consulted, appreciate recs. Plans for chemo with curative intent in the future once bowel issues stabilized  - cont TPN - stop when getting enough po  - on zosyn IV empirically. Now stopped.   - started having BMs 6/15.  - NGT removed  - diet advanced to FLD     Acute postoperative blood loss anemia with hypotension  - EBL from surgery 600ml  - s/p 2 units PRBC   - hgb drifted down. Transfuse if hgb <7  - IV iron     Hypotension - resolved  - weaned off pressors     DM2  - ISS     Other:  H/o HTN   HL  BPH - resume flomax      Recent Labs   Lab 06/14/25  0605 06/14/25  1308 06/15/25  0455 06/15/25  1302 06/17/25  0018 06/17/25  0606 06/17/25  1220   WBC 16.4*  --  8.9  --   --  7.8  --    HGB 9.0*  9.0*   < > 7.4*  7.4*   < > 7.1* 7.2* 7.2*   HCT 26.6*  --  22.1*  --   --  22.2*  --    .0*  --  117.0*  --   --  156.0  --    RBC 3.18*  --  2.61*  --   --  2.51*  --    MCV 83.6  --  84.7  --   --  88.4  --    MCH 28.3  --  28.4  --   --  28.7  --    MCHC 33.8  --  33.5  --   --  32.4  --    RDW 18.1*  --  18.4*  --   --  18.3*  --    NEPRELIM 14.54*  --  7.14  --   --  5.72  --      Lab 06/13/25  0725 06/14/25  0605 06/15/25  0455 06/16/25  0616  06/17/25  0606 06/17/25  1113   BUN 13   < > 18 21 19  --    CREATSERUM 0.85   < > 0.66* 0.68* 0.69*  --    CA 7.4*   < > 7.5* 7.9* 7.9*  --    ALB 2.4*  --   --   --   --   --       < > 141 140 139  --    K 3.2*   < > 3.4* 4.1  4.1 4.2  --       < > 105 107 107  --    CO2 24.0   < > 30.0 27.0 25.0  --    *   < > 161* 168* 179*  --    MG 1.9   < > 2.0 1.9 1.8  --    PHOS 2.2*   < > 3.1 3.5 3.4 3.2   BILT 0.4  --   --   --   --   --    AST 13  --   --   --   --   --    ALT 14  --   --   --   --   --    ALKPHO 55  --   --   --   --   --    TP 4.2*  --   --   --   --   --    B12  --   --   --   --  1,020*  --     < > = values in this interval not displayed.         General Surgery Progress Note:     Doing well increase to solids     Imaging:  XR ABDOMEN (1 VIEW) (CPT=74018)  Result Date: 6/13/2025  CONCLUSION:         1. Postoperative changes from a recent transverse colonic resection and partial small bowel resection including persistent pneumoperitoneum. 2. Nonobstructive bowel gas pattern.        XR CHEST AP PORTABLE  (CPT=71045)  Result Date: 6/13/2025       CONCLUSION:         1. Enteric tube has been advanced with tip and distal side hole now projecting over the gastric bubble, in customary position. 2. Expiratory chest with stable atelectasis at both lung bases. 3. Persistent pneumoperitoneum, likely related to the recent abdominal surgery.      XR CHEST AP PORTABLE  (CPT=71045)  Result Date: 6/13/2025  CONCLUSION:         1. Enteric tube tip projects over the stomach, although the distal side hole projects over the distal esophagus.  Consider advancing approximately 7.0 cm into the stomach. 2. Expiratory chest with the development of atelectasis at both lung bases. 3. Pneumoperitoneum, likely related to the recent abdominal surgery.           MEDICATIONS ADMINISTERED IN LAST 1 DAY:  adult 3 in 1 TPN       Date Action Dose Route User    6/16/2025 4050 Rate/Dose Verify (none) Intravenous  Annika Downs RN          adult 3 in 1 TPN       Date Action Dose Route User    6/16/2025 2133 New Bag (none) Intravenous Sara Gomez RN          folic acid (Folvite) tab 1 mg       Date Action Dose Route User    6/17/2025 0821 Given 1 mg Oral Annika Downs RN          HYDROmorphone (Dilaudid) 1 MG/ML injection 0.4 mg       Date Action Dose Route User    6/17/2025 0253 Given 0.4 mg Intravenous Sara Gomez RN          insulin regular human (Novolin R, Humulin R) 100 UNIT/ML injection 1-11 Units       Date Action Dose Route User    6/17/2025 1219 Given 4 Units Subcutaneous (Right Upper Arm) Annika Downs RN    6/17/2025 0611 Given 4 Units Subcutaneous (Right Upper Arm) Sara Gomez RN    6/17/2025 0030 Given 1 Units Subcutaneous (Right Upper Arm) Sara Gomez RN    6/16/2025 1704 Given 4 Units Subcutaneous (Right Upper Arm) Annika Downs RN          magnesium sulfate in sterile water for injection 2 g/50mL IVPB premix 2 g       Date Action Dose Route User    6/17/2025 0934 New Bag 2 g Intravenous Annika Downs RN          pantoprazole (Protonix) 40 mg in sodium chloride 0.9% PF 10 mL IV push       Date Action Dose Route User    6/17/2025 0821 Given 40 mg Intravenous Annika Downs RN          piperacillin-tazobactam (Zosyn) 3.375 g in dextrose 5% 100 mL IVPB-ADDV       Date Action Dose Route User    6/17/2025 0934 New Bag 3.375 g Intravenous Annika Downs RN    6/17/2025 0253 New Bag 3.375 g Intravenous Sara Gomez RN    6/16/2025 1810 New Bag 3.375 g Intravenous Annika Downs RN          sodium ferric gluconate (Ferrlecit) 125 mg in sodium chloride 0.9% 100mL IVPB premix       Date Action Dose Route User    6/17/2025 0821 New Bag 125 mg Intravenous Annika Downs RN    6/16/2025 1648 New Bag 125 mg Intravenous Annika Downs RN            Vitals (last day)       Date/Time Temp Pulse Resp BP SpO2 Weight O2 Device O2 Flow Rate (L/min) Who    06/17/25 1204 98.5 °F (36.9 °C) 78 12  103/47 95 % -- None (Room air) -- GH    06/17/25 0558 -- -- -- -- -- 171 lb 4.8 oz (77.7 kg) -- -- AD    06/17/25 0558 98.6 °F (37 °C) 81 16 114/55 91 % -- None (Room air) -- DS    06/16/25 1944 99.5 °F (37.5 °C) 76 19 101/44 90 % -- None (Room air) -- DS    06/16/25 1242 97.5 °F (36.4 °C) 64 20 103/57 98 % -- -- --     06/16/25 1015 -- 75 -- 107/54 -- -- -- -- GR    06/16/25 0600 -- -- -- -- -- 163 lb 4 oz (74 kg) -- --     06/16/25 0228 99.1 °F (37.3 °C) 74 18 100/55 93 % -- Nasal cannula 2 L/min        Blood Transfusion Record       Product Unit Status Volume Start End            Transfuse RBC       25  [id number]  [id number] Completed 06/11/25 0450 325 mL 06/11/25 0235 06/11/25 0445       25  [id number]  [id number] Completed 06/10/25 2114  06/10/25 1824 06/10/25 2224       25  [id number]  [id number] Completed 06/10/25 1820 350 mL 06/10/25 1758 06/10/25 1808

## 2025-06-17 NOTE — PROGRESS NOTES
[facility]  Progress Note     Arnaldo Gutierrez  : 4/15/1945    Status: Inpatient  Day #: 12    Attending: Boy Mccabe MD  PCP: PHYSICIAN NONSTAFF     Assessment and Plan:    Diffuse large B-cell lymphoma  Large Bowel obstruction 2/2 Proximal transverse colonic mass  - CT A/P reviewed  - GI and general surgery consulted  -  s/p colonoscopy with metallic colon stent  - 6/10 - s/p laparoscopic transverse colon resection, small bowel resection x2, resection of tumor nodule on small bowel. Patient appeared to have metastatic disease per Dr. Randall  - pathology - transverse colon - diffuse large B-cell lymphoma. Separate segment with tubular adenoma. Small intestine with extensive involvement of diffuse large B-cell lymphoma  - oncology consulted, appreciate recs. Plans for chemo with curative intent in the future once bowel issues stabilized  - cont TPN - stop when getting enough po  - on zosyn IV empirically. Now stopped.   - started having BMs 6/15.  - NGT removed  - diet advanced to FLD    Acute postoperative blood loss anemia with hypotension  - EBL from surgery 600ml  - s/p 2 units PRBC   - hgb drifted down. Transfuse if hgb <7  - IV iron    Hypotension - resolved  - weaned off pressors    DM2  - ISS    Other:  H/o HTN   HL  BPH - resume flomax    Dispo:  discharge planning. Will need to be off TPN and adequate PO nutrition. Plans for [facility] on discharge. Insurance auth pending.     Dietitian Malnutrition Assessment    Evaluation for Malnutrition: Criteria for severe malnutrition diagnosis- chronic illness related to   Wt loss greater than 10% in 6 months., Energy intake less than 75% for greater than 1 month.               RD Malnutrition Care Plan:      Body Fat/Muscle Mass:          Physician Assessment     Patient has a diagnosis of severe malnutrition       DVT Mechanical Prophylaxis:   SCDs,    DVT Pharmacologic Prophylaxis   Medication   None               Subjective:      Initial Chief  Complaint:  abd discomfort, nausea, weight loss    Still having frequent watery BMs. Abd pain improved. No n/v. Tolerated liquids.     10 point ROS completed and was negative, except for pertinent positive and negatives stated in subjective.      Objective:      Temp:  [98.5 °F (36.9 °C)-99.5 °F (37.5 °C)] 98.5 °F (36.9 °C)  Pulse:  [76-81] 78  Resp:  [12-19] 12  BP: (101-114)/(44-55) 103/47  SpO2:  [90 %-95 %] 95 %  General:  Alert, no distress  HEENT:  Normocephalic, atraumatic  Cardiac:  Regular rate, regular rhythm  Pulmonary:  Clear to auscultation bilaterally, respirations unlabored  Gastrointestinal:  +BS  Musculoskeletal:  No joint swelling  Extremities:  No edema, no cyanosis, no clubbing  Neurologic:  nonfocal  Psychiatric:  Normal affect, calm and appropriate    Intake/Output Summary (Last 24 hours) at 6/17/2025 1337  Last data filed at 6/17/2025 1204  Gross per 24 hour   Intake 2082.75 ml   Output 2900 ml   Net -817.25 ml         Recent Labs   Lab 06/14/25  0605 06/14/25  1308 06/15/25  0455 06/15/25  1302 06/17/25  0018 06/17/25  0606 06/17/25  1220   WBC 16.4*  --  8.9  --   --  7.8  --    HGB 9.0*  9.0*   < > 7.4*  7.4*   < > 7.1* 7.2* 7.2*   HCT 26.6*  --  22.1*  --   --  22.2*  --    .0*  --  117.0*  --   --  156.0  --    RBC 3.18*  --  2.61*  --   --  2.51*  --    MCV 83.6  --  84.7  --   --  88.4  --    MCH 28.3  --  28.4  --   --  28.7  --    MCHC 33.8  --  33.5  --   --  32.4  --    RDW 18.1*  --  18.4*  --   --  18.3*  --    NEPRELIM 14.54*  --  7.14  --   --  5.72  --     < > = values in this interval not displayed.     Recent Labs   Lab 06/13/25  0725 06/14/25  0605 06/15/25  0455 06/16/25  0616 06/17/25  0606 06/17/25  1113   BUN 13   < > 18 21 19  --    CREATSERUM 0.85   < > 0.66* 0.68* 0.69*  --    CA 7.4*   < > 7.5* 7.9* 7.9*  --    ALB 2.4*  --   --   --   --   --       < > 141 140 139  --    K 3.2*   < > 3.4* 4.1  4.1 4.2  --       < > 105 107 107  --    CO2 24.0    < > 30.0 27.0 25.0  --    *   < > 161* 168* 179*  --    MG 1.9   < > 2.0 1.9 1.8  --    PHOS 2.2*   < > 3.1 3.5 3.4 3.2   BILT 0.4  --   --   --   --   --    AST 13  --   --   --   --   --    ALT 14  --   --   --   --   --    ALKPHO 55  --   --   --   --   --    TP 4.2*  --   --   --   --   --    B12  --   --   --   --  1,020*  --     < > = values in this interval not displayed.       No results found.        Medications:  Scheduled Medications[1]   PRN Meds: PRN Medications[2]    Supplementary Documentation:   DVT Mechanical Prophylaxis:   SCDs,    DVT Pharmacologic Prophylaxis   Medication   None                Code Status: Not on file  Muñoz: External urinary catheter in place  Muñoz Duration (in days): 2  Central line:    ANNELISE: 6/18/2025        **Certification      PHYSICIAN Certification of Need for Inpatient Hospitalization - Initial Certification    Patient will require inpatient services that will reasonably be expected to span two midnight's based on the clinical documentation in H+P.   Based on patients current state of illness, I anticipate that, after discharge, patient will require TBD.        Boy Mccabe MD         [1]    sodium ferric gluconate  125 mg Intravenous Daily    folic acid  1 mg Oral Daily    pantoprazole  40 mg Intravenous Daily    insulin regular human  1-11 Units Subcutaneous Q6H   [2]   phenol    dextrose 10%    ondansetron    acetaminophen    oxyCODONE **OR** oxyCODONE    HYDROmorphone **OR** HYDROmorphone    acetaminophen    glucose **OR** glucose **OR** [DISCONTINUED] glucose-vitamin C **OR** dextrose **OR** [DISCONTINUED] glucose **OR** [DISCONTINUED] glucose **OR** [DISCONTINUED] glucose-vitamin C

## 2025-06-17 NOTE — PLAN OF CARE
Problem: Patient Centered Care  Goal: Patient preferences are identified and integrated in the patient's plan of care  Description: Interventions:  - What would you like us to know as we care for you? I am from home.   - Provide timely, complete, and accurate information to patient/family  - Incorporate patient and family knowledge, values, beliefs, and cultural backgrounds into the planning and delivery of care  - Encourage patient/family to participate in care and decision-making at the level they choose  - Honor patient and family perspectives and choices  Outcome: Progressing     Problem: Diabetes/Glucose Control  Goal: Glucose maintained within prescribed range  Description: INTERVENTIONS:  - Monitor Blood Glucose as ordered  - Assess for signs and symptoms of hyperglycemia and hypoglycemia  - Administer ordered medications to maintain glucose within target range  - Assess barriers to adequate nutritional intake and initiate nutrition consult as needed  - Instruct patient on self management of diabetes  Outcome: Progressing     Problem: Patient/Family Goals  Goal: Patient/Family Long Term Goal  Description: Patient's Long Term Goal: Discharge home     Interventions:  - Monitor vitals  - Monitor labs  - IVF  - Glucose monitoring ACHS  - Clear liquid diet  - Scheduled miralax  - GI on consult  - See additional Care Plan goals for specific interventions  Outcome: Progressing  Goal: Patient/Family Short Term Goal  Description: Patient's Short Term Goal: Improve bowel function    Interventions:   - Full liquid diet  - GI on consult  - Scheduled miralax  - See additional Care Plan goals for specific interventions  Outcome: Progressing     Problem: PAIN - ADULT  Goal: Verbalizes/displays adequate comfort level or patient's stated pain goal  Description: INTERVENTIONS:  - Encourage pt to monitor pain and request assistance  - Assess pain using appropriate pain scale  - Administer analgesics based on type and severity  of pain and evaluate response  - Implement non-pharmacological measures as appropriate and evaluate response  - Consider cultural and social influences on pain and pain management  - Manage/alleviate anxiety  - Utilize distraction and/or relaxation techniques  - Monitor for opioid side effects  - Notify MD/LIP if interventions unsuccessful or patient reports new pain  - Anticipate increased pain with activity and pre-medicate as appropriate  Outcome: Progressing     Problem: RISK FOR INFECTION - ADULT  Goal: Absence of fever/infection during anticipated neutropenic period  Description: INTERVENTIONS  - Monitor WBC  - Administer growth factors as ordered  - Implement neutropenic guidelines  Outcome: Progressing     Problem: SAFETY ADULT - FALL  Goal: Free from fall injury  Description: INTERVENTIONS:  - Assess pt frequently for physical needs  - Identify cognitive and physical deficits and behaviors that affect risk of falls.  - [place] fall precautions as indicated by assessment.  - Educate pt/family on patient safety including physical limitations  - Instruct pt to call for assistance with activity based on assessment  - Modify environment to reduce risk of injury  - Provide assistive devices as appropriate  - Consider OT/PT consult to assist with strengthening/mobility  - Encourage toileting schedule  Outcome: Progressing     Problem: DISCHARGE PLANNING  Goal: Discharge to home or other facility with appropriate resources  Description: INTERVENTIONS:  - Identify barriers to discharge w/pt and caregiver  - Include patient/family/discharge partner in discharge planning  - Arrange for needed discharge resources and transportation as appropriate  - Identify discharge learning needs (meds, wound care, etc)  - Arrange for interpreters to assist at discharge as needed  - Consider post-discharge preferences of patient/family/discharge partner  - Complete POLST form as appropriate  - Assess patient's ability to be  responsible for managing their own health  - Refer to Case Management Department for coordinating discharge planning if the patient needs post-hospital services based on physician/LIP order or complex needs related to functional status, cognitive ability or social support system  Outcome: Progressing     Problem: Altered Communication/Language Barrier  Goal: Patient/Family is able to understand and participate in their care  Description: Interventions:  - Assess communication ability and preferred communication style  - Implement communication aides and strategies  - Use visual cues when possible  - Listen attentively, be patient, do not interrupt  - Minimize distractions  - Allow time for understanding and response  - Establish method for patient to ask for assistance (call light)  - Provide an  as needed  - Communicate barriers and strategies to overcome with those who interact with patient  Outcome: Progressing     Problem: GASTROINTESTINAL - ADULT  Goal: Minimal or absence of nausea and vomiting  Description: INTERVENTIONS:  - Maintain adequate hydration with IV or PO as ordered and tolerated  - Nasogastric tube to low intermittent suction as ordered  - Evaluate effectiveness of ordered antiemetic medications  - Provide nonpharmacologic comfort measures as appropriate  - Advance diet as tolerated, if ordered  - Obtain nutritional consult as needed  - Evaluate fluid balance  Outcome: Progressing  Goal: Maintains or returns to baseline bowel function  Description: INTERVENTIONS:  - Assess bowel function  - Maintain adequate hydration with IV or PO as ordered and tolerated  - Evaluate effectiveness of GI medications  - Encourage mobilization and activity  - Obtain nutritional consult as needed  - Establish a toileting routine/schedule  - Consider collaborating with pharmacy to review patient's medication profile  Outcome: Progressing

## 2025-06-17 NOTE — OCCUPATIONAL THERAPY NOTE
OCCUPATIONAL THERAPY TREATMENT NOTE - INPATIENT        Room Number: 445/445-A     Presenting Problem: Large bowel obstruction    Problem List  Principal Problem:    Large bowel obstruction (HCC)  Active Problems:    Colonic mass    Diffuse large B-cell lymphoma of solid organ excluding spleen      OCCUPATIONAL THERAPY ASSESSMENT   Patient demonstrates fair progress this session, goals remain in progress.    Patient is requiring minimal assist and moderate assist as a result of the following impairments: pain, limited reach, medical status, fatigue.     Patient continues to function below baseline with ADLs and functional mobility.  Next session anticipate patient to progress LE dressing, functional mobility, toilet transfer.  Occupational Therapy will continue to follow patient for duration of hospitalization.     Patient continues to benefit from continued skilled OT services: to promote return to prior level of function and safety with continuous assistance and gradual rehabilitative therapy.     PLAN DURING HOSPITALIZATION  OT Device Recommendations: TBD  OT Treatment Plan: Balance activities, ADL training, Functional transfer training, Patient/Family education, Equipment eval/education, Patient/Family training, Compensatory technique education     SUBJECTIVE  Agreeable to activity     OBJECTIVE  Precautions: Abdominal protective strategies, Bed/chair alarm,  needed ([place])    WEIGHT BEARING RESTRICTION     PAIN ASSESSMENT  Rating: Unable to rate  Location: Abdomen    ACTIVITY TOLERANCE  Room air    ACTIVITIES OF DAILY LIVING ASSESSMENT  AM-PAC ‘6-Clicks’ Inpatient Daily Activity Short Form  How much help from another person does the patient currently need…  -   Putting on and taking off regular lower body clothing?: A Lot  -   Bathing (including washing, rinsing, drying)?: A Lot  -   Toileting, which includes using toilet, bedpan or urinal? : A Lot  -   Putting on and taking off regular upper body  clothing?: A Little  -   Taking care of personal grooming such as brushing teeth?: A Little  -   Eating meals?: A Little    AM-PAC Score:  Score: 15  Approx Degree of Impairment: 56.46%  Standardized Score (AM-PAC Scale): 34.69  CMS Modifier (G-Code): CK    FUNCTIONAL TRANSFER ASSESSMENT  Sit to Stand: Chair  Edge of Bed: Not Tested  Chair: Contact Guard Assist  Toilet Transfer: Not Tested    BED MOBILITY  Rolling: Not Tested  Supine to Sit : Not tested    FUNCTIONAL ADL ASSESSMENT  Grooming Seated: Independent  LB Dressing Seated: Moderate Assist (attempt at figure four - limited rom due to pain)  LB Dressing Standing: Moderate Assist (pull briefs up over hips ; Min a  to maintain balance)    Skilled Therapy Provided: RN cleared pt for participation in occupational therapy session, which was completed in collaboration with PT . Upon arrival, pt was seated in bedside chair and agreeable to activity. No family present during session. Pt benefited from additional time, verbal cues, RW to maximize participation.    Pt with frequent loose bowel movements, increased with sit<>stand transitions. Pt completed x 3 sit<>stand , maintained standing for up to 5min with CGA - [place] during radu care, linen changes and LE dressing management. Pt participated in aspects of LE dressing throughout via partial figure four - to tolerance. Pt with no loss of balance during activity.     EDUCATION PROVIDED  Patient Education : Role of Occupational Therapy; Plan of Care; Other (abdominal sparing strategies)  Patient's Response to Education: Verbalized Understanding; Returned Demonstration    The patient's Approx Degree of Impairment: 56.46% has been calculated based on documentation in the [facility] '6 clicks' Inpatient Daily Activity Short Form.  Research supports that patients with this level of impairment may benefit from rehab.  Final disposition will be made by interdisciplinary medical team.    Patient End of Session: Up in chair, Needs  met, Call light within reach, RN aware of session/findings    OT Goals:  Patients self stated goal is: none stated      Patient will complete functional transfer with SBA  Comment: ongoing     Patient will complete toileting with SBA  Comment: ongoing     Patient will tolerate standing for 2 minutes in prep for adls with SBA   Comment: ongoing             Goals  on: 25  Frequency: 3-5x/week    OT Session Time: 25 minutes  Self-Care Home Management: 25 minutes

## 2025-06-17 NOTE — PLAN OF CARE
Arnaldo is aox4, tolerating diet ADAT, voiding via primofit, incontinent of bowel, ambulating SBA w/ walker. TPN continued. Denies nausea. Pain managed with dilaudid. Electrolytes replaced per protocol. Daughter called and updated on care. Call light within reach, calls appropriately. Safety precautions in place.     Problem: Patient Centered Care  Goal: Patient preferences are identified and integrated in the patient's plan of care  Description: Interventions:  - What would you like us to know as we care for you? I am from home.   - Provide timely, complete, and accurate information to patient/family  - Incorporate patient and family knowledge, values, beliefs, and cultural backgrounds into the planning and delivery of care  - Encourage patient/family to participate in care and decision-making at the level they choose  - Honor patient and family perspectives and choices  Outcome: Progressing     Problem: Diabetes/Glucose Control  Goal: Glucose maintained within prescribed range  Description: INTERVENTIONS:  - Monitor Blood Glucose as ordered  - Assess for signs and symptoms of hyperglycemia and hypoglycemia  - Administer ordered medications to maintain glucose within target range  - Assess barriers to adequate nutritional intake and initiate nutrition consult as needed  - Instruct patient on self management of diabetes  Outcome: Progressing     Problem: Patient/Family Goals  Goal: Patient/Family Long Term Goal  Description: Patient's Long Term Goal: Discharge home     Interventions:  - Monitor vitals  - Monitor labs  - IVF  - Glucose monitoring ACHS  - Clear liquid diet  - Scheduled miralax  - GI on consult  - See additional Care Plan goals for specific interventions  Outcome: Progressing  Goal: Patient/Family Short Term Goal  Description: Patient's Short Term Goal: Improve bowel function    Interventions:   - Full liquid diet  - GI on consult  - Scheduled miralax  - See additional Care Plan goals for specific  interventions  Outcome: Progressing     Problem: PAIN - ADULT  Goal: Verbalizes/displays adequate comfort level or patient's stated pain goal  Description: INTERVENTIONS:  - Encourage pt to monitor pain and request assistance  - Assess pain using appropriate pain scale  - Administer analgesics based on type and severity of pain and evaluate response  - Implement non-pharmacological measures as appropriate and evaluate response  - Consider cultural and social influences on pain and pain management  - Manage/alleviate anxiety  - Utilize distraction and/or relaxation techniques  - Monitor for opioid side effects  - Notify MD/LIP if interventions unsuccessful or patient reports new pain  - Anticipate increased pain with activity and pre-medicate as appropriate  Outcome: Progressing     Problem: RISK FOR INFECTION - ADULT  Goal: Absence of fever/infection during anticipated neutropenic period  Description: INTERVENTIONS  - Monitor WBC  - Administer growth factors as ordered  - Implement neutropenic guidelines  Outcome: Progressing     Problem: SAFETY ADULT - FALL  Goal: Free from fall injury  Description: INTERVENTIONS:  - Assess pt frequently for physical needs  - Identify cognitive and physical deficits and behaviors that affect risk of falls.  - [place] fall precautions as indicated by assessment.  - Educate pt/family on patient safety including physical limitations  - Instruct pt to call for assistance with activity based on assessment  - Modify environment to reduce risk of injury  - Provide assistive devices as appropriate  - Consider OT/PT consult to assist with strengthening/mobility  - Encourage toileting schedule  Outcome: Progressing     Problem: DISCHARGE PLANNING  Goal: Discharge to home or other facility with appropriate resources  Description: INTERVENTIONS:  - Identify barriers to discharge w/pt and caregiver  - Include patient/family/discharge partner in discharge planning  - Arrange for needed  discharge resources and transportation as appropriate  - Identify discharge learning needs (meds, wound care, etc)  - Arrange for interpreters to assist at discharge as needed  - Consider post-discharge preferences of patient/family/discharge partner  - Complete POLST form as appropriate  - Assess patient's ability to be responsible for managing their own health  - Refer to Case Management Department for coordinating discharge planning if the patient needs post-hospital services based on physician/LIP order or complex needs related to functional status, cognitive ability or social support system  Outcome: Progressing     Problem: Altered Communication/Language Barrier  Goal: Patient/Family is able to understand and participate in their care  Description: Interventions:  - Assess communication ability and preferred communication style  - Implement communication aides and strategies  - Use visual cues when possible  - Listen attentively, be patient, do not interrupt  - Minimize distractions  - Allow time for understanding and response  - Establish method for patient to ask for assistance (call light)  - Provide an  as needed  - Communicate barriers and strategies to overcome with those who interact with patient  Outcome: Progressing     Problem: GASTROINTESTINAL - ADULT  Goal: Minimal or absence of nausea and vomiting  Description: INTERVENTIONS:  - Maintain adequate hydration with IV or PO as ordered and tolerated  - Nasogastric tube to low intermittent suction as ordered  - Evaluate effectiveness of ordered antiemetic medications  - Provide nonpharmacologic comfort measures as appropriate  - Advance diet as tolerated, if ordered  - Obtain nutritional consult as needed  - Evaluate fluid balance  Outcome: Progressing  Goal: Maintains or returns to baseline bowel function  Description: INTERVENTIONS:  - Assess bowel function  - Maintain adequate hydration with IV or PO as ordered and tolerated  - Evaluate  effectiveness of GI medications  - Encourage mobilization and activity  - Obtain nutritional consult as needed  - Establish a toileting routine/schedule  - Consider collaborating with pharmacy to review patient's medication profile  Outcome: Progressing

## 2025-06-17 NOTE — PHYSICAL THERAPY NOTE
PHYSICAL THERAPY TREATMENT NOTE - INPATIENT     Room Number: 445/445-A       Presenting Problem: large bowel obstruction post ex lap    Problem List  Principal Problem:    Large bowel obstruction (HCC)  Active Problems:    Colonic mass    Diffuse large B-cell lymphoma of solid organ excluding spleen    PHYSICAL THERAPY ASSESSMENT   Patient demonstrates limited progress this session, goals  remain in progress.      Patient is requiring minimal assist as a result of the following impairments: decreased functional strength, decreased endurance/aerobic capacity, pain, impaired standing balance, decreased muscular endurance, and medical status.     Patient continues to function below baseline with bed mobility, transfers, gait, stair negotiation, maintaining seated position, standing prolonged periods, and performing household tasks.  Next session anticipate patient to progress bed mobility, transfers, gait, stair negotiation, maintaining seated position, standing prolonged periods, and performing household tasks.  Physical Therapy will continue to follow patient for duration of hospitalization.    Patient continues to benefit from continued skilled PT services: to promote return to prior level of function and safety with continuous assistance and gradual rehabilitative therapy .    PLAN DURING HOSPITALIZATION  Nursing Mobility Recommendation : 1 Assist  PT Device Recommendation: Rolling walker  PT Treatment Plan: Bed mobility, Body mechanics, Coordination, Don/doff brace, Endurance, Energy conservation, Gait training, Strengthening, Transfer training, Balance training  Frequency (Obs): 3-5x/week     SUBJECTIVE  Patient agreeable to participation in PT.    OBJECTIVE  Precautions: Abdominal protective strategies, Bed/chair alarm,  needed (Maltese)    PAIN ASSESSMENT   Ratin  Location: abdominal mild    BALANCE  Static Sitting: Good  Dynamic Sitting: Fair  Static Standing: Good  Dynamic Standing:  Fair    AM-PAC '6-Clicks' INPATIENT SHORT FORM - BASIC MOBILITY  How much difficulty does the patient currently have...  Patient Difficulty: Turning over in bed (including adjusting bedclothes, sheets and blankets)?: A Little   Patient Difficulty: Sitting down on and standing up from a chair with arms (e.g., wheelchair, bedside commode, etc.): A Little   Patient Difficulty: Moving from lying on back to sitting on the side of the bed?: A Little   How much help from another person does the patient currently need...   Help from Another: Moving to and from a bed to a chair (including a wheelchair)?: A Little   Help from Another: Need to walk in hospital room?: A Little   Help from Another: Climbing 3-5 steps with a railing?: A Lot     AM-PAC Score:  Raw Score: 17   Approx Degree of Impairment: 50.57%   Standardized Score (AM-PAC Scale): 42.13   CMS Modifier (G-Code): CK    FUNCTIONAL ABILITY STATUS  Functional Mobility/Gait Assessment  Gait Assistance: Not tested  Rolling: not tested  Supine to Sit: not tested (patient up in recliner chair at start/end of session)  Sit to Supine: not tested  Sit to Stand: minimal assist    Skilled Therapy Provided: Patient received seated in recliner chair at initiation of session agreeable to participation in PT. Patient tolerates treatment fairly, most limited due to incontinence in standing. Patient requires minimal assistance to perform standing from chair at initiation of session, bowel and bladder incontinence noted. Patient able to perform static standing activities for ~8 minutes in order to assist with hygiene/radu care. Approximately 3 sit to stand transfers performed during session. Deferred further ambulation due to incontinence. Patient left in bedside chair, lines intact, needs in reach and handoff to RN.    The patient's Approx Degree of Impairment: 50.57% has been calculated based on documentation in the [facility] '6 clicks' Inpatient Daily Activity Short Form.  Research  supports that patients with this level of impairment may benefit from gradual rehab.  Final disposition will be made by interdisciplinary medical team.    Patient End of Session: Up in chair, Needs met, Call light within reach, RN aware of session/findings, All patient questions and concerns addressed, Hospital anti-slip socks, Alarm set    CURRENT GOALS   Goals to be met by: 7/10  Patient Goal Patient's self-stated goal is: unstated   Goal #1 Patient is able to demonstrate supine - sit EOB @ level: supervision      Goal #1   Current Status NOT MET/IN PROGRESS    Goal #2 Patient is able to demonstrate transfers Sit to/from Stand at assistance level: supervision with walker - rolling      Goal #2  Current Status NOT MET/IN PROGRESS    Goal #3 Patient is able to ambulate 50 feet with assist device: walker - rolling at assistance level: minimum assistance   Goal #3   Current Status NOT MET/IN PROGRESS                Goal #5 Patient to demonstrate independence with home activity/exercise instructions provided to patient in preparation for discharge.   Goal #5   Current Status IN PROGRESS/ONGOING    Goal #6     Goal #6  Current Status       Therapeutic Activity: 23 minutes    Malini Jeter PT, DPT

## 2025-06-17 NOTE — CM/SW NOTE
Anticipated therapy need: Gradual Rehabilitative Therapy    CM met with patient at bedside to discuss anticipated therapy need above + provide list of accepting [facility] facilities.   utilized [id number] to [phone], [facility] #[phone].    Patient confirms he is agreeable to [facility], but requests CM/SW contact his daughter Yao to discuss as well.  Patient deferring choice of preferred [facility] to Yao.    CM called patient's daughter aYo and confirmed patient/family agreeable to [facility].  CM emailed [facility] options to Yao at [email or web address] Quotes per request.      Per chart review, patient advanced to clear liquid diet.  Plan to wean TPN.  CM/SW to submit [facility] insurance authorization as discharge approaches for approval.    1234  CM spoke with patient's daughter Yao via phone - confirmed patient/family [facility] choice is [facility].    CM reserved [facility] via [employer] and notified liaison Maury of choice.    CM/SW will likely submit [facility] insurance authorization via [employer] portal 6/18 -- pending continued advancement of diet + discontinuation of TPN.    / to remain available for support and/or discharge planning.     Plan: [facility] - pending insurance authorization, medical clearance    Tammie Raya RN, BSN  Nurse   [phone]

## 2025-06-18 LAB
ANION GAP SERPL CALC-SCNC: 5 MMOL/L (ref 0–18)
BASOPHILS # BLD AUTO: 0.01 X10(3) UL (ref 0–0.2)
BASOPHILS NFR BLD AUTO: 0.1 %
BUN BLD-MCNC: 17 MG/DL (ref 9–23)
BUN/CREAT SERPL: 25 (ref 10–20)
CALCIUM BLD-MCNC: 7.6 MG/DL (ref 8.7–10.4)
CHLORIDE SERPL-SCNC: 104 MMOL/L (ref 98–112)
CO2 SERPL-SCNC: 25 MMOL/L (ref 21–32)
CREAT BLD-MCNC: 0.68 MG/DL (ref 0.7–1.3)
DEPRECATED RDW RBC AUTO: 58.9 FL (ref 35.1–46.3)
EGFRCR SERPLBLD CKD-EPI 2021: 95 ML/MIN/1.73M2 (ref 60–?)
EOSINOPHIL # BLD AUTO: 0.13 X10(3) UL (ref 0–0.7)
EOSINOPHIL NFR BLD AUTO: 1.1 %
ERYTHROCYTE [DISTWIDTH] IN BLOOD BY AUTOMATED COUNT: 18.6 % (ref 11–15)
GLUCOSE BLD-MCNC: 170 MG/DL (ref 70–99)
GLUCOSE BLDC GLUCOMTR-MCNC: 177 MG/DL (ref 70–99)
GLUCOSE BLDC GLUCOMTR-MCNC: 245 MG/DL (ref 70–99)
GLUCOSE BLDC GLUCOMTR-MCNC: 258 MG/DL (ref 70–99)
GLUCOSE BLDC GLUCOMTR-MCNC: 266 MG/DL (ref 70–99)
HCT VFR BLD AUTO: 23.1 % (ref 39–53)
HGB BLD-MCNC: 7.4 G/DL (ref 13–17.5)
IMM GRANULOCYTES # BLD AUTO: 0.17 X10(3) UL (ref 0–1)
IMM GRANULOCYTES NFR BLD: 1.5 %
LYMPHOCYTES # BLD AUTO: 1.18 X10(3) UL (ref 1–4)
LYMPHOCYTES NFR BLD AUTO: 10.3 %
MAGNESIUM SERPL-MCNC: 1.8 MG/DL (ref 1.6–2.6)
MCH RBC QN AUTO: 27.6 PG (ref 26–34)
MCHC RBC AUTO-ENTMCNC: 32 G/DL (ref 31–37)
MCV RBC AUTO: 86.2 FL (ref 80–100)
MONOCYTES # BLD AUTO: 0.87 X10(3) UL (ref 0.1–1)
MONOCYTES NFR BLD AUTO: 7.6 %
NEUTROPHILS # BLD AUTO: 9.06 X10 (3) UL (ref 1.5–7.7)
NEUTROPHILS # BLD AUTO: 9.06 X10(3) UL (ref 1.5–7.7)
NEUTROPHILS NFR BLD AUTO: 79.4 %
OSMOLALITY SERPL CALC.SUM OF ELEC: 284 MOSM/KG (ref 275–295)
PHOSPHATE SERPL-MCNC: 3 MG/DL (ref 2.4–5.1)
PLATELET # BLD AUTO: 229 10(3)UL (ref 150–450)
POTASSIUM SERPL-SCNC: 4.3 MMOL/L (ref 3.5–5.1)
RBC # BLD AUTO: 2.68 X10(6)UL (ref 3.8–5.8)
SODIUM SERPL-SCNC: 134 MMOL/L (ref 136–145)
WBC # BLD AUTO: 11.4 X10(3) UL (ref 4–11)

## 2025-06-18 PROCEDURE — 99233 SBSQ HOSP IP/OBS HIGH 50: CPT | Performed by: HOSPITALIST

## 2025-06-18 RX ORDER — MAGNESIUM SULFATE HEPTAHYDRATE 40 MG/ML
2 INJECTION, SOLUTION INTRAVENOUS ONCE
Status: COMPLETED | OUTPATIENT
Start: 2025-06-18 | End: 2025-06-18

## 2025-06-18 NOTE — PLAN OF CARE
Problem: ALTERED NUTRIENT INTAKE - ADULT  Goal: Nutrient intake appropriate for improving, restoring or maintaining nutritional needs  Description: INTERVENTIONS:  - Assess nutritional status and recommend course of action  - Monitor oral intake, labs, and treatment plans  - Recommend appropriate diets, oral nutritional supplements, and vitamin/mineral supplements  - Off CPN  - Provide specific nutrition education as appropriate  Outcome: Progressing

## 2025-06-18 NOTE — DIETARY NOTE
ADULT NUTRITION REASSESSMENT    Pt is at high downgraded to moderate nutrition risk as pt off CPN and eating very well.  Pt meets severe malnutrition criteria.      RECOMMENDATIONS TO MD:   Expiring CPN ,finishing last bag today as pt tolerating and eating very well of Soft Low fiber diet.      Education completed on use of short term Soft Low fiber diet via Language line, given diet hand out in both Sami and English version.        ADMITTING DIAGNOSIS:  Large bowel obstruction (HCC) [K56.609]  Colonic mass [K63.89]  PERTINENT PAST MEDICAL HISTORY:   Past Medical History:    Anxiety    BPH (benign prostatic hyperplasia)    CKD (chronic kidney disease) stage 3, GFR 30-59 ml/min (HCC)    Depression    Diabetes (HCC)    Diabetes mellitus (HCC)    Essential hypertension    GERD (gastroesophageal reflux disease)    High cholesterol    Hyperlipidemia       PATIENT STATUS:   Initial 06/06/25: Pt assessed due to screening at risk for MST of unsure weight loss. Pt admitted due to large bowel obstruction found. Past medical history significant for CKD, DM, GERD, HTN and hyperlipidemia. Chart reviewed. Attempted to visit patient bedside twice; out of room both times. Currently at colonoscopy. Pt has been NPO for unprepped colonoscopy with possible colonic stent and biopsy; unable to assess intake during admission. Reviewed MD notes and significant for reports of very decreased appetite x2 weeks due to abdominal pain after eating. Complaints of weight loss; ~60lbs in 4-5 months. EMR reviewed and weight loss noted; 1/16/25:169lbs (12% loss in 5 months) 6/7/24: 189lbs (20% lost in 1 yr); both weight losses clinically significant for malnutrition in time frame. Pt has had BM this AM. NFPE deferred. Will complete at reassessment.  6/10/2025 Update: NPO/Cl liq x 5 days. NPO today for Surgery ( Lap Assisted Colon Resection).  Pt was taking Cl liq 100% prior to NPO. Re-visited pt. Utilized Language line (# [id number], Jimmie). Pt  reports was tolerating liquid diet well. + BM this am.   Nutrition history reveals decreased po for > 3 months d/t  loss of appetite r/t depression and recently d/t symptoms of acute GI illness. Wt history reported usual wt of 200# last year (189# in emr) , lost wt to 150-162#. 162# 3 months ago, and lost more wt down to 150# ( this admit). Based on above initial RD eval, 39# or 20.6% significant wt loss in the past year. Nutrition Focus Physical Exam ( NFPE) completed. Pt met Severe Malnutrition criteria. Will monitor GI and NPO status, await diet advance as safe. Otherwise, can consider Nutrition Support Therapy (NST) if unable to advance diet beyond cl liq given pre-existing Malnutrition.   06/12/25 UPDATE: Early reassessment for CPN start. POD #2 s/p lap transverse colon resection, small bowel resection x2, resection of tumor nodule on small bowel. Per surgery, pt appears to have metastatic disease - oncology on consult. Weaned off pressor support. Stable on room air with family and RN at bedside at time of visit. Discussed CPN start with family.   6/16/25 UPDATE: CPN in place--progressing to goal, but slow progression due to elevated glucose. POD #5 transverse colon resection, SBR and resection of tumur on small bowel--diffuse large B-cell lymphoma.  NGT to LIS continues--monitor for clamping trial and diet progression.       6/18/2025 Update:      Expiring CPN, finishing last bag today as pt tolerating and eating very well on Soft/low fiber diet. Ate 100% of dinner last night and 100% of breakfast today. Education completed on use of short term Soft Low fiber diet via Language line. Will start providing Sugar Free Mighty shake supplement BID in view of Malnutrition with increasing nutrient needs. Discussed with MD, RN and pharmacist discontinuation of CPN today.    Downgraded nutrition risk from high to moderate.       FOOD/NUTRITION RELATED HISTORY:  Appetite: Good and Improved  Intake: 100% of dinner and  breakfast today.   Intake Meeting Needs: Yes, and oral nutrition supplements (ONS) to maximize, expiring CPN today.   Percent Meals Eaten (last 6 days)       Date/Time Percent Meals Eaten (%)    06/15/25 0800 --     Percent Meals Eaten (%): NPO/ ice chips at 06/15/25 0800    06/16/25 1830 50 %    06/17/25 1204 80 %    06/17/25 1840 100 %     Percent Meals Eaten (%): pt had turkey, mashed potatoes, yogurt at 06/17/25 1840    06/18/25 1020 100 %        Food Allergies: No Known Food Allergies (NKFA)  Cultural/Ethnic/Mosque Preferences: Not Obtained    GASTROINTESTINAL: +BM this am--soft loose large brown loose  post op     MEDICATIONS: reviewed;   adult 3 in 1 TPN 75 mL/hr at 06/17/25 2001    dextrose 10%        tamsulosin  0.8 mg Oral Daily    pregabalin  100 mg Oral TID    vitamin B-12  50 mcg Oral Daily    sodium ferric gluconate  125 mg Intravenous Daily    folic acid  1 mg Oral Daily    pantoprazole  40 mg Intravenous Daily    insulin regular human  1-11 Units Subcutaneous Q6H     LABS: reviewed , 154, 174, 192  Recent Labs     06/16/25  0616 06/17/25  0606 06/17/25  1113 06/18/25  0557   * 179*  --  170*   BUN 21 19  --  17   CREATSERUM 0.68* 0.69*  --  0.68*   CA 7.9* 7.9*  --  7.6*   MG 1.9 1.8  --  1.8    139  --  134*   K 4.1  4.1 4.2  --  4.3    107  --  104   CO2 27.0 25.0  --  25.0   PHOS 3.5 3.4 3.2 3.0   OSMOCALC 297* 295  --  284     WEIGHT HISTORY:  Patient Weight(s) for the past 336 hrs:   Weight   06/17/25 0558 77.7 kg (171 lb 4.8 oz)   06/16/25 0600 74 kg (163 lb 4 oz)   06/14/25 0600 73.2 kg (161 lb 6 oz)   06/13/25 0459 73.1 kg (161 lb 2.5 oz)   06/11/25 1200 70 kg (154 lb 5.2 oz)   06/10/25 2245 66.2 kg (145 lb 15.1 oz)   06/05/25 2004 68.4 kg (150 lb 12.7 oz)   06/05/25 1608 67.1 kg (147 lb 14.9 oz)     Wt Readings from Last 10 Encounters:   06/17/25 77.7 kg (171 lb 4.8 oz)   05/28/25 67.1 kg (148 lb)   04/09/25 72.6 kg (160 lb)   04/08/25 72.6 kg (160 lb)    03/18/25 81.6 kg (180 lb)   01/16/25 76.7 kg (169 lb)   11/21/24 77.6 kg (171 lb)   08/29/24 78.9 kg (174 lb)   08/25/24 81.6 kg (180 lb)   07/15/24 84.8 kg (187 lb)     ANTHROPOMETRICS:  HT: 180.3 cm (5' 11\")  Wt Readings from Last 1 Encounters:   06/17/25 77.7 kg (171 lb 4.8 oz)    Wt up r/t post op, fluids, etc.   I&Os : 8.0 Liters since admit.   Last weight:  low of 145#    Dosing Weight: 66.2 kg (146 lbs) - recent weight / lowest wt this admission taken on 6/10/25, utilized for anthropometric calculations  BMI: Body mass index is 20.36 kg/m².  BMI CLASSIFICATION: <22 considered underweight for advanced age (>65)  IBW/lbs (Calculated) Male: 172 lbs           85% IBW  Usual Body Wt: 189 lbs    1 yr ago  77% UBW    NUTRITION RELATED PHYSICAL FINDINGS:  - Nutrition Focused Physical Exam (NFPE): deferred--will obtain as able.   - Fluid Accumulation: none . See RN documentation for details  - Skin Integrity: at risk and surgical wound(s). See RN documentation for details    CRITERIA FOR MALNUTRITION DIAGNOSIS:  Criteria for severe malnutrition diagnosis: chronic illness related to wt loss greater than 10% in 6 months and energy intake less than 75% for greater than 1 month.    NUTRITION DIAGNOSIS/PROBLEM:   Severe Malnutrition related to Chronic - Physiological causes increasing nutrient needs due to illness or condition (colonic mass) as evidenced by wt loss greater than 10% in 6 months and energy intake less than 75% for greater than 1 month.    NUTRITION DIAGNOSIS PROGRESS:  Improvement (unresolved)--diet advanced, eating well 100%. Expiring CPN today.     NUTRITION INTERVENTION:     NUTRITION PRESCRIPTION:   Estimated Nutrition needs: --dosing wt of 66.2 kg - wt taken on 6/10/25  Calories: 7117-8414 calories/day (30-35 calories per kg Dosing wt)  Protein: 79-99 g protein/day (1.2-1.5 g protein/kg Dosing wt)  Fluid Needs: 3216-3478 mL (1mL/kcal)    - Diet:       Procedures    Low Fiber/Soft diet Low Fiber/Soft;  Is Patient on Accuchecks? Yes   Parenteral Nutrition: Expring CPN today 6/18   CPN via right basilic triple lumen PICC, 1800 ml total volume, 100 g protein, 900 kcal dextrose (265 g) & 615 kcal lipid. Total energy = 1915 kcal.   Additives and electrolytes ordered. 25 units regular insulin in bag.   Meets 96 % of min estimated kcal and 100% of min estimated protein needs.     - Nutrition Care Plan: Encouraged small frequent meals with emphasis on high calorie/high protein, Initiated ONS (oral nutritional supplements), and Educated patient/family on ways to increase oral intake  - ONS (Oral Nutrition Supplements)/Meals/Snacks: SF Shake (200 calories/ 7 g protein each) BID Vanilla   - Vitamin and mineral supplements: none  - Feeding assistance: independent  - Nutrition education: Short term Soft Low fiber diet  education completed and given both Korean and english diet hand outs.   - Coordination of nutrition care: collaboration with other providers   - Discharge and transfer of nutrition care to new setting or provider: Plan to discharge to [facility].     MONITOR AND EVALUATE/NUTRITION GOALS:  - Food and Nutrient Intake:    Monitor: adequacy of PO intake, tolerance of PO intake, and adequacy of supplement intake  - Food and Nutrient Administration:    Monitor: expiring CPN today 6/18  - Nutrition Goals:    halt wt loss, true wt gain, PO and supplement greater than 75% of needs, labs within acceptable limits, minimize lean body mass loss, support body systems, and improved GI status      DIETITIAN FOLLOW UP: RD to follow and monitor nutrition status      Elana Collins RD, LDN, [facility]  Clinical Dietitian  [phone]

## 2025-06-18 NOTE — PROGRESS NOTES
[facility]  part of [facility]    Progress Note    Arnaldo Gutierrez Patient Status:  Inpatient    4/15/1945 MRN [id number]   Location [facility] 4W/SW/SE Attending Shekhar Johnson MD   Hosp Day # 13 PCP PHYSICIAN NONSTAFF     Subjective:  Feels ok   still loose stool   po ?     Objective/Physical Exam:  General: Alert, orientated x3.  Cooperative.  No apparent distress.  Vital Signs:  Blood pressure 127/67, pulse 84, temperature 98.8 °F (37.1 °C), temperature source Temporal, resp. rate 18, height 5' 11\" (1.803 m), weight 171 lb 4.8 oz (77.7 kg), SpO2 94%.  Abd   neg   wound clean    Labs:  Lab Results   Component Value Date    WBC 11.4 (H) 2025    HGB 7.4 (L) 2025    HCT 23.1 (L) 2025    .0 2025    CREATSERUM 0.69 (L) 2025    BUN 19 2025     2025    K 4.2 2025     2025    CO2 25.0 2025     (H) 2025    CA 7.9 (L) 2025    ALB 2.4 (L) 2025    ALKPHO 55 2025    BILT 0.4 2025    TP 4.2 (L) 2025    AST 13 2025    ALT 14 2025    TSH 2.417 2025    LIP 22 2025    MG 1.8 2025    PHOS 3.2 2025    B12 1,020 (H) 2025       Imaging:  XR ABDOMEN (1 VIEW) (CPT=74018)  Result Date: 2025  PROCEDURE: XR ABDOMEN (1 VIEW) (CPT=74018)  COMPARISON: [facility], XR CHEST AP PORTABLE (CPT=71045), 2025, 8:58 AM.  [facility], CT ABDOMEN + PELVIS (CONTRAST ONLY) (CPT=74177), 2025, 5:22 PM.  [facility], XR ABDOMEN (1 VIEW) (CPT=74018), 6/10/2025, 6:07 PM.  INDICATIONS: History of presumed colon cancer status post transverse colonic resection and partial small bowel resection on 06/10/2025.  TECHNIQUE:   Single view.   FINDINGS:  BOWEL GAS PATTERN: An enteric tube tip in distal side hole project over the left upper quadrant the abdomen in the expected location of the stomach.  There is  bowel gas throughout the abdomen, including the rectum.  No dilated gas-filled loops of bowel are seen to suggest obstruction.  Anastomotic staple lines are again noted in the right upper and left lower quadrants. SOFT TISSUES: Normal.  No masses or organomegaly.  CALCIFICATIONS: None significant. BONES: The osseous structures are unchanged. OTHER: Vertically oriented skin staples again project over the central abdomen.  Additional skin staples project over both lower quadrants and pelvis.  There is ill-defined lucency throughout the abdomen, which is consistent with the pneumoperitoneum visible on the preceding chest x-ray.         CONCLUSION:  1. Postoperative changes from a recent transverse colonic resection and partial small bowel resection including persistent pneumoperitoneum. 2. Nonobstructive bowel gas pattern.     Dictated by (CST): Chuy Schmid MD on 6/13/2025 at 11:38 AM     Finalized by (CST): Chuy Schmid MD on 6/13/2025 at 11:41 AM          XR CHEST AP PORTABLE  (CPT=71045)  Result Date: 6/13/2025  PROCEDURE: XR CHEST AP PORTABLE  (CPT=71045) TIME: 8:59.   COMPARISON: [facility], CT ABDOMEN + PELVIS (CONTRAST ONLY) (CPT=74177), 6/05/2025, 5:22 PM.  [facility], XR CHEST AP PORTABLE (CPT=71045), 6/13/2025, 6:40 AM.  INDICATIONS: Post NG advancement.  Metastatic colon cancer status post transverse colonic resection on 06/10/2025.  Acute postoperative blood loss with hypotension.  TECHNIQUE:   Single view.   FINDINGS:  CARDIAC/VASC: The cardiac silhouette is not enlarged.  There is calcification of the aortic knob.  Unremarkable pulmonary vasculature.  MEDIAST/KRYSTA:   No visible mass or adenopathy. LUNGS/PLEURA: The lungs are again hypoinflated.  Streaky opacities at the lung bases with elevation of the right hemidiaphragm are all unchanged.  No pleural effusion or pneumothorax. BONES: There are mild spondylotic changes in the thoracic spine. OTHER: An enteric  tube has been advanced.  The tip in distal side hole project over the gastric bubble in the left upper quadrant of the abdomen.  A right-sided PICC tip projects over the mid SVC.  Free air is again noted beneath the right hemidiaphragm,  likely related to the recent surgery.         CONCLUSION:  1. Enteric tube has been advanced with tip and distal side hole now projecting over the gastric bubble, in customary position. 2. Expiratory chest with stable atelectasis at both lung bases. 3. Persistent pneumoperitoneum, likely related to the recent abdominal surgery.    Dictated by (CST): Chuy Schmid MD on 6/13/2025 at 9:19 AM     Finalized by (CST): Chuy Schmid MD on 6/13/2025 at 9:21 AM          XR CHEST AP PORTABLE  (CPT=71045)  Result Date: 6/13/2025  PROCEDURE: XR CHEST AP PORTABLE  (CPT=71045) TIME: 6:43.   COMPARISON: [facility], XR ABDOMEN (1 VIEW) (CPT=74018), 6/10/2025, 6:07 PM.  [facility], CT ABDOMEN + PELVIS (CONTRAST ONLY) (CPT=74177), 6/05/2025, 5:22 PM.  INDICATIONS: Hypoxia.  Suspected metastatic colon cancer status post transverse colonic resection on 06/10/2025.  Acute postoperative blood loss with hypotension.  TECHNIQUE:   Single view.   FINDINGS:  CARDIAC/VASC: Normal.  No cardiac silhouette abnormality or cardiomegaly.  Unremarkable pulmonary vasculature.  MEDIAST/KRYSTA:   No visible mass or adenopathy. LUNGS/PLEURA: The lungs are hypoinflated.  Streaky opacities have developed at the lung bases with elevation of the right hemidiaphragm.  No pleural effusion or pneumothorax. BONES: There are mild spondylotic changes in the thoracic spine. OTHER: An enteric tube tip again projects over the gastric bubble.  The distal side hole projects over the expected location of the distal esophagus.  A right-sided PICC tip projects over the mid SVC.  Free air is noted beneath the right hemidiaphragm, likely related to the recent surgery.         CONCLUSION:  1. Enteric  tube tip projects over the stomach, although the distal side hole projects over the distal esophagus.  Consider advancing approximately 7.0 cm into the stomach. 2. Expiratory chest with the development of atelectasis at both lung bases. 3. Pneumoperitoneum, likely related to the recent abdominal surgery.    Dictated by (CST): Chuy Schmid MD on 6/13/2025 at 7:16 AM     Finalized by (CST): Chuy Schmid MD on 6/13/2025 at 7:19 AM          XR ABDOMEN (1 VIEW) (CPT=74018)  Result Date: 6/11/2025  PROCEDURE: XR ABDOMEN (1 VIEW) (CPT=74018)  COMPARISON: [facility], CT ABDOMEN + PELVIS (CONTRAST ONLY) (CPT=74177), 6/05/2025, 5:22 PM.  INDICATIONS: Partially obstructing colon tumor.  Opene tray. image taken due to protocol. Evaluate for foreign body.  TECHNIQUE:   Single view.           CONCLUSION:  1. No retained unexpected surgical foreign body. 2. Postsurgical changes with anastomotic staple lines and skin staples. 3. Catheter in the bladder.    Dictated by (CST): Chong Valdez MD on 6/11/2025 at 1:22 AM     Finalized by (CST): Chong Valdez MD on 6/11/2025 at 1:28 AM          XR FLUOROSCOPY C-ARM TIME LESS THAN 1 HOUR (CPT=76000)  Result Date: 6/6/2025  PROCEDURE: XR FLUORO PHYSICIAN TIME< 1 HOUR (CPT=76000)  COMPARISON: [facility], CT ABDOMEN + PELVIS (CONTRAST ONLY) (CPT=74177), 6/05/2025, 5:22 PM.  INDICATIONS: COLONOSCOPY with colonic stent placement under fluoroscopy.  TECHNIQUE:   FLUOROSCOPY IMAGES OBTAINED:  2 FLUOROSCOPY TIME:  173.8 seconds RADIATION DOSE (Dose Area Product):  [id number]-[id number]*m^2  FINDINGS:   Intraoperative exam was performed.  There has been placement of a proximal colonic stent.         CONCLUSION: Intraoperative exam. Please see operative report for further details.     Dictated by (CST): Frandy Starkey MD on 6/06/2025 at 8:55 PM     Finalized by (CST): Frandy Starkey MD on 6/06/2025 at 8:56 PM          CT ABDOMEN+PELVIS(CONTRAST  ONLY)(CPT=74177)  Result Date: 6/5/2025  PROCEDURE: CT ABDOMEN + PELVIS (CONTRAST ONLY) (CPT=74177)  COMPARISON: [facility] ABDOMEN LIMITED (CPT=76705), 9/19/2024, 9:36 AM.  INDICATIONS: abd pain  TECHNIQUE: Multidetector CT images of the abdomen and pelvis were obtained with non-ionic intravenous contrast material. Automated exposure control for dose reduction was used. Adjustment of the mA and/or kV was done based on the patient's size. Iterative reconstruction technique for dose reduction was employed.  FINDINGS: LUNG BASES: The heart is normal in size.  Partially imaged coronary artery and aortic annular calcification.  Subpleural reticular opacities at the periphery of the right lower lobe. There is dependent subsegmental atelectasis bilaterally. LIVER: No enlargement, atrophy, abnormal density, or significant focal lesion is identified.  BILIARY: The gallbladder is present. PANCREAS: No lesion, fluid collection, ductal dilatation, or atrophy.  Small periampullary duodenal diverticulum. SPLEEN: No enlargement.  Calcified granulomas in the spleen. ADRENALS:   No defined mass or abnormal enlargement.  KIDNEYS:   Symmetric enhancement is seen without evidence of hydronephrosis or underlying solid masses.  Simple-appearing 2.4 cm right posterior interpolar renal cyst.  Tiny 4 mm nonobstructing right interpolar renal calculus. GI/MESENTERY:  There is no evidence of bowel obstruction.  Mild to moderate gastric distention noted.  Approximate 4 cm constricting mass at the proximal transverse colon (just beyond the hepatic flexure with marked upstream ascending colonic distention;  there is also mild-to-moderate distal colonic fecal burden; there is fat stranding surrounding the aforementioned constricting mass with adjacent subcentimeter short axis right upper quadrant pericolonic lymph nodes.  Normal appendix. URINARY BLADDER: Well distended urinary bladder, which demonstrates mild wall  thickening. PELVIC NODES: No lymphadenopathy.   PELVIC ORGANS: No visible mass. Pelvic organs appropriate for patient age.  VASCULATURE:   No aneurysm is detected.  Mild atherosclerosis. RETROPERITONEUM: Borderline enlarged 1 cm interaortocaval retroperitoneal lymph node with other prominent but nonenlarged retroperitoneal lymph nodes that measure less than 1 cm short axis. BONES:   Demineralization with levoscoliosis of the lumbar spine and lower lumbar spine degeneration.  Probable benign hemangioma in the L2 vertebral body. ABDOMINAL WALL: No mass or hernia is perceived. OTHER: No free air or fluid is seen in the abdomen or pelvis.          CONCLUSION:  1. Suspicious approximate 4 cm segment of constricting masslike wall thickening at the proximal transverse colon with large upstream colonic fecal burden.  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  Stellate morphology stranding and nodularity in the right upper quadrant mesentery adjacent to the constricting mass probably relates to direct extension of disease.  There are adjacent nonenlarged right upper quadrant mesenteric lymph nodes, which could be reactive or metastatic.  Gastroenterology evaluation and colonoscopy correlation is suggested.  No free intraperitoneal air or well-defined/drainable intra-abdominal collection. 2. Single mildly enlarged interaortocaval retroperitoneal lymph node with other prominent but nonenlarged retroperitoneal nodes.  These could be reactive or metastatic in nature. 3. Coronary and peripheral atherosclerosis with aortic annular calcifications. 4. Reticular opacities at the periphery of the right lung base probably relate to scarring. 5. Mild circumferential urinary bladder wall thickening.  Request urinalysis correlation to assess for potential cystitis.    elm-remote  Dictated by (CST): Jeremie Cedeño MD on 6/05/2025 at 5:47 PM     Finalized by (CST): Jeremie Cedeño MD on 6/05/2025 at 5:58 PM             Assessment/Plan:  Problem List[1]  Ok for discharge if tolerating solids    May have dietary see  Instructions given  Shower prn  To make appt for one week  THUY ALSTON MD  6/18/2025  9:24 AM       [1]   Patient Active Problem List  Diagnosis    BPH (benign prostatic hyperplasia)    Chronic low back pain    Chronic pain of both knees    Depression with anxiety    Generalized osteoarthritis    Hyperlipidemia    Type 2 diabetes mellitus without complication, without long-term current use of insulin (HCC)    Essential hypertension    Bipolar I disorder, single manic episode (HCC)    Large bowel obstruction (HCC)    Colonic mass    Diffuse large B-cell lymphoma of solid organ excluding spleen

## 2025-06-18 NOTE — PROGRESS NOTES
[facility]  part of [facility]    Progress Note    Arnaldo Gutierrez Patient Status:  Inpatient    4/15/1945 MRN [id number]   Location [facility] 4W/SW/SE Attending Shekhar Johnson MD   Hosp Day # 13 PCP PHYSICIAN NONSTAFF       Subjective:     Feeling well.  No abd pain.  Tolerating PO diet and having BMs.    Objective:   Blood pressure 110/53, pulse 79, temperature 100 °F (37.8 °C), temperature source Oral, resp. rate 16, height 71\", weight 171 lb 4.8 oz (77.7 kg), SpO2 97%.    Gen:   NAD.  A and O x 3  CV:   RRR, no m/g/r  Pulm:   CTA bilat  Abd:   +bs, soft, NT, ND  LE:   No c/c/e  Neuro:   nonfocal    Results:     Lab Results   Component Value Date    WBC 11.4 (H) 2025    HGB 7.4 (L) 2025    HCT 23.1 (L) 2025    .0 2025    CREATSERUM 0.68 (L) 2025    BUN 17 2025     (L) 2025    K 4.3 2025     2025    CO2 25.0 2025     (H) 2025    CA 7.6 (L) 2025    ALB 2.4 (L) 2025    ALKPHO 55 2025    BILT 0.4 2025    TP 4.2 (L) 2025    AST 13 2025    ALT 14 2025    TSH 2.417 2025    LIP 22 2025    MG 1.8 2025    PHOS 3.0 2025    B12 1,020 (H) 2025       No results found.        Assessment and Plan:     Diffuse large B-cell lymphoma  Large Bowel obstruction due to Proximal transverse colonic mass  - CT A/P reviewed  - GI and general surgery consulted  -  s/p colonoscopy with metallic colon stent  - 6/10 - s/p laparoscopic transverse colon resection, small bowel resection x2, resection of tumor nodule on small bowel. Patient appeared to have metastatic disease per Dr. Randall  - pathology - transverse colon - diffuse large B-cell lymphoma. Separate segment with tubular adenoma. Small intestine with extensive involvement of diffuse large B-cell lymphoma  - oncology consulted, appreciate recs. Plans for chemo with curative intent in the  future once bowel issues stabilized  - taking PO now.  TPN stopping today.  - was on zosyn IV empirically. Now stopped.   - started having BMs 6/15.  - NGT removed  - soft diet     Acute postoperative blood loss anemia with hypotension  - EBL from surgery 600ml  - s/p 2 units PRBC   - hgb drifted down. Transfuse if hgb <7  - IV iron     Hypotension - resolved  - weaned off pressors     DM2  - ISS     Other:  H/o HTN   HL  BPH - resumed flomax     Dispo:  discharge planning.   Taking PO now.   DC to [facility] pending insurance auth.     Dietitian Malnutrition Assessment     Evaluation for Malnutrition: Criteria for severe malnutrition diagnosis- chronic illness related to   Wt loss greater than 10% in 6 months., Energy intake less than 75% for greater than 1 month.                RD Malnutrition Care Plan:       Body Fat/Muscle Mass:           Physician Assessment      Patient has a diagnosis of severe malnutrition     dvt proph:    SCDs    Code status:    Full       MDM:    High Shekhar Schwarz MD  6/18/2025

## 2025-06-18 NOTE — CM/SW NOTE
Department  asked to send updates to [facility] referral for [facility].    Assigned SW/CM to follow up with patient/family on discharge plan.     Tiffani Wiggins, DSC

## 2025-06-18 NOTE — DISCHARGE INSTRUCTIONS
Diet Instruction: Use of  Glucerna or equivalent Oral Nutrition Supplement 1-2 can(s) or pack(s) /day to maximize po intake given pre-existing Malnutrition.

## 2025-06-18 NOTE — PLAN OF CARE
Problem: Patient Centered Care  Goal: Patient preferences are identified and integrated in the patient's plan of care  Description: Interventions:  - What would you like us to know as we care for you? I am from home.   - Provide timely, complete, and accurate information to patient/family  - Incorporate patient and family knowledge, values, beliefs, and cultural backgrounds into the planning and delivery of care  - Encourage patient/family to participate in care and decision-making at the level they choose  - Honor patient and family perspectives and choices  Outcome: Progressing     Problem: Diabetes/Glucose Control  Goal: Glucose maintained within prescribed range  Description: INTERVENTIONS:  - Monitor Blood Glucose as ordered  - Assess for signs and symptoms of hyperglycemia and hypoglycemia  - Administer ordered medications to maintain glucose within target range  - Assess barriers to adequate nutritional intake and initiate nutrition consult as needed  - Instruct patient on self management of diabetes  Outcome: Progressing     Problem: Patient/Family Goals  Goal: Patient/Family Long Term Goal  Description: Patient's Long Term Goal: Discharge home     Interventions:  - Monitor vitals  - Monitor labs  - IVF  - Glucose monitoring ACHS  - Clear liquid diet  - Scheduled miralax  - GI on consult  - See additional Care Plan goals for specific interventions  Outcome: Progressing  Goal: Patient/Family Short Term Goal  Description: Patient's Short Term Goal: Improve bowel function    Interventions:   - Full liquid diet  - GI on consult  - Scheduled miralax  - See additional Care Plan goals for specific interventions  Outcome: Progressing     Problem: PAIN - ADULT  Goal: Verbalizes/displays adequate comfort level or patient's stated pain goal  Description: INTERVENTIONS:  - Encourage pt to monitor pain and request assistance  - Assess pain using appropriate pain scale  - Administer analgesics based on type and severity  of pain and evaluate response  - Implement non-pharmacological measures as appropriate and evaluate response  - Consider cultural and social influences on pain and pain management  - Manage/alleviate anxiety  - Utilize distraction and/or relaxation techniques  - Monitor for opioid side effects  - Notify MD/LIP if interventions unsuccessful or patient reports new pain  - Anticipate increased pain with activity and pre-medicate as appropriate  Outcome: Progressing     Problem: RISK FOR INFECTION - ADULT  Goal: Absence of fever/infection during anticipated neutropenic period  Description: INTERVENTIONS  - Monitor WBC  - Administer growth factors as ordered  - Implement neutropenic guidelines  Outcome: Progressing     Problem: SAFETY ADULT - FALL  Goal: Free from fall injury  Description: INTERVENTIONS:  - Assess pt frequently for physical needs  - Identify cognitive and physical deficits and behaviors that affect risk of falls.  - [place] fall precautions as indicated by assessment.  - Educate pt/family on patient safety including physical limitations  - Instruct pt to call for assistance with activity based on assessment  - Modify environment to reduce risk of injury  - Provide assistive devices as appropriate  - Consider OT/PT consult to assist with strengthening/mobility  - Encourage toileting schedule  Outcome: Progressing     Problem: DISCHARGE PLANNING  Goal: Discharge to home or other facility with appropriate resources  Description: INTERVENTIONS:  - Identify barriers to discharge w/pt and caregiver  - Include patient/family/discharge partner in discharge planning  - Arrange for needed discharge resources and transportation as appropriate  - Identify discharge learning needs (meds, wound care, etc)  - Arrange for interpreters to assist at discharge as needed  - Consider post-discharge preferences of patient/family/discharge partner  - Complete POLST form as appropriate  - Assess patient's ability to be  responsible for managing their own health  - Refer to Case Management Department for coordinating discharge planning if the patient needs post-hospital services based on physician/LIP order or complex needs related to functional status, cognitive ability or social support system  Outcome: Progressing     Problem: Altered Communication/Language Barrier  Goal: Patient/Family is able to understand and participate in their care  Description: Interventions:  - Assess communication ability and preferred communication style  - Implement communication aides and strategies  - Use visual cues when possible  - Listen attentively, be patient, do not interrupt  - Minimize distractions  - Allow time for understanding and response  - Establish method for patient to ask for assistance (call light)  - Provide an  as needed  - Communicate barriers and strategies to overcome with those who interact with patient  Outcome: Progressing     Problem: GASTROINTESTINAL - ADULT  Goal: Minimal or absence of nausea and vomiting  Description: INTERVENTIONS:  - Maintain adequate hydration with IV or PO as ordered and tolerated  - Nasogastric tube to low intermittent suction as ordered  - Evaluate effectiveness of ordered antiemetic medications  - Provide nonpharmacologic comfort measures as appropriate  - Advance diet as tolerated, if ordered  - Obtain nutritional consult as needed  - Evaluate fluid balance  Outcome: Progressing  Goal: Maintains or returns to baseline bowel function  Description: INTERVENTIONS:  - Assess bowel function  - Maintain adequate hydration with IV or PO as ordered and tolerated  - Evaluate effectiveness of GI medications  - Encourage mobilization and activity  - Obtain nutritional consult as needed  - Establish a toileting routine/schedule  - Consider collaborating with pharmacy to review patient's medication profile  Outcome: Progressing

## 2025-06-18 NOTE — CM/SW NOTE
Patient discussed in rounds, patient tolerating general diet & plan to stop TPN tonight. Request for insurance auth to be submitted.    SHAMA Dalton [phone]   Statement Selected

## 2025-06-19 ENCOUNTER — APPOINTMENT (OUTPATIENT)
Dept: CT IMAGING | Facility: HOSPITAL | Age: 80
End: 2025-06-19
Payer: MEDICARE

## 2025-06-19 ENCOUNTER — APPOINTMENT (OUTPATIENT)
Dept: INTERVENTIONAL RADIOLOGY/VASCULAR | Facility: HOSPITAL | Age: 80
End: 2025-06-19
Attending: STUDENT IN AN ORGANIZED HEALTH CARE EDUCATION/TRAINING PROGRAM
Payer: MEDICARE

## 2025-06-19 LAB
ANION GAP SERPL CALC-SCNC: 6 MMOL/L (ref 0–18)
BASOPHILS # BLD AUTO: 0.02 X10(3) UL (ref 0–0.2)
BASOPHILS NFR BLD AUTO: 0.1 %
BUN BLD-MCNC: 20 MG/DL (ref 9–23)
BUN/CREAT SERPL: 27 (ref 10–20)
CALCIUM BLD-MCNC: 7.5 MG/DL (ref 8.7–10.4)
CHLORIDE SERPL-SCNC: 102 MMOL/L (ref 98–112)
CO2 SERPL-SCNC: 24 MMOL/L (ref 21–32)
CREAT BLD-MCNC: 0.74 MG/DL (ref 0.7–1.3)
DEPRECATED RDW RBC AUTO: 56.5 FL (ref 35.1–46.3)
EGFRCR SERPLBLD CKD-EPI 2021: 93 ML/MIN/1.73M2 (ref 60–?)
EOSINOPHIL # BLD AUTO: 0.1 X10(3) UL (ref 0–0.7)
EOSINOPHIL NFR BLD AUTO: 0.6 %
ERYTHROCYTE [DISTWIDTH] IN BLOOD BY AUTOMATED COUNT: 18.3 % (ref 11–15)
GLUCOSE BLD-MCNC: 146 MG/DL (ref 70–99)
GLUCOSE BLDC GLUCOMTR-MCNC: 160 MG/DL (ref 70–99)
GLUCOSE BLDC GLUCOMTR-MCNC: 165 MG/DL (ref 70–99)
GLUCOSE BLDC GLUCOMTR-MCNC: 175 MG/DL (ref 70–99)
GLUCOSE BLDC GLUCOMTR-MCNC: 221 MG/DL (ref 70–99)
GLUCOSE BLDC GLUCOMTR-MCNC: 245 MG/DL (ref 70–99)
GLUCOSE BLDC GLUCOMTR-MCNC: 253 MG/DL (ref 70–99)
HCT VFR BLD AUTO: 22 % (ref 39–53)
HGB BLD-MCNC: 7.3 G/DL (ref 13–17.5)
IMM GRANULOCYTES # BLD AUTO: 0.17 X10(3) UL (ref 0–1)
IMM GRANULOCYTES NFR BLD: 1.1 %
LYMPHOCYTES # BLD AUTO: 1.46 X10(3) UL (ref 1–4)
LYMPHOCYTES NFR BLD AUTO: 9.1 %
MAGNESIUM SERPL-MCNC: 1.8 MG/DL (ref 1.6–2.6)
MCH RBC QN AUTO: 28.1 PG (ref 26–34)
MCHC RBC AUTO-ENTMCNC: 33.2 G/DL (ref 31–37)
MCV RBC AUTO: 84.6 FL (ref 80–100)
MONOCYTES # BLD AUTO: 1.11 X10(3) UL (ref 0.1–1)
MONOCYTES NFR BLD AUTO: 6.9 %
NEUTROPHILS # BLD AUTO: 13.12 X10 (3) UL (ref 1.5–7.7)
NEUTROPHILS # BLD AUTO: 13.12 X10(3) UL (ref 1.5–7.7)
NEUTROPHILS NFR BLD AUTO: 82.2 %
OSMOLALITY SERPL CALC.SUM OF ELEC: 279 MOSM/KG (ref 275–295)
PLATELET # BLD AUTO: 284 10(3)UL (ref 150–450)
POTASSIUM SERPL-SCNC: 4.6 MMOL/L (ref 3.5–5.1)
RBC # BLD AUTO: 2.6 X10(6)UL (ref 3.8–5.8)
SODIUM SERPL-SCNC: 132 MMOL/L (ref 136–145)
WBC # BLD AUTO: 16 X10(3) UL (ref 4–11)

## 2025-06-19 PROCEDURE — 99233 SBSQ HOSP IP/OBS HIGH 50: CPT | Performed by: HOSPITALIST

## 2025-06-19 PROCEDURE — 74177 CT ABD & PELVIS W/CONTRAST: CPT

## 2025-06-19 PROCEDURE — 99232 SBSQ HOSP IP/OBS MODERATE 35: CPT | Performed by: STUDENT IN AN ORGANIZED HEALTH CARE EDUCATION/TRAINING PROGRAM

## 2025-06-19 PROCEDURE — 0W9G3ZZ DRAINAGE OF PERITONEAL CAVITY, PERCUTANEOUS APPROACH: ICD-10-PCS | Performed by: RADIOLOGY

## 2025-06-19 RX ORDER — HYDROCODONE BITARTRATE AND ACETAMINOPHEN 5; 325 MG/1; MG/1
1-2 TABLET ORAL EVERY 6 HOURS PRN
Qty: 30 TABLET | Refills: 0 | Status: SHIPPED | OUTPATIENT
Start: 2025-06-19 | End: 2025-06-24

## 2025-06-19 RX ORDER — HYDROMORPHONE HYDROCHLORIDE 1 MG/ML
0.5 INJECTION, SOLUTION INTRAMUSCULAR; INTRAVENOUS; SUBCUTANEOUS EVERY 4 HOURS PRN
Status: DISCONTINUED | OUTPATIENT
Start: 2025-06-19 | End: 2025-06-24

## 2025-06-19 RX ORDER — POLYETHYLENE GLYCOL 3350 17 G/17G
17 POWDER, FOR SOLUTION ORAL DAILY
Status: SHIPPED | COMMUNITY
Start: 2025-06-19 | End: 2025-06-23

## 2025-06-19 RX ORDER — HYDROMORPHONE HYDROCHLORIDE 1 MG/ML
1.2 INJECTION, SOLUTION INTRAMUSCULAR; INTRAVENOUS; SUBCUTANEOUS EVERY 2 HOUR PRN
Status: DISCONTINUED | OUTPATIENT
Start: 2025-06-19 | End: 2025-06-24

## 2025-06-19 RX ORDER — MAGNESIUM SULFATE HEPTAHYDRATE 40 MG/ML
2 INJECTION, SOLUTION INTRAVENOUS ONCE
Status: COMPLETED | OUTPATIENT
Start: 2025-06-19 | End: 2025-06-19

## 2025-06-19 RX ORDER — HYDROMORPHONE HYDROCHLORIDE 1 MG/ML
0.8 INJECTION, SOLUTION INTRAMUSCULAR; INTRAVENOUS; SUBCUTANEOUS EVERY 2 HOUR PRN
Status: DISCONTINUED | OUTPATIENT
Start: 2025-06-19 | End: 2025-06-24

## 2025-06-19 RX ORDER — SODIUM CHLORIDE 9 MG/ML
INJECTION, SOLUTION INTRAVENOUS CONTINUOUS
Status: DISCONTINUED | OUTPATIENT
Start: 2025-06-19 | End: 2025-06-23

## 2025-06-19 NOTE — PLAN OF CARE
Problem: Patient Centered Care  Goal: Patient preferences are identified and integrated in the patient's plan of care  Description: Interventions:  - What would you like us to know as we care for you? I am from home.   - Provide timely, complete, and accurate information to patient/family  - Incorporate patient and family knowledge, values, beliefs, and cultural backgrounds into the planning and delivery of care  - Encourage patient/family to participate in care and decision-making at the level they choose  - Honor patient and family perspectives and choices  Outcome: Progressing   Patient post op day 8. Midline incision, and lap sites open to air staples intact. Abdominal pain managed with Oxy and Dilaudid. Patient has been tolerating diet appetite is good, TPN will be discontinued tonight.  Patient had 2 loose incontinent BMs. Primofit in place patient is having good urine output. Fall precautions in place.

## 2025-06-19 NOTE — CM/SW NOTE
Prior Authorization - Destination  Destination Type: Skilled nursing facility  Service Provider: Rey  Payer Communication Destination Comments: [place] [phone]  Prior Authorization Status: Approved  Prior Authorization Start Date: 06/19/25 6/19 to 6/23    Tiffani Wiggins DSC

## 2025-06-19 NOTE — OCCUPATIONAL THERAPY NOTE
OCCUPATIONAL THERAPY TREATMENT NOTE - INPATIENT        Room Number: 445/445-A     Presenting Problem: Large bowel obstruction    Problem List  Principal Problem:    Large bowel obstruction (HCC)  Active Problems:    Colonic mass    Diffuse large B-cell lymphoma of solid organ excluding spleen      OCCUPATIONAL THERAPY ASSESSMENT   Patient demonstrates fair progress this session, goals remain in progress.     Patient is requiring minimal assist and moderate assist as a result of the following impairments: pain, limited reach, medical status, fatigue.      Patient continues to function below baseline with ADLs and functional mobility.  Next session anticipate patient to progress LE dressing, functional mobility, toilet transfer.  Occupational Therapy will continue to follow patient for duration of hospitalization.     Patient continues to benefit from continued skilled OT services: to promote return to prior level of function and safety with continuous assistance and gradual rehabilitative therapy.     PLAN DURING HOSPITALIZATION  OT Device Recommendations: TBD  OT Treatment Plan: Balance activities, ADL training, Functional transfer training, Patient/Family education, Equipment eval/education, Patient/Family training, Compensatory technique education     SUBJECTIVE  \"I need to use the washroom\"    OBJECTIVE  Precautions: Abdominal protective strategies, Bed/chair alarm,  needed (Divehi)    WEIGHT BEARING RESTRICTION     PAIN ASSESSMENT  Ratin  Location: abdomen    ACTIVITY TOLERANCE  Room air   Denied dizziness    ACTIVITIES OF DAILY LIVING ASSESSMENT  AM-PAC ‘6-Clicks’ Inpatient Daily Activity Short Form  How much help from another person does the patient currently need…  -   Putting on and taking off regular lower body clothing?: A Lot  -   Bathing (including washing, rinsing, drying)?: A Lot  -   Toileting, which includes using toilet, bedpan or urinal? : A Lot  -   Putting on and taking off  regular upper body clothing?: A Little  -   Taking care of personal grooming such as brushing teeth?: A Little  -   Eating meals?: A Little    AM-PAC Score:  Score: 15  Approx Degree of Impairment: 56.46%  Standardized Score (AM-PAC Scale): 34.69  CMS Modifier (G-Code): CK    FUNCTIONAL TRANSFER ASSESSMENT  Sit to Stand: Edge of Bed  Edge of Bed: Minimal Assist  Chair: Not Tested  Toilet Transfer: Minimal Assist    BED MOBILITY  Rolling: Not Tested  Supine to Sit : Not tested    FUNCTIONAL ADL ASSESSMENT  Grooming Seated: Independent  LB Dressing Seated: Moderate Assist (attempt at figure four - limited rom due to pain)  LB Dressing Standing: Moderate Assist (pull briefs up over hips ; Min a  to maintain balance)  Toileting Standing: Maximum Assist (Min A for standing balance, dep assist for radu care - fatigue and frequent bowel movemet)    Skilled Therapy Provided: RN cleared pt for participation in occupational therapy session, which was completed in collaboration with PT staff. Upon arrival, pt was supine in bed and agreeable to activity. Pt's family present during session, pt declined use of language line - family provided interpretation prn. Pt benefited from additional time ,verbal cues, RW to maximize participation.    Pt reported having had bowel movement at bed level, agreeable to trip to washroom for further toileting and hygiene. Pt with frequent loose bowel movements, increased with sit<>stand transitions. Pt completed functional mobility to and from washroom with RW and Min A.  From toilet -  x 3 sit<>stand , maintained standing for up to 4 min with CGA - [place] during radu care and LE dressing management. Pt participated in aspects of LE dressing throughout via partial figure four - to tolerance. Pt with no loss of balance during activity. Pt reported b/l pain from side of back across to front.    EDUCATION PROVIDED  Patient Education : Role of Occupational Therapy; Plan of Care; Other (abdominal  sparing strategies)  Patient's Response to Education: Verbalized Understanding; Returned Demonstration    The patient's Approx Degree of Impairment: 56.46% has been calculated based on documentation in the [facility] '6 clicks' Inpatient Daily Activity Short Form.  Research supports that patients with this level of impairment may benefit from rehab.  Final disposition will be made by interdisciplinary medical team.    Patient End of Session: Needs met, Call light within reach, In bed, RN aware of session/findings, Alarm set, Family present, Other (Comment) (awaiting transport f)    OT Goals:  Patients self stated goal is: none stated      Patient will complete functional transfer with SBA  Comment: ongoing     Patient will complete toileting with SBA  Comment: ongoing     Patient will tolerate standing for 2 minutes in prep for adls with SBA   Comment: ongoing             Goals  on: 25  Frequency: 3-5x/week      OT Session Time: 25 minutes  Self-Care Home Management: 25 minutes

## 2025-06-19 NOTE — PLAN OF CARE
Problem: Patient Centered Care  Goal: Patient preferences are identified and integrated in the patient's plan of care  Description: Interventions:  - What would you like us to know as we care for you? I am from home.   - Provide timely, complete, and accurate information to patient/family  - Incorporate patient and family knowledge, values, beliefs, and cultural backgrounds into the planning and delivery of care  - Encourage patient/family to participate in care and decision-making at the level they choose  - Honor patient and family perspectives and choices  Outcome: Progressing     Problem: Diabetes/Glucose Control  Goal: Glucose maintained within prescribed range  Description: INTERVENTIONS:  - Monitor Blood Glucose as ordered  - Assess for signs and symptoms of hyperglycemia and hypoglycemia  - Administer ordered medications to maintain glucose within target range  - Assess barriers to adequate nutritional intake and initiate nutrition consult as needed  - Instruct patient on self management of diabetes  Outcome: Progressing     Problem: Patient/Family Goals  Goal: Patient/Family Long Term Goal  Description: Patient's Long Term Goal: Discharge home     Interventions:  - Monitor vitals  - Monitor labs  - IVF  - Glucose monitoring ACHS  - Clear liquid diet  - Scheduled miralax  - GI on consult  - See additional Care Plan goals for specific interventions  Outcome: Progressing  Goal: Patient/Family Short Term Goal  Description: Patient's Short Term Goal: Improve bowel function    Interventions:   - Full liquid diet  - GI on consult  - Scheduled miralax  - See additional Care Plan goals for specific interventions  Outcome: Progressing     Problem: PAIN - ADULT  Goal: Verbalizes/displays adequate comfort level or patient's stated pain goal  Description: INTERVENTIONS:  - Encourage pt to monitor pain and request assistance  - Assess pain using appropriate pain scale  - Administer analgesics based on type and severity  of pain and evaluate response  - Implement non-pharmacological measures as appropriate and evaluate response  - Consider cultural and social influences on pain and pain management  - Manage/alleviate anxiety  - Utilize distraction and/or relaxation techniques  - Monitor for opioid side effects  - Notify MD/LIP if interventions unsuccessful or patient reports new pain  - Anticipate increased pain with activity and pre-medicate as appropriate  Outcome: Progressing     Problem: RISK FOR INFECTION - ADULT  Goal: Absence of fever/infection during anticipated neutropenic period  Description: INTERVENTIONS  - Monitor WBC  - Administer growth factors as ordered  - Implement neutropenic guidelines  Outcome: Progressing     Problem: SAFETY ADULT - FALL  Goal: Free from fall injury  Description: INTERVENTIONS:  - Assess pt frequently for physical needs  - Identify cognitive and physical deficits and behaviors that affect risk of falls.  - [place] fall precautions as indicated by assessment.  - Educate pt/family on patient safety including physical limitations  - Instruct pt to call for assistance with activity based on assessment  - Modify environment to reduce risk of injury  - Provide assistive devices as appropriate  - Consider OT/PT consult to assist with strengthening/mobility  - Encourage toileting schedule  Outcome: Progressing     Problem: DISCHARGE PLANNING  Goal: Discharge to home or other facility with appropriate resources  Description: INTERVENTIONS:  - Identify barriers to discharge w/pt and caregiver  - Include patient/family/discharge partner in discharge planning  - Arrange for needed discharge resources and transportation as appropriate  - Identify discharge learning needs (meds, wound care, etc)  - Arrange for interpreters to assist at discharge as needed  - Consider post-discharge preferences of patient/family/discharge partner  - Complete POLST form as appropriate  - Assess patient's ability to be  responsible for managing their own health  - Refer to Case Management Department for coordinating discharge planning if the patient needs post-hospital services based on physician/LIP order or complex needs related to functional status, cognitive ability or social support system  Outcome: Progressing     Problem: Altered Communication/Language Barrier  Goal: Patient/Family is able to understand and participate in their care  Description: Interventions:  - Assess communication ability and preferred communication style  - Implement communication aides and strategies  - Use visual cues when possible  - Listen attentively, be patient, do not interrupt  - Minimize distractions  - Allow time for understanding and response  - Establish method for patient to ask for assistance (call light)  - Provide an  as needed  - Communicate barriers and strategies to overcome with those who interact with patient  Outcome: Progressing     Problem: GASTROINTESTINAL - ADULT  Goal: Minimal or absence of nausea and vomiting  Description: INTERVENTIONS:  - Maintain adequate hydration with IV or PO as ordered and tolerated  - Nasogastric tube to low intermittent suction as ordered  - Evaluate effectiveness of ordered antiemetic medications  - Provide nonpharmacologic comfort measures as appropriate  - Advance diet as tolerated, if ordered  - Obtain nutritional consult as needed  - Evaluate fluid balance  Outcome: Progressing  Goal: Maintains or returns to baseline bowel function  Description: INTERVENTIONS:  - Assess bowel function  - Maintain adequate hydration with IV or PO as ordered and tolerated  - Evaluate effectiveness of GI medications  - Encourage mobilization and activity  - Obtain nutritional consult as needed  - Establish a toileting routine/schedule  - Consider collaborating with pharmacy to review patient's medication profile  Outcome: Progressing

## 2025-06-19 NOTE — PROGRESS NOTES
[facility]  Progress Note    Arnaldo Gutierrez Patient Status:  Inpatient    4/15/1945 MRN [id number]   Location [facility] 4W/SW/SE Attending Shekhar Johnson MD   Hosp Day # 14 PCP PHYSICIAN NONSTAFF     Subjective:  Patient resting comfortably in bed this morning. Reports mild midline incisional abdominal pain. Denies fevers or chills. Tolerating soft diet without nausea or emesis. Denies fever or chills. Reports he has had BM yesterday and today. Discussed with RN, patient stated yesterday he had new onset severe epigastric and flank abdominal pain.     WBC increased from 11.4 yesterday to 16 today.     Objective/Physical Exam:  General: Alert, orientated x3.  Cooperative.  No apparent distress.  Vital Signs:  Blood pressure 100/49, pulse 84, temperature 98.8 °F (37.1 °C), temperature source Oral, resp. rate 18, height 5' 11\" (1.803 m), weight 167 lb 8 oz (76 kg), SpO2 92%.  Wt Readings from Last 3 Encounters:   25 167 lb 8 oz (76 kg)   25 148 lb (67.1 kg)   25 160 lb (72.6 kg)     Lungs: No respiratory distress.  Cardiac: Regular rate and rhythm.   Abdomen:  Soft, non distended, mild midline incisional tenderness, with no rebound or guarding.  No peritoneal signs.   Extremities:  No lower extremity edema noted.    Incision: Clean, dry, intact, no erythema. Staples in place.     Intake/Output:    Intake/Output Summary (Last 24 hours) at 2025 0903  Last data filed at 2025 0751  Gross per 24 hour   Intake 1375 ml   Output 2100 ml   Net -725 ml     I/O last 3 completed shifts:  In: 2260.5 [P.O.:450; IV PIGGYBACK:100]  Out: 3600 [Urine:3600]  I/O this shift:  In: -   Out: 200 [Urine:200]    Medications: Scheduled Medications[1]    Labs:  Lab Results   Component Value Date    WBC 16.0 2025    HGB 7.3 2025    HCT 22.0 2025    .0 2025     Lab Results   Component Value Date     2025    K 4.6 2025     2025     CO2 24.0 06/19/2025    BUN 20 06/19/2025    CREATSERUM 0.74 06/19/2025     06/19/2025    CA 7.5 06/19/2025     No results found for: \"PT\", \"INR\"      Assessment  Problem List[2]    S/p  Laparoscopic assisted tranverse colon resection, small bowel resection x2, resection of tumor nodule on small bowel  laparoscopic take down of splenic flexure on 6/10/2025  Diffuse large B-cell lymphoma  Leukocytosis    Plan:  Repeat CT abdomen and pelvis today due to abdominal pain and increased WBC today. Clinical course pending plan  Continue soft diet as tolerated  Analgesics and antiemetics as needed  Ambulate and up to chair  DVT prophylaxis with SCDs  GI prophylaxis with Protonix    Quality:  DVT Mechanical Prophylaxis:   SCDs,    DVT Pharmacologic Prophylaxis   Medication   None                Code Status: Full Code  Muñoz: External urinary catheter in place  Muñoz Duration (in days):   Central line:    ANNELISE: 6/19/2025        Krystyna Baltazar PA-C  6/19/2025  9:03 AM               [1]    magnesium sulfate  2 g Intravenous Once    tamsulosin  0.8 mg Oral Daily    pregabalin  100 mg Oral TID    vitamin B-12  50 mcg Oral Daily    sodium ferric gluconate  125 mg Intravenous Daily    folic acid  1 mg Oral Daily    pantoprazole  40 mg Intravenous Daily    insulin regular human  1-11 Units Subcutaneous Q6H   [2]   Patient Active Problem List  Diagnosis    BPH (benign prostatic hyperplasia)    Chronic low back pain    Chronic pain of both knees    Depression with anxiety    Generalized osteoarthritis    Hyperlipidemia    Type 2 diabetes mellitus without complication, without long-term current use of insulin (HCC)    Essential hypertension    Bipolar I disorder, single manic episode (HCC)    Large bowel obstruction (HCC)    Colonic mass    Diffuse large B-cell lymphoma of solid organ excluding spleen

## 2025-06-19 NOTE — BRIEF PROCEDURE NOTE
[facility]  part of [facility]  Procedure Note    Arnaldo Gutierrez Patient Status:  Inpatient    4/15/1945 MRN [id number]   Location [facility] 4W/SW/SE Attending Shekhar Johnson MD   Hosp Day # 14 PCP PHYSICIAN NONSTAFF     Procedure: US drainage of peritoneal fluid    Pre-Procedure Diagnosis:  peritoneal air/fluid s/p colon and small bowel resection    Post-Procedure Diagnosis: as above    Anesthesia:  Local    Findings:  10 F APD drain placed RLQ, c aspiration of 260 ml serosanguinous fluid    Specimens: fluid sample for C&S     Blood Loss:  0      Complications:  None    Drains:  10 F APD    Norris Reilly MD  2025

## 2025-06-19 NOTE — PHYSICAL THERAPY NOTE
PHYSICAL THERAPY TREATMENT NOTE - INPATIENT     Room Number: 445/445-A       Presenting Problem: large bowel obstruction post ex lap       Problem List  Principal Problem:    Large bowel obstruction (HCC)  Active Problems:    Colonic mass    Diffuse large B-cell lymphoma of solid organ excluding spleen      PHYSICAL THERAPY ASSESSMENT   Patient demonstrates limited progress this session, goals  remain in progress.      Patient is requiring contact guard assist and minimal assist as a result of the following impairments: decreased functional strength, pain, impaired coordination, impaired motor planning, and medical status.     Patient continues to function below baseline with bed mobility, transfers, and gait.  Next session anticipate patient to progress bed mobility, transfers, gait, and maintaining seated position.  Physical Therapy will continue to follow patient for duration of hospitalization.    Patient continues to benefit from continued skilled PT services: to promote return to prior level of function and safety with continuous assistance and gradual rehabilitative therapy .    PLAN DURING HOSPITALIZATION  Nursing Mobility Recommendation : 1 Assist  PT Device Recommendation: Rolling walker  PT Treatment Plan: Bed mobility, Body mechanics, Patient education, Gait training, Strengthening, Transfer training, Balance training  Frequency (Obs): 3-5x/week     SUBJECTIVE  Im going to test    OBJECTIVE  Precautions: Abdominal protective strategies, Bed/chair alarm,  needed (Romanian)    WEIGHT BEARING RESTRICTION       PAIN ASSESSMENT   Ratin  Location: abdominal area  Management Techniques: Activity promotion, Body mechanics, Repositioning    BALANCE  Static Sitting: Good  Dynamic Sitting: Fair  Static Standing: Fair -  Dynamic Standing: Fair -    ACTIVITY TOLERANCE                          O2 WALK       AM-PAC '6-Clicks' INPATIENT SHORT FORM - BASIC MOBILITY  How much difficulty does the patient  currently have...  Patient Difficulty: Turning over in bed (including adjusting bedclothes, sheets and blankets)?: A Little   Patient Difficulty: Sitting down on and standing up from a chair with arms (e.g., wheelchair, bedside commode, etc.): A Little   Patient Difficulty: Moving from lying on back to sitting on the side of the bed?: A Little   How much help from another person does the patient currently need...   Help from Another: Moving to and from a bed to a chair (including a wheelchair)?: A Little   Help from Another: Need to walk in hospital room?: A Little   Help from Another: Climbing 3-5 steps with a railing?: A Lot     AM-PAC Score:  Raw Score: 17   Approx Degree of Impairment: 50.57%   Standardized Score (AM-PAC Scale): 42.13   CMS Modifier (G-Code): CK    FUNCTIONAL ABILITY STATUS  Functional Mobility/Gait Assessment  Gait Assistance: Contact guard assist  Distance (ft): 20 ft x2  Assistive Device: Rolling walker  Pattern: Shuffle (slow ninoska)  Rolling: minimal assist  Supine to Sit: minimal assist  Sit to Supine: minimal assist  Sit to Stand: contact guard assist    Skilled Therapy Provided: RN approved PT session. Pt in supine, stated pain in b sides LB. Pt's family present in room and assist with translation due pt Korean speaking. Supine to sit with assist and sitting callie on EOB. Sit to stand with RW. Amb to bathroom, pt needs to use toilet. Assist with toilet transfers. Sit<> stand to assist with cleaning up. Pt appeared tired and fatigue. Pt amb back to bed, pt able to lift b le's to b level and reposition in bed. All needs in reach and pt's family present in room. Pt is awaiting for scheduled test.    The patient's Approx Degree of Impairment: 50.57% has been calculated based on documentation in the [facility] '6 clicks' Inpatient Daily Activity Short Form.  Research supports that patients with this level of impairment may benefit from gradual rehabilitative therapy.  Final disposition will be  made by interdisciplinary medical team.    THERAPEUTIC EXERCISES  Lower Extremity Alternating marching  Ankle pumps  Knee extension     Position Sitting       Patient End of Session: In bed, Needs met, Call light within reach, RN aware of session/findings, All patient questions and concerns addressed, Family present    CURRENT GOALS   Goals to be met by: 7/10  Patient Goal Patient's self-stated goal is: unstated   Goal #1 Patient is able to demonstrate supine - sit EOB @ level: supervision      Goal #1   Current Status NOT MET/IN PROGRESS    Goal #2 Patient is able to demonstrate transfers Sit to/from Stand at assistance level: supervision with walker - rolling      Goal #2  Current Status NOT MET/IN PROGRESS    Goal #3 Patient is able to ambulate 50 feet with assist device: walker - rolling at assistance level: minimum assistance   Goal #3   Current Status NOT MET/IN PROGRESS                Goal #5 Patient to demonstrate independence with home activity/exercise instructions provided to patient in preparation for discharge.   Goal #5   Current Status IN PROGRESS/ONGOING    Goal #6     Goal #6  Current Status         Gait Trainin minutes  Therapeutic Activity: 14 minutes

## 2025-06-19 NOTE — PROGRESS NOTES
[facility]  part of [facility]    Progress Note    Arnaldo Gutierrez Patient Status:  Inpatient    4/15/1945 MRN [id number]   Location [facility] 4W/SW/SE Attending Shekhar Johnson MD   Hosp Day # 14 PCP PHYSICIAN NONSTAFF       Subjective:     Pt seen earlier this morning.  Pt with increased pain over right side of abdomen.    Objective:   Blood pressure 99/59, pulse 79, temperature 98.2 °F (36.8 °C), temperature source Oral, resp. rate 18, height 71\", weight 167 lb 8 oz (76 kg), SpO2 92%.    Gen:   NAD.  A and O x 3  CV:   RRR, no m/g/r  Pulm:   CTA bilat  Abd:   +bs, soft, tender over mid to right abdomen with moderate palpation, no rebound or guarding, ND  LE:   No c/c/e  Neuro:   nonfocal    Results:     Lab Results   Component Value Date    WBC 16.0 (H) 2025    HGB 7.3 (L) 2025    HCT 22.0 (L) 2025    .0 2025    CREATSERUM 0.74 2025    BUN 20 2025     (L) 2025    K 4.6 2025     2025    CO2 24.0 2025     (H) 2025    CA 7.5 (L) 2025    ALB 2.4 (L) 2025    ALKPHO 55 2025    BILT 0.4 2025    TP 4.2 (L) 2025    AST 13 2025    ALT 14 2025    TSH 2.417 2025    LIP 22 2025    MG 1.8 2025    PHOS 3.0 2025    B12 1,020 (H) 2025       No results found.        Assessment and Plan:     Diffuse large B-cell lymphoma  Large Bowel obstruction due to Proximal transverse colonic mass  - CT A/P reviewed  - GI and general surgery consulted  -  s/p colonoscopy with metallic colon stent  - 6/10 - s/p laparoscopic transverse colon resection, small bowel resection x2, resection of tumor nodule on small bowel. Patient appeared to have metastatic disease per Dr. Randall  - pathology - transverse colon - diffuse large B-cell lymphoma. Separate segment with tubular adenoma. Small intestine with extensive involvement of diffuse large B-cell  lymphoma  - oncology consulted, appreciate recs. Plans for chemo with curative intent in the future once bowel issues stabilized  - taking PO now.  Off TPN  - was on zosyn IV empirically. Now stopped.   - started having BMs 6/15.  - NGT removed  - soft diet  6/19:  Increased abd pain.  CT abd ordered and pending.     Acute postoperative blood loss anemia with hypotension  - EBL from surgery 600ml  - s/p 2 units PRBC   - hgb drifted down. Transfuse if hgb <7  - IV iron     Hypotension - resolved  - weaned off pressors     DM2  - ISS     Other:  H/o HTN   HL  BPH - resumed flomax     Dispo:  discharge planning.   Taking PO now.   DC to [facility] pending insurance auth and pending repeat CT abdomen.     Dietitian Malnutrition Assessment     Evaluation for Malnutrition: Criteria for severe malnutrition diagnosis- chronic illness related to   Wt loss greater than 10% in 6 months., Energy intake less than 75% for greater than 1 month.                RD Malnutrition Care Plan:       Body Fat/Muscle Mass:           Physician Assessment      Patient has a diagnosis of severe malnutrition     dvt proph:    SCDs     Code status:    Full       MDM:    High Shekhar Schwarz MD  6/19/2025

## 2025-06-20 LAB
BASOPHILS # BLD AUTO: 0.04 X10(3) UL (ref 0–0.2)
BASOPHILS NFR BLD AUTO: 0.2 %
DEPRECATED RDW RBC AUTO: 60.2 FL (ref 35.1–46.3)
EOSINOPHIL # BLD AUTO: 0.05 X10(3) UL (ref 0–0.7)
EOSINOPHIL NFR BLD AUTO: 0.3 %
ERYTHROCYTE [DISTWIDTH] IN BLOOD BY AUTOMATED COUNT: 18.7 % (ref 11–15)
GLUCOSE BLDC GLUCOMTR-MCNC: 176 MG/DL (ref 70–99)
GLUCOSE BLDC GLUCOMTR-MCNC: 177 MG/DL (ref 70–99)
GLUCOSE BLDC GLUCOMTR-MCNC: 209 MG/DL (ref 70–99)
GLUCOSE BLDC GLUCOMTR-MCNC: 244 MG/DL (ref 70–99)
HCT VFR BLD AUTO: 24.5 % (ref 39–53)
HGB BLD-MCNC: 7.7 G/DL (ref 13–17.5)
IMM GRANULOCYTES # BLD AUTO: 0.19 X10(3) UL (ref 0–1)
IMM GRANULOCYTES NFR BLD: 1 %
LYMPHOCYTES # BLD AUTO: 1.15 X10(3) UL (ref 1–4)
LYMPHOCYTES NFR BLD AUTO: 6.1 %
MAGNESIUM SERPL-MCNC: 1.7 MG/DL (ref 1.6–2.6)
MCH RBC QN AUTO: 27.8 PG (ref 26–34)
MCHC RBC AUTO-ENTMCNC: 31.4 G/DL (ref 31–37)
MCV RBC AUTO: 88.4 FL (ref 80–100)
MONOCYTES # BLD AUTO: 1.4 X10(3) UL (ref 0.1–1)
MONOCYTES NFR BLD AUTO: 7.4 %
NEUTROPHILS # BLD AUTO: 16.15 X10 (3) UL (ref 1.5–7.7)
NEUTROPHILS # BLD AUTO: 16.15 X10(3) UL (ref 1.5–7.7)
NEUTROPHILS NFR BLD AUTO: 85 %
PLATELET # BLD AUTO: 397 10(3)UL (ref 150–450)
RBC # BLD AUTO: 2.77 X10(6)UL (ref 3.8–5.8)
WBC # BLD AUTO: 19 X10(3) UL (ref 4–11)

## 2025-06-20 PROCEDURE — 99233 SBSQ HOSP IP/OBS HIGH 50: CPT | Performed by: HOSPITALIST

## 2025-06-20 PROCEDURE — 99232 SBSQ HOSP IP/OBS MODERATE 35: CPT | Performed by: STUDENT IN AN ORGANIZED HEALTH CARE EDUCATION/TRAINING PROGRAM

## 2025-06-20 RX ORDER — CALCIUM CARBONATE 500 MG/1
500 TABLET, CHEWABLE ORAL EVERY 6 HOURS PRN
Status: DISCONTINUED | OUTPATIENT
Start: 2025-06-20 | End: 2025-06-24

## 2025-06-20 RX ORDER — MAGNESIUM OXIDE 400 MG/1
400 TABLET ORAL ONCE
Status: COMPLETED | OUTPATIENT
Start: 2025-06-20 | End: 2025-06-20

## 2025-06-20 NOTE — PLAN OF CARE
Patient's pain managed with Dilaudid IV. He denies of nausea. R NGOZI to bulb suction. Tolerating clears. IVF infusing. On 2L of O2, Plan to dc to [facility]. Safety precautions in place.     Problem: PAIN - ADULT  Goal: Verbalizes/displays adequate comfort level or patient's stated pain goal  Description: INTERVENTIONS:  - Encourage pt to monitor pain and request assistance  - Assess pain using appropriate pain scale  - Administer analgesics based on type and severity of pain and evaluate response  - Implement non-pharmacological measures as appropriate and evaluate response  - Consider cultural and social influences on pain and pain management  - Manage/alleviate anxiety  - Utilize distraction and/or relaxation techniques  - Monitor for opioid side effects  - Notify MD/LIP if interventions unsuccessful or patient reports new pain  - Anticipate increased pain with activity and pre-medicate as appropriate  Outcome: Progressing     Problem: RISK FOR INFECTION - ADULT  Goal: Absence of fever/infection during anticipated neutropenic period  Description: INTERVENTIONS  - Monitor WBC  - Administer growth factors as ordered  - Implement neutropenic guidelines  Outcome: Progressing     Problem: Altered Communication/Language Barrier  Goal: Patient/Family is able to understand and participate in their care  Description: Interventions:  - Assess communication ability and preferred communication style  - Implement communication aides and strategies  - Use visual cues when possible  - Listen attentively, be patient, do not interrupt  - Minimize distractions  - Allow time for understanding and response  - Establish method for patient to ask for assistance (call light)  - Provide an  as needed  - Communicate barriers and strategies to overcome with those who interact with patient  Outcome: Progressing

## 2025-06-20 NOTE — CM/SW NOTE
Department  asked to resubmit auth for BTE , need approval.    6/20-- Parth [id number], Auth #[id number] approved 6/19 to 6/24 (BTE) ([facility] 6-19-6-23)    Note new auth ID.    Tiffani Wiggins, DSC

## 2025-06-20 NOTE — CM/SW NOTE
CM informed by patient's daughter Yao - patient/family [facility] choice is now [facility] d/t proximity to [facility].    CM reserved [facility] via [facility] and notified liaison of choice.    CM requested DSC Tiffani update facility choice to authorization in "[employer] portal.    / to remain available for support and/or discharge planning.     Plan: [facility] - once medically cleared    Tammie Raya RN, BSN  Nurse   [phone]

## 2025-06-20 NOTE — PROGRESS NOTES
[facility]  Progress Note    Arnaldo Gutierrez Patient Status:  Inpatient    4/15/1945 MRN [id number]   Location [facility] 4W/SW/SE Attending Shekhar Johnson MD   Hosp Day # 15 PCP PHYSICIAN NONSTAFF     Subjective:  S/p IR drain with ~200 ml serosang fluid removed. Pain much better today. No nausea.     Objective/Physical Exam:  General: Alert, orientated x3.  Cooperative.  No apparent distress.  Vital Signs:  Blood pressure 106/54, pulse 88, temperature 99.2 °F (37.3 °C), temperature source Oral, resp. rate 18, height 5' 11\" (1.803 m), weight 167 lb 8 oz (76 kg), SpO2 93%.  Wt Readings from Last 3 Encounters:   25 167 lb 8 oz (76 kg)   25 148 lb (67.1 kg)   25 160 lb (72.6 kg)     Lungs: No respiratory distress.  Cardiac: Regular rate and rhythm.   Abdomen:  Soft, non distended, mild midline incisional tenderness, with no rebound or guarding.  No peritoneal signs. Drain with serous output  Extremities:  No lower extremity edema noted.    Incision: Clean, dry, intact, no erythema. Staples in place.     Intake/Output:    Intake/Output Summary (Last 24 hours) at 2025 1040  Last data filed at 2025 0539  Gross per 24 hour   Intake 718 ml   Output 1260 ml   Net -542 ml     I/O last 3 completed shifts:  In: 1260.3 [P.O.:604; I.V.:240; IV PIGGYBACK:100]  Out: 2260 [Urine:2200; Drains:60]  No intake/output data recorded.    Medications: Scheduled Medications[1]    Labs:  Lab Results   Component Value Date    WBC 19.0 2025    HGB 7.7 2025    HCT 24.5 2025    .0 2025          No results found for: \"PT\", \"INR\"      Assessment  Problem List[2]    S/p  Laparoscopic assisted tranverse colon resection, small bowel resection x2, resection of tumor nodule on small bowel  laparoscopic take down of splenic flexure on 6/10/2025  Diffuse large B-cell lymphoma  Leukocytosis    Plan:  S/p IR drain into peritoneal fluid, serous. Discussed with   [place] to restart abx given worsening leukocytosis  Continue soft diet as tolerated  Analgesics and antiemetics as needed  Ambulate and up to chair  DVT prophylaxis with SCDs  GI prophylaxis with Protonix    Quality:  DVT Mechanical Prophylaxis:   SCDs,    DVT Pharmacologic Prophylaxis   Medication   None                Code Status: Full Code  Muñoz: External urinary catheter in place  Muñoz Duration (in days):   Central line:    ANNELISE: 6/21/2025      Mayela Hood MD  [facility] - General Surgery   [address] [place] [address]  p [phone]              [1]    piperacillin-tazobactam  3.375 g Intravenous Q8H    insulin aspart  1-11 Units Subcutaneous TID CC    tamsulosin  0.8 mg Oral Daily    pregabalin  100 mg Oral TID    vitamin B-12  50 mcg Oral Daily    sodium ferric gluconate  125 mg Intravenous Daily    folic acid  1 mg Oral Daily    pantoprazole  40 mg Intravenous Daily   [2]   Patient Active Problem List  Diagnosis    BPH (benign prostatic hyperplasia)    Chronic low back pain    Chronic pain of both knees    Depression with anxiety    Generalized osteoarthritis    Hyperlipidemia    Type 2 diabetes mellitus without complication, without long-term current use of insulin (HCC)    Essential hypertension    Bipolar I disorder, single manic episode (HCC)    Large bowel obstruction (HCC)    Colonic mass    Diffuse large B-cell lymphoma of solid organ excluding spleen

## 2025-06-20 NOTE — PROGRESS NOTES
[facility]  part of [facility]    Progress Note    Arnaldo Gutierrez Patient Status:  Inpatient    4/15/1945 MRN [id number]   Location [facility] 4W/SW/SE Attending Shekhar Johnson MD   Hosp Day # 15 PCP PHYSICIAN NONSTAFF       Subjective:     Less abd pain.  Tolerating diet.    Objective:   Blood pressure 106/54, pulse 88, temperature 99.2 °F (37.3 °C), temperature source Oral, resp. rate 18, height 71\", weight 167 lb 8 oz (76 kg), SpO2 93%.    Gen:   NAD.  A and O x 3  CV:   RRR, no m/g/r  Pulm:   CTA bilat  Abd:   +bs, soft, tender over mid to right abdomen with moderate palpation, no rebound or guarding, ND, NGOZI drain in right side with serosanquinous drainage.  LE:   No c/c/e  Neuro:   nonfocal    Results:     Lab Results   Component Value Date    WBC 19.0 (H) 2025    HGB 7.7 (L) 2025    HCT 24.5 (L) 2025    .0 2025    CREATSERUM 0.74 2025    BUN 20 2025     (L) 2025    K 4.6 2025     2025    CO2 24.0 2025     (H) 2025    CA 7.5 (L) 2025    ALB 2.4 (L) 2025    ALKPHO 55 2025    BILT 0.4 2025    TP 4.2 (L) 2025    AST 13 2025    ALT 14 2025    TSH 2.417 2025    LIP 22 2025    MG 1.7 2025    PHOS 3.0 2025    B12 1,020 (H) 2025       CT ABDOMEN+PELVIS(CONTRAST ONLY)(CPT=74177)  Result Date: 2025  CONCLUSION:  1.  Post large bowel resection in the region of the proximal transverse colon/hepatic flexure.  No obstruction or inflammation at the surgical site.  There is a moderate-large volume of free fluid within the abdomen and pelvis mainly distributed in both upper quadrants, across the omentum, and in the pericolic gutters.  Moderate volume of free air is also present in the same distribution as the fluid.  These may be related to recent surgery.  Organized fluid collection in the left lower retroperitoneum/left lower  quadrant measuring approximately 55 x 47 x 58 mm which could represent a seroma, liquified hematoma, or other inflammatory fluid collection.  Peritoneal enhancement and thickening in the both pericolic gutters which could represent phlegmonous change, early organization of fluid collections with similar differential as the aforementioned organized collection, or peritonitis. 2.  Moderate-sized bilateral pleural effusions.  Enhancing bibasilar pulmonary opacities with air bronchograms likely representing secondary compressive atelectasis. 3.  Coronary atherosclerosis. 4.  Right renal cyst. 5.  Mild body wall edema/anasarca.  Skin staples across the abdominal-pelvic anterior wall compatible with surgical access sites.   Dictated by (CST): Jason Quiñones MD on 6/19/2025 at 5:29 PM     Finalized by (CST): Jason Quiñones MD on 6/19/2025 at 5:36 PM                Assessment and Plan:     Diffuse large B-cell lymphoma  Large Bowel obstruction due to Proximal transverse colonic mass  Abdominal fluid collection.  - CT A/P reviewed  - GI and general surgery consulted  - 6/6 s/p colonoscopy with metallic colon stent  - 6/10 - s/p laparoscopic transverse colon resection, small bowel resection x2, resection of tumor nodule on small bowel. Patient appeared to have metastatic disease per Dr. Randall  - pathology - transverse colon - diffuse large B-cell lymphoma. Separate segment with tubular adenoma. Small intestine with extensive involvement of diffuse large B-cell lymphoma  - oncology consulted, appreciate recs. Plans for chemo with curative intent in the future once bowel issues stabilized  - taking PO now.  Off TPN  - was on zosyn IV empirically. Now re-started.  - started having BMs 6/15.  - NGT removed  - soft diet  6/19:  Increased abd pain.  CT abd ordered and showed fluid collection.    IR consulted.  Pt now POD #1 s/p NGOZI roth placement in abd fluid collection.  F/u cultures..     Acute postoperative blood loss anemia with  hypotension  - EBL from surgery 600ml  - s/p 2 units PRBC   - hgb drifted down. Transfuse if hgb <7  - IV iron  - follow cbc     Hypotension - resolved  - weaned off pressors     DM2  - ISS     Other:  H/o HTN   HL  BPH - cont flomax     Dispo:  discharge planning.   Taking PO now.   DC to [facility] pending medical clearance.     Dietitian Malnutrition Assessment     Evaluation for Malnutrition: Criteria for severe malnutrition diagnosis- chronic illness related to   Wt loss greater than 10% in 6 months., Energy intake less than 75% for greater than 1 month.                RD Malnutrition Care Plan:       Body Fat/Muscle Mass:           Physician Assessment      Patient has a diagnosis of severe malnutrition     dvt proph:    SCDs     Code status:    Full       MDM:    High Shekhar Schwarz MD  6/20/2025

## 2025-06-20 NOTE — PROGRESS NOTES
[facility]  part of [facility]  Progress Note      Arnaldo Gutierrez Patient Status:  Inpatient    4/15/1945 MRN [id number]   Location [facility] 4W/SW/SE Attending Shekhar Johnson MD   Hosp Day # 15 PCP PHYSICIAN NONSTAFF       Subjective:   Comfortable    Objective:   RLQ NGOZI drain site intact some dried blood on biopatch  Serousanguinous output in bulb    Vital Signs:  Blood pressure 106/54, pulse 88, temperature 99.2 °F (37.3 °C), temperature source Oral, resp. rate 18, height 71\", weight 167 lb 8 oz (76 kg), SpO2 93%.    Input/Output:    Intake/Output Summary (Last 24 hours) at 2025 0959  Last data filed at 2025 0539  Gross per 24 hour   Intake 954 ml   Output 1260 ml   Net -306 ml     24 hour drain output: 60mL    Results:   Labs:  Lab Results   Component Value Date    WBC 19.0 2025    RBC 2.77 2025    HGB 7.7 2025    HCT 24.5 2025    MCV 88.4 2025    MCH 27.8 2025    MCHC 31.4 2025    RDW 18.7 2025    .0 2025     Recent Labs   Lab 25  0606 25  0557 25  0443   * 170* 146*   BUN  20   CREATSERUM 0.69* 0.68* 0.74   EGFRCR 95 95 93   CA 7.9* 7.6* 7.5*    134* 132*   K 4.2 4.3 4.6    104 102   CO2 25.0 25.0 24.0     CT ABDOMEN+PELVIS(CONTRAST ONLY)(CPT=74177)  Result Date: 2025  CONCLUSION:  1.  Post large bowel resection in the region of the proximal transverse colon/hepatic flexure.  No obstruction or inflammation at the surgical site.  There is a moderate-large volume of free fluid within the abdomen and pelvis mainly distributed in both upper quadrants, across the omentum, and in the pericolic gutters.  Moderate volume of free air is also present in the same distribution as the fluid.  These may be related to recent surgery.  Organized fluid collection in the left lower retroperitoneum/left lower quadrant measuring approximately 55 x 47 x 58 mm which could  represent a seroma, liquified hematoma, or other inflammatory fluid collection.  Peritoneal enhancement and thickening in the both pericolic gutters which could represent phlegmonous change, early organization of fluid collections with similar differential as the aforementioned organized collection, or peritonitis. 2.  Moderate-sized bilateral pleural effusions.  Enhancing bibasilar pulmonary opacities with air bronchograms likely representing secondary compressive atelectasis. 3.  Coronary atherosclerosis. 4.  Right renal cyst. 5.  Mild body wall edema/anasarca.  Skin staples across the abdominal-pelvic anterior wall compatible with surgical access sites.   Dictated by (CST): Jason Quiñones MD on 6/19/2025 at 5:29 PM     Finalized by (CST): Jason Quiñones MD on 6/19/2025 at 5:36 PM            Assessment and Plan:   79 yo male s/p bowel resection complicated by postoperative peritoneal fluid collection  Day #1 s/p IR drainage of peritoneal fluid  24 hr drain output: 60 mL, cx pending  Continue drain, interval follow up imaging timing per general surgery      FAIZAN Vargas  6/20/2025  9:59 AM

## 2025-06-20 NOTE — PLAN OF CARE
Problem: Patient Centered Care  Goal: Patient preferences are identified and integrated in the patient's plan of care  Description: Interventions:  - What would you like us to know as we care for you? I am from home.   - Provide timely, complete, and accurate information to patient/family  - Incorporate patient and family knowledge, values, beliefs, and cultural backgrounds into the planning and delivery of care  - Encourage patient/family to participate in care and decision-making at the level they choose  - Honor patient and family perspectives and choices  Outcome: Progressing   Patient post op day 9, surgical incisions with staples dry clean intact, open to air. Patient is having loose incontinent stool. Voiding freely primofit in place. Patient has been complaining of left and right upper abdominal side pain . Repeat CT abdomen/pelvis was done and IR was consulted, patient had drain placed to right lower abdomen, draining serosanguinous/ serous fluid. Patient has been tolerating low fiber/ soft diet. This evening patient reported severe left upper abdomen pain and nausea. MD was notified. Patient received Dilaudid and Zofran with some relieve. Diet changed to clear liquid. Fall precautions in place.

## 2025-06-21 LAB
ANION GAP SERPL CALC-SCNC: 6 MMOL/L (ref 0–18)
ANTIBODY SCREEN: NEGATIVE
BASOPHILS # BLD AUTO: 0.03 X10(3) UL (ref 0–0.2)
BASOPHILS NFR BLD AUTO: 0.3 %
BUN BLD-MCNC: 18 MG/DL (ref 9–23)
BUN/CREAT SERPL: 23.7 (ref 10–20)
CALCIUM BLD-MCNC: 7.5 MG/DL (ref 8.7–10.4)
CHLORIDE SERPL-SCNC: 101 MMOL/L (ref 98–112)
CO2 SERPL-SCNC: 26 MMOL/L (ref 21–32)
CREAT BLD-MCNC: 0.76 MG/DL (ref 0.7–1.3)
DEPRECATED RDW RBC AUTO: 58.7 FL (ref 35.1–46.3)
EGFRCR SERPLBLD CKD-EPI 2021: 92 ML/MIN/1.73M2 (ref 60–?)
EOSINOPHIL # BLD AUTO: 0.08 X10(3) UL (ref 0–0.7)
EOSINOPHIL NFR BLD AUTO: 0.8 %
ERYTHROCYTE [DISTWIDTH] IN BLOOD BY AUTOMATED COUNT: 18.6 % (ref 11–15)
GLUCOSE BLD-MCNC: 198 MG/DL (ref 70–99)
GLUCOSE BLDC GLUCOMTR-MCNC: 174 MG/DL (ref 70–99)
GLUCOSE BLDC GLUCOMTR-MCNC: 175 MG/DL (ref 70–99)
GLUCOSE BLDC GLUCOMTR-MCNC: 197 MG/DL (ref 70–99)
GLUCOSE BLDC GLUCOMTR-MCNC: 212 MG/DL (ref 70–99)
HCT VFR BLD AUTO: 20.7 % (ref 39–53)
HGB BLD-MCNC: 6.7 G/DL (ref 13–17.5)
HGB BLD-MCNC: 7.9 G/DL (ref 13–17.5)
IMM GRANULOCYTES # BLD AUTO: 0.08 X10(3) UL (ref 0–1)
IMM GRANULOCYTES NFR BLD: 0.8 %
LYMPHOCYTES # BLD AUTO: 1.01 X10(3) UL (ref 1–4)
LYMPHOCYTES NFR BLD AUTO: 9.8 %
MAGNESIUM SERPL-MCNC: 1.6 MG/DL (ref 1.6–2.6)
MCH RBC QN AUTO: 28.3 PG (ref 26–34)
MCHC RBC AUTO-ENTMCNC: 32.4 G/DL (ref 31–37)
MCV RBC AUTO: 87.3 FL (ref 80–100)
MONOCYTES # BLD AUTO: 0.79 X10(3) UL (ref 0.1–1)
MONOCYTES NFR BLD AUTO: 7.7 %
NEUTROPHILS # BLD AUTO: 8.28 X10 (3) UL (ref 1.5–7.7)
NEUTROPHILS # BLD AUTO: 8.28 X10(3) UL (ref 1.5–7.7)
NEUTROPHILS NFR BLD AUTO: 80.6 %
OSMOLALITY SERPL CALC.SUM OF ELEC: 283 MOSM/KG (ref 275–295)
PLATELET # BLD AUTO: 293 10(3)UL (ref 150–450)
PLATELET MORPHOLOGY: NORMAL
PLATELETS.RETICULATED NFR BLD AUTO: 3.6 % (ref 0–7)
POTASSIUM SERPL-SCNC: 4.1 MMOL/L (ref 3.5–5.1)
RBC # BLD AUTO: 2.37 X10(6)UL (ref 3.8–5.8)
RH BLOOD TYPE: POSITIVE
SODIUM SERPL-SCNC: 133 MMOL/L (ref 136–145)
WBC # BLD AUTO: 10.3 X10(3) UL (ref 4–11)

## 2025-06-21 PROCEDURE — 99233 SBSQ HOSP IP/OBS HIGH 50: CPT | Performed by: HOSPITALIST

## 2025-06-21 RX ORDER — MAGNESIUM OXIDE 400 MG/1
400 TABLET ORAL ONCE
Status: COMPLETED | OUTPATIENT
Start: 2025-06-21 | End: 2025-06-21

## 2025-06-21 RX ORDER — HEPARIN SODIUM 5000 [USP'U]/ML
5000 INJECTION, SOLUTION INTRAVENOUS; SUBCUTANEOUS EVERY 12 HOURS
Status: DISCONTINUED | OUTPATIENT
Start: 2025-06-21 | End: 2025-06-24

## 2025-06-21 RX ORDER — SODIUM CHLORIDE 9 MG/ML
INJECTION, SOLUTION INTRAVENOUS ONCE
Status: COMPLETED | OUTPATIENT
Start: 2025-06-21 | End: 2025-06-21

## 2025-06-21 NOTE — PLAN OF CARE
Plan of care reviewed with Arnaldo. Diet advanced per surgery and patient tolerating. Abdominal incisions clean, dry, and intact. NGOZI drain maintained. C/o severe abdominal pain this evening. IVP dilaudid given. PRN oxycodone given for additional pain management. Safety measures in place and call light within reach.

## 2025-06-21 NOTE — PROGRESS NOTES
[facility]  Progress Note    Arnaldo Gutierrez Patient Status:  Inpatient    4/15/1945 MRN [id number]   Location [facility] 4W/SW/SE Attending Shekhar Johnson MD   Hosp Day # 16 PCP PHYSICIAN NONSTAFF     Subjective:  Patient resting comfortably in bed this morning, RN at bedside providing translation.  Patient reports he is feeling okay this morning.  Patient reports mild abdominal soreness near surgical incisions.  Tolerating diet without nausea or emesis.  Reports passed flatus, denies bowel movement yesterday.    Hemoglobin 6.7 this morning, 1 unit PRBC transfusing.    Objective/Physical Exam:  General: Alert, orientated x3.  Cooperative.  No apparent distress.  Vital Signs:  Blood pressure 100/59, pulse 89, temperature 97.8 °F (36.6 °C), temperature source Axillary, resp. rate 18, height 5' 11\" (1.803 m), weight 167 lb 8 oz (76 kg), SpO2 92%.  Wt Readings from Last 3 Encounters:   25 167 lb 8 oz (76 kg)   25 148 lb (67.1 kg)   25 160 lb (72.6 kg)     Lungs: No respiratory distress.  Cardiac: Regular rate and rhythm.   Abdomen:  Soft, non distended, mild midline incisional tenderness , with no rebound or guarding.  No peritoneal signs.   Extremities:  No lower extremity edema noted.    Incision: Clean, dry, intact, no erythema.  Staples in place  Drain: IR drain with serosanguineous output    Intake/Output:    Intake/Output Summary (Last 24 hours) at 2025 0943  Last data filed at 2025 0835  Gross per 24 hour   Intake 810 ml   Output 1282.5 ml   Net -472.5 ml     I/O last 3 completed shifts:  In: 1050 [P.O.:240; I.V.:680; IV PIGGYBACK:100]  Out: 1300.5 [Urine:1250; Drains:50.5]  I/O this shift:  In: 10   Out: 2 [Drains:2]    Medications: Scheduled Medications[1]    Labs:  Lab Results   Component Value Date    WBC 10.3 2025    HGB 6.7 2025    HCT 20.7 2025    .0 2025     Lab Results   Component Value Date     2025    K  4.1 06/21/2025     06/21/2025    CO2 26.0 06/21/2025    BUN 18 06/21/2025    CREATSERUM 0.76 06/21/2025     06/21/2025    CA 7.5 06/21/2025     No results found for: \"PT\", \"INR\"      Assessment  Problem List[2]    S/p  Laparoscopic assisted tranverse colon resection, small bowel resection x2, resection of tumor nodule on small bowel  laparoscopic take down of splenic flexure on 6/10/2025  S/p US drainage of peritoneal fluid by IR 6/19/2025  Diffuse large B-cell lymphoma  Leukocytosis, downtrending   Acute anemia    Plan:  Continue soft diet as tolerated  Continue IV antibiotics  Continue to trend CBC for worsening anemia.  Recommend to transfuse if Hgb < 7  Continue analgesics and antiemetics as needed   ambulate and up to chair  DVT prophylaxis with SCDs  GI prophylaxis with Protonix    Quality:  DVT Mechanical Prophylaxis:   SCDs,    DVT Pharmacologic Prophylaxis   Medication   None                Code Status: Full Code  Muñoz: External urinary catheter in place  Muñoz Duration (in days):   Central line:    ANNELISE: 6/22/2025        Krystyna Baltazar PA-C  6/21/2025  9:43 AM               [1]    sodium chloride   Intravenous Once    piperacillin-tazobactam  3.375 g Intravenous Q8H    insulin aspart  1-11 Units Subcutaneous TID CC    tamsulosin  0.8 mg Oral Daily    pregabalin  100 mg Oral TID    vitamin B-12  50 mcg Oral Daily    folic acid  1 mg Oral Daily    pantoprazole  40 mg Intravenous Daily   [2]   Patient Active Problem List  Diagnosis    BPH (benign prostatic hyperplasia)    Chronic low back pain    Chronic pain of both knees    Depression with anxiety    Generalized osteoarthritis    Hyperlipidemia    Type 2 diabetes mellitus without complication, without long-term current use of insulin (HCC)    Essential hypertension    Bipolar I disorder, single manic episode (HCC)    Large bowel obstruction (HCC)    Colonic mass    Diffuse large B-cell lymphoma of solid organ excluding spleen

## 2025-06-21 NOTE — PLAN OF CARE
Arnaldo is aox4, tolerating diet, ambulating upx2 w/ walker, voiding via primofit, no bm. Denies nausea. Pain managed with dilaudid and oxycodone. IVF and IV abx continued. 1U PRBCs given, tolerated well. Repositioned as needed. Call light within reach, calls appropriately. Safety precautions in place.     Problem: Patient Centered Care  Goal: Patient preferences are identified and integrated in the patient's plan of care  Description: Interventions:  - What would you like us to know as we care for you? I am from home.   - Provide timely, complete, and accurate information to patient/family  - Incorporate patient and family knowledge, values, beliefs, and cultural backgrounds into the planning and delivery of care  - Encourage patient/family to participate in care and decision-making at the level they choose  - Honor patient and family perspectives and choices  Outcome: Progressing     Problem: Diabetes/Glucose Control  Goal: Glucose maintained within prescribed range  Description: INTERVENTIONS:  - Monitor Blood Glucose as ordered  - Assess for signs and symptoms of hyperglycemia and hypoglycemia  - Administer ordered medications to maintain glucose within target range  - Assess barriers to adequate nutritional intake and initiate nutrition consult as needed  - Instruct patient on self management of diabetes  Outcome: Progressing     Problem: Patient/Family Goals  Goal: Patient/Family Long Term Goal  Description: Patient's Long Term Goal: Discharge home     Interventions:  - Monitor vitals  - Monitor labs  - IVF  - Glucose monitoring ACHS  - Clear liquid diet  - Scheduled miralax  - GI on consult  - See additional Care Plan goals for specific interventions  Outcome: Progressing  Goal: Patient/Family Short Term Goal  Description: Patient's Short Term Goal: Improve bowel function    Interventions:   - Full liquid diet  - GI on consult  - Scheduled miralax  - See additional Care Plan goals for specific  interventions  Outcome: Progressing     Problem: PAIN - ADULT  Goal: Verbalizes/displays adequate comfort level or patient's stated pain goal  Description: INTERVENTIONS:  - Encourage pt to monitor pain and request assistance  - Assess pain using appropriate pain scale  - Administer analgesics based on type and severity of pain and evaluate response  - Implement non-pharmacological measures as appropriate and evaluate response  - Consider cultural and social influences on pain and pain management  - Manage/alleviate anxiety  - Utilize distraction and/or relaxation techniques  - Monitor for opioid side effects  - Notify MD/LIP if interventions unsuccessful or patient reports new pain  - Anticipate increased pain with activity and pre-medicate as appropriate  Outcome: Progressing     Problem: RISK FOR INFECTION - ADULT  Goal: Absence of fever/infection during anticipated neutropenic period  Description: INTERVENTIONS  - Monitor WBC  - Administer growth factors as ordered  - Implement neutropenic guidelines  Outcome: Progressing     Problem: SAFETY ADULT - FALL  Goal: Free from fall injury  Description: INTERVENTIONS:  - Assess pt frequently for physical needs  - Identify cognitive and physical deficits and behaviors that affect risk of falls.  - [place] fall precautions as indicated by assessment.  - Educate pt/family on patient safety including physical limitations  - Instruct pt to call for assistance with activity based on assessment  - Modify environment to reduce risk of injury  - Provide assistive devices as appropriate  - Consider OT/PT consult to assist with strengthening/mobility  - Encourage toileting schedule  Outcome: Progressing     Problem: DISCHARGE PLANNING  Goal: Discharge to home or other facility with appropriate resources  Description: INTERVENTIONS:  - Identify barriers to discharge w/pt and caregiver  - Include patient/family/discharge partner in discharge planning  - Arrange for needed  discharge resources and transportation as appropriate  - Identify discharge learning needs (meds, wound care, etc)  - Arrange for interpreters to assist at discharge as needed  - Consider post-discharge preferences of patient/family/discharge partner  - Complete POLST form as appropriate  - Assess patient's ability to be responsible for managing their own health  - Refer to Case Management Department for coordinating discharge planning if the patient needs post-hospital services based on physician/LIP order or complex needs related to functional status, cognitive ability or social support system  Outcome: Progressing     Problem: Altered Communication/Language Barrier  Goal: Patient/Family is able to understand and participate in their care  Description: Interventions:  - Assess communication ability and preferred communication style  - Implement communication aides and strategies  - Use visual cues when possible  - Listen attentively, be patient, do not interrupt  - Minimize distractions  - Allow time for understanding and response  - Establish method for patient to ask for assistance (call light)  - Provide an  as needed  - Communicate barriers and strategies to overcome with those who interact with patient  Outcome: Progressing     Problem: GASTROINTESTINAL - ADULT  Goal: Minimal or absence of nausea and vomiting  Description: INTERVENTIONS:  - Maintain adequate hydration with IV or PO as ordered and tolerated  - Nasogastric tube to low intermittent suction as ordered  - Evaluate effectiveness of ordered antiemetic medications  - Provide nonpharmacologic comfort measures as appropriate  - Advance diet as tolerated, if ordered  - Obtain nutritional consult as needed  - Evaluate fluid balance  Outcome: Progressing  Goal: Maintains or returns to baseline bowel function  Description: INTERVENTIONS:  - Assess bowel function  - Maintain adequate hydration with IV or PO as ordered and tolerated  - Evaluate  effectiveness of GI medications  - Encourage mobilization and activity  - Obtain nutritional consult as needed  - Establish a toileting routine/schedule  - Consider collaborating with pharmacy to review patient's medication profile  Outcome: Progressing

## 2025-06-21 NOTE — PLAN OF CARE
Patient alert and oriented. Nepali speaking. Critical hemoglobin MD notified, type and screen drawn and sent to lab. On room air. NGOZI drain to right side flushed and aspirated per order. Midline incision with staples TREVOR. Tolerating a low fiber diet. ACHS Accuchecks. Primofit on. Oxycodone and dilaudid given prn for pain management. Moves x2 with a walker. Call light within reach. Safety measures in place.     Problem: Patient Centered Care  Goal: Patient preferences are identified and integrated in the patient's plan of care  Description: Interventions:  - What would you like us to know as we care for you? I am from home.   - Provide timely, complete, and accurate information to patient/family  - Incorporate patient and family knowledge, values, beliefs, and cultural backgrounds into the planning and delivery of care  - Encourage patient/family to participate in care and decision-making at the level they choose  - Honor patient and family perspectives and choices  Outcome: Progressing     Problem: Diabetes/Glucose Control  Goal: Glucose maintained within prescribed range  Description: INTERVENTIONS:  - Monitor Blood Glucose as ordered  - Assess for signs and symptoms of hyperglycemia and hypoglycemia  - Administer ordered medications to maintain glucose within target range  - Assess barriers to adequate nutritional intake and initiate nutrition consult as needed  - Instruct patient on self management of diabetes  Outcome: Progressing     Problem: Patient/Family Goals  Goal: Patient/Family Long Term Goal  Description: Patient's Long Term Goal: Discharge home     Interventions:  - Monitor vitals  - Monitor labs  - IVF  - Glucose monitoring ACHS  - Clear liquid diet  - Scheduled miralax  - GI on consult  - See additional Care Plan goals for specific interventions  Outcome: Progressing  Goal: Patient/Family Short Term Goal  Description: Patient's Short Term Goal: Improve bowel function    Interventions:   - Full liquid  diet  - GI on consult  - Scheduled miralax  - See additional Care Plan goals for specific interventions  Outcome: Progressing     Problem: PAIN - ADULT  Goal: Verbalizes/displays adequate comfort level or patient's stated pain goal  Description: INTERVENTIONS:  - Encourage pt to monitor pain and request assistance  - Assess pain using appropriate pain scale  - Administer analgesics based on type and severity of pain and evaluate response  - Implement non-pharmacological measures as appropriate and evaluate response  - Consider cultural and social influences on pain and pain management  - Manage/alleviate anxiety  - Utilize distraction and/or relaxation techniques  - Monitor for opioid side effects  - Notify MD/LIP if interventions unsuccessful or patient reports new pain  - Anticipate increased pain with activity and pre-medicate as appropriate  Outcome: Progressing     Problem: RISK FOR INFECTION - ADULT  Goal: Absence of fever/infection during anticipated neutropenic period  Description: INTERVENTIONS  - Monitor WBC  - Administer growth factors as ordered  - Implement neutropenic guidelines  Outcome: Progressing     Problem: SAFETY ADULT - FALL  Goal: Free from fall injury  Description: INTERVENTIONS:  - Assess pt frequently for physical needs  - Identify cognitive and physical deficits and behaviors that affect risk of falls.  - [place] fall precautions as indicated by assessment.  - Educate pt/family on patient safety including physical limitations  - Instruct pt to call for assistance with activity based on assessment  - Modify environment to reduce risk of injury  - Provide assistive devices as appropriate  - Consider OT/PT consult to assist with strengthening/mobility  - Encourage toileting schedule  Outcome: Progressing     Problem: DISCHARGE PLANNING  Goal: Discharge to home or other facility with appropriate resources  Description: INTERVENTIONS:  - Identify barriers to discharge w/pt and caregiver  -  Include patient/family/discharge partner in discharge planning  - Arrange for needed discharge resources and transportation as appropriate  - Identify discharge learning needs (meds, wound care, etc)  - Arrange for interpreters to assist at discharge as needed  - Consider post-discharge preferences of patient/family/discharge partner  - Complete POLST form as appropriate  - Assess patient's ability to be responsible for managing their own health  - Refer to Case Management Department for coordinating discharge planning if the patient needs post-hospital services based on physician/LIP order or complex needs related to functional status, cognitive ability or social support system  Outcome: Progressing     Problem: Altered Communication/Language Barrier  Goal: Patient/Family is able to understand and participate in their care  Description: Interventions:  - Assess communication ability and preferred communication style  - Implement communication aides and strategies  - Use visual cues when possible  - Listen attentively, be patient, do not interrupt  - Minimize distractions  - Allow time for understanding and response  - Establish method for patient to ask for assistance (call light)  - Provide an  as needed  - Communicate barriers and strategies to overcome with those who interact with patient  Outcome: Progressing     Problem: GASTROINTESTINAL - ADULT  Goal: Minimal or absence of nausea and vomiting  Description: INTERVENTIONS:  - Maintain adequate hydration with IV or PO as ordered and tolerated  - Nasogastric tube to low intermittent suction as ordered  - Evaluate effectiveness of ordered antiemetic medications  - Provide nonpharmacologic comfort measures as appropriate  - Advance diet as tolerated, if ordered  - Obtain nutritional consult as needed  - Evaluate fluid balance  Outcome: Progressing  Goal: Maintains or returns to baseline bowel function  Description: INTERVENTIONS:  - Assess bowel  function  - Maintain adequate hydration with IV or PO as ordered and tolerated  - Evaluate effectiveness of GI medications  - Encourage mobilization and activity  - Obtain nutritional consult as needed  - Establish a toileting routine/schedule  - Consider collaborating with pharmacy to review patient's medication profile  Outcome: Progressing

## 2025-06-21 NOTE — PROGRESS NOTES
[facility]  part of [facility]    Progress Note    Arnaldo Gutierrez Patient Status:  Inpatient    4/15/1945 MRN [id number]   Location [facility] [place] 4W/SW/SE Attending Shekhar Johnson MD   Hosp Day # 16 PCP PHYSICIAN NONSTAFF       Subjective:     Abd pain controlled.  Pt eating and having BMs.    Objective:   Blood pressure 104/54, pulse 83, temperature 98.3 °F (36.8 °C), temperature source Oral, resp. rate 18, height 71\", weight 167 lb 8 oz (76 kg), SpO2 93%.    Gen:   NAD.  A and O x 3  CV:   RRR, no m/g/r  Pulm:   CTA bilat  Abd:   +bs, soft, tender over mid to right abdomen with moderate palpation, no rebound or guarding, ND, NGOZI drain in right side with serosanquinous drainage.  LE:   No c/c/e  Neuro:   nonfocal    Results:     Lab Results   Component Value Date    WBC 10.3 2025    HGB 7.9 (L) 2025    HCT 20.7 (L) 2025    .0 2025    CREATSERUM 0.76 2025    BUN 18 2025     (L) 2025    K 4.1 2025     2025    CO2 26.0 2025     (H) 2025    CA 7.5 (L) 2025    ALB 2.4 (L) 2025    ALKPHO 55 2025    BILT 0.4 2025    TP 4.2 (L) 2025    AST 13 2025    ALT 14 2025    TSH 2.417 2025    LIP 22 2025    MG 1.6 2025    PHOS 3.0 2025    B12 1,020 (H) 2025       IR PERITONEAL DRAINAGE CATHETER  Result Date: 2025  CONCLUSION: Insertion of drainage catheter into intra-abdominal ascites yielding 260 cc serosanguineous fluid.    Dictated by (CST): Norris Reilly MD on 2025 at 6:09 PM     Finalized by (CST): Norris Reilly MD on 2025 at 6:10 PM          CT ABDOMEN+PELVIS(CONTRAST ONLY)(CPT=74177)  Result Date: 2025  CONCLUSION:  1.  Post large bowel resection in the region of the proximal transverse colon/hepatic flexure.  No obstruction or inflammation at the surgical site.  There is a moderate-large volume of free fluid  within the abdomen and pelvis mainly distributed in both upper quadrants, across the omentum, and in the pericolic gutters.  Moderate volume of free air is also present in the same distribution as the fluid.  These may be related to recent surgery.  Organized fluid collection in the left lower retroperitoneum/left lower quadrant measuring approximately 55 x 47 x 58 mm which could represent a seroma, liquified hematoma, or other inflammatory fluid collection.  Peritoneal enhancement and thickening in the both pericolic gutters which could represent phlegmonous change, early organization of fluid collections with similar differential as the aforementioned organized collection, or peritonitis. 2.  Moderate-sized bilateral pleural effusions.  Enhancing bibasilar pulmonary opacities with air bronchograms likely representing secondary compressive atelectasis. 3.  Coronary atherosclerosis. 4.  Right renal cyst. 5.  Mild body wall edema/anasarca.  Skin staples across the abdominal-pelvic anterior wall compatible with surgical access sites.   Dictated by (CST): Jason Quiñones MD on 6/19/2025 at 5:29 PM     Finalized by (CST): Jason Quiñones MD on 6/19/2025 at 5:36 PM                Assessment and Plan:     Diffuse large B-cell lymphoma  Large Bowel obstruction due to Proximal transverse colonic mass  Abdominal fluid collection.  - CT A/P reviewed  - GI and general surgery consulted  - 6/6 s/p colonoscopy with metallic colon stent  - 6/10 - s/p laparoscopic transverse colon resection, small bowel resection x2, resection of tumor nodule on small bowel. Patient appeared to have metastatic disease per Dr. Randall  - pathology - transverse colon - diffuse large B-cell lymphoma. Separate segment with tubular adenoma. Small intestine with extensive involvement of diffuse large B-cell lymphoma  - oncology consulted, appreciate recs. Plans for chemo with curative intent in the future once bowel issues stabilized  - taking PO now.  Off  TPN.  - was on zosyn IV empirically.  Now re-started.  - started having BMs 6/15.  - NGT out  - cont soft diet  6/19:  Increased abd pain.  CT abd ordered and showed fluid collection.    IR consulted.  Pt now POD #2 s/p NGOZI drian placement in abd fluid collection.  F/u cultures.  Low dose IVF.     Acute postoperative blood loss anemia with hypotension  Hgb dip again this am.  - EBL from surgery 600ml  - s/p 2 units PRBC   - hgb drifted down. Transfuse if hgb <7.   Transfuse another unit PRBC today.  - was given 5 days IV iron  - follow cbc     Hypotension - resolved  - weaned off pressors     DM2  - ISS     Other:  H/o HTN   HL  BPH - cont flomax     Dispo:  discharge planning.   Taking PO now.   DC to [facility] pending medical clearance.     Dietitian Malnutrition Assessment     Evaluation for Malnutrition: Criteria for severe malnutrition diagnosis- chronic illness related to   Wt loss greater than 10% in 6 months., Energy intake less than 75% for greater than 1 month.                RD Malnutrition Care Plan:       Body Fat/Muscle Mass:           Physician Assessment      Patient has a diagnosis of severe malnutrition     dvt proph:    SCDs     Code status:    Full       MDM:    High Shekhar Schwarz MD  6/21/2025

## 2025-06-22 LAB
BASOPHILS # BLD AUTO: 0.03 X10(3) UL (ref 0–0.2)
BASOPHILS NFR BLD AUTO: 0.3 %
BLOOD TYPE BARCODE: 6200
DEPRECATED RDW RBC AUTO: 56.2 FL (ref 35.1–46.3)
EOSINOPHIL # BLD AUTO: 0.08 X10(3) UL (ref 0–0.7)
EOSINOPHIL NFR BLD AUTO: 0.8 %
ERYTHROCYTE [DISTWIDTH] IN BLOOD BY AUTOMATED COUNT: 17.4 % (ref 11–15)
GLUCOSE BLDC GLUCOMTR-MCNC: 158 MG/DL (ref 70–99)
GLUCOSE BLDC GLUCOMTR-MCNC: 173 MG/DL (ref 70–99)
GLUCOSE BLDC GLUCOMTR-MCNC: 206 MG/DL (ref 70–99)
GLUCOSE BLDC GLUCOMTR-MCNC: 211 MG/DL (ref 70–99)
HCT VFR BLD AUTO: 26 % (ref 39–53)
HGB BLD-MCNC: 8.6 G/DL (ref 13–17.5)
IMM GRANULOCYTES # BLD AUTO: 0.1 X10(3) UL (ref 0–1)
IMM GRANULOCYTES NFR BLD: 1 %
LYMPHOCYTES # BLD AUTO: 1.1 X10(3) UL (ref 1–4)
LYMPHOCYTES NFR BLD AUTO: 10.6 %
MAGNESIUM SERPL-MCNC: 1.5 MG/DL (ref 1.6–2.6)
MCH RBC QN AUTO: 29.5 PG (ref 26–34)
MCHC RBC AUTO-ENTMCNC: 33.1 G/DL (ref 31–37)
MCV RBC AUTO: 89 FL (ref 80–100)
MONOCYTES # BLD AUTO: 0.98 X10(3) UL (ref 0.1–1)
MONOCYTES NFR BLD AUTO: 9.5 %
NEUTROPHILS # BLD AUTO: 8.04 X10 (3) UL (ref 1.5–7.7)
NEUTROPHILS # BLD AUTO: 8.04 X10(3) UL (ref 1.5–7.7)
NEUTROPHILS NFR BLD AUTO: 77.8 %
PLATELET # BLD AUTO: 445 10(3)UL (ref 150–450)
RBC # BLD AUTO: 2.92 X10(6)UL (ref 3.8–5.8)
UNIT VOLUME: 350 ML
WBC # BLD AUTO: 10.3 X10(3) UL (ref 4–11)

## 2025-06-22 PROCEDURE — 99233 SBSQ HOSP IP/OBS HIGH 50: CPT | Performed by: HOSPITALIST

## 2025-06-22 RX ORDER — POLYETHYLENE GLYCOL 3350 17 G/17G
17 POWDER, FOR SOLUTION ORAL 2 TIMES DAILY
Status: DISCONTINUED | OUTPATIENT
Start: 2025-06-22 | End: 2025-06-24

## 2025-06-22 RX ORDER — POLYETHYLENE GLYCOL 3350 17 G/17G
17 POWDER, FOR SOLUTION ORAL DAILY
Status: DISCONTINUED | OUTPATIENT
Start: 2025-06-22 | End: 2025-06-22

## 2025-06-22 RX ORDER — MAGNESIUM OXIDE 400 MG/1
800 TABLET ORAL ONCE
Status: COMPLETED | OUTPATIENT
Start: 2025-06-22 | End: 2025-06-22

## 2025-06-22 RX ORDER — DOCUSATE SODIUM 100 MG/1
100 CAPSULE, LIQUID FILLED ORAL 2 TIMES DAILY
Status: DISCONTINUED | OUTPATIENT
Start: 2025-06-22 | End: 2025-06-24

## 2025-06-22 RX ORDER — PANTOPRAZOLE SODIUM 40 MG/1
40 TABLET, DELAYED RELEASE ORAL
Status: DISCONTINUED | OUTPATIENT
Start: 2025-06-23 | End: 2025-06-24

## 2025-06-22 NOTE — PROGRESS NOTES
[facility]  part of [facility]    Progress Note    Arnaldo Gutierrez Patient Status:  Inpatient    4/15/1945 MRN [id number]   Location [facility] 4W/SW/SE Attending Shekhar Johnson MD   Hosp Day # 17 PCP PHYSICIAN NONSTAFF       Subjective:     More abd pain.  No BM in 2 dys.    Objective:   Blood pressure 102/55, pulse 86, temperature 98.8 °F (37.1 °C), temperature source Oral, resp. rate 18, height 71\", weight 169 lb 3.2 oz (76.7 kg), SpO2 91%.    Gen:   NAD.  A and O x 3  CV:   RRR, no m/g/r  Pulm:   CTA bilat  Abd:   +bs, soft, tender over mid to right abdomen with moderate palpation, no rebound or guarding, ND, NGOZI drain in right side with serosanquinous drainage.  LE:   No c/c/e  Neuro:   nonfocal    Results:     Lab Results   Component Value Date    WBC 10.3 2025    HGB 8.6 (L) 2025    HCT 26.0 (L) 2025    .0 2025    CREATSERUM 0.76 2025    BUN 18 2025     (L) 2025    K 4.1 2025     2025    CO2 26.0 2025     (H) 2025    CA 7.5 (L) 2025    ALB 2.4 (L) 2025    ALKPHO 55 2025    BILT 0.4 2025    TP 4.2 (L) 2025    AST 13 2025    ALT 14 2025    TSH 2.417 2025    LIP 22 2025    MG 1.5 (L) 2025    PHOS 3.0 2025    B12 1,020 (H) 2025       No results found.        Assessment and Plan:     Diffuse large B-cell lymphoma  Large Bowel obstruction due to Proximal transverse colonic mass  Abdominal fluid collection.  - CT A/P reviewed  - GI and general surgery consulted  -  s/p colonoscopy with metallic colon stent  - 6/10 - s/p laparoscopic transverse colon resection, small bowel resection x2, resection of tumor nodule on small bowel. Patient appeared to have metastatic disease per Dr. Randall  - pathology - transverse colon - diffuse large B-cell lymphoma. Separate segment with tubular adenoma. Small intestine with extensive  involvement of diffuse large B-cell lymphoma  - oncology consulted, appreciate recs. Plans for chemo with curative intent in the future once bowel issues stabilized  - taking PO now.  Off TPN.  - was on zosyn IV empirically.  Now re-started.  - started having BMs 6/15.  No BM in 2 dys.  - colace and miralax bid  - NGT out  - cont soft diet  6/19:  Increased abd pain.  CT abd ordered and showed fluid collection.    IR consulted.  Pt now POD #3 s/p NGOZI gonzalez placement in abd fluid collection.  F/u cultures.  Low dose IVF.     Acute postoperative blood loss anemia with hypotension  - EBL from surgery 600ml  - s/p 3 units PRBC   - Transfuse if hgb <7.    - was given 5 days IV iron  - follow cbc     Hypotension - resolved  - off pressors     DM2  - ISS     Other:  H/o HTN   HL  BPH - cont flomax     Dispo:  discharge planning.   Taking PO now.   DC to [facility] pending medical clearance.     Dietitian Malnutrition Assessment     Evaluation for Malnutrition: Criteria for severe malnutrition diagnosis- chronic illness related to   Wt loss greater than 10% in 6 months., Energy intake less than 75% for greater than 1 month.                RD Malnutrition Care Plan:       Body Fat/Muscle Mass:           Physician Assessment      Patient has a diagnosis of severe malnutrition     dvt proph:    heparin     Code status:    Full       MDM:    High Shekhar Schwarz MD  6/22/2025

## 2025-06-22 NOTE — PLAN OF CARE
Patient alert and oriented. On room air. NGOZI drain to right side flushed and aspirated per order. Midline incision with staples TREVOR. Tolerating a low fiber diet. ACHS Accuchecks. Primofit on. Oxycodone and dilaudid given prn for pain management. Moves x2 with a walker. Call light within reach. Safety measures in place.    Problem: Patient Centered Care  Goal: Patient preferences are identified and integrated in the patient's plan of care  Description: Interventions:  - What would you like us to know as we care for you? I am from home.   - Provide timely, complete, and accurate information to patient/family  - Incorporate patient and family knowledge, values, beliefs, and cultural backgrounds into the planning and delivery of care  - Encourage patient/family to participate in care and decision-making at the level they choose  - Honor patient and family perspectives and choices  Outcome: Progressing     Problem: Diabetes/Glucose Control  Goal: Glucose maintained within prescribed range  Description: INTERVENTIONS:  - Monitor Blood Glucose as ordered  - Assess for signs and symptoms of hyperglycemia and hypoglycemia  - Administer ordered medications to maintain glucose within target range  - Assess barriers to adequate nutritional intake and initiate nutrition consult as needed  - Instruct patient on self management of diabetes  Outcome: Progressing     Problem: Patient/Family Goals  Goal: Patient/Family Long Term Goal  Description: Patient's Long Term Goal: Discharge home     Interventions:  - Monitor vitals  - Monitor labs  - IVF  - Glucose monitoring ACHS  - Clear liquid diet  - Scheduled miralax  - GI on consult  - See additional Care Plan goals for specific interventions  Outcome: Progressing  Goal: Patient/Family Short Term Goal  Description: Patient's Short Term Goal: Improve bowel function    Interventions:   - Full liquid diet  - GI on consult  - Scheduled miralax  - See additional Care Plan goals for specific  interventions  Outcome: Progressing     Problem: PAIN - ADULT  Goal: Verbalizes/displays adequate comfort level or patient's stated pain goal  Description: INTERVENTIONS:  - Encourage pt to monitor pain and request assistance  - Assess pain using appropriate pain scale  - Administer analgesics based on type and severity of pain and evaluate response  - Implement non-pharmacological measures as appropriate and evaluate response  - Consider cultural and social influences on pain and pain management  - Manage/alleviate anxiety  - Utilize distraction and/or relaxation techniques  - Monitor for opioid side effects  - Notify MD/LIP if interventions unsuccessful or patient reports new pain  - Anticipate increased pain with activity and pre-medicate as appropriate  Outcome: Progressing     Problem: RISK FOR INFECTION - ADULT  Goal: Absence of fever/infection during anticipated neutropenic period  Description: INTERVENTIONS  - Monitor WBC  - Administer growth factors as ordered  - Implement neutropenic guidelines  Outcome: Progressing     Problem: SAFETY ADULT - FALL  Goal: Free from fall injury  Description: INTERVENTIONS:  - Assess pt frequently for physical needs  - Identify cognitive and physical deficits and behaviors that affect risk of falls.  - [place] fall precautions as indicated by assessment.  - Educate pt/family on patient safety including physical limitations  - Instruct pt to call for assistance with activity based on assessment  - Modify environment to reduce risk of injury  - Provide assistive devices as appropriate  - Consider OT/PT consult to assist with strengthening/mobility  - Encourage toileting schedule  Outcome: Progressing     Problem: DISCHARGE PLANNING  Goal: Discharge to home or other facility with appropriate resources  Description: INTERVENTIONS:  - Identify barriers to discharge w/pt and caregiver  - Include patient/family/discharge partner in discharge planning  - Arrange for needed  discharge resources and transportation as appropriate  - Identify discharge learning needs (meds, wound care, etc)  - Arrange for interpreters to assist at discharge as needed  - Consider post-discharge preferences of patient/family/discharge partner  - Complete POLST form as appropriate  - Assess patient's ability to be responsible for managing their own health  - Refer to Case Management Department for coordinating discharge planning if the patient needs post-hospital services based on physician/LIP order or complex needs related to functional status, cognitive ability or social support system  Outcome: Progressing     Problem: Altered Communication/Language Barrier  Goal: Patient/Family is able to understand and participate in their care  Description: Interventions:  - Assess communication ability and preferred communication style  - Implement communication aides and strategies  - Use visual cues when possible  - Listen attentively, be patient, do not interrupt  - Minimize distractions  - Allow time for understanding and response  - Establish method for patient to ask for assistance (call light)  - Provide an  as needed  - Communicate barriers and strategies to overcome with those who interact with patient  Outcome: Progressing     Problem: GASTROINTESTINAL - ADULT  Goal: Minimal or absence of nausea and vomiting  Description: INTERVENTIONS:  - Maintain adequate hydration with IV or PO as ordered and tolerated  - Nasogastric tube to low intermittent suction as ordered  - Evaluate effectiveness of ordered antiemetic medications  - Provide nonpharmacologic comfort measures as appropriate  - Advance diet as tolerated, if ordered  - Obtain nutritional consult as needed  - Evaluate fluid balance  Outcome: Progressing  Goal: Maintains or returns to baseline bowel function  Description: INTERVENTIONS:  - Assess bowel function  - Maintain adequate hydration with IV or PO as ordered and tolerated  - Evaluate  effectiveness of GI medications  - Encourage mobilization and activity  - Obtain nutritional consult as needed  - Establish a toileting routine/schedule  - Consider collaborating with pharmacy to review patient's medication profile  Outcome: Progressing

## 2025-06-22 NOTE — PLAN OF CARE
Arnaldo is aox4, tolerating diet, ambulating upx1 w/ walker, voiding via primofit, no bm. IVF and IV abx continued. Staples removed by surgery PA at bedside. Denies nausea. Pain managed with dilaudid and oxycodone. Jpx1 intact, I/a per orders. Daughter updated via phone call. Call light within reach, calls appropriately. Safety precautions in place.     Problem: Patient Centered Care  Goal: Patient preferences are identified and integrated in the patient's plan of care  Description: Interventions:  - What would you like us to know as we care for you? I am from home.   - Provide timely, complete, and accurate information to patient/family  - Incorporate patient and family knowledge, values, beliefs, and cultural backgrounds into the planning and delivery of care  - Encourage patient/family to participate in care and decision-making at the level they choose  - Honor patient and family perspectives and choices  Outcome: Progressing     Problem: Diabetes/Glucose Control  Goal: Glucose maintained within prescribed range  Description: INTERVENTIONS:  - Monitor Blood Glucose as ordered  - Assess for signs and symptoms of hyperglycemia and hypoglycemia  - Administer ordered medications to maintain glucose within target range  - Assess barriers to adequate nutritional intake and initiate nutrition consult as needed  - Instruct patient on self management of diabetes  Outcome: Progressing     Problem: Patient/Family Goals  Goal: Patient/Family Long Term Goal  Description: Patient's Long Term Goal: Discharge home     Interventions:  - Monitor vitals  - Monitor labs  - IVF  - Glucose monitoring ACHS  - Clear liquid diet  - Scheduled miralax  - GI on consult  - See additional Care Plan goals for specific interventions  Outcome: Progressing  Goal: Patient/Family Short Term Goal  Description: Patient's Short Term Goal: Improve bowel function    Interventions:   - Full liquid diet  - GI on consult  - Scheduled miralax  - See  additional Care Plan goals for specific interventions  Outcome: Progressing     Problem: PAIN - ADULT  Goal: Verbalizes/displays adequate comfort level or patient's stated pain goal  Description: INTERVENTIONS:  - Encourage pt to monitor pain and request assistance  - Assess pain using appropriate pain scale  - Administer analgesics based on type and severity of pain and evaluate response  - Implement non-pharmacological measures as appropriate and evaluate response  - Consider cultural and social influences on pain and pain management  - Manage/alleviate anxiety  - Utilize distraction and/or relaxation techniques  - Monitor for opioid side effects  - Notify MD/LIP if interventions unsuccessful or patient reports new pain  - Anticipate increased pain with activity and pre-medicate as appropriate  Outcome: Progressing     Problem: RISK FOR INFECTION - ADULT  Goal: Absence of fever/infection during anticipated neutropenic period  Description: INTERVENTIONS  - Monitor WBC  - Administer growth factors as ordered  - Implement neutropenic guidelines  Outcome: Progressing     Problem: SAFETY ADULT - FALL  Goal: Free from fall injury  Description: INTERVENTIONS:  - Assess pt frequently for physical needs  - Identify cognitive and physical deficits and behaviors that affect risk of falls.  - [place] fall precautions as indicated by assessment.  - Educate pt/family on patient safety including physical limitations  - Instruct pt to call for assistance with activity based on assessment  - Modify environment to reduce risk of injury  - Provide assistive devices as appropriate  - Consider OT/PT consult to assist with strengthening/mobility  - Encourage toileting schedule  Outcome: Progressing     Problem: DISCHARGE PLANNING  Goal: Discharge to home or other facility with appropriate resources  Description: INTERVENTIONS:  - Identify barriers to discharge w/pt and caregiver  - Include patient/family/discharge partner in  discharge planning  - Arrange for needed discharge resources and transportation as appropriate  - Identify discharge learning needs (meds, wound care, etc)  - Arrange for interpreters to assist at discharge as needed  - Consider post-discharge preferences of patient/family/discharge partner  - Complete POLST form as appropriate  - Assess patient's ability to be responsible for managing their own health  - Refer to Case Management Department for coordinating discharge planning if the patient needs post-hospital services based on physician/LIP order or complex needs related to functional status, cognitive ability or social support system  Outcome: Progressing     Problem: Altered Communication/Language Barrier  Goal: Patient/Family is able to understand and participate in their care  Description: Interventions:  - Assess communication ability and preferred communication style  - Implement communication aides and strategies  - Use visual cues when possible  - Listen attentively, be patient, do not interrupt  - Minimize distractions  - Allow time for understanding and response  - Establish method for patient to ask for assistance (call light)  - Provide an  as needed  - Communicate barriers and strategies to overcome with those who interact with patient  Outcome: Progressing     Problem: GASTROINTESTINAL - ADULT  Goal: Minimal or absence of nausea and vomiting  Description: INTERVENTIONS:  - Maintain adequate hydration with IV or PO as ordered and tolerated  - Nasogastric tube to low intermittent suction as ordered  - Evaluate effectiveness of ordered antiemetic medications  - Provide nonpharmacologic comfort measures as appropriate  - Advance diet as tolerated, if ordered  - Obtain nutritional consult as needed  - Evaluate fluid balance  Outcome: Progressing  Goal: Maintains or returns to baseline bowel function  Description: INTERVENTIONS:  - Assess bowel function  - Maintain adequate hydration with IV or  PO as ordered and tolerated  - Evaluate effectiveness of GI medications  - Encourage mobilization and activity  - Obtain nutritional consult as needed  - Establish a toileting routine/schedule  - Consider collaborating with pharmacy to review patient's medication profile  Outcome: Progressing

## 2025-06-22 NOTE — PROGRESS NOTES
[facility]  Progress Note    Arnaldo Gutierrez Patient Status:  Inpatient    4/15/1945 MRN [id number]   Location [facility] 4W/SW/SE Attending Shekhar Johnson MD   Hosp Day # 17 PCP PHYSICIAN NONSTAFF     Subjective:  Patient sitting in chair upon exam, RN at bedside providing translation.  Patient reports he feels well today, notes abdominal and back pain which has improved since yesterday.  Tolerating diet without nausea or emesis.  Denies bowel movement, MiraLAX given today.  Denies fever or chills.    Objective/Physical Exam:  General: Alert, orientated x3.  Cooperative.  No apparent distress.  Vital Signs:  Blood pressure 102/55, pulse 86, temperature 98.8 °F (37.1 °C), temperature source Oral, resp. rate 18, height 5' 11\" (1.803 m), weight 169 lb 3.2 oz (76.7 kg), SpO2 91%.  Wt Readings from Last 3 Encounters:   25 169 lb 3.2 oz (76.7 kg)   25 148 lb (67.1 kg)   25 160 lb (72.6 kg)     Lungs: No respiratory distress.  Cardiac: Regular rate and rhythm.   Abdomen:  Soft, non distended, mild midline incisional tenderness , with no rebound or guarding.  No peritoneal signs.   Extremities:  No lower extremity edema noted.    Incision: Clean, dry, intact, no erythema. Staples in place  Drain: IR drain with ss output    Intake/Output:    Intake/Output Summary (Last 24 hours) at 2025 0944  Last data filed at 2025 0900  Gross per 24 hour   Intake 1132.08 ml   Output 1787 ml   Net -654.92 ml     I/O last 3 completed shifts:  In: .1 [P.O.:480; I.V.:880; Blood:332.1; IV PIGGYBACK:200]  Out: .5 [Urine:1975; Drains:84.5]  I/O this shift:  In: -   Out: 400 [Urine:400]    Medications: Scheduled Medications[1]    Labs:  Lab Results   Component Value Date    WBC 10.3 2025    HGB 8.6 2025    HCT 26.0 2025    .0 2025        No results found for: \"PT\", \"INR\"      Assessment  Problem List[2]    S/p  Laparoscopic assisted tranverse colon  resection, small bowel resection x2, resection of tumor nodule on small bowel  laparoscopic take down of splenic flexure on 6/10/2025  S/p US drainage of peritoneal fluid by IR 6/19/2025  Diffuse large B-cell lymphoma  Acute anemia, improving    Plan:  Midline and abdominal staples to be removed today  Staple for dc to [facility] from surgical standpoint once HGB stabilized  Patient to follow-up with Dr. Randall in 1 week  Continue soft diet as tolerated  Continue IV antibiotics  Continue to trend CBC for worsening recommend to transfuse if hemoglobin less than 7  Analgesics and antiemetics as needed  Ambulate and up to chair   DVT prophylaxis with SCDs  GI prophylaxis with Protonix    Quality:  DVT Mechanical Prophylaxis:   SCDs,    DVT Pharmacologic Prophylaxis   Medication    heparin (Porcine) 5000 UNIT/ML injection 5,000 Units                Code Status: Full Code  Muñoz: External urinary catheter in place  Muñoz Duration (in days):   Central line:    ANNELISE: 6/23/2025        Krystyna Baltazar PA-C  6/22/2025  9:44 AM               [1]    polyethylene glycol (PEG 3350)  17 g Oral Daily    heparin  5,000 Units Subcutaneous Q12H    piperacillin-tazobactam  3.375 g Intravenous Q8H    insulin aspart  1-11 Units Subcutaneous TID CC    tamsulosin  0.8 mg Oral Daily    pregabalin  100 mg Oral TID    vitamin B-12  50 mcg Oral Daily    folic acid  1 mg Oral Daily    pantoprazole  40 mg Intravenous Daily   [2]   Patient Active Problem List  Diagnosis    BPH (benign prostatic hyperplasia)    Chronic low back pain    Chronic pain of both knees    Depression with anxiety    Generalized osteoarthritis    Hyperlipidemia    Type 2 diabetes mellitus without complication, without long-term current use of insulin (HCC)    Essential hypertension    Bipolar I disorder, single manic episode (HCC)    Large bowel obstruction (HCC)    Colonic mass    Diffuse large B-cell lymphoma of solid organ excluding spleen

## 2025-06-23 ENCOUNTER — APPOINTMENT (OUTPATIENT)
Dept: CV DIAGNOSTICS | Facility: HOSPITAL | Age: 80
End: 2025-06-23
Attending: STUDENT IN AN ORGANIZED HEALTH CARE EDUCATION/TRAINING PROGRAM
Payer: MEDICARE

## 2025-06-23 LAB
ANION GAP SERPL CALC-SCNC: 6 MMOL/L (ref 0–18)
BASOPHILS # BLD AUTO: 0.02 X10(3) UL (ref 0–0.2)
BASOPHILS NFR BLD AUTO: 0.2 %
BUN BLD-MCNC: 15 MG/DL (ref 9–23)
BUN/CREAT SERPL: 19.5 (ref 10–20)
CALCIUM BLD-MCNC: 7.7 MG/DL (ref 8.7–10.4)
CHLORIDE SERPL-SCNC: 103 MMOL/L (ref 98–112)
CO2 SERPL-SCNC: 26 MMOL/L (ref 21–32)
CREAT BLD-MCNC: 0.77 MG/DL (ref 0.7–1.3)
DEPRECATED RDW RBC AUTO: 56 FL (ref 35.1–46.3)
EGFRCR SERPLBLD CKD-EPI 2021: 92 ML/MIN/1.73M2 (ref 60–?)
EOSINOPHIL # BLD AUTO: 0 X10(3) UL (ref 0–0.7)
EOSINOPHIL NFR BLD AUTO: 0 %
ERYTHROCYTE [DISTWIDTH] IN BLOOD BY AUTOMATED COUNT: 17.4 % (ref 11–15)
GLUCOSE BLD-MCNC: 192 MG/DL (ref 70–99)
GLUCOSE BLDC GLUCOMTR-MCNC: 214 MG/DL (ref 70–99)
GLUCOSE BLDC GLUCOMTR-MCNC: 237 MG/DL (ref 70–99)
HCT VFR BLD AUTO: 24 % (ref 39–53)
HGB BLD-MCNC: 7.8 G/DL (ref 13–17.5)
IMM GRANULOCYTES # BLD AUTO: 0.06 X10(3) UL (ref 0–1)
IMM GRANULOCYTES NFR BLD: 0.6 %
LYMPHOCYTES # BLD AUTO: 0.72 X10(3) UL (ref 1–4)
LYMPHOCYTES NFR BLD AUTO: 7 %
MAGNESIUM SERPL-MCNC: 1.5 MG/DL (ref 1.6–2.6)
MCH RBC QN AUTO: 28.2 PG (ref 26–34)
MCHC RBC AUTO-ENTMCNC: 32.5 G/DL (ref 31–37)
MCV RBC AUTO: 86.6 FL (ref 80–100)
MONOCYTES # BLD AUTO: 0.91 X10(3) UL (ref 0.1–1)
MONOCYTES NFR BLD AUTO: 8.8 %
NEUTROPHILS # BLD AUTO: 8.63 X10 (3) UL (ref 1.5–7.7)
NEUTROPHILS # BLD AUTO: 8.63 X10(3) UL (ref 1.5–7.7)
NEUTROPHILS NFR BLD AUTO: 83.4 %
OSMOLALITY SERPL CALC.SUM OF ELEC: 286 MOSM/KG (ref 275–295)
PHOSPHATE SERPL-MCNC: 3.1 MG/DL (ref 2.4–5.1)
PLATELET # BLD AUTO: 500 10(3)UL (ref 150–450)
POTASSIUM SERPL-SCNC: 3.3 MMOL/L (ref 3.5–5.1)
RBC # BLD AUTO: 2.77 X10(6)UL (ref 3.8–5.8)
SODIUM SERPL-SCNC: 135 MMOL/L (ref 136–145)
WBC # BLD AUTO: 10.3 X10(3) UL (ref 4–11)

## 2025-06-23 PROCEDURE — 99233 SBSQ HOSP IP/OBS HIGH 50: CPT | Performed by: HOSPITALIST

## 2025-06-23 PROCEDURE — 93306 TTE W/DOPPLER COMPLETE: CPT | Performed by: STUDENT IN AN ORGANIZED HEALTH CARE EDUCATION/TRAINING PROGRAM

## 2025-06-23 RX ORDER — POLYETHYLENE GLYCOL 3350 17 G/17G
17 POWDER, FOR SOLUTION ORAL 2 TIMES DAILY
Status: SHIPPED | COMMUNITY
Start: 2025-06-23

## 2025-06-23 RX ORDER — MAGNESIUM HYDROXIDE/ALUMINUM HYDROXICE/SIMETHICONE 120; 1200; 1200 MG/30ML; MG/30ML; MG/30ML
30 SUSPENSION ORAL 4 TIMES DAILY PRN
Status: DISCONTINUED | OUTPATIENT
Start: 2025-06-23 | End: 2025-06-24

## 2025-06-23 RX ORDER — SUCRALFATE ORAL 1 G/10ML
1 SUSPENSION ORAL
Status: DISCONTINUED | OUTPATIENT
Start: 2025-06-23 | End: 2025-06-24

## 2025-06-23 RX ORDER — PROCHLORPERAZINE EDISYLATE 5 MG/ML
10 INJECTION INTRAMUSCULAR; INTRAVENOUS EVERY 6 HOURS PRN
Status: DISCONTINUED | OUTPATIENT
Start: 2025-06-23 | End: 2025-06-24

## 2025-06-23 RX ORDER — MAGNESIUM OXIDE 400 MG/1
800 TABLET ORAL ONCE
Status: COMPLETED | OUTPATIENT
Start: 2025-06-23 | End: 2025-06-23

## 2025-06-23 RX ORDER — PSEUDOEPHEDRINE HCL 30 MG
100 TABLET ORAL 2 TIMES DAILY
Status: SHIPPED | COMMUNITY
Start: 2025-06-23

## 2025-06-23 NOTE — PAYOR COMM NOTE
--------------  CONTINUED STAY REVIEW 6/23    Payor: [employer]  Subscriber #:  [id number]  Authorization Number: [id number]    Admit date: 6/5/25  Admit time:  8:01 PM    REVIEW DOCUMENTATION:  Date of Service: 6/23/2025  1:50 PM     Signed         [facility]  part of [facility]     Progress Note       Subjective:      Pt c/o pain in right calf.  Tolerating diet.     Objective:   Blood pressure 102/53, pulse 92, temperature 99.7 °F (37.6 °C), temperature source Oral, resp. rate 18, height 71\", weight 171 lb 3.2 oz (77.7 kg), SpO2 90%.     Gen:   NAD.  A and O x 3  CV:   RRR, no m/g/r  Pulm:   CTA bilat  Abd:   +bs, soft, tender over mid to right abdomen with moderate palpation, no rebound or guarding, ND, NGOZI drain in right side with serosanquinous drainage.  LE:   No c/c/e  Neuro:   nonfocal     Results:            Lab Results   Component Value Date     WBC 10.3 06/23/2025     HGB 7.8 (L) 06/23/2025     HCT 24.0 (L) 06/23/2025     .0 (H) 06/23/2025     CREATSERUM 0.77 06/23/2025     BUN 15 06/23/2025      (L) 06/23/2025     K 3.3 (L) 06/23/2025      06/23/2025     CO2 26.0 06/23/2025      (H) 06/23/2025     CA 7.7 (L) 06/23/2025     ALB 2.4 (L) 06/13/2025     ALKPHO 55 06/13/2025     BILT 0.4 06/13/2025     TP 4.2 (L) 06/13/2025     AST 13 06/13/2025     ALT 14 06/13/2025     TSH 2.417 04/08/2025     LIP 22 06/05/2025     MG 1.5 (L) 06/23/2025     PHOS 3.1 06/23/2025     B12 1,020 (H) 06/17/2025         No results found.         Assessment and Plan:      Diffuse large B-cell lymphoma  Large Bowel obstruction due to Proximal transverse colonic mass  Abdominal fluid collection.  - CT A/P reviewed  - GI and general surgery consulted  - 6/6 s/p colonoscopy with metallic colon stent  - 6/10 - s/p laparoscopic transverse colon resection, small bowel resection x2, resection of tumor nodule on small bowel. Patient appeared to have metastatic disease per   Prakash  - pathology - transverse colon - diffuse large B-cell lymphoma. Separate segment with tubular adenoma. Small intestine with extensive involvement of diffuse large B-cell lymphoma  - oncology consulted, appreciate recs. Plans for chemo with curative intent in the future once bowel issues stabilized  - taking PO now.  Off TPN.  - was on zosyn IV empirically.  Now re-started.  - started having BMs 6/15.  No BM in 2 dys.  - colace and miralax bid  - NGT out  - cont soft diet  6/19:  Increased abd pain.  CT abd ordered and showed fluid collection.    IR consulted.  Pt now POD #4 s/p NGOZI gonzalez placement in abd fluid collection.  F/u cultures.  - stop IVF  - ID consult for abx on dc     Right calf pain  - check LE dopplers     Acute postoperative blood loss anemia with hypotension  - EBL from surgery 600ml  - s/p 3 units PRBC   - Transfuse if hgb <7.    - was given 5 days IV iron  - follow cbc     Hypotension - resolved  - off pressors     DM2  - ISS     Other:  H/o HTN   HL  BPH - cont flomax     Dispo:  discharge planning.   Taking PO now.   DC to [facility] pending medical clearance.     Dietitian Malnutrition Assessment     Evaluation for Malnutrition: Criteria for severe malnutrition diagnosis- chronic illness related to   Wt loss greater than 10% in 6 months., Energy intake less than 75% for greater than 1 month.                RD Malnutrition Care Plan:       Body Fat/Muscle Mass:           Physician Assessment      Patient has a diagnosis of severe malnutrition     dvt proph:    heparin     Code status:    Full        MDM:    High Shekhar Schwarz MD  6/23/2025                          MEDICATIONS ADMINISTERED IN LAST 1 DAY:  alum-mag hydroxide-simethicone (Maalox) 200-200-20 MG/5ML oral suspension 30 mL       Date Action Dose Route User    6/23/2025 1420 Given 30 mL Oral Genevieve Jeter, RN    6/23/2025 0934 Given 30 mL Oral Genevieve Jeter, JOSE          calcium carbonate (Tums) chewable tab 500 mg       Date  Action Dose Route User    6/23/2025 0218 Given 500 mg Oral Dorothy Bernard RN          folic acid (Folvite) tab 1 mg       Date Action Dose Route User    6/23/2025 0944 Given 1 mg Oral Genevieve Jeter RN          heparin (Porcine) 5000 UNIT/ML injection 5,000 Units       Date Action Dose Route User    6/23/2025 0944 Given 5,000 Units Subcutaneous (Left Lower Abdomen) Genevieve Jeter RN    6/22/2025 2017 Given 5,000 Units Subcutaneous (Left Lower Abdomen) Dorothy Bernard RN          HYDROmorphone (Dilaudid) 1 MG/ML injection 0.8 mg       Date Action Dose Route User    6/23/2025 1305 Given 0.8 mg Intravenous Genevieve Jeter RN    6/22/2025 1931 Given 0.8 mg Intravenous Dorothy Bernard RN          insulin aspart (NovoLOG) 100 Units/mL FlexPen 1-11 Units       Date Action Dose Route User    6/23/2025 1236 Given 4 Units Subcutaneous (Left Upper Arm) Genevieve Jeter RN    6/22/2025 1756 Given 4 Units Subcutaneous (Right Upper Arm) Annika Downs RN          magnesium oxide (Mag-Ox) tab 800 mg       Date Action Dose Route User    6/23/2025 0944 Given 800 mg Oral Genevieve Jeter RN          ondansetron (Zofran) 4 MG/2ML injection 4 mg       Date Action Dose Route User    6/23/2025 0953 Given 4 mg Intravenous Genevieve Jeter RN    6/22/2025 2017 Given 4 mg Intravenous Dorothy Bernard RN    6/22/2025 1554 Given 4 mg Intravenous Annika Downs RN          oxyCODONE immediate release tab 5 mg       Date Action Dose Route User    6/22/2025 2027 Given 5 mg Oral Dorothy Bernard RN    6/22/2025 1553 Given 5 mg Oral Annika Downs RN          pantoprazole (Protonix) DR tab 40 mg       Date Action Dose Route User    6/23/2025 0555 Given 40 mg Oral Dorothy Bernard RN          polyethylene glycol (PEG 3350) (Miralax) 17 g oral packet 17 g       Date Action Dose Route User    6/23/2025 0944 Given 17 g Oral Genevieve Jeter RN    6/22/2025 2018 Given 17 g Oral Dorothy Bernard RN          piperacillin-tazobactam (Zosyn)  3.375 g in dextrose 5% 100 mL IVPB-ADDV       Date Action Dose Route User    6/23/2025 0944 New Bag 3.375 g Intravenous Genevieve Jeter RN    6/23/2025 0213 New Bag 3.375 g Intravenous Dorothy Bernard, RN    6/22/2025 1756 New Bag 3.375 g Intravenous Annika Downs RN          potassium chloride 40 mEq in 250mL sodium chloride 0.9% IVPB premix       Date Action Dose Route User    6/23/2025 0944 New Bag 40 mEq Intravenous Genevieve Jeter RN          pregabalin (Lyrica) cap 100 mg       Date Action Dose Route User    6/23/2025 0944 Given 100 mg Oral Genevieve Jeter RN    6/22/2025 2017 Given 100 mg Oral Dorothy Bernard RN          tamsulosin (Flomax) cap 0.8 mg       Date Action Dose Route User    6/23/2025 0944 Given 0.8 mg Oral Genevieve Jeter RN          cyanocobalamin (Vitamin B12) tab 50 mcg       Date Action Dose Route User    6/23/2025 0953 Given 50 mcg Oral Genevieve Jeter RN            Vitals (last day)       Date/Time Temp Pulse Resp BP SpO2 Weight O2 Device O2 Flow Rate (L/min) [facility]    06/23/25 1428 97.5 °F (36.4 °C) 87 16 101/61 92 % -- None (Room air) -- LG    06/23/25 0609 -- -- -- -- -- 171 lb 3.2 oz (77.7 kg) -- -- BS    06/23/25 0359 99.7 °F (37.6 °C) 92 18 102/53 90 % -- None (Room air) -- BS    06/22/25 1929 100 °F (37.8 °C) 98 22 111/52 90 % -- None (Room air) -- BS    06/22/25 1120 98.6 °F (37 °C) 81 16 106/61 94 % -- None (Room air) -- CV    06/22/25 0718 -- -- -- -- -- 169 lb 3.2 oz (76.7 kg) -- -- BS    06/22/25 0535 -- -- -- 102/55 -- -- -- -- BB    06/22/25 0327 98.8 °F (37.1 °C) 86 18 99/45 91 % -- None (Room air) -- BS

## 2025-06-23 NOTE — PLAN OF CARE
Pt A/O x4, Icelandic speaking. VSS. Poor appetite, accu-checks AC/HS. +bm. Acid reflux throughout shift, Maalox with some relief. Carafate ordered. Zofran & Compazine PRN for nausea. Mag & K+ replaced per protocol. IVF dc'd. IV abx continued. NGOZI drain in place & draining, flushing per orders. US doppler BLE & 2D echo pending. Fall precautions place. Call light within reach. Frequent rounding. Anticipate dc to [facility] pending medical clearance.     Problem: Patient Centered Care  Goal: Patient preferences are identified and integrated in the patient's plan of care  Description: Interventions:  - What would you like us to know as we care for you? I am from home.   - Provide timely, complete, and accurate information to patient/family  - Incorporate patient and family knowledge, values, beliefs, and cultural backgrounds into the planning and delivery of care  - Encourage patient/family to participate in care and decision-making at the level they choose  - Honor patient and family perspectives and choices  Outcome: Progressing     Problem: Diabetes/Glucose Control  Goal: Glucose maintained within prescribed range  Description: INTERVENTIONS:  - Monitor Blood Glucose as ordered  - Assess for signs and symptoms of hyperglycemia and hypoglycemia  - Administer ordered medications to maintain glucose within target range  - Assess barriers to adequate nutritional intake and initiate nutrition consult as needed  - Instruct patient on self management of diabetes  Outcome: Progressing     Problem: Patient/Family Goals  Goal: Patient/Family Long Term Goal  Description: Patient's Long Term Goal: Discharge home     Interventions:  - Monitor vitals  - Monitor labs  - IVF  - Glucose monitoring ACHS  - Clear liquid diet  - Scheduled miralax  - GI on consult  - See additional Care Plan goals for specific interventions  Outcome: Progressing  Goal: Patient/Family Short Term Goal  Description: Patient's Short Term Goal: Improve bowel  function    Interventions:   - Full liquid diet  - GI on consult  - Scheduled miralax  - See additional Care Plan goals for specific interventions  Outcome: Progressing     Problem: PAIN - ADULT  Goal: Verbalizes/displays adequate comfort level or patient's stated pain goal  Description: INTERVENTIONS:  - Encourage pt to monitor pain and request assistance  - Assess pain using appropriate pain scale  - Administer analgesics based on type and severity of pain and evaluate response  - Implement non-pharmacological measures as appropriate and evaluate response  - Consider cultural and social influences on pain and pain management  - Manage/alleviate anxiety  - Utilize distraction and/or relaxation techniques  - Monitor for opioid side effects  - Notify MD/LIP if interventions unsuccessful or patient reports new pain  - Anticipate increased pain with activity and pre-medicate as appropriate  Outcome: Progressing     Problem: RISK FOR INFECTION - ADULT  Goal: Absence of fever/infection during anticipated neutropenic period  Description: INTERVENTIONS  - Monitor WBC  - Administer growth factors as ordered  - Implement neutropenic guidelines  Outcome: Progressing     Problem: SAFETY ADULT - FALL  Goal: Free from fall injury  Description: INTERVENTIONS:  - Assess pt frequently for physical needs  - Identify cognitive and physical deficits and behaviors that affect risk of falls.  - [place] fall precautions as indicated by assessment.  - Educate pt/family on patient safety including physical limitations  - Instruct pt to call for assistance with activity based on assessment  - Modify environment to reduce risk of injury  - Provide assistive devices as appropriate  - Consider OT/PT consult to assist with strengthening/mobility  - Encourage toileting schedule  Outcome: Progressing     Problem: DISCHARGE PLANNING  Goal: Discharge to home or other facility with appropriate resources  Description: INTERVENTIONS:  - Identify  barriers to discharge w/pt and caregiver  - Include patient/family/discharge partner in discharge planning  - Arrange for needed discharge resources and transportation as appropriate  - Identify discharge learning needs (meds, wound care, etc)  - Arrange for interpreters to assist at discharge as needed  - Consider post-discharge preferences of patient/family/discharge partner  - Complete POLST form as appropriate  - Assess patient's ability to be responsible for managing their own health  - Refer to Case Management Department for coordinating discharge planning if the patient needs post-hospital services based on physician/LIP order or complex needs related to functional status, cognitive ability or social support system  Outcome: Progressing     Problem: Altered Communication/Language Barrier  Goal: Patient/Family is able to understand and participate in their care  Description: Interventions:  - Assess communication ability and preferred communication style  - Implement communication aides and strategies  - Use visual cues when possible  - Listen attentively, be patient, do not interrupt  - Minimize distractions  - Allow time for understanding and response  - Establish method for patient to ask for assistance (call light)  - Provide an  as needed  - Communicate barriers and strategies to overcome with those who interact with patient  Outcome: Progressing     Problem: GASTROINTESTINAL - ADULT  Goal: Minimal or absence of nausea and vomiting  Description: INTERVENTIONS:  - Maintain adequate hydration with IV or PO as ordered and tolerated  - Nasogastric tube to low intermittent suction as ordered  - Evaluate effectiveness of ordered antiemetic medications  - Provide nonpharmacologic comfort measures as appropriate  - Advance diet as tolerated, if ordered  - Obtain nutritional consult as needed  - Evaluate fluid balance  Outcome: Progressing  Goal: Maintains or returns to baseline bowel  function  Description: INTERVENTIONS:  - Assess bowel function  - Maintain adequate hydration with IV or PO as ordered and tolerated  - Evaluate effectiveness of GI medications  - Encourage mobilization and activity  - Obtain nutritional consult as needed  - Establish a toileting routine/schedule  - Consider collaborating with pharmacy to review patient's medication profile  Outcome: Progressing

## 2025-06-23 NOTE — CONSULTS
[facility]  part of [facility] ID CONSULT NOTE    Arnaldo Gutierrez Patient Status:  Inpatient    4/15/1945 MRN [id number]   Location [facility] 4W/SW/SE Attending Shekhar Johnson MD   Hosp Day # 18 PCP PHYSICIAN NONSTAFF       Reason for Consultation:  Intraabdominal fluid    ASSESSMENT:    Antibiotics: IV zosyn    # Intraabdominal post op fluid collection s/p IR drain placement 25 with 260 cc of serosang fluid, cx negative    # Large bowel obstruction s/p OR 6/10/25 for lap transverse colon resection, small bowel resection x2, rsection of tumor nodule on small bowel - path with DLBCL   - s/p colonic stent placement by GI 25    PLAN:    -  continue IV zosyn for now  -  will d/w Dr. Randall  -  f/up cx  -  outpatient oncology workup noted  -  Follow fever curve, wbc  -  Reviewed labs, micro, imaging reports, available old records  -  d/w patient, family, RN, Primary    History of Present Illness:  Arnaldo Gutierrez is a a(n) 80 year old male. Patient is a 80-year-old male with history of diabetes, HTN, BPH who presents to hospital initially on  with abdominal bloating, nausea, decreased appetite, unintentional weight loss.  Initial CT A/P on  with 4 cm segment of masslike constriction and thickening in the proximal transverse colon with concern for partially obstructing chronic neoplasm.  Seen by GI and underwent colonoscopy with chronic stent placement on .  Also seen by general surgery and taken to the OR on 6/10 for large bowel obstruction status post laparoscopic assisted transverse colon resection, small bowel resection and tumor nodule and small bowel.  Pathology diffuse large B-cell lymphoma.  Seen by oncology.  On  had low-grade temperatures with increasing WBC up to 19 with repeat CT A/P showing moderate to large volume free fluid in the abdomen and pelvis in the upper quadrants.  Organized fluid in the left lower retroperitoneum measuring 5.5 x  4.7 x 5.8 cm concern for seroma, liquefied hematoma versus inflammatory fluid.  Seen by IR and underwent IR peritoneal drain placement on 7/19 with 260 cc of serosanguineous fluid.  Cultures negative.  Has been on IV Zosyn.    History:  Past Medical History[1]  Past Surgical History[2]  Family History[3]   reports that he quit smoking about 45 years ago. His smoking use included cigarettes. He started smoking about 55 years ago. He has a 20 pack-year smoking history. He has been exposed to tobacco smoke. He has never used smokeless tobacco. He reports current alcohol use. He reports that he does not currently use drugs.    Allergies:  Allergies[4]    Medications:  Current Hospital Medications[5]    Review of Systems:  Per HPI    Physical Exam:  Vital signs: Blood pressure 102/53, pulse 92, temperature 99.7 °F (37.6 °C), temperature source Oral, resp. rate 18, height 180.3 cm (5' 11\"), weight 171 lb 3.2 oz (77.7 kg), SpO2 90%.    General: Alert, oriented, NAD, fatigued  HEENT: Moist mucous membranes. EOMI  Neck: No lymphadenopathy.  Supple.  Cardiovascular: No chest wall tenderness  Respiratory: Symmetric expansion  Abdomen: Soft, some distension; midline incision with steri strips c/d/I; non-tender  Musculoskeletal: No edema noted  Integument: No lesions. No erythema.  RUE PICC    Laboratory Data: Reviewed in EMR    Microbiology: Reviewed in EMR    Radiology: Reviewed    Thank you for allowing us to participate in the care of this patient. Please do not hesitate to call if you have any questions.     We will continue to follow with you and will make further recommendations based on his progress.    Rafita Torres MD   [facility]  [phone]  6/23/2025         [1]   Past Medical History:   Anxiety    BPH (benign prostatic hyperplasia)    CKD (chronic kidney disease) stage 3, GFR 30-59 ml/min (HCC)    Depression    Diabetes (HCC)    Diabetes mellitus (HCC)    Essential hypertension     GERD (gastroesophageal reflux disease)    High cholesterol    Hyperlipidemia   [2]   Past Surgical History:  Procedure Laterality Date    Colonoscopy N/A 6/6/2025    Procedure: COLONOSCOPY with colonic stent placement;  Surgeon: Eusebio Palmer MD;  Location: [facility] ENDOSCOPY    Foot fracture surgery Right     2012   [3]   Family History  Problem Relation Age of Onset    Other (Other) Father    [4] No Known Allergies  [5]   Current Facility-Administered Medications:     alum-mag hydroxide-simethicone (Maalox) 200-200-20 MG/5ML oral suspension 30 mL, 30 mL, Oral, QID PRN    pantoprazole (Protonix) DR tab 40 mg, 40 mg, Oral, QAM AC    polyethylene glycol (PEG 3350) (Miralax) 17 g oral packet 17 g, 17 g, Oral, BID    docusate sodium (Colace) cap 100 mg, 100 mg, Oral, BID    heparin (Porcine) 5000 UNIT/ML injection 5,000 Units, 5,000 Units, Subcutaneous, Q12H    calcium carbonate (Tums) chewable tab 500 mg, 500 mg, Oral, Q6H PRN    piperacillin-tazobactam (Zosyn) 3.375 g in dextrose 5% 100 mL IVPB-ADDV, 3.375 g, Intravenous, Q8H    insulin aspart (NovoLOG) 100 Units/mL FlexPen 1-11 Units, 1-11 Units, Subcutaneous, TID CC    HYDROmorphone (Dilaudid) 1 MG/ML injection 0.5 mg, 0.5 mg, Intravenous, Q4H PRN **OR** HYDROmorphone (Dilaudid) 1 MG/ML injection 0.8 mg, 0.8 mg, Intravenous, Q2H PRN **OR** HYDROmorphone (Dilaudid) 1 MG/ML injection 1.2 mg, 1.2 mg, Intravenous, Q2H PRN    tamsulosin (Flomax) cap 0.8 mg, 0.8 mg, Oral, Daily    pregabalin (Lyrica) cap 100 mg, 100 mg, Oral, TID    cyanocobalamin (Vitamin B12) tab 50 mcg, 50 mcg, Oral, Daily    folic acid (Folvite) tab 1 mg, 1 mg, Oral, Daily    phenol (Chloraseptic) 1.4 % oral liquid spray, , Oral, Q2H PRN    dextrose 10% infusion (TPN no rate), , Intravenous, Continuous PRN    ondansetron (Zofran) 4 MG/2ML injection 4 mg, 4 mg, Intravenous, Q4H PRN    acetaminophen (Ofirmev) 10 mg/mL infusion premix 1,000 mg, 1,000 mg, Intravenous, Q6H PRN    oxyCODONE  immediate release tab 2.5 mg, 2.5 mg, Oral, Q4H PRN **OR** oxyCODONE immediate release tab 5 mg, 5 mg, Oral, Q4H PRN    acetaminophen (Tylenol Extra Strength) tab 500 mg, 500 mg, Oral, Q4H PRN    glucose (Dex4) 15 GM/59ML oral liquid 15 g, 15 g, Oral, Q15 Min PRN **OR** glucose (Glutose) 40% oral gel 15 g, 15 g, Oral, Q15 Min PRN **OR** [DISCONTINUED] glucose-vitamin C (Dex-4) chewable tab 4 tablet, 4 tablet, Oral, Q15 Min PRN **OR** dextrose 50% injection 50 mL, 50 mL, Intravenous, Q15 Min PRN **OR** [DISCONTINUED] glucose (Dex4) 15 GM/59ML oral liquid 30 g, 30 g, Oral, Q15 Min PRN **OR** [DISCONTINUED] glucose (Glutose) 40% oral gel 30 g, 30 g, Oral, Q15 Min PRN **OR** [DISCONTINUED] glucose-vitamin C (Dex-4) chewable tab 8 tablet, 8 tablet, Oral, Q15 Min PRN

## 2025-06-23 NOTE — PROGRESS NOTES
PT attempted pt refusing requesting rest in bed. Therapeutic benefits of PT and mobility discussed pt requesting rest.

## 2025-06-23 NOTE — PROGRESS NOTES
[facility]  Progress Note    Arnaldo Gutierrez Patient Status:  Inpatient    4/15/1945 MRN [id number]   Location [facility] 4W/SW/SE Attending Shekhar Johnson MD   Hosp Day # 18 PCP PHYSICIAN NONSTAFF     Subjective:  Patient resting in bed this morning.  Patient complains of reflux this morning.  Patient denies nausea or vomiting.  Reports mild abdominal soreness.  Denies fever or chills.    Objective/Physical Exam:  General: Alert, orientated x3.  Cooperative.  No apparent distress.  Vital Signs:  Blood pressure 102/53, pulse 92, temperature 99.7 °F (37.6 °C), temperature source Oral, resp. rate 18, height 5' 11\" (1.803 m), weight 171 lb 3.2 oz (77.7 kg), SpO2 90%.  Wt Readings from Last 3 Encounters:   25 171 lb 3.2 oz (77.7 kg)   25 148 lb (67.1 kg)   25 160 lb (72.6 kg)     Lungs: No respiratory distress.  Cardiac: Regular rate and rhythm.   Abdomen:  Soft, non distended, mild midline incisional tenderness, with no rebound or guarding.  No peritoneal signs.   Extremities:  No lower extremity edema noted.    Incision: Clean, dry, intact, no erythema.  Abdominal staples removed  Drain: With serosanguineous output    Intake/Output:    Intake/Output Summary (Last 24 hours) at 2025 1033  Last data filed at 2025 0609  Gross per 24 hour   Intake 806 ml   Output 1542 ml   Net -736 ml     I/O last 3 completed shifts:  In: 1826 [P.O.:600; I.V.:886; IV PIGGYBACK:300]  Out: 2869 [Urine:2775; Drains:94]  No intake/output data recorded.    Medications: Scheduled Medications[1]    Labs:  Lab Results   Component Value Date    WBC 10.3 2025    HGB 7.8 2025    HCT 24.0 2025    .0 2025     Lab Results   Component Value Date     2025    K 3.3 2025     2025    CO2 26.0 2025    BUN 15 2025    CREATSERUM 0.77 2025     2025    CA 7.7 2025     No results found for: \"PT\",  \"INR\"      Assessment  Problem List[2]    S/p  Laparoscopic assisted tranverse colon resection, small bowel resection x2, resection of tumor nodule on small bowel  laparoscopic take down of splenic flexure on 6/10/2025  S/p US drainage of peritoneal fluid by IR 6/19/2025  Diffuse large B-cell lymphoma  Acute anemia, HGB 7.8 this morning    Plan:  Patient is stable for discharge to [facility] from a surgical standpoint  Transition to PO antibiotics on discharge  Follow-up with IR as outpatient for drain management  Follow-up with Dr. Randall in 1 week  Analgesics and antiemetics as needed   Ambulate and up to chair  DVT prophylaxis with heparin  GI prophylaxis with Protonix    Quality:  DVT Mechanical Prophylaxis:   SCDs,    DVT Pharmacologic Prophylaxis   Medication    heparin (Porcine) 5000 UNIT/ML injection 5,000 Units                Code Status: Full Code  Muñoz: External urinary catheter in place  Muñoz Duration (in days):   Central line:    ANNELISE: 6/23/2025        Krystyna Baltazar PA-C  6/23/2025  10:33 AM               [1]    potassium chloride  40 mEq Intravenous Once    pantoprazole  40 mg Oral QAM AC    polyethylene glycol (PEG 3350)  17 g Oral BID    docusate sodium  100 mg Oral BID    heparin  5,000 Units Subcutaneous Q12H    piperacillin-tazobactam  3.375 g Intravenous Q8H    insulin aspart  1-11 Units Subcutaneous TID CC    tamsulosin  0.8 mg Oral Daily    pregabalin  100 mg Oral TID    vitamin B-12  50 mcg Oral Daily    folic acid  1 mg Oral Daily   [2]   Patient Active Problem List  Diagnosis    BPH (benign prostatic hyperplasia)    Chronic low back pain    Chronic pain of both knees    Depression with anxiety    Generalized osteoarthritis    Hyperlipidemia    Type 2 diabetes mellitus without complication, without long-term current use of insulin (HCC)    Essential hypertension    Bipolar I disorder, single manic episode (HCC)    Large bowel obstruction (HCC)    Colonic mass    Diffuse large B-cell lymphoma of  solid organ excluding spleen

## 2025-06-23 NOTE — PROGRESS NOTES
[facility]  part of [facility]    Progress Note    Arnaldo Gutierrez Patient Status:  Inpatient    4/15/1945 MRN [id number]   Location [facility] 4W/SW/SE Attending Shekhar Johnson MD   Hosp Day # 18 PCP PHYSICIAN NONSTAFF       Subjective:     Pt c/o pain in right calf.  Tolerating diet.    Objective:   Blood pressure 102/53, pulse 92, temperature 99.7 °F (37.6 °C), temperature source Oral, resp. rate 18, height 71\", weight 171 lb 3.2 oz (77.7 kg), SpO2 90%.    Gen:   NAD.  A and O x 3  CV:   RRR, no m/g/r  Pulm:   CTA bilat  Abd:   +bs, soft, tender over mid to right abdomen with moderate palpation, no rebound or guarding, ND, NGOZI drain in right side with serosanquinous drainage.  LE:   No c/c/e  Neuro:   nonfocal    Results:     Lab Results   Component Value Date    WBC 10.3 2025    HGB 7.8 (L) 2025    HCT 24.0 (L) 2025    .0 (H) 2025    CREATSERUM 0.77 2025    BUN 15 2025     (L) 2025    K 3.3 (L) 2025     2025    CO2 26.0 2025     (H) 2025    CA 7.7 (L) 2025    ALB 2.4 (L) 2025    ALKPHO 55 2025    BILT 0.4 2025    TP 4.2 (L) 2025    AST 13 2025    ALT 14 2025    TSH 2.417 2025    LIP 22 2025    MG 1.5 (L) 2025    PHOS 3.1 2025    B12 1,020 (H) 2025       No results found.        Assessment and Plan:     Diffuse large B-cell lymphoma  Large Bowel obstruction due to Proximal transverse colonic mass  Abdominal fluid collection.  - CT A/P reviewed  - GI and general surgery consulted  -  s/p colonoscopy with metallic colon stent  - 6/10 - s/p laparoscopic transverse colon resection, small bowel resection x2, resection of tumor nodule on small bowel. Patient appeared to have metastatic disease per Dr. Randall  - pathology - transverse colon - diffuse large B-cell lymphoma. Separate segment with tubular adenoma. Small  intestine with extensive involvement of diffuse large B-cell lymphoma  - oncology consulted, appreciate recs. Plans for chemo with curative intent in the future once bowel issues stabilized  - taking PO now.  Off TPN.  - was on zosyn IV empirically.  Now re-started.  - started having BMs 6/15.  No BM in 2 dys.  - colace and miralax bid  - NGT out  - cont soft diet  6/19:  Increased abd pain.  CT abd ordered and showed fluid collection.    IR consulted.  Pt now POD #4 s/p NGOZI drian placement in abd fluid collection.  F/u cultures.  - stop IVF  - ID consult for abx on dc    Right calf pain  - check LE dopplers     Acute postoperative blood loss anemia with hypotension  - EBL from surgery 600ml  - s/p 3 units PRBC   - Transfuse if hgb <7.    - was given 5 days IV iron  - follow cbc     Hypotension - resolved  - off pressors     DM2  - ISS     Other:  H/o HTN   HL  BPH - cont flomax     Dispo:  discharge planning.   Taking PO now.   DC to [facility] pending medical clearance.     Dietitian Malnutrition Assessment     Evaluation for Malnutrition: Criteria for severe malnutrition diagnosis- chronic illness related to   Wt loss greater than 10% in 6 months., Energy intake less than 75% for greater than 1 month.                RD Malnutrition Care Plan:       Body Fat/Muscle Mass:           Physician Assessment      Patient has a diagnosis of severe malnutrition     dvt proph:    heparin     Code status:    Full       MDM:    High Shekhar Schwarz MD  6/23/2025

## 2025-06-23 NOTE — PLAN OF CARE
Patient alert and oriented. On room air. NGOZI drain to right side flushed and aspirated per order. Midline incision with staples TREVOR. Tolerating a low fiber diet. ACHS Accuchecks. Primofit on. Oxycodone and dilaudid given prn for pain management. Moves x1 with a walker. Call light within reach. Safety measures in place.     Problem: Patient Centered Care  Goal: Patient preferences are identified and integrated in the patient's plan of care  Description: Interventions:  - What would you like us to know as we care for you? I am from home.   - Provide timely, complete, and accurate information to patient/family  - Incorporate patient and family knowledge, values, beliefs, and cultural backgrounds into the planning and delivery of care  - Encourage patient/family to participate in care and decision-making at the level they choose  - Honor patient and family perspectives and choices  Outcome: Progressing     Problem: Diabetes/Glucose Control  Goal: Glucose maintained within prescribed range  Description: INTERVENTIONS:  - Monitor Blood Glucose as ordered  - Assess for signs and symptoms of hyperglycemia and hypoglycemia  - Administer ordered medications to maintain glucose within target range  - Assess barriers to adequate nutritional intake and initiate nutrition consult as needed  - Instruct patient on self management of diabetes  Outcome: Progressing     Problem: Patient/Family Goals  Goal: Patient/Family Long Term Goal  Description: Patient's Long Term Goal: Discharge home     Interventions:  - Monitor vitals  - Monitor labs  - IVF  - Glucose monitoring ACHS  - Clear liquid diet  - Scheduled miralax  - GI on consult  - See additional Care Plan goals for specific interventions  Outcome: Progressing  Goal: Patient/Family Short Term Goal  Description: Patient's Short Term Goal: Improve bowel function    Interventions:   - Full liquid diet  - GI on consult  - Scheduled miralax  - See additional Care Plan goals for specific  interventions  Outcome: Progressing     Problem: PAIN - ADULT  Goal: Verbalizes/displays adequate comfort level or patient's stated pain goal  Description: INTERVENTIONS:  - Encourage pt to monitor pain and request assistance  - Assess pain using appropriate pain scale  - Administer analgesics based on type and severity of pain and evaluate response  - Implement non-pharmacological measures as appropriate and evaluate response  - Consider cultural and social influences on pain and pain management  - Manage/alleviate anxiety  - Utilize distraction and/or relaxation techniques  - Monitor for opioid side effects  - Notify MD/LIP if interventions unsuccessful or patient reports new pain  - Anticipate increased pain with activity and pre-medicate as appropriate  Outcome: Progressing     Problem: RISK FOR INFECTION - ADULT  Goal: Absence of fever/infection during anticipated neutropenic period  Description: INTERVENTIONS  - Monitor WBC  - Administer growth factors as ordered  - Implement neutropenic guidelines  Outcome: Progressing     Problem: SAFETY ADULT - FALL  Goal: Free from fall injury  Description: INTERVENTIONS:  - Assess pt frequently for physical needs  - Identify cognitive and physical deficits and behaviors that affect risk of falls.  - [place] fall precautions as indicated by assessment.  - Educate pt/family on patient safety including physical limitations  - Instruct pt to call for assistance with activity based on assessment  - Modify environment to reduce risk of injury  - Provide assistive devices as appropriate  - Consider OT/PT consult to assist with strengthening/mobility  - Encourage toileting schedule  Outcome: Progressing     Problem: DISCHARGE PLANNING  Goal: Discharge to home or other facility with appropriate resources  Description: INTERVENTIONS:  - Identify barriers to discharge w/pt and caregiver  - Include patient/family/discharge partner in discharge planning  - Arrange for needed  discharge resources and transportation as appropriate  - Identify discharge learning needs (meds, wound care, etc)  - Arrange for interpreters to assist at discharge as needed  - Consider post-discharge preferences of patient/family/discharge partner  - Complete POLST form as appropriate  - Assess patient's ability to be responsible for managing their own health  - Refer to Case Management Department for coordinating discharge planning if the patient needs post-hospital services based on physician/LIP order or complex needs related to functional status, cognitive ability or social support system  Outcome: Progressing     Problem: Altered Communication/Language Barrier  Goal: Patient/Family is able to understand and participate in their care  Description: Interventions:  - Assess communication ability and preferred communication style  - Implement communication aides and strategies  - Use visual cues when possible  - Listen attentively, be patient, do not interrupt  - Minimize distractions  - Allow time for understanding and response  - Establish method for patient to ask for assistance (call light)  - Provide an  as needed  - Communicate barriers and strategies to overcome with those who interact with patient  Outcome: Progressing     Problem: GASTROINTESTINAL - ADULT  Goal: Minimal or absence of nausea and vomiting  Description: INTERVENTIONS:  - Maintain adequate hydration with IV or PO as ordered and tolerated  - Nasogastric tube to low intermittent suction as ordered  - Evaluate effectiveness of ordered antiemetic medications  - Provide nonpharmacologic comfort measures as appropriate  - Advance diet as tolerated, if ordered  - Obtain nutritional consult as needed  - Evaluate fluid balance  Outcome: Progressing  Goal: Maintains or returns to baseline bowel function  Description: INTERVENTIONS:  - Assess bowel function  - Maintain adequate hydration with IV or PO as ordered and tolerated  - Evaluate  effectiveness of GI medications  - Encourage mobilization and activity  - Obtain nutritional consult as needed  - Establish a toileting routine/schedule  - Consider collaborating with pharmacy to review patient's medication profile  Outcome: Progressing

## 2025-06-23 NOTE — OCCUPATIONAL THERAPY NOTE
Attempt made for OT treatment. Pt pleasantly declined activity despite encouragement. Pt reported feeling tired, pain. RN aware.

## 2025-06-24 ENCOUNTER — APPOINTMENT (OUTPATIENT)
Dept: ULTRASOUND IMAGING | Facility: HOSPITAL | Age: 80
End: 2025-06-24
Attending: HOSPITALIST
Payer: MEDICARE

## 2025-06-24 VITALS
HEIGHT: 71 IN | BODY MASS INDEX: 23.97 KG/M2 | WEIGHT: 171.19 LBS | SYSTOLIC BLOOD PRESSURE: 102 MMHG | OXYGEN SATURATION: 92 % | TEMPERATURE: 99 F | DIASTOLIC BLOOD PRESSURE: 59 MMHG | RESPIRATION RATE: 18 BRPM | HEART RATE: 91 BPM

## 2025-06-24 LAB
GLUCOSE BLDC GLUCOMTR-MCNC: 180 MG/DL (ref 70–99)
GLUCOSE BLDC GLUCOMTR-MCNC: 208 MG/DL (ref 70–99)
GLUCOSE BLDC GLUCOMTR-MCNC: 233 MG/DL (ref 70–99)
MAGNESIUM SERPL-MCNC: 1.8 MG/DL (ref 1.6–2.6)
POTASSIUM SERPL-SCNC: 3.8 MMOL/L (ref 3.5–5.1)

## 2025-06-24 PROCEDURE — 99232 SBSQ HOSP IP/OBS MODERATE 35: CPT | Performed by: STUDENT IN AN ORGANIZED HEALTH CARE EDUCATION/TRAINING PROGRAM

## 2025-06-24 PROCEDURE — 99239 HOSP IP/OBS DSCHRG MGMT >30: CPT | Performed by: HOSPITALIST

## 2025-06-24 PROCEDURE — 93970 EXTREMITY STUDY: CPT | Performed by: HOSPITALIST

## 2025-06-24 RX ORDER — HYDROCODONE BITARTRATE AND ACETAMINOPHEN 5; 325 MG/1; MG/1
1-2 TABLET ORAL EVERY 6 HOURS PRN
Qty: 30 TABLET | Refills: 0 | Status: SHIPPED | OUTPATIENT
Start: 2025-06-24

## 2025-06-24 RX ORDER — MAGNESIUM OXIDE 400 MG/1
400 TABLET ORAL ONCE
Status: COMPLETED | OUTPATIENT
Start: 2025-06-24 | End: 2025-06-24

## 2025-06-24 NOTE — PROGRESS NOTES
[facility]  part of [facility]    Progress Note    Arnaldo Gutierrez Patient Status:  Inpatient    4/15/1945 MRN [id number]   Location [facility] 4W/SW/SE Attending Shekhar Johnson MD   Hosp Day # 19 PCP PHYSICIAN NONSTAFF     Subjective: feels ok    drains about 47       Objective/Physical Exam:  General: Alert, orientated x3.  Cooperative.  No apparent distress.  Vital Signs:  Blood pressure 105/58, pulse 100, temperature 98 °F (36.7 °C), temperature source Oral, resp. rate 18, height 180.3 cm (5' 11\"), weight 171 lb 3.2 oz (77.7 kg), SpO2 90%.  Abdomen:  Soft,  slight distention   Drain ?    Labs:  Lab Results   Component Value Date    WBC 10.3 2025    HGB 7.8 (L) 2025    HCT 24.0 (L) 2025    .0 (H) 2025    CREATSERUM 0.77 2025    BUN 15 2025     (L) 2025    K 3.8 2025     2025    CO2 26.0 2025     (H) 2025    CA 7.7 (L) 2025    ALB 2.4 (L) 2025    ALKPHO 55 2025    BILT 0.4 2025    TP 4.2 (L) 2025    AST 13 2025    ALT 14 2025    TSH 2.417 2025    LIP 22 2025    MG 1.8 2025    PHOS 3.1 2025    B12 1,020 (H) 2025       Imaging:  IR PERITONEAL DRAINAGE CATHETER  Result Date: 2025  PROCEDURE: IR PERITONEAL DRAINAGE CATHETER  INDICATIONS:  Status post colon resection, with postop intraperitoneal fluid  COMPARISON: None.  (S):  [place]  ESTIMATED BLOOD LOSS: Less than 5 mL  COMPLICATIONS: None  FINDINGS:  Informed consent was obtained. The patient was positioned supine. The right lower quadrant was sterilely prepped and draped. Preliminary ultrasound demonstrated complex appearing septated fluid collection.. Local Lidocaine was administered. Under ultrasound guidance, a Yueh sheath needle was advanced into the collection. There was return of serosanguineous fluid from the access needle. Under ultrasound guidance, a  wire was advanced through the needle. The access was dilated and a 10 Danish pigtail catheter was coiled in the collection.  Via the drain, approximately 260 cc of serosanguineous fluid were aspirated.  Sample was sent for cultures and sensitivities.  The catheter was secured to the skin with sutures and attached to bulb suction.  Sterile dressing applied.         CONCLUSION: Insertion of drainage catheter into intra-abdominal ascites yielding 260 cc serosanguineous fluid.    Dictated by (CST): Norris Reilly MD on 6/20/2025 at 6:09 PM     Finalized by (CST): Norris Reilly MD on 6/20/2025 at 6:10 PM          CT ABDOMEN+PELVIS(CONTRAST ONLY)(CPT=74177)  Result Date: 6/19/2025  PROCEDURE: CT ABDOMEN + PELVIS (CONTRAST ONLY) (CPT=74177)  COMPARISON: [facility], CT ABDOMEN + PELVIS (CONTRAST ONLY) (CPT=74177), 6/05/2025, 5:22 PM.  INDICATIONS: S/p colon resection 6/10, leukocytosis and increased pain  TECHNIQUE: CT images of the abdomen and pelvis were obtained with non-ionic intravenous contrast material.  Automated exposure control for dose reduction was used. Adjustment of the mA and/or kV was done based on the patient's size. Use of iterative reconstruction technique for dose reduction was used.  Dose information is transmitted to the ACR (American College of Radiology) NRDR (National Radiology Data Registry) which includes the Dose Index Registry.  FINDINGS:  LIVER: No enlargement, atrophy, abnormal density, or significant focal lesion.  BILIARY: Intrahepatic biliary ductal dilatation.  Gallbladder is incompletely distended.  No significant extrahepatic biliary ductal dilatation. SPLEEN: Spleen is not enlarged. There are granulomatous calcifications in the spleen. No suspicious splenic lesion. STOMACH: No gastric obstruction.  Duodenum is distended with oral contrast material but is otherwise without evidence of obstruction or inflammation. PANCREAS: No lesion, fluid collection, ductal  dilatation, or atrophy.  ADRENALS: No nodule or enlargement. KIDNEYS: Cortical based low density focus in the posterior midpole of the right kidney suggesting simple cyst.  Bilaterally no suspicious enhancing renal lesion or hydroureteronephrosis. AORTA/VASCULAR:   Atherosclerosis of the abdominal aorta.  No aneurysm. BOWEL/MESENTERY:  Peripherally enhancing fluid collection in the left lower quadrant, above the roof of the urinary bladder and measuring approximately 55 x 47 x 58 mm (AP x transverse x CC).  There is also a moderate-large volume of free fluid within the abdomen and pelvis mainly along the peripheral aspect of the liver and spleen, both pericolic gutters and across the omentum.  Moderate volume of foci of gas are present within this fluid.  There is peritoneal thickening/enhancement in both pericolic  gutters.  No evidence of bowel obstruction.  No lymphadenopathy.  Post large bowel resection with reanastomosis in the region of the hepatic flexure without focal obstruction or inflammatory change at the anastomosis. ABDOMINAL WALL: Ventral line of skin staples are present above the umbilicus.  Additional scattered skin staples are present in the abdominal-pelvic anterior wall.  Mild body wall edema. URINARY BLADDER: Urinary bladder is distended with urine.  No stones or wall thickening. PELVIC NODES: No enlarged mass or adenopathy.   PELVIC ORGANS: No visible mass.  Pelvic organs appropriate for patient age.  BONES:   Mild endplate change and disc disease within the spine most advanced at L5-S1. LUNG BASES: Small-moderate sized bilateral pleural effusion.  Enhancing wedge-shaped pulmonary opacities with air bronchograms adjacent to the effusions likely representing secondary compressive atelectasis.  The visualized heart has coronary artery calcifications. OTHER: Negative.          CONCLUSION:  1.  Post large bowel resection in the region of the proximal transverse colon/hepatic flexure.  No  obstruction or inflammation at the surgical site.  There is a moderate-large volume of free fluid within the abdomen and pelvis mainly distributed in both upper quadrants, across the omentum, and in the pericolic gutters.  Moderate volume of free air is also present in the same distribution as the fluid.  These may be related to recent surgery.  Organized fluid collection in the left lower retroperitoneum/left lower quadrant measuring approximately 55 x 47 x 58 mm which could represent a seroma, liquified hematoma, or other inflammatory fluid collection.  Peritoneal enhancement and thickening in the both pericolic gutters which could represent phlegmonous change, early organization of fluid collections with similar differential as the aforementioned organized collection, or peritonitis. 2.  Moderate-sized bilateral pleural effusions.  Enhancing bibasilar pulmonary opacities with air bronchograms likely representing secondary compressive atelectasis. 3.  Coronary atherosclerosis. 4.  Right renal cyst. 5.  Mild body wall edema/anasarca.  Skin staples across the abdominal-pelvic anterior wall compatible with surgical access sites.   Dictated by (CST): Jason Quiñones MD on 6/19/2025 at 5:29 PM     Finalized by (CST): Jason Quiñones MD on 6/19/2025 at 5:36 PM          XR ABDOMEN (1 VIEW) (CPT=74018)  Result Date: 6/13/2025  PROCEDURE: XR ABDOMEN (1 VIEW) (CPT=74018)  COMPARISON: [facility], XR CHEST AP PORTABLE (CPT=71045), 6/13/2025, 8:58 AM.  [facility], CT ABDOMEN + PELVIS (CONTRAST ONLY) (CPT=74177), 6/05/2025, 5:22 PM.  [facility], XR ABDOMEN (1 VIEW) (CPT=74018), 6/10/2025, 6:07 PM.  INDICATIONS: History of presumed colon cancer status post transverse colonic resection and partial small bowel resection on 06/10/2025.  TECHNIQUE:   Single view.   FINDINGS:  BOWEL GAS PATTERN: An enteric tube tip in distal side hole project over the left upper quadrant the abdomen in the  expected location of the stomach.  There is bowel gas throughout the abdomen, including the rectum.  No dilated gas-filled loops of bowel are seen to suggest obstruction.  Anastomotic staple lines are again noted in the right upper and left lower quadrants. SOFT TISSUES: Normal.  No masses or organomegaly.  CALCIFICATIONS: None significant. BONES: The osseous structures are unchanged. OTHER: Vertically oriented skin staples again project over the central abdomen.  Additional skin staples project over both lower quadrants and pelvis.  There is ill-defined lucency throughout the abdomen, which is consistent with the pneumoperitoneum visible on the preceding chest x-ray.         CONCLUSION:  1. Postoperative changes from a recent transverse colonic resection and partial small bowel resection including persistent pneumoperitoneum. 2. Nonobstructive bowel gas pattern.     Dictated by (CST): Chuy Schmid MD on 6/13/2025 at 11:38 AM     Finalized by (CST): Chuy Schmid MD on 6/13/2025 at 11:41 AM          XR CHEST AP PORTABLE  (CPT=71045)  Result Date: 6/13/2025  PROCEDURE: XR CHEST AP PORTABLE  (CPT=71045) TIME: 8:59.   COMPARISON: [facility], CT ABDOMEN + PELVIS (CONTRAST ONLY) (CPT=74177), 6/05/2025, 5:22 PM.  [facility], XR CHEST AP PORTABLE (CPT=71045), 6/13/2025, 6:40 AM.  INDICATIONS: Post NG advancement.  Metastatic colon cancer status post transverse colonic resection on 06/10/2025.  Acute postoperative blood loss with hypotension.  TECHNIQUE:   Single view.   FINDINGS:  CARDIAC/VASC: The cardiac silhouette is not enlarged.  There is calcification of the aortic knob.  Unremarkable pulmonary vasculature.  MEDIAST/KRYSTA:   No visible mass or adenopathy. LUNGS/PLEURA: The lungs are again hypoinflated.  Streaky opacities at the lung bases with elevation of the right hemidiaphragm are all unchanged.  No pleural effusion or pneumothorax. BONES: There are mild spondylotic  changes in the thoracic spine. OTHER: An enteric tube has been advanced.  The tip in distal side hole project over the gastric bubble in the left upper quadrant of the abdomen.  A right-sided PICC tip projects over the mid SVC.  Free air is again noted beneath the right hemidiaphragm,  likely related to the recent surgery.         CONCLUSION:  1. Enteric tube has been advanced with tip and distal side hole now projecting over the gastric bubble, in customary position. 2. Expiratory chest with stable atelectasis at both lung bases. 3. Persistent pneumoperitoneum, likely related to the recent abdominal surgery.    Dictated by (CST): Chuy Schmid MD on 6/13/2025 at 9:19 AM     Finalized by (CST): Chuy Schmid MD on 6/13/2025 at 9:21 AM          XR CHEST AP PORTABLE  (CPT=71045)  Result Date: 6/13/2025  PROCEDURE: XR CHEST AP PORTABLE  (CPT=71045) TIME: 6:43.   COMPARISON: [facility], XR ABDOMEN (1 VIEW) (CPT=74018), 6/10/2025, 6:07 PM.  [facility], CT ABDOMEN + PELVIS (CONTRAST ONLY) (CPT=74177), 6/05/2025, 5:22 PM.  INDICATIONS: Hypoxia.  Suspected metastatic colon cancer status post transverse colonic resection on 06/10/2025.  Acute postoperative blood loss with hypotension.  TECHNIQUE:   Single view.   FINDINGS:  CARDIAC/VASC: Normal.  No cardiac silhouette abnormality or cardiomegaly.  Unremarkable pulmonary vasculature.  MEDIAST/KRYSTA:   No visible mass or adenopathy. LUNGS/PLEURA: The lungs are hypoinflated.  Streaky opacities have developed at the lung bases with elevation of the right hemidiaphragm.  No pleural effusion or pneumothorax. BONES: There are mild spondylotic changes in the thoracic spine. OTHER: An enteric tube tip again projects over the gastric bubble.  The distal side hole projects over the expected location of the distal esophagus.  A right-sided PICC tip projects over the mid SVC.  Free air is noted beneath the right hemidiaphragm, likely related to  the recent surgery.         CONCLUSION:  1. Enteric tube tip projects over the stomach, although the distal side hole projects over the distal esophagus.  Consider advancing approximately 7.0 cm into the stomach. 2. Expiratory chest with the development of atelectasis at both lung bases. 3. Pneumoperitoneum, likely related to the recent abdominal surgery.    Dictated by (CST): Chuy Schmid MD on 6/13/2025 at 7:16 AM     Finalized by (CST): Chuy Schmid MD on 6/13/2025 at 7:19 AM          XR ABDOMEN (1 VIEW) (CPT=74018)  Result Date: 6/11/2025  PROCEDURE: XR ABDOMEN (1 VIEW) (CPT=74018)  COMPARISON: [facility], CT ABDOMEN + PELVIS (CONTRAST ONLY) (CPT=74177), 6/05/2025, 5:22 PM.  INDICATIONS: Partially obstructing colon tumor.  Opene tray. image taken due to protocol. Evaluate for foreign body.  TECHNIQUE:   Single view.           CONCLUSION:  1. No retained unexpected surgical foreign body. 2. Postsurgical changes with anastomotic staple lines and skin staples. 3. Catheter in the bladder.    Dictated by (CST): Chong Valdez MD on 6/11/2025 at 1:22 AM     Finalized by (CST): Chong Valdez MD on 6/11/2025 at 1:28 AM          XR FLUOROSCOPY C-ARM TIME LESS THAN 1 HOUR (CPT=76000)  Result Date: 6/6/2025  PROCEDURE: XR FLUORO PHYSICIAN TIME< 1 HOUR (CPT=76000)  COMPARISON: [facility], CT ABDOMEN + PELVIS (CONTRAST ONLY) (CPT=74177), 6/05/2025, 5:22 PM.  INDICATIONS: COLONOSCOPY with colonic stent placement under fluoroscopy.  TECHNIQUE:   FLUOROSCOPY IMAGES OBTAINED:  2 FLUOROSCOPY TIME:  173.8 seconds RADIATION DOSE (Dose Area Product):  [id number]  FINDINGS:   Intraoperative exam was performed.  There has been placement of a proximal colonic stent.         CONCLUSION: Intraoperative exam. Please see operative report for further details.     Dictated by (CST): Frandy Starkey MD on 6/06/2025 at 8:55 PM     Finalized by (CST): Frandy Starkey MD on 6/06/2025 at 8:56 PM           CT ABDOMEN+PELVIS(CONTRAST ONLY)(CPT=74177)  Result Date: 6/5/2025  PROCEDURE: CT ABDOMEN + PELVIS (CONTRAST ONLY) (CPT=74177)  COMPARISON: [facility] ABDOMEN LIMITED (CPT=76705), 9/19/2024, 9:36 AM.  INDICATIONS: abd pain  TECHNIQUE: Multidetector CT images of the abdomen and pelvis were obtained with non-ionic intravenous contrast material. Automated exposure control for dose reduction was used. Adjustment of the mA and/or kV was done based on the patient's size. Iterative reconstruction technique for dose reduction was employed.  FINDINGS: LUNG BASES: The heart is normal in size.  Partially imaged coronary artery and aortic annular calcification.  Subpleural reticular opacities at the periphery of the right lower lobe. There is dependent subsegmental atelectasis bilaterally. LIVER: No enlargement, atrophy, abnormal density, or significant focal lesion is identified.  BILIARY: The gallbladder is present. PANCREAS: No lesion, fluid collection, ductal dilatation, or atrophy.  Small periampullary duodenal diverticulum. SPLEEN: No enlargement.  Calcified granulomas in the spleen. ADRENALS:   No defined mass or abnormal enlargement.  KIDNEYS:   Symmetric enhancement is seen without evidence of hydronephrosis or underlying solid masses.  Simple-appearing 2.4 cm right posterior interpolar renal cyst.  Tiny 4 mm nonobstructing right interpolar renal calculus. GI/MESENTERY:  There is no evidence of bowel obstruction.  Mild to moderate gastric distention noted.  Approximate 4 cm constricting mass at the proximal transverse colon (just beyond the hepatic flexure with marked upstream ascending colonic distention;  there is also mild-to-moderate distal colonic fecal burden; there is fat stranding surrounding the aforementioned constricting mass with adjacent subcentimeter short axis right upper quadrant pericolonic lymph nodes.  Normal appendix. URINARY BLADDER: Well distended urinary  bladder, which demonstrates mild wall thickening. PELVIC NODES: No lymphadenopathy.   PELVIC ORGANS: No visible mass. Pelvic organs appropriate for patient age.  VASCULATURE:   No aneurysm is detected.  Mild atherosclerosis. RETROPERITONEUM: Borderline enlarged 1 cm interaortocaval retroperitoneal lymph node with other prominent but nonenlarged retroperitoneal lymph nodes that measure less than 1 cm short axis. BONES:   Demineralization with levoscoliosis of the lumbar spine and lower lumbar spine degeneration.  Probable benign hemangioma in the L2 vertebral body. ABDOMINAL WALL: No mass or hernia is perceived. OTHER: No free air or fluid is seen in the abdomen or pelvis.          CONCLUSION:  1. Suspicious approximate 4 cm segment of constricting masslike wall thickening at the proximal transverse colon with large upstream colonic fecal burden.  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  Stellate morphology stranding and nodularity in the right upper quadrant mesentery adjacent to the constricting mass probably relates to direct extension of disease.  There are adjacent nonenlarged right upper quadrant mesenteric lymph nodes, which could be reactive or metastatic.  Gastroenterology evaluation and colonoscopy correlation is suggested.  No free intraperitoneal air or well-defined/drainable intra-abdominal collection. 2. Single mildly enlarged interaortocaval retroperitoneal lymph node with other prominent but nonenlarged retroperitoneal nodes.  These could be reactive or metastatic in nature. 3. Coronary and peripheral atherosclerosis with aortic annular calcifications. 4. Reticular opacities at the periphery of the right lung base probably relate to scarring. 5. Mild circumferential urinary bladder wall thickening.  Request urinalysis correlation to assess for potential cystitis.    [id number]  Dictated by (CST): Jeremie Cedeño MD on 6/05/2025 at 5:47 PM     Finalized by (CST): Gala  MD Jeremie on 6/05/2025 at 5:58 PM            Assessment/Plan:  Problem List[1]  Stable   po fair   cpm   drain management per IR  THUY ALSTON MD  6/24/2025  9:37 AM       [1]   Patient Active Problem List  Diagnosis    BPH (benign prostatic hyperplasia)    Chronic low back pain    Chronic pain of both knees    Depression with anxiety    Generalized osteoarthritis    Hyperlipidemia    Type 2 diabetes mellitus without complication, without long-term current use of insulin (HCC)    Essential hypertension    Bipolar I disorder, single manic episode (HCC)    Large bowel obstruction (HCC)    Colonic mass    Diffuse large B-cell lymphoma of solid organ excluding spleen

## 2025-06-24 NOTE — DIETARY NOTE
ADULT NUTRITION REASSESSMENT    Pt is at high downgraded to moderate nutrition risk as pt off CPN and eating very well.  Pt meets severe malnutrition criteria.      RECOMMENDATIONS TO MD:   See nutrition intervention for ONS (oral nutrition supplements)     Education completed on use of short term Soft Low fiber diet via Language line, given diet hand out in both Nepali and English version.        ADMITTING DIAGNOSIS:  Large bowel obstruction (HCC) [K56.609]  Colonic mass [K63.89]  PERTINENT PAST MEDICAL HISTORY:   Past Medical History:    Anxiety    BPH (benign prostatic hyperplasia)    CKD (chronic kidney disease) stage 3, GFR 30-59 ml/min (HCC)    Depression    Diabetes (HCC)    Diabetes mellitus (HCC)    Essential hypertension    GERD (gastroesophageal reflux disease)    High cholesterol    Hyperlipidemia       PATIENT STATUS:   Initial 06/06/25: Pt assessed due to screening at risk for MST of unsure weight loss. Pt admitted due to large bowel obstruction found. Past medical history significant for CKD, DM, GERD, HTN and hyperlipidemia. Chart reviewed. Attempted to visit patient bedside twice; out of room both times. Currently at colonoscopy. Pt has been NPO for unprepped colonoscopy with possible colonic stent and biopsy; unable to assess intake during admission. Reviewed MD notes and significant for reports of very decreased appetite x2 weeks due to abdominal pain after eating. Complaints of weight loss; ~60lbs in 4-5 months. EMR reviewed and weight loss noted; 1/16/25:169lbs (12% loss in 5 months) 6/7/24: 189lbs (20% lost in 1 yr); both weight losses clinically significant for malnutrition in time frame. Pt has had BM this AM. NFPE deferred. Will complete at reassessment.  6/10/2025 Update: NPO/Cl liq x 5 days. NPO today for Surgery ( Lap Assisted Colon Resection).  Pt was taking Cl liq 100% prior to NPO. Re-visited pt. Utilized Language line (# [id number], Jimmie). Pt reports was tolerating liquid diet well.  + BM this am.   Nutrition history reveals decreased po for > 3 months d/t  loss of appetite r/t depression and recently d/t symptoms of acute GI illness. Wt history reported usual wt of 200# last year (189# in emr) , lost wt to 150-162#. 162# 3 months ago, and lost more wt down to 150# ( this admit). Based on above initial RD eval, 39# or 20.6% significant wt loss in the past year. Nutrition Focus Physical Exam ( NFPE) completed. Pt met Severe Malnutrition criteria. Will monitor GI and NPO status, await diet advance as safe. Otherwise, can consider Nutrition Support Therapy (NST) if unable to advance diet beyond cl liq given pre-existing Malnutrition.   06/12/25 UPDATE: Early reassessment for CPN start. POD #2 s/p lap transverse colon resection, small bowel resection x2, resection of tumor nodule on small bowel. Per surgery, pt appears to have metastatic disease - oncology on consult. Weaned off pressor support. Stable on room air with family and RN at bedside at time of visit. Discussed CPN start with family.   6/16/25 UPDATE: CPN in place--progressing to goal, but slow progression due to elevated glucose. POD #5 transverse colon resection, SBR and resection of tumur on small bowel--diffuse large B-cell lymphoma.  NGT to LIS continues--monitor for clamping trial and diet progression.       6/18/2025 Update:      Expiring CPN, finishing last bag today as pt tolerating and eating very well on Soft/low fiber diet. Ate 100% of dinner last night and 100% of breakfast today. Education completed on use of short term Soft Low fiber diet via Language line. Will start providing Sugar Free Mighty shake supplement BID in view of Malnutrition with increasing nutrient needs. Discussed with MD, RN and pharmacist discontinuation of CPN today.    Downgraded nutrition risk from high to moderate.  6/24/25 Update: Language line #[id number] Radha used for Bengali interpreting. Pt reports good appetite. Eating well. Likes the SF shakes  and takes both per day--2pm one at bedside right now. Pt denies any GI c/o's.         FOOD/NUTRITION RELATED HISTORY:  Appetite: Good and Improved  Intake: 100% of lunch, NPO for ultrasound this am.    Intake Meeting Needs: Yes, and oral nutrition supplements (ONS) to maximize  Percent Meals Eaten (last 6 days)       Date/Time Percent Meals Eaten (%)    06/18/25 1020 100 %    06/18/25 1400 100 %     Percent Meals Eaten (%): lunch at 06/18/25 1400    06/19/25 1028 75 %    06/19/25 1734 75 %    06/20/25 1600 100 %    06/20/25 2039 75 %    06/21/25 1515 100 %    06/22/25 0900 100 %    06/22/25 1500 75 %    06/23/25 1300 15 %    06/24/25 0952 0 %     Percent Meals Eaten (%): declined breakfast and going down for ultrasound at 06/24/25 0952    06/24/25 1400 100 %    06/24/25 1439 100 %        Food Allergies: No Known Food Allergies (NKFA)  Cultural/Ethnic/Hinduism Preferences: Not Obtained    GASTROINTESTINAL: +BM today per pt     MEDICATIONS: reviewed;   dextrose 10%        rivaroxaban  15 mg Oral BID with meals    sucralfate  1 g Oral TID AC and HS (2200)    pantoprazole  40 mg Oral QAM AC    polyethylene glycol (PEG 3350)  17 g Oral BID    docusate sodium  100 mg Oral BID    piperacillin-tazobactam  3.375 g Intravenous Q8H    insulin aspart  1-11 Units Subcutaneous TID CC    tamsulosin  0.8 mg Oral Daily    pregabalin  100 mg Oral TID    vitamin B-12  50 mcg Oral Daily    folic acid  1 mg Oral Daily     LABS: reviewed , 237, 208 and 180--remain elevated.   Recent Labs     06/21/25  0507 06/22/25  0535 06/23/25  0615 06/24/25  0543   *  --  192*  --    BUN 18  --  15  --    CREATSERUM 0.76  --  0.77  --    CA 7.5*  --  7.7*  --    MG 1.6 1.5* 1.5* 1.8   *  --  135*  --    K 4.1  --  3.3* 3.8     --  103  --    CO2 26.0  --  26.0  --    PHOS  --   --  3.1  --    OSMOCALC 283  --  286  --      WEIGHT HISTORY:  Patient Weight(s) for the past 336 hrs:   Weight   06/24/25 0405 77.7 kg (171 lb 3.2  oz)   06/23/25 0609 77.7 kg (171 lb 3.2 oz)   06/22/25 0718 76.7 kg (169 lb 3.2 oz)   06/19/25 0817 76 kg (167 lb 8 oz)   06/17/25 0558 77.7 kg (171 lb 4.8 oz)   06/16/25 0600 74 kg (163 lb 4 oz)   06/14/25 0600 73.2 kg (161 lb 6 oz)   06/13/25 0459 73.1 kg (161 lb 2.5 oz)   06/11/25 1200 70 kg (154 lb 5.2 oz)   06/10/25 2245 66.2 kg (145 lb 15.1 oz)     Wt Readings from Last 10 Encounters:   06/24/25 77.7 kg (171 lb 3.2 oz)   05/28/25 67.1 kg (148 lb)   04/09/25 72.6 kg (160 lb)   04/08/25 72.6 kg (160 lb)   03/18/25 81.6 kg (180 lb)   01/16/25 76.7 kg (169 lb)   11/21/24 77.6 kg (171 lb)   08/29/24 78.9 kg (174 lb)   08/25/24 81.6 kg (180 lb)   07/15/24 84.8 kg (187 lb)     ANTHROPOMETRICS:  HT: 180.3 cm (5' 11\")  Wt Readings from Last 1 Encounters:   06/24/25 77.7 kg (171 lb 3.2 oz)     Last weight:  low of 145#  150# at adm. Question accuracy of current wt  Dosing Weight: 66.2 kg (146 lbs) - recent weight / lowest wt this admission taken on 6/10/25, utilized for anthropometric calculations  BMI: Body mass index is 20.36 kg/m².  BMI CLASSIFICATION: <22 considered underweight for advanced age (>65)  IBW/lbs (Calculated) Male: 172 lbs           85% IBW  Usual Body Wt: 189 lbs    1 yr ago  77% UBW    NUTRITION RELATED PHYSICAL FINDINGS:  - Nutrition Focused Physical Exam (NFPE): deferred--will obtain as able.   - Fluid Accumulation: none . See RN documentation for details  - Skin Integrity: at risk and surgical wound(s). See RN documentation for details    CRITERIA FOR MALNUTRITION DIAGNOSIS:  Criteria for severe malnutrition diagnosis: chronic illness related to wt loss greater than 10% in 6 months and energy intake less than 75% for greater than 1 month.    NUTRITION DIAGNOSIS/PROBLEM:   Severe Malnutrition related to Chronic - Physiological causes increasing nutrient needs due to illness or condition (colonic mass) as evidenced by wt loss greater than 10% in 6 months and energy intake less than 75% for greater  than 1 month.    NUTRITION DIAGNOSIS PROGRESS:  Improvement (unresolved)--diet advanced, eating well 100% intake maximized with ONS    NUTRITION INTERVENTION:     NUTRITION PRESCRIPTION:   Estimated Nutrition needs: --dosing wt of 66.2 kg - wt taken on 6/10/25  Calories: 7091-2125 calories/day (30-35 calories per kg Dosing wt)  Protein: 79-99 g protein/day (1.2-1.5 g protein/kg Dosing wt)  Fluid Needs: 6289-4183 mL (1mL/kcal)    - Diet:       Procedures    Low Fiber/Soft diet Low Fiber/Soft; Is Patient on Accuchecks? Yes     - Nutrition Care Plan: Encouraged small frequent meals with emphasis on high calorie/high protein, Initiated ONS (oral nutritional supplements), and Educated patient/family on ways to increase oral intake  - ONS (Oral Nutrition Supplements)/Meals/Snacks: SF Shake (200 calories/ 7 g protein each) BID Vanilla   - Vitamin and mineral supplements: none  - Feeding assistance: independent  - Nutrition education: Short term Soft Low fiber diet  education completed and given both Kiswahili and english diet hand outs.   - Coordination of nutrition care: collaboration with other providers   - Discharge and transfer of nutrition care to new setting or provider: Plan to discharge to [facility].     MONITOR AND EVALUATE/NUTRITION GOALS:  - Food and Nutrient Intake:    Monitor: adequacy of PO intake, tolerance of PO intake, and adequacy of supplement intake  - Food and Nutrient Administration:    Monitor: expiring CPN today 6/18  - Nutrition Goals:    halt wt loss, true wt gain, PO and supplement greater than 75% of needs, labs within acceptable limits, minimize lean body mass loss, support body systems, and improved GI status      DIETITIAN FOLLOW UP: RD to follow and monitor nutrition status    Chioma Eric RD, LDN   Clinical Dietitian [id number]

## 2025-06-24 NOTE — PLAN OF CARE
Problem: Patient Centered Care  Goal: Patient preferences are identified and integrated in the patient's plan of care  Description: Interventions:  - What would you like us to know as we care for you? I am from home.   - Provide timely, complete, and accurate information to patient/family  - Incorporate patient and family knowledge, values, beliefs, and cultural backgrounds into the planning and delivery of care  - Encourage patient/family to participate in care and decision-making at the level they choose  - Honor patient and family perspectives and choices  Outcome: Adequate for Discharge     Problem: Diabetes/Glucose Control  Goal: Glucose maintained within prescribed range  Description: INTERVENTIONS:  - Monitor Blood Glucose as ordered  - Assess for signs and symptoms of hyperglycemia and hypoglycemia  - Administer ordered medications to maintain glucose within target range  - Assess barriers to adequate nutritional intake and initiate nutrition consult as needed  - Instruct patient on self management of diabetes  Outcome: Adequate for Discharge     Problem: Patient/Family Goals  Goal: Patient/Family Long Term Goal  Description: Patient's Long Term Goal: Discharge from hospital     Interventions:  - monitor vital signs   - monitor blood glucose levels  - monitor appropriate labs  - pain management   - administer medications per order  - follow MD orders  - diagnostics per order  - update and inform patient and family on plan of care  - discharge planning  - See additional Care Plan goals for specific interventions  Outcome: Adequate for Discharge  Goal: Patient/Family Short Term Goal  Description: Patient's Short Term Goal: Improve bowel function    Interventions:   - monitor vital signs   - monitor blood glucose levels  - monitor appropriate labs  - pain management   - administer medications per order  - follow MD orders  - diagnostics per order  - update and inform patient and family on plan of care  - See  additional Care Plan goals for specific interventions  Outcome: Adequate for Discharge     Problem: PAIN - ADULT  Goal: Verbalizes/displays adequate comfort level or patient's stated pain goal  Description: INTERVENTIONS:  - Encourage pt to monitor pain and request assistance  - Assess pain using appropriate pain scale  - Administer analgesics based on type and severity of pain and evaluate response  - Implement non-pharmacological measures as appropriate and evaluate response  - Consider cultural and social influences on pain and pain management  - Manage/alleviate anxiety  - Utilize distraction and/or relaxation techniques  - Monitor for opioid side effects  - Notify MD/LIP if interventions unsuccessful or patient reports new pain  - Anticipate increased pain with activity and pre-medicate as appropriate  Outcome: Adequate for Discharge     Problem: RISK FOR INFECTION - ADULT  Goal: Absence of fever/infection during anticipated neutropenic period  Description: INTERVENTIONS  - Monitor WBC  - Administer growth factors as ordered  - Implement neutropenic guidelines  Outcome: Adequate for Discharge     Problem: SAFETY ADULT - FALL  Goal: Free from fall injury  Description: INTERVENTIONS:  - Assess pt frequently for physical needs  - Identify cognitive and physical deficits and behaviors that affect risk of falls.  - [place] fall precautions as indicated by assessment.  - Educate pt/family on patient safety including physical limitations  - Instruct pt to call for assistance with activity based on assessment  - Modify environment to reduce risk of injury  - Provide assistive devices as appropriate  - Consider OT/PT consult to assist with strengthening/mobility  - Encourage toileting schedule  Outcome: Adequate for Discharge     Problem: DISCHARGE PLANNING  Goal: Discharge to home or other facility with appropriate resources  Description: INTERVENTIONS:  - Identify barriers to discharge w/pt and caregiver  - Include  patient/family/discharge partner in discharge planning  - Arrange for needed discharge resources and transportation as appropriate  - Identify discharge learning needs (meds, wound care, etc)  - Arrange for interpreters to assist at discharge as needed  - Consider post-discharge preferences of patient/family/discharge partner  - Complete POLST form as appropriate  - Assess patient's ability to be responsible for managing their own health  - Refer to Case Management Department for coordinating discharge planning if the patient needs post-hospital services based on physician/LIP order or complex needs related to functional status, cognitive ability or social support system  Outcome: Adequate for Discharge     Problem: Altered Communication/Language Barrier  Goal: Patient/Family is able to understand and participate in their care  Description: Interventions:  - Assess communication ability and preferred communication style  - Implement communication aides and strategies  - Use visual cues when possible  - Listen attentively, be patient, do not interrupt  - Minimize distractions  - Allow time for understanding and response  - Establish method for patient to ask for assistance (call light)  - Provide an  as needed  - Communicate barriers and strategies to overcome with those who interact with patient  Outcome: Adequate for Discharge     Problem: GASTROINTESTINAL - ADULT  Goal: Minimal or absence of nausea and vomiting  Description: INTERVENTIONS:  - Maintain adequate hydration with IV or PO as ordered and tolerated  - Nasogastric tube to low intermittent suction as ordered  - Evaluate effectiveness of ordered antiemetic medications  - Provide nonpharmacologic comfort measures as appropriate  - Advance diet as tolerated, if ordered  - Obtain nutritional consult as needed  - Evaluate fluid balance  Outcome: Adequate for Discharge  Goal: Maintains or returns to baseline bowel function  Description:  INTERVENTIONS:  - Assess bowel function  - Maintain adequate hydration with IV or PO as ordered and tolerated  - Evaluate effectiveness of GI medications  - Encourage mobilization and activity  - Obtain nutritional consult as needed  - Establish a toileting routine/schedule  - Consider collaborating with pharmacy to review patient's medication profile  Outcome: Adequate for Discharge     Problem: METABOLIC/FLUID AND ELECTROLYTES - ADULT  Goal: Electrolytes maintained within normal limits  Description: INTERVENTIONS:  - Monitor labs and rhythm and assess patient for signs and symptoms of electrolyte imbalances  - Administer electrolyte replacement as ordered  - Monitor response to electrolyte replacements, including rhythm and repeat lab results as appropriate  - Fluid restriction as ordered  - Instruct patient on fluid and nutrition restrictions as appropriate  Outcome: Adequate for Discharge     Problem: SKIN/TISSUE INTEGRITY - ADULT  Goal: Incision(s), wounds(s) or drain site(s) healing without S/S of infection  Description: INTERVENTIONS:  - Assess and document risk factors for pressure ulcer development  - Assess and document skin integrity  - Assess and document dressing/incision, wound bed, drain sites and surrounding tissue  - Implement wound care per orders  - Initiate isolation precautions as appropriate  - Initiate Pressure Ulcer prevention bundle as indicated  Outcome: Adequate for Discharge     Problem: HEMATOLOGIC - ADULT  Goal: Free from bleeding injury  Description: (Example usage: patient with low platelets)  INTERVENTIONS:  - Avoid intramuscular injections, enemas and rectal medication administration  - Ensure safe mobilization of patient  - Hold pressure on venipuncture sites to achieve adequate hemostasis  - Assess for signs and symptoms of internal bleeding  - Monitor lab trends  - Patient is to report abnormal signs of bleeding to staff  - Avoid use of toothpicks and dental floss  - Use electric  shaver for shaving  - Use soft bristle tooth brush  - Limit straining and forceful nose blowing  Outcome: Adequate for Discharge    Patient medically cleared for discharge. Report called to Pippa Loomis RN. Tolerating room air and ambulation. NGOZI drain on right side. PICC line removed by JOSE Bullard prior to discharge.

## 2025-06-24 NOTE — PLAN OF CARE
Problem: Patient Centered Care  Goal: Patient preferences are identified and integrated in the patient's plan of care  Description: Interventions:  - What would you like us to know as we care for you? I am from home.   - Provide timely, complete, and accurate information to patient/family  - Incorporate patient and family knowledge, values, beliefs, and cultural backgrounds into the planning and delivery of care  - Encourage patient/family to participate in care and decision-making at the level they choose  - Honor patient and family perspectives and choices  Outcome: Progressing     Problem: Diabetes/Glucose Control  Goal: Glucose maintained within prescribed range  Description: INTERVENTIONS:  - Monitor Blood Glucose as ordered  - Assess for signs and symptoms of hyperglycemia and hypoglycemia  - Administer ordered medications to maintain glucose within target range  - Assess barriers to adequate nutritional intake and initiate nutrition consult as needed  - Instruct patient on self management of diabetes  Outcome: Progressing     Problem: Patient/Family Goals  Goal: Patient/Family Long Term Goal  Description: Patient's Long Term Goal: Discharge home     Interventions:  - Monitor vitals  - Monitor labs  - IVF  - Glucose monitoring ACHS  - Clear liquid diet  - Scheduled miralax  - GI on consult  - See additional Care Plan goals for specific interventions  Outcome: Progressing  Goal: Patient/Family Short Term Goal  Description: Patient's Short Term Goal: Improve bowel function    Interventions:   - Full liquid diet  - GI on consult  - Scheduled miralax  - See additional Care Plan goals for specific interventions  Outcome: Progressing     Problem: PAIN - ADULT  Goal: Verbalizes/displays adequate comfort level or patient's stated pain goal  Description: INTERVENTIONS:  - Encourage pt to monitor pain and request assistance  - Assess pain using appropriate pain scale  - Administer analgesics based on type and severity  of pain and evaluate response  - Implement non-pharmacological measures as appropriate and evaluate response  - Consider cultural and social influences on pain and pain management  - Manage/alleviate anxiety  - Utilize distraction and/or relaxation techniques  - Monitor for opioid side effects  - Notify MD/LIP if interventions unsuccessful or patient reports new pain  - Anticipate increased pain with activity and pre-medicate as appropriate  Outcome: Progressing     Problem: RISK FOR INFECTION - ADULT  Goal: Absence of fever/infection during anticipated neutropenic period  Description: INTERVENTIONS  - Monitor WBC  - Administer growth factors as ordered  - Implement neutropenic guidelines  Outcome: Progressing     Problem: SAFETY ADULT - FALL  Goal: Free from fall injury  Description: INTERVENTIONS:  - Assess pt frequently for physical needs  - Identify cognitive and physical deficits and behaviors that affect risk of falls.  - [place] fall precautions as indicated by assessment.  - Educate pt/family on patient safety including physical limitations  - Instruct pt to call for assistance with activity based on assessment  - Modify environment to reduce risk of injury  - Provide assistive devices as appropriate  - Consider OT/PT consult to assist with strengthening/mobility  - Encourage toileting schedule  Outcome: Progressing     Problem: DISCHARGE PLANNING  Goal: Discharge to home or other facility with appropriate resources  Description: INTERVENTIONS:  - Identify barriers to discharge w/pt and caregiver  - Include patient/family/discharge partner in discharge planning  - Arrange for needed discharge resources and transportation as appropriate  - Identify discharge learning needs (meds, wound care, etc)  - Arrange for interpreters to assist at discharge as needed  - Consider post-discharge preferences of patient/family/discharge partner  - Complete POLST form as appropriate  - Assess patient's ability to be  responsible for managing their own health  - Refer to Case Management Department for coordinating discharge planning if the patient needs post-hospital services based on physician/LIP order or complex needs related to functional status, cognitive ability or social support system  Outcome: Progressing     Problem: Altered Communication/Language Barrier  Goal: Patient/Family is able to understand and participate in their care  Description: Interventions:  - Assess communication ability and preferred communication style  - Implement communication aides and strategies  - Use visual cues when possible  - Listen attentively, be patient, do not interrupt  - Minimize distractions  - Allow time for understanding and response  - Establish method for patient to ask for assistance (call light)  - Provide an  as needed  - Communicate barriers and strategies to overcome with those who interact with patient  Outcome: Progressing     Problem: GASTROINTESTINAL - ADULT  Goal: Minimal or absence of nausea and vomiting  Description: INTERVENTIONS:  - Maintain adequate hydration with IV or PO as ordered and tolerated  - Nasogastric tube to low intermittent suction as ordered  - Evaluate effectiveness of ordered antiemetic medications  - Provide nonpharmacologic comfort measures as appropriate  - Advance diet as tolerated, if ordered  - Obtain nutritional consult as needed  - Evaluate fluid balance  Outcome: Progressing  Goal: Maintains or returns to baseline bowel function  Description: INTERVENTIONS:  - Assess bowel function  - Maintain adequate hydration with IV or PO as ordered and tolerated  - Evaluate effectiveness of GI medications  - Encourage mobilization and activity  - Obtain nutritional consult as needed  - Establish a toileting routine/schedule  - Consider collaborating with pharmacy to review patient's medication profile  Outcome: Progressing     A/ox4, primarily Russian speaking, up x1 SBA with rolling walker,  voiding, incontinent at times, no BM overnight, IVF and IV antibiotics continued overnight, decreased appetite, ACHS, surgical incisions are clean/dry/intact, vital signs stable, received PRN Zofran for nausea, PRN Morphine for pain management, no acute changes overnight. Call light within reach, safety measures in place, frequent rounding done, plan of care continued

## 2025-06-24 NOTE — PROGRESS NOTES
[facility] Hematology/Oncology Group  Inpatient Progress Note    Arnaldo Gutierrez Patient Status:  Inpatient    4/15/1945 MRN [id number]   Location [facility] 4W/SW/SE Attending Boy Mccabe MD   Hosp Day # 19 PCP PHYSICIAN NONSTAFF     Subjective:   Arnaldo Gutierrez is a 80 year old male with a history of diabetes hypertension GERD and other comorbidities who was admitted 2025 with iron deficiency anemia and partial large bowel obstruction with a mass in the transverse colon.  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  He went for transverse colon resection 6/10 and reportedly there were numerous peritoneal or omental metastasis and enlarged lymph nodes.  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    Interval history:  Ready for discharge to [facility].  Found to have right lower extremity DVT started on Xarelto.    Review of Systems:  Hematology/Oncology ROS performed and negative except as above in HPI    Current Medications[1]    Objective:    /59 (BP Location: Left arm)   Pulse 91   Temp 98.6 °F (37 °C) (Oral)   Resp 18   Ht 1.803 m (5' 11\")   Wt 77.7 kg (171 lb 3.2 oz)   SpO2 92%   BMI 23.88 kg/m²   Physical Exam:  General: A&Ox3, clinically improving   HEENT: PERRL  CV: RRR   Pulm:  normal effort  Abd: appropriately tender   Extremities: no edema  Neurological: Grossly intact    Labs:  Lab Results   Component Value Date    K 3.8 2025    MG 1.8 2025       Imaging:  US VENOUS DOPPLER LEG BILAT - DIAG IMG (CPT=93970)  Result Date: 2025  CONCLUSION: 1. Abnormal exam. Right lower extremity has nonocclusive acute appearing thrombus within the common femoral vein as well as the visualized portions of the deep femoral vein. There is also nonocclusive thrombus within the right popliteal vein. 2. No DVT in the left lower extremity, however there is acute occlusive  thrombus within a left soleal vein at the knee. Electronically Verified and Signed by Attending Radiologist: Jason Quiñones MD 6/24/2025 12:45 PM Workstation: [id number]    IR PERITONEAL DRAINAGE CATHETER  Result Date: 6/20/2025  CONCLUSION: Insertion of drainage catheter into intra-abdominal ascites yielding 260 cc serosanguineous fluid.    Dictated by (CST): Norris Reilly MD on 6/20/2025 at 6:09 PM     Finalized by (CST): Norris Reilly MD on 6/20/2025 at 6:10 PM          CT ABDOMEN+PELVIS(CONTRAST ONLY)(CPT=74177)  Result Date: 6/19/2025  CONCLUSION:  1.  Post large bowel resection in the region of the proximal transverse colon/hepatic flexure.  No obstruction or inflammation at the surgical site.  There is a moderate-large volume of free fluid within the abdomen and pelvis mainly distributed in both upper quadrants, across the omentum, and in the pericolic gutters.  Moderate volume of free air is also present in the same distribution as the fluid.  These may be related to recent surgery.  Organized fluid collection in the left lower retroperitoneum/left lower quadrant measuring approximately 55 x 47 x 58 mm which could represent a seroma, liquified hematoma, or other inflammatory fluid collection.  Peritoneal enhancement and thickening in the both pericolic gutters which could represent phlegmonous change, early organization of fluid collections with similar differential as the aforementioned organized collection, or peritonitis. 2.  Moderate-sized bilateral pleural effusions.  Enhancing bibasilar pulmonary opacities with air bronchograms likely representing secondary compressive atelectasis. 3.  Coronary atherosclerosis. 4.  Right renal cyst. 5.  Mild body wall edema/anasarca.  Skin staples across the abdominal-pelvic anterior wall compatible with surgical access sites.   Dictated by (CST): Jason Quiñones MD on 6/19/2025 at 5:29 PM     Finalized by (CST): Jason Quiñones MD on 6/19/2025 at 5:36 PM          XR  ABDOMEN (1 VIEW) (CPT=74018)  Result Date: 6/13/2025  CONCLUSION:  1. Postoperative changes from a recent transverse colonic resection and partial small bowel resection including persistent pneumoperitoneum. 2. Nonobstructive bowel gas pattern.     Dictated by (CST): Chuy Schmid MD on 6/13/2025 at 11:38 AM     Finalized by (CST): Chuy Schmid MD on 6/13/2025 at 11:41 AM          XR CHEST AP PORTABLE  (CPT=71045)  Result Date: 6/13/2025  CONCLUSION:  1. Enteric tube has been advanced with tip and distal side hole now projecting over the gastric bubble, in customary position. 2. Expiratory chest with stable atelectasis at both lung bases. 3. Persistent pneumoperitoneum, likely related to the recent abdominal surgery.    Dictated by (CST): Chuy Schmid MD on 6/13/2025 at 9:19 AM     Finalized by (CST): Chuy Schmid MD on 6/13/2025 at 9:21 AM          XR CHEST AP PORTABLE  (CPT=71045)  Result Date: 6/13/2025  CONCLUSION:  1. Enteric tube tip projects over the stomach, although the distal side hole projects over the distal esophagus.  Consider advancing approximately 7.0 cm into the stomach. 2. Expiratory chest with the development of atelectasis at both lung bases. 3. Pneumoperitoneum, likely related to the recent abdominal surgery.    Dictated by (CST): Chuy Schmid MD on 6/13/2025 at 7:16 AM     Finalized by (CST): Chuy Schmid MD on 6/13/2025 at 7:19 AM          XR ABDOMEN (1 VIEW) (CPT=74018)  Result Date: 6/11/2025  CONCLUSION:  1. No retained unexpected surgical foreign body. 2. Postsurgical changes with anastomotic staple lines and skin staples. 3. Catheter in the bladder.    Dictated by (CST): Chong Valdez MD on 6/11/2025 at 1:22 AM     Finalized by (CST): Chong Valdez MD on 6/11/2025 at 1:28 AM            Assessment & Plan:    Pt is a 80 year old male with a history of diabetes hypertension GERD and other comorbidities who was admitted 6/5/2025 with iron deficiency  anemia and partial large bowel obstruction with a mass in the transverse colon.  Went for colonoscopy 6/6 and found to have a malignant stricture of the transverse colon and underwent colonic stenting.  He went for transverse colon resection 6/10 and reportedly there were numerous peritoneal or omental metastasis and enlarged lymph nodes.  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     I previously reviewed the pathology report at the bedside with the patient, his wife daughter and son-in-law.  I discussed the natural history of diffuse large B-cell lymphoma.  LDH uric acid within normal limits, no emergent indications for inpatient chemotherapy at this time now that obstruction has been surgically relieved.  We will await surgical recovery and tentatively plan for outpatient PET/CT while we await for aggressive FISH panel to help determine optimal chemotherapy regimen.  Prior to admission, the patient was generally healthy with a fair performance status when hopeful that as he improves from his obstruction he will get to a point where he will be able to tolerate chemotherapy with curative intent.     He is improved to the point of discharge to rehab.  Will plan for outpatient oncology follow-up with Dr. Wilkins after PET/CT    RLE DVT.   Provoked, started on xarelto.     Norris Lazaro DO    [facility] Hematology/Oncology Group  [facility]    This note was created using a voice-recognition transcribing system. Incorrect words or phrases may have been missed during proofreading. Please interpret accordingly.       [1]    [COMPLETED] magnesium oxide (Mag-Ox) tab 400 mg  400 mg Oral Once    rivaroxaban (Xarelto) tab 15 mg  15 mg Oral BID with meals    [COMPLETED] magnesium oxide (Mag-Ox) tab 800 mg  800 mg Oral Once    [COMPLETED] potassium chloride 40 mEq in 250mL sodium chloride 0.9% IVPB premix  40 mEq Intravenous Once    alum-mag hydroxide-simethicone  (Maalox) 200-200-20 MG/5ML oral suspension 30 mL  30 mL Oral QID PRN    prochlorperazine (Compazine) 10 MG/2ML injection 10 mg  10 mg Intravenous Q6H PRN    sucralfate (Carafate) 1 GM/10ML oral suspension 1 g  1 g Oral TID AC and HS (2200)    [COMPLETED] magnesium oxide (Mag-Ox) tab 800 mg  800 mg Oral Once    pantoprazole (Protonix) DR tab 40 mg  40 mg Oral QAM AC    polyethylene glycol (PEG 3350) (Miralax) 17 g oral packet 17 g  17 g Oral BID    docusate sodium (Colace) cap 100 mg  100 mg Oral BID    [COMPLETED] sodium chloride 0.9% infusion   Intravenous Once    [COMPLETED] magnesium oxide (Mag-Ox) tab 400 mg  400 mg Oral Once    calcium carbonate (Tums) chewable tab 500 mg  500 mg Oral Q6H PRN    [COMPLETED] magnesium oxide (Mag-Ox) tab 400 mg  400 mg Oral Once    piperacillin-tazobactam (Zosyn) 3.375 g in dextrose 5% 100 mL IVPB-ADDV  3.375 g Intravenous Q8H    [COMPLETED] magnesium sulfate in sterile water for injection 2 g/50mL IVPB premix 2 g  2 g Intravenous Once    insulin aspart (NovoLOG) 100 Units/mL FlexPen 1-11 Units  1-11 Units Subcutaneous TID CC    [COMPLETED] iopamidol 76% (ISOVUE-370) injection for power injector  80 mL Intravenous ONCE PRN    [COMPLETED] fentaNYL (Sublimaze) 50 mcg/mL injection        HYDROmorphone (Dilaudid) 1 MG/ML injection 0.5 mg  0.5 mg Intravenous Q4H PRN    Or    HYDROmorphone (Dilaudid) 1 MG/ML injection 0.8 mg  0.8 mg Intravenous Q2H PRN    Or    HYDROmorphone (Dilaudid) 1 MG/ML injection 1.2 mg  1.2 mg Intravenous Q2H PRN    [COMPLETED] magnesium sulfate in sterile water for injection 2 g/50mL IVPB premix 2 g  2 g Intravenous Once    [COMPLETED] magnesium sulfate in sterile water for injection 2 g/50mL IVPB premix 2 g  2 g Intravenous Once    [] adult 3 in 1 TPN   Intravenous Continuous TPN    tamsulosin (Flomax) cap 0.8 mg  0.8 mg Oral Daily    pregabalin (Lyrica) cap 100 mg  100 mg Oral TID    cyanocobalamin (Vitamin B12) tab 50 mcg  50 mcg Oral Daily     [] adult 3 in 1 TPN   Intravenous Continuous TPN    [COMPLETED] sodium ferric gluconate (Ferrlecit) 125 mg in sodium chloride 0.9% 100mL IVPB premix  125 mg Intravenous Daily    folic acid (Folvite) tab 1 mg  1 mg Oral Daily    [COMPLETED] potassium chloride 40 mEq in 250mL sodium chloride 0.9% IVPB premix  40 mEq Intravenous Once    [] adult 3 in 1 TPN   Intravenous Continuous TPN    phenol (Chloraseptic) 1.4 % oral liquid spray   Oral Q2H PRN    [COMPLETED] sodium phosphate 15 mmol in 0.9% NaCl 100mL IVPB premix  15 mmol Intravenous Once    [] adult 3 in 1 TPN   Intravenous Continuous TPN    [COMPLETED] magnesium sulfate in sterile water for injection 2 g/50mL IVPB premix 2 g  2 g Intravenous Once    [COMPLETED] potassium phosphate dibasic 15 mmol in sodium chloride 0.9% 250 mL IVPB  15 mmol Intravenous Once    Followed by    [COMPLETED] potassium chloride 20 mEq/100mL IVPB premix 20 mEq  20 mEq Intravenous Once    [] adult 3 in 1 TPN   Intravenous Continuous TPN    [COMPLETED] magnesium sulfate 4 g/100mL IVPB premix 4 g  4 g Intravenous Once    dextrose 10% infusion (TPN no rate)   Intravenous Continuous PRN    [] adult 3 in 1 TPN   Intravenous Continuous TPN    [COMPLETED] sodium chloride 0.9 % IV bolus 500 mL  500 mL Intravenous Once    [COMPLETED] sodium chloride 0.9 % IV bolus 500 mL  500 mL Intravenous Once    [COMPLETED] insulin regular human (Novolin R, Humulin R) 100 UNIT/ML injection 10 Units  10 Units Subcutaneous Once    [COMPLETED] potassium chloride 40 mEq in 250mL sodium chloride 0.9% IVPB premix  40 mEq Intravenous Once    ondansetron (Zofran) 4 MG/2ML injection 4 mg  4 mg Intravenous Q4H PRN    acetaminophen (Ofirmev) 10 mg/mL infusion premix 1,000 mg  1,000 mg Intravenous Q6H PRN    [COMPLETED] phentolamine (Regitine) 10 mg in sodium chloride 0.9% syringe (ADULT EXTRAVASATION)  10 mg Subcutaneous See Admin Instructions    [COMPLETED] lidocaine PF (Xylocaine-MPF)  1% injection  5 mL Intradermal Once    oxyCODONE immediate release tab 2.5 mg  2.5 mg Oral Q4H PRN    Or    oxyCODONE immediate release tab 5 mg  5 mg Oral Q4H PRN    [COMPLETED] acetaminophen (Ofirmev) 10 mg/mL infusion premix 1,000 mg  1,000 mg Intravenous Once    [COMPLETED] sodium chloride 0.9 % IV bolus 1,986 mL  30 mL/kg Intravenous Once    [COMPLETED] sodium chloride 0.9 % IV bolus 250 mL  250 mL Intravenous Once    [COMPLETED] neomycin (Mycifradin) tab 1,000 mg  1,000 mg Oral Once    [COMPLETED] neomycin (Mycifradin) tab 1,000 mg  1,000 mg Oral Once    [COMPLETED] neomycin (Mycifradin) tab 1,000 mg  1,000 mg Oral Once    [COMPLETED] metroNIDAZOLE (Flagyl) tab 500 mg  500 mg Oral Once    [COMPLETED] metroNIDAZOLE (Flagyl) tab 500 mg  500 mg Oral Once    [COMPLETED] metroNIDAZOLE (Flagyl) tab 500 mg  500 mg Oral Once    [COMPLETED] sodium ferric gluconate (Ferrlecit) 125 mg in sodium chloride 0.9% 100mL IVPB premix  125 mg Intravenous Daily    [COMPLETED] pantoprazole (Protonix) 40 mg in sodium chloride 0.9% PF 10 mL IV push  40 mg Intravenous Once    [COMPLETED] sodium chloride 0.9 % IV bolus 1,000 mL  1,000 mL Intravenous Once    [COMPLETED] ondansetron (Zofran) 4 MG/2ML injection 4 mg  4 mg Intravenous Once    [COMPLETED] iopamidol 76% (ISOVUE-370) injection for power injector  80 mL Intravenous ONCE PRN    [COMPLETED] morphINE PF 2 MG/ML injection 2 mg  2 mg Intravenous Once    [] sodium chloride 0.9% infusion   Intravenous Continuous    acetaminophen (Tylenol Extra Strength) tab 500 mg  500 mg Oral Q4H PRN    glucose (Dex4) 15 GM/59ML oral liquid 15 g  15 g Oral Q15 Min PRN    Or    glucose (Glutose) 40% oral gel 15 g  15 g Oral Q15 Min PRN    Or    dextrose 50% injection 50 mL  50 mL Intravenous Q15 Min PRN

## 2025-06-24 NOTE — CM/SW NOTE
06/24/25 1500   Discharge disposition   Expected discharge disposition subacute   Post Acute Care Provider Napoleon Arvizu  ([facility])   Discharge transportation [facility]     MDO placed for discharge.      [facility] - [facility] available and facility can accept patient at 4 PM today.      Destination: [facility]  [address] [place] [address]  Call for report to: [phone]  Transportation provider-[facility] Time: 4 PM     PCS form completed. JOSE Watts notified of above.    CM called patient's daughter Yao and updated of above.  Patient/family agreeable to plan.    Patient cleared for discharge from CM/SW standpoint.    Tammie Raya RN, BSN  Nurse   [phone]

## 2025-06-24 NOTE — DISCHARGE SUMMARY
[facility]  part of [facility]    Discharge Summary    Arnaldo Gutierrez Patient Status:  Inpatient    4/15/1945 MRN [id number]   Location [facility] 4W/SW/SE Attending No att. providers found   Hosp Day # 19 PCP PHYSICIAN NONSTAFF     Date of Admission: 2025 Disposition:   SNF Subacute Rehab     Date of Discharge:  2025  5:04 PM    Admitting Diagnosis:   Large bowel obstruction (HCC) [K56.609]  Colonic mass [K63.89]    Hospital Discharge Diagnoses:    Diffuse large B-cell lymphoma  Large Bowel obstruction due to Proximal transverse colonic mass  Abdominal fluid collection.  25 s/p colonoscopy with metallic colon stent  6/10/25 - s/p laparoscopic transverse colon resection, small bowel resection x2, resection of tumor nodule on small bowel.  Metastatic disease  Abdominal fluid collection  S/p NGOZI drian placement in abd fluid collection on 2025 by IR  Right calf pain  Bilat LE DVTs   Acute postoperative blood loss anemia with hypotension  Hypotension   Hx of DM2  Hx of HTN  Hx of HL  Hx of BPH      Problem List:     Problem List[1]    Physical Exam:      /59 (BP Location: Left arm)   Pulse 91   Temp 98.6 °F (37 °C) (Oral)   Resp 18   Ht 71\"   Wt 171 lb 3.2 oz (77.7 kg)   SpO2 92%   BMI 23.88 kg/m²     Gen:   NAD.  A and O x 3  CV:   RRR, no m/g/r  Pulm:   CTA bilat  Abd:   +bs, soft, tender over mid to right abdomen with moderate palpation, no rebound or guarding, ND, NGOZI drain in right side with serosanquinous drainage.  LE:   No c/c/e  Neuro:   nonfocal      Reason for Admission:   Obstructive colonic mass.     HISTORY OF PRESENT ILLNESS:  The patient is a 80-year-old  male who came into the emergency department for evaluation of abdominal bloating, nausea, and poor appetite with progressive weight loss.  CBC showed hemoglobin of 10.1.  Has been steadily dropping compared to January, it was 11, and August last year, it was 14.5.  Chemistry and  liver function test were unremarkable.  Lipase was negative.  CT scan of the abdomen and pelvis showed 4 cm segment of constricting masslike wall thickening at the proximal transverse colon with large upstream colonic fecal burden.  Findings are concerning for partial obstructing colonic neoplasm, satellite morphology stranding and nodularity in the right lower quadrant mesentery adjacent to the constricting mass probably related to direct extension of disease.  There are adjacent nonenlarged right upper quadrant mesenteric lymph nodes which could be reactive or metastatic.  No free intraperitoneal air or well-defined drainable intra-abdominal collection.  Single mildly enlarged aortocaval retroperitoneal lymph node with other prominent but nonenlarged retroperitoneal nodes, could be reactive or metastatic.  Patient will be admitted to the hospital for further management.    Hospital Course:     Diffuse large B-cell lymphoma  Large Bowel obstruction due to Proximal transverse colonic mass  Abdominal fluid collection.  - CT A/P reviewed  - GI and general surgery consulted  - 6/6/25 s/p colonoscopy with metallic colon stent  - 6/10/25 - s/p laparoscopic transverse colon resection, small bowel resection x2, resection of tumor nodule on small bowel. Patient appeared to have metastatic disease per Dr. Randall  - pathology - transverse colon - diffuse large B-cell lymphoma. Separate segment with tubular adenoma. Small intestine with extensive involvement of diffuse large B-cell lymphoma  - oncology consulted, appreciate recs. Plans for chemo with curative intent in the future once bowel issues stabilized  - taking PO now.  Off TPN.  - was on zosyn IV empirically.  Then re-started zosyn.   DC on augmentin.  - started having BMs 6/15.     - con colace and miralax bid  - NGT out  - cont soft diet  6/19:  Increased abd pain.  CT abd ordered and showed fluid collection.    IR consulted.  Pt now POD #5 s/p NGOZI roth placement in  abd fluid collection.  F/u cultures.  - was given IVF  - ID consult for abx on dc - dc on PO augmentin.  Surgery and oncology f/u.     Right calf pain  LE dopplers 6/24/2025 reveal bilat LE DVTs.  Start xarelto.  D/w hematology.     Acute postoperative blood loss anemia with hypotension  - EBL from surgery 600ml  - s/p 3 units PRBC  total this admit.  Hgb stable now.  PO iron on dc.  - was given 5 days IV iron  - follow cbc     Hypotension - resolved  - off pressors     DM2  - ISS     Other:  H/o HTN   HL  BPH - cont flomax     Dispo:  discharge planning.   Taking PO now.   DC to [facility].     Dietitian Malnutrition Assessment     Evaluation for Malnutrition: Criteria for severe malnutrition diagnosis- chronic illness related to   Wt loss greater than 10% in 6 months., Energy intake less than 75% for greater than 1 month.                RD Malnutrition Care Plan:       Body Fat/Muscle Mass:           Physician Assessment      Patient has a diagnosis of severe malnutrition     dvt proph:    Xarelto     Code status:    Full       Consultations:   GI, general surgery, pulmonology, oncology, ID    Discharge Condition:  Good    Discharge Medications:      Discharge Medications        START taking these medications        Instructions Prescription details   amoxicillin clavulanate 875-125 MG Tabs  Commonly known as: Augmentin      Take 1 tablet by mouth 2 (two) times daily for 7 days.   Stop taking on: July 1, 2025  Quantity: 14 tablet  Refills: 0     docusate sodium 100 MG Caps  Commonly known as: COLACE      Take 100 mg by mouth 2 (two) times daily.   Refills: 0     HYDROcodone-acetaminophen 5-325 MG Tabs  Commonly known as: Norco      Take 1-2 tablets by mouth every 6 (six) hours as needed for Pain.   Quantity: 30 tablet  Refills: 0     Naloxone HCl 4 MG/0.1ML Liqd      4 mg by Nasal route as needed. If patient remains unresponsive, repeat dose in other nostril 2-5 minutes after first dose.   Quantity: 1 kit  Refills: 0             CHANGE how you take these medications        Instructions Prescription details   polyethylene glycol (PEG 3350) 17 g Pack  Commonly known as: Miralax  What changed: when to take this      Take 17 g by mouth in the morning and 17 g before bedtime. Stop if diarrhea.   Refills: 0     rivaroxaban 15 MG Tabs  Commonly known as: Xarelto  What changed: You were already taking a medication with the same name, and this prescription was added. Make sure you understand how and when to take each.      Take 1 tablet (15 mg total) by mouth 2 (two) times daily with meals. Pt to take Xarelto 15 mg BID for 21 days and then Xarelto 20 mg daily after that for 5 more months.   Stop taking on: July 13, 2025  Refills: 0     rivaroxaban 20 MG Tabs  Commonly known as: Xarelto  Start taking on: July 14, 2025  What changed: Another medication with the same name was added. Make sure you understand how and when to take each.      Take 1 tablet (20 mg total) by mouth daily with food. To start 7/14/2025 and continue for 5 months.   Refills: 0            CONTINUE taking these medications        Instructions Prescription details   Accu-Chek FastClix Lancets Misc      Apply 100 Lancets topically daily.   Quantity: 100 each  Refills: 3     Accu-Chek Guide Strp      1 each by In Vitro route in the morning and 1 each before bedtime.   Refills: 0     Accu-Chek Guide Test Strp  Generic drug: Glucose Blood      Use 1 time per day.   Quantity: 100 strip  Refills: 3     alum-mag hydroxide-simethicone 200-200-20 MG/5ML Susp  Commonly known as: Maalox      Take 10 mL by mouth 4 (four) times daily as needed.   Quantity: 200 mL  Refills: 0     Ferrous Sulfate 325 (65 Fe) MG Tabs      Take 1 tablet (325 mg total) by mouth daily with breakfast. With orange juice.   Quantity: 90 tablet  Refills: 1     FLUoxetine 20 MG Caps  Commonly known as: PROzac      Take 1 capsule (20 mg total) by mouth daily.   Quantity: 90 capsule  Refills: 3     loratadine 10 MG  Tabs  Commonly known as: Claritin      Take 1 tablet (10 mg total) by mouth daily as needed for Allergies.   Refills: 0     melatonin 3 MG Tabs      Take 2 tablets (6 mg total) by mouth daily.   Refills: 0     pantoprazole 40 MG Tbec  Commonly known as: Protonix      Take 1 tablet (40 mg total) by mouth every morning before breakfast.   Quantity: 90 tablet  Refills: 3     pregabalin 100 MG Caps  Commonly known as: Lyrica      Take 1 capsule (100 mg total) by mouth in the morning, at noon, and at bedtime.   Quantity: 90 capsule  Refills: 5     PreserVision/Lutein Caps      Take 1 capsule by mouth in the morning.   Refills: 0     simvastatin 40 MG Tabs  Commonly known as: Zocor      Take 1 tablet (40 mg total) by mouth nightly.   Quantity: 90 tablet  Refills: 3     SYSTANE ULTRA OP      Apply 2 drops to eye in the morning and 2 drops before bedtime.   Refills: 0     tamsulosin 0.4 MG Caps  Commonly known as: Flomax      Take 2 capsules (0.8 mg total) by mouth daily. AFTER SAME MEAL EACH DAY   Quantity: 180 capsule  Refills: 3     vitamin B-12 50 MCG Tabs  Commonly known as: CYANOCOBALAMIN      Take 1 tablet (50 mcg total) by mouth in the morning.   Refills: 0            STOP taking these medications      empagliflozin 10 MG Tabs  Commonly known as: Jardiance        Mounjaro 5 MG/0.5ML Soaj  Generic drug: Tirzepatide        traMADol 50 MG Tabs  Commonly known as: Ultram                  Where to Get Your Medications        These medications were sent to [id number] - [place], [place] - [address] [place] [address], [phone], [phone]  [address] [place] [address]      Phone: [phone]   Naloxone HCl 4 MG/0.1ML Liqd  pantoprazole 40 MG Tbec       Please  your prescriptions at the location directed by your doctor or nurse    Bring a paper prescription for each of these medications  amoxicillin clavulanate 875-125 MG Tabs  HYDROcodone-acetaminophen 5-325 MG Tabs         Follow  up Visits:     Follow-up Information       Ruben Randall MD. Schedule an appointment as soon as possible for a visit in 1 week(s).    Specialty: SURGERY, GENERAL  Why: For post operative wound check  Contact information:  [address]  [address]  [facility] [address]  [phone]               Norris Lazaro DO. Schedule an appointment as soon as possible for a visit.    Specialties: Hematology and Oncology, ONCOLOGY, HEMATOLOGY  Contact information:  [address]  [facility] [address]  [phone]                             Hospital Discharge Diagnoses:  Diffuse large B-cell lymphoma    Lace+ Score: 82  59-90 High Risk  29-58 Medium Risk  0-28   Low Risk.    TCM Follow-Up Recommendation:  LACE > 58: High Risk of readmission after discharge from the hospital.    >35 minutes spent preparing this discharge.    Shekhar Schwarz MD  6/24/2025  5:21 PM          [1]   Patient Active Problem List  Diagnosis    BPH (benign prostatic hyperplasia)    Chronic low back pain    Chronic pain of both knees    Depression with anxiety    Generalized osteoarthritis    Hyperlipidemia    Type 2 diabetes mellitus without complication, without long-term current use of insulin (HCC)    Essential hypertension    Bipolar I disorder, single manic episode (HCC)    Large bowel obstruction (HCC)    Colonic mass    Diffuse large B-cell lymphoma of solid organ excluding spleen

## 2025-06-24 NOTE — PROGRESS NOTES
INFECTIOUS DISEASE PROGRESS NOTE  [facility]  part of [facility] ID PROGRESS NOTE    Arnaldo Gutierrez Patient Status:  Inpatient    4/15/1945 MRN [id number]   Location [facility] 4W/SW/SE Attending Shekhar Johnson MD   Hosp Day # 19 PCP PHYSICIAN NONSTAFF     Subjective:  ROS reviewed. About to go down for venous dopplers. No acute overnight events. Minimal output from RLQ drain.    ASSESSMENT:    Antibiotics: IV zosyn     # Intraabdominal post op fluid collection s/p IR drain placement 25 with 260 cc of serosang fluid, cx negative     # Large bowel obstruction s/p OR 6/10/25 for lap transverse colon resection, small bowel resection x2, rsection of tumor nodule on small bowel - path with DLBCL               - s/p colonic stent placement by GI 25     PLAN:  -  Currently on zosyn. Can DC on PO augmentin x 7 days.  -  Outpatient oncology workup noted.  -  Follow fever curve, wbc.  -  Reviewed labs, micro, imaging reports, available old records.  -  d/w patient using language line, RN.     History of Present Illness:  Arnaldo Gutierrez is a a(n) 80 year old male. Patient is a 80-year-old male with history of diabetes, HTN, BPH who presents to hospital initially on  with abdominal bloating, nausea, decreased appetite, unintentional weight loss.  Initial CT A/P on  with 4 cm segment of masslike constriction and thickening in the proximal transverse colon with concern for partially obstructing chronic neoplasm.  Seen by GI and underwent colonoscopy with chronic stent placement on .  Also seen by general surgery and taken to the OR on 6/10 for large bowel obstruction status post laparoscopic assisted transverse colon resection, small bowel resection and tumor nodule and small bowel.  Pathology diffuse large B-cell lymphoma.  Seen by oncology.  On  had low-grade temperatures with increasing WBC up to 19 with repeat CT A/P showing moderate to large volume free  fluid in the abdomen and pelvis in the upper quadrants.  Organized fluid in the left lower retroperitoneum measuring 5.5 x 4.7 x 5.8 cm concern for seroma, liquefied hematoma versus inflammatory fluid.  Seen by IR and underwent IR peritoneal drain placement on 7/19 with 260 cc of serosanguineous fluid.  Cultures negative.  Has been on IV Zosyn.    Physical Exam:  BP 99/49 (BP Location: Left arm)   Pulse 87   Temp 98.8 °F (37.1 °C) (Oral)   Resp 17   Ht 5' 11\" (1.803 m)   Wt 171 lb 3.2 oz (77.7 kg)   SpO2 90%   BMI 23.88 kg/m²     Gen:   Awake, in bed  HEENT:  EOMI, neck supple  CV/lungs:  RRR, CTAB  Abdom:  Soft, RLQ drain c/d/i  Skin/extrem:  No rashes, no c/c/e  Lines:  PICC+    Laboratory Data: Reviewed    Microbiology: Reviewed    Radiology: Reviewed      [facility]  [phone]  6/24/2025

## 2025-06-25 DIAGNOSIS — C83.33 DIFFUSE LARGE B-CELL LYMPHOMA OF INTRA-ABDOMINAL LYMPH NODES (HCC): Primary | ICD-10-CM

## 2025-06-26 ENCOUNTER — TELEPHONE (OUTPATIENT)
Age: 80
End: 2025-06-26

## 2025-06-27 NOTE — PAYOR COMM NOTE
--------------  DISCHARGE REVIEW    Payor: [employer]  Subscriber #:  [id number]  Authorization Number: [id number]    Admit date: 25  Admit time:   8:01 PM  Discharge Date: 2025  5:04 PM     Admitting Physician: Og James MD  Attending Physician:  No att. providers found  Primary Care Physician: Nonstaff, Physician          Discharge Summary Notes        Discharge Summary signed by Shekhar Johnson MD at 2025  5:41 PM       Author: Shekhar Johnson MD Specialty: HOSPITALIST, HOSPITALIST Author Type: Physician    Filed: 2025  5:41 PM Date of Service: 2025  5:21 PM Status: Signed    : Shekhar Johnson MD (Physician)           [facility]  part of [facility]    Discharge Summary    Arnaldo Gutierrez Patient Status:  Inpatient    4/15/1945 MRN [id number]   Location [facility] 4W/SW/SE Attending No att. providers found   Hosp Day # 19 PCP PHYSICIAN NONSTAFF     Date of Admission: 2025 Disposition:   SNF Subacute Rehab     Date of Discharge:  2025  5:04 PM    Admitting Diagnosis:   Large bowel obstruction (HCC) [K56.609]  Colonic mass [K63.89]    Hospital Discharge Diagnoses:    Diffuse large B-cell lymphoma  Large Bowel obstruction due to Proximal transverse colonic mass  Abdominal fluid collection.  25 s/p colonoscopy with metallic colon stent  6/10/25 - s/p laparoscopic transverse colon resection, small bowel resection x2, resection of tumor nodule on small bowel.  Metastatic disease  Abdominal fluid collection  S/p NGOZI drian placement in abd fluid collection on 2025 by IR  Right calf pain  Bilat LE DVTs   Acute postoperative blood loss anemia with hypotension  Hypotension   Hx of DM2  Hx of HTN  Hx of HL  Hx of BPH      Problem List:     Problem List[1]    Physical Exam:      /59 (BP Location: Left arm)   Pulse 91   Temp 98.6 °F (37 °C) (Oral)   Resp 18   Ht 71\"   Wt 171 lb 3.2 oz (77.7 kg)   SpO2 92%   BMI  23.88 kg/m²     Gen:   NAD.  A and O x 3  CV:   RRR, no m/g/r  Pulm:   CTA bilat  Abd:   +bs, soft, tender over mid to right abdomen with moderate palpation, no rebound or guarding, ND, NGOZI drain in right side with serosanquinous drainage.  LE:   No c/c/e  Neuro:   nonfocal      Reason for Admission:   Obstructive colonic mass.     HISTORY OF PRESENT ILLNESS:  The patient is a 80-year-old  male who came into the emergency department for evaluation of abdominal bloating, nausea, and poor appetite with progressive weight loss.  CBC showed hemoglobin of 10.1.  Has been steadily dropping compared to January, it was 11, and August last year, it was 14.5.  Chemistry and liver function test were unremarkable.  Lipase was negative.  CT scan of the abdomen and pelvis showed 4 cm segment of constricting masslike wall thickening at the proximal transverse colon with large upstream colonic fecal burden.  Findings are concerning for partial obstructing colonic neoplasm, satellite morphology stranding and nodularity in the right lower quadrant mesentery adjacent to the constricting mass probably related to direct extension of disease.  There are adjacent nonenlarged right upper quadrant mesenteric lymph nodes which could be reactive or metastatic.  No free intraperitoneal air or well-defined drainable intra-abdominal collection.  Single mildly enlarged aortocaval retroperitoneal lymph node with other prominent but nonenlarged retroperitoneal nodes, could be reactive or metastatic.  Patient will be admitted to the hospital for further management.    Hospital Course:     Diffuse large B-cell lymphoma  Large Bowel obstruction due to Proximal transverse colonic mass  Abdominal fluid collection.  - CT A/P reviewed  - GI and general surgery consulted  - 6/6/25 s/p colonoscopy with metallic colon stent  - 6/10/25 - s/p laparoscopic transverse colon resection, small bowel resection x2, resection of tumor nodule on small bowel.  Patient appeared to have metastatic disease per Dr. Randall  - pathology - transverse colon - diffuse large B-cell lymphoma. Separate segment with tubular adenoma. Small intestine with extensive involvement of diffuse large B-cell lymphoma  - oncology consulted, appreciate recs. Plans for chemo with curative intent in the future once bowel issues stabilized  - taking PO now.  Off TPN.  - was on zosyn IV empirically.  Then re-started zosyn.   DC on augmentin.  - started having BMs 6/15.     - con colace and miralax bid  - NGT out  - cont soft diet  6/19:  Increased abd pain.  CT abd ordered and showed fluid collection.    IR consulted.  Pt now POD #5 s/p NGOZI gonzalez placement in abd fluid collection.  F/u cultures.  - was given IVF  - ID consult for abx on dc - dc on PO augmentin.  Surgery and oncology f/u.     Right calf pain  LE dopplers 6/24/2025 reveal bilat LE DVTs.  Start xarelto.  D/w hematology.     Acute postoperative blood loss anemia with hypotension  - EBL from surgery 600ml  - s/p 3 units PRBC  total this admit.  Hgb stable now.  PO iron on dc.  - was given 5 days IV iron  - follow cbc     Hypotension - resolved  - off pressors     DM2  - ISS     Other:  H/o HTN   HL  BPH - cont flomax     Dispo:  discharge planning.   Taking PO now.   DC to [facility].     Dietitian Malnutrition Assessment     Evaluation for Malnutrition: Criteria for severe malnutrition diagnosis- chronic illness related to   Wt loss greater than 10% in 6 months., Energy intake less than 75% for greater than 1 month.                RD Malnutrition Care Plan:       Body Fat/Muscle Mass:           Physician Assessment      Patient has a diagnosis of severe malnutrition     dvt proph:    Xarelto     Code status:    Full       Consultations:   GI, general surgery, pulmonology, oncology, ID    Discharge Condition:  Good    Discharge Medications:      Discharge Medications        START taking these medications        Instructions Prescription details    amoxicillin clavulanate 875-125 MG Tabs  Commonly known as: Augmentin      Take 1 tablet by mouth 2 (two) times daily for 7 days.   Stop taking on: July 1, 2025  Quantity: 14 tablet  Refills: 0     docusate sodium 100 MG Caps  Commonly known as: COLACE      Take 100 mg by mouth 2 (two) times daily.   Refills: 0     HYDROcodone-acetaminophen 5-325 MG Tabs  Commonly known as: Norco      Take 1-2 tablets by mouth every 6 (six) hours as needed for Pain.   Quantity: 30 tablet  Refills: 0     Naloxone HCl 4 MG/0.1ML Liqd      4 mg by Nasal route as needed. If patient remains unresponsive, repeat dose in other nostril 2-5 minutes after first dose.   Quantity: 1 kit  Refills: 0            CHANGE how you take these medications        Instructions Prescription details   polyethylene glycol (PEG 3350) 17 g Pack  Commonly known as: Miralax  What changed: when to take this      Take 17 g by mouth in the morning and 17 g before bedtime. Stop if diarrhea.   Refills: 0     rivaroxaban 15 MG Tabs  Commonly known as: Xarelto  What changed: You were already taking a medication with the same name, and this prescription was added. Make sure you understand how and when to take each.      Take 1 tablet (15 mg total) by mouth 2 (two) times daily with meals. Pt to take Xarelto 15 mg BID for 21 days and then Xarelto 20 mg daily after that for 5 more months.   Stop taking on: July 13, 2025  Refills: 0     rivaroxaban 20 MG Tabs  Commonly known as: Xarelto  Start taking on: July 14, 2025  What changed: Another medication with the same name was added. Make sure you understand how and when to take each.      Take 1 tablet (20 mg total) by mouth daily with food. To start 7/14/2025 and continue for 5 months.   Refills: 0            CONTINUE taking these medications        Instructions Prescription details   Accu-Chek FastClix Lancets Misc      Apply 100 Lancets topically daily.   Quantity: 100 each  Refills: 3     Accu-Chek Guide Strp      1  each by In Vitro route in the morning and 1 each before bedtime.   Refills: 0     Accu-Chek Guide Test Strp  Generic drug: Glucose Blood      Use 1 time per day.   Quantity: 100 strip  Refills: 3     alum-mag hydroxide-simethicone 200-200-20 MG/5ML Susp  Commonly known as: Maalox      Take 10 mL by mouth 4 (four) times daily as needed.   Quantity: 200 mL  Refills: 0     Ferrous Sulfate 325 (65 Fe) MG Tabs      Take 1 tablet (325 mg total) by mouth daily with breakfast. With orange juice.   Quantity: 90 tablet  Refills: 1     FLUoxetine 20 MG Caps  Commonly known as: PROzac      Take 1 capsule (20 mg total) by mouth daily.   Quantity: 90 capsule  Refills: 3     loratadine 10 MG Tabs  Commonly known as: Claritin      Take 1 tablet (10 mg total) by mouth daily as needed for Allergies.   Refills: 0     melatonin 3 MG Tabs      Take 2 tablets (6 mg total) by mouth daily.   Refills: 0     pantoprazole 40 MG Tbec  Commonly known as: Protonix      Take 1 tablet (40 mg total) by mouth every morning before breakfast.   Quantity: 90 tablet  Refills: 3     pregabalin 100 MG Caps  Commonly known as: Lyrica      Take 1 capsule (100 mg total) by mouth in the morning, at noon, and at bedtime.   Quantity: 90 capsule  Refills: 5     PreserVision/Lutein Caps      Take 1 capsule by mouth in the morning.   Refills: 0     simvastatin 40 MG Tabs  Commonly known as: Zocor      Take 1 tablet (40 mg total) by mouth nightly.   Quantity: 90 tablet  Refills: 3     SYSTANE ULTRA OP      Apply 2 drops to eye in the morning and 2 drops before bedtime.   Refills: 0     tamsulosin 0.4 MG Caps  Commonly known as: Flomax      Take 2 capsules (0.8 mg total) by mouth daily. AFTER SAME MEAL EACH DAY   Quantity: 180 capsule  Refills: 3     vitamin B-12 50 MCG Tabs  Commonly known as: CYANOCOBALAMIN      Take 1 tablet (50 mcg total) by mouth in the morning.   Refills: 0            STOP taking these medications      empagliflozin 10 MG Tabs  Commonly known  as: Jardiance        Mounjaro 5 MG/0.5ML Soaj  Generic drug: Tirzepatide        traMADol 50 MG Tabs  Commonly known as: Ultram                  Where to Get Your Medications        These medications were sent to [id number] - [place], [place] - [address] [facility] AT [address] [place] [address], [phone], [phone]  [address] [facility] [address]      Phone: [phone]   Naloxone HCl 4 MG/0.1ML Liqd  pantoprazole 40 MG Tbec       Please  your prescriptions at the location directed by your doctor or nurse    Bring a paper prescription for each of these medications  amoxicillin clavulanate 875-125 MG Tabs  HYDROcodone-acetaminophen 5-325 MG Tabs         Follow up Visits:     Follow-up Information       Ruben Randall MD. Schedule an appointment as soon as possible for a visit in 1 week(s).    Specialty: SURGERY, GENERAL  Why: For post operative wound check  Contact information:  [address] [facility]  [address]  [facility] [address]  [phone]               Norris Lazaro DO. Schedule an appointment as soon as possible for a visit.    Specialties: Hematology and Oncology, ONCOLOGY, HEMATOLOGY  Contact information:  [address] [facility] [id number] [phone]                             Hospital Discharge Diagnoses:  Diffuse large B-cell lymphoma    Lace+ Score: 82  59-90 High Risk  29-58 Medium Risk  0-28   Low Risk.    TCM Follow-Up Recommendation:  LACE > 58: High Risk of readmission after discharge from the hospital.    >35 minutes spent preparing this discharge.    Shekhar Schwarz MD  6/24/2025  5:21 PM       Electronically signed by Shekhar Johnson MD on 6/24/2025  5:41 PM         REVIEWER COMMENTS         [1]   Patient Active Problem List  Diagnosis    BPH (benign prostatic hyperplasia)    Chronic low back pain    Chronic pain of both knees    Depression with anxiety    Generalized osteoarthritis    Hyperlipidemia    Type 2 diabetes mellitus without complication, without long-term  current use of insulin (HCC)    Essential hypertension    Bipolar I disorder, single manic episode (HCC)    Large bowel obstruction (HCC)    Colonic mass    Diffuse large B-cell lymphoma of solid organ excluding spleen

## 2025-06-30 DIAGNOSIS — C83.398 DIFFUSE LARGE B-CELL LYMPHOMA OF SOLID ORGAN EXCLUDING SPLEEN: Primary | ICD-10-CM

## 2025-07-02 ENCOUNTER — TELEPHONE (OUTPATIENT)
Dept: FAMILY MEDICINE CLINIC | Facility: CLINIC | Age: 80
End: 2025-07-02

## 2025-07-02 ENCOUNTER — OFFICE VISIT (OUTPATIENT)
Dept: FAMILY MEDICINE CLINIC | Facility: CLINIC | Age: 80
End: 2025-07-02
Payer: COMMERCIAL

## 2025-07-02 VITALS
HEART RATE: 102 BPM | DIASTOLIC BLOOD PRESSURE: 67 MMHG | HEIGHT: 71 IN | WEIGHT: 171 LBS | BODY MASS INDEX: 23.94 KG/M2 | SYSTOLIC BLOOD PRESSURE: 107 MMHG

## 2025-07-02 DIAGNOSIS — R60.0 LEG EDEMA, RIGHT: ICD-10-CM

## 2025-07-02 DIAGNOSIS — L89.90: ICD-10-CM

## 2025-07-02 DIAGNOSIS — E11.9 TYPE 2 DIABETES MELLITUS WITHOUT COMPLICATION, WITHOUT LONG-TERM CURRENT USE OF INSULIN (HCC): Primary | ICD-10-CM

## 2025-07-02 LAB — HEMOGLOBIN A1C: 7.2 % (ref 4.3–5.6)

## 2025-07-02 PROCEDURE — 1159F MED LIST DOCD IN RCRD: CPT | Performed by: PHYSICIAN ASSISTANT

## 2025-07-02 PROCEDURE — 3074F SYST BP LT 130 MM HG: CPT | Performed by: PHYSICIAN ASSISTANT

## 2025-07-02 PROCEDURE — 3078F DIAST BP <80 MM HG: CPT | Performed by: PHYSICIAN ASSISTANT

## 2025-07-02 PROCEDURE — 83036 HEMOGLOBIN GLYCOSYLATED A1C: CPT | Performed by: PHYSICIAN ASSISTANT

## 2025-07-02 PROCEDURE — 1126F AMNT PAIN NOTED NONE PRSNT: CPT | Performed by: PHYSICIAN ASSISTANT

## 2025-07-02 PROCEDURE — 3008F BODY MASS INDEX DOCD: CPT | Performed by: PHYSICIAN ASSISTANT

## 2025-07-02 PROCEDURE — 99213 OFFICE O/P EST LOW 20 MIN: CPT | Performed by: PHYSICIAN ASSISTANT

## 2025-07-02 PROCEDURE — 1111F DSCHRG MED/CURRENT MED MERGE: CPT | Performed by: PHYSICIAN ASSISTANT

## 2025-07-02 NOTE — ASSESSMENT & PLAN NOTE
Orders:    POC Hemoglobin A1C  Diabetes: A1c is 7.2 done 7/2/2025 which shows hyperglycemia and borderline control, maintain vigilance and increase activity and medications as listed.   DM Meds:    Oral Diabetes Med Adherence Good at 96%    Diabetic Complications: Hyperglycemia: A1c 7.2% 7/2/2025, .       Discussed HbA1C result. Continue with diet controlled.

## 2025-07-02 NOTE — PROGRESS NOTES
HPI:     HPI  A 80-year-old man, accompanied by his daughter, is in the office for a follow-up. The patient has been at [facility] for the past week. He feels weak and has lower back pain with a rash in his right gluteal area. He takes Norco 5/325 mg as needed for pain.    Medications:   Current Medications[1]    Allergies:   Allergies[2]    History:     Health Maintenance   Topic Date Due    Diabetes Care Dilated Eye Exam  Never done    COVID-19 Vaccine ( season) 2024    Influenza Vaccine (1) 10/01/2025    Diabetes Care A1C  2025    Diabetes Care Foot Exam  2026    Diabetes Care: GFR  2026    Annual Well Visit  Completed    Annual Depression Screening  Completed    Fall Risk Screening (Annual)  Completed    Diabetes Care: Microalb/Creat Ratio (Annual)  Completed    Pneumococcal Vaccine: 50+ Years  Completed    Zoster Vaccines  Completed    Meningococcal B Vaccine  Aged Out    Colorectal Cancer Screening  Discontinued       No LMP for male patient.   Past Medical History:   Past Medical History[3]    Past Surgical History:   Past Surgical History[4]    Family History:   Family History[5]    Social History:     Social History     Socioeconomic History    Marital status:      Spouse name: Not on file    Number of children: Not on file    Years of education: Not on file    Highest education level: Not on file   Occupational History    Not on file   Tobacco Use    Smoking status: Former     Current packs/day: 0.00     Average packs/day: 2.0 packs/day for 10.0 years (20.0 ttl pk-yrs)     Types: Cigarettes     Start date:      Quit date: 1980     Years since quittin.5     Passive exposure: Past    Smokeless tobacco: Never   Vaping Use    Vaping status: Never Used   Substance and Sexual Activity    Alcohol use: Yes     Comment: rarely    Drug use: Not Currently    Sexual activity: Not on file   Other Topics Concern    Not on file   Social History Narrative    Not on  file     Social Drivers of Health     Food Insecurity: No Food Insecurity (6/5/2025)    NCSS - Food Insecurity     Worried About Running Out of Food in the Last Year: No     Ran Out of Food in the Last Year: No   Transportation Needs: No Transportation Needs (6/5/2025)    NCSS - Transportation     Lack of Transportation: No   Stress: Not on file   Housing Stability: Not At Risk (6/5/2025)    NCSS - Housing/Utilities     Has Housing: Yes     Worried About Losing Housing: No     Unable to Get Utilities: No       Review of Systems:   Review of Systems   Skin:  Positive for wound.        Vitals:    07/02/25 1121   BP: 107/67   Pulse: 102   Weight: 171 lb (77.6 kg)   Height: 5' 11\" (1.803 m)     Body mass index is 23.85 kg/m².    Physical Exam:   Physical Exam  Vitals reviewed.   Cardiovascular:      Rate and Rhythm: Normal rate and regular rhythm.      Heart sounds: Normal heart sounds.   Pulmonary:      Effort: Pulmonary effort is normal.      Breath sounds: Normal breath sounds.      + mild edema of right leg.    Assessment and Plan::     Assessment & Plan  Type 2 diabetes mellitus without complication, without long-term current use of insulin ([facility])    Orders:    POC Hemoglobin A1C  Diabetes: A1c is 7.2 done 7/2/2025 which shows hyperglycemia and borderline control, maintain vigilance and increase activity and medications as listed.   DM Meds:    Oral Diabetes Med Adherence Good at 96%    Diabetic Complications: Hyperglycemia: A1c 7.2% 7/2/2025, .       Discussed HbA1C result. Continue with diet controlled.  Leg edema, right  Advise the patient to elevate legs.       Superficial pressure ulcer  Continue dressing at rehab. Advise the change the positions every 2-3 hours.          Discussed plan of care with pt and pt is in agreement.All questions answered. Pt to call with questions or concerns.         [1]   Current Outpatient Medications   Medication Sig Dispense Refill    rivaroxaban 15 MG Oral Tab Take 1  tablet (15 mg total) by mouth 2 (two) times daily with meals. Pt to take Xarelto 15 mg BID for 21 days and then Xarelto 20 mg daily after that for 5 more months.      [START ON 7/14/2025] rivaroxaban 20 MG Oral Tab Take 1 tablet (20 mg total) by mouth daily with food. To start 7/14/2025 and continue for 5 months.      HYDROcodone-acetaminophen 5-325 MG Oral Tab Take 1-2 tablets by mouth every 6 (six) hours as needed for Pain. 30 tablet 0    polyethylene glycol, PEG 3350, 17 g Oral Powd Pack Take 17 g by mouth in the morning and 17 g before bedtime. Stop if diarrhea.      docusate sodium 100 MG Oral Cap Take 100 mg by mouth 2 (two) times daily.      Naloxone HCl 4 MG/0.1ML Nasal Liquid 4 mg by Nasal route as needed. If patient remains unresponsive, repeat dose in other nostril 2-5 minutes after first dose. 1 kit 0    pantoprazole 40 MG Oral Tab EC Take 1 tablet (40 mg total) by mouth every morning before breakfast. 90 tablet 3    loratadine 10 MG Oral Tab Take 1 tablet (10 mg total) by mouth daily as needed for Allergies.      melatonin 3 MG Oral Tab Take 2 tablets (6 mg total) by mouth daily.      FLUoxetine 20 MG Oral Cap Take 1 capsule (20 mg total) by mouth daily. 90 capsule 3    Accu-Chek FastClix Lancets Does not apply Misc Apply 100 Lancets topically daily. 100 each 3    Glucose Blood (ACCU-CHEK GUIDE TEST) In Vitro Strip Use 1 time per day. 100 strip 3    pregabalin 100 MG Oral Cap Take 1 capsule (100 mg total) by mouth in the morning, at noon, and at bedtime. 90 capsule 5    Ferrous Sulfate 325 (65 Fe) MG Oral Tab Take 1 tablet (325 mg total) by mouth daily with breakfast. With orange juice. 90 tablet 1    tamsulosin 0.4 MG Oral Cap Take 2 capsules (0.8 mg total) by mouth daily. AFTER SAME MEAL EACH  capsule 3    alum-mag hydroxide-simethicone 200-200-20 MG/5ML Oral Suspension Take 10 mL by mouth 4 (four) times daily as needed. 200 mL 0    simvastatin 40 MG Oral Tab Take 1 tablet (40 mg total) by  mouth nightly. 90 tablet 3    Glucose Blood (ACCU-CHEK GUIDE) In Vitro Strip 1 each by In Vitro route in the morning and 1 each before bedtime.      vitamin B-12 50 MCG Oral Tab Take 1 tablet (50 mcg total) by mouth in the morning.      Multiple Vitamins-Minerals (PRESERVISION/LUTEIN) Oral Cap Take 1 capsule by mouth in the morning.      Polyethyl Glycol-Propyl Glycol (SYSTANE ULTRA OP) Apply 2 drops to eye in the morning and 2 drops before bedtime.     [2] No Known Allergies  [3]   Past Medical History:   Anxiety    BPH (benign prostatic hyperplasia)    CKD (chronic kidney disease) stage 3, GFR 30-59 ml/min (HCC)    Depression    Diabetes (HCC)    Diabetes mellitus (HCC)    Essential hypertension    GERD (gastroesophageal reflux disease)    High cholesterol    Hyperlipidemia   [4]   Past Surgical History:  Procedure Laterality Date    Colonoscopy N/A 6/6/2025    Procedure: COLONOSCOPY with colonic stent placement;  Surgeon: Eusebio Palmer MD;  Location: [facility] ENDOSCOPY    Foot fracture surgery Right     2012   [5]   Family History  Problem Relation Age of Onset    Other (Other) Father

## 2025-07-04 ENCOUNTER — APPOINTMENT (OUTPATIENT)
Dept: CT IMAGING | Facility: HOSPITAL | Age: 80
End: 2025-07-04
Attending: EMERGENCY MEDICINE
Payer: MEDICARE

## 2025-07-04 ENCOUNTER — HOSPITAL ENCOUNTER (INPATIENT)
Facility: HOSPITAL | Age: 80
LOS: 14 days | Discharge: NURSING, REHAB OR LONG-TERM CARE | End: 2025-07-19
Attending: EMERGENCY MEDICINE | Admitting: INTERNAL MEDICINE
Payer: MEDICARE

## 2025-07-04 DIAGNOSIS — R60.1 ANASARCA: ICD-10-CM

## 2025-07-04 DIAGNOSIS — M25.562 CHRONIC PAIN OF BOTH KNEES: ICD-10-CM

## 2025-07-04 DIAGNOSIS — K66.8 PNEUMOPERITONEUM: ICD-10-CM

## 2025-07-04 DIAGNOSIS — G89.29 CHRONIC PAIN OF BOTH KNEES: ICD-10-CM

## 2025-07-04 DIAGNOSIS — R18.8 INTRAABDOMINAL FLUID COLLECTION: ICD-10-CM

## 2025-07-04 DIAGNOSIS — R14.0 ABDOMINAL DISTENSION: Primary | ICD-10-CM

## 2025-07-04 DIAGNOSIS — M25.561 CHRONIC PAIN OF BOTH KNEES: ICD-10-CM

## 2025-07-04 LAB
ALBUMIN SERPL-MCNC: 2.5 G/DL (ref 3.2–4.8)
ALBUMIN/GLOB SERPL: 0.7 {RATIO} (ref 1–2)
ALP LIVER SERPL-CCNC: 236 U/L (ref 45–117)
ALT SERPL-CCNC: 13 U/L (ref 10–49)
ANION GAP SERPL CALC-SCNC: 8 MMOL/L (ref 0–18)
AST SERPL-CCNC: 15 U/L (ref ?–34)
BASOPHILS # BLD AUTO: 0.03 X10(3) UL (ref 0–0.2)
BASOPHILS NFR BLD AUTO: 0.3 %
BILIRUB SERPL-MCNC: 0.4 MG/DL (ref 0.2–1.1)
BUN BLD-MCNC: 14 MG/DL (ref 9–23)
BUN/CREAT SERPL: 21.9 (ref 10–20)
CALCIUM BLD-MCNC: 7.7 MG/DL (ref 8.7–10.4)
CHLORIDE SERPL-SCNC: 98 MMOL/L (ref 98–112)
CO2 SERPL-SCNC: 28 MMOL/L (ref 21–32)
CREAT BLD-MCNC: 0.64 MG/DL (ref 0.7–1.3)
DEPRECATED RDW RBC AUTO: 54.5 FL (ref 35.1–46.3)
EGFRCR SERPLBLD CKD-EPI 2021: 97 ML/MIN/1.73M2 (ref 60–?)
EOSINOPHIL # BLD AUTO: 0 X10(3) UL (ref 0–0.7)
EOSINOPHIL NFR BLD AUTO: 0 %
ERYTHROCYTE [DISTWIDTH] IN BLOOD BY AUTOMATED COUNT: 17.6 % (ref 11–15)
GLOBULIN PLAS-MCNC: 3.5 G/DL (ref 2–3.5)
GLUCOSE BLD-MCNC: 190 MG/DL (ref 70–99)
HCT VFR BLD AUTO: 27.4 % (ref 39–53)
HGB BLD-MCNC: 8.8 G/DL (ref 13–17.5)
IMM GRANULOCYTES # BLD AUTO: 0.08 X10(3) UL (ref 0–1)
IMM GRANULOCYTES NFR BLD: 0.7 %
LIPASE SERPL-CCNC: 45 U/L (ref 12–53)
LYMPHOCYTES # BLD AUTO: 1.38 X10(3) UL (ref 1–4)
LYMPHOCYTES NFR BLD AUTO: 12.3 %
MAGNESIUM SERPL-MCNC: 1.7 MG/DL (ref 1.6–2.6)
MCH RBC QN AUTO: 27 PG (ref 26–34)
MCHC RBC AUTO-ENTMCNC: 32.1 G/DL (ref 31–37)
MCV RBC AUTO: 84 FL (ref 80–100)
MONOCYTES # BLD AUTO: 1.08 X10(3) UL (ref 0.1–1)
MONOCYTES NFR BLD AUTO: 9.6 %
NEUTROPHILS # BLD AUTO: 8.67 X10 (3) UL (ref 1.5–7.7)
NEUTROPHILS # BLD AUTO: 8.67 X10(3) UL (ref 1.5–7.7)
NEUTROPHILS NFR BLD AUTO: 77.1 %
OSMOLALITY SERPL CALC.SUM OF ELEC: 284 MOSM/KG (ref 275–295)
PLATELET # BLD AUTO: 519 10(3)UL (ref 150–450)
POTASSIUM SERPL-SCNC: 4.3 MMOL/L (ref 3.5–5.1)
PROT SERPL-MCNC: 6 G/DL (ref 5.7–8.2)
RBC # BLD AUTO: 3.26 X10(6)UL (ref 3.8–5.8)
SODIUM SERPL-SCNC: 134 MMOL/L (ref 136–145)
WBC # BLD AUTO: 11.2 X10(3) UL (ref 4–11)

## 2025-07-04 PROCEDURE — 99223 1ST HOSP IP/OBS HIGH 75: CPT | Performed by: INTERNAL MEDICINE

## 2025-07-04 PROCEDURE — 74177 CT ABD & PELVIS W/CONTRAST: CPT | Performed by: STUDENT IN AN ORGANIZED HEALTH CARE EDUCATION/TRAINING PROGRAM

## 2025-07-04 RX ORDER — SODIUM CHLORIDE 9 MG/ML
INJECTION, SOLUTION INTRAVENOUS ONCE
Status: COMPLETED | OUTPATIENT
Start: 2025-07-04 | End: 2025-07-05

## 2025-07-04 RX ORDER — MORPHINE SULFATE 4 MG/ML
4 INJECTION, SOLUTION INTRAMUSCULAR; INTRAVENOUS ONCE
Status: COMPLETED | OUTPATIENT
Start: 2025-07-04 | End: 2025-07-04

## 2025-07-05 ENCOUNTER — APPOINTMENT (OUTPATIENT)
Dept: INTERVENTIONAL RADIOLOGY/VASCULAR | Facility: HOSPITAL | Age: 80
End: 2025-07-05
Attending: RADIOLOGY
Payer: MEDICARE

## 2025-07-05 PROBLEM — R60.1 ANASARCA: Status: ACTIVE | Noted: 2025-07-05

## 2025-07-05 PROBLEM — R18.8 INTRAABDOMINAL FLUID COLLECTION: Status: ACTIVE | Noted: 2025-07-05

## 2025-07-05 LAB
ANION GAP SERPL CALC-SCNC: 3 MMOL/L (ref 0–18)
APTT PPP: 44.9 SECONDS (ref 23–36)
APTT PPP: 45.7 SECONDS (ref 23–36)
APTT PPP: 48.7 SECONDS (ref 23–36)
APTT PPP: 50.5 SECONDS (ref 23–36)
BUN BLD-MCNC: 14 MG/DL (ref 9–23)
BUN/CREAT SERPL: 21.9 (ref 10–20)
CALCIUM BLD-MCNC: 7.8 MG/DL (ref 8.7–10.4)
CHLORIDE SERPL-SCNC: 100 MMOL/L (ref 98–112)
CO2 SERPL-SCNC: 29 MMOL/L (ref 21–32)
CREAT BLD-MCNC: 0.64 MG/DL (ref 0.7–1.3)
DEPRECATED RDW RBC AUTO: 55.1 FL (ref 35.1–46.3)
EGFRCR SERPLBLD CKD-EPI 2021: 97 ML/MIN/1.73M2 (ref 60–?)
ERYTHROCYTE [DISTWIDTH] IN BLOOD BY AUTOMATED COUNT: 17.4 % (ref 11–15)
GLUCOSE BLD-MCNC: 170 MG/DL (ref 70–99)
GLUCOSE BLDC GLUCOMTR-MCNC: 135 MG/DL (ref 70–99)
GLUCOSE BLDC GLUCOMTR-MCNC: 144 MG/DL (ref 70–99)
GLUCOSE BLDC GLUCOMTR-MCNC: 162 MG/DL (ref 70–99)
GLUCOSE BLDC GLUCOMTR-MCNC: 163 MG/DL (ref 70–99)
GLUCOSE BLDC GLUCOMTR-MCNC: 193 MG/DL (ref 70–99)
GLUCOSE BLDC GLUCOMTR-MCNC: 223 MG/DL (ref 70–99)
HCT VFR BLD AUTO: 27 % (ref 39–53)
HGB BLD-MCNC: 8.4 G/DL (ref 13–17.5)
LACTATE SERPL-SCNC: 1.2 MMOL/L (ref 0.5–2)
MCH RBC QN AUTO: 26.5 PG (ref 26–34)
MCHC RBC AUTO-ENTMCNC: 31.1 G/DL (ref 31–37)
MCV RBC AUTO: 85.2 FL (ref 80–100)
MRSA DNA SPEC QL NAA+PROBE: NEGATIVE
OSMOLALITY SERPL CALC.SUM OF ELEC: 278 MOSM/KG (ref 275–295)
PLATELET # BLD AUTO: 519 10(3)UL (ref 150–450)
POTASSIUM SERPL-SCNC: 4.1 MMOL/L (ref 3.5–5.1)
RBC # BLD AUTO: 3.17 X10(6)UL (ref 3.8–5.8)
SODIUM SERPL-SCNC: 132 MMOL/L (ref 136–145)
WBC # BLD AUTO: 9.5 X10(3) UL (ref 4–11)

## 2025-07-05 PROCEDURE — 0W9G30Z DRAINAGE OF PERITONEAL CAVITY WITH DRAINAGE DEVICE, PERCUTANEOUS APPROACH: ICD-10-PCS | Performed by: RADIOLOGY

## 2025-07-05 PROCEDURE — 99233 SBSQ HOSP IP/OBS HIGH 50: CPT | Performed by: HOSPITALIST

## 2025-07-05 PROCEDURE — 99232 SBSQ HOSP IP/OBS MODERATE 35: CPT | Performed by: STUDENT IN AN ORGANIZED HEALTH CARE EDUCATION/TRAINING PROGRAM

## 2025-07-05 RX ORDER — ONDANSETRON 2 MG/ML
4 INJECTION INTRAMUSCULAR; INTRAVENOUS EVERY 6 HOURS PRN
Status: DISCONTINUED | OUTPATIENT
Start: 2025-07-05 | End: 2025-07-19

## 2025-07-05 RX ORDER — MAGNESIUM SULFATE HEPTAHYDRATE 40 MG/ML
2 INJECTION, SOLUTION INTRAVENOUS ONCE
Status: COMPLETED | OUTPATIENT
Start: 2025-07-05 | End: 2025-07-05

## 2025-07-05 RX ORDER — SODIUM CHLORIDE 9 MG/ML
INJECTION, SOLUTION INTRAVENOUS CONTINUOUS
Status: DISCONTINUED | OUTPATIENT
Start: 2025-07-05 | End: 2025-07-07

## 2025-07-05 RX ORDER — LIDOCAINE HYDROCHLORIDE 20 MG/ML
INJECTION, SOLUTION EPIDURAL; INFILTRATION; INTRACAUDAL; PERINEURAL
Status: COMPLETED
Start: 2025-07-05 | End: 2025-07-05

## 2025-07-05 RX ORDER — HEPARIN SODIUM 1000 [USP'U]/ML
30 INJECTION, SOLUTION INTRAVENOUS; SUBCUTANEOUS ONCE
Status: COMPLETED | OUTPATIENT
Start: 2025-07-05 | End: 2025-07-05

## 2025-07-05 RX ORDER — HEPARIN SODIUM AND DEXTROSE 10000; 5 [USP'U]/100ML; G/100ML
18 INJECTION INTRAVENOUS ONCE
Status: COMPLETED | OUTPATIENT
Start: 2025-07-05 | End: 2025-07-05

## 2025-07-05 RX ORDER — MORPHINE SULFATE 2 MG/ML
2 INJECTION, SOLUTION INTRAMUSCULAR; INTRAVENOUS EVERY 2 HOUR PRN
Status: DISCONTINUED | OUTPATIENT
Start: 2025-07-05 | End: 2025-07-11

## 2025-07-05 RX ORDER — MORPHINE SULFATE 4 MG/ML
4 INJECTION, SOLUTION INTRAMUSCULAR; INTRAVENOUS EVERY 2 HOUR PRN
Status: DISCONTINUED | OUTPATIENT
Start: 2025-07-05 | End: 2025-07-11

## 2025-07-05 RX ORDER — NICOTINE POLACRILEX 4 MG
15 LOZENGE BUCCAL
Status: DISCONTINUED | OUTPATIENT
Start: 2025-07-05 | End: 2025-07-19

## 2025-07-05 RX ORDER — DEXTROSE MONOHYDRATE 25 G/50ML
50 INJECTION, SOLUTION INTRAVENOUS
Status: DISCONTINUED | OUTPATIENT
Start: 2025-07-05 | End: 2025-07-19

## 2025-07-05 RX ORDER — HEPARIN SODIUM AND DEXTROSE 10000; 5 [USP'U]/100ML; G/100ML
INJECTION INTRAVENOUS CONTINUOUS
Status: DISCONTINUED | OUTPATIENT
Start: 2025-07-05 | End: 2025-07-13

## 2025-07-05 RX ORDER — MORPHINE SULFATE 2 MG/ML
1 INJECTION, SOLUTION INTRAMUSCULAR; INTRAVENOUS EVERY 2 HOUR PRN
Status: DISCONTINUED | OUTPATIENT
Start: 2025-07-05 | End: 2025-07-11

## 2025-07-05 RX ORDER — NICOTINE POLACRILEX 4 MG
30 LOZENGE BUCCAL
Status: DISCONTINUED | OUTPATIENT
Start: 2025-07-05 | End: 2025-07-19

## 2025-07-05 RX ORDER — ACETAMINOPHEN 650 MG/1
650 SUPPOSITORY RECTAL EVERY 6 HOURS PRN
Status: DISCONTINUED | OUTPATIENT
Start: 2025-07-05 | End: 2025-07-19

## 2025-07-05 RX ORDER — MIDAZOLAM HYDROCHLORIDE 1 MG/ML
INJECTION INTRAMUSCULAR; INTRAVENOUS
Status: COMPLETED
Start: 2025-07-05 | End: 2025-07-05

## 2025-07-05 RX ORDER — METOCLOPRAMIDE HYDROCHLORIDE 5 MG/ML
10 INJECTION INTRAMUSCULAR; INTRAVENOUS EVERY 8 HOURS PRN
Status: DISCONTINUED | OUTPATIENT
Start: 2025-07-05 | End: 2025-07-19

## 2025-07-05 NOTE — CONSULTS
[facility]  Report of Consultation    Arnaldo Gutierrez Patient Status:  Inpatient    4/15/1945 MRN [id number]   Location [facility] 4W/SW/SE Attending Declan Stearns MD   Hosp Day # 0 PCP PHYSICIAN NONSTAFF     Reason for Consultation:  Intra-abdominal abscesses    History of Present Illness:  Arnaldo Gutierrez is a 80 year old male who presents to [facility] on 2025 with complaints of worsening abdominal pain associated with distention. He recently underwent a lap transverse colon resection with small bowel resection x2 and resection of tumor nodule on small bowel on  with Dr. Randall. CT A/P from yesterday revealed multiple large peritoneal abscesses seen, the largest measuring 24.3 x 10.9 x 23.9 cm. Afebrile, appears comfortable, WBC 9.5.    Past medical history CKD, DM, HTN, GERD,     Past abdominal surgical history as above    History:  Past Medical History[1]  Past Surgical History[2]  Family History[3]   reports that he quit smoking about 45 years ago. His smoking use included cigarettes. He started smoking about 55 years ago. He has a 20 pack-year smoking history. He has been exposed to tobacco smoke. He has never used smokeless tobacco. He reports current alcohol use. He reports that he does not currently use drugs.    Allergies:  Allergies[4]    Medications:  Prescriptions Prior to Admission[5]    Current Hospital Medications[6]    Review of Systems:  Review of Systems   Constitutional:  Negative for chills, diaphoresis and fever.   Respiratory:  Negative for shortness of breath.    Cardiovascular:  Negative for chest pain.   Gastrointestinal:  Positive for abdominal pain and abdominal distention. Negative for nausea and vomiting.   Neurological:  Negative for weakness.       Physical Exam:  /64 (BP Location: Right arm)   Pulse 92   Temp 98.4 °F (36.9 °C) (Oral)   Resp 12   Ht 5' 11\" (1.803 m)   Wt 158 lb 4.8 oz (71.8 kg)   SpO2 93%   BMI  22.08 kg/m²   Physical Exam  Vitals reviewed.   Constitutional:       General: He is not in acute distress.     Appearance: Normal appearance.   HENT:      Head: Normocephalic and atraumatic.      Right Ear: External ear normal.      Left Ear: External ear normal.      Nose: Nose normal.   Pulmonary:      Effort: Pulmonary effort is normal. No respiratory distress.   Abdominal:      General: There is distension.      Tenderness: There is abdominal tenderness. There is no guarding or rebound.      Comments: Drain with serosanguineous output   Neurological:      Mental Status: He is alert and oriented to person, place, and time.   Psychiatric:         Mood and Affect: Mood normal.         Behavior: Behavior normal.         Thought Content: Thought content normal.         Judgment: Judgment normal.         Laboratory Data:  Recent Labs   Lab 07/04/25 2142 07/05/25  0309   RBC 3.26* 3.17*   HGB 8.8* 8.4*   HCT 27.4* 27.0*   MCV 84.0 85.2   MCH 27.0 26.5   MCHC 32.1 31.1   RDW 17.6* 17.4*   NEPRELIM 8.67*  --    WBC 11.2* 9.5   .0* 519.0*       Recent Labs   Lab 07/04/25 2142 07/05/25  0309   * 170*   BUN 14 14   CREATSERUM 0.64* 0.64*   CA 7.7* 7.8*   ALB 2.5*  --    * 132*   K 4.3 4.1   CL 98 100   CO2 28.0 29.0   ALKPHO 236*  --    AST 15  --    ALT 13  --    BILT 0.4  --    TP 6.0  --          Recent Labs   Lab 07/05/25  0309 07/05/25  0632   PTT 45.7* 48.7*         CT ABDOMEN+PELVIS(CONTRAST ONLY)(CPT=74177)  Result Date: 7/5/2025  CONCLUSION: 1.  Multiple large peritoneal abscesses are seen. The largest is centered in the left upper quadrant and left midabdomen and measures 24.3 x 10.9 x 23.9 cm with air-fluid levels and extends into the abdominal wall. This is contiguous with smaller right upper quadrant perihepatic and posterior left upper quadrant perisplenic abscesses. A separate small 3 cm abscess is noted adjacent to the bladder. Bladder diverticulum is thought less likely. 2.  Multiple  prior bowel resections are seen. There may be fistulous connection between the bowel and left upper quadrant dominant abscess at the left mid abdomen. The location of a right-sided pigtail drainage catheter is uncertain but it may terminate within the ascending colon. 3.  Additional chronic and/or incidental findings are detailed in the body of this report. Preliminary report was given by [facility]. No clinically significant discrepancies are noted. Electronically Verified and Signed by Attending Radiologist: Gelacio Bullock MD 7/5/2025 7:14 AM Workstation: [email or web address]    Medical Decision Making         Impression:  Problem List[7]    Intra-abdominal abscesses following lap transverse colon resection on 6/12/25    Plan:  No acute surgical intervention indicated at this time. Patient afebrile, pain managed, afebrile  IR consulted to evaluate for drain placement, appreciate recs  Continue IV abx and IVF  Keep NPO until IR evaluation  Analgesics and antiemetics as needed  Patient seen with Dr. Hood, who can provide additional recommendations as needed    Mark Yancey PA-C  7/5/2025  10:29 AM                           [1]   Past Medical History:   Anxiety    BPH (benign prostatic hyperplasia)    CKD (chronic kidney disease) stage 3, GFR 30-59 ml/min (HCC)    Depression    Diabetes (HCC)    Diabetes mellitus (HCC)    Essential hypertension    GERD (gastroesophageal reflux disease)    High cholesterol    Hyperlipidemia   [2]   Past Surgical History:  Procedure Laterality Date    Colonoscopy N/A 6/6/2025    Procedure: COLONOSCOPY with colonic stent placement;  Surgeon: Eusebio Palmer MD;  Location[facility] ENDOSCOPY    Foot fracture surgery Right     2012   [3]   Family History  Problem Relation Age of Onset    Other (Other) Father    [4] No Known Allergies  [5]   Medications Prior to Admission   Medication Sig    rivaroxaban 15 MG Oral Tab Take 1 tablet (15 mg total) by mouth 2 (two) times daily with meals. Pt to  take Xarelto 15 mg BID for 21 days and then Xarelto 20 mg daily after that for 5 more months.    HYDROcodone-acetaminophen 5-325 MG Oral Tab Take 1-2 tablets by mouth every 6 (six) hours as needed for Pain.    docusate sodium 100 MG Oral Cap Take 100 mg by mouth 2 (two) times daily.    pantoprazole 40 MG Oral Tab EC Take 1 tablet (40 mg total) by mouth every morning before breakfast.    FLUoxetine 20 MG Oral Cap Take 1 capsule (20 mg total) by mouth daily.    pregabalin 100 MG Oral Cap Take 1 capsule (100 mg total) by mouth in the morning, at noon, and at bedtime.    Ferrous Sulfate 325 (65 Fe) MG Oral Tab Take 1 tablet (325 mg total) by mouth daily with breakfast. With orange juice.    tamsulosin 0.4 MG Oral Cap Take 2 capsules (0.8 mg total) by mouth daily. AFTER SAME MEAL EACH DAY    alum-mag hydroxide-simethicone 200-200-20 MG/5ML Oral Suspension Take 10 mL by mouth 4 (four) times daily as needed.    simvastatin 40 MG Oral Tab Take 1 tablet (40 mg total) by mouth nightly.    vitamin B-12 50 MCG Oral Tab Take 1 tablet (50 mcg total) by mouth in the morning.    Multiple Vitamins-Minerals (PRESERVISION/LUTEIN) Oral Cap Take 1 capsule by mouth in the morning.    Polyethyl Glycol-Propyl Glycol (SYSTANE ULTRA OP) Apply 2 drops to eye in the morning and 2 drops before bedtime.    [] amoxicillin clavulanate 875-125 MG Oral Tab Take 1 tablet by mouth 2 (two) times daily for 7 days.    [START ON 2025] rivaroxaban 20 MG Oral Tab Take 1 tablet (20 mg total) by mouth daily with food. To start 2025 and continue for 5 months.    polyethylene glycol, PEG 3350, 17 g Oral Powd Pack Take 17 g by mouth in the morning and 17 g before bedtime. Stop if diarrhea.    Naloxone HCl 4 MG/0.1ML Nasal Liquid 4 mg by Nasal route as needed. If patient remains unresponsive, repeat dose in other nostril 2-5 minutes after first dose.    loratadine 10 MG Oral Tab Take 1 tablet (10 mg total) by mouth daily as needed for Allergies.     melatonin 3 MG Oral Tab Take 2 tablets (6 mg total) by mouth daily.    Accu-Chek FastClix Lancets Does not apply Misc Apply 100 Lancets topically daily.    Glucose Blood (ACCU-CHEK GUIDE TEST) In Vitro Strip Use 1 time per day.    Glucose Blood (ACCU-CHEK GUIDE) In Vitro Strip 1 each by In Vitro route in the morning and 1 each before bedtime.   [6]   Current Facility-Administered Medications:     glucose (Dex4) 15 GM/59ML oral liquid 15 g, 15 g, Oral, Q15 Min PRN **OR** glucose (Glutose) 40% oral gel 15 g, 15 g, Oral, Q15 Min PRN **OR** glucose-vitamin C (Dex-4) chewable tab 4 tablet, 4 tablet, Oral, Q15 Min PRN **OR** dextrose 50% injection 50 mL, 50 mL, Intravenous, Q15 Min PRN **OR** glucose (Dex4) 15 GM/59ML oral liquid 30 g, 30 g, Oral, Q15 Min PRN **OR** glucose (Glutose) 40% oral gel 30 g, 30 g, Oral, Q15 Min PRN **OR** glucose-vitamin C (Dex-4) chewable tab 8 tablet, 8 tablet, Oral, Q15 Min PRN    sodium chloride 0.9% infusion, , Intravenous, Continuous    morphINE PF 2 MG/ML injection 1 mg, 1 mg, Intravenous, Q2H PRN **OR** morphINE PF 2 MG/ML injection 2 mg, 2 mg, Intravenous, Q2H PRN **OR** morphINE PF 4 MG/ML injection 4 mg, 4 mg, Intravenous, Q2H PRN    acetaminophen (Tylenol) rectal suppository 650 mg, 650 mg, Rectal, Q6H PRN    ondansetron (Zofran) 4 MG/2ML injection 4 mg, 4 mg, Intravenous, Q6H PRN    metoclopramide (Reglan) 5 mg/mL injection 10 mg, 10 mg, Intravenous, Q8H PRN    insulin aspart (NovoLOG) 100 Units/mL FlexPen 1-5 Units, 1-5 Units, Subcutaneous, q6h    piperacillin-tazobactam (Zosyn) 3.375 g in dextrose 5% 100 mL IVPB-ADDV, 3.375 g, Intravenous, Q8H    heparin (Porcine) 90657 units/250mL infusion PE/DVT/THROMBUS CONTINUOUS, 200-3,000 Units/hr, Intravenous, Continuous    pantoprazole (Protonix) 40 mg in sodium chloride 0.9% PF 10 mL IV push, 40 mg, Intravenous, Daily    magnesium sulfate in sterile water for injection 2 g/50mL IVPB premix 2 g, 2 g, Intravenous, Once    sodium  chloride 0.9% infusion, , Intravenous, Once  [7]   Patient Active Problem List  Diagnosis    BPH (benign prostatic hyperplasia)    Chronic low back pain    Chronic pain of both knees    Depression with anxiety    Generalized osteoarthritis    Hyperlipidemia    Type 2 diabetes mellitus without complication, without long-term current use of insulin (HCC)    Essential hypertension    Bipolar I disorder, single manic episode (HCC)    Large bowel obstruction (HCC)    Colonic mass    Diffuse large B-cell lymphoma of solid organ excluding spleen    Pneumoperitoneum    Abdominal distension    Anasarca    Intraabdominal fluid collection

## 2025-07-05 NOTE — PHYSICAL THERAPY NOTE
PT orders received and chart reviewed. Patient received resting in bed;  utilized; adamantly refused to work with therapy today for no specific reason. Patient stated he has had poor experiences in the past and feels \"too weak\" to participate with therapy, despite maximum encouragement and education provided. Pt was agreeable to follow-up tomorrow. Will re-attempt as appropriate and able. RN was made aware.     Jessica Hussein, PT  Ext. [phone]

## 2025-07-05 NOTE — H&P
[facility]  part of [facility]                                                                                                          History and Physical     Arnaldo Gutierrez Patient Status:  Emergency    4/15/1945 MRN [id number]   Location [facility] EMERGENCY DEPARTMENT Attending Maria Alejandra Ken DO   Hosp Day # 0 PCP PHYSICIAN NONSTAFF     History obtained with assistance of daughter at bedside.  Patient is Algerian-speaking.    Chief Complaint: Abdominal distention/discomfort.    Subjective:    Arnaldo Gutierrez is a 80 year old male with B-cell lymphoma, BPH, CKD, depression, DM, HTN, GERD, HLD, anxiety who was recently hospitalized due to large colonic mass, subsequently discharged to nursing home.  Sent to ED today for evaluation of distended abdomen and abdominal discomfort.  At the time of my evaluation he is resting comfortably.  Pain controlled.  Denies nausea or vomiting.  No fever or chills.  Denies diarrhea.  Denies chest pain or shortness of breath.    Vital stable.  Labs stable  CT today shows a large area of pneumoperitoneum.  Large collection with fluid and gas is seen within the anterior abdomen.  Findings concerning for continued bowel leak.  Also anasarca with bilateral pleural effusions.    Antibiotics started.  General surgery consulted.      Chart reviewed:  Patient recently hospitalized due to colonic mass with large bowel obstruction.  Diagnosed with diffuse large B-cell lymphoma.  Had colonoscopy 2025 with metallic colon stents placed.  Had laparoscopic transverse colon resection, small bowel resection on 6/10/2025.  Had NGOZI drain placed after CT 2025 showed abdominal fluid collection.  Patient discharged on Augmentin.  Hospital course also complicated by lower extremity DVTs, on Xarelto.      History/Other:      Past Medical History:Past Medical History[1]     Past Surgical History: Past Surgical History[2]    Social History:  reports that he quit  smoking about 45 years ago. His smoking use included cigarettes. He started smoking about 55 years ago. He has a 20 pack-year smoking history. He has been exposed to tobacco smoke. He has never used smokeless tobacco. He reports current alcohol use. He reports that he does not currently use drugs.    Family History: Family History[3]    Allergies: Allergies[4]    Medications:  Medications Ordered Prior to Encounter[5]    Review of Systems:   A comprehensive 14 point review of systems was completed.    Pertinent positives and negatives noted in the HPI.    Objective:     /58   Pulse 94   Temp 98 °F (36.7 °C) (Temporal)   Resp 20   SpO2 95%   General: No acute distress.    HEENT: Normocephalic, atraumatic.  Neck: Supple.  Respiratory: Normal effort.  CTAB  Cardiovascular: S1, S2 regular.  Normal rate.  No murmur.   Abdomen: Distended, firm.  Nontender.  Incisions healing adequately.  RLQ drain present.  Neurologic: Alert, oriented x 3.  Nonfocal.  Musculoskeletal: No tenderness or deformity.  Extremities: No edema  Psychiatric: Appropriate    Results:      Labs:  Labs Last 24 Hours:   BMP     CBC    Other     Na 134 Cl 98 BUN 14 Glu 190   Hb 8.8   PTT - Procal -   K 4.3 CO2 28.0 Cr 0.64   WBC 11.2 >< .0  INR - CRP -   Renal Lytes Endo    Hct 27.4   Trop - D dim -   eGFR - Ca 7.7 POC Gluc  -    LFT   pBNP - Lactic -   eGFR AA - PO4 - A1c -   AST 15 APk 236 Prot 6.0  LDL -     Mg 1.7 TSH -   ALT 13 T pop 0.4 Alb 2.5          COVID-19 Lab Results    COVID-19  No results found for: \"COVID19\"    Pro-Calcitonin  No results for input(s): \"PCT\" in the last 168 hours.    Cardiac  No results for input(s): \"TROP\", \"PBNP\" in the last 168 hours.    Creatinine Kinase  No results for input(s): \"CK\" in the last 168 hours.    Inflammatory Markers  No results for input(s): \"CRP\", \"GERTRUDIS\", \"LDH\", \"DDIMER\" in the last 168 hours.    Imaging: Imaging data reviewed in Epic.    Assessment & Plan:    Pneumoperitoneum with  concern for persistent bowel leak  Abdominal fluid collection  Possibly trapped right lateral abdomen pigtail drain  - Patient with recent bowel resection.  Still has NGOZI drain  - Admit  - IV antibiotics with Zosyn  - N.p.o.  - General Surgery on consult  -Consider IR consult in a.m. to evaluate pigtail drain.  - Pain control    Right lower extremity DVT, LLE soleal vein thrombosis  -Xarelto outpatient, last dose this a.m.  -In case surgery needed, will hold Xarelto, start heparin drip in a.m.    Chronic anemia, stable  - Monitor    Anasarca with diffuse subcutaneous edema on CT. likely due to hypoalbuminemia  Hypoalbuminemia  - Echo 6/24/2025: EF 60 to 65%.  Normal diastolic function.  - Will need dietary consult following surgery evaluation.  - N.p.o. for now    DM2  - SSI    B-cell lymphoma  -Patient oncology follow-up    HTN  HLD  GERD  BPH  Anxiety  Depression  - IV meds as needed    Quality:  DVT Prophylaxis: No SCD due to DVT.  Hold Xarelto due to possible surgery.  Start heparin drip in a.m.  CODE status: Full code  ANNELISE: TBD      Plan of care discussed with patient and family at bedside. Acknowledged understanding and agrees to plan. Also discussed with the ED physician.    High MDM  Time spent on this admission - examining patient, obtaining history, reviewing previous medical records, going over test results/imaging and discussing plan of care. More than 50% of the time was spent in counseling and/or coordination of care related to intra-abdominal gas and fluid collection.   All questions answered.     Montse Gonzales MD  7/4/2025                   [1]   Past Medical History:   Anxiety    BPH (benign prostatic hyperplasia)    CKD (chronic kidney disease) stage 3, GFR 30-59 ml/min (HCC)    Depression    Diabetes (HCC)    Diabetes mellitus (HCC)    Essential hypertension    GERD (gastroesophageal reflux disease)    High cholesterol    Hyperlipidemia   [2]   Past Surgical History:  Procedure Laterality Date     Colonoscopy N/A 6/6/2025    Procedure: COLONOSCOPY with colonic stent placement;  Surgeon: Eusebio Palmer MD;  Location: [facility] ENDOSCOPY    Foot fracture surgery Right     2012   [3]   Family History  Problem Relation Age of Onset    Other (Other) Father    [4] No Known Allergies  [5]   Current Facility-Administered Medications on File Prior to Encounter   Medication Dose Route Frequency Provider Last Rate Last Admin    [COMPLETED] magnesium oxide (Mag-Ox) tab 400 mg  400 mg Oral Once Shekhar Johnson MD   400 mg at 06/24/25 0954    [COMPLETED] magnesium oxide (Mag-Ox) tab 800 mg  800 mg Oral Once Shekhar Johnson MD   800 mg at 06/23/25 0944    [COMPLETED] potassium chloride 40 mEq in 250mL sodium chloride 0.9% IVPB premix  40 mEq Intravenous Once Shekhar Johnson MD 62.5 mL/hr at 06/23/25 0944 40 mEq at 06/23/25 0944    [COMPLETED] magnesium oxide (Mag-Ox) tab 800 mg  800 mg Oral Once Shekhar Johnson MD   800 mg at 06/22/25 0823    [COMPLETED] sodium chloride 0.9% infusion   Intravenous Once Montse Gonzales MD   Stopped at 06/21/25 1519    [COMPLETED] magnesium oxide (Mag-Ox) tab 400 mg  400 mg Oral Once Shekhar Johnson MD   400 mg at 06/21/25 0803    [COMPLETED] magnesium oxide (Mag-Ox) tab 400 mg  400 mg Oral Once Shekhar Johnson MD   400 mg at 06/20/25 1017    [COMPLETED] magnesium sulfate in sterile water for injection 2 g/50mL IVPB premix 2 g  2 g Intravenous Once Shekhar Johnson MD 50 mL/hr at 06/19/25 0855 2 g at 06/19/25 0855    [COMPLETED] iopamidol 76% (ISOVUE-370) injection for power injector  80 mL Intravenous ONCE PRN Shekhar Johnson MD   80 mL at 06/19/25 1342    [COMPLETED] fentaNYL (Sublimaze) 50 mcg/mL injection             [COMPLETED] magnesium sulfate in sterile water for injection 2 g/50mL IVPB premix 2 g  2 g Intravenous Once Shekhar Johnson MD 50 mL/hr at 06/18/25 1640 2 g at 06/18/25 1640    [COMPLETED] magnesium sulfate in sterile water for injection 2 g/50mL IVPB  premix 2 g  2 g Intravenous Once Boy Mccabe MD 50 mL/hr at 25 0934 2 g at 25 0934    [] adult 3 in 1 TPN   Intravenous Continuous TPN Boy Mccabe MD 75 mL/hr at 25 New Bag at 25    [] adult 3 in 1 TPN   Intravenous Continuous TPN Boy Mccabe MD 87.5 mL/hr at 25 2133 New Bag at 25 2133    [COMPLETED] sodium ferric gluconate (Ferrlecit) 125 mg in sodium chloride 0.9% 100mL IVPB premix  125 mg Intravenous Daily Norris Lazaro  mL/hr at 25 1215 125 mg at 25 1215    [COMPLETED] potassium chloride 40 mEq in 250mL sodium chloride 0.9% IVPB premix  40 mEq Intravenous Once Boy Mccabe MD 62.5 mL/hr at 06/15/25 0954 40 mEq at 06/15/25 0954    [] adult 3 in 1 TPN   Intravenous Continuous TPN Boy Mccabe MD 87.5 mL/hr at 25 1830 Rate Verify at 25 1830    [COMPLETED] sodium phosphate 15 mmol in 0.9% NaCl 100mL IVPB premix  15 mmol Intravenous Once Rip Azul MD   15 mmol at 25 0836    [] adult 3 in 1 TPN   Intravenous Continuous TPN Boy Mccabe MD 83.3 mL/hr at 25 New Bag at 25    [COMPLETED] magnesium sulfate in sterile water for injection 2 g/50mL IVPB premix 2 g  2 g Intravenous Once Boy Mccabe MD 50 mL/hr at 25 1100 2 g at 25 1100    [COMPLETED] potassium phosphate dibasic 15 mmol in sodium chloride 0.9% 250 mL IVPB  15 mmol Intravenous Once Boy Mccabe MD 62.5 mL/hr at 25 0847 15 mmol at 25 0847    Followed by    [COMPLETED] potassium chloride 20 mEq/100mL IVPB premix 20 mEq  20 mEq Intravenous Once Boy Mccabe MD 50 mL/hr at 25 1328 20 mEq at 25 1328    [] adult 3 in 1 TPN   Intravenous Continuous TPN Boy Mccabe MD 83.3 mL/hr at 258 New Bag at 25    [COMPLETED] magnesium sulfate 4 g/100mL IVPB premix 4 g  4 g Intravenous Once Boy Mccabe MD 50 mL/hr at 25 1209 4 g at 25 1209    [] adult 3 in 1 TPN    Intravenous Continuous TPN Boy Mccabe MD 83.3 mL/hr at 06/12/25 2146 New Bag at 06/12/25 2146    [COMPLETED] sodium chloride 0.9 % IV bolus 500 mL  500 mL Intravenous Once Missy Beck APRN 1,000 mL/hr at 06/12/25 1638 500 mL at 06/12/25 1638    [COMPLETED] sodium chloride 0.9 % IV bolus 500 mL  500 mL Intravenous Once Missy Beck APRN 1,000 mL/hr at 06/12/25 1921 500 mL at 06/12/25 1921    [COMPLETED] insulin regular human (Novolin R, Humulin R) 100 UNIT/ML injection 10 Units  10 Units Subcutaneous Once Danelle Valdez MD   10 Units at 06/11/25 0303    [COMPLETED] potassium chloride 40 mEq in 250mL sodium chloride 0.9% IVPB premix  40 mEq Intravenous Once Danelle Valdez MD 62.5 mL/hr at 06/11/25 0437 40 mEq at 06/11/25 0437    [COMPLETED] phentolamine (Regitine) 10 mg in sodium chloride 0.9% syringe (ADULT EXTRAVASATION)  10 mg Subcutaneous See Admin Instructions Missy Beck APRN   10 mg at 06/11/25 1558    [COMPLETED] lidocaine PF (Xylocaine-MPF) 1% injection  5 mL Intradermal Once Missy Beck APRN   5 mL at 06/11/25 1518    [COMPLETED] acetaminophen (Ofirmev) 10 mg/mL infusion premix 1,000 mg  1,000 mg Intravenous Once Rolando Whatley  mL/hr at 06/10/25 1903 1,000 mg at 06/10/25 1903    [COMPLETED] sodium chloride 0.9 % IV bolus 1,986 mL  30 mL/kg Intravenous Once Danelle Valdez  mL/hr at 06/10/25 2347 1,986 mL at 06/10/25 2347    [COMPLETED] sodium chloride 0.9 % IV bolus 250 mL  250 mL Intravenous Once Montse Gonzales  mL/hr at 06/08/25 0620 250 mL at 06/08/25 0620    [COMPLETED] neomycin (Mycifradin) tab 1,000 mg  1,000 mg Oral Once Ruben Randall MD   1,000 mg at 06/09/25 1536    [COMPLETED] neomycin (Mycifradin) tab 1,000 mg  1,000 mg Oral Once Ruben Randall MD   1,000 mg at 06/09/25 1721    [COMPLETED] neomycin (Mycifradin) tab 1,000 mg  1,000 mg Oral Once Ruben Randlal MD   1,000 mg at 06/09/25 2141    [COMPLETED] metroNIDAZOLE (Flagyl) tab  500 mg  500 mg Oral Once Ruben Randall MD   500 mg at 25 1459    [COMPLETED] metroNIDAZOLE (Flagyl) tab 500 mg  500 mg Oral Once Ruben Randall MD   500 mg at 25 1721    [COMPLETED] metroNIDAZOLE (Flagyl) tab 500 mg  500 mg Oral Once Ruben Randall MD   500 mg at 25 2138    [COMPLETED] sodium ferric gluconate (Ferrlecit) 125 mg in sodium chloride 0.9% 100mL IVPB premix  125 mg Intravenous Daily An Fuller  mL/hr at 25 0915 125 mg at 25 0915    [COMPLETED] pantoprazole (Protonix) 40 mg in sodium chloride 0.9% PF 10 mL IV push  40 mg Intravenous Once Rupinder Vines MD   40 mg at 25 1657    [COMPLETED] sodium chloride 0.9 % IV bolus 1,000 mL  1,000 mL Intravenous Once Rupinder Vines MD   Stopped at 25 1753    [COMPLETED] ondansetron (Zofran) 4 MG/2ML injection 4 mg  4 mg Intravenous Once Rupinder Vines MD   4 mg at 25 1654    [COMPLETED] iopamidol 76% (ISOVUE-370) injection for power injector  80 mL Intravenous ONCE PRN Rupinder Vines MD   80 mL at 25 1734    [COMPLETED] morphINE PF 2 MG/ML injection 2 mg  2 mg Intravenous Once Rupinder Vines MD   2 mg at 25 1858    [] sodium chloride 0.9% infusion   Intravenous Continuous Boy Mccabe  mL/hr at 25 1456 New Bag at 25 1456     Current Outpatient Medications on File Prior to Encounter   Medication Sig Dispense Refill    [] amoxicillin clavulanate 875-125 MG Oral Tab Take 1 tablet by mouth 2 (two) times daily for 7 days. 14 tablet 0    rivaroxaban 15 MG Oral Tab Take 1 tablet (15 mg total) by mouth 2 (two) times daily with meals. Pt to take Xarelto 15 mg BID for 21 days and then Xarelto 20 mg daily after that for 5 more months.      [START ON 2025] rivaroxaban 20 MG Oral Tab Take 1 tablet (20 mg total) by mouth daily with food. To start 2025 and continue for 5 months.      HYDROcodone-acetaminophen 5-325 MG Oral Tab Take 1-2 tablets by mouth every 6 (six) hours as  needed for Pain. 30 tablet 0    polyethylene glycol, PEG 3350, 17 g Oral Powd Pack Take 17 g by mouth in the morning and 17 g before bedtime. Stop if diarrhea.      docusate sodium 100 MG Oral Cap Take 100 mg by mouth 2 (two) times daily.      Naloxone HCl 4 MG/0.1ML Nasal Liquid 4 mg by Nasal route as needed. If patient remains unresponsive, repeat dose in other nostril 2-5 minutes after first dose. 1 kit 0    pantoprazole 40 MG Oral Tab EC Take 1 tablet (40 mg total) by mouth every morning before breakfast. 90 tablet 3    loratadine 10 MG Oral Tab Take 1 tablet (10 mg total) by mouth daily as needed for Allergies.      melatonin 3 MG Oral Tab Take 2 tablets (6 mg total) by mouth daily.      FLUoxetine 20 MG Oral Cap Take 1 capsule (20 mg total) by mouth daily. 90 capsule 3    Accu-Chek FastClix Lancets Does not apply Misc Apply 100 Lancets topically daily. 100 each 3    Glucose Blood (ACCU-CHEK GUIDE TEST) In Vitro Strip Use 1 time per day. 100 strip 3    pregabalin 100 MG Oral Cap Take 1 capsule (100 mg total) by mouth in the morning, at noon, and at bedtime. 90 capsule 5    Ferrous Sulfate 325 (65 Fe) MG Oral Tab Take 1 tablet (325 mg total) by mouth daily with breakfast. With orange juice. 90 tablet 1    tamsulosin 0.4 MG Oral Cap Take 2 capsules (0.8 mg total) by mouth daily. AFTER SAME MEAL EACH  capsule 3    alum-mag hydroxide-simethicone 200-200-20 MG/5ML Oral Suspension Take 10 mL by mouth 4 (four) times daily as needed. 200 mL 0    simvastatin 40 MG Oral Tab Take 1 tablet (40 mg total) by mouth nightly. 90 tablet 3    Glucose Blood (ACCU-CHEK GUIDE) In Vitro Strip 1 each by In Vitro route in the morning and 1 each before bedtime.      vitamin B-12 50 MCG Oral Tab Take 1 tablet (50 mcg total) by mouth in the morning.      Multiple Vitamins-Minerals (PRESERVISION/LUTEIN) Oral Cap Take 1 capsule by mouth in the morning.      Polyethyl Glycol-Propyl Glycol (SYSTANE ULTRA OP) Apply 2 drops to eye in the  morning and 2 drops before bedtime.

## 2025-07-05 NOTE — PLAN OF CARE
Patient arrived to the floor from ED. A&ox4. Vital signs stable. Complained pain on abdomen radiating to back, ,managed with Morphine IV push. No nausea or vomiting. IV fluids and heparin drip infusing. Initial dose of hep. Drip at 13 ml/hr. NPO. Patient has generalized weakness. Primofit in place. RLQ Darin drain dressing changed, no output overnight. Patient is Yemeni speaking only. Language line use for translation.

## 2025-07-05 NOTE — ED PROVIDER NOTES
Patient Seen in: [facility] Emergency Department    History     Chief Complaint   Patient presents with    Abdomen/Flank Pain     Stated Complaint: distended abdomen    HPI    Pt Uzbek speaking, assisted with Uzbek speaking RN which patient prefers    Patient is a 80M hx of CKD, DM, GERD, HTN, HLD, b cell lymphoma, recent colon resection and stent placement in setting of colon mass, complains of abdominal distension that began today.  Pain described as bloating sensation and tightness. Denies nausea or vomiting. No diarrhea, has had normal stool without blood. No difficulty urinating or dysuria. No fever or chills. Denies CP or SOB.      Past Medical History[1]    Past Surgical History[2]         Family History[3]    Short Social Hx on File[4]    Review of Systems    Positive for stated complaint: distended abdomen  Other systems are as noted in HPI.  Constitutional and vital signs reviewed.      All other systems reviewed and negative except as noted above.    PSFH elements reviewed from today and agreed except as otherwise stated in HPI.    Physical Exam     ED Triage Vitals [07/04/25 2137]   /58   Pulse 92   Resp 20   Temp 98 °F (36.7 °C)   Temp src Temporal   SpO2 95 %   O2 Device None (Room air)       Current:/64 (BP Location: Right arm)   Pulse 92   Temp 98.4 °F (36.9 °C) (Oral)   Resp 12   Ht 180.3 cm (5' 11\")   Wt 71.8 kg   SpO2 93%   BMI 22.08 kg/m²   Pulse ox 95% on RA         Physical Exam  General Appearance: alert, no distress  Eyes: pupils equal and round no pallor or injection  ENT, Mouth: mucous membranes moist  Respiratory: there are no retractions, lungs are clear to auscultation in upper lung fields, diminished at bases   Cardiovascular: regular rate and rhythm no MRG   Gastrointestinal: abdomen distended, mildly tender throughout. Lower midline surgical site appears c/d/I without drainage or induration/fluctuance, no erythema or warmth, NGOZI drain with small amount  of serosanguinous drainage to RUQ.   Neurological: II-XII grossly intact no focal deficits  Skin: warm and dry, no rashes.  Musculoskeletal:  Extremities are symmetric, grossly full range of motion. 2+ leg edema bilaterally noted   Psychiatric: patient is pleasant, there is no agitation      ED Course     Labs Reviewed   CBC WITH DIFFERENTIAL WITH PLATELET - Abnormal; Notable for the following components:       Result Value    WBC 11.2 (*)     RBC 3.26 (*)     HGB 8.8 (*)     HCT 27.4 (*)     RDW-SD 54.5 (*)     RDW 17.6 (*)     .0 (*)     Neutrophil Absolute Prelim 8.67 (*)     Neutrophil Absolute 8.67 (*)     Monocyte Absolute 1.08 (*)     All other components within normal limits   COMP METABOLIC PANEL (14) - Abnormal; Notable for the following components:    Glucose 190 (*)     Sodium 134 (*)     Creatinine 0.64 (*)     BUN/CREA Ratio 21.9 (*)     Calcium, Total 7.7 (*)     Alkaline Phosphatase 236 (*)     Albumin 2.5 (*)     A/G Ratio 0.7 (*)     All other components within normal limits   BASIC METABOLIC PANEL (8) - Abnormal; Notable for the following components:    Glucose 170 (*)     Sodium 132 (*)     Creatinine 0.64 (*)     BUN/CREA Ratio 21.9 (*)     Calcium, Total 7.8 (*)     All other components within normal limits   CBC, PLATELET; NO DIFFERENTIAL - Abnormal; Notable for the following components:    RBC 3.17 (*)     HGB 8.4 (*)     HCT 27.0 (*)     RDW 17.4 (*)     RDW-SD 55.1 (*)     .0 (*)     All other components within normal limits   PTT, ACTIVATED - Abnormal; Notable for the following components:    PTT 48.7 (*)     All other components within normal limits   PTT, ACTIVATED - Abnormal; Notable for the following components:    PTT 45.7 (*)     All other components within normal limits   POCT GLUCOSE - Abnormal; Notable for the following components:    POC Glucose  162 (*)     All other components within normal limits   POCT GLUCOSE - Abnormal; Notable for the following components:     POC Glucose  163 (*)     All other components within normal limits   LIPASE - Normal   MAGNESIUM - Normal   LACTIC ACID, PLASMA - Normal   ED/MRSA SCREEN BY PCR-CC - Normal   SCAN SLIDE   PTT, ACTIVATED   RAINBOW DRAW LAVENDER   RAINBOW DRAW LIGHT GREEN   RAINBOW DRAW BLUE   BLOOD CULTURE   BLOOD CULTURE       [facility]           Monitor Interpretation:  Sinus rhythm     Radiology Interpretation:  CT ABDOMEN+PELVIS(CONTRAST ONLY)(CPT=74177)  Result Date: 7/5/2025  CONCLUSION: 1.  Multiple large peritoneal abscesses are seen. The largest is centered in the left upper quadrant and left midabdomen and measures 24.3 x 10.9 x 23.9 cm with air-fluid levels and extends into the abdominal wall. This is contiguous with smaller right upper quadrant perihepatic and posterior left upper quadrant perisplenic abscesses. A separate small 3 cm abscess is noted adjacent to the bladder. Bladder diverticulum is thought less likely. 2.  Multiple prior bowel resections are seen. There may be fistulous connection between the bowel and left upper quadrant dominant abscess at the left mid abdomen. The location of a right-sided pigtail drainage catheter is uncertain but it may terminate within the ascending colon. 3.  Additional chronic and/or incidental findings are detailed in the body of this report. Preliminary report was given by [employer]. No clinically significant discrepancies are noted. Electronically Verified and Signed by Attending Radiologist: Gelacio Bullock MD 7/5/2025 7:14 AM Workstation: [id number]      Reviewed discharge summary from  6/5-6/24/2025 June 6/6/25 - s/p colonoscopy with metallic colon stent  6/10/25 - s/p laparoscopic transverse colon resection, small bowel resection x2, resection of tumor nodule on small bowel. Metastatic disease  Abdominal fluid collection  6/19/2025 - IR guided NGOZI drainplacement in abd fluid collection in RUQ      MDM      Patient is a 80M hx of CKD, DM, GERD, HTN, HLD, recent colon resection  and stent placement in setting of colon mass, complains of abdominal distension that began today. Exam as above       Ddx includes  Sbo  Ileus  Volvulus  Intraabd abscess or fluid collection   Perforated viscus   Malignancy    Plan: cbc, cmp, CTAP, lipase    Will give morphine for pain     Admission disposition: 7/5/2025 12:46 AM       ED Course as of 07/05/25 0719  ------------------------------------------------------------  Time: 07/04 2214  Value: WBC(!): 11.2  Comment: Mild leukocytosis with chronic anemia. Chronic thrombocytosis unchanged   ------------------------------------------------------------  Time: 07/04 2214  Comment: Cmp unremarkable   ------------------------------------------------------------  Time: 07/04 2214  Comment: Lipase normal   ------------------------------------------------------------  Time: 07/04 2250  Value: CT ABDOMEN+PELVIS(CONTRAST ONLY)(CPT=74177)  Comment: On my evaluation of pt's CT abd pelvis, large amount of bowel gas noted  ------------------------------------------------------------  Time: 07/04 2308  Comment: Case discussed with Dr. Glynn with radiology, states large fluid collection walling off in anterior abdomen with with continued free fluid and   ------------------------------------------------------------  Time: 07/04 1692  Comment: IMPRESSION:    Moderate bilateral pleural effusions are increased in size.  There is associated compressive atelectasis versus pneumonia, which appears stable.    Large area of pneumoperitoneum is again seen.  Large collection with fluid and gas is seen within the anterior abdomen, measuring at least 27.9 x 11.6 cm in diameter.  Gas extends through the anterior abdominal wound into the subcutaneous tissues.  Given the persistence of the large amount of fluid and gas within the collection, possibility of continued bowel leak should be considered.    There is a pigtail drainage catheter along the right lateral abdomen.  This apparently  previously drained to the right lateral fluid collection.  This projects along the course of the ascending colon.  On sagittal reconstructions, this appears to be trapped between loops of colon rather than being intraluminal, although intraluminal position is not entirely excluded.  Correlate clinically.    Small residual rim-enhancing collection along the right lateral abdomen which appears to communicate with the larger anterior collection.    Anterior collection produces mass effect on the stomach.  Mass effect on the liver as well.    Cholelithiasis.  No evidence of biliary dilatation or acute pancreatic abnormality.    Nonobstructing right renal calculus.  No hydronephrosis or ureteral calculus.    Small collection to the left of the bladder has decreased in size, now measuring 3.1 x 2.3 cm.    Left flank collection, containing fluid and gas also appears decreased.  This is adjacent to the spleen.  It is possible that this also communicates to the anterior collection.  This component measures 7.0 x 2.4 cm.    Postoperative changes are seen along bowel in the left hemiabdomen.  No evidence of bowel obstruction.    No adenopathy is seen.    Anasarca, with diffuse subcutaneous edema.    ------------------------------------------------------------  Time: 07/04 3719  Comment: Case d/w Dr. Hood with gen surg will see on consult agrees with plan. Will give empiric zosyn and keep NPO. Bcx/lactic sent. D/w Dr. Gonzales accepts admission. Pt and his family cartside were updated and agreeable with admission        Disposition and Plan     Clinical Impression:  1. Abdominal distension    2. Anasarca    3. Pneumoperitoneum    4. Intraabdominal fluid collection         Disposition:  Admit  7/5/2025 12:46 am      Supplementary Documentation:         Hospital Problems       Present on Admission  Date Reviewed: 7/2/2025          ICD-10-CM Noted POA    * (Principal) Abdominal distension R14.0 7/4/2025 Unknown    Anasarca R60.1  2025 Unknown    Intraabdominal fluid collection R18.8 2025 Unknown    Pneumoperitoneum K66.8 2025 Unknown                                    Maria Alejandra Ken DO  Attending Physician  Emergency Medicine          [1]   Past Medical History:   Anxiety    BPH (benign prostatic hyperplasia)    CKD (chronic kidney disease) stage 3, GFR 30-59 ml/min (HCC)    Depression    Diabetes (HCC)    Diabetes mellitus (HCC)    Essential hypertension    GERD (gastroesophageal reflux disease)    High cholesterol    Hyperlipidemia   [2]   Past Surgical History:  Procedure Laterality Date    Colonoscopy N/A 2025    Procedure: COLONOSCOPY with colonic stent placement;  Surgeon: Eusebio Palmer MD;  Location: [facility] ENDOSCOPY    Foot fracture surgery Right     2012   [3]   Family History  Problem Relation Age of Onset    Other (Other) Father    [4]   Social History  Socioeconomic History    Marital status:    Tobacco Use    Smoking status: Former     Current packs/day: 0.00     Average packs/day: 2.0 packs/day for 10.0 years (20.0 ttl pk-yrs)     Types: Cigarettes     Start date:      Quit date:      Years since quittin.5     Passive exposure: Past    Smokeless tobacco: Never   Vaping Use    Vaping status: Never Used   Substance and Sexual Activity    Alcohol use: Yes     Comment: rarely    Drug use: Not Currently     Social Drivers of Health     Food Insecurity: No Food Insecurity (2025)    NCSS - Food Insecurity     Worried About Running Out of Food in the Last Year: No     Ran Out of Food in the Last Year: No   Transportation Needs: No Transportation Needs (2025)    NCSS - Transportation     Lack of Transportation: No   Housing Stability: Not At Risk (2025)    NCSS - Housing/Utilities     Has Housing: Yes     Worried About Losing Housing: No     Unable to Get Utilities: No

## 2025-07-05 NOTE — PROGRESS NOTES
[facility]  part of [facility]    Progress Note    Arnaldo Gutierrez Patient Status:  Inpatient    4/15/1945 MRN [id number]   Location [facility] 4W/SW/SE Attending Declan Stearns MD   Hosp Day # 0 PCP PHYSICIAN NONSTAFF       Subjective:   Arnaldo Gutierrez is a(n) 80 year old male was seen and examined   Resting in bed, comfortably  No cp, sob, f,c,n,v abd pain or HA     Objective:   Blood pressure 106/58, pulse 101, temperature 98.8 °F (37.1 °C), temperature source Oral, resp. rate 20, height 5' 11\" (1.803 m), weight 158 lb 4.8 oz (71.8 kg), SpO2 93%.    GENERAL:  The patient appeared to be in no distress and was comfortable.  SKIN:  Warm and hydrated  PSYCHIATRIC: Calm and cooperative    HEENT:  Head was atraumatic and normocephalic.  Eyes: Sclera was anicteric.  Pupils were equal.  Ears:  There were no lesions.  Nose:  No lesions were noted.      NECK:  Supple.  There was no JVD.    CHEST:  Symmetrical movement on inspiration  CARDIAC: S1 S2+, RRR  LUNGS:  CTAB with decreased entry at bases   ABDOMEN: Non-distended, non-tender, BS+  MUSCULOSKELETAL:  There was no deformity.  There was full range of motion in all the extremities.    EXTREMITIES: There was no edema  NEUROLOGIC: Cranial nerves II-XII intact, no focal defecits    Current Inpatient Medications:   Current Hospital Medications[1]    Assessment and Plan:   Pneumoperitoneum with concern for persistent bowel leak  Abdominal fluid collection  Possibly trapped right lateral abdomen pigtail drain  - Patient with recent bowel resection.  Still has NGOZI drain  - Admit  - IV antibiotics with Zosyn  - N.p.o.  - General Surgery on consult  -IR has been consulted, will undergo drain placement today.  - Pain controlled     Right lower extremity DVT, LLE soleal vein thrombosis  -Xarelto outpatient, last dose this a.m.  -on heparin gtt for now, on hold for drain placement     Chronic anemia, stable  - Monitor     Anasarca with diffuse  subcutaneous edema on CT. likely due to hypoalbuminemia  Hypoalbuminemia  - Echo 6/24/2025: EF 60 to 65%.  Normal diastolic function.  - N.p.o. for now     DM2  - SSI     B-cell lymphoma  -Patient oncology follow-up     HTN  HLD  GERD  BPH  Anxiety  Depression  - IV meds as needed     Quality:  DVT Prophylaxis: heparin gtt on hold  CODE status: Full code  ANNELISE: TBD        Results:     Recent Labs   Lab 07/04/25 2142 07/05/25  0309   RBC 3.26* 3.17*   HGB 8.8* 8.4*   HCT 27.4* 27.0*   MCV 84.0 85.2   MCH 27.0 26.5   MCHC 32.1 31.1   RDW 17.6* 17.4*   NEPRELIM 8.67*  --    WBC 11.2* 9.5   .0* 519.0*         Recent Labs   Lab 07/04/25 2142 07/05/25  0309   * 170*   BUN 14 14   CREATSERUM 0.64* 0.64*   EGFRCR 97 97   CA 7.7* 7.8*   * 132*   K 4.3 4.1   CL 98 100   CO2 28.0 29.0         Imaging:   CT ABDOMEN+PELVIS(CONTRAST ONLY)(CPT=74177)  Result Date: 7/5/2025  CONCLUSION: 1.  Multiple large peritoneal abscesses are seen. The largest is centered in the left upper quadrant and left midabdomen and measures 24.3 x 10.9 x 23.9 cm with air-fluid levels and extends into the abdominal wall. This is contiguous with smaller right upper quadrant perihepatic and posterior left upper quadrant perisplenic abscesses. A separate small 3 cm abscess is noted adjacent to the bladder. Bladder diverticulum is thought less likely. 2.  Multiple prior bowel resections are seen. There may be fistulous connection between the bowel and left upper quadrant dominant abscess at the left mid abdomen. The location of a right-sided pigtail drainage catheter is uncertain but it may terminate within the ascending colon. 3.  Additional chronic and/or incidental findings are detailed in the body of this report. Preliminary report was given by [facility] radiology. No clinically significant discrepancies are noted. Electronically Verified and Signed by Attending Radiologist: Gelacio Bullock MD 7/5/2025 7:14 AM Workstation: [id number]             Declan Stearns MD  7/5/2025          [1]   Current Facility-Administered Medications:     glucose (Dex4) 15 GM/59ML oral liquid 15 g, 15 g, Oral, Q15 Min PRN **OR** glucose (Glutose) 40% oral gel 15 g, 15 g, Oral, Q15 Min PRN **OR** glucose-vitamin C (Dex-4) chewable tab 4 tablet, 4 tablet, Oral, Q15 Min PRN **OR** dextrose 50% injection 50 mL, 50 mL, Intravenous, Q15 Min PRN **OR** glucose (Dex4) 15 GM/59ML oral liquid 30 g, 30 g, Oral, Q15 Min PRN **OR** glucose (Glutose) 40% oral gel 30 g, 30 g, Oral, Q15 Min PRN **OR** glucose-vitamin C (Dex-4) chewable tab 8 tablet, 8 tablet, Oral, Q15 Min PRN    sodium chloride 0.9% infusion, , Intravenous, Continuous    morphINE PF 2 MG/ML injection 1 mg, 1 mg, Intravenous, Q2H PRN **OR** morphINE PF 2 MG/ML injection 2 mg, 2 mg, Intravenous, Q2H PRN **OR** morphINE PF 4 MG/ML injection 4 mg, 4 mg, Intravenous, Q2H PRN    acetaminophen (Tylenol) rectal suppository 650 mg, 650 mg, Rectal, Q6H PRN    ondansetron (Zofran) 4 MG/2ML injection 4 mg, 4 mg, Intravenous, Q6H PRN    metoclopramide (Reglan) 5 mg/mL injection 10 mg, 10 mg, Intravenous, Q8H PRN    insulin aspart (NovoLOG) 100 Units/mL FlexPen 1-5 Units, 1-5 Units, Subcutaneous, q6h    piperacillin-tazobactam (Zosyn) 3.375 g in dextrose 5% 100 mL IVPB-ADDV, 3.375 g, Intravenous, Q8H    heparin (Porcine) 82136 units/250mL infusion PE/DVT/THROMBUS CONTINUOUS, 200-3,000 Units/hr, Intravenous, Continuous    pantoprazole (Protonix) 40 mg in sodium chloride 0.9% PF 10 mL IV push, 40 mg, Intravenous, Daily    sodium chloride 0.9% infusion, , Intravenous, Once

## 2025-07-05 NOTE — ED QUICK NOTES
Pt to ED from [facility] with c/o worsening abdominal distension, abdominal pressure, and fatigue. Pain worse with movement. Distension prominent to upper quadrants bilaterally. Pt states last BM was this afternoon. Pt denies fever, n/v/d.  Sutures in abdomen healing appropriately, no swelling or redness.  Pt A&ox4.

## 2025-07-05 NOTE — BRIEF PROCEDURE NOTE
[facility]  part of [facility]  Procedure Note    Arnaldo Gutierrez Patient Status:  Inpatient    4/15/1945 MRN [id number]   Location [facility] 4W/SW/SE Attending Declan Stearns MD   Hosp Day # 0 PCP PHYSICIAN NONSTAFF     Procedure: Intraperitoneal abscess drainage    Pre-Procedure Diagnosis:  Post operative fluid collection    Post-Procedure Diagnosis: Same    Anesthesia:  Sedation    Findings:  Large intraperitoneal fluid-air collection targeted via LLQ. 22 Fr Davin catheter positioned across midline to RUQ. Attached to gravity, draining copious tan opaque fluid and air. Secured and dressed with sterile gauze. RUQ drain was not instrumented.     Specimens: Intraperitoneal fluid for culture    Blood Loss:  Minimal      Complications:  None    Drains:  RUQ 10 Fr drain to suction, LLQ 22 Fr drain to gravity    Plan:   Bedrest x 1 hr. Resume heparin IV in 4 hrs. Monitor output q shift. Anticipate follow up outpatient CT and drain check in 2-4 weeks pending clinical course. Findings relayed to surgical team.    Kiko Sánhcez MD   2025

## 2025-07-05 NOTE — PLAN OF CARE
Arnaldo is alert and oriented x 4. He is a max assist at this time, patient refused working with therapy today. Patient is tolerating a clear liquid diet with no nausea or vomiting. Patient complains of mild pain right now. 1500 ml of brown liquid drained from left drain site. Blood sugars achs. Call light within reach.  Problem: Patient Centered Care  Goal: Patient preferences are identified and integrated in the patient's plan of care  Description: Interventions:  - What would you like us to know as we care for you?   - Provide timely, complete, and accurate information to patient/family  - Incorporate patient and family knowledge, values, beliefs, and cultural backgrounds into the planning and delivery of care  - Encourage patient/family to participate in care and decision-making at the level they choose  - Honor patient and family perspectives and choices  Outcome: Progressing     Problem: Diabetes/Glucose Control  Goal: Glucose maintained within prescribed range  Description: INTERVENTIONS:  - Monitor Blood Glucose as ordered  - Assess for signs and symptoms of hyperglycemia and hypoglycemia  - Administer ordered medications to maintain glucose within target range  - Assess barriers to adequate nutritional intake and initiate nutrition consult as needed  - Instruct patient on self management of diabetes  Outcome: Progressing     Problem: Patient/Family Goals  Goal: Patient/Family Long Term Goal  Description: Patient's Long Term Goal:     Interventions:  - See additional Care Plan goals for specific interventions  Outcome: Progressing  Goal: Patient/Family Short Term Goal  Description: Patient's Short Term Goal:     Interventions:   - See additional Care Plan goals for specific interventions  Outcome: Progressing

## 2025-07-05 NOTE — CM/SW NOTE
Patient admitted to [facility] from [facility].    CM sent return referral to [facility] via [employer].  PASRR current within 90 days and uploaded to referral.    PT/OT evaluations pending.  If [facility] is indicated, insurance authorization will be required prior to transfer.    / to remain available for support and/or discharge planning.     Plan: TBD - pending PT/OT, course of stay, medical clearance    Tammie Raya RN, BSN  Nurse   [phone]

## 2025-07-05 NOTE — ED INITIAL ASSESSMENT (HPI)
From [facility] for distended abdomen. Pt with pmh significant for colonic mass, recent stent, and colon resection.

## 2025-07-05 NOTE — ED QUICK NOTES
Orders for admission, patient is aware of plan and ready to go upstairs. Any questions, please call ED RN Brianna RN at extension [phone].     Patient Covid vaccination status: Fully vaccinated     COVID Test Ordered in ED: None    COVID Suspicion at Admission: N/A    Running Infusions: Medication Infusions[1] None    Mental Status/LOC at time of transport: A&Ox4    Other pertinent information: NPO  CIWA score: N/A   NIH score:  N/A             [1]

## 2025-07-06 LAB
ALBUMIN SERPL-MCNC: 2 G/DL (ref 3.2–4.8)
ALBUMIN/GLOB SERPL: 0.7 {RATIO} (ref 1–2)
ALP LIVER SERPL-CCNC: 175 U/L (ref 45–117)
ALT SERPL-CCNC: 9 U/L (ref 10–49)
ANION GAP SERPL CALC-SCNC: 3 MMOL/L (ref 0–18)
APTT PPP: 116.6 SECONDS (ref 23–36)
APTT PPP: 71.3 SECONDS (ref 23–36)
AST SERPL-CCNC: 11 U/L (ref ?–34)
BASOPHILS # BLD AUTO: 0.04 X10(3) UL (ref 0–0.2)
BASOPHILS NFR BLD AUTO: 0.4 %
BILIRUB SERPL-MCNC: 0.3 MG/DL (ref 0.2–1.1)
BUN BLD-MCNC: 12 MG/DL (ref 9–23)
BUN/CREAT SERPL: 22.6 (ref 10–20)
CALCIUM BLD-MCNC: 7.2 MG/DL (ref 8.7–10.4)
CHLORIDE SERPL-SCNC: 102 MMOL/L (ref 98–112)
CO2 SERPL-SCNC: 28 MMOL/L (ref 21–32)
CREAT BLD-MCNC: 0.53 MG/DL (ref 0.7–1.3)
DEPRECATED RDW RBC AUTO: 55.8 FL (ref 35.1–46.3)
EGFRCR SERPLBLD CKD-EPI 2021: 103 ML/MIN/1.73M2 (ref 60–?)
EOSINOPHIL # BLD AUTO: 0.03 X10(3) UL (ref 0–0.7)
EOSINOPHIL NFR BLD AUTO: 0.3 %
ERYTHROCYTE [DISTWIDTH] IN BLOOD BY AUTOMATED COUNT: 17.4 % (ref 11–15)
GLOBULIN PLAS-MCNC: 3 G/DL (ref 2–3.5)
GLUCOSE BLD-MCNC: 152 MG/DL (ref 70–99)
GLUCOSE BLDC GLUCOMTR-MCNC: 143 MG/DL (ref 70–99)
GLUCOSE BLDC GLUCOMTR-MCNC: 158 MG/DL (ref 70–99)
GLUCOSE BLDC GLUCOMTR-MCNC: 179 MG/DL (ref 70–99)
GLUCOSE BLDC GLUCOMTR-MCNC: 198 MG/DL (ref 70–99)
HCT VFR BLD AUTO: 25.9 % (ref 39–53)
HGB BLD-MCNC: 8 G/DL (ref 13–17.5)
IMM GRANULOCYTES # BLD AUTO: 0.06 X10(3) UL (ref 0–1)
IMM GRANULOCYTES NFR BLD: 0.5 %
LYMPHOCYTES # BLD AUTO: 1.22 X10(3) UL (ref 1–4)
LYMPHOCYTES NFR BLD AUTO: 11.2 %
MAGNESIUM SERPL-MCNC: 1.9 MG/DL (ref 1.6–2.6)
MCH RBC QN AUTO: 26.8 PG (ref 26–34)
MCHC RBC AUTO-ENTMCNC: 30.9 G/DL (ref 31–37)
MCV RBC AUTO: 86.9 FL (ref 80–100)
MONOCYTES # BLD AUTO: 0.73 X10(3) UL (ref 0.1–1)
MONOCYTES NFR BLD AUTO: 6.7 %
NEUTROPHILS # BLD AUTO: 8.84 X10 (3) UL (ref 1.5–7.7)
NEUTROPHILS # BLD AUTO: 8.84 X10(3) UL (ref 1.5–7.7)
NEUTROPHILS NFR BLD AUTO: 80.9 %
OSMOLALITY SERPL CALC.SUM OF ELEC: 279 MOSM/KG (ref 275–295)
PLATELET # BLD AUTO: 490 10(3)UL (ref 150–450)
POTASSIUM SERPL-SCNC: 3.9 MMOL/L (ref 3.5–5.1)
PROT SERPL-MCNC: 5 G/DL (ref 5.7–8.2)
RBC # BLD AUTO: 2.98 X10(6)UL (ref 3.8–5.8)
SODIUM SERPL-SCNC: 133 MMOL/L (ref 136–145)
WBC # BLD AUTO: 10.9 X10(3) UL (ref 4–11)

## 2025-07-06 PROCEDURE — 99233 SBSQ HOSP IP/OBS HIGH 50: CPT | Performed by: HOSPITALIST

## 2025-07-06 RX ORDER — FLUCONAZOLE 2 MG/ML
400 INJECTION, SOLUTION INTRAVENOUS EVERY 24 HOURS
Status: DISCONTINUED | OUTPATIENT
Start: 2025-07-06 | End: 2025-07-07

## 2025-07-06 RX ORDER — TEMAZEPAM 7.5 MG/1
7.5 CAPSULE ORAL NIGHTLY PRN
Status: DISCONTINUED | OUTPATIENT
Start: 2025-07-06 | End: 2025-07-19

## 2025-07-06 NOTE — CONSULTS
[facility]  part of [facility] ID CONSULT NOTE    Arnaldo Gutierrez Patient Status:  Inpatient    4/15/1945 MRN [id number]   Location [facility] 4W/SW/SE Attending Declan Stearns MD   Hosp Day # 1 PCP PHYSICIAN NONSTAFF       Reason for Consultation:  Intra-abdominal abscess    Antibiotics: zosyn     ASSESSMENT:    # multiple large intra-abdominal abscesses   - now s/p IR drain placement on 25   - fluid cx so far with GNR and budding yeast   - surgery and IR following    # hx of Large bowel obstruction s/p OR 6/10/25 for lap transverse colon resection, small bowel resection x2, rsection of tumor nodule on small bowel - path with DLBCL               - s/p colonic stent placement by GI 25   - post op fluid collection s/p IR drain placement 25 with 260 cc of serosang fluid, cx negative     PLAN:    -  continue zosyn. Add fluconazole for fungal coverage. Anticipate prolonged IV antibiotics course on discharge  -  will follow fluid cx results.   -  drain per IR  -  Follow fever curve, wbc  -  Reviewed labs, micro, imaging reports, available old records    D/w pt, son and primary    Thank you for allowing us to participate in the care of this patient. Please do not hesitate to call if you have any questions.   We will continue to follow with you and will make further recommendations based on his progress.    History of Present Illness:  Arnaldo Gutierrez is a a(n) 80 year old male with history of diabetes, HTN, BPH who was admitted on  with abdominal bloating, nausea, decreased appetite, unintentional weight loss. Initial CT A/P on  with 4 cm segment of masslike constriction and thickening in the proximal transverse colon with concern for partially obstructing chronic neoplasm. Seen by GI and underwent colonoscopy with chronic stent placement on . Also seen by general surgery and taken to the OR on 6/10 for large bowel obstruction status post laparoscopic  assisted transverse colon resection, small bowel resection and tumor nodule and small bowel. Pathology diffuse large B-cell lymphoma. Seen by oncology. On 6/19 had low-grade temperatures with increasing WBC up to 19 with repeat CT A/P showing moderate to large volume free fluid in the abdomen and pelvis in the upper quadrants. Organized fluid in the left lower retroperitoneum measuring 5.5 x 4.7 x 5.8 cm concern for seroma, liquefied hematoma versus inflammatory fluid. Seen by IR and underwent IR peritoneal drain placement on 6/19 with 260 cc of serosanguineous fluid. Cultures negative. Was on zosyn while inpatient and transitioned to PO augmentin on discharge. Discharged on 6/24/25 to subacute rehab. Came back to ED on 7/4/25 with abdominal pain. Repeat CT with multiple large peritoneal abscesses. Surgery and IR consult.  Now status post IR drainage with cx growing GNR and budding yeast.  Currently on IV Zosyn.  ID consulted for further antibiotics management.     History:  Past Medical History[1]  Past Surgical History[2]  Family History[3]   reports that he quit smoking about 45 years ago. His smoking use included cigarettes. He started smoking about 55 years ago. He has a 20 pack-year smoking history. He has been exposed to tobacco smoke. He has never used smokeless tobacco. He reports current alcohol use. He reports that he does not currently use drugs.    Allergies:  Allergies[4]    Medications:  Current Hospital Medications[5]    Review of Systems:  CONSTITUTIONAL:  No weight loss, weakness or fatigue.  HEENT:  Eyes:  No visual loss, blurred vision, double vision or yellow sclerae. Ears, Nose, Throat:  No hearing loss, sneezing, congestion, runny nose or sore throat.  SKIN:  No rash or itching.  CARDIOVASCULAR:  No chest pain, chest pressure or chest discomfort  RESPIRATORY:  No shortness of breath, cough or sputum.  GASTROINTESTINAL:  +Abdominal pain  GENITOURINARY:  No Burning on urination.   NEUROLOGICAL:   No headache, dizziness, syncope, paralysis, ataxia, numbness or tingling in the extremities.  MUSCULOSKELETAL:  No muscle, back pain, joint pain or stiffness.    Physical Exam:  Vital signs: Blood pressure 115/65, pulse 93, temperature 99.4 °F (37.4 °C), temperature source Oral, resp. rate 18, height 5' 11\" (1.803 m), weight 158 lb 4.8 oz (71.8 kg), SpO2 95%.    General: Alert, oriented, NAD  HEENT: Moist mucous membranes. EOMI  Neck: No lymphadenopathy.  Supple.  Cardiovascular: RRR  Respiratory: Clear to auscultation bilaterally.  No wheezes. No rhonchi.  Abdomen: Soft, nontender, nondistended. RUQ and LLQ drain in place with purulent output  Musculoskeletal: No edema noted  Integument: No lesions. No erythema.    Laboratory Data:  Recent Labs   Lab 07/06/25  0228   RBC 2.98*   HGB 8.0*   HCT 25.9*   MCV 86.9   MCH 26.8   MCHC 30.9*   RDW 17.4*   NEPRELIM 8.84*   WBC 10.9   .0*     Recent Labs   Lab 07/04/25  2142 07/05/25  0309 07/06/25  0228   * 170* 152*   BUN 14 14 12   CREATSERUM 0.64* 0.64* 0.53*   CA 7.7* 7.8* 7.2*   ALB 2.5*  --  2.0*   * 132* 133*   K 4.3 4.1 3.9   CL 98 100 102   CO2 28.0 29.0 28.0   ALKPHO 236*  --  175*   AST 15  --  11   ALT 13  --  9*   BILT 0.4  --  0.3   TP 6.0  --  5.0*       Microbiology: Reviewed in EMR    Radiology: Reviewed      MD Michele [facility]  [phone]  7/6/2025         [1]   Past Medical History:   Anxiety    BPH (benign prostatic hyperplasia)    CKD (chronic kidney disease) stage 3, GFR 30-59 ml/min (HCC)    Depression    Diabetes (HCC)    Diabetes mellitus (HCC)    Essential hypertension    GERD (gastroesophageal reflux disease)    High cholesterol    Hyperlipidemia   [2]   Past Surgical History:  Procedure Laterality Date    Colonoscopy N/A 6/6/2025    Procedure: COLONOSCOPY with colonic stent placement;  Surgeon: Eusebio Palmer MD;  Location: [facility] ENDOSCOPY    Foot fracture surgery Right      2012   [3]   Family History  Problem Relation Age of Onset    Other (Other) Father    [4] No Known Allergies  [5]   Current Facility-Administered Medications:     fluconazole in sodium chloride 0.9% (Diflucan) 400 mg/200mL IVPB premix 400 mg, 400 mg, Intravenous, Q24H    glucose (Dex4) 15 GM/59ML oral liquid 15 g, 15 g, Oral, Q15 Min PRN **OR** glucose (Glutose) 40% oral gel 15 g, 15 g, Oral, Q15 Min PRN **OR** glucose-vitamin C (Dex-4) chewable tab 4 tablet, 4 tablet, Oral, Q15 Min PRN **OR** dextrose 50% injection 50 mL, 50 mL, Intravenous, Q15 Min PRN **OR** glucose (Dex4) 15 GM/59ML oral liquid 30 g, 30 g, Oral, Q15 Min PRN **OR** glucose (Glutose) 40% oral gel 30 g, 30 g, Oral, Q15 Min PRN **OR** glucose-vitamin C (Dex-4) chewable tab 8 tablet, 8 tablet, Oral, Q15 Min PRN    sodium chloride 0.9% infusion, , Intravenous, Continuous    morphINE PF 2 MG/ML injection 1 mg, 1 mg, Intravenous, Q2H PRN **OR** morphINE PF 2 MG/ML injection 2 mg, 2 mg, Intravenous, Q2H PRN **OR** morphINE PF 4 MG/ML injection 4 mg, 4 mg, Intravenous, Q2H PRN    acetaminophen (Tylenol) rectal suppository 650 mg, 650 mg, Rectal, Q6H PRN    ondansetron (Zofran) 4 MG/2ML injection 4 mg, 4 mg, Intravenous, Q6H PRN    metoclopramide (Reglan) 5 mg/mL injection 10 mg, 10 mg, Intravenous, Q8H PRN    piperacillin-tazobactam (Zosyn) 3.375 g in dextrose 5% 100 mL IVPB-ADDV, 3.375 g, Intravenous, Q8H    heparin (Porcine) 41247 units/250mL infusion PE/DVT/THROMBUS CONTINUOUS, 200-3,000 Units/hr, Intravenous, Continuous    pantoprazole (Protonix) 40 mg in sodium chloride 0.9% PF 10 mL IV push, 40 mg, Intravenous, Daily    insulin aspart (NovoLOG) 100 Units/mL FlexPen 1-5 Units, 1-5 Units, Subcutaneous, TID CC and HS

## 2025-07-06 NOTE — PLAN OF CARE
Problem: Patient Centered Care  Goal: Patient preferences are identified and integrated in the patient's plan of care  Description: Interventions:  - What would you like us to know as we care for you? I came [facility]  - Provide timely, complete, and accurate information to patient/family  - Incorporate patient and family knowledge, values, beliefs, and cultural backgrounds into the planning and delivery of care  - Encourage patient/family to participate in care and decision-making at the level they choose  - Honor patient and family perspectives and choices  7/6/2025 0118 by Misti Chau RN  Outcome: Progressing  7/6/2025 0115 by Misti Chau RN  Outcome: Progressing     Problem: Diabetes/Glucose Control  Goal: Glucose maintained within prescribed range  Description: INTERVENTIONS:  - Monitor Blood Glucose as ordered  - Assess for signs and symptoms of hyperglycemia and hypoglycemia  - Administer ordered medications to maintain glucose within target range  - Assess barriers to adequate nutritional intake and initiate nutrition consult as needed  - Instruct patient on self management of diabetes  7/6/2025 0118 by Misti Chau RN  Outcome: Progressing  7/6/2025 0115 by Misti Chau RN  Outcome: Progressing     Problem: Patient/Family Goals  Goal: Patient/Family Long Term Goal  Description: Patient's Long Term Goal: return home    Interventions:  - follow plan of care  - See additional Care Plan goals for specific interventions  7/6/2025 0118 by Misti Chau RN  Outcome: Progressing  7/6/2025 0115 by Misti Chau RN  Outcome: Progressing  Goal: Patient/Family Short Term Goal  Description: Patient's Short Term Goal: to get stronger and feel better    Interventions:   - PT/OT on consult  - IV abx  - surgery, IR on consult  - NGOZI drains  - monitor labs and vital signs  - See additional Care Plan goals for specific interventions  7/6/2025 0118 by Misti Chau RN  Outcome: Progressing  7/6/2025 0115 by  Misti Chau RN  Outcome: Progressing     Problem: PAIN - ADULT  Goal: Verbalizes/displays adequate comfort level or patient's stated pain goal  Description: INTERVENTIONS:  - Encourage pt to monitor pain and request assistance  - Assess pain using appropriate pain scale  - Administer analgesics based on type and severity of pain and evaluate response  - Implement non-pharmacological measures as appropriate and evaluate response  - Consider cultural and social influences on pain and pain management  - Manage/alleviate anxiety  - Utilize distraction and/or relaxation techniques  - Monitor for opioid side effects  - Notify MD/LIP if interventions unsuccessful or patient reports new pain  - Anticipate increased pain with activity and pre-medicate as appropriate  Outcome: Progressing     Problem: RISK FOR INFECTION - ADULT  Goal: Absence of fever/infection during anticipated neutropenic period  Description: INTERVENTIONS  - Monitor WBC  - Administer growth factors as ordered  - Implement neutropenic guidelines  Outcome: Progressing     Problem: SAFETY ADULT - FALL  Goal: Free from fall injury  Description: INTERVENTIONS:  - Assess pt frequently for physical needs  - Identify cognitive and physical deficits and behaviors that affect risk of falls.  - [place] fall precautions as indicated by assessment.  - Educate pt/family on patient safety including physical limitations  - Instruct pt to call for assistance with activity based on assessment  - Modify environment to reduce risk of injury  - Provide assistive devices as appropriate  - Consider OT/PT consult to assist with strengthening/mobility  - Encourage toileting schedule  Outcome: Progressing     Problem: HEMATOLOGIC - ADULT  Goal: Free from bleeding injury  Description: (Example usage: patient with low platelets)  INTERVENTIONS:  - Avoid intramuscular injections, enemas and rectal medication administration  - Ensure safe mobilization of patient  - Hold pressure  on venipuncture sites to achieve adequate hemostasis  - Assess for signs and symptoms of internal bleeding  - Monitor lab trends  - Patient is to report abnormal signs of bleeding to staff  - Avoid use of toothpicks and dental floss  - Use electric shaver for shaving  - Use soft bristle tooth brush  - Limit straining and forceful nose blowing  Outcome: Progressing     Problem: GASTROINTESTINAL - ADULT  Goal: Minimal or absence of nausea and vomiting  Description: INTERVENTIONS:  - Maintain adequate hydration with IV or PO as ordered and tolerated  - Nasogastric tube to low intermittent suction as ordered  - Evaluate effectiveness of ordered antiemetic medications  - Provide nonpharmacologic comfort measures as appropriate  - Advance diet as tolerated, if ordered  - Obtain nutritional consult as needed  - Evaluate fluid balance  Outcome: Progressing  Goal: Maintains or returns to baseline bowel function  Description: INTERVENTIONS:  - Assess bowel function  - Maintain adequate hydration with IV or PO as ordered and tolerated  - Evaluate effectiveness of GI medications  - Encourage mobilization and activity  - Obtain nutritional consult as needed  - Establish a toileting routine/schedule  - Consider collaborating with pharmacy to review patient's medication profile  Outcome: Progressing   NO acute changes. Vital signs stable. Morphine given for pain. No nausea or vomiting. Tolerating clear liquid diet. Heparin drip at 14 ml/hr, next PTT at 09:45. IV fluids and IV Zosyn infusing. Minimal output on RLQ Darin drain. New drain on LLQ has large output. Patient calls appropriately. Fall and safety measures in place.

## 2025-07-06 NOTE — PLAN OF CARE
Arnaldo is alert and oriented x 4. He is a max assist at this time, patient stood with therapy. Patient is tolerating meals with no nausea or vomiting. Patient denies pain. 1 BM today. PTT therapeutic, heparin drip at 14gtt/hr. Call light within reach.   Problem: Patient Centered Care  Goal: Patient preferences are identified and integrated in the patient's plan of care  Description: Interventions:  - What would you like us to know as we care for you? I came from [facility]  - Provide timely, complete, and accurate information to patient/family  - Incorporate patient and family knowledge, values, beliefs, and cultural backgrounds into the planning and delivery of care  - Encourage patient/family to participate in care and decision-making at the level they choose  - Honor patient and family perspectives and choices  Outcome: Progressing     Problem: Diabetes/Glucose Control  Goal: Glucose maintained within prescribed range  Description: INTERVENTIONS:  - Monitor Blood Glucose as ordered  - Assess for signs and symptoms of hyperglycemia and hypoglycemia  - Administer ordered medications to maintain glucose within target range  - Assess barriers to adequate nutritional intake and initiate nutrition consult as needed  - Instruct patient on self management of diabetes  Outcome: Progressing     Problem: Patient/Family Goals  Goal: Patient/Family Long Term Goal  Description: Patient's Long Term Goal: return home    Interventions:  - follow plan of care  - See additional Care Plan goals for specific interventions  Outcome: Progressing  Goal: Patient/Family Short Term Goal  Description: Patient's Short Term Goal: to get stronger and feel better    Interventions:   - PT/OT on consult  - IV abx  - surgery, IR on consult  - NGOZI drains  - monitor labs and vital signs  - See additional Care Plan goals for specific interventions  Outcome: Progressing     Problem: PAIN - ADULT  Goal: Verbalizes/displays adequate comfort level or  patient's stated pain goal  Description: INTERVENTIONS:  - Encourage pt to monitor pain and request assistance  - Assess pain using appropriate pain scale  - Administer analgesics based on type and severity of pain and evaluate response  - Implement non-pharmacological measures as appropriate and evaluate response  - Consider cultural and social influences on pain and pain management  - Manage/alleviate anxiety  - Utilize distraction and/or relaxation techniques  - Monitor for opioid side effects  - Notify MD/LIP if interventions unsuccessful or patient reports new pain  - Anticipate increased pain with activity and pre-medicate as appropriate  Outcome: Progressing     Problem: RISK FOR INFECTION - ADULT  Goal: Absence of fever/infection during anticipated neutropenic period  Description: INTERVENTIONS  - Monitor WBC  - Administer growth factors as ordered  - Implement neutropenic guidelines  Outcome: Progressing     Problem: SAFETY ADULT - FALL  Goal: Free from fall injury  Description: INTERVENTIONS:  - Assess pt frequently for physical needs  - Identify cognitive and physical deficits and behaviors that affect risk of falls.  - [place] fall precautions as indicated by assessment.  - Educate pt/family on patient safety including physical limitations  - Instruct pt to call for assistance with activity based on assessment  - Modify environment to reduce risk of injury  - Provide assistive devices as appropriate  - Consider OT/PT consult to assist with strengthening/mobility  - Encourage toileting schedule  Outcome: Progressing     Problem: HEMATOLOGIC - ADULT  Goal: Free from bleeding injury  Description: (Example usage: patient with low platelets)  INTERVENTIONS:  - Avoid intramuscular injections, enemas and rectal medication administration  - Ensure safe mobilization of patient  - Hold pressure on venipuncture sites to achieve adequate hemostasis  - Assess for signs and symptoms of internal bleeding  - Monitor  lab trends  - Patient is to report abnormal signs of bleeding to staff  - Avoid use of toothpicks and dental floss  - Use electric shaver for shaving  - Use soft bristle tooth brush  - Limit straining and forceful nose blowing  Outcome: Progressing     Problem: GASTROINTESTINAL - ADULT  Goal: Minimal or absence of nausea and vomiting  Description: INTERVENTIONS:  - Maintain adequate hydration with IV or PO as ordered and tolerated  - Nasogastric tube to low intermittent suction as ordered  - Evaluate effectiveness of ordered antiemetic medications  - Provide nonpharmacologic comfort measures as appropriate  - Advance diet as tolerated, if ordered  - Obtain nutritional consult as needed  - Evaluate fluid balance  Outcome: Progressing  Goal: Maintains or returns to baseline bowel function  Description: INTERVENTIONS:  - Assess bowel function  - Maintain adequate hydration with IV or PO as ordered and tolerated  - Evaluate effectiveness of GI medications  - Encourage mobilization and activity  - Obtain nutritional consult as needed  - Establish a toileting routine/schedule  - Consider collaborating with pharmacy to review patient's medication profile  Outcome: Progressing

## 2025-07-06 NOTE — PROGRESS NOTES
[facility] [place]  Progress Note    Arnaldo Gutierrez Patient Status:  Inpatient    4/15/1945 MRN [id number]   Location [facility] 4W/SW/SE Attending Declan Stearns MD   Hosp Day # 1 PCP PHYSICIAN NONSTAFF     Subjective:  Pt seen laying in bed. Pain improved since IR drain placement yesterday. No fever, chills, nausea, or vomiting. Passing gas. Tolerating clear liquids well.    Objective/Physical Exam:  General: Alert, orientated x3.  Cooperative.  No apparent distress.  Vital Signs:  Blood pressure 98/54, pulse 85, temperature 98.4 °F (36.9 °C), temperature source Oral, resp. rate 18, height 5' 11\" (1.803 m), weight 158 lb 4.8 oz (71.8 kg), SpO2 92%.  Wt Readings from Last 3 Encounters:   25 158 lb 4.8 oz (71.8 kg)   25 171 lb (77.6 kg)   25 171 lb 3.2 oz (77.7 kg)     Lungs: No respiratory distress.  Cardiac: Regular rate and rhythm.   Abdomen:  Soft, non distended, minimal drain site tender, with no rebound or guarding.  No peritoneal signs.   Extremities:  No lower extremity edema noted.    Incision: Clean, dry, intact, no erythema  Drain: IR drain intact, 1900cc output yesterday    Intake/Output:    Intake/Output Summary (Last 24 hours) at 2025 0902  Last data filed at 2025 0600  Gross per 24 hour   Intake 925 ml   Output 3003 ml   Net -2078 ml     I/O last 3 completed shifts:  In: 925 [I.V.:825; IV PIGGYBACK:100]  Out: 3003 [Urine:1100; Drains:1903]  No intake/output data recorded.    Medications: Scheduled Medications[1]    Labs:  Lab Results   Component Value Date    WBC 10.9 2025    HGB 8.0 2025    HCT 25.9 2025    .0 2025     Lab Results   Component Value Date     2025    K 3.9 2025     2025    CO2 28.0 2025    BUN 12 2025    CREATSERUM 0.53 2025     2025    CA 7.2 2025    ALKPHO 175 2025    ALT 9 2025    AST 11 2025    BILT 0.3 2025     ALB 2.0 07/06/2025    TP 5.0 07/06/2025     No results found for: \"PT\", \"INR\"      Assessment  Problem List[2]    Intra-abdominal abscesses following lap transverse colon resection on 6/12/25 s/p IR drain placement 7/5/25    Plan:  No acute surgical intervention indicated at this time  Monitor IR drain output, IR to manage drain  Continue IV abx  Analgesics and antiemetics as needed  Ambulate and up to chair  DVT prophylaxis with heparin    Quality:  DVT Mechanical Prophylaxis:     Early ambuation  DVT Pharmacologic Prophylaxis   Medication    heparin (Porcine) 13359 units/250mL infusion PE/DVT/THROMBUS CONTINUOUS                Code Status: Full Code  Muñoz: No urinary catheter in place  Muñoz Duration (in days):   Central line:    ANNELISE: 7/9/2025        Mark Yancey PA-C  7/6/2025  9:02 AM               [1]    piperacillin-tazobactam  3.375 g Intravenous Q8H    pantoprazole  40 mg Intravenous Daily    insulin aspart  1-5 Units Subcutaneous TID CC and HS   [2]   Patient Active Problem List  Diagnosis    BPH (benign prostatic hyperplasia)    Chronic low back pain    Chronic pain of both knees    Depression with anxiety    Generalized osteoarthritis    Hyperlipidemia    Type 2 diabetes mellitus without complication, without long-term current use of insulin (HCC)    Essential hypertension    Bipolar I disorder, single manic episode (HCC)    Large bowel obstruction (HCC)    Colonic mass    Diffuse large B-cell lymphoma of solid organ excluding spleen    Pneumoperitoneum    Abdominal distension    Anasarca    Intraabdominal fluid collection

## 2025-07-06 NOTE — PHYSICAL THERAPY NOTE
PHYSICAL THERAPY EVALUATION - INPATIENT     Room Number: 454/454-A  Evaluation Date: 7/6/2025  Type of Evaluation: Initial   Physician Order: PT Eval and Treat    Presenting Problem: ABD pain with large retroperitoneal abscess. Pt is s/p IR procedure with 2 ABD drain placed on 7/05.   Pt with recent admission s/p on 6/12 ABD sx including SB resection x 2 and resection of tumor .  DVT noted last admission right LE and left LE currently therapeautic on heparin .     PMH sign for Diffuse Large B -cell Lymphoma  / Bipolar / Chronic LB and knee pain     Reason for Therapy: Mobility Dysfunction and Discharge Planning    PHYSICAL THERAPY ASSESSMENT   Chart reviewed , Co session with OT .  RN approve participation.  Pt son present translating as pt is Finnish speaking declined need for language line .  OT also speaking Finnish.  Pt with 2 IV and 2 ABD drain , ABD incision and purewick catheter lines.      Patient is a 80 year old male admitted 7/4/2025 for Presenting Problem: ABD pain with large retroperitoneal abscess. Pt is s/p IR procedure with 2 ABD drain placed on 7/05.   Pt with recent admission s/p on 6/12 ABD sx including SB resection x 2 and resection of tumor .  DVT noted last admission right LE and left LE currently therapeautic on heparin .     PMH sign for Diffuse Large B -cell Lymphoma  / Bipolar / Chronic LB and knee pain.      Prior to admission in June 2025 / prior ABD sx 6/12/95 , patient's baseline is Indep ADL and community ambulation with no AD able to drive. Pt admitted from rehab using RW ambulating with therapy staff.      Patient is currently functioning below baseline with bed mobility, transfers, gait, maintaining seated position, standing prolonged periods, and performing household tasks.  Patient is requiring moderate assist as a result of the following impairments: decreased functional strength, decreased endurance/aerobic capacity, pain, impaired sit and standing  balance, decreased muscular  endurance, and medical status.  Pt agreeable to stand at EOB declining ambulation or mobility to chair today . Physical Therapy will continue to follow for duration of hospitalization.    Patient will benefit from continued skilled PT Services to promote return to prior level of function and safety with continuous assistance and gradual rehabilitative therapy .  Pt admitted from [facility] . Pt lives alone . Therapy will update recommendations pending progress , currently therapy anticipates pt return to [facility] when medically stable.    PLAN DURING HOSPITALIZATION  Nursing Mobility Recommendation :  (Undetermined / stood at EOB with PT --one assist for standing -- did not get to chair today)  PT Device Recommendation: Rolling walker, Gait belt  PT Treatment Plan: Bed mobility, Body mechanics, Endurance, Energy conservation, Patient education, Family education, Gait training, Balance training, Transfer training  Rehab Potential : Good  Frequency (Obs): 5x/week     PHYSICAL THERAPY MEDICAL/SOCIAL HISTORY   History related to current admission:     From sx team   Assessment  [Problem List]    [Problem List]  Patient Active Problem List      Diagnosis    BPH (benign prostatic hyperplasia)    Chronic low back pain    Chronic pain of both knees    Depression with anxiety    Generalized osteoarthritis    Hyperlipidemia    Type 2 diabetes mellitus without complication, without long-term current use of insulin (HCC)    Essential hypertension    Bipolar I disorder, single manic episode (HCC)    Large bowel obstruction (HCC)    Colonic mass    Diffuse large B-cell lymphoma of solid organ excluding spleen    Pneumoperitoneum    Abdominal distension    Anasarca    Intraabdominal fluid collection        Intra-abdominal abscesses following lap transverse colon resection on 6/12/25 s/p IR drain placement 7/5/25     Plan:  No acute surgical intervention indicated at this time  Monitor IR drain output, IR to manage drain  Continue IV  abx  Analgesics and antiemetics as needed  Ambulate and up to chair  DVT prophylaxis with heparin     Quality:  DVT Mechanical Prophylaxis:     Early ambuation      DVT Pharmacologic Prophylaxis   Medication    heparin (Porcine) 45643 units/250mL infusion PE/DVT/THROMBUS CONTINUOUS                 Code Status: Full Code  Muñoz: No urinary catheter in place  Muñoz Duration (in days):   Central line:    ANNELISE: 7/9/2025           Mark Yancey PA-C  7/6/2025  9:02 AM                                   Problem List  Principal Problem:    Abdominal distension  Active Problems:    Pneumoperitoneum    Anasarca    Intraabdominal fluid collection      HOME SITUATION  Type of Home:  (Admitted from [facility] --- prior to last admission living in apt with elevator)  Home Layout: One level                     Lives With:  (staff 24hr with rehab   at home alone)    Drives: Yes   Patient Regularly Uses:  (prior to last admission no AD at rehab was using a RW)         SUBJECTIVE  ABD area pain and B distal LE pain in shin bone area diffuse    agreeable to limiting mobility       PHYSICAL THERAPY EXAMINATION   OBJECTIVE  Precautions: Limb alert - right, Limb alert - left, Cardiac, Abdominal protective strategies, Bed/chair alarm, Drain(s)  Fall Risk: High fall risk    WEIGHT BEARING RESTRICTION       PAIN ASSESSMENT  Rating: Other (Comment)  Location: ABD area pain and B distal LE diffuse  Management Techniques: Activity promotion, Body mechanics, Breathing techniques, Relaxation, Repositioning    COGNITION  Orientation Level:  oriented to place, time with extra time  . oriented to situation, and oriented to person  Son is translating following one step directions , Pt with prompts for place and time      RANGE OF MOTION AND STRENGTH ASSESSMENT  Upper extremity ROM are within functional limits   Lower extremity ROM is grossly  within functional limits for fxn mobility---LE ROM was not formally assessed .  Pt with B DVT currently therapeutic  on heparin drip     Pt presents with generalized weakness of LE and trunk as noted by need for assisted fxn mobility and inability to progress to ambulation / chair today --stood only with RW CGA to min support once standing           BALANCE  Static Sitting: Fair  Dynamic Sitting: Not tested  Static Standing: Fair -  Dynamic Standing: Poor +     ACTIVITY TOLERANCE  Pulse: 93 (resting 93)        BP: 115/65 (115/64 resting  post activity  119/67)             O2 WALK  Oxygen Therapy  SPO2% on Room Air at Rest: 93 (resting   end of session 94)    AM-PAC '6-Clicks' INPATIENT SHORT FORM - BASIC MOBILITY  How much difficulty does the patient currently have...  Patient Difficulty: Turning over in bed (including adjusting bedclothes, sheets and blankets)?: A Little   Patient Difficulty: Sitting down on and standing up from a chair with arms (e.g., wheelchair, bedside commode, etc.): A Little   Patient Difficulty: Moving from lying on back to sitting on the side of the bed?: A Lot   How much help from another person does the patient currently need...   Help from Another: Moving to and from a bed to a chair (including a wheelchair)?: A Lot   Help from Another: Need to walk in hospital room?: Total   Help from Another: Climbing 3-5 steps with a railing?: Total     AM-PAC Score:  Raw Score: 12   Approx Degree of Impairment: 68.66%   Standardized Score (AM-PAC Scale): 35.33   CMS Modifier (G-Code): CL    FUNCTIONAL ABILITY STATUS  Functional Mobility/Gait Assessment  Gait Assistance: Not tested  Distance (ft): Pt agreeable to standing at EOB and side step only  Assistive Device: Rolling walker  Pattern:  (side step only using RW small steps)  Rolling: minimal assist  log roll sequencing   Supine to Sit: maximum assist log roll sequencing   Sit to Supine: maximum assist  log roll sequencing   Sit to Stand: minimal assist to brief mod assist cues needed.  Pt able to sit at EOB with close CGA support and stood with RW close CGA to  min support . Encouraged side step with min assist up toward HOB . Pt stood at EOB approximately 1-2 min         Skilled Therapy Provided: PT eval , fxn mobility training , pt education and DC Planning Education Provided To: Patient, Family/Caregiver   Patient Education: Role of Physical Therapy, Plan of Care, Discharge Recommendations, DME Recommendations, Functional Transfer Techniques, Fall Prevention, Surgical Precautions, Posture/Positioning, Abdominal Protective Strategies   Patient's Response to Education: Verbalized Understanding, Returned Demonstration, Requires Further Education      The patient's Approx Degree of Impairment: 68.66% has been calculated based on documentation in the [facility] '6 clicks' Inpatient Basic Mobility Short Form.  Research supports that patients with this level of impairment may benefit from [facility].  Final disposition will be made by interdisciplinary medical team.    Patient End of Session: In bed, Needs met, Call light within reach, RN aware of session/findings, All patient questions and concerns addressed, Hospital anti-slip socks, Alarm set, Family present    CURRENT GOALS  Goals to be met by: 7/25/25  Patient Goal Patient's self-stated goal is: return to [facility]    Goal #1 Patient is able to demonstrate supine - sit EOB @ level: CGA support log roll sequencing      Goal #1   Current Status    Goal #2 Patient is able to demonstrate transfers sit to stand and SPT with RW  at assistance level: CGA  with walker - rolling     Goal #2  Current Status    Goal #3 Patient is able to ambulate 20-40  feet progress distance as appropriate with assist device: walker - rolling at assistance level: CGA    Goal #3   Current Status    Goal #4 Per son lives in apartment with elevator building    Goal #4   Current Status    Goal #5 Patient to demonstrate independence with home activity/exercise instructions provided to patient in preparation for discharge.   Goal #5   Current Status    Goal #6    Goal  #6  Current Status      Patient Evaluation Complexity Level:  History Moderate - 1 or 2 personal factors and/or co-morbidities   Examination of body systems Low -  addressing 1-2 elements   Clinical Presentation  Moderate - Evolving   Clinical Decision Making  Moderate Complexity   PT eval and therapy activity 2 units

## 2025-07-06 NOTE — OCCUPATIONAL THERAPY NOTE
OCCUPATIONAL THERAPY EVALUATION - INPATIENT     Room Number: 454/454-A  Evaluation Date: 7/6/2025  Type of Evaluation: Initial       Physician Order: IP Consult to Occupational Therapy  Reason for Therapy: ADL/IADL Dysfunction and Discharge Planning    OCCUPATIONAL THERAPY ASSESSMENT   Patient is a 80 year old male admitted 7/4/2025 for abdominal dicomfort and distention s/p IR intraperitoneal abscess drainage on 7/5/25.  Prior to admission, patient's baseline- at [facility], assist with ADLs and ambulating short distance with RW. Prior to recent admission, pt was living alone and independent.   Patient is currently functioning below baseline with self care/ADLs.  Patient is requiring moderate assist and maximum assist as a result of the following impairments: decreased functional strength, decreased functional reach, pain, impaired   balance, and decreased muscular endurance. Occupational Therapy will continue to follow for duration of hospitalization.    Patient will benefit from continued skilled OT Services to promote return to prior level of function and safety with continuous assistance and gradual rehabilitative therapy.    PLAN DURING HOSPITALIZATION     OT Treatment Plan: Balance activities, Energy conservation/work simplification techniques, ADL training, Functional transfer training, UE strengthening/ROM, Endurance training, Patient/Family education, Compensatory technique education     OCCUPATIONAL THERAPY MEDICAL/SOCIAL HISTORY   Problem List  Principal Problem:    Abdominal distension  Active Problems:    Pneumoperitoneum    Anasarca    Intraabdominal fluid collection    HOME SITUATION  Type of Home: -- (Admitted from [facility] --- prior to last admission living in apt with elevator)  Home Layout: One level  Lives With: -- (staff 24hr with rehab   at home alone)  Drives: Yes  Patient Regularly Uses: -- (prior to last admission no AD at rehab was using a RW)    Stairs in Home:   Use of Assistive  Device(s): RW    Prior Level of Padroni: Pt admit from [facility]    SUBJECTIVE  Pt agreeable to tx    OCCUPATIONAL THERAPY EXAMINATION      OBJECTIVE  Precautions: Limb alert - right; Limb alert - left; Cardiac; Abdominal protective strategies; Bed/chair alarm; Drain(s)  Fall Risk: High fall risk      PAIN ASSESSMENT  Rating: Unable to rate  Location: abdomen  Management Techniques: Relaxation; Repositioning      ACTIVITY TOLERANCE  Pulse: 93  Heart Rate Source: Monitor     BP: 119/67  BP Location: Right arm  BP Method: Automatic  Patient Position: Lying    O2 SATURATIONS  Oxygen Therapy  SPO2% on Room Air at Rest: 93    COGNITION  Orientation Level:  oriented to time and oriented to person        Communication: wfl, Tanzanian speaking    Behavioral/Emotional/Social: wfl    RANGE OF MOTION   Upper extremity ROM is within functional limits     STRENGTH ASSESSMENT  Upper extremity strength is within functional limits     COORDINATION  Gross Motor: WFL   Fine Motor: WFL     ACTIVITIES OF DAILY LIVING ASSESSMENT  AM-PAC ‘6-Clicks’ Inpatient Daily Activity Short Form  How much help from another person does the patient currently need…  -   Putting on and taking off regular lower body clothing?: A Lot  -   Bathing (including washing, rinsing, drying)?: A Lot  -   Toileting, which includes using toilet, bedpan or urinal? : A Lot  -   Putting on and taking off regular upper body clothing?: A Little  -   Taking care of personal grooming such as brushing teeth?: A Little  -   Eating meals?: A Little    AM-PAC Score:  Score: 15  Approx Degree of Impairment: 56.46%  Standardized Score (AM-PAC Scale): 34.69  CMS Modifier (G-Code): CK    FUNCTIONAL TRANSFER ASSESSMENT  Sit to Stand: Edge of Bed  Edge of Bed: Minimal Assist    BED MOBILITY  Rolling: Moderate Assist  Supine to Sit : Maximum Assist  Sit to Supine (OT): Maximum Assist    BALANCE ASSESSMENT  Static Sitting: Stand-by Assist  Static Standing: Minimal Assist    FUNCTIONAL  ADL ASSESSMENT  LB Dressing Seated: Maximum Assist  Toileting Standing: Maximum Assist    THERAPEUTIC EXERCISE     Skilled Therapy Provided: RN approved session. Pt received in the bed, agreeable to tx. Pt reports \"unrated\" pain in his abdomen. Pt has abdominal drain x2, IV x2. Pt's son present and assisted with Sinhala translation. Pt was educated in log roll for bed mobility. He rolled in bed with Mod A. Max A required for sidelying to sit. Pt c/o dizziness sitting EOB, BP stable.  Pt required Max A to don socks at EOB. He completed STS with Min A and RW. Pt stood x1 minute. He was able to take sidesteps along the bed with MIn A and RW. Pt requested back to bed at end of session despite encouragement from therapist to sit up in chair. Pt was positioned for comfort.      EDUCATION PROVIDED  Patient Education : Role of Occupational Therapy; Plan of Care; Discharge Recommendations; Functional Transfer Techniques  Patient's Response to Education: Verbalized Understanding; Requires Further Education    The patient's Approx Degree of Impairment: 56.46% has been calculated based [facility] '[facility] clicks' Inpatient Daily Activity Short Form.  Research supports that patients with this level of impairment may benefit from [facility].  Final disposition will be made by interdisciplinary medical team.     Patient End of Session: Needs met, Call light within reach, RN aware of session/findings, All patient questions and concerns addressed, Hospital anti-slip socks, Alarm set, Family present, In bed    OT Goals  Patients self stated goal is: unstated     Patient will complete functional transfer with SBA  Comment:     Patient will complete toileting with SBA  Comment:     Patient will tolerate standing for 3 minutes in prep for adls with SBA   Comment:    Patient will complete LB dressing with Min A  Comment:          Goals  on: 25  Frequency: 3-5x/week    Patient Evaluation Complexity Level:   Occupational  Profile/Medical History MODERATE - Expanded review of history including review of medical or therapy record   Specific performance deficits impacting engagement in ADL/IADL MODERATE  3 - 5 performance deficits   Client Assessment/Performance Deficits MODERATE - Comorbidities and min to mod modifications of tasks    Clinical Decision Making MODERATE - Analysis of occupational profile, detailed assessments, several treatment options    Overall Complexity MODERATE     OT Session    Therapeutic Activity: 20 minutes

## 2025-07-06 NOTE — PROGRESS NOTES
[facility]  part of [facility]    Progress Note    Arnaldo Gutierrez Patient Status:  Inpatient    4/15/1945 MRN [id number]   Location [facility] 4W/SW/SE Attending Declan Sterans MD   Hosp Day # 1 PCP PHYSICIAN NONSTAFF       Subjective:   Arnaldo Gutierrez is a(n) 80 year old male was seen and examined   Resting in bed, comfortably  Tolerating diet   No cp, sob, f,c,n,v abd pain or HA     Objective:   Blood pressure 115/65, pulse 93, temperature 99.4 °F (37.4 °C), temperature source Oral, resp. rate 18, height 5' 11\" (1.803 m), weight 158 lb 4.8 oz (71.8 kg), SpO2 95%.    GENERAL:  The patient appeared to be in no distress and was comfortable.  SKIN:  Warm and hydrated  PSYCHIATRIC: Calm and cooperative    HEENT:  Head was atraumatic and normocephalic.  Eyes: Sclera was anicteric.  Pupils were equal.  Ears:  There were no lesions.  Nose:  No lesions were noted.      NECK:  Supple.  There was no JVD.    CHEST:  Symmetrical movement on inspiration  CARDIAC: S1 S2+, RRR  LUNGS:  CTAB with decreased entry at bases   ABDOMEN: Non-distended, non-tender, BS+  MUSCULOSKELETAL:  There was no deformity.  There was full range of motion in all the extremities.    EXTREMITIES: There was no edema  NEUROLOGIC: Cranial nerves II-XII intact, no focal defecits    Current Inpatient Medications:   Current Hospital Medications[1]    Assessment and Plan:   Pneumoperitoneum with concern for persistent bowel leak  Abdominal fluid collection  Possibly trapped right lateral abdomen pigtail drain  - Patient with recent bowel resection.  Still has NGOZI drain  - IV antibiotics with Zosyn  - N.p.o. -> now tolerating low fiber soft diet   - General Surgery on consult  -IR has been consulted, s/p drain placement, await final cx  -ID consulted for abx management  - Pain controlled     Right lower extremity DVT, LLE soleal vein thrombosis  -Xarelto outpatient, last dose this a.m.  -on heparin gtt for now  -likely  switch to PO tomorrow     Chronic anemia, stable  - Monitor     Anasarca with diffuse subcutaneous edema on CT. likely due to hypoalbuminemia  Hypoalbuminemia  - Echo 6/24/2025: EF 60 to 65%.  Normal diastolic function.    DM2  - SSI     B-cell lymphoma  -Patient oncology follow-up     HTN  HLD  GERD  BPH  Anxiety  Depression  - IV meds as needed     Quality:  DVT Prophylaxis: heparin gtt   CODE status: Full code  ANNELISE: TBD        Results:     Recent Labs   Lab 07/04/25 2142 07/05/25 0309 07/06/25 0228   RBC 3.26* 3.17* 2.98*   HGB 8.8* 8.4* 8.0*   HCT 27.4* 27.0* 25.9*   MCV 84.0 85.2 86.9   MCH 27.0 26.5 26.8   MCHC 32.1 31.1 30.9*   RDW 17.6* 17.4* 17.4*   NEPRELIM 8.67*  --  8.84*   WBC 11.2* 9.5 10.9   .0* 519.0* 490.0*         Recent Labs   Lab 07/04/25 2142 07/05/25 0309 07/06/25 0228   * 170* 152*   BUN 14 14 12   CREATSERUM 0.64* 0.64* 0.53*   EGFRCR 97 97 103   CA 7.7* 7.8* 7.2*   * 132* 133*   K 4.3 4.1 3.9   CL 98 100 102   CO2 28.0 29.0 28.0         Imaging:   CT ABDOMEN+PELVIS(CONTRAST ONLY)(CPT=74177)  Result Date: 7/5/2025  CONCLUSION: 1.  Multiple large peritoneal abscesses are seen. The largest is centered in the left upper quadrant and left midabdomen and measures 24.3 x 10.9 x 23.9 cm with air-fluid levels and extends into the abdominal wall. This is contiguous with smaller right upper quadrant perihepatic and posterior left upper quadrant perisplenic abscesses. A separate small 3 cm abscess is noted adjacent to the bladder. Bladder diverticulum is thought less likely. 2.  Multiple prior bowel resections are seen. There may be fistulous connection between the bowel and left upper quadrant dominant abscess at the left mid abdomen. The location of a right-sided pigtail drainage catheter is uncertain but it may terminate within the ascending colon. 3.  Additional chronic and/or incidental findings are detailed in the body of this report. Preliminary report was given by  [facility]. No clinically significant discrepancies are noted. Electronically Verified and Signed by Attending Radiologist: Gelacio Bullock MD 7/5/2025 7:14 AM Workstation: [id number]            Declan Stearns MD  7/6/2025          [1]   Current Facility-Administered Medications:     fluconazole in sodium chloride 0.9% (Diflucan) 400 mg/200mL IVPB premix 400 mg, 400 mg, Intravenous, Q24H    glucose (Dex4) 15 GM/59ML oral liquid 15 g, 15 g, Oral, Q15 Min PRN **OR** glucose (Glutose) 40% oral gel 15 g, 15 g, Oral, Q15 Min PRN **OR** glucose-vitamin C (Dex-4) chewable tab 4 tablet, 4 tablet, Oral, Q15 Min PRN **OR** dextrose 50% injection 50 mL, 50 mL, Intravenous, Q15 Min PRN **OR** glucose (Dex4) 15 GM/59ML oral liquid 30 g, 30 g, Oral, Q15 Min PRN **OR** glucose (Glutose) 40% oral gel 30 g, 30 g, Oral, Q15 Min PRN **OR** glucose-vitamin C (Dex-4) chewable tab 8 tablet, 8 tablet, Oral, Q15 Min PRN    sodium chloride 0.9% infusion, , Intravenous, Continuous    morphINE PF 2 MG/ML injection 1 mg, 1 mg, Intravenous, Q2H PRN **OR** morphINE PF 2 MG/ML injection 2 mg, 2 mg, Intravenous, Q2H PRN **OR** morphINE PF 4 MG/ML injection 4 mg, 4 mg, Intravenous, Q2H PRN    acetaminophen (Tylenol) rectal suppository 650 mg, 650 mg, Rectal, Q6H PRN    ondansetron (Zofran) 4 MG/2ML injection 4 mg, 4 mg, Intravenous, Q6H PRN    metoclopramide (Reglan) 5 mg/mL injection 10 mg, 10 mg, Intravenous, Q8H PRN    piperacillin-tazobactam (Zosyn) 3.375 g in dextrose 5% 100 mL IVPB-ADDV, 3.375 g, Intravenous, Q8H    heparin (Porcine) 52245 units/250mL infusion PE/DVT/THROMBUS CONTINUOUS, 200-3,000 Units/hr, Intravenous, Continuous    pantoprazole (Protonix) 40 mg in sodium chloride 0.9% PF 10 mL IV push, 40 mg, Intravenous, Daily    insulin aspart (NovoLOG) 100 Units/mL FlexPen 1-5 Units, 1-5 Units, Subcutaneous, TID CC and HS

## 2025-07-07 LAB
ALBUMIN SERPL-MCNC: 2 G/DL (ref 3.2–4.8)
ALBUMIN/GLOB SERPL: 0.7 {RATIO} (ref 1–2)
ALP LIVER SERPL-CCNC: 182 U/L (ref 45–117)
ALT SERPL-CCNC: 16 U/L (ref 10–49)
ANION GAP SERPL CALC-SCNC: 5 MMOL/L (ref 0–18)
APTT PPP: 108.2 SECONDS (ref 23–36)
APTT PPP: 87.6 SECONDS (ref 23–36)
AST SERPL-CCNC: 22 U/L (ref ?–34)
BASOPHILS # BLD AUTO: 0.01 X10(3) UL (ref 0–0.2)
BASOPHILS NFR BLD AUTO: 0.1 %
BILIRUB SERPL-MCNC: 0.3 MG/DL (ref 0.2–1.1)
BUN BLD-MCNC: 8 MG/DL (ref 9–23)
BUN/CREAT SERPL: 16.7 (ref 10–20)
CALCIUM BLD-MCNC: 7.1 MG/DL (ref 8.7–10.4)
CHLORIDE SERPL-SCNC: 104 MMOL/L (ref 98–112)
CK SERPL-CCNC: <15 U/L (ref 46–171)
CO2 SERPL-SCNC: 26 MMOL/L (ref 21–32)
CREAT BLD-MCNC: 0.48 MG/DL (ref 0.7–1.3)
DEPRECATED RDW RBC AUTO: 54.1 FL (ref 35.1–46.3)
EGFRCR SERPLBLD CKD-EPI 2021: 106 ML/MIN/1.73M2 (ref 60–?)
EOSINOPHIL # BLD AUTO: 0.01 X10(3) UL (ref 0–0.7)
EOSINOPHIL NFR BLD AUTO: 0.1 %
ERYTHROCYTE [DISTWIDTH] IN BLOOD BY AUTOMATED COUNT: 17.1 % (ref 11–15)
GLOBULIN PLAS-MCNC: 3 G/DL (ref 2–3.5)
GLUCOSE BLD-MCNC: 139 MG/DL (ref 70–99)
GLUCOSE BLDC GLUCOMTR-MCNC: 140 MG/DL (ref 70–99)
GLUCOSE BLDC GLUCOMTR-MCNC: 181 MG/DL (ref 70–99)
GLUCOSE BLDC GLUCOMTR-MCNC: 215 MG/DL (ref 70–99)
GLUCOSE BLDC GLUCOMTR-MCNC: 221 MG/DL (ref 70–99)
GLUCOSE BLDC GLUCOMTR-MCNC: 246 MG/DL (ref 70–99)
HCT VFR BLD AUTO: 24.5 % (ref 39–53)
HGB BLD-MCNC: 7.8 G/DL (ref 13–17.5)
IMM GRANULOCYTES # BLD AUTO: 0.07 X10(3) UL (ref 0–1)
IMM GRANULOCYTES NFR BLD: 0.7 %
LYMPHOCYTES # BLD AUTO: 1.58 X10(3) UL (ref 1–4)
LYMPHOCYTES NFR BLD AUTO: 16.7 %
MAGNESIUM SERPL-MCNC: 1.8 MG/DL (ref 1.6–2.6)
MCH RBC QN AUTO: 27.2 PG (ref 26–34)
MCHC RBC AUTO-ENTMCNC: 31.8 G/DL (ref 31–37)
MCV RBC AUTO: 85.4 FL (ref 80–100)
MONOCYTES # BLD AUTO: 0.82 X10(3) UL (ref 0.1–1)
MONOCYTES NFR BLD AUTO: 8.7 %
NEUTROPHILS # BLD AUTO: 6.98 X10 (3) UL (ref 1.5–7.7)
NEUTROPHILS # BLD AUTO: 6.98 X10(3) UL (ref 1.5–7.7)
NEUTROPHILS NFR BLD AUTO: 73.7 %
OSMOLALITY SERPL CALC.SUM OF ELEC: 281 MOSM/KG (ref 275–295)
PHOSPHATE SERPL-MCNC: 3 MG/DL (ref 2.4–5.1)
PLATELET # BLD AUTO: 489 10(3)UL (ref 150–450)
POTASSIUM SERPL-SCNC: 3.3 MMOL/L (ref 3.5–5.1)
PROT SERPL-MCNC: 5 G/DL (ref 5.7–8.2)
RBC # BLD AUTO: 2.87 X10(6)UL (ref 3.8–5.8)
SODIUM SERPL-SCNC: 135 MMOL/L (ref 136–145)
WBC # BLD AUTO: 9.5 X10(3) UL (ref 4–11)

## 2025-07-07 PROCEDURE — 99233 SBSQ HOSP IP/OBS HIGH 50: CPT | Performed by: HOSPITALIST

## 2025-07-07 RX ORDER — POTASSIUM CHLORIDE 1500 MG/1
40 TABLET, EXTENDED RELEASE ORAL ONCE
Status: COMPLETED | OUTPATIENT
Start: 2025-07-07 | End: 2025-07-07

## 2025-07-07 RX ORDER — METRONIDAZOLE 500 MG/1
500 TABLET ORAL 2 TIMES DAILY
Status: DISCONTINUED | OUTPATIENT
Start: 2025-07-07 | End: 2025-07-19

## 2025-07-07 RX ORDER — HYDROCODONE BITARTRATE AND ACETAMINOPHEN 5; 325 MG/1; MG/1
1 TABLET ORAL EVERY 6 HOURS PRN
Refills: 0 | Status: DISCONTINUED | OUTPATIENT
Start: 2025-07-07 | End: 2025-07-12

## 2025-07-07 RX ORDER — MAGNESIUM OXIDE 400 MG/1
400 TABLET ORAL ONCE
Status: COMPLETED | OUTPATIENT
Start: 2025-07-07 | End: 2025-07-07

## 2025-07-07 NOTE — PROGRESS NOTES
[facility]  part of [facility]    Progress Note    Arnaldo Gutierrez Patient Status:  Inpatient    4/15/1945 MRN [id number]   Location [facility] 4W/SW/SE Attending Declan Stearns MD   Hosp Day # 2 PCP PHYSICIAN NONSTAFF       Subjective:   Arnaldo Gutierrez is a(n) 80 year old male was seen and examined   Resting in bed, comfortably  Tolerating diet   C/o some RUE swelling  No cp, sob, f,c,n,v abd pain or HA     Objective:   Blood pressure 106/54, pulse 82, temperature 98.2 °F (36.8 °C), temperature source Oral, resp. rate 18, height 5' 11\" (1.803 m), weight 158 lb 4.8 oz (71.8 kg), SpO2 92%.    GENERAL:  The patient appeared to be in no distress and was comfortable.  SKIN:  Warm and hydrated  PSYCHIATRIC: Calm and cooperative    HEENT:  Head was atraumatic and normocephalic.  Eyes: Sclera was anicteric.  Pupils were equal.  Ears:  There were no lesions.  Nose:  No lesions were noted.      NECK:  Supple.  There was no JVD.    CHEST:  Symmetrical movement on inspiration  CARDIAC: S1 S2+, RRR  LUNGS:  CTAB with decreased entry at bases   ABDOMEN: Non-distended, non-tender, BS+  MUSCULOSKELETAL:  There was no deformity.  There was full range of motion in all the extremities.    EXTREMITIES: There was no edema  NEUROLOGIC: Cranial nerves II-XII intact, no focal defecits    Current Inpatient Medications:   Current Hospital Medications[1]    Assessment and Plan:   Pneumoperitoneum with concern for persistent bowel leak  Abdominal fluid collection  Possibly trapped right lateral abdomen pigtail drain  - Patient with recent bowel resection.  Still has NGOZI drain  - IV antibiotics with Zosyn  - N.p.o. -> now tolerating low fiber soft diet   - General Surgery on consult  -IR has been consulted, s/p drain placement, cultures reviewed   -ID consulted for abx management  - Pain controlled  -will go for SBFT tomorrow     Right lower extremity DVT, LLE soleal vein thrombosis  -Xarelto outpatient,  last dose this a.m.  -on heparin gtt for now  -likely switch to PO tomorrow     RUE swelling  -Will need to r/o DVT, await US  -cont heparin gtt    Chronic anemia, stable  - Monitor     Anasarca with diffuse subcutaneous edema on CT. likely due to hypoalbuminemia  Hypoalbuminemia  - Echo 6/24/2025: EF 60 to 65%.  Normal diastolic function.    DM2  - SSI     B-cell lymphoma  -Patient oncology follow-up     HTN  HLD  GERD  BPH  Anxiety  Depression  - IV meds as needed     Quality:  DVT Prophylaxis: heparin gtt   CODE status: Full code  ANNELISE: TBD        Results:     Recent Labs   Lab 07/04/25  2142 07/05/25  0309 07/06/25  0228 07/07/25  0706   RBC 3.26* 3.17* 2.98* 2.87*   HGB 8.8* 8.4* 8.0* 7.8*   HCT 27.4* 27.0* 25.9* 24.5*   MCV 84.0 85.2 86.9 85.4   MCH 27.0 26.5 26.8 27.2   MCHC 32.1 31.1 30.9* 31.8   RDW 17.6* 17.4* 17.4* 17.1*   NEPRELIM 8.67*  --  8.84* 6.98   WBC 11.2* 9.5 10.9 9.5   .0* 519.0* 490.0* 489.0*         Recent Labs   Lab 07/05/25  0309 07/06/25  0228 07/07/25  0706   * 152* 139*   BUN 14 12 8*   CREATSERUM 0.64* 0.53* 0.48*   EGFRCR 97 103 106   CA 7.8* 7.2* 7.1*   * 133* 135*   K 4.1 3.9 3.3*    102 104   CO2 29.0 28.0 26.0         Imaging:   No results found.            Declan Stearns MD  7/7/2025          [1]   Current Facility-Administered Medications:     HYDROcodone-acetaminophen (Norco) 5-325 MG per tab 1 tablet, 1 tablet, Oral, Q6H PRN    glycerin-hypromellose- (Artificial Tears) 0.2-0.2-1 % ophthalmic solution 1 drop, 1 drop, Both Eyes, QID PRN    fluconazole in sodium chloride 0.9% (Diflucan) 400 mg/200mL IVPB premix 400 mg, 400 mg, Intravenous, Q24H    melatonin cap/tab 5 mg, 5 mg, Oral, Nightly PRN    temazepam (Restoril) cap 7.5 mg, 7.5 mg, Oral, Nightly PRN    glucose (Dex4) 15 GM/59ML oral liquid 15 g, 15 g, Oral, Q15 Min PRN **OR** glucose (Glutose) 40% oral gel 15 g, 15 g, Oral, Q15 Min PRN **OR** glucose-vitamin C (Dex-4) chewable tab 4  tablet, 4 tablet, Oral, Q15 Min PRN **OR** dextrose 50% injection 50 mL, 50 mL, Intravenous, Q15 Min PRN **OR** glucose (Dex4) 15 GM/59ML oral liquid 30 g, 30 g, Oral, Q15 Min PRN **OR** glucose (Glutose) 40% oral gel 30 g, 30 g, Oral, Q15 Min PRN **OR** glucose-vitamin C (Dex-4) chewable tab 8 tablet, 8 tablet, Oral, Q15 Min PRN    sodium chloride 0.9% infusion, , Intravenous, Continuous    morphINE PF 2 MG/ML injection 1 mg, 1 mg, Intravenous, Q2H PRN **OR** morphINE PF 2 MG/ML injection 2 mg, 2 mg, Intravenous, Q2H PRN **OR** morphINE PF 4 MG/ML injection 4 mg, 4 mg, Intravenous, Q2H PRN    acetaminophen (Tylenol) rectal suppository 650 mg, 650 mg, Rectal, Q6H PRN    ondansetron (Zofran) 4 MG/2ML injection 4 mg, 4 mg, Intravenous, Q6H PRN    metoclopramide (Reglan) 5 mg/mL injection 10 mg, 10 mg, Intravenous, Q8H PRN    piperacillin-tazobactam (Zosyn) 3.375 g in dextrose 5% 100 mL IVPB-ADDV, 3.375 g, Intravenous, Q8H    heparin (Porcine) 95547 units/250mL infusion PE/DVT/THROMBUS CONTINUOUS, 200-3,000 Units/hr, Intravenous, Continuous    pantoprazole (Protonix) 40 mg in sodium chloride 0.9% PF 10 mL IV push, 40 mg, Intravenous, Daily    insulin aspart (NovoLOG) 100 Units/mL FlexPen 1-5 Units, 1-5 Units, Subcutaneous, TID CC and HS

## 2025-07-07 NOTE — CONGREGATE LIVING REVIEW
[facility] Living Authorization    The [facility] Review Committee has reviewed this case and the patient IS APPROVED for discharge to a facility for Short Term Skilled once the following procedure is followed:     - The physician discharge instructions (contained within the AMRISOL note for SNF) must inlcude the below appropriate and approved COVID instructions to the facility    For questions regarding CLRC approval process, please contact the CM assigned to the case.  For questions regarding RN discharge workflow, please contact the unit Clinical Leader.

## 2025-07-07 NOTE — PROGRESS NOTES
[facility]  part of [facility]  Progress Note      Arnaldo Gutierrez Patient Status:  Inpatient    4/15/1945 MRN [id number]   Location [facility] 4W/SW/SE Attending Declan Stearns MD   Hosp Day # 2 PCP PHYSICIAN NONSTAFF       Subjective:   Lying in bed  Objective:   RUQ NGOZI drain cloudy serous output  LLQ drain: large amounts of tan opaque fluid in drainage bag    Vital Signs:  Blood pressure 106/54, pulse 82, temperature 98.2 °F (36.8 °C), temperature source Oral, resp. rate 18, height 71\", weight 158 lb 4.8 oz (71.8 kg), SpO2 92%.    Input/Output:    Intake/Output Summary (Last 24 hours) at 2025 1053  Last data filed at 2025 0830  Gross per 24 hour   Intake 1887 ml   Output 2603 ml   Net -716 ml     24 hour drain output: RUQ drain: 3mL  LLQ:  450mL    Results:   Labs:  Lab Results   Component Value Date    WBC 9.5 2025    RBC 2.87 2025    HGB 7.8 2025    HCT 24.5 2025    MCV 85.4 2025    MCH 27.2 2025    MCHC 31.8 2025    RDW 17.1 2025    .0 2025     Recent Labs   Lab 25  0309 25  0228 25  0706   * 152* 139*   BUN 14 12 8*   CREATSERUM 0.64* 0.53* 0.48*   EGFRCR 97 103 106   CA 7.8* 7.2* 7.1*   * 133* 135*   K 4.1 3.9 3.3*    102 104   CO2 29.0 28.0 26.0     CT ABDOMEN+PELVIS(CONTRAST ONLY)(CPT=74177)  Result Date: 2025  CONCLUSION: 1.  Multiple large peritoneal abscesses are seen. The largest is centered in the left upper quadrant and left midabdomen and measures 24.3 x 10.9 x 23.9 cm with air-fluid levels and extends into the abdominal wall. This is contiguous with smaller right upper quadrant perihepatic and posterior left upper quadrant perisplenic abscesses. A separate small 3 cm abscess is noted adjacent to the bladder. Bladder diverticulum is thought less likely. 2.  Multiple prior bowel resections are seen. There may be fistulous connection between the bowel and  left upper quadrant dominant abscess at the left mid abdomen. The location of a right-sided pigtail drainage catheter is uncertain but it may terminate within the ascending colon. 3.  Additional chronic and/or incidental findings are detailed in the body of this report. Preliminary report was given by [facility]. No clinically significant discrepancies are noted. Electronically Verified and Signed by Attending Radiologist: Gelacio Bullock MD 7/5/2025 7:14 AM Workstation: [id number]    US VENOUS DOPPLER LEG BILAT - DIAG IMG (CPT=93970)  Result Date: 6/24/2025  CONCLUSION: 1. Abnormal exam. Right lower extremity has nonocclusive acute appearing thrombus within the common femoral vein as well as the visualized portions of the deep femoral vein. There is also nonocclusive thrombus within the right popliteal vein. 2. No DVT in the left lower extremity, however there is acute occlusive thrombus within a left soleal vein at the knee. Electronically Verified and Signed by Attending Radiologist: Jason Quiñones MD 6/24/2025 12:45 PM Workstation: [id number]          Assessment and Plan:   79 yo male s/p 6/12/25 Laparoscopic-assisted transverse colon resection, small bowel resection x2, resection of tumor nodule on small bowel.   Pneumoperitoneum with concern for persistent bowel leak  S/p RUQ IR drain 6/19/25 and LLQ IR drain 7/5/25  Extensive output from new left sided drain, 450mL in 24/hrs  Surgery following, Recommend interval follow up CT , drain check with IR in 2-4 weeks    FAIZAN Vargas  7/7/2025  10:53 AM

## 2025-07-07 NOTE — DIETARY NOTE
ADULT NUTRITION INITIAL ASSESSMENT    Pt is at moderate nutrition risk.  Pt does not meet malnutrition criteria at this time.     RECOMMENDATIONS TO MD: See nutrition intervention for ONS (oral nutrition supplements)    ADMITTING DIAGNOSIS:  Pneumoperitoneum [K66.8]  Anasarca [R60.1]  Abdominal distension [R14.0]  Intraabdominal fluid collection [R18.8]  PERTINENT PAST MEDICAL HISTORY: Past Medical History[1]    PATIENT STATUS: Initial 07/07/25: Pt assessed due to screening at risk, MST score of 5. Pt admitted with distended abdomen and abdominal discomfort. Pt recently admitted due to large colonic mass, s/p lap transverse colon resection, small bowel resection x2, resection of tumor nodule on small bowel on 6/12. Was on PN on previous visit. Per GI, CT showed multiple large peritoneal abscesses. No acute surgical intervention at this time, IR drain placement 7/5. Concerns for leaking from bowel, per surgery, plan for small bowel follow through. Met with pt bedside. Utilized language line (#[id number], Oscar). Diet advanced to low fiber/soft yesterday. Pt reports he has a good appetite and is tolerating the diet well with no N/V. Pt reports he was eating well PTA. Per EMR review, pt weighed 167 lbs 6/19/25, current weight at 158 lbs. 5.4% weight loss in 1 month, significant and severe. Pt noted with a stage 2 PI on coccyx (left). Pt previously on SF mighty shake and enjoyed, pt agreeable to receiving again this admission. Will monitor PO + ONS intake.     FOOD/NUTRITION RELATED HISTORY:  Appetite: Good  Intake: ~90% x  1 meals documented since admit  Intake Meeting Needs: Yes, and oral nutrition supplements (ONS) to maximize  Percent Meals Eaten (last 6 days)       Date/Time Percent Meals Eaten (%)    07/06/25 1300 90 %           Food Allergies: No Known Food Allergies (NKFA)  Cultural/Ethnic/Mosque Preferences: Not Obtained    GASTROINTESTINAL: +BM 7/4 and distention  453 mL output via drain     MEDICATIONS:  reviewed  Scheduled Medications[2]  Medication Infusions[3]     LABS: reviewed  Recent Labs     07/04/25  2142 07/05/25  0309 07/06/25  0228 07/07/25  0706   * 170* 152* 139*   BUN 14 14 12 8*   CREATSERUM 0.64* 0.64* 0.53* 0.48*   CA 7.7* 7.8* 7.2* 7.1*   MG 1.7  --  1.9 1.8   * 132* 133* 135*   K 4.3 4.1 3.9 3.3*   CL 98 100 102 104   CO2 28.0 29.0 28.0 26.0   PHOS  --   --   --  3.0   OSMOCALC 284 278 279 281       WEIGHT HISTORY:  Patient Weight(s) for the past 336 hrs:   Weight   07/05/25 0243 71.8 kg (158 lb 4.8 oz)     Wt Readings from Last 10 Encounters:   07/05/25 71.8 kg (158 lb 4.8 oz)   07/02/25 77.6 kg (171 lb)   06/24/25 77.7 kg (171 lb 3.2 oz)   05/28/25 67.1 kg (148 lb)   04/09/25 72.6 kg (160 lb)   04/08/25 72.6 kg (160 lb)   03/18/25 81.6 kg (180 lb)   01/16/25 76.7 kg (169 lb)   11/21/24 77.6 kg (171 lb)   08/29/24 78.9 kg (174 lb)       ANTHROPOMETRICS:  HT: 180.3 cm (5' 11\")  Wt Readings from Last 1 Encounters:   07/05/25 71.8 kg (158 lb 4.8 oz)     Last weight: Likely accurate  BMI: Body mass index is 22.08 kg/m².  Dosing Weight: 71.8 kg - recent weight, utilized for anthropometric calculations  BMI CLASSIFICATION: 18.5-24.9 kg/m2 - WNL  IBW/lbs (Calculated) Male: 172 lbs             92% IBW  Usual Body Wt: 189 lbs 1 year ago     84% UBW    NUTRITION RELATED PHYSICAL FINDINGS:  - Nutrition Focused Physical Exam (NFPE): no wasting noted per visual exam   - Fluid Accumulation: non-pitting Left Upper extremity, Lower extremity, and Foot  and Right Upper extremity, Lower extremity, and Foot . See RN documentation for details  - Skin Integrity: Stage 2 PI coccyx left. See RN documentation for details      NUTRITION DIAGNOSIS/PROBLEM:   Unintended wt loss related to Physiological causes increasing nutrient needs as evidenced by 5.4% weight loss in 1 month     NUTRITION INTERVENTION:     NUTRITION PRESCRIPTION:   Estimated Nutrition needs: --dosing wt of 71.8 kg - wt taken on  7/5/25  Calories: 2010-2154 calories/day (28-30 calories per kg Dosing wt)   Protein:  g protein/day (1.2-1.4 g protein/kg Dosing wt) -> Stage 2 PI    - Diet:       Procedures    Low Fiber/Soft diet Low Fiber/Soft; Is Patient on Accuchecks? Yes      - Nutrition Care Plan: Encouraged increased PO intake, Encouraged small frequent meals with emphasis on high calorie/high protein, and Initiated ONS (oral nutritional supplements)  - ONS (Oral Nutrition Supplements)/Meals/Snacks: SF Shake (200 calories/ 7 g protein each) BID Chocolate   - Vitamin and mineral supplements: none  - Feeding assistance: independent  - Nutrition education: Discussed importance of adequate energy and protein intake    - Coordination of nutrition care: collaboration with other providers  - Discharge and transfer of nutrition care to new setting or provider: monitor plans    MONITOR AND EVALUATE/NUTRITION GOALS:  - Food and Nutrient Intake:      Monitor: adequacy of PO intake and adequacy of supplement intake  - Food and Nutrient Administration:      Monitor: N/A  - Anthropometric Measurement:    Monitor weight  - Nutrition Goals:      halt wt loss, PO and supplement greater than 75% of needs, intake to meet needs, good supplement intake, return to normal GI function, labs within acceptable limits, euglycemia, minimize lean body mass loss, prevent skin breakdown, promote healing, support wound healing, support body systems, and improved GI status    DIETITIAN FOLLOW UP: RD to follow and monitor nutrition status    Miladys Hernandez MS, RDN, LDN  Clinical Dietitian  [phone]          [1]   Past Medical History:   Anxiety    BPH (benign prostatic hyperplasia)    CKD (chronic kidney disease) stage 3, GFR 30-59 ml/min (HCC)    Depression    Diabetes (HCC)    Diabetes mellitus (HCC)    Essential hypertension    GERD (gastroesophageal reflux disease)    High cholesterol    Hyperlipidemia   [2]    fluconazole  400 mg Intravenous Q24H     piperacillin-tazobactam  3.375 g Intravenous Q8H    pantoprazole  40 mg Intravenous Daily    insulin aspart  1-5 Units Subcutaneous TID CC and HS   [3]    sodium chloride 75 mL/hr at 07/05/25 0252    continuous dose heparin 1,300 Units/hr (07/07/25 0807)

## 2025-07-07 NOTE — PLAN OF CARE
Heparin gtt to 13cc/hr. IV abx as ordered. RUE doppler to be done. SBFT to be done tomorrow due to meal this morning. NPO after midnight.   Problem: Patient Centered Care  Goal: Patient preferences are identified and integrated in the patient's plan of care  Description: Interventions:  - What would you like us to know as we care for you? I came from [facility]  - Provide timely, complete, and accurate information to patient/family  - Incorporate patient and family knowledge, values, beliefs, and cultural backgrounds into the planning and delivery of care  - Encourage patient/family to participate in care and decision-making at the level they choose  - Honor patient and family perspectives and choices  Outcome: Progressing     Problem: Diabetes/Glucose Control  Goal: Glucose maintained within prescribed range  Description: INTERVENTIONS:  - Monitor Blood Glucose as ordered  - Assess for signs and symptoms of hyperglycemia and hypoglycemia  - Administer ordered medications to maintain glucose within target range  - Assess barriers to adequate nutritional intake and initiate nutrition consult as needed  - Instruct patient on self management of diabetes  Outcome: Progressing     Problem: Patient/Family Goals  Goal: Patient/Family Long Term Goal  Description: Patient's Long Term Goal: return home    Interventions:  - follow plan of care  - See additional Care Plan goals for specific interventions  Outcome: Progressing  Goal: Patient/Family Short Term Goal  Description: Patient's Short Term Goal: to get stronger and feel better    Interventions:   - PT/OT on consult  - IV abx  - surgery, IR on consult  - NGOZI drains  - monitor labs and vital signs  - See additional Care Plan goals for specific interventions  Outcome: Progressing     Problem: PAIN - ADULT  Goal: Verbalizes/displays adequate comfort level or patient's stated pain goal  Description: INTERVENTIONS:  - Encourage pt to monitor pain and request assistance  -  Assess pain using appropriate pain scale  - Administer analgesics based on type and severity of pain and evaluate response  - Implement non-pharmacological measures as appropriate and evaluate response  - Consider cultural and social influences on pain and pain management  - Manage/alleviate anxiety  - Utilize distraction and/or relaxation techniques  - Monitor for opioid side effects  - Notify MD/LIP if interventions unsuccessful or patient reports new pain  - Anticipate increased pain with activity and pre-medicate as appropriate  Outcome: Progressing     Problem: RISK FOR INFECTION - ADULT  Goal: Absence of fever/infection during anticipated neutropenic period  Description: INTERVENTIONS  - Monitor WBC  - Administer growth factors as ordered  - Implement neutropenic guidelines  Outcome: Progressing     Problem: SAFETY ADULT - FALL  Goal: Free from fall injury  Description: INTERVENTIONS:  - Assess pt frequently for physical needs  - Identify cognitive and physical deficits and behaviors that affect risk of falls.  - [place] fall precautions as indicated by assessment.  - Educate pt/family on patient safety including physical limitations  - Instruct pt to call for assistance with activity based on assessment  - Modify environment to reduce risk of injury  - Provide assistive devices as appropriate  - Consider OT/PT consult to assist with strengthening/mobility  - Encourage toileting schedule  Outcome: Progressing     Problem: HEMATOLOGIC - ADULT  Goal: Free from bleeding injury  Description: (Example usage: patient with low platelets)  INTERVENTIONS:  - Avoid intramuscular injections, enemas and rectal medication administration  - Ensure safe mobilization of patient  - Hold pressure on venipuncture sites to achieve adequate hemostasis  - Assess for signs and symptoms of internal bleeding  - Monitor lab trends  - Patient is to report abnormal signs of bleeding to staff  - Avoid use of toothpicks and dental  floss  - Use electric shaver for shaving  - Use soft bristle tooth brush  - Limit straining and forceful nose blowing  Outcome: Progressing     Problem: GASTROINTESTINAL - ADULT  Goal: Minimal or absence of nausea and vomiting  Description: INTERVENTIONS:  - Maintain adequate hydration with IV or PO as ordered and tolerated  - Nasogastric tube to low intermittent suction as ordered  - Evaluate effectiveness of ordered antiemetic medications  - Provide nonpharmacologic comfort measures as appropriate  - Advance diet as tolerated, if ordered  - Obtain nutritional consult as needed  - Evaluate fluid balance  Outcome: Progressing  Goal: Maintains or returns to baseline bowel function  Description: INTERVENTIONS:  - Assess bowel function  - Maintain adequate hydration with IV or PO as ordered and tolerated  - Evaluate effectiveness of GI medications  - Encourage mobilization and activity  - Obtain nutritional consult as needed  - Establish a toileting routine/schedule  - Consider collaborating with pharmacy to review patient's medication profile  Outcome: Progressing

## 2025-07-07 NOTE — PROGRESS NOTES
INFECTIOUS DISEASE PROGRESS NOTE    Arnaldo Gutierrez Patient Status:  Inpatient    4/15/1945 MRN [id number]   Location [facility] 4W/SW/SE Attending Declan Stearns MD   Hosp Day # 2 PCP PHYSICIAN NONSTAFF       SUBJECTIVE  ROS done. Feels better. Ate some breakfast. No f/c. No n/v.    ASSESSMENT/PLAN:    Antibiotics: IV cefepime, dapto, flagyl, micafungin; (IV zosyn, fluconazole, augmentin)     ASSESSMENT:     # multiple large intra-abdominal abscesses               - now s/p IR drain placement on 25               - fluid cx so far with GNR and budding yeast               - surgery and IR following     # hx of Large bowel obstruction s/p OR 6/10/25 for lap transverse colon resection, small bowel resection x2, rsection of tumor nodule on small bowel - path with DLBCL               - s/p colonic stent placement by GI 25               - post op fluid collection s/p IR drain placement 25 with 260 cc of serosang fluid, cx negative      PLAN:     -  discontinue IV zosyn and fluconazole  -  start IV daptomycin, cefepime, metronidazole, micafungin  -  will follow fluid cx results.   -  drain per IR  -  Follow fever curve, wbc  -  Reviewed labs, micro, imaging reports  -  d/w patient, family, RN, staff        History of Present Illness:  Arnaldo Gutierrez is a a(n) 80 year old male with history of diabetes, HTN, BPH who was admitted on  with abdominal bloating, nausea, decreased appetite, unintentional weight loss. Initial CT A/P on  with 4 cm segment of masslike constriction and thickening in the proximal transverse colon with concern for partially obstructing chronic neoplasm. Seen by GI and underwent colonoscopy with chronic stent placement on . Also seen by general surgery and taken to the OR on 6/10 for large bowel obstruction status post laparoscopic assisted transverse colon resection, small bowel resection and tumor nodule and small bowel. Pathology diffuse large B-cell lymphoma.  Seen by oncology. On 6/19 had low-grade temperatures with increasing WBC up to 19 with repeat CT A/P showing moderate to large volume free fluid in the abdomen and pelvis in the upper quadrants. Organized fluid in the left lower retroperitoneum measuring 5.5 x 4.7 x 5.8 cm concern for seroma, liquefied hematoma versus inflammatory fluid. Seen by IR and underwent IR peritoneal drain placement on 6/19 with 260 cc of serosanguineous fluid. Cultures negative. Was on zosyn while inpatient and transitioned to PO augmentin on discharge. Discharged on 6/24/25 to subacute rehab. Came back to ED on 7/4/25 with abdominal pain. Repeat CT with multiple large peritoneal abscesses. Surgery and IR consult.  Now status post IR drainage with cx growing GNR and budding yeast.  Currently on IV Zosyn.  ID consulted for further antibiotics management.     OBJECTIVE  /54 (BP Location: Left arm)   Pulse 82   Temp 98.2 °F (36.8 °C) (Oral)   Resp 18   Ht 180.3 cm (5' 11\")   Wt 158 lb 4.8 oz (71.8 kg)   SpO2 92%   BMI 22.08 kg/m²     General: No acute distress. Alert.  HEENT: EOMI   Abdomen: Soft, nontender, nondistended. RUQ drain with minimal output; LLQ large drain with large output  Musculoskeletal: No edema  Integument: No rashes        MD Michele Meyer [facility]  [phone]  7/7/2025

## 2025-07-07 NOTE — PAYOR COMM NOTE
--------------  ADMISSION REVIEW     Payor: [employer]  Subscriber #:  [id number]  Authorization Number: [id number]    Admit date: 7/5/25  Admit time:  2:31 AM      7/4 Patient Seen in: [facility] Emergency Department    History     Chief Complaint   Patient presents with    Abdomen/Flank Pain     Stated Complaint: distended abdomen    HPI    Pt Lithuanian speaking, assisted with Lithuanian speaking RN which patient prefers    Patient is a 80M hx of CKD, DM, GERD, HTN, HLD, b cell lymphoma, recent colon resection and stent placement in setting of colon mass, complains of abdominal distension that began today.  Pain described as bloating sensation and tightness. Denies nausea or vomiting. No diarrhea, has had normal stool without blood. No difficulty urinating or dysuria. No fever or chills. Denies CP or SOB.        Current:/64 (BP Location: Right arm)   Pulse 92   Temp 98.4 °F (36.9 °C) (Oral)   Resp 12   Ht 180.3 cm (5' 11\")   Wt 71.8 kg   SpO2 93%   BMI 22.08 kg/m²   Pulse ox 95% on RA       Physical Exam  General Appearance: alert, no distress  Eyes: pupils equal and round no pallor or injection  ENT, Mouth: mucous membranes moist  Respiratory: there are no retractions, lungs are clear to auscultation in upper lung fields, diminished at bases   Cardiovascular: regular rate and rhythm no MRG   Gastrointestinal: abdomen distended, mildly tender throughout. Lower midline surgical site appears c/d/I without drainage or induration/fluctuance, no erythema or warmth, NGOZI drain with small amount of serosanguinous drainage to RUQ.   Neurological: II-XII grossly intact no focal deficits  Skin: warm and dry, no rashes.  Musculoskeletal:  Extremities are symmetric, grossly full range of motion. 2+ leg edema bilaterally noted   Psychiatric: patient is pleasant, there is no agitation      ED Course     Labs Reviewed   CBC WITH DIFFERENTIAL WITH PLATELET - Abnormal; Notable for the following  components:       Result Value    WBC 11.2 (*)     RBC 3.26 (*)     HGB 8.8 (*)     HCT 27.4 (*)     RDW-SD 54.5 (*)     RDW 17.6 (*)     .0 (*)     Neutrophil Absolute Prelim 8.67 (*)     Neutrophil Absolute 8.67 (*)     Monocyte Absolute 1.08 (*)     All other components within normal limits   COMP METABOLIC PANEL (14) - Abnormal; Notable for the following components:    Glucose 190 (*)     Sodium 134 (*)     Creatinine 0.64 (*)     BUN/CREA Ratio 21.9 (*)     Calcium, Total 7.7 (*)     Alkaline Phosphatase 236 (*)     Albumin 2.5 (*)     A/G Ratio 0.7 (*)     All other components within normal limits   BASIC METABOLIC PANEL (8) - Abnormal; Notable for the following components:    Glucose 170 (*)     Sodium 132 (*)     Creatinine 0.64 (*)     BUN/CREA Ratio 21.9 (*)     Calcium, Total 7.8 (*)     All other components within normal limits   CBC, PLATELET; NO DIFFERENTIAL - Abnormal; Notable for the following components:    RBC 3.17 (*)     HGB 8.4 (*)     HCT 27.0 (*)     RDW 17.4 (*)     RDW-SD 55.1 (*)     .0 (*)     All other components within normal limits   PTT, ACTIVATED - Abnormal; Notable for the following components:    PTT 48.7 (*)     All other components within normal limits   PTT, ACTIVATED - Abnormal; Notable for the following components:    PTT 45.7 (*)     All other components within normal limits   POCT GLUCOSE - Abnormal; Notable for the following components:    POC Glucose  162 (*)     All other components within normal limits   POCT GLUCOSE - Abnormal; Notable for the following components:    POC Glucose  163 (*)     All other components within normal limits   LIPASE - Normal   MAGNESIUM - Normal   LACTIC ACID, PLASMA - Normal   ED/MRSA SCREEN BY PCR-CC - Normal   SCAN SLIDE   PTT, ACTIVATED   RAINBOW DRAW LAVENDER   RAINBOW DRAW LIGHT GREEN   RAINBOW DRAW BLUE   BLOOD CULTURE   BLOOD CULTURE     Radiology Interpretation:  CT ABDOMEN+PELVIS(CONTRAST ONLY)(CPT=74177)  Result Date:  7/5/2025  CONCLUSION: 1.  Multiple large peritoneal abscesses are seen. The largest is centered in the left upper quadrant and left midabdomen and measures 24.3 x 10.9 x 23.9 cm with air-fluid levels and extends into the abdominal wall. This is contiguous with smaller right upper quadrant perihepatic and posterior left upper quadrant perisplenic abscesses. A separate small 3 cm abscess is noted adjacent to the bladder. Bladder diverticulum is thought less likely. 2.  Multiple prior bowel resections are seen. There may be fistulous connection between the bowel and left upper quadrant dominant abscess at the left mid abdomen. The location of a right-sided pigtail drainage catheter is uncertain but it may terminate within the ascending colon. 3.  Additional chronic and/or incidental findings are detailed in the body of this report. Preliminary report was given by [facility]. No clinically significant discrepancies are noted.         Reviewed discharge summary from  6/5-6/24/2025 June 6/6/25 - s/p colonoscopy with metallic colon stent  6/10/25 - s/p laparoscopic transverse colon resection, small bowel resection x2, resection of tumor nodule on small bowel. Metastatic disease  Abdominal fluid collection  6/19/2025 - IR guided NGOZI drainplacement in abd fluid collection in RUQ      MDM      Patient is a 80M hx of CKD, DM, GERD, HTN, HLD, recent colon resection and stent placement in setting of colon mass, complains of abdominal distension that began today. Exam as above       Ddx includes  Sbo  Ileus  Volvulus  Intraabd abscess or fluid collection   Perforated viscus   Malignancy    Plan: cbc, cmp, CTAP, lipase    Will give morphine for pain     Admission disposition: 7/5/2025 12:46 AM       ED Course as of 07/05/25 0719  ------------------------------------------------------------  Time: 07/04 2214  Value: WBC(!): 11.2  Comment: Mild leukocytosis with chronic anemia. Chronic thrombocytosis unchanged    ------------------------------------------------------------  Time: 07/04 2214  Comment: Cmp unremarkable   ------------------------------------------------------------  Time: 07/04 2214  Comment: Lipase normal   ------------------------------------------------------------  Time: 07/04 2250  Value: CT ABDOMEN+PELVIS(CONTRAST ONLY)(CPT=74177)  Comment: On my evaluation of pt's CT abd pelvis, large amount of bowel gas noted  ------------------------------------------------------------  Time: 07/04 2308  Comment: Case discussed with Dr. Glynn with radiology, states large fluid collection walling off in anterior abdomen with with continued free fluid and   ------------------------------------------------------------  Time: 07/04 8962  Comment: IMPRESSION:    Moderate bilateral pleural effusions are increased in size.  There is associated compressive atelectasis versus pneumonia, which appears stable.    Large area of pneumoperitoneum is again seen.  Large collection with fluid and gas is seen within the anterior abdomen, measuring at least 27.9 x 11.6 cm in diameter.  Gas extends through the anterior abdominal wound into the subcutaneous tissues.  Given the persistence of the large amount of fluid and gas within the collection, possibility of continued bowel leak should be considered.    There is a pigtail drainage catheter along the right lateral abdomen.  This apparently previously drained to the right lateral fluid collection.  This projects along the course of the ascending colon.  On sagittal reconstructions, this appears to be trapped between loops of colon rather than being intraluminal, although intraluminal position is not entirely excluded.  Correlate clinically.    Small residual rim-enhancing collection along the right lateral abdomen which appears to communicate with the larger anterior collection.    Anterior collection produces mass effect on the stomach.  Mass effect on the liver as  well.    Cholelithiasis.  No evidence of biliary dilatation or acute pancreatic abnormality.    Nonobstructing right renal calculus.  No hydronephrosis or ureteral calculus.    Small collection to the left of the bladder has decreased in size, now measuring 3.1 x 2.3 cm.    Left flank collection, containing fluid and gas also appears decreased.  This is adjacent to the spleen.  It is possible that this also communicates to the anterior collection.  This component measures 7.0 x 2.4 cm.    Postoperative changes are seen along bowel in the left hemiabdomen.  No evidence of bowel obstruction.    No adenopathy is seen.    Anasarca, with diffuse subcutaneous edema.    ------------------------------------------------------------  Time: 07/04 8659  Comment: Case d/w Dr. Hood with gen surg will see on consult agrees with plan. Will give empiric zosyn and keep NPO. Bcx/lactic sent. D/w Dr. Gonzales accepts admission. Pt and his family cartside were updated and agreeable with admission        Disposition and Plan     Clinical Impression:  1. Abdominal distension    2. Anasarca    3. Pneumoperitoneum    4. Intraabdominal fluid collection         Disposition:  Admit  7/5/2025       History and Physical         Subjective:  Arnaldo Gutierrez is a 80 year old male with B-cell lymphoma, BPH, CKD, depression, DM, HTN, GERD, HLD, anxiety who was recently hospitalized due to large colonic mass, subsequently discharged to nursing home.  Sent to ED today for evaluation of distended abdomen and abdominal discomfort.  At the time of my evaluation he is resting comfortably.  Pain controlled.  Denies nausea or vomiting.  No fever or chills.  Denies diarrhea.  Denies chest pain or shortness of breath.     CT today shows a large area of pneumoperitoneum.  Large collection with fluid and gas is seen within the anterior abdomen.  Findings concerning for continued bowel leak.  Also anasarca with bilateral pleural effusions.     Antibiotics  started.  General surgery consulted.    Chart reviewed:  Patient recently hospitalized due to colonic mass with large bowel obstruction.  Diagnosed with diffuse large B-cell lymphoma.  Had colonoscopy 6/6/2025 with metallic colon stents placed.  Had laparoscopic transverse colon resection, small bowel resection on 6/10/2025.  Had NGOZI drain placed after CT 6/19/2025 showed abdominal fluid collection.  Patient discharged on Augmentin.  Hospital course also complicated by lower extremity DVTs, on Xarelto.    Assessment & Plan:  Pneumoperitoneum with concern for persistent bowel leak  Abdominal fluid collection  Possibly trapped right lateral abdomen pigtail drain  - Patient with recent bowel resection.  Still has NGOZI drain  - Admit  - IV antibiotics with Zosyn  - N.p.o.  - General Surgery on consult  -Consider IR consult in a.m. to evaluate pigtail drain.  - Pain control     Right lower extremity DVT, LLE soleal vein thrombosis  -Xarelto outpatient, last dose this a.m.  -In case surgery needed, will hold Xarelto, start heparin drip in a.m.     Chronic anemia, stable  - Monitor     Anasarca with diffuse subcutaneous edema on CT. likely due to hypoalbuminemia  Hypoalbuminemia  - Echo 6/24/2025: EF 60 to 65%.  Normal diastolic function.  - Will need dietary consult following surgery evaluation.  - N.p.o. for now     DM2  - SSI     B-cell lymphoma  -Patient oncology follow-up     HTN  HLD  GERD  BPH  Anxiety  Depression  - IV meds as needed       7/5:   General Surgery Consult:     Reason for Consultation:  Intra-abdominal abscesses     History of Present Illness:  Arnaldo Gutierrez is a 80 year old male who [facility] to [facility] on 7/4/2025 with complaints of worsening abdominal pain associated with distention. He recently underwent a lap transverse colon resection with small bowel resection x2 and resection of tumor nodule on small bowel on 6/12 with Dr. Randall. CT A/P from yesterday revealed multiple large  peritoneal abscesses seen, the largest measuring 24.3 x 10.9 x 23.9 cm. Afebrile, appears comfortable, WBC 9.5.    Gastrointestinal:  Positive for abdominal pain and abdominal distention. Negative for nausea and vomiting.     Abdominal:      General: There is distension.      Tenderness: There is abdominal tenderness. There is no guarding or rebound.      Comments: Drain with serosanguineous output     Recent Labs   Lab 07/04/25 2142 07/05/25  0309   RBC 3.26* 3.17*   HGB 8.8* 8.4*   HCT 27.4* 27.0*   MCV 84.0 85.2   MCH 27.0 26.5   MCHC 32.1 31.1   RDW 17.6* 17.4*   NEPRELIM 8.67*  --    WBC 11.2* 9.5   .0* 519.0*      Lab 07/04/25 2142 07/05/25  0309   * 170*   BUN 14 14   CREATSERUM 0.64* 0.64*   CA 7.7* 7.8*   ALB 2.5*  --    * 132*   K 4.3 4.1   CL 98 100   CO2 28.0 29.0   ALKPHO 236*  --    AST 15  --    ALT 13  --    BILT 0.4  --    TP 6.0  --       Lab 07/05/25 0309 07/05/25  0632   PTT 45.7* 48.7*       Intra-abdominal abscesses following lap transverse colon resection on 6/12/25     Plan:  No acute surgical intervention indicated at this time. Patient afebrile, pain managed, afebrile  IR consulted to evaluate for drain placement, appreciate recs  Continue IV abx and IVF  Keep NPO until IR evaluation  Analgesics and antiemetics as needed  Patient seen with Dr. Hood, who can provide additional recommendations as needed     IR Procedure Note:    Procedure: Intraperitoneal abscess drainage     Pre-Procedure Diagnosis:  Post operative fluid collection     Post-Procedure Diagnosis: Same     Anesthesia:  Sedation     Findings:  Large intraperitoneal fluid-air collection targeted via LLQ. 22 Fr Davin catheter positioned across midline to RUQ. Attached to gravity, draining copious tan opaque fluid and air. Secured and dressed with sterile gauze. RUQ drain was not instrumented.      Specimens: Intraperitoneal fluid for culture         7/6:     General Surgery Progress Note:      Intra-abdominal abscesses following lap transverse colon resection on 6/12/25 s/p IR drain placement 7/5/25     Plan:  No acute surgical intervention indicated at this time  Monitor IR drain output, IR to manage drain  Continue IV abx  Analgesics and antiemetics as needed  Ambulate and up to chair  DVT prophylaxis with heparin         Infectious Disease Consult:     Reason for Consultation:  Intra-abdominal abscess     Antibiotics: zosyn      ASSESSMENT:     # multiple large intra-abdominal abscesses               - now s/p IR drain placement on 7/5/25               - fluid cx so far with GNR and budding yeast               - surgery and IR following     # hx of Large bowel obstruction s/p OR 6/10/25 for lap transverse colon resection, small bowel resection x2, rsection of tumor nodule on small bowel - path with DLBCL               - s/p colonic stent placement by GI 6/6/25               - post op fluid collection s/p IR drain placement 6/19/25 with 260 cc of serosang fluid, cx negative      PLAN:     -  continue zosyn. Add fluconazole for fungal coverage. Anticipate prolonged IV antibiotics course on discharge  -  will follow fluid cx results.   -  drain per IR  -  Follow fever curve, wbc      7/7:     IR Prog Note:    Objective:   RUQ NGOZI drain cloudy serous output  LLQ drain: large amounts of tan opaque fluid in drainage bag      Assessment and Plan:   79 yo male s/p 6/12/25 Laparoscopic-assisted transverse colon resection, small bowel resection x2, resection of tumor nodule on small bowel.   Pneumoperitoneum with concern for persistent bowel leak  S/p RUQ IR drain 6/19/25 and LLQ IR drain 7/5/25  Extensive output from new left sided drain, 450mL in 24/hrs  Surgery following, Recommend interval follow up CT , drain check with IR in 2-4 weeks           General Surgery Consult:     Labs basically same  Concern if  leak from bowel  Will check with small bowel follow-through   Discussed with IR          Lab  Results   Component Value Date     WBC 9.5 07/07/2025     HGB 7.8 (L) 07/07/2025     HCT 24.5 (L) 07/07/2025     .0 (H) 07/07/2025     CREATSERUM 0.48 (L) 07/07/2025     BUN 8 (L) 07/07/2025      (L) 07/07/2025     K 3.3 (L) 07/07/2025      07/07/2025     CO2 26.0 07/07/2025      (H) 07/07/2025     CA 7.1 (L) 07/07/2025     ALB 2.0 (L) 07/07/2025     ALKPHO 182 (H) 07/07/2025     BILT 0.3 07/07/2025     TP 5.0 (L) 07/07/2025     AST 22 07/07/2025     ALT 16 07/07/2025     .2 (H) 07/07/2025       Hospitalist Progress Note:     Assessment and Plan:   Pneumoperitoneum with concern for persistent bowel leak  Abdominal fluid collection  Possibly trapped right lateral abdomen pigtail drain  - Patient with recent bowel resection.  Still has NGOZI drain  - IV antibiotics with Zosyn  - N.p.o. -> now tolerating low fiber soft diet   - General Surgery on consult  -IR has been consulted, s/p drain placement, cultures reviewed   -ID consulted for abx management  - Pain controlled  -will go for SBFT tomorrow     Right lower extremity DVT, LLE soleal vein thrombosis  -Xarelto outpatient, last dose this a.m.  -on heparin gtt for now  -likely switch to PO tomorrow     RUE swelling  -Will need to r/o DVT, await US  -cont heparin gtt     Chronic anemia, stable  - Monitor     Anasarca with diffuse subcutaneous edema on CT. likely due to hypoalbuminemia  Hypoalbuminemia  - Echo 6/24/2025: EF 60 to 65%.  Normal diastolic function.     DM2  - SSI     B-cell lymphoma  -Patient oncology follow-up     HTN  HLD  GERD  BPH  Anxiety  Depression  - IV meds as needed      Infectious Disease Progress Note:     ASSESSMENT/PLAN:     Antibiotics: IV cefepime, dapto, flagyl, micafungin; (IV zosyn, fluconazole, augmentin)     ASSESSMENT:     # multiple large intra-abdominal abscesses               - now s/p IR drain placement on 7/5/25               - fluid cx so far with GNR and budding yeast               - surgery and  IR following     # hx of Large bowel obstruction s/p OR 6/10/25 for lap transverse colon resection, small bowel resection x2, rsection of tumor nodule on small bowel - path with DLBCL               - s/p colonic stent placement by GI 6/6/25               - post op fluid collection s/p IR drain placement 6/19/25 with 260 cc of serosang fluid, cx negative      PLAN:     -  discontinue IV zosyn and fluconazole  -  start IV daptomycin, cefepime, metronidazole, micafungin  -  will follow fluid cx results.   -  drain per IR  -  Follow fever curve, wbc  -  Reviewed labs, micro, imaging reports      Medications 07/05/25 07/06/25 07/07/25   ceFEPIme (Maxipime) 1 g in sodium chloride 0.9% 100mL IVPB-BRANDY  Dose: 1 g  Freq: Every 8 hours Route: IV  Last Dose: 1 g (07/07/25 1335)  Start: 07/07/25 1315   Order specific questions:         1335 DD-New Bag   2115         DAPTOmycin (Cubicin) 550 mg in sodium chloride 0.9% PF IV syringe  Dose: 8 mg/kg  Weight Dosing Info: 71.8 kg  Freq: Every 24 hours Route: IV  Start: 07/07/25 1315   Admin Instructions:   Administer IV Push over 2 minutes  Do not administer with other dextrose containing IVs   Order specific questions:         1315          fentaNYL (Sublimaze) 50 mcg/mL injection  Start: 07/05/25 1216 End: 07/05/25 1216   Admin Instructions:   Abdullahi Chung: cabinet override         fluconazole in sodium chloride 0.9% (Diflucan) 400 mg/200mL IVPB premix 400 mg  Dose: 400 mg  Freq: Every 24 hours Route: IV  Last Dose: 400 mg (07/07/25 1305)  Start: 07/06/25 1330 End: 07/07/25 1312   Admin Instructions:   CAUTION: REPRODUCTIVE RISK   Order specific questions:        1436 KD-New Bag        1305 DD-New Bag   1312-D/C'd       heparin (Porcine) 1000 UNIT/ML injection - BOLUS IV 2,200 Units  Dose: 30 Units/kg  Weight Dosing Info: 71.8 kg  Freq: Once Route: IV  Start: 07/05/25 2030 End: 07/05/25 2044   Admin Instructions:   Bolus for PE/DVT/Thrombus  IV Push    2044 VT/BP-Given             heparin (Porcine) 1000 UNIT/ML injection - BOLUS IV 2,200 Units  Dose: 30 Units/kg  Weight Dosing Info: 71.8 kg  Freq: Once Route: IV  Start: 07/05/25 0700 End: 07/05/25 0707   Admin Instructions:   Bolus for PE/DVT/Thrombus  IV Push    0707 VT/KD-Given            heparin (Porcine) 58895 units/250mL infusion (PE/DVT/THROMBUS) INITIAL DOSE  Dose: 18 Units/kg/hr  Weight Dosing Info: 71.8 kg  Freq: Once Route: IV  Last Dose: 18 Units/kg/hr (07/05/25 0608)  Start: 07/05/25 0600 End: 07/05/25 0608   Admin Instructions:   Heparin for PE/DVT/Thrombus    0608 VT/CM-New Bag            insulin aspart (NovoLOG) 100 Units/mL FlexPen 1-5 Units  Dose: 1-5 Units  Freq: 3 times daily with meals and at bedtime Route: SC  Start: 07/05/25 2100   Admin Instructions:   CORRECTION FACTOR - LOW DOSE  Continue to give correction insulin even if NPO  DO NOT HOLD OR ALTER INSULIN DOSE WITHOUT A PHYSICIAN ORDER    Give 1 unit for blood glucose 160-200 mg/dL  Give 2 units for blood glucose 201-240 mg/dL  Give 3 units for blood glucose 241-280 mg/dL  Give 4 units for blood glucose 281-320 mg/dL  Give 5 units for blood glucose 321-360 mg/dL  Call physician if blood glucose is greater than 360 mg/dL with time and last dose of correction insulin given.    2051 VT-Given        (0800 KD)-Not Given   (1200 KD)-Not Given   1625 KD-Given     2051 RG-Given        (0800 DD)-Not Given   1334 DD-Given   1700     2100          insulin aspart (NovoLOG) 100 Units/mL FlexPen 1-5 Units  Dose: 1-5 Units  Freq: Every 6 hours Route: SC  Start: 07/05/25 0245 End: 07/05/25 1740   Admin Instructions:   CORRECTION FACTOR - LOW DOSE  Continue to give correction insulin even if NPO  DO NOT HOLD OR ALTER INSULIN DOSE WITHOUT A PHYSICIAN ORDER    Give 1 unit for blood glucose 160-200 mg/dL  Give 2 units for blood glucose 201-240 mg/dL  Give 3 units for blood glucose 241-280 mg/dL  Give 4 units for blood glucose 281-320 mg/dL  Give 5 units for blood glucose 321-360  mg/dL  Call physician if blood glucose is greater than 360 mg/dL with time and last dose of correction insulin given.    0310 VT-Given   0706 VT-Given   (1445 KD)-Not Given     1740-D/C'd          lidocaine PF (Xylocaine-MPF) 2 % injection  Start: 07/05/25 1216 End: 07/05/25 1216   Admin Instructions:   Abdullahi Chung: cabinet override         magnesium oxide (Mag-Ox) tab 400 mg  Dose: 400 mg  Freq: Once Route: OR  Start: 07/07/25 0900 End: 07/07/25 1019      1019 DD-Given          magnesium sulfate in sterile water for injection 2 g/50mL IVPB premix 2 g  Dose: 2 g  Freq: Once Route: IV  Last Dose: 2 g (07/05/25 1036)  Start: 07/05/25 0930 End: 07/05/25 1136    1036 KD-New Bag            metroNIDAZOLE (Flagyl) tab 500 mg  Dose: 500 mg  Freq: 2 times daily Route: OR  Start: 07/07/25 1315   Order specific questions:         1345 DD-Given   2100         micafungin (Mycamine) 100mg in 100 mL 0.9% IVPB-BRANDY  Dose: 100 mg  Freq: Every 24 hours Route: IV  Start: 07/07/25 1400   Order specific questions:         1400          pantoprazole (Protonix) 40 mg in sodium chloride 0.9% PF 10 mL IV push  Dose: 40 mg  Freq: Daily Route: IV  Start: 07/05/25 0930   Admin Instructions:   Dilute with 10 ml NS; IV push over 2 minutes    0903 KD-Given        0832 KD-Given        0855 DD-Given          piperacillin-tazobactam (Zosyn) 3.375 g in dextrose 5% 100 mL IVPB-ADDV  Dose: 3.375 g  Freq: Every 8 hours Route: IV  Last Dose: 3.375 g (07/07/25 0855)  Start: 07/05/25 0800 End: 07/07/25 1312   Admin Instructions:   Incompatible with Lactated Ringers (LR)   Order specific questions:       0903 KD-New Bag   1619 KD-New Bag       0011 VT-New Bag   0832 KD-New Bag   1625 KD-New Bag      0025 RG-New Bag   0855 DD-New Bag   1312-D/C'd      piperacillin-tazobactam (Zosyn) 4.5 g in D5W 100mL IVPB-BRANDY  Dose: 4.5 g  Freq: Once Route: IV  Last Dose: Stopped (07/05/25 0056)  Start: 07/04/25 2338 End: 07/05/25 0056   Order specific questions:       0013  AC-New Bag   0056 AW-Stopped           potassium chloride (Klor-Con M20) tab 40 mEq  Dose: 40 mEq  Freq: Once Route: OR  Start: 07/07/25 0900 End: 07/07/25 1019   Admin Instructions:   Do not crush      1019 DD-Given          sodium chloride 0.9% infusion  Freq: Once Route: IV  Start: 07/04/25 2309 End: 07/05/25 2006 2006 VT-New Bag              Continuous Meds Sorted by Name  for Arnaldo Gutierrez as of 7/5/25 through 7/7/25    1 Day 3 Days 7 Days 10 Days < Today >   Legend:       Medications 07/05/25 07/06/25 07/07/25   heparin (Porcine) 28804 units/250mL infusion PE/DVT/THROMBUS CONTINUOUS  Rate: 2-30 mL/hr Dose: 200-3000 Units/hr  Freq: Continuous Route: IV  Last Dose: 1,300 Units/hr (07/07/25 0807)  Start: 07/05/25 0645   Admin Instructions:   Heparin for PE/DVT/Thrombus    0709 VT/KD-Hi-Risk Rate/Dose Change   0800 VT/KD-Handoff   1008 KD-Stopped     1736 KD/KT-Hi-Risk Restarted   1927 KD/VT-Handoff   2043 VT/BP-Hi-Risk Rate/Dose Change      0345 VT/BP-Hi-Risk Rate/Dose Change   0716 VT/KD-Handoff   0834 KD/AG-New Bag     1951 RG/KD-Handoff        0316 RG/MN-New Bag   0746 RG/DD-Handoff   0807 DD/AR-Hi-Risk Rate/Dose Change [C]        sodium chloride 0.9% infusion  Rate: 75 mL/hr  Freq: Continuous Route: IV  Start: 07/05/25 0245 End: 07/07/25 1358    0252 VT-New Bag         1358-D/C'd          PRN Meds Sorted by Name  for Arnaldo Gutierrez as of 7/5/25 through 7/7/25    1 Day 3 Days 7 Days 10 Days < Today >   Legend:         Medications 07/05/25 07/06/25 07/07/25   glycerin-hypromellose- (Artificial Tears) 0.2-0.2-1 % ophthalmic solution 1 drop  Dose: 1 drop  Freq: 4 times daily PRN Route: Both Eyes  PRN Reason: Dry Eyes  Start: 07/07/25 0859      0951 NGOZI-Given          HYDROcodone-acetaminophen (Norco) 5-325 MG per tab 1 tablet  Dose: 1 tablet  Freq: Every 6 hours PRN Route: OR  PRN Reason: moderate pain  Start: 07/07/25 0028      0042 RG-Given           morphINE PF 2 MG/ML injection 1 mg  Dose: 1  mg  Freq: Every 2 hour PRN Route: IV  PRN Reason: mild pain  Start: 07/05/25 0233   Admin Instructions:   Use PRN reason as a guide and follow range order policy. If oral pain meds are ordered and patient can tolerate oral intake, start with PRN oral pain medications first.                           Or   morphINE PF 2 MG/ML injection 2 mg  Dose: 2 mg  Freq: Every 2 hour PRN Route: IV  PRN Reason: moderate pain  Start: 07/05/25 0233   Admin Instructions:   Use PRN reason as a guide and follow range order policy. If oral pain meds are ordered and patient can tolerate oral intake, start with PRN oral pain medications first.          2006 VT-Given       0323 RG-Given          Or   morphINE PF 4 MG/ML injection 4 mg  Dose: 4 mg  Freq: Every 2 hour PRN Route: IV  PRN Reason: severe pain  Start: 07/05/25 0233   Admin Instructions:   Use PRN reason as a guide and follow range order policy. If oral pain meds are ordered and patient can tolerate oral intake, start with PRN oral pain medications first.    0252 VT-Given   0621 VT-Given                    ondansetron (Zofran) 4 MG/2ML injection 4 mg  Dose: 4 mg  Freq: Every 6 hours PRN Route: IV  PRN Reasons: Nausea,vomiting  Start: 07/05/25 0233   Admin Instructions:   Default antiemetic sequence (unless otherwise preferred by patient):  1. ondansetron (Zofran) 2. metoclopramide (Reglan)  Wait 15 minutes before proceeding to next medication in sequence.  Follow therapeutic duplication policy.         temazepam (Restoril) cap 7.5 mg  Dose: 7.5 mg  Freq: Nightly PRN Route: OR  PRN Reason: Sleep  Start: 07/06/25 2133   Admin Instructions:   CAUTION: REPRODUCTIVE RISK     2243 RG-Given             Date/Time Temp Pulse Resp BP SpO2 Weight O2 Device O2 Flow Rate (L/min) Who   07/07/25 0304 98.2 °F (36.8 °C) 82 18 106/54 92 % -- None (Room air) -- DS   07/06/25 2030 98.3 °F (36.8 °C) 82 18 104/55 94 % -- None (Room air) -- DS   07/06/25 1900 -- 79 -- -- -- -- -- -- CY   07/06/25 1200  99.4 °F (37.4 °C) -- 18 -- 95 % -- None (Room air) -- CM   07/06/25 1204 -- 93 -- 115/65 -- -- -- -- KS   07/06/25 1200 -- 93 -- 119/67 -- -- -- -- EA   07/06/25 0418 98.4 °F (36.9 °C) 85 18 98/54 92 % -- None (Room air) -- DS   07/05/25 1949 99 °F (37.2 °C) 85 16 96/54 92 % -- None (Room air) -- DS   07/05/25 1900 -- 87 -- -- -- -- -- -- CY   07/05/25 1336 98.5 °F (36.9 °C) 93 18 102/53 91 % -- None (Room air) -- KD   07/05/25 1214 98.8 °F (37.1 °C) 101 20 106/58 93 % -- None (Room air) -- GH   07/05/25 0433 -- 92 -- -- -- -- -- -- GT   07/05/25 0243 98.4 °F (36.9 °C) 65 12 115/64 93 % 158 lb 4.8 oz (71.8 kg) None (Room air) -- VT   07/05/25 0200 -- 82 11 98/60 94 % -- None (Room air) -- AW   07/05/25 0130 -- 83 11 97/54 94 % -- None (Room air) -- AW   07/05/25 0100 -- 83 10 100/53 94 % -- None (Room air) -- AW   07/05/25 0030 -- 84 12 105/54 95 % -- -- -- AC   07/05/25 0015 -- 89 22 -- 96 % -- -- -- AC   07/05/25 0000 -- 82 20 98/57 96 % -- -- -- AC   07/04/25 2340 -- 92 -- -- -- -- -- -- AC   07/04/25 2300 -- -- -- 106/58 -- -- -- -- AC   07/04/25 2224 -- 94 -- -- -- -- -- --    07/04/25 2211 -- -- -- -- -- -- None (Room air) --    07/04/25 2137 98 °F (36.7 °C) 92 20 110/58 95 % -- None (Room air) --

## 2025-07-07 NOTE — PLAN OF CARE
Patient is AxO4. Norco and morphine prn for back pain. IR drain intact to LUQ. RLQ NGOZI drain intact with minimal output. Heparin gtt maintained at 1400 units. Tolerating LF/S diet. Accuchecks AC/HS. Denies nausea/vomiting. Pt is a max assist - repositioning provided. IV abx and IVF continued. Primofit in place. + scant BM, incontinence care provided. Appropriate safety measures maintained, call light within reach, and frequent rounding.      Problem: Patient Centered Care  Goal: Patient preferences are identified and integrated in the patient's plan of care  Description: Interventions:  - What would you like us to know as we care for you? I came from [facility]  - Provide timely, complete, and accurate information to patient/family  - Incorporate patient and family knowledge, values, beliefs, and cultural backgrounds into the planning and delivery of care  - Encourage patient/family to participate in care and decision-making at the level they choose  - Honor patient and family perspectives and choices  Outcome: Progressing     Problem: Diabetes/Glucose Control  Goal: Glucose maintained within prescribed range  Description: INTERVENTIONS:  - Monitor Blood Glucose as ordered  - Assess for signs and symptoms of hyperglycemia and hypoglycemia  - Administer ordered medications to maintain glucose within target range  - Assess barriers to adequate nutritional intake and initiate nutrition consult as needed  - Instruct patient on self management of diabetes  Outcome: Progressing     Problem: Patient/Family Goals  Goal: Patient/Family Long Term Goal  Description: Patient's Long Term Goal: return home    Interventions:  - follow plan of care  - See additional Care Plan goals for specific interventions  Outcome: Progressing  Goal: Patient/Family Short Term Goal  Description: Patient's Short Term Goal: to get stronger and feel better    Interventions:   - PT/OT on consult  - IV abx  - surgery, IR on consult  - NGOZI drains  -  monitor labs and vital signs  - See additional Care Plan goals for specific interventions  Outcome: Progressing     Problem: PAIN - ADULT  Goal: Verbalizes/displays adequate comfort level or patient's stated pain goal  Description: INTERVENTIONS:  - Encourage pt to monitor pain and request assistance  - Assess pain using appropriate pain scale  - Administer analgesics based on type and severity of pain and evaluate response  - Implement non-pharmacological measures as appropriate and evaluate response  - Consider cultural and social influences on pain and pain management  - Manage/alleviate anxiety  - Utilize distraction and/or relaxation techniques  - Monitor for opioid side effects  - Notify MD/LIP if interventions unsuccessful or patient reports new pain  - Anticipate increased pain with activity and pre-medicate as appropriate  Outcome: Progressing     Problem: RISK FOR INFECTION - ADULT  Goal: Absence of fever/infection during anticipated neutropenic period  Description: INTERVENTIONS  - Monitor WBC  - Administer growth factors as ordered  - Implement neutropenic guidelines  Outcome: Progressing     Problem: SAFETY ADULT - FALL  Goal: Free from fall injury  Description: INTERVENTIONS:  - Assess pt frequently for physical needs  - Identify cognitive and physical deficits and behaviors that affect risk of falls.  - Falls fall precautions as indicated by assessment.  - Educate pt/family on patient safety including physical limitations  - Instruct pt to call for assistance with activity based on assessment  - Modify environment to reduce risk of injury  - Provide assistive devices as appropriate  - Consider OT/PT consult to assist with strengthening/mobility  - Encourage toileting schedule  Outcome: Progressing     Problem: HEMATOLOGIC - ADULT  Goal: Free from bleeding injury  Description: (Example usage: patient with low platelets)  INTERVENTIONS:  - Avoid intramuscular injections, enemas and rectal medication  administration  - Ensure safe mobilization of patient  - Hold pressure on venipuncture sites to achieve adequate hemostasis  - Assess for signs and symptoms of internal bleeding  - Monitor lab trends  - Patient is to report abnormal signs of bleeding to staff  - Avoid use of toothpicks and dental floss  - Use electric shaver for shaving  - Use soft bristle tooth brush  - Limit straining and forceful nose blowing  Outcome: Progressing     Problem: GASTROINTESTINAL - ADULT  Goal: Minimal or absence of nausea and vomiting  Description: INTERVENTIONS:  - Maintain adequate hydration with IV or PO as ordered and tolerated  - Nasogastric tube to low intermittent suction as ordered  - Evaluate effectiveness of ordered antiemetic medications  - Provide nonpharmacologic comfort measures as appropriate  - Advance diet as tolerated, if ordered  - Obtain nutritional consult as needed  - Evaluate fluid balance  Outcome: Progressing  Goal: Maintains or returns to baseline bowel function  Description: INTERVENTIONS:  - Assess bowel function  - Maintain adequate hydration with IV or PO as ordered and tolerated  - Evaluate effectiveness of GI medications  - Encourage mobilization and activity  - Obtain nutritional consult as needed  - Establish a toileting routine/schedule  - Consider collaborating with pharmacy to review patient's medication profile  Outcome: Progressing

## 2025-07-07 NOTE — CONSULTS
[facility]  part of [facility]    Report of Consultation    Arnaldo Gutierrez Patient Status:  Inpatient    4/15/1945 MRN [id number]   Location [facility] 4W/SW/SE Attending Declan Stearns MD   Hosp Day # 2 PCP PHYSICIAN NONSTAFF     Date of Admission:  2025  Date of Consult:  2025    Reason for Consultation:abd pain    History of Present Illness:  Arnaldo Gutierrez is a a(n) 80 year old male. Well know to me   multiple bowel resections for lymphoma   developed abscess   drained 4 days ago      History:  Past Medical History[1]  Past Surgical History[2]  Family History[3]   reports that he quit smoking about 45 years ago. His smoking use included cigarettes. He started smoking about 55 years ago. He has a 20 pack-year smoking history. He has been exposed to tobacco smoke. He has never used smokeless tobacco. He reports current alcohol use. He reports that he does not currently use drugs.    Allergies:  Allergies[4]    Medications:  Current Hospital Medications[5]  Prescriptions Prior to Admission[6]    Review of Systems:  A ten point review of systems was negative except as noted.    Physical Exam:  Blood pressure 106/54, pulse 82, temperature 98.2 °F (36.8 °C), temperature source Oral, resp. rate 18, height 180.3 cm (5' 11\"), weight 158 lb 4.8 oz (71.8 kg), SpO2 92%.    General: Alert, orientated x3.  Cooperative.  No apparent distress.  HEENT: Exam is unremarkable.    Lungs: per attending  Cardiac: per attending  Abdomen:  Soft, non-distended, non-tender, with no rebound or guarding.  No peritoneal signs.wounds ok  453 ml doesn't look enteric  Skin: Normal texture and turgor.  Neurologic:per attending  Laboratory Data:  Lab Results   Component Value Date    WBC 9.5 2025    HGB 7.8 (L) 2025    HCT 24.5 (L) 2025    .0 (H) 2025    CREATSERUM 0.48 (L) 2025    BUN 8 (L) 2025     (L) 2025    K 3.3 (L) 2025      07/07/2025    CO2 26.0 07/07/2025     (H) 07/07/2025    CA 7.1 (L) 07/07/2025    ALB 2.0 (L) 07/07/2025    ALKPHO 182 (H) 07/07/2025    BILT 0.3 07/07/2025    TP 5.0 (L) 07/07/2025    AST 22 07/07/2025    ALT 16 07/07/2025    .2 (H) 07/07/2025    TSH 2.417 04/08/2025    LIP 45 07/04/2025    MG 1.8 07/07/2025    PHOS 3.0 07/07/2025    B12 1,020 (H) 06/17/2025       Imaging:  CT ABDOMEN+PELVIS(CONTRAST ONLY)(CPT=74177)  Result Date: 7/5/2025  PROCEDURE(S): CT ABDOMEN+PELVIS(CONTRAST ONLY)(CPT=74177) WORKSTATION: [id number] HISTORY/INDICATIONS: distended abdomen, recent surgery for mass resection, cath placement distended abdomen, recent surgery for mass resection, cath placement COMPARISON: 06/19/2025 CT ABDOMEN+PELVIS(CONTRAST ONLY)(CPT=74177) TECHNIQUE: Helical CT of the abdomen and pelvis was obtained following administration of intravenous contrast material. Axial, coronal, and sagittal reformatted images were created and interpreted. For this exam, one or more of the following dose reductions techniques were used: Automated exposure control Adjustment of the mA and/or kV according to patient size Use of iterative reconstruction technique FINDINGS: Lower Thorax: Moderate bilateral pleural effusions are again seen. Heart is normal size. Few airspace opacities are noted in the lingula series 3 image 1. There is atelectasis in the lower lobes. Liver: Unremarkable. Gallbladder/Biliary tree: Cholelithiasis without evidence of acute cholecystitis. No biliary ductal dilatation. Pancreas:  Unremarkable. Spleen: Unremarkable. Adrenal glands:  Unremarkable. Kidneys: Simple cyst arises from the right kidney posterior interpolar region. No suspicious renal lesions. Symmetric nephrograms. No hydronephrosis. Retroperitoneum/extraperitoneum: Unremarkable. Vasculature: No aneurysm or dissection. Mild atherosclerotic calcification is seen. Peritoneum: Multiple abscesses are seen: Left upper quadrant abscess measures  24.3 x 10.9 x 23.9 cm series 2 image 73; series 5 image 23. Air-fluid levels are seen. It extends into the abdominal wall series 2 image 73. Right upper quadrant perihepatic abscess communicates with the left upper quadrant abscess and measures 14.2 x 5.1 x 15.8 cm series 2 image 35; series 6 image 96. Perisplenic abscess measures 7.9 x 2.8 x 9.8 cm series 2 image 50; series 6 image 19. This abscess also likely communicates with the left upper quadrant abscess. Small lower midline suspected abscess measuring 3.0 x 2.7 x 3.5 cm. There is a right mid abdominal pigtail drainage catheter which may terminate within the colon series 2 image 80. GI tract/mesentery/omentum: Multiple prior bowel resections are noted. Suspected fistula in the left mid abdomen series 2 image 103. Possible pigtail drainage catheter within the ascending colon series 2 image 80. No bowel obstruction is identified. Urinary Bladder: The bladder is grossly unremarkable. Reproductive organs: Unremarkable. Body wall: Mild anasarca. The dominant abscess extends into the anterior abdominal wall along the surgical incision series 2 image 73. Bones:  There are mild degenerative changes in the spine.     CONCLUSION: 1.  Multiple large peritoneal abscesses are seen. The largest is centered in the left upper quadrant and left midabdomen and measures 24.3 x 10.9 x 23.9 cm with air-fluid levels and extends into the abdominal wall. This is contiguous with smaller right upper quadrant perihepatic and posterior left upper quadrant perisplenic abscesses. A separate small 3 cm abscess is noted adjacent to the bladder. Bladder diverticulum is thought less likely. 2.  Multiple prior bowel resections are seen. There may be fistulous connection between the bowel and left upper quadrant dominant abscess at the left mid abdomen. The location of a right-sided pigtail drainage catheter is uncertain but it may terminate within the ascending colon. 3.  Additional chronic  and/or incidental findings are detailed in the body of this report. Preliminary report was given by ENT Surgical radiology. No clinically significant discrepancies are noted. Electronically Verified and Signed by Attending Radiologist: Gelacio Bullock MD 2025 7:14 AM Workstation: [id number]    CARD TTE STRAIN W DOPPLER ONCOLOGY (CPT=93306)  Result Date: 2025  Transthoracic Echocardiogram Name:Arnaldo Gutierrez Date: 2025 :  04/15/1945 Ht:  (71in)  BP: 101 / 61 MRN:  [id number]    Age:  80years    Wt:  (171lb) HR: 88bpm Loc:  [facility]       Gndr: M          BSA: 1.97m^2 Sonographer: Amelia CHERY Ordering:    Crys Wilkins Consulting:  Rafita Torres ---------------------------------------------------------------------------- History/Indications:   Encounter for Monitoring Cardiotoxic Drug Therapy. ---------------------------------------------------------------------------- Procedure information:  A transthoracic complete 2D study was performed. Additional evaluation included M-mode, complete spectral Doppler, and color Doppler.  Patient status:  Inpatient.  Location:  Echo laboratory.    This was a routine study. Transthoracic echocardiography for diagnosis. Image quality was adequate. ECG rhythm:   Normal sinus ---------------------------------------------------------------------------- Conclusions: 1. Left ventricle: The cavity size was normal. Wall thickness was normal.    Systolic function was normal. The estimated ejection fraction was 60-65%,    by biplane method of disks. Wall motion is normal; there are no regional    wall motion abnormalities. Left ventricular diastolic function parameters    were normal. The Global Longitudinal Strain (GLS) was -19.40%. 2. Mitral valve: There was mild regurgitation. * ---------------------------------------------------------------------------- * Findings: Left ventricle:  The cavity size was normal. Wall thickness was normal. Systolic function was normal. The estimated  ejection fraction was 60-65%, by biplane method of disks. Wall motion is normal; there are no regional wall motion abnormalities. The Global Longitudinal Strain (GLS) was -19.40%. Left ventricular diastolic function parameters were normal. Left atrium:  Well visualized. The atrium was normal in size. Right ventricle:  The cavity size was normal. Systolic function was normal. Systolic pressure was within the normal range. Right atrium:  Well visualized. The atrium was normal in size. Mitral valve:  Well visualized. The leaflets were normal thickness. No evidence for prolapse.  Doppler:  Transvalvular velocity was within the normal range. There was no evidence for stenosis. There was mild regurgitation. Aortic valve:  Well visualized.  The valve was trileaflet. The leaflets were normal thickness.  Doppler:  Transvalvular velocity was within the normal range. There was no evidence for stenosis. There was trivial regurgitation. Tricuspid valve:  Well visualized. The annulus is normal-sized. The leaflets are normal thickness. No echocardiographic evidence for tricuspid prolapse. Doppler:  Transvalvular velocity was within the normal range. There was no evidence for stenosis. There was trivial regurgitation. Pulmonic valve:   Well visualized. The annulus is normal-sized. The leaflets are normal thickness. No evidence for prolapse.  Doppler:  Transvalvular velocity was within the normal range. There was no evidence for stenosis. There was no significant regurgitation. Pericardium:   There was no pericardial effusion. Pleura:  No evidence of pleural fluid accumulation. Aorta: Aortic root: The aortic root was normal. Ascending aorta: The ascending aorta was normal. Pulmonary arteries: There was no evidence of pulmonary hypertension. Systemic veins:  Central venous respirophasic diameter changes are in the normal range (>50%). Inferior vena cava: The IVC was normal-sized.  ---------------------------------------------------------------------------- Measurements  Left ventricle                   Value         Ref  GLS, 2D                          -19.40 %      ---------  IVS thickness, ED, PLAX          0.9    cm     0.6 - 1.0  LV ID, ED, PLAX              (L) 3.5    cm     4.2 - 5.8  LV ID, ES, PLAX              (L) 2.1    cm     2.5 - 4.0  LV PW thickness, ED, PLAX        0.8    cm     0.6 - 1.0  IVS/LV PW ratio, ED, PLAX        1.13          ---------  LV PW/LV ID ratio, ED, PLAX      0.23          ---------  LV area, ES, A4C                 19.3   cm^2   ---------  LV ejection fraction             72     %      52 - 72  Stroke volume/bsa, 2D            43     ml/m^2 ---------  LV end-diastolic volume, 1-p     87     ml     69 - 185  A4C  LV end-systolic volume, 1-p      47     ml     22 - 78  A4C  LV ejection fraction, 1-p        57     %      46 - 74  A4C  Stroke volume, 1-p A4C           50     ml     ---------  LV end-diastolic volume/bsa,     44     ml/m^2 37 - 93  1-p A4C  LV end-systolic volume/bsa,      24     ml/m^2 12 - 40  1-p A4C  Stroke volume/bsa, 1-p A4C       25     ml/m^2 ---------  LV end-diastolic volume, 2-p     81     ml     62 - 150  LV end-systolic volume, 2-p      32     ml     21 - 61  LV ejection fraction, 2-p        60     %      52 - 72  Stroke volume, 2-p               48     ml     ---------  LV end-diastolic volume/bsa,     41     ml/m^2 34 - 74  2-p  LV end-systolic volume/bsa,      16     ml/m^2 11 - 31  2-p  Stroke volume/bsa, 2-p           24.5   ml/m^2 ---------  LV e', lateral                   14.9   cm/sec >=10.0  LV E/e', lateral                 6             <=13  LV e', medial                    12.5   cm/sec >=7.0  LV E/e', medial                  7             ---------  LV e', average                   13.7   cm/sec ---------  LV E/e', average                 6             <=14  LVOT                             Value         Ref  LVOT ID                           2.1    cm     ---------  LVOT peak velocity, S            1.22   m/sec  ---------  LVOT VTI, S                      24.7   cm     ---------  LVOT peak gradient, S            6      mm Hg  ---------  LVOT mean gradient, S            3      mm Hg  ---------  Stroke volume (SV), LVOT DP      86     ml     ---------  Stroke index (SV/bsa), LVOT      43     ml/m^2 ---------  DP  Aortic valve                     Value         Ref  Aortic leaflet separation,       1.8    cm     ---------  MM  Aortic root                      Value         Ref  Aortic root ID, STJ, ED      (H) 3.6    cm     2.3 - 3.5  Ascending aorta                  Value         Ref  Ascending aorta ID               3.6    cm     2.2 - 3.8  Left atrium                      Value         Ref  LA volume, S                 (H) 62     ml     18 - 58  LA volume/bsa, S                 31     ml/m^2 16 - 34  LA volume, ES, 1-p A4C           53     ml     18 - 58  LA volume, ES, 1-p A2C       (H) 70     ml     18 - 58  LA volume, ES, A/L               68     ml     ---------  LA volume/bsa, ES, A/L           34     ml/m^2 16 - 34  Mitral valve                     Value         Ref  Mitral E-wave peak velocity      0.88   m/sec  ---------  Mitral A-wave peak velocity      0.99   m/sec  ---------  Mitral deceleration time         180    ms     ---------  Mitral peak gradient, D          3      mm Hg  ---------  Mitral E/A ratio, peak           0.9           ---------  Tricuspid valve                  Value         Ref  Tricuspid regurg peak            2.52   m/sec  <=2.8  velocity  Tricuspid peak RV-RA             25     mm Hg  ---------  gradient  Right ventricle                  Value         Ref  TAPSE, 2D                        2.50   cm     >=1.70  TAPSE, MM                        2.50   cm     >=1.70  RV s', lateral                   22.6   cm/sec >=9.5 Legend: (L)  and  (H)  farida values outside specified reference range.  ---------------------------------------------------------------------------- Prepared and electronically signed by Ernst Parada 06/24/2025 13:42     US VENOUS DOPPLER LEG BILAT - DIAG IMG (CPT=93970)  Result Date: 6/24/2025  PROCEDURE: US VENOUS DOPPLER LEG BILAT - DIAG IMG (CPT=93970) INDICATIONS: right calf pain, r/o dvt COMPARISON: None TECHNIQUE: Color duplex Doppler venous ultrasound of both lower extremities was performed in the usual manner. FINDINGS: Thrombus is present within the right common femoral vein as well as in the adjacent deep femoral vein draining into the common femoral vein. There is also nonocclusive thrombus within the proximal and distal aspect of the popliteal vein thrombus is otherwise expansile with heterogeneous grayscale appearance and there is absence of normal color flow and compressibility in these regions. The femoral vein appears normal. Normal flow was demonstrated with color and pulsed Doppler. Visualized portions of the great and small saphenous, posterior tibial and peroneal veins appear normal. Contralateral left lower extremity is without DVT within the common femoral, femoral, and popliteal veins. There is occlusive thrombus within a left soleal vein based on abnormal grayscale appearance, Vansil morphology of the vein and absence of normal color flow and Doppler venous waveforms.     CONCLUSION: 1. Abnormal exam. Right lower extremity has nonocclusive acute appearing thrombus within the common femoral vein as well as the visualized portions of the deep femoral vein. There is also nonocclusive thrombus within the right popliteal vein. 2. No DVT in the left lower extremity, however there is acute occlusive thrombus within a left soleal vein at the knee. Electronically Verified and Signed by Attending Radiologist: Jason Quiñones MD 6/24/2025 12:45 PM Workstation: [id number]    IR PERITONEAL DRAINAGE CATHETER  Result Date: 6/20/2025  PROCEDURE: IR PERITONEAL DRAINAGE CATHETER   INDICATIONS:  Status post colon resection, with postop intraperitoneal fluid  COMPARISON: None.  (S):  [place]  ESTIMATED BLOOD LOSS: Less than 5 mL  COMPLICATIONS: None  FINDINGS:  Informed consent was obtained. The patient was positioned supine. The right lower quadrant was sterilely prepped and draped. Preliminary ultrasound demonstrated complex appearing septated fluid collection.. Local Lidocaine was administered. Under ultrasound guidance, a Yueh sheath needle was advanced into the collection. There was return of serosanguineous fluid from the access needle. Under ultrasound guidance, a wire was advanced through the needle. The access was dilated and a 10 Scottish pigtail catheter was coiled in the collection.  Via the drain, approximately 260 cc of serosanguineous fluid were aspirated.  Sample was sent for cultures and sensitivities.  The catheter was secured to the skin with sutures and attached to bulb suction.  Sterile dressing applied.         CONCLUSION: Insertion of drainage catheter into intra-abdominal ascites yielding 260 cc serosanguineous fluid.    Dictated by (CST): Norris Reilly MD on 6/20/2025 at 6:09 PM     Finalized by (CST): Norris Reilly MD on 6/20/2025 at 6:10 PM          CT ABDOMEN+PELVIS(CONTRAST ONLY)(CPT=74177)  Result Date: 6/19/2025  PROCEDURE: CT ABDOMEN + PELVIS (CONTRAST ONLY) (CPT=74177)  COMPARISON: [facility], CT ABDOMEN + PELVIS (CONTRAST ONLY) (CPT=74177), 6/05/2025, 5:22 PM.  INDICATIONS: S/p colon resection 6/10, leukocytosis and increased pain  TECHNIQUE: CT images of the abdomen and pelvis were obtained with non-ionic intravenous contrast material.  Automated exposure control for dose reduction was used. Adjustment of the mA and/or kV was done based on the patient's size. Use of iterative reconstruction technique for dose reduction was used.  Dose information is transmitted to the ACR (American College of Radiology) NRDR (National Radiology Data  Registry) which includes the Dose Index Registry.  FINDINGS:  LIVER: No enlargement, atrophy, abnormal density, or significant focal lesion.  BILIARY: Intrahepatic biliary ductal dilatation.  Gallbladder is incompletely distended.  No significant extrahepatic biliary ductal dilatation. SPLEEN: Spleen is not enlarged. There are granulomatous calcifications in the spleen. No suspicious splenic lesion. STOMACH: No gastric obstruction.  Duodenum is distended with oral contrast material but is otherwise without evidence of obstruction or inflammation. PANCREAS: No lesion, fluid collection, ductal dilatation, or atrophy.  ADRENALS: No nodule or enlargement. KIDNEYS: Cortical based low density focus in the posterior midpole of the right kidney suggesting simple cyst.  Bilaterally no suspicious enhancing renal lesion or hydroureteronephrosis. AORTA/VASCULAR:   Atherosclerosis of the abdominal aorta.  No aneurysm. BOWEL/MESENTERY:  Peripherally enhancing fluid collection in the left lower quadrant, above the roof of the urinary bladder and measuring approximately 55 x 47 x 58 mm (AP x transverse x CC).  There is also a moderate-large volume of free fluid within the abdomen and pelvis mainly along the peripheral aspect of the liver and spleen, both pericolic gutters and across the omentum.  Moderate volume of foci of gas are present within this fluid.  There is peritoneal thickening/enhancement in both pericolic  gutters.  No evidence of bowel obstruction.  No lymphadenopathy.  Post large bowel resection with reanastomosis in the region of the hepatic flexure without focal obstruction or inflammatory change at the anastomosis. ABDOMINAL WALL: Ventral line of skin staples are present above the umbilicus.  Additional scattered skin staples are present in the abdominal-pelvic anterior wall.  Mild body wall edema. URINARY BLADDER: Urinary bladder is distended with urine.  No stones or wall thickening. PELVIC NODES: No enlarged  mass or adenopathy.   PELVIC ORGANS: No visible mass.  Pelvic organs appropriate for patient age.  BONES:   Mild endplate change and disc disease within the spine most advanced at L5-S1. LUNG BASES: Small-moderate sized bilateral pleural effusion.  Enhancing wedge-shaped pulmonary opacities with air bronchograms adjacent to the effusions likely representing secondary compressive atelectasis.  The visualized heart has coronary artery calcifications. OTHER: Negative.          CONCLUSION:  1.  Post large bowel resection in the region of the proximal transverse colon/hepatic flexure.  No obstruction or inflammation at the surgical site.  There is a moderate-large volume of free fluid within the abdomen and pelvis mainly distributed in both upper quadrants, across the omentum, and in the pericolic gutters.  Moderate volume of free air is also present in the same distribution as the fluid.  These may be related to recent surgery.  Organized fluid collection in the left lower retroperitoneum/left lower quadrant measuring approximately 55 x 47 x 58 mm which could represent a seroma, liquified hematoma, or other inflammatory fluid collection.  Peritoneal enhancement and thickening in the both pericolic gutters which could represent phlegmonous change, early organization of fluid collections with similar differential as the aforementioned organized collection, or peritonitis. 2.  Moderate-sized bilateral pleural effusions.  Enhancing bibasilar pulmonary opacities with air bronchograms likely representing secondary compressive atelectasis. 3.  Coronary atherosclerosis. 4.  Right renal cyst. 5.  Mild body wall edema/anasarca.  Skin staples across the abdominal-pelvic anterior wall compatible with surgical access sites.   Dictated by (CST): Jason Quiñones MD on 6/19/2025 at 5:29 PM     Finalized by (CST): Jason Quiñones MD on 6/19/2025 at 5:36 PM          XR ABDOMEN (1 VIEW) (CPT=74018)  Result Date: 6/13/2025  PROCEDURE: XR ABDOMEN  (1 VIEW) (CPT=74018)  COMPARISON: [facility], XR CHEST AP PORTABLE (CPT=71045), 6/13/2025, 8:58 AM.  [facility], CT ABDOMEN + PELVIS (CONTRAST ONLY) (CPT=74177), 6/05/2025, 5:22 PM.  [facility], XR ABDOMEN (1 VIEW) (CPT=74018), 6/10/2025, 6:07 PM.  INDICATIONS: History of presumed colon cancer status post transverse colonic resection and partial small bowel resection on 06/10/2025.  TECHNIQUE:   Single view.   FINDINGS:  BOWEL GAS PATTERN: An enteric tube tip in distal side hole project over the left upper quadrant the abdomen in the expected location of the stomach.  There is bowel gas throughout the abdomen, including the rectum.  No dilated gas-filled loops of bowel are seen to suggest obstruction.  Anastomotic staple lines are again noted in the right upper and left lower quadrants. SOFT TISSUES: Normal.  No masses or organomegaly.  CALCIFICATIONS: None significant. BONES: The osseous structures are unchanged. OTHER: Vertically oriented skin staples again project over the central abdomen.  Additional skin staples project over both lower quadrants and pelvis.  There is ill-defined lucency throughout the abdomen, which is consistent with the pneumoperitoneum visible on the preceding chest x-ray.         CONCLUSION:  1. Postoperative changes from a recent transverse colonic resection and partial small bowel resection including persistent pneumoperitoneum. 2. Nonobstructive bowel gas pattern.     Dictated by (CST): Chuy Schmid MD on 6/13/2025 at 11:38 AM     Finalized by (CST): Chuy Schmid MD on 6/13/2025 at 11:41 AM          XR CHEST AP PORTABLE  (CPT=71045)  Result Date: 6/13/2025  PROCEDURE: XR CHEST AP PORTABLE  (CPT=71045) TIME: 8:59.   COMPARISON: [facility], CT ABDOMEN + PELVIS (CONTRAST ONLY) (CPT=74177), 6/05/2025, 5:22 PM.  [facility], XR CHEST AP PORTABLE (CPT=71045), 6/13/2025, 6:40 AM.  INDICATIONS: Post NG  advancement.  Metastatic colon cancer status post transverse colonic resection on 06/10/2025.  Acute postoperative blood loss with hypotension.  TECHNIQUE:   Single view.   FINDINGS:  CARDIAC/VASC: The cardiac silhouette is not enlarged.  There is calcification of the aortic knob.  Unremarkable pulmonary vasculature.  MEDIAST/KRYSTA:   No visible mass or adenopathy. LUNGS/PLEURA: The lungs are again hypoinflated.  Streaky opacities at the lung bases with elevation of the right hemidiaphragm are all unchanged.  No pleural effusion or pneumothorax. BONES: There are mild spondylotic changes in the thoracic spine. OTHER: An enteric tube has been advanced.  The tip in distal side hole project over the gastric bubble in the left upper quadrant of the abdomen.  A right-sided PICC tip projects over the mid SVC.  Free air is again noted beneath the right hemidiaphragm,  likely related to the recent surgery.         CONCLUSION:  1. Enteric tube has been advanced with tip and distal side hole now projecting over the gastric bubble, in customary position. 2. Expiratory chest with stable atelectasis at both lung bases. 3. Persistent pneumoperitoneum, likely related to the recent abdominal surgery.    Dictated by (CST): Chuy Schmid MD on 6/13/2025 at 9:19 AM     Finalized by (CST): Chuy Schmid MD on 6/13/2025 at 9:21 AM          XR CHEST AP PORTABLE  (CPT=71045)  Result Date: 6/13/2025  PROCEDURE: XR CHEST AP PORTABLE  (CPT=71045) TIME: 6:43.   COMPARISON: [facility], XR ABDOMEN (1 VIEW) (CPT=74018), 6/10/2025, 6:07 PM.  [facility], CT ABDOMEN + PELVIS (CONTRAST ONLY) (CPT=74177), 6/05/2025, 5:22 PM.  INDICATIONS: Hypoxia.  Suspected metastatic colon cancer status post transverse colonic resection on 06/10/2025.  Acute postoperative blood loss with hypotension.  TECHNIQUE:   Single view.   FINDINGS:  CARDIAC/VASC: Normal.  No cardiac silhouette abnormality or cardiomegaly.  Unremarkable  pulmonary vasculature.  MEDIAST/KRYSTA:   No visible mass or adenopathy. LUNGS/PLEURA: The lungs are hypoinflated.  Streaky opacities have developed at the lung bases with elevation of the right hemidiaphragm.  No pleural effusion or pneumothorax. BONES: There are mild spondylotic changes in the thoracic spine. OTHER: An enteric tube tip again projects over the gastric bubble.  The distal side hole projects over the expected location of the distal esophagus.  A right-sided PICC tip projects over the mid SVC.  Free air is noted beneath the right hemidiaphragm, likely related to the recent surgery.         CONCLUSION:  1. Enteric tube tip projects over the stomach, although the distal side hole projects over the distal esophagus.  Consider advancing approximately 7.0 cm into the stomach. 2. Expiratory chest with the development of atelectasis at both lung bases. 3. Pneumoperitoneum, likely related to the recent abdominal surgery.    Dictated by (CST): Chuy Schmid MD on 6/13/2025 at 7:16 AM     Finalized by (CST): Chuy Schmid MD on 6/13/2025 at 7:19 AM          XR ABDOMEN (1 VIEW) (CPT=74018)  Result Date: 6/11/2025  PROCEDURE: XR ABDOMEN (1 VIEW) (CPT=74018)  COMPARISON: [facility], CT ABDOMEN + PELVIS (CONTRAST ONLY) (CPT=74177), 6/05/2025, 5:22 PM.  INDICATIONS: Partially obstructing colon tumor.  Opene tray. image taken due to protocol. Evaluate for foreign body.  TECHNIQUE:   Single view.           CONCLUSION:  1. No retained unexpected surgical foreign body. 2. Postsurgical changes with anastomotic staple lines and skin staples. 3. Catheter in the bladder.    Dictated by (CST): Chong Valdez MD on 6/11/2025 at 1:22 AM     Finalized by (CST): Chong Valdez MD on 6/11/2025 at 1:28 AM            Impression and Plan:  Problem List[7]  Labs basically same  Concern if  leak from bowel  Will check with small bowel follow-through   Discussed with IR    THUY ALSTON MD  7/7/2025  11:39  AM         [1]   Past Medical History:   Anxiety    BPH (benign prostatic hyperplasia)    CKD (chronic kidney disease) stage 3, GFR 30-59 ml/min (HCC)    Depression    Diabetes (HCC)    Diabetes mellitus (HCC)    Essential hypertension    GERD (gastroesophageal reflux disease)    High cholesterol    Hyperlipidemia   [2]   Past Surgical History:  Procedure Laterality Date    Colonoscopy N/A 6/6/2025    Procedure: COLONOSCOPY with colonic stent placement;  Surgeon: Eusebio Palmer MD;  Location: [facility] ENDOSCOPY    Foot fracture surgery Right     2012   [3]   Family History  Problem Relation Age of Onset    Other (Other) Father    [4] No Known Allergies  [5]   Current Facility-Administered Medications:     HYDROcodone-acetaminophen (Norco) 5-325 MG per tab 1 tablet, 1 tablet, Oral, Q6H PRN    glycerin-hypromellose- (Artificial Tears) 0.2-0.2-1 % ophthalmic solution 1 drop, 1 drop, Both Eyes, QID PRN    fluconazole in sodium chloride 0.9% (Diflucan) 400 mg/200mL IVPB premix 400 mg, 400 mg, Intravenous, Q24H    melatonin cap/tab 5 mg, 5 mg, Oral, Nightly PRN    temazepam (Restoril) cap 7.5 mg, 7.5 mg, Oral, Nightly PRN    glucose (Dex4) 15 GM/59ML oral liquid 15 g, 15 g, Oral, Q15 Min PRN **OR** glucose (Glutose) 40% oral gel 15 g, 15 g, Oral, Q15 Min PRN **OR** glucose-vitamin C (Dex-4) chewable tab 4 tablet, 4 tablet, Oral, Q15 Min PRN **OR** dextrose 50% injection 50 mL, 50 mL, Intravenous, Q15 Min PRN **OR** glucose (Dex4) 15 GM/59ML oral liquid 30 g, 30 g, Oral, Q15 Min PRN **OR** glucose (Glutose) 40% oral gel 30 g, 30 g, Oral, Q15 Min PRN **OR** glucose-vitamin C (Dex-4) chewable tab 8 tablet, 8 tablet, Oral, Q15 Min PRN    sodium chloride 0.9% infusion, , Intravenous, Continuous    morphINE PF 2 MG/ML injection 1 mg, 1 mg, Intravenous, Q2H PRN **OR** morphINE PF 2 MG/ML injection 2 mg, 2 mg, Intravenous, Q2H PRN **OR** morphINE PF 4 MG/ML injection 4 mg, 4 mg, Intravenous, Q2H PRN    acetaminophen  (Tylenol) rectal suppository 650 mg, 650 mg, Rectal, Q6H PRN    ondansetron (Zofran) 4 MG/2ML injection 4 mg, 4 mg, Intravenous, Q6H PRN    metoclopramide (Reglan) 5 mg/mL injection 10 mg, 10 mg, Intravenous, Q8H PRN    piperacillin-tazobactam (Zosyn) 3.375 g in dextrose 5% 100 mL IVPB-ADDV, 3.375 g, Intravenous, Q8H    heparin (Porcine) 62516 units/250mL infusion PE/DVT/THROMBUS CONTINUOUS, 200-3,000 Units/hr, Intravenous, Continuous    pantoprazole (Protonix) 40 mg in sodium chloride 0.9% PF 10 mL IV push, 40 mg, Intravenous, Daily    insulin aspart (NovoLOG) 100 Units/mL FlexPen 1-5 Units, 1-5 Units, Subcutaneous, TID CC and HS  [6]   Medications Prior to Admission   Medication Sig Dispense Refill Last Dose/Taking    rivaroxaban 15 MG Oral Tab Take 1 tablet (15 mg total) by mouth 2 (two) times daily with meals. Pt to take Xarelto 15 mg BID for 21 days and then Xarelto 20 mg daily after that for 5 more months.   7/4/2025 at  9:00 AM    HYDROcodone-acetaminophen 5-325 MG Oral Tab Take 1-2 tablets by mouth every 6 (six) hours as needed for Pain. 30 tablet 0 7/4/2025 at  6:05 PM    docusate sodium 100 MG Oral Cap Take 100 mg by mouth 2 (two) times daily.   7/4/2025 at  9:00 AM    pantoprazole 40 MG Oral Tab EC Take 1 tablet (40 mg total) by mouth every morning before breakfast. 90 tablet 3 7/4/2025 at  5:45 AM    FLUoxetine 20 MG Oral Cap Take 1 capsule (20 mg total) by mouth daily. 90 capsule 3 7/4/2025 at  9:00 AM    pregabalin 100 MG Oral Cap Take 1 capsule (100 mg total) by mouth in the morning, at noon, and at bedtime. 90 capsule 5 7/4/2025 at  1:41 PM    Ferrous Sulfate 325 (65 Fe) MG Oral Tab Take 1 tablet (325 mg total) by mouth daily with breakfast. With orange juice. 90 tablet 1 7/4/2025 at  9:00 AM    tamsulosin 0.4 MG Oral Cap Take 2 capsules (0.8 mg total) by mouth daily. AFTER SAME MEAL EACH  capsule 3 7/4/2025 at  9:00 AM    alum-mag hydroxide-simethicone 200-200-20 MG/5ML Oral Suspension Take  10 mL by mouth 4 (four) times daily as needed. 200 mL 0 2025    simvastatin 40 MG Oral Tab Take 1 tablet (40 mg total) by mouth nightly. 90 tablet 3 7/3/2025    vitamin B-12 50 MCG Oral Tab Take 1 tablet (50 mcg total) by mouth in the morning.   2025 at  9:00 AM    Multiple Vitamins-Minerals (PRESERVISION/LUTEIN) Oral Cap Take 1 capsule by mouth in the morning.   2025 at  5:45 AM    Polyethyl Glycol-Propyl Glycol (SYSTANE ULTRA OP) Apply 2 drops to eye in the morning and 2 drops before bedtime.   2025 at  5:45 AM    [] amoxicillin clavulanate 875-125 MG Oral Tab Take 1 tablet by mouth 2 (two) times daily for 7 days. 14 tablet 0     [START ON 2025] rivaroxaban 20 MG Oral Tab Take 1 tablet (20 mg total) by mouth daily with food. To start 2025 and continue for 5 months.   Unknown    polyethylene glycol, PEG 3350, 17 g Oral Powd Pack Take 17 g by mouth in the morning and 17 g before bedtime. Stop if diarrhea.   Unknown    Naloxone HCl 4 MG/0.1ML Nasal Liquid 4 mg by Nasal route as needed. If patient remains unresponsive, repeat dose in other nostril 2-5 minutes after first dose. 1 kit 0 Unknown    loratadine 10 MG Oral Tab Take 1 tablet (10 mg total) by mouth daily as needed for Allergies.   Unknown    melatonin 3 MG Oral Tab Take 2 tablets (6 mg total) by mouth daily.   7/3/2025    Accu-Chek FastClix Lancets Does not apply Misc Apply 100 Lancets topically daily. 100 each 3     Glucose Blood (ACCU-CHEK GUIDE TEST) In Vitro Strip Use 1 time per day. 100 strip 3     Glucose Blood (ACCU-CHEK GUIDE) In Vitro Strip 1 each by In Vitro route in the morning and 1 each before bedtime.      [7]   Patient Active Problem List  Diagnosis    BPH (benign prostatic hyperplasia)    Chronic low back pain    Chronic pain of both knees    Depression with anxiety    Generalized osteoarthritis    Hyperlipidemia    Type 2 diabetes mellitus without complication, without long-term current use of insulin (HCC)     Essential hypertension    Bipolar I disorder, single manic episode (HCC)    Large bowel obstruction (HCC)    Colonic mass    Diffuse large B-cell lymphoma of solid organ excluding spleen    Pneumoperitoneum    Abdominal distension    Anasarca    Intraabdominal fluid collection

## 2025-07-08 ENCOUNTER — TELEPHONE (OUTPATIENT)
Age: 80
End: 2025-07-08

## 2025-07-08 ENCOUNTER — APPOINTMENT (OUTPATIENT)
Dept: ULTRASOUND IMAGING | Facility: HOSPITAL | Age: 80
End: 2025-07-08
Attending: HOSPITALIST
Payer: MEDICARE

## 2025-07-08 ENCOUNTER — APPOINTMENT (OUTPATIENT)
Dept: GENERAL RADIOLOGY | Facility: HOSPITAL | Age: 80
End: 2025-07-08
Attending: SPECIALIST
Payer: MEDICARE

## 2025-07-08 LAB
ALBUMIN SERPL-MCNC: 2.1 G/DL (ref 3.2–4.8)
ANION GAP SERPL CALC-SCNC: 7 MMOL/L (ref 0–18)
APTT PPP: 96.3 SECONDS (ref 23–36)
BASOPHILS # BLD AUTO: 0.03 X10(3) UL (ref 0–0.2)
BASOPHILS NFR BLD AUTO: 0.4 %
BUN BLD-MCNC: 7 MG/DL (ref 9–23)
BUN/CREAT SERPL: 14.9 (ref 10–20)
CALCIUM BLD-MCNC: 7.4 MG/DL (ref 8.7–10.4)
CHLORIDE SERPL-SCNC: 103 MMOL/L (ref 98–112)
CO2 SERPL-SCNC: 27 MMOL/L (ref 21–32)
CREAT BLD-MCNC: 0.47 MG/DL (ref 0.7–1.3)
DEPRECATED RDW RBC AUTO: 54.3 FL (ref 35.1–46.3)
EGFRCR SERPLBLD CKD-EPI 2021: 106 ML/MIN/1.73M2 (ref 60–?)
EOSINOPHIL # BLD AUTO: 0.01 X10(3) UL (ref 0–0.7)
EOSINOPHIL NFR BLD AUTO: 0.1 %
ERYTHROCYTE [DISTWIDTH] IN BLOOD BY AUTOMATED COUNT: 17.5 % (ref 11–15)
GLUCOSE BLD-MCNC: 135 MG/DL (ref 70–99)
GLUCOSE BLDC GLUCOMTR-MCNC: 126 MG/DL (ref 70–99)
GLUCOSE BLDC GLUCOMTR-MCNC: 145 MG/DL (ref 70–99)
GLUCOSE BLDC GLUCOMTR-MCNC: 159 MG/DL (ref 70–99)
GLUCOSE BLDC GLUCOMTR-MCNC: 185 MG/DL (ref 70–99)
HCT VFR BLD AUTO: 25.3 % (ref 39–53)
HGB BLD-MCNC: 7.9 G/DL (ref 13–17.5)
IMM GRANULOCYTES # BLD AUTO: 0.07 X10(3) UL (ref 0–1)
IMM GRANULOCYTES NFR BLD: 0.9 %
LYMPHOCYTES # BLD AUTO: 1.38 X10(3) UL (ref 1–4)
LYMPHOCYTES NFR BLD AUTO: 17.6 %
MAGNESIUM SERPL-MCNC: 1.6 MG/DL (ref 1.6–2.6)
MCH RBC QN AUTO: 26.5 PG (ref 26–34)
MCHC RBC AUTO-ENTMCNC: 31.2 G/DL (ref 31–37)
MCV RBC AUTO: 84.9 FL (ref 80–100)
MONOCYTES # BLD AUTO: 0.72 X10(3) UL (ref 0.1–1)
MONOCYTES NFR BLD AUTO: 9.2 %
NEUTROPHILS # BLD AUTO: 5.65 X10 (3) UL (ref 1.5–7.7)
NEUTROPHILS # BLD AUTO: 5.65 X10(3) UL (ref 1.5–7.7)
NEUTROPHILS NFR BLD AUTO: 71.8 %
OSMOLALITY SERPL CALC.SUM OF ELEC: 284 MOSM/KG (ref 275–295)
PHOSPHATE SERPL-MCNC: 2.9 MG/DL (ref 2.4–5.1)
PLATELET # BLD AUTO: 464 10(3)UL (ref 150–450)
POTASSIUM SERPL-SCNC: 3.5 MMOL/L (ref 3.5–5.1)
POTASSIUM SERPL-SCNC: 3.5 MMOL/L (ref 3.5–5.1)
RBC # BLD AUTO: 2.98 X10(6)UL (ref 3.8–5.8)
SODIUM SERPL-SCNC: 137 MMOL/L (ref 136–145)
WBC # BLD AUTO: 7.9 X10(3) UL (ref 4–11)

## 2025-07-08 PROCEDURE — 74250 X-RAY XM SM INT 1CNTRST STD: CPT | Performed by: RADIOLOGY

## 2025-07-08 PROCEDURE — 99233 SBSQ HOSP IP/OBS HIGH 50: CPT | Performed by: HOSPITALIST

## 2025-07-08 PROCEDURE — 93971 EXTREMITY STUDY: CPT | Performed by: RADIOLOGY

## 2025-07-08 RX ORDER — MAGNESIUM SULFATE HEPTAHYDRATE 40 MG/ML
2 INJECTION, SOLUTION INTRAVENOUS ONCE
Status: COMPLETED | OUTPATIENT
Start: 2025-07-08 | End: 2025-07-08

## 2025-07-08 NOTE — PLAN OF CARE
Patient is AxO4. Patient having intermittent episodes of severe abdominal pain - morphine provided as needed. IR drain to gravity via a mcfadden bag intact to LUQ. RLQ NGOZI drain intact with minimal output. Heparin gtt maintained at 1300 units. Tolerating diet. Accuchecks AC/HS. Denies nausea/vomiting. Pt is a max assist - repositioning provided, hygiene care provided, and patient bathed. IV abx continued. Primofit in place. Patient made NPO at 0000, going to receive SBFT today and R arm venous doppler. Appropriate safety measures maintained, call light within reach, and frequent rounding.     Problem: Patient Centered Care  Goal: Patient preferences are identified and integrated in the patient's plan of care  Description: Interventions:  - What would you like us to know as we care for you? I came from [facility]  - Provide timely, complete, and accurate information to patient/family  - Incorporate patient and family knowledge, values, beliefs, and cultural backgrounds into the planning and delivery of care  - Encourage patient/family to participate in care and decision-making at the level they choose  - Honor patient and family perspectives and choices  Outcome: Progressing     Problem: Diabetes/Glucose Control  Goal: Glucose maintained within prescribed range  Description: INTERVENTIONS:  - Monitor Blood Glucose as ordered  - Assess for signs and symptoms of hyperglycemia and hypoglycemia  - Administer ordered medications to maintain glucose within target range  - Assess barriers to adequate nutritional intake and initiate nutrition consult as needed  - Instruct patient on self management of diabetes  Outcome: Progressing     Problem: Patient/Family Goals  Goal: Patient/Family Long Term Goal  Description: Patient's Long Term Goal: return home    Interventions:  - follow plan of care  - See additional Care Plan goals for specific interventions  Outcome: Progressing  Goal: Patient/Family Short Term Goal  Description:  Patient's Short Term Goal: to get stronger and feel better    Interventions:   - PT/OT on consult  - IV abx  - surgery, IR on consult  - NGOZI drains  - monitor labs and vital signs  - See additional Care Plan goals for specific interventions  Outcome: Progressing     Problem: PAIN - ADULT  Goal: Verbalizes/displays adequate comfort level or patient's stated pain goal  Description: INTERVENTIONS:  - Encourage pt to monitor pain and request assistance  - Assess pain using appropriate pain scale  - Administer analgesics based on type and severity of pain and evaluate response  - Implement non-pharmacological measures as appropriate and evaluate response  - Consider cultural and social influences on pain and pain management  - Manage/alleviate anxiety  - Utilize distraction and/or relaxation techniques  - Monitor for opioid side effects  - Notify MD/LIP if interventions unsuccessful or patient reports new pain  - Anticipate increased pain with activity and pre-medicate as appropriate  Outcome: Progressing     Problem: RISK FOR INFECTION - ADULT  Goal: Absence of fever/infection during anticipated neutropenic period  Description: INTERVENTIONS  - Monitor WBC  - Administer growth factors as ordered  - Implement neutropenic guidelines  Outcome: Progressing     Problem: SAFETY ADULT - FALL  Goal: Free from fall injury  Description: INTERVENTIONS:  - Assess pt frequently for physical needs  - Identify cognitive and physical deficits and behaviors that affect risk of falls.  - [facility] fall precautions as indicated by assessment.  - Educate pt/family on patient safety including physical limitations  - Instruct pt to call for assistance with activity based on assessment  - Modify environment to reduce risk of injury  - Provide assistive devices as appropriate  - Consider OT/PT consult to assist with strengthening/mobility  - Encourage toileting schedule  Outcome: Progressing     Problem: HEMATOLOGIC - ADULT  Goal: Free from  bleeding injury  Description: (Example usage: patient with low platelets)  INTERVENTIONS:  - Avoid intramuscular injections, enemas and rectal medication administration  - Ensure safe mobilization of patient  - Hold pressure on venipuncture sites to achieve adequate hemostasis  - Assess for signs and symptoms of internal bleeding  - Monitor lab trends  - Patient is to report abnormal signs of bleeding to staff  - Avoid use of toothpicks and dental floss  - Use electric shaver for shaving  - Use soft bristle tooth brush  - Limit straining and forceful nose blowing  Outcome: Progressing     Problem: GASTROINTESTINAL - ADULT  Goal: Minimal or absence of nausea and vomiting  Description: INTERVENTIONS:  - Maintain adequate hydration with IV or PO as ordered and tolerated  - Nasogastric tube to low intermittent suction as ordered  - Evaluate effectiveness of ordered antiemetic medications  - Provide nonpharmacologic comfort measures as appropriate  - Advance diet as tolerated, if ordered  - Obtain nutritional consult as needed  - Evaluate fluid balance  Outcome: Progressing  Goal: Maintains or returns to baseline bowel function  Description: INTERVENTIONS:  - Assess bowel function  - Maintain adequate hydration with IV or PO as ordered and tolerated  - Evaluate effectiveness of GI medications  - Encourage mobilization and activity  - Obtain nutritional consult as needed  - Establish a toileting routine/schedule  - Consider collaborating with pharmacy to review patient's medication profile  Outcome: Progressing

## 2025-07-08 NOTE — CONSULTS
Hematology/Oncology Initial Consultation Note    Patient Name: Arnaldo Gutierrez  Medical Record Number: [id number]    YOB: 1945   Date of Consultation: 7/8/2025   PCP: PHYSICIAN RIAZTAFF   Other providers:    Reason for Consultation:  Arnaldo Gutierrez was seen today for the diagnosis of VTE       ===================================================  History of Present Illness:      Medical history     Admitted 6/5/2025 with iron deficiency anemia and partial large bowel obstruction with a mass in the transverse colon. Went for colonoscopy 6/6 and found to have a malignant stricture of the transverse colon and underwent colonic stenting. He went for transverse colon resection 6/10 and reportedly there were numerous peritoneal or omental metastasis and enlarged lymph nodes. 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     6/13/25: Laparoscopic-assisted transverse colon resection, small bowel resection x2, resection of tumor nodule on small bowel.     On 6/19 had low-grade temperatures with increasing WBC up to 19 with repeat CT A/P showing moderate to large volume free fluid in the abdomen and pelvis in the upper quadrants. Organized fluid in the left lower retroperitoneum measuring 5.5 x 4.7 x 5.8 cm concern for seroma, liquefied hematoma versus inflammatory fluid. Seen by IR and underwent IR peritoneal drain placement on 7/19 with 260 cc of serosanguineous fluid.     ED on 7/4/25 with abdominal pain. Repeat CT with multiple large peritoneal abscess status post IR drainage with cx growing GNR and budding yeast.      Developed right lower extremity DVT, was on Xarelto.  Last dose of Xarelto was given on 7/4 morning.  Started on heparin .75.    7/8/25: Acute expansile occlusive DVT within the right subclavian and axillary veins. The visualized segments of the right brachial, basilic, and cephalic veins are patent       Past Medical History:  Past Medical History[1]    Past  Surgical History[2]    Home Medications:  [unfilled]  -------  Medications Ordered Prior to Encounter[3]    Allergies:   Allergies[4]    Psychosocial History:  Social History     Social History Narrative    Not on file     Short Social Hx on File[5]    Family Medical History:  Family History[6]    Review of Systems:  A 10-point ROS was done with pertinent positives and negative per the HPI    Vital Signs:  Height: --  Weight: --  BSA (Calculated - sq m): --  Pulse: 68 (07/08 1234)  BP: 114/57 (07/08 1234)  Temp: 97.8 °F (36.6 °C) (07/08 1234)  Do Not Use - Resp Rate: --  SpO2: 97 % (07/08 1234)    Wt Readings from Last 6 Encounters:   07/05/25 71.8 kg (158 lb 4.8 oz)   07/02/25 77.6 kg (171 lb)   06/24/25 77.7 kg (171 lb 3.2 oz)   05/28/25 67.1 kg (148 lb)   04/09/25 72.6 kg (160 lb)   04/08/25 72.6 kg (160 lb)       ECOG PS: 2    Physical Examination:  General: Patient is alert and oriented, not in acute distress  Psych: Mood and affect are appropriate  Eyes: EOMI, PERRL  ENT: Oropharynx is clear, no adenopathy  CV: Regular rate and rhythm, normal S1S2, no murmurs, no LE edema  Respiratory: Lungs clear to auscultation bilaterally  GI/Abd: Soft, non-tender with normoactive bowel sounds, no hepatosplenomegaly  Neurological: Grossly intact   Lymphatics: No palpable cervical, supraclavicular, axillary, or inguinal lymphadenopathy  Skin: no rashes or petechiae  Peritoneal drains noted. RUE swelling+.    Laboratory:  Lab Results   Component Value Date    WBC 7.9 07/08/2025    WBC 9.5 07/07/2025    WBC 10.9 07/06/2025    HGB 7.9 (L) 07/08/2025    HGB 7.8 (L) 07/07/2025    HGB 8.0 (L) 07/06/2025    HCT 25.3 (L) 07/08/2025    MCV 84.9 07/08/2025    MCH 26.5 07/08/2025    MCHC 31.2 07/08/2025    RDW 17.5 (H) 07/08/2025    .0 (H) 07/08/2025    .0 (H) 07/07/2025    .0 (H) 07/06/2025     Lab Results   Component Value Date     (H) 07/08/2025    BUN 7 (L) 07/08/2025    BUNCREA 14.9 07/08/2025     CREATSERUM 0.47 (L) 07/08/2025    CREATSERUM 0.48 (L) 07/07/2025    CREATSERUM 0.53 (L) 07/06/2025    ANIONGAP 7 07/08/2025    CA 7.4 (L) 07/08/2025    OSMOCALC 284 07/08/2025    ALKPHO 182 (H) 07/07/2025    AST 22 07/07/2025    ALT 16 07/07/2025    BILT 0.3 07/07/2025    TP 5.0 (L) 07/07/2025    ALB 2.1 (L) 07/08/2025    GLOBULIN 3.0 07/07/2025     07/08/2025    K 3.5 07/08/2025    K 3.5 07/08/2025     07/08/2025    CO2 27.0 07/08/2025     Lab Results   Component Value Date    PTT 96.3 (H) 07/08/2025       Imaging:    CT Chest reviewed      Impression & Plan:     80 year old male with a history of diabetes hypertension GERD and other comorbidities who was admitted 6/5/2025 with iron deficiency anemia and partial large bowel obstruction with a mass in the transverse colon.  Went for colonoscopy 6/6 and found to have a malignant stricture of the transverse colon and underwent colonic stenting.  He went for transverse colon resection 6/10 and reportedly there were numerous peritoneal or omental metastasis and enlarged lymph nodes.  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  Postoperative course was complicated by intra-abdominal abscess.    Given the acute infection, we will defer treatment of his lymphoma until he recovers from the acute infection.  Discussed with daughter over the phone about the increased risk of sepsis and mortality while treating with active infection.  No signs of any bowel obstruction or peritonitis.  Follow-up outpatient for management of his lymphoma.    DVT  Of the right upper extremity.  CT of the chest did not reveal any external compression of the veins by the enlarged lymph node.Developed while on IV heparin with therapeutic PTT. Likely due to the underlying inflammation and lymphoma.     Increased heparin to higher intensity. Monitor PTT and CBC for any signs of bleeding.     Hematology will continue to follow along     Crys Wilkins  MD  [facility]            This note was prepared using Dragon Medical voice recognition dictation software and as a result, errors may occur. When identified, these errors have been corrected. While every attempt is made to correct errors during dictation, discrepancies may still exist          [1]   Past Medical History:   Anxiety    BPH (benign prostatic hyperplasia)    CKD (chronic kidney disease) stage 3, GFR 30-59 ml/min (HCC)    Depression    Diabetes (HCC)    Diabetes mellitus (HCC)    Essential hypertension    GERD (gastroesophageal reflux disease)    High cholesterol    Hyperlipidemia   [2]   Past Surgical History:  Procedure Laterality Date    Colonoscopy N/A 6/6/2025    Procedure: COLONOSCOPY with colonic stent placement;  Surgeon: Eusebio Palmer MD;  Location: [facility] ENDOSCOPY    Foot fracture surgery Right     2012   [3]   Current Facility-Administered Medications on File Prior to Encounter   Medication Dose Route Frequency Provider Last Rate Last Admin    [COMPLETED] magnesium oxide (Mag-Ox) tab 400 mg  400 mg Oral Once Shekhar Johnson MD   400 mg at 06/24/25 0954    [COMPLETED] magnesium oxide (Mag-Ox) tab 800 mg  800 mg Oral Once Shekhar Johnson MD   800 mg at 06/23/25 0944    [COMPLETED] potassium chloride 40 mEq in 250mL sodium chloride 0.9% IVPB premix  40 mEq Intravenous Once Shekhar Johnson MD 62.5 mL/hr at 06/23/25 0944 40 mEq at 06/23/25 0944    [COMPLETED] magnesium oxide (Mag-Ox) tab 800 mg  800 mg Oral Once Shekhar Johnson MD   800 mg at 06/22/25 0823    [COMPLETED] sodium chloride 0.9% infusion   Intravenous Once Montse Gonzales MD   Stopped at 06/21/25 1519    [COMPLETED] magnesium oxide (Mag-Ox) tab 400 mg  400 mg Oral Once Shekhar Johnson MD   400 mg at 06/21/25 0803    [COMPLETED] magnesium oxide (Mag-Ox) tab 400 mg  400 mg Oral Once Shekhar Johnson MD   400 mg at 06/20/25 1017    [COMPLETED] magnesium sulfate in sterile water for  injection 2 g/50mL IVPB premix 2 g  2 g Intravenous Once Shekhar Johnson MD 50 mL/hr at 25 0855 2 g at 25 0855    [COMPLETED] iopamidol 76% (ISOVUE-370) injection for power injector  80 mL Intravenous ONCE PRN Shekhar Johnson MD   80 mL at 25 1342    [COMPLETED] fentaNYL (Sublimaze) 50 mcg/mL injection             [COMPLETED] magnesium sulfate in sterile water for injection 2 g/50mL IVPB premix 2 g  2 g Intravenous Once Shekhar Johnson MD 50 mL/hr at 25 1640 2 g at 25 1640    [COMPLETED] magnesium sulfate in sterile water for injection 2 g/50mL IVPB premix 2 g  2 g Intravenous Once Boy Mccabe MD 50 mL/hr at 25 0934 2 g at 25 0934    [] adult 3 in 1 TPN   Intravenous Continuous TPN Boy Mccabe MD 75 mL/hr at 25 New Bag at 25    [] adult 3 in 1 TPN   Intravenous Continuous TPN Boy Mccabe MD 87.5 mL/hr at 25 2133 New Bag at 25 2133    [COMPLETED] sodium ferric gluconate (Ferrlecit) 125 mg in sodium chloride 0.9% 100mL IVPB premix  125 mg Intravenous Daily Norris Lazaro  mL/hr at 25 1215 125 mg at 25 1215    [COMPLETED] potassium chloride 40 mEq in 250mL sodium chloride 0.9% IVPB premix  40 mEq Intravenous Once Boy Mccabe MD 62.5 mL/hr at 06/15/25 0954 40 mEq at 06/15/25 0954    [] adult 3 in 1 TPN   Intravenous Continuous TPN Boy Mccabe MD 87.5 mL/hr at 25 1830 Rate Verify at 25 1830    [COMPLETED] sodium phosphate 15 mmol in 0.9% NaCl 100mL IVPB premix  15 mmol Intravenous Once Rip Azul MD   15 mmol at 25 0836    [] adult 3 in 1 TPN   Intravenous Continuous TPN Boy Mccabe MD 83.3 mL/hr at 25 New Bag at 25    [COMPLETED] magnesium sulfate in sterile water for injection 2 g/50mL IVPB premix 2 g  2 g Intravenous Once Boy Mccabe MD 50 mL/hr at 25 1100 2 g at 25 1100    [COMPLETED] potassium phosphate dibasic 15 mmol in sodium  chloride 0.9% 250 mL IVPB  15 mmol Intravenous Once Boy Mccabe MD 62.5 mL/hr at 25 0847 15 mmol at 25 0847    Followed by    [COMPLETED] potassium chloride 20 mEq/100mL IVPB premix 20 mEq  20 mEq Intravenous Once Boy Mccabe MD 50 mL/hr at 25 1328 20 mEq at 25 1328    [] adult 3 in 1 TPN   Intravenous Continuous TPN Boy Mccabe MD 83.3 mL/hr at 25 2048 New Bag at 25 2048    [COMPLETED] magnesium sulfate 4 g/100mL IVPB premix 4 g  4 g Intravenous Once Boy Mccabe MD 50 mL/hr at 25 1209 4 g at 25 1209    [] adult 3 in 1 TPN   Intravenous Continuous TPN Boy Mccabe MD 83.3 mL/hr at 25 2146 New Bag at 25 2146    [COMPLETED] sodium chloride 0.9 % IV bolus 500 mL  500 mL Intravenous Once Missy Beck APRN 1,000 mL/hr at 25 1638 500 mL at 25 1638    [COMPLETED] sodium chloride 0.9 % IV bolus 500 mL  500 mL Intravenous Once Missy Beck APRN 1,000 mL/hr at 25 1921 500 mL at 25 1921    [COMPLETED] insulin regular human (Novolin R, Humulin R) 100 UNIT/ML injection 10 Units  10 Units Subcutaneous Once Danelle Valdez MD   10 Units at 25 0303    [COMPLETED] potassium chloride 40 mEq in 250mL sodium chloride 0.9% IVPB premix  40 mEq Intravenous Once Danelle Valdez MD 62.5 mL/hr at 25 0437 40 mEq at 25 0437    [COMPLETED] phentolamine (Regitine) 10 mg in sodium chloride 0.9% syringe (ADULT EXTRAVASATION)  10 mg Subcutaneous See Admin Instructions Missy Beck APRN   10 mg at 25 1558    [COMPLETED] lidocaine PF (Xylocaine-MPF) 1% injection  5 mL Intradermal Once Missy Beck APRN   5 mL at 25 1518    [COMPLETED] acetaminophen (Ofirmev) 10 mg/mL infusion premix 1,000 mg  1,000 mg Intravenous Once Rolando Whatley  mL/hr at 06/10/25 1903 1,000 mg at 06/10/25 1903    [COMPLETED] sodium chloride 0.9 % IV bolus 1,986 mL  30 mL/kg Intravenous Once Danelle Valdez  mL/hr at  06/10/25 2347 1,986 mL at 06/10/25 2347    [COMPLETED] neomycin (Mycifradin) tab 1,000 mg  1,000 mg Oral Once Ruben aRndall MD   1,000 mg at 25 1536    [COMPLETED] neomycin (Mycifradin) tab 1,000 mg  1,000 mg Oral Once Ruben Randall MD   1,000 mg at 25 1721    [COMPLETED] neomycin (Mycifradin) tab 1,000 mg  1,000 mg Oral Once Ruben Randall MD   1,000 mg at 25 2141    [COMPLETED] metroNIDAZOLE (Flagyl) tab 500 mg  500 mg Oral Once Ruben Randall MD   500 mg at 25 1459    [COMPLETED] metroNIDAZOLE (Flagyl) tab 500 mg  500 mg Oral Once Ruben Randall MD   500 mg at 25 1721    [COMPLETED] metroNIDAZOLE (Flagyl) tab 500 mg  500 mg Oral Once Ruben Randall MD   500 mg at 25 2138    [] sodium chloride 0.9% infusion   Intravenous Continuous Boy Mccabe  mL/hr at 25 1456 New Bag at 25 1456     Current Outpatient Medications on File Prior to Encounter   Medication Sig Dispense Refill    rivaroxaban 15 MG Oral Tab Take 1 tablet (15 mg total) by mouth 2 (two) times daily with meals. Pt to take Xarelto 15 mg BID for 21 days and then Xarelto 20 mg daily after that for 5 more months.      HYDROcodone-acetaminophen 5-325 MG Oral Tab Take 1-2 tablets by mouth every 6 (six) hours as needed for Pain. 30 tablet 0    docusate sodium 100 MG Oral Cap Take 100 mg by mouth 2 (two) times daily.      pantoprazole 40 MG Oral Tab EC Take 1 tablet (40 mg total) by mouth every morning before breakfast. 90 tablet 3    FLUoxetine 20 MG Oral Cap Take 1 capsule (20 mg total) by mouth daily. 90 capsule 3    pregabalin 100 MG Oral Cap Take 1 capsule (100 mg total) by mouth in the morning, at noon, and at bedtime. 90 capsule 5    Ferrous Sulfate 325 (65 Fe) MG Oral Tab Take 1 tablet (325 mg total) by mouth daily with breakfast. With orange juice. 90 tablet 1    tamsulosin 0.4 MG Oral Cap Take 2 capsules (0.8 mg total) by mouth daily. AFTER SAME MEAL EACH   capsule 3    alum-mag hydroxide-simethicone 200-200-20 MG/5ML Oral Suspension Take 10 mL by mouth 4 (four) times daily as needed. 200 mL 0    simvastatin 40 MG Oral Tab Take 1 tablet (40 mg total) by mouth nightly. 90 tablet 3    vitamin B-12 50 MCG Oral Tab Take 1 tablet (50 mcg total) by mouth in the morning.      Multiple Vitamins-Minerals (PRESERVISION/LUTEIN) Oral Cap Take 1 capsule by mouth in the morning.      Polyethyl Glycol-Propyl Glycol (SYSTANE ULTRA OP) Apply 2 drops to eye in the morning and 2 drops before bedtime.      [] amoxicillin clavulanate 875-125 MG Oral Tab Take 1 tablet by mouth 2 (two) times daily for 7 days. 14 tablet 0    [START ON 2025] rivaroxaban 20 MG Oral Tab Take 1 tablet (20 mg total) by mouth daily with food. To start 2025 and continue for 5 months.      polyethylene glycol, PEG 3350, 17 g Oral Powd Pack Take 17 g by mouth in the morning and 17 g before bedtime. Stop if diarrhea.      Naloxone HCl 4 MG/0.1ML Nasal Liquid 4 mg by Nasal route as needed. If patient remains unresponsive, repeat dose in other nostril 2-5 minutes after first dose. 1 kit 0    loratadine 10 MG Oral Tab Take 1 tablet (10 mg total) by mouth daily as needed for Allergies.      melatonin 3 MG Oral Tab Take 2 tablets (6 mg total) by mouth daily.      Accu-Chek FastClix Lancets Does not apply Misc Apply 100 Lancets topically daily. 100 each 3    Glucose Blood (ACCU-CHEK GUIDE TEST) In Vitro Strip Use 1 time per day. 100 strip 3    Glucose Blood (ACCU-CHEK GUIDE) In Vitro Strip 1 each by In Vitro route in the morning and 1 each before bedtime.     [4] No Known Allergies  [5]   Social History  Socioeconomic History    Marital status:    Tobacco Use    Smoking status: Former     Current packs/day: 0.00     Average packs/day: 2.0 packs/day for 10.0 years (20.0 ttl pk-yrs)     Types: Cigarettes     Start date:      Quit date:      Years since quittin.5     Passive exposure: Past     Smokeless tobacco: Never   Vaping Use    Vaping status: Never Used   Substance and Sexual Activity    Alcohol use: Yes     Comment: rarely    Drug use: Not Currently     Social Drivers of Health     Food Insecurity: No Food Insecurity (7/5/2025)    NCSS - Food Insecurity     Worried About Running Out of Food in the Last Year: No     Ran Out of Food in the Last Year: No   Transportation Needs: No Transportation Needs (7/5/2025)    NCSS - Transportation     Lack of Transportation: No   Housing Stability: Not At Risk (7/5/2025)    NCSS - Housing/Utilities     Has Housing: Yes     Worried About Losing Housing: No     Unable to Get Utilities: No   [6]   Family History  Problem Relation Age of Onset    Other (Other) Father

## 2025-07-08 NOTE — PROGRESS NOTES
[facility]  part of [facility]    Progress Note    Arnaldo Gutierrez Patient Status:  Inpatient    4/15/1945 MRN [id number]   Location [facility] 4W/SW/SE Attending Declan Stearns MD   Hosp Day # 3 PCP PHYSICIAN NONSTAFF       Subjective:   Arnaldo Gutierrez is a(n) 80 year old male was seen and examined   Resting in bed, comfortably  Tolerating diet   Denies any RUE pain  No cp, sob, f,c,n,v abd pain or HA     Objective:   Blood pressure 104/55, pulse 71, temperature 97.8 °F (36.6 °C), temperature source Oral, resp. rate 18, height 5' 11\" (1.803 m), weight 158 lb 4.8 oz (71.8 kg), SpO2 95%.    GENERAL:  The patient appeared to be in no distress and was comfortable.  SKIN:  Warm and hydrated  PSYCHIATRIC: Calm and cooperative    HEENT:  Head was atraumatic and normocephalic.  Eyes: Sclera was anicteric.  Pupils were equal.  Ears:  There were no lesions.  Nose:  No lesions were noted.      NECK:  Supple.  There was no JVD.    CHEST:  Symmetrical movement on inspiration  CARDIAC: S1 S2+, RRR  LUNGS:  CTAB with decreased entry at bases   ABDOMEN: Non-distended, non-tender, BS+  MUSCULOSKELETAL:  There was no deformity.  There was full range of motion in all the extremities.    EXTREMITIES: There was no edema  NEUROLOGIC: Cranial nerves II-XII intact, no focal defecits    Current Inpatient Medications:   Current Hospital Medications[1]    Assessment and Plan:   Pneumoperitoneum with concern for persistent bowel leak  Abdominal fluid collection  Possibly trapped right lateral abdomen pigtail drain  - Patient with recent bowel resection.  Still has NGOZI drain  - IV antibiotics with Zosyn  - N.p.o. -> now tolerating low fiber soft diet   - General Surgery on consult  -IR has been consulted, s/p drain placement, cultures reviewed   -ID consulted for abx management  - Pain controlled  - SBFT pending     Right lower extremity DVT, LLE soleal vein thrombosis  -is on Xarelto outpatient  -on  heparin gtt for now     RUE swelling  -Will need to r/o DVT, await US -> showing acute expansile dvt of subclavian and axillary veins  -cont heparin gtt for now  -hemtology/oncology consulted    Chronic anemia, stable  - Monitor     Anasarca with diffuse subcutaneous edema on CT. likely due to hypoalbuminemia  Hypoalbuminemia  - Echo 6/24/2025: EF 60 to 65%.  Normal diastolic function.    DM2  - SSI     B-cell lymphoma  -Patient oncology follow-up     HTN  HLD  GERD  BPH  Anxiety  Depression  - IV meds as needed     Quality:  DVT Prophylaxis: heparin gtt   CODE status: Full code  ANNELISE: TBD        Results:     Recent Labs   Lab 07/06/25 0228 07/07/25  0706 07/08/25  0718   RBC 2.98* 2.87* 2.98*   HGB 8.0* 7.8* 7.9*   HCT 25.9* 24.5* 25.3*   MCV 86.9 85.4 84.9   MCH 26.8 27.2 26.5   MCHC 30.9* 31.8 31.2   RDW 17.4* 17.1* 17.5*   NEPRELIM 8.84* 6.98 5.65   WBC 10.9 9.5 7.9   .0* 489.0* 464.0*         Recent Labs   Lab 07/06/25 0228 07/07/25  0706 07/08/25  0718   * 139* 135*   BUN 12 8* 7*   CREATSERUM 0.53* 0.48* 0.47*   EGFRCR 103 106 106   CA 7.2* 7.1* 7.4*   * 135* 137   K 3.9 3.3* 3.5  3.5    104 103   CO2 28.0 26.0 27.0         Imaging:   US VENOUS DOPPLER ARM RIGHT - DIAG IMG (CPT=93971)  Result Date: 7/8/2025  CONCLUSION: Acute expansile occlusive DVT within the right subclavian and axillary veins. The visualized segments of the right brachial, basilic, and cephalic veins are patent. The patient's nurse was notified by telephone of these results at 10:41 a.m. on 7/8/2025. Electronically Verified and Signed by Attending Radiologist: Duran Bhardwaj MD 7/8/2025 10:41 AM Workstation: [id number]              Declan Stearns MD  7/8/2025          [1]   Current Facility-Administered Medications:     magnesium sulfate in sterile water for injection 2 g/50mL IVPB premix 2 g, 2 g, Intravenous, Once    HYDROcodone-acetaminophen (Norco) 5-325 MG per tab 1 tablet, 1 tablet, Oral, Q6H PRN     glycerin-hypromellose- (Artificial Tears) 0.2-0.2-1 % ophthalmic solution 1 drop, 1 drop, Both Eyes, QID PRN    DAPTOmycin (Cubicin) 500 mg in sodium chloride 0.9% PF IV syringe, 500 mg, Intravenous, Q24H    ceFEPIme (Maxipime) 1 g in sodium chloride 0.9% 100mL IVPB-BRANDY, 1 g, Intravenous, Q8H    micafungin (Mycamine) 100mg in 100 mL 0.9% IVPB-BRANDY, 100 mg, Intravenous, Q24H    metroNIDAZOLE (Flagyl) tab 500 mg, 500 mg, Oral, BID    melatonin cap/tab 5 mg, 5 mg, Oral, Nightly PRN    temazepam (Restoril) cap 7.5 mg, 7.5 mg, Oral, Nightly PRN    glucose (Dex4) 15 GM/59ML oral liquid 15 g, 15 g, Oral, Q15 Min PRN **OR** glucose (Glutose) 40% oral gel 15 g, 15 g, Oral, Q15 Min PRN **OR** glucose-vitamin C (Dex-4) chewable tab 4 tablet, 4 tablet, Oral, Q15 Min PRN **OR** dextrose 50% injection 50 mL, 50 mL, Intravenous, Q15 Min PRN **OR** glucose (Dex4) 15 GM/59ML oral liquid 30 g, 30 g, Oral, Q15 Min PRN **OR** glucose (Glutose) 40% oral gel 30 g, 30 g, Oral, Q15 Min PRN **OR** glucose-vitamin C (Dex-4) chewable tab 8 tablet, 8 tablet, Oral, Q15 Min PRN    morphINE PF 2 MG/ML injection 1 mg, 1 mg, Intravenous, Q2H PRN **OR** morphINE PF 2 MG/ML injection 2 mg, 2 mg, Intravenous, Q2H PRN **OR** morphINE PF 4 MG/ML injection 4 mg, 4 mg, Intravenous, Q2H PRN    acetaminophen (Tylenol) rectal suppository 650 mg, 650 mg, Rectal, Q6H PRN    ondansetron (Zofran) 4 MG/2ML injection 4 mg, 4 mg, Intravenous, Q6H PRN    metoclopramide (Reglan) 5 mg/mL injection 10 mg, 10 mg, Intravenous, Q8H PRN    heparin (Porcine) 54418 units/250mL infusion PE/DVT/THROMBUS CONTINUOUS, 200-3,000 Units/hr, Intravenous, Continuous    pantoprazole (Protonix) 40 mg in sodium chloride 0.9% PF 10 mL IV push, 40 mg, Intravenous, Daily    insulin aspart (NovoLOG) 100 Units/mL FlexPen 1-5 Units, 1-5 Units, Subcutaneous, TID CC and HS

## 2025-07-08 NOTE — PHYSICAL THERAPY NOTE
Pt was cleared to participate with PT per RN but upon arrival pt declined to participate with pt's family present and offered to translate to pt. Pt was educated as to importance and benefits of PT skilled services as well as importance of early mobility with pt continue to decline. RN made aware.

## 2025-07-08 NOTE — TELEPHONE ENCOUNTER
Pt daughter Yao called she stated the pt is currently in [facility], however the pt has a PHFU 7/9 Yao is not sure when to schedule the pt appt but she doesn't want to cancel his appt 7/9       Please advise pt daughter Yao [phone] she is requesting to speak with a nurse

## 2025-07-08 NOTE — TELEPHONE ENCOUNTER
Returned call to Yao. Let her know we will cancel her dad's appointment for tomorrow. Dr. FRAIRE wants him to heal before starting any treatment. Informed her Dr. FRAIRE will try to stop by sometime between 12 pm and 3 pm tomorrow to meet with the, while Arnaldo is in the hospital. She stated understanding and thanked me for the call.

## 2025-07-08 NOTE — OCCUPATIONAL THERAPY NOTE
Pt attempted however pt declining despite encouragement from therapist and family. Will follow up as able

## 2025-07-08 NOTE — PROGRESS NOTES
[facility]  part of [facility]    Progress Note    Arnaldo Gutierrez Patient Status:  Inpatient    4/15/1945 MRN [id number]   Location [facility] 4W/SW/SE Attending Declan Stearns MD   Hosp Day # 3 PCP PHYSICIAN NONSTAFF     Subjective:  Feels ok     Objective/Physical Exam:  General: Alert, orientated x3.  Cooperative.  No apparent distress.  Vital Signs:  Blood pressure 114/57, pulse 68, temperature 97.8 °F (36.6 °C), temperature source Oral, resp. rate 16, height 180.3 cm (5' 11\"), weight 158 lb 4.8 oz (71.8 kg), SpO2 97%.  Abdomen:  Soft, non-distended, non-tender,   Drainage 250   ? Slight bilious       Labs:  Lab Results   Component Value Date    WBC 7.9 2025    HGB 7.9 (L) 2025    HCT 25.3 (L) 2025    .0 (H) 2025    CREATSERUM 0.47 (L) 2025    BUN 7 (L) 2025     2025    K 3.5 2025    K 3.5 2025     2025    CO2 27.0 2025     (H) 2025    CA 7.4 (L) 2025    ALB 2.1 (L) 2025    ALKPHO 182 (H) 2025    BILT 0.3 2025    TP 5.0 (L) 2025    AST 22 2025    ALT 16 2025    PTT 96.3 (H) 2025    TSH 2.417 2025    LIP 45 2025    MG 1.6 2025    PHOS 2.9 2025    CK <15 (L) 2025    B12 1,020 (H) 2025       Imaging:  US VENOUS DOPPLER ARM RIGHT - DIAG IMG (CPT=93971)  Result Date: 2025  PROCEDURE: US VENOUS DOPPLER ARM RIGHT - DIAG IM (CPT=93971) INDICATIONS: swelling in RUE TECHNIQUE: Color duplex Doppler venous ultrasound of the right upper extremity was performed in the usual manner. FINDINGS: The internal jugular, subclavian, axillary, brachial, cephalic and basilic veins appear normal. Flow was demonstrated with color and pulsed Doppler. Comparison scans of the left subclavian vein appear normal. There is acute expansile DVT within the right subclavian and axillary veins. The visualized segments of the  right brachial vein, right cephalic vein, and right basilic vein are patent. There is moderate soft tissue edema within the right upper extremity.     CONCLUSION: Acute expansile occlusive DVT within the right subclavian and axillary veins. The visualized segments of the right brachial, basilic, and cephalic veins are patent. The patient's nurse was notified by telephone of these results at 10:41 a.m. on 7/8/2025. Electronically Verified and Signed by Attending Radiologist: Duran Bhardwaj MD 7/8/2025 10:41 AM Workstation: [id number]    CT ABDOMEN+PELVIS(CONTRAST ONLY)(CPT=74177)  Result Date: 7/5/2025  PROCEDURE(S): CT ABDOMEN+PELVIS(CONTRAST ONLY)(CPT=74177) WORKSTATION: [id number] HISTORY/INDICATIONS: distended abdomen, recent surgery for mass resection, cath placement distended abdomen, recent surgery for mass resection, cath placement COMPARISON: 06/19/2025 CT ABDOMEN+PELVIS(CONTRAST ONLY)(CPT=74177) TECHNIQUE: Helical CT of the abdomen and pelvis was obtained following administration of intravenous contrast material. Axial, coronal, and sagittal reformatted images were created and interpreted. For this exam, one or more of the following dose reductions techniques were used: Automated exposure control Adjustment of the mA and/or kV according to patient size Use of iterative reconstruction technique FINDINGS: Lower Thorax: Moderate bilateral pleural effusions are again seen. Heart is normal size. Few airspace opacities are noted in the lingula series 3 image 1. There is atelectasis in the lower lobes. Liver: Unremarkable. Gallbladder/Biliary tree: Cholelithiasis without evidence of acute cholecystitis. No biliary ductal dilatation. Pancreas:  Unremarkable. Spleen: Unremarkable. Adrenal glands:  Unremarkable. Kidneys: Simple cyst arises from the right kidney posterior interpolar region. No suspicious renal lesions. Symmetric nephrograms. No hydronephrosis. Retroperitoneum/extraperitoneum: Unremarkable.  Vasculature: No aneurysm or dissection. Mild atherosclerotic calcification is seen. Peritoneum: Multiple abscesses are seen: Left upper quadrant abscess measures 24.3 x 10.9 x 23.9 cm series 2 image 73; series 5 image 23. Air-fluid levels are seen. It extends into the abdominal wall series 2 image 73. Right upper quadrant perihepatic abscess communicates with the left upper quadrant abscess and measures 14.2 x 5.1 x 15.8 cm series 2 image 35; series 6 image 96. Perisplenic abscess measures 7.9 x 2.8 x 9.8 cm series 2 image 50; series 6 image 19. This abscess also likely communicates with the left upper quadrant abscess. Small lower midline suspected abscess measuring 3.0 x 2.7 x 3.5 cm. There is a right mid abdominal pigtail drainage catheter which may terminate within the colon series 2 image 80. GI tract/mesentery/omentum: Multiple prior bowel resections are noted. Suspected fistula in the left mid abdomen series 2 image 103. Possible pigtail drainage catheter within the ascending colon series 2 image 80. No bowel obstruction is identified. Urinary Bladder: The bladder is grossly unremarkable. Reproductive organs: Unremarkable. Body wall: Mild anasarca. The dominant abscess extends into the anterior abdominal wall along the surgical incision series 2 image 73. Bones:  There are mild degenerative changes in the spine.     CONCLUSION: 1.  Multiple large peritoneal abscesses are seen. The largest is centered in the left upper quadrant and left midabdomen and measures 24.3 x 10.9 x 23.9 cm with air-fluid levels and extends into the abdominal wall. This is contiguous with smaller right upper quadrant perihepatic and posterior left upper quadrant perisplenic abscesses. A separate small 3 cm abscess is noted adjacent to the bladder. Bladder diverticulum is thought less likely. 2.  Multiple prior bowel resections are seen. There may be fistulous connection between the bowel and left upper quadrant dominant abscess at the  left mid abdomen. The location of a right-sided pigtail drainage catheter is uncertain but it may terminate within the ascending colon. 3.  Additional chronic and/or incidental findings are detailed in the body of this report. Preliminary report was given by [facility] radiology. No clinically significant discrepancies are noted. Electronically Verified and Signed by Attending Radiologist: Gelacio Bullock MD 2025 7:14 AM Workstation: [email or web address]    CARD TTE STRAIN W DOPPLER ONCOLOGY (CPT=93306)  Result Date: 2025  Transthoracic Echocardiogram Name:Arnaldo Gutierrez Date: 2025 :  04/15/1945 Ht:  (71in)  BP: 101 / 61 MRN:  [id number]    Age:  80years    Wt:  (171lb) HR: 88bpm Loc:  [facility]       Gndr: M          BSA: 1.97m^2 Sonographer: Amelia CHERY Ordering:    Crys Wilkins Consulting:  Rafita Torres ---------------------------------------------------------------------------- History/Indications:   Encounter for Monitoring Cardiotoxic Drug Therapy. ---------------------------------------------------------------------------- Procedure information:  A transthoracic complete 2D study was performed. Additional evaluation included M-mode, complete spectral Doppler, and color Doppler.  Patient status:  Inpatient.  Location:  Echo laboratory.    This was a routine study. Transthoracic echocardiography for diagnosis. Image quality was adequate. ECG rhythm:   Normal sinus ---------------------------------------------------------------------------- Conclusions: 1. Left ventricle: The cavity size was normal. Wall thickness was normal.    Systolic function was normal. The estimated ejection fraction was 60-65%,    by biplane method of disks. Wall motion is normal; there are no regional    wall motion abnormalities. Left ventricular diastolic function parameters    were normal. The Global Longitudinal Strain (GLS) was -19.40%. 2. Mitral valve: There was mild regurgitation. *  ---------------------------------------------------------------------------- * Findings: Left ventricle:  The cavity size was normal. Wall thickness was normal. Systolic function was normal. The estimated ejection fraction was 60-65%, by biplane method of disks. Wall motion is normal; there are no regional wall motion abnormalities. The Global Longitudinal Strain (GLS) was -19.40%. Left ventricular diastolic function parameters were normal. Left atrium:  Well visualized. The atrium was normal in size. Right ventricle:  The cavity size was normal. Systolic function was normal. Systolic pressure was within the normal range. Right atrium:  Well visualized. The atrium was normal in size. Mitral valve:  Well visualized. The leaflets were normal thickness. No evidence for prolapse.  Doppler:  Transvalvular velocity was within the normal range. There was no evidence for stenosis. There was mild regurgitation. Aortic valve:  Well visualized.  The valve was trileaflet. The leaflets were normal thickness.  Doppler:  Transvalvular velocity was within the normal range. There was no evidence for stenosis. There was trivial regurgitation. Tricuspid valve:  Well visualized. The annulus is normal-sized. The leaflets are normal thickness. No echocardiographic evidence for tricuspid prolapse. Doppler:  Transvalvular velocity was within the normal range. There was no evidence for stenosis. There was trivial regurgitation. Pulmonic valve:   Well visualized. The annulus is normal-sized. The leaflets are normal thickness. No evidence for prolapse.  Doppler:  Transvalvular velocity was within the normal range. There was no evidence for stenosis. There was no significant regurgitation. Pericardium:   There was no pericardial effusion. Pleura:  No evidence of pleural fluid accumulation. Aorta: Aortic root: The aortic root was normal. Ascending aorta: The ascending aorta was normal. Pulmonary arteries: There was no evidence of pulmonary  hypertension. Systemic veins:  Central venous respirophasic diameter changes are in the normal range (>50%). Inferior vena cava: The IVC was normal-sized. ---------------------------------------------------------------------------- Measurements  Left ventricle                   Value         Ref  GLS, 2D                          -19.40 %      ---------  IVS thickness, ED, PLAX          0.9    cm     0.6 - 1.0  LV ID, ED, PLAX              (L) 3.5    cm     4.2 - 5.8  LV ID, ES, PLAX              (L) 2.1    cm     2.5 - 4.0  LV PW thickness, ED, PLAX        0.8    cm     0.6 - 1.0  IVS/LV PW ratio, ED, PLAX        1.13          ---------  LV PW/LV ID ratio, ED, PLAX      0.23          ---------  LV area, ES, A4C                 19.3   cm^2   ---------  LV ejection fraction             72     %      52 - 72  Stroke volume/bsa, 2D            43     ml/m^2 ---------  LV end-diastolic volume, 1-p     87     ml     69 - 185  A4C  LV end-systolic volume, 1-p      47     ml     22 - 78  A4C  LV ejection fraction, 1-p        57     %      46 - 74  A4C  Stroke volume, 1-p A4C           50     ml     ---------  LV end-diastolic volume/bsa,     44     ml/m^2 37 - 93  1-p A4C  LV end-systolic volume/bsa,      24     ml/m^2 12 - 40  1-p A4C  Stroke volume/bsa, 1-p A4C       25     ml/m^2 ---------  LV end-diastolic volume, 2-p     81     ml     62 - 150  LV end-systolic volume, 2-p      32     ml     21 - 61  LV ejection fraction, 2-p        60     %      52 - 72  Stroke volume, 2-p               48     ml     ---------  LV end-diastolic volume/bsa,     41     ml/m^2 34 - 74  2-p  LV end-systolic volume/bsa,      16     ml/m^2 11 - 31  2-p  Stroke volume/bsa, 2-p           24.5   ml/m^2 ---------  LV e', lateral                   14.9   cm/sec >=10.0  LV E/e', lateral                 6             <=13  LV e', medial                    12.5   cm/sec >=7.0  LV E/e', medial                  7             ---------  LV e', average                    13.7   cm/sec ---------  LV E/e', average                 6             <=14  LVOT                             Value         Ref  LVOT ID                          2.1    cm     ---------  LVOT peak velocity, S            1.22   m/sec  ---------  LVOT VTI, S                      24.7   cm     ---------  LVOT peak gradient, S            6      mm Hg  ---------  LVOT mean gradient, S            3      mm Hg  ---------  Stroke volume (SV), LVOT DP      86     ml     ---------  Stroke index (SV/bsa), LVOT      43     ml/m^2 ---------  DP  Aortic valve                     Value         Ref  Aortic leaflet separation,       1.8    cm     ---------  MM  Aortic root                      Value         Ref  Aortic root ID, STJ, ED      (H) 3.6    cm     2.3 - 3.5  Ascending aorta                  Value         Ref  Ascending aorta ID               3.6    cm     2.2 - 3.8  Left atrium                      Value         Ref  LA volume, S                 (H) 62     ml     18 - 58  LA volume/bsa, S                 31     ml/m^2 16 - 34  LA volume, ES, 1-p A4C           53     ml     18 - 58  LA volume, ES, 1-p A2C       (H) 70     ml     18 - 58  LA volume, ES, A/L               68     ml     ---------  LA volume/bsa, ES, A/L           34     ml/m^2 16 - 34  Mitral valve                     Value         Ref  Mitral E-wave peak velocity      0.88   m/sec  ---------  Mitral A-wave peak velocity      0.99   m/sec  ---------  Mitral deceleration time         180    ms     ---------  Mitral peak gradient, D          3      mm Hg  ---------  Mitral E/A ratio, peak           0.9           ---------  Tricuspid valve                  Value         Ref  Tricuspid regurg peak            2.52   m/sec  <=2.8  velocity  Tricuspid peak RV-RA             25     mm Hg  ---------  gradient  Right ventricle                  Value         Ref  TAPSE, 2D                        2.50   cm     >=1.70  TAPSE, MM                         2.50   cm     >=1.70  RV s', lateral                   22.6   cm/sec >=9.5 Legend: (L)  and  (H)  farida values outside specified reference range. ---------------------------------------------------------------------------- Prepared and electronically signed by Ernst Parada 06/24/2025 13:42     US VENOUS DOPPLER LEG BILAT - DIAG IMG (CPT=93970)  Result Date: 6/24/2025  PROCEDURE: US VENOUS DOPPLER LEG BILAT - DIAG IMG (CPT=93970) INDICATIONS: right calf pain, r/o dvt COMPARISON: None TECHNIQUE: Color duplex Doppler venous ultrasound of both lower extremities was performed in the usual manner. FINDINGS: Thrombus is present within the right common femoral vein as well as in the adjacent deep femoral vein draining into the common femoral vein. There is also nonocclusive thrombus within the proximal and distal aspect of the popliteal vein thrombus is otherwise expansile with heterogeneous grayscale appearance and there is absence of normal color flow and compressibility in these regions. The femoral vein appears normal. Normal flow was demonstrated with color and pulsed Doppler. Visualized portions of the great and small saphenous, posterior tibial and peroneal veins appear normal. Contralateral left lower extremity is without DVT within the common femoral, femoral, and popliteal veins. There is occlusive thrombus within a left soleal vein based on abnormal grayscale appearance, Vansil morphology of the vein and absence of normal color flow and Doppler venous waveforms.     CONCLUSION: 1. Abnormal exam. Right lower extremity has nonocclusive acute appearing thrombus within the common femoral vein as well as the visualized portions of the deep femoral vein. There is also nonocclusive thrombus within the right popliteal vein. 2. No DVT in the left lower extremity, however there is acute occlusive thrombus within a left soleal vein at the knee. Electronically Verified and Signed by Attending Radiologist: Jason Quiñones MD  6/24/2025 12:45 PM Workstation: [id number]    IR PERITONEAL DRAINAGE CATHETER  Result Date: 6/20/2025  PROCEDURE: IR PERITONEAL DRAINAGE CATHETER  INDICATIONS:  Status post colon resection, with postop intraperitoneal fluid  COMPARISON: None.  (S):  [place]  ESTIMATED BLOOD LOSS: Less than 5 mL  COMPLICATIONS: None  FINDINGS:  Informed consent was obtained. The patient was positioned supine. The right lower quadrant was sterilely prepped and draped. Preliminary ultrasound demonstrated complex appearing septated fluid collection.. Local Lidocaine was administered. Under ultrasound guidance, a Priceonomicseh sheath needle was advanced into the collection. There was return of serosanguineous fluid from the access needle. Under ultrasound guidance, a wire was advanced through the needle. The access was dilated and a 10 Finnish pigtail catheter was coiled in the collection.  Via the drain, approximately 260 cc of serosanguineous fluid were aspirated.  Sample was sent for cultures and sensitivities.  The catheter was secured to the skin with sutures and attached to bulb suction.  Sterile dressing applied.         CONCLUSION: Insertion of drainage catheter into intra-abdominal ascites yielding 260 cc serosanguineous fluid.    Dictated by (CST): Norris Reilly MD on 6/20/2025 at 6:09 PM     Finalized by (CST): Norris Reilly MD on 6/20/2025 at 6:10 PM          CT ABDOMEN+PELVIS(CONTRAST ONLY)(CPT=74177)  Result Date: 6/19/2025  PROCEDURE: CT ABDOMEN + PELVIS (CONTRAST ONLY) (CPT=74177)  COMPARISON: [facility], CT ABDOMEN + PELVIS (CONTRAST ONLY) (CPT=74177), 6/05/2025, 5:22 PM.  INDICATIONS: S/p colon resection 6/10, leukocytosis and increased pain  TECHNIQUE: CT images of the abdomen and pelvis were obtained with non-ionic intravenous contrast material.  Automated exposure control for dose reduction was used. Adjustment of the mA and/or kV was done based on the patient's size. Use of iterative reconstruction  technique for dose reduction was used.  Dose information is transmitted to the ACR (American College of Radiology) NRDR (National Radiology Data Registry) which includes the Dose Index Registry.  FINDINGS:  LIVER: No enlargement, atrophy, abnormal density, or significant focal lesion.  BILIARY: Intrahepatic biliary ductal dilatation.  Gallbladder is incompletely distended.  No significant extrahepatic biliary ductal dilatation. SPLEEN: Spleen is not enlarged. There are granulomatous calcifications in the spleen. No suspicious splenic lesion. STOMACH: No gastric obstruction.  Duodenum is distended with oral contrast material but is otherwise without evidence of obstruction or inflammation. PANCREAS: No lesion, fluid collection, ductal dilatation, or atrophy.  ADRENALS: No nodule or enlargement. KIDNEYS: Cortical based low density focus in the posterior midpole of the right kidney suggesting simple cyst.  Bilaterally no suspicious enhancing renal lesion or hydroureteronephrosis. AORTA/VASCULAR:   Atherosclerosis of the abdominal aorta.  No aneurysm. BOWEL/MESENTERY:  Peripherally enhancing fluid collection in the left lower quadrant, above the roof of the urinary bladder and measuring approximately 55 x 47 x 58 mm (AP x transverse x CC).  There is also a moderate-large volume of free fluid within the abdomen and pelvis mainly along the peripheral aspect of the liver and spleen, both pericolic gutters and across the omentum.  Moderate volume of foci of gas are present within this fluid.  There is peritoneal thickening/enhancement in both pericolic  gutters.  No evidence of bowel obstruction.  No lymphadenopathy.  Post large bowel resection with reanastomosis in the region of the hepatic flexure without focal obstruction or inflammatory change at the anastomosis. ABDOMINAL WALL: Ventral line of skin staples are present above the umbilicus.  Additional scattered skin staples are present in the abdominal-pelvic anterior  wall.  Mild body wall edema. URINARY BLADDER: Urinary bladder is distended with urine.  No stones or wall thickening. PELVIC NODES: No enlarged mass or adenopathy.   PELVIC ORGANS: No visible mass.  Pelvic organs appropriate for patient age.  BONES:   Mild endplate change and disc disease within the spine most advanced at L5-S1. LUNG BASES: Small-moderate sized bilateral pleural effusion.  Enhancing wedge-shaped pulmonary opacities with air bronchograms adjacent to the effusions likely representing secondary compressive atelectasis.  The visualized heart has coronary artery calcifications. OTHER: Negative.          CONCLUSION:  1.  Post large bowel resection in the region of the proximal transverse colon/hepatic flexure.  No obstruction or inflammation at the surgical site.  There is a moderate-large volume of free fluid within the abdomen and pelvis mainly distributed in both upper quadrants, across the omentum, and in the pericolic gutters.  Moderate volume of free air is also present in the same distribution as the fluid.  These may be related to recent surgery.  Organized fluid collection in the left lower retroperitoneum/left lower quadrant measuring approximately 55 x 47 x 58 mm which could represent a seroma, liquified hematoma, or other inflammatory fluid collection.  Peritoneal enhancement and thickening in the both pericolic gutters which could represent phlegmonous change, early organization of fluid collections with similar differential as the aforementioned organized collection, or peritonitis. 2.  Moderate-sized bilateral pleural effusions.  Enhancing bibasilar pulmonary opacities with air bronchograms likely representing secondary compressive atelectasis. 3.  Coronary atherosclerosis. 4.  Right renal cyst. 5.  Mild body wall edema/anasarca.  Skin staples across the abdominal-pelvic anterior wall compatible with surgical access sites.   Dictated by (CST): Jason Quiñones MD on 6/19/2025 at 5:29 PM      Finalized by (CST): Jason Quiñones MD on 6/19/2025 at 5:36 PM          XR ABDOMEN (1 VIEW) (CPT=74018)  Result Date: 6/13/2025  PROCEDURE: XR ABDOMEN (1 VIEW) (CPT=74018)  COMPARISON: [facility], XR CHEST AP PORTABLE (CPT=71045), 6/13/2025, 8:58 AM.  [facility], CT ABDOMEN + PELVIS (CONTRAST ONLY) (CPT=74177), 6/05/2025, 5:22 PM.  [facility], XR ABDOMEN (1 VIEW) (CPT=74018), 6/10/2025, 6:07 PM.  INDICATIONS: History of presumed colon cancer status post transverse colonic resection and partial small bowel resection on 06/10/2025.  TECHNIQUE:   Single view.   FINDINGS:  BOWEL GAS PATTERN: An enteric tube tip in distal side hole project over the left upper quadrant the abdomen in the expected location of the stomach.  There is bowel gas throughout the abdomen, including the rectum.  No dilated gas-filled loops of bowel are seen to suggest obstruction.  Anastomotic staple lines are again noted in the right upper and left lower quadrants. SOFT TISSUES: Normal.  No masses or organomegaly.  CALCIFICATIONS: None significant. BONES: The osseous structures are unchanged. OTHER: Vertically oriented skin staples again project over the central abdomen.  Additional skin staples project over both lower quadrants and pelvis.  There is ill-defined lucency throughout the abdomen, which is consistent with the pneumoperitoneum visible on the preceding chest x-ray.         CONCLUSION:  1. Postoperative changes from a recent transverse colonic resection and partial small bowel resection including persistent pneumoperitoneum. 2. Nonobstructive bowel gas pattern.     Dictated by (CST): Chuy Schmid MD on 6/13/2025 at 11:38 AM     Finalized by (CST): Chuy Schmid MD on 6/13/2025 at 11:41 AM          XR CHEST AP PORTABLE  (CPT=71045)  Result Date: 6/13/2025  PROCEDURE: XR CHEST AP PORTABLE  (CPT=71045) TIME: 8:59.   COMPARISON: [facility], CT ABDOMEN + PELVIS (CONTRAST  ONLY) (CPT=74177), 6/05/2025, 5:22 PM.  [facility], XR CHEST AP PORTABLE (CPT=71045), 6/13/2025, 6:40 AM.  INDICATIONS: Post NG advancement.  Metastatic colon cancer status post transverse colonic resection on 06/10/2025.  Acute postoperative blood loss with hypotension.  TECHNIQUE:   Single view.   FINDINGS:  CARDIAC/VASC: The cardiac silhouette is not enlarged.  There is calcification of the aortic knob.  Unremarkable pulmonary vasculature.  MEDIAST/KRYSTA:   No visible mass or adenopathy. LUNGS/PLEURA: The lungs are again hypoinflated.  Streaky opacities at the lung bases with elevation of the right hemidiaphragm are all unchanged.  No pleural effusion or pneumothorax. BONES: There are mild spondylotic changes in the thoracic spine. OTHER: An enteric tube has been advanced.  The tip in distal side hole project over the gastric bubble in the left upper quadrant of the abdomen.  A right-sided PICC tip projects over the mid SVC.  Free air is again noted beneath the right hemidiaphragm,  likely related to the recent surgery.         CONCLUSION:  1. Enteric tube has been advanced with tip and distal side hole now projecting over the gastric bubble, in customary position. 2. Expiratory chest with stable atelectasis at both lung bases. 3. Persistent pneumoperitoneum, likely related to the recent abdominal surgery.    Dictated by (CST): Chuy Schmid MD on 6/13/2025 at 9:19 AM     Finalized by (CST): Chuy Schmid MD on 6/13/2025 at 9:21 AM          XR CHEST AP PORTABLE  (CPT=71045)  Result Date: 6/13/2025  PROCEDURE: XR CHEST AP PORTABLE  (CPT=71045) TIME: 6:43.   COMPARISON: [facility], XR ABDOMEN (1 VIEW) (CPT=74018), 6/10/2025, 6:07 PM.  [facility], CT ABDOMEN + PELVIS (CONTRAST ONLY) (CPT=74177), 6/05/2025, 5:22 PM.  INDICATIONS: Hypoxia.  Suspected metastatic colon cancer status post transverse colonic resection on 06/10/2025.  Acute postoperative blood loss  with hypotension.  TECHNIQUE:   Single view.   FINDINGS:  CARDIAC/VASC: Normal.  No cardiac silhouette abnormality or cardiomegaly.  Unremarkable pulmonary vasculature.  MEDIAST/KRYSTA:   No visible mass or adenopathy. LUNGS/PLEURA: The lungs are hypoinflated.  Streaky opacities have developed at the lung bases with elevation of the right hemidiaphragm.  No pleural effusion or pneumothorax. BONES: There are mild spondylotic changes in the thoracic spine. OTHER: An enteric tube tip again projects over the gastric bubble.  The distal side hole projects over the expected location of the distal esophagus.  A right-sided PICC tip projects over the mid SVC.  Free air is noted beneath the right hemidiaphragm, likely related to the recent surgery.         CONCLUSION:  1. Enteric tube tip projects over the stomach, although the distal side hole projects over the distal esophagus.  Consider advancing approximately 7.0 cm into the stomach. 2. Expiratory chest with the development of atelectasis at both lung bases. 3. Pneumoperitoneum, likely related to the recent abdominal surgery.    Dictated by (CST): Chuy Schmid MD on 6/13/2025 at 7:16 AM     Finalized by (CST): Chuy Schmid MD on 6/13/2025 at 7:19 AM          XR ABDOMEN (1 VIEW) (CPT=74018)  Result Date: 6/11/2025  PROCEDURE: XR ABDOMEN (1 VIEW) (CPT=74018)  COMPARISON: [facility], CT ABDOMEN + PELVIS (CONTRAST ONLY) (CPT=74177), 6/05/2025, 5:22 PM.  INDICATIONS: Partially obstructing colon tumor.  Opene tray. image taken due to protocol. Evaluate for foreign body.  TECHNIQUE:   Single view.           CONCLUSION:  1. No retained unexpected surgical foreign body. 2. Postsurgical changes with anastomotic staple lines and skin staples. 3. Catheter in the bladder.    Dictated by ([facility]): Chong Valdez MD on 6/11/2025 at 1:22 AM     Finalized by (CST): Chong Valdez MD on 6/11/2025 at 1:28 AM            Assessment/Plan:  Problem List[1]  Awaiting  result of SBFT   concern for leak   Though no symptoms of peritonitis  THUY ALSTON MD  7/8/2025  2:09 PM       [1]   Patient Active Problem List  Diagnosis    BPH (benign prostatic hyperplasia)    Chronic low back pain    Chronic pain of both knees    Depression with anxiety    Generalized osteoarthritis    Hyperlipidemia    Type 2 diabetes mellitus without complication, without long-term current use of insulin (HCC)    Essential hypertension    Bipolar I disorder, single manic episode (HCC)    Large bowel obstruction (HCC)    Colonic mass    Diffuse large B-cell lymphoma of solid organ excluding spleen    Pneumoperitoneum    Abdominal distension    Anasarca    Intraabdominal fluid collection

## 2025-07-08 NOTE — PROGRESS NOTES
INFECTIOUS DISEASE PROGRESS NOTE    Arnaldo Gutierrez Patient Status:  Inpatient    4/15/1945 MRN [id number]   Location [facility] 4W/SW/SE Attending Declan Stearns MD   Hosp Day # 3 PCP PHYSICIAN NONSTAFF       SUBJECTIVE  ROS done. Feels better. Some pain around drain sites. Plans for small bowel follow through.     ASSESSMENT/PLAN:    Antibiotics: IV cefepime, dapto, flagyl, micafungin; (IV zosyn, fluconazole, augmentin)     ASSESSMENT:     # multiple large intra-abdominal abscesses               - now s/p IR drain placement on 25               - fluid cx so far with E. Coli, Enterobacter cloacae, Enterococcus faecalis, C. Glabrata, PSAR, Lactobacillus               - surgery and IR following  # RUE DVT  # hx of Large bowel obstruction s/p OR 6/10/25 for lap transverse colon resection, small bowel resection x2, rsection of tumor nodule on small bowel - path with DLBCL               - s/p colonic stent placement by GI 25               - post op fluid collection s/p IR drain placement 25 with 260 cc of serosang fluid, cx negative      PLAN:  -  Continue on IV daptomycin, cefepime, metronidazole, and micafungin.   -  FU small bowel study.  -  Follow fever curve, wbc.  -  Reviewed labs, micro, imaging reports.  -  Case d/w patient using language line, RN.     History of Present Illness:  80 year old male with history of diabetes, HTN, BPH who was admitted on  with abdominal bloating, nausea, decreased appetite, unintentional weight loss. Initial CT A/P on  with 4 cm segment of masslike constriction and thickening in the proximal transverse colon with concern for partially obstructing chronic neoplasm. Seen by GI and underwent colonoscopy with chronic stent placement on . Also seen by general surgery and taken to the OR on 6/10 for large bowel obstruction status post laparoscopic assisted transverse colon resection, small bowel resection and tumor nodule and small bowel. Pathology  diffuse large B-cell lymphoma. Seen by oncology. On 6/19 had low-grade temperatures with increasing WBC up to 19 with repeat CT A/P showing moderate to large volume free fluid in the abdomen and pelvis in the upper quadrants. Organized fluid in the left lower retroperitoneum measuring 5.5 x 4.7 x 5.8 cm concern for seroma, liquefied hematoma versus inflammatory fluid. Seen by IR and underwent IR peritoneal drain placement on 6/19 with 260 cc of serosanguineous fluid. Cultures negative. Was on zosyn while inpatient and transitioned to PO augmentin on discharge. Discharged on 6/24/25 to subacute rehab. Came back to ED on 7/4/25 with abdominal pain. Repeat CT with multiple large peritoneal abscesses. Surgery and IR consult.  Now status post IR drainage with cx growing GNR and budding yeast.  Currently on IV Zosyn.  ID consulted for further antibiotics management.     OBJECTIVE  /55 (BP Location: Right arm)   Pulse 71   Temp 97.8 °F (36.6 °C) (Oral)   Resp 18   Ht 5' 11\" (1.803 m)   Wt 158 lb 4.8 oz (71.8 kg)   SpO2 95%   BMI 22.08 kg/m²     General: No acute distress. Alert.  HEENT: EOMI   Abdomen: Soft, nontender, nondistended. RUQ drain with minimal output; LLQ large drain with tan output  Musculoskeletal: No edema  Integument: No rashes    [facility]  [phone]

## 2025-07-08 NOTE — PLAN OF CARE
Patient alert and oriented, Romansh speaking. Tele, no calls. Denies pain, denies nausea, SBFT. Multiple incontinent BM, primofit. RLQ NGOZI without output, LLQ drain with more output. US BUE, +DVT, heparin drip continued. Multiple antibx. Max assist. Call light within reach, using appropriately.     Problem: Diabetes/Glucose Control  Goal: Glucose maintained within prescribed range  Description: INTERVENTIONS:  - Monitor Blood Glucose as ordered  - Assess for signs and symptoms of hyperglycemia and hypoglycemia  - Administer ordered medications to maintain glucose within target range  - Assess barriers to adequate nutritional intake and initiate nutrition consult as needed  - Instruct patient on self management of diabetes  Outcome: Progressing     Problem: PAIN - ADULT  Goal: Verbalizes/displays adequate comfort level or patient's stated pain goal  Description: INTERVENTIONS:  - Encourage pt to monitor pain and request assistance  - Assess pain using appropriate pain scale  - Administer analgesics based on type and severity of pain and evaluate response  - Implement non-pharmacological measures as appropriate and evaluate response  - Consider cultural and social influences on pain and pain management  - Manage/alleviate anxiety  - Utilize distraction and/or relaxation techniques  - Monitor for opioid side effects  - Notify MD/LIP if interventions unsuccessful or patient reports new pain  - Anticipate increased pain with activity and pre-medicate as appropriate  Outcome: Progressing     Problem: RISK FOR INFECTION - ADULT  Goal: Absence of fever/infection during anticipated neutropenic period  Description: INTERVENTIONS  - Monitor WBC  - Administer growth factors as ordered  - Implement neutropenic guidelines  Outcome: Progressing     Problem: SAFETY ADULT - FALL  Goal: Free from fall injury  Description: INTERVENTIONS:  - Assess pt frequently for physical needs  - Identify cognitive and physical deficits and  behaviors that affect risk of falls.  - [place] fall precautions as indicated by assessment.  - Educate pt/family on patient safety including physical limitations  - Instruct pt to call for assistance with activity based on assessment  - Modify environment to reduce risk of injury  - Provide assistive devices as appropriate  - Consider OT/PT consult to assist with strengthening/mobility  - Encourage toileting schedule  Outcome: Progressing     Problem: HEMATOLOGIC - ADULT  Goal: Free from bleeding injury  Description: (Example usage: patient with low platelets)  INTERVENTIONS:  - Avoid intramuscular injections, enemas and rectal medication administration  - Ensure safe mobilization of patient  - Hold pressure on venipuncture sites to achieve adequate hemostasis  - Assess for signs and symptoms of internal bleeding  - Monitor lab trends  - Patient is to report abnormal signs of bleeding to staff  - Avoid use of toothpicks and dental floss  - Use electric shaver for shaving  - Use soft bristle tooth brush  - Limit straining and forceful nose blowing  Outcome: Progressing     Problem: GASTROINTESTINAL - ADULT  Goal: Minimal or absence of nausea and vomiting  Description: INTERVENTIONS:  - Maintain adequate hydration with IV or PO as ordered and tolerated  - Nasogastric tube to low intermittent suction as ordered  - Evaluate effectiveness of ordered antiemetic medications  - Provide nonpharmacologic comfort measures as appropriate  - Advance diet as tolerated, if ordered  - Obtain nutritional consult as needed  - Evaluate fluid balance  Outcome: Progressing  Goal: Maintains or returns to baseline bowel function  Description: INTERVENTIONS:  - Assess bowel function  - Maintain adequate hydration with IV or PO as ordered and tolerated  - Evaluate effectiveness of GI medications  - Encourage mobilization and activity  - Obtain nutritional consult as needed  - Establish a toileting routine/schedule  - Consider  collaborating with pharmacy to review patient's medication profile  Outcome: Progressing

## 2025-07-09 ENCOUNTER — APPOINTMENT (OUTPATIENT)
Dept: CT IMAGING | Facility: HOSPITAL | Age: 80
End: 2025-07-09
Attending: STUDENT IN AN ORGANIZED HEALTH CARE EDUCATION/TRAINING PROGRAM
Payer: MEDICARE

## 2025-07-09 ENCOUNTER — APPOINTMENT (OUTPATIENT)
Dept: GENERAL RADIOLOGY | Facility: HOSPITAL | Age: 80
End: 2025-07-09
Attending: SPECIALIST
Payer: MEDICARE

## 2025-07-09 ENCOUNTER — APPOINTMENT (OUTPATIENT)
Facility: LOCATION | Age: 80
End: 2025-07-09
Attending: STUDENT IN AN ORGANIZED HEALTH CARE EDUCATION/TRAINING PROGRAM

## 2025-07-09 LAB
ALBUMIN SERPL-MCNC: 2.3 G/DL (ref 3.2–4.8)
ANION GAP SERPL CALC-SCNC: 6 MMOL/L (ref 0–18)
APTT PPP: 105.2 SECONDS (ref 23–36)
APTT PPP: 129.2 SECONDS (ref 23–36)
APTT PPP: 93 SECONDS (ref 23–36)
BASOPHILS # BLD AUTO: 0.01 X10(3) UL (ref 0–0.2)
BASOPHILS NFR BLD AUTO: 0.1 %
BUN BLD-MCNC: 5 MG/DL (ref 9–23)
BUN/CREAT SERPL: 10.2 (ref 10–20)
CALCIUM BLD-MCNC: 7.7 MG/DL (ref 8.7–10.4)
CHLORIDE SERPL-SCNC: 101 MMOL/L (ref 98–112)
CO2 SERPL-SCNC: 29 MMOL/L (ref 21–32)
CREAT BLD-MCNC: 0.49 MG/DL (ref 0.7–1.3)
DEPRECATED RDW RBC AUTO: 54.9 FL (ref 35.1–46.3)
EGFRCR SERPLBLD CKD-EPI 2021: 105 ML/MIN/1.73M2 (ref 60–?)
EOSINOPHIL # BLD AUTO: 0.02 X10(3) UL (ref 0–0.7)
EOSINOPHIL NFR BLD AUTO: 0.3 %
ERYTHROCYTE [DISTWIDTH] IN BLOOD BY AUTOMATED COUNT: 17.2 % (ref 11–15)
GLUCOSE BLD-MCNC: 157 MG/DL (ref 70–99)
GLUCOSE BLDC GLUCOMTR-MCNC: 154 MG/DL (ref 70–99)
GLUCOSE BLDC GLUCOMTR-MCNC: 185 MG/DL (ref 70–99)
GLUCOSE BLDC GLUCOMTR-MCNC: 218 MG/DL (ref 70–99)
GLUCOSE BLDC GLUCOMTR-MCNC: 245 MG/DL (ref 70–99)
GLUCOSE BLDC GLUCOMTR-MCNC: 256 MG/DL (ref 70–99)
HCT VFR BLD AUTO: 28.5 % (ref 39–53)
HGB BLD-MCNC: 8.8 G/DL (ref 13–17.5)
IMM GRANULOCYTES # BLD AUTO: 0.08 X10(3) UL (ref 0–1)
IMM GRANULOCYTES NFR BLD: 1.1 %
LYMPHOCYTES # BLD AUTO: 1.76 X10(3) UL (ref 1–4)
LYMPHOCYTES NFR BLD AUTO: 24.2 %
MAGNESIUM SERPL-MCNC: 1.9 MG/DL (ref 1.6–2.6)
MCH RBC QN AUTO: 26.5 PG (ref 26–34)
MCHC RBC AUTO-ENTMCNC: 30.9 G/DL (ref 31–37)
MCV RBC AUTO: 85.8 FL (ref 80–100)
MONOCYTES # BLD AUTO: 0.74 X10(3) UL (ref 0.1–1)
MONOCYTES NFR BLD AUTO: 10.2 %
NEUTROPHILS # BLD AUTO: 4.66 X10 (3) UL (ref 1.5–7.7)
NEUTROPHILS # BLD AUTO: 4.66 X10(3) UL (ref 1.5–7.7)
NEUTROPHILS NFR BLD AUTO: 64.1 %
OSMOLALITY SERPL CALC.SUM OF ELEC: 283 MOSM/KG (ref 275–295)
PHOSPHATE SERPL-MCNC: 3 MG/DL (ref 2.4–5.1)
PLATELET # BLD AUTO: 490 10(3)UL (ref 150–450)
POTASSIUM SERPL-SCNC: 3.3 MMOL/L (ref 3.5–5.1)
RBC # BLD AUTO: 3.32 X10(6)UL (ref 3.8–5.8)
SODIUM SERPL-SCNC: 136 MMOL/L (ref 136–145)
WBC # BLD AUTO: 7.3 X10(3) UL (ref 4–11)

## 2025-07-09 PROCEDURE — 71250 CT THORAX DX C-: CPT | Performed by: STUDENT IN AN ORGANIZED HEALTH CARE EDUCATION/TRAINING PROGRAM

## 2025-07-09 PROCEDURE — 99233 SBSQ HOSP IP/OBS HIGH 50: CPT | Performed by: HOSPITALIST

## 2025-07-09 PROCEDURE — 74018 RADEX ABDOMEN 1 VIEW: CPT | Performed by: STUDENT IN AN ORGANIZED HEALTH CARE EDUCATION/TRAINING PROGRAM

## 2025-07-09 RX ORDER — HEPARIN SODIUM AND DEXTROSE 10000; 5 [USP'U]/100ML; G/100ML
18 INJECTION INTRAVENOUS ONCE
Status: CANCELLED | OUTPATIENT
Start: 2025-07-09 | End: 2025-07-09

## 2025-07-09 RX ORDER — HEPARIN SODIUM 1000 [USP'U]/ML
30 INJECTION, SOLUTION INTRAVENOUS; SUBCUTANEOUS ONCE
Status: COMPLETED | OUTPATIENT
Start: 2025-07-09 | End: 2025-07-09

## 2025-07-09 RX ORDER — CIPROFLOXACIN 2 MG/ML
400 INJECTION, SOLUTION INTRAVENOUS EVERY 12 HOURS
Status: DISCONTINUED | OUTPATIENT
Start: 2025-07-09 | End: 2025-07-09

## 2025-07-09 RX ORDER — DIPHENHYDRAMINE HCL 25 MG
25 CAPSULE ORAL NIGHTLY PRN
Status: DISCONTINUED | OUTPATIENT
Start: 2025-07-09 | End: 2025-07-09

## 2025-07-09 RX ORDER — HEPARIN SODIUM AND DEXTROSE 10000; 5 [USP'U]/100ML; G/100ML
INJECTION INTRAVENOUS CONTINUOUS
Status: CANCELLED | OUTPATIENT
Start: 2025-07-09

## 2025-07-09 RX ORDER — HEPARIN SODIUM 1000 [USP'U]/ML
80 INJECTION, SOLUTION INTRAVENOUS; SUBCUTANEOUS ONCE
Status: CANCELLED | OUTPATIENT
Start: 2025-07-09 | End: 2025-07-09

## 2025-07-09 NOTE — PHYSICAL THERAPY NOTE
PHYSICAL THERAPY TREATMENT NOTE - INPATIENT     Room Number: 454/454-A       Presenting Problem: ABD pain with large retroperitoneal abscess. Pt is s/p IR procedure with 2 ABD drain placed on .   Pt with recent admission s/p on  ABD sx including SB resection x 2 and resection of tumor .  DVT noted last admission right LE and left LE currently therapeautic on heparin .     PMH sign for Diffuse Large B -cell Lymphoma  / Bipolar / Chronic LB and knee pain       Problem List  Principal Problem:    Abdominal distension  Active Problems:    Pneumoperitoneum    Anasarca    Intraabdominal fluid collection      PHYSICAL THERAPY ASSESSMENT   Patient demonstrates excellent progress this session, goals  progressing with 2/5 met this session.      Patient is requiring contact guard assist and minimal assist as a result of the following impairments: medical status.     Patient continues to function below baseline with bed mobility, gait, and stair negotiation.  Next session anticipate patient to progress bed mobility, gait, and stair negotiation.  Physical Therapy will continue to follow patient for duration of hospitalization.    Patient continues to benefit from continued skilled PT services: at discharge to promote prior level of function.  Anticipate patient will return home with home health PT.    PLAN DURING HOSPITALIZATION  Nursing Mobility Recommendation : 1 Assist  PT Device Recommendation: Rolling walker, Gait belt  PT Treatment Plan: Bed mobility, Body mechanics, Endurance, Energy conservation, Patient education, Family education, Gait training, Balance training, Transfer training  Frequency (Obs): 5x/week     SUBJECTIVE  Patient reporting improved energy levels today compared to previous days.    OBJECTIVE  Precautions: Limb alert - right, Limb alert - left, Cardiac, Abdominal protective strategies, Bed/chair alarm, Drain(s)    WEIGHT BEARING RESTRICTION  none    PAIN ASSESSMENT   Ratin  Location:  denies  Management Techniques: Activity promotion, Body mechanics, Breathing techniques, Relaxation, Repositioning    BALANCE  Static Sitting: Normal  Dynamic Sitting: Good  Static Standing: Good  Dynamic Standing: Good    ACTIVITY TOLERANCE  Pulse: 70  Heart Rate Source: Monitor     BP: 110/64  BP Location: Right arm  BP Method: Automatic  Patient Position: Semi-Valerio     O2 WALK  Oxygen Therapy  SPO2% on Room Air at Rest: 96    AM-PAC '6-Clicks' INPATIENT SHORT FORM - BASIC MOBILITY  How much difficulty does the patient currently have...  Patient Difficulty: Turning over in bed (including adjusting bedclothes, sheets and blankets)?: A Little   Patient Difficulty: Sitting down on and standing up from a chair with arms (e.g., wheelchair, bedside commode, etc.): A Little   Patient Difficulty: Moving from lying on back to sitting on the side of the bed?: A Little   How much help from another person does the patient currently need...   Help from Another: Moving to and from a bed to a chair (including a wheelchair)?: A Little   Help from Another: Need to walk in hospital room?: A Little   Help from Another: Climbing 3-5 steps with a railing?: A Little     AM-PAC Score:  Raw Score: 18   Approx Degree of Impairment: 46.58%   Standardized Score (AM-PAC Scale): 43.63   CMS Modifier (G-Code): CK    FUNCTIONAL ABILITY STATUS  Functional Mobility/Gait Assessment  Gait Assistance: Contact guard assist  Distance (ft): 100  Assistive Device: Rolling walker  Pattern: Shuffle  Supine to Sit: minimal assist  Sit to Stand: contact guard assist    Skilled Therapy Provided: Since previous session patient has progressed, tolerating household ambulation distances using rolling walker, requiring less assist with sit-to-stand transfers and bed mobility. Patient's family present and supportive. Patient anticipates having increased support at discharge including a part time caregiver.     Patient seen for co-treatment with occupational  therapy to maximize patient function and enhance communication with patient given OT fluent in Welsh. Patient received semi-fowlers in bed, agreeable to physical therapy. Vital signs monitored as noted above, no adverse symptoms and patient stable during session. Anticipated therapy needs remain appropriate based on the patient's performance, personal factors, and remaining functional impairments.    PATIENT EDUCATION  Education Provided To: Patient, Family/Caregiver  Patient Education: Role of Physical Therapy, Plan of Care, Discharge Recommendations, DME Recommendations, Functional Transfer Techniques, Fall Prevention, Surgical Precautions, Posture/Positioning, Abdominal Protective Strategies  Patient's Response to Education: Verbalized Understanding, Returned Demonstration, Requires Further Education     Family/Caregiver's Response to Education: Verbalized Understanding     The patient's Approx Degree of Impairment: 46.58% has been calculated based on documentation in the [facility] '6 clicks' Inpatient Daily Activity Short Form.  Research supports that patients with this level of impairment may benefit from home-health PT.  Final disposition will be made by interdisciplinary medical team.      Patient End of Session: Up in chair, Needs met, Call light within reach, RN aware of session/findings, All patient questions and concerns addressed, Hospital anti-slip socks, Alarm set, Family present    CURRENT GOALS   Goals to be met by: 7/25/25  Patient Goal Patient's self-stated goal is: return to [facility]    Goal #1 Patient is able to demonstrate supine - sit EOB @ level: CGA support log roll sequencing      Goal #1   Current Status NOT MET/IN PROGRESS    Goal #2 Patient is able to demonstrate transfers sit to stand and SPT with RW  at assistance level: CGA  with walker - rolling     Goal #2  Current Status MET 7/9/25   Goal #3 Patient is able to ambulate 20-40  feet progress distance as appropriate with assist device: walker  - rolling at assistance level: CGA    Goal #3   Current Status MET 25   Goal #4 Patient is able to ambulate 150 feet with assist device: walker - rolling at assistance level: [place]       Goal #4   Current Status NEW GOAL 25   Goal #5 Patient to demonstrate independence with home activity/exercise instructions provided to patient in preparation for discharge.   Goal #5   Current Status IN PROGRESS/ONGOING    Goal #6    Goal #6  Current Status      Gait Trainin minutes  Therapeutic Activity: 15 minutes      Jia Sullivan PT, DPT  [facility]  Rehab Services - Physical Therapy  [id number]

## 2025-07-09 NOTE — PLAN OF CARE
Patient is AxO4. + for MDRO in abdominal fluid and abscess cultures - MD szymanski. Contract precautions in place. Denies pain. IR drain to gravity via a mcfadden bag intact to LUQ. RLQ NGOZI drain intact with minimal output. Heparin gtt maintained at 1300 units. Tolerating CLD.  Denies nausea/vomiting. Accuchecks AC/HS. Max assist. IV abx continued. Primofit in place. Multiple Bms overnight,  incontinence care provided.  Remote tele in place. R arm precautions in place. Plan for hematology consult. Appropriate safety measures maintained, call light within reach, and frequent rounding.           Problem: Patient Centered Care  Goal: Patient preferences are identified and integrated in the patient's plan of care  Description: Interventions:  - What would you like us to know as we care for you? I came from [facility]  - Provide timely, complete, and accurate information to patient/family  - Incorporate patient and family knowledge, values, beliefs, and cultural backgrounds into the planning and delivery of care  - Encourage patient/family to participate in care and decision-making at the level they choose  - Honor patient and family perspectives and choices  Outcome: Progressing     Problem: Diabetes/Glucose Control  Goal: Glucose maintained within prescribed range  Description: INTERVENTIONS:  - Monitor Blood Glucose as ordered  - Assess for signs and symptoms of hyperglycemia and hypoglycemia  - Administer ordered medications to maintain glucose within target range  - Assess barriers to adequate nutritional intake and initiate nutrition consult as needed  - Instruct patient on self management of diabetes  Outcome: Progressing     Problem: Patient/Family Goals  Goal: Patient/Family Long Term Goal  Description: Patient's Long Term Goal: return home    Interventions:  - follow plan of care  - See additional Care Plan goals for specific interventions  Outcome: Progressing  Goal: Patient/Family Short Term Goal  Description:  Patient's Short Term Goal: to get stronger and feel better    Interventions:   - PT/OT on consult  - IV abx  - surgery, IR on consult  - NGOZI drains  - monitor labs and vital signs  - See additional Care Plan goals for specific interventions  Outcome: Progressing     Problem: PAIN - ADULT  Goal: Verbalizes/displays adequate comfort level or patient's stated pain goal  Description: INTERVENTIONS:  - Encourage pt to monitor pain and request assistance  - Assess pain using appropriate pain scale  - Administer analgesics based on type and severity of pain and evaluate response  - Implement non-pharmacological measures as appropriate and evaluate response  - Consider cultural and social influences on pain and pain management  - Manage/alleviate anxiety  - Utilize distraction and/or relaxation techniques  - Monitor for opioid side effects  - Notify MD/LIP if interventions unsuccessful or patient reports new pain  - Anticipate increased pain with activity and pre-medicate as appropriate  Outcome: Progressing     Problem: RISK FOR INFECTION - ADULT  Goal: Absence of fever/infection during anticipated neutropenic period  Description: INTERVENTIONS  - Monitor WBC  - Administer growth factors as ordered  - Implement neutropenic guidelines  Outcome: Progressing     Problem: SAFETY ADULT - FALL  Goal: Free from fall injury  Description: INTERVENTIONS:  - Assess pt frequently for physical needs  - Identify cognitive and physical deficits and behaviors that affect risk of falls.  - [place] fall precautions as indicated by assessment.  - Educate pt/family on patient safety including physical limitations  - Instruct pt to call for assistance with activity based on assessment  - Modify environment to reduce risk of injury  - Provide assistive devices as appropriate  - Consider OT/PT consult to assist with strengthening/mobility  - Encourage toileting schedule  Outcome: Progressing     Problem: HEMATOLOGIC - ADULT  Goal: Free from  bleeding injury  Description: (Example usage: patient with low platelets)  INTERVENTIONS:  - Avoid intramuscular injections, enemas and rectal medication administration  - Ensure safe mobilization of patient  - Hold pressure on venipuncture sites to achieve adequate hemostasis  - Assess for signs and symptoms of internal bleeding  - Monitor lab trends  - Patient is to report abnormal signs of bleeding to staff  - Avoid use of toothpicks and dental floss  - Use electric shaver for shaving  - Use soft bristle tooth brush  - Limit straining and forceful nose blowing  Outcome: Progressing     Problem: GASTROINTESTINAL - ADULT  Goal: Minimal or absence of nausea and vomiting  Description: INTERVENTIONS:  - Maintain adequate hydration with IV or PO as ordered and tolerated  - Nasogastric tube to low intermittent suction as ordered  - Evaluate effectiveness of ordered antiemetic medications  - Provide nonpharmacologic comfort measures as appropriate  - Advance diet as tolerated, if ordered  - Obtain nutritional consult as needed  - Evaluate fluid balance  Outcome: Progressing  Goal: Maintains or returns to baseline bowel function  Description: INTERVENTIONS:  - Assess bowel function  - Maintain adequate hydration with IV or PO as ordered and tolerated  - Evaluate effectiveness of GI medications  - Encourage mobilization and activity  - Obtain nutritional consult as needed  - Establish a toileting routine/schedule  - Consider collaborating with pharmacy to review patient's medication profile  Outcome: Progressing

## 2025-07-09 NOTE — PAYOR COMM NOTE
--------------  CONTINUED STAY REVIEW FOR 7/8    Payor: [employer]  Subscriber #:  [id number]  Authorization Number: [id number]    Admit date: 7/5/25  Admit time:  2:31 AM    REVIEW DOCUMENTATION:          Date of Service: 7/8/2025 12:31 PM     Signed         [facility]  part of [facility]     Progress Note           Subjective:   Arnaldo Gutierrez is a(n) 80 year old male was seen and examined   Resting in bed, comfortably  Tolerating diet   Denies any RUE pain  No cp, sob, f,c,n,v abd pain or HA      Objective:   Blood pressure 104/55, pulse 71, temperature 97.8 °F (36.6 °C), temperature source Oral, resp. rate 18, height 5' 11\" (1.803 m), weight 158 lb 4.8 oz (71.8 kg), SpO2 95%.     GENERAL:  The patient appeared to be in no distress and was comfortable.  SKIN:  Warm and hydrated  PSYCHIATRIC: Calm and cooperative    HEENT:  Head was atraumatic and normocephalic.  Eyes: Sclera was anicteric.  Pupils were equal.  Ears:  There were no lesions.  Nose:  No lesions were noted.      NECK:  Supple.  There was no JVD.    CHEST:  Symmetrical movement on inspiration  CARDIAC: S1 S2+, RRR  LUNGS:  CTAB with decreased entry at bases   ABDOMEN: Non-distended, non-tender, BS+  MUSCULOSKELETAL:  There was no deformity.  There was full range of motion in all the extremities.    EXTREMITIES: There was no edema  NEUROLOGIC: Cranial nerves II-XII intact, no focal defecits     Current Inpatient Medications:   [Current Hospital Medications]    [Current Hospital Medications]     Current Facility-Administered Medications:     magnesium sulfate in sterile water for injection 2 g/50mL IVPB premix 2 g, 2 g, Intravenous, Once    HYDROcodone-acetaminophen (Norco) 5-325 MG per tab 1 tablet, 1 tablet, Oral, Q6H PRN    glycerin-hypromellose- (Artificial Tears) 0.2-0.2-1 % ophthalmic solution 1 drop, 1 drop, Both Eyes, QID PRN    DAPTOmycin (Cubicin) 500 mg in sodium chloride 0.9% PF IV syringe, 500  mg, Intravenous, Q24H    ceFEPIme (Maxipime) 1 g in sodium chloride 0.9% 100mL IVPB-BRANDY, 1 g, Intravenous, Q8H    micafungin (Mycamine) 100mg in 100 mL 0.9% IVPB-BRANDY, 100 mg, Intravenous, Q24H    metroNIDAZOLE (Flagyl) tab 500 mg, 500 mg, Oral, BID    melatonin cap/tab 5 mg, 5 mg, Oral, Nightly PRN    temazepam (Restoril) cap 7.5 mg, 7.5 mg, Oral, Nightly PRN    glucose (Dex4) 15 GM/59ML oral liquid 15 g, 15 g, Oral, Q15 Min PRN **OR** glucose (Glutose) 40% oral gel 15 g, 15 g, Oral, Q15 Min PRN **OR** glucose-vitamin C (Dex-4) chewable tab 4 tablet, 4 tablet, Oral, Q15 Min PRN **OR** dextrose 50% injection 50 mL, 50 mL, Intravenous, Q15 Min PRN **OR** glucose (Dex4) 15 GM/59ML oral liquid 30 g, 30 g, Oral, Q15 Min PRN **OR** glucose (Glutose) 40% oral gel 30 g, 30 g, Oral, Q15 Min PRN **OR** glucose-vitamin C (Dex-4) chewable tab 8 tablet, 8 tablet, Oral, Q15 Min PRN    morphINE PF 2 MG/ML injection 1 mg, 1 mg, Intravenous, Q2H PRN **OR** morphINE PF 2 MG/ML injection 2 mg, 2 mg, Intravenous, Q2H PRN **OR** morphINE PF 4 MG/ML injection 4 mg, 4 mg, Intravenous, Q2H PRN    acetaminophen (Tylenol) rectal suppository 650 mg, 650 mg, Rectal, Q6H PRN    ondansetron (Zofran) 4 MG/2ML injection 4 mg, 4 mg, Intravenous, Q6H PRN    metoclopramide (Reglan) 5 mg/mL injection 10 mg, 10 mg, Intravenous, Q8H PRN    heparin (Porcine) 94862 units/250mL infusion PE/DVT/THROMBUS CONTINUOUS, 200-3,000 Units/hr, Intravenous, Continuous    pantoprazole (Protonix) 40 mg in sodium chloride 0.9% PF 10 mL IV push, 40 mg, Intravenous, Daily    insulin aspart (NovoLOG) 100 Units/mL FlexPen 1-5 Units, 1-5 Units, Subcutaneous, TID CC and HS     Assessment and Plan:   Pneumoperitoneum with concern for persistent bowel leak  Abdominal fluid collection  Possibly trapped right lateral abdomen pigtail drain  - Patient with recent bowel resection.  Still has NGOZI drain  - IV antibiotics with Zosyn  - N.p.o. -> now tolerating low fiber soft diet   -  General Surgery on consult  -IR has been consulted, s/p drain placement, cultures reviewed   -ID consulted for abx management  - Pain controlled  - SBFT pending     Right lower extremity DVT, LLE soleal vein thrombosis  -is on Xarelto outpatient  -on heparin gtt for now     RUE swelling  -Will need to r/o DVT, await US -> showing acute expansile dvt of subclavian and axillary veins  -cont heparin gtt for now  -hemtology/oncology consulted     Chronic anemia, stable  - Monitor     Anasarca with diffuse subcutaneous edema on CT. likely due to hypoalbuminemia  Hypoalbuminemia  - Echo 6/24/2025: EF 60 to 65%.  Normal diastolic function.     DM2  - SSI     B-cell lymphoma  -Patient oncology follow-up     HTN  HLD  GERD  BPH  Anxiety  Depression  - IV meds as needed     Quality:  DVT Prophylaxis: heparin gtt   CODE status: Full code  ANNELISE: TBD           Results:            Recent Labs   Lab 07/06/25 0228 07/07/25  0706 07/08/25  0718   RBC 2.98* 2.87* 2.98*   HGB 8.0* 7.8* 7.9*   HCT 25.9* 24.5* 25.3*   MCV 86.9 85.4 84.9   MCH 26.8 27.2 26.5   MCHC 30.9* 31.8 31.2   RDW 17.4* 17.1* 17.5*   NEPRELIM 8.84* 6.98 5.65   WBC 10.9 9.5 7.9   .0* 489.0* 464.0*                  Recent Labs   Lab 07/06/25 0228 07/07/25  0706 07/08/25  0718   * 139* 135*   BUN 12 8* 7*   CREATSERUM 0.53* 0.48* 0.47*   EGFRCR 103 106 106   CA 7.2* 7.1* 7.4*   * 135* 137   K 3.9 3.3* 3.5  3.5    104 103   CO2 28.0 26.0 27.0            Imaging:   US VENOUS DOPPLER ARM RIGHT - DIAG IMG (CPT=93971)  Result Date: 7/8/2025  CONCLUSION: Acute expansile occlusive DVT within the right subclavian and axillary veins. The visualized segments of the right brachial, basilic, and cephalic veins are patent. The patient's nurse was notified by telephone of these results at 10:41 a.m. on 7/8/2025. Electronically Verified and Signed by Attending Radiologist: Duran Bhardwaj MD 7/8/2025 10:41 AM Workstation: [id number]                   Declan Stearns MD  7/8/2025                           MEDICATIONS ADMINISTERED IN LAST 1 DAY:  ceFEPIme (Maxipime) 1 g in sodium chloride 0.9% 100mL IVPB-BRANDY       Date Action Dose Route User    7/9/2025 0417 New Bag 1 g Intravenous Goldberg, Rachel, RN    7/8/2025 2045 New Bag 1 g Intravenous Goldberg, Rachel, RN    7/8/2025 1234 New Bag 1 g Intravenous Juan Miguel Becerra RN          ciprofloxacin in dextrose 5% (Cipro) 400 mg/200mL IVPB premix 400 mg       Date Action Dose Route User    7/9/2025 0839 New Bag 400 mg Intravenous Juan Miguel Becerra RN          heparin (Porcine) 53522 units/250mL infusion PE/DVT/THROMBUS CONTINUOUS       Date Action Dose Route User    7/9/2025 0757 Hi-Risk Rate/Dose Change 1,200 Units/hr Intravenous Juan Miguel Becerra RN    7/8/2025 1726 New Bag 1,300 Units/hr Intravenous Juan Miguel Becerra RN          DAPTOmycin (Cubicin) 500 mg in sodium chloride 0.9% PF IV syringe       Date Action Dose Route User    7/8/2025 1714 New Bag 500 mg Intravenous Juan Miguel Becerra RN          insulin aspart (NovoLOG) 100 Units/mL FlexPen 1-5 Units       Date Action Dose Route User    7/8/2025 1810 Given 1 Units Subcutaneous (Left Upper Arm) Juan Miguel Becerra RN          magnesium sulfate in sterile water for injection 2 g/50mL IVPB premix 2 g       Date Action Dose Route User    7/8/2025 1913 New Bag 2 g Intravenous Juan Miguel Becerra RN          melatonin cap/tab 5 mg       Date Action Dose Route User    7/8/2025 2203 Given 5 mg Oral Goldberg, Rachel, RN          melatonin cap/tab 5 mg       Date Action Dose Route User    7/9/2025 0147 Given 5 mg Oral Goldberg, Rachel, RN          metroNIDAZOLE (Flagyl) tab 500 mg       Date Action Dose Route User    7/9/2025 0839 Given 500 mg Oral Juan Miguel Becerra RN    7/8/2025 2045 Given 500 mg Oral Goldberg, Rachel, RN          micafungin (Mycamine) 100mg in 100 mL 0.9% IVPB-BRANDY       Date Action Dose Route User    7/8/2025 2321 New Bag 100 mg Intravenous Juan Miguel Becerra, RN           pantoprazole (Protonix) 40 mg in sodium chloride 0.9% PF 10 mL IV push       Date Action Dose Route User    7/9/2025 0839 Given 40 mg Intravenous Juan Miguel Becerra RN    7/8/2025 1234 Given 40 mg Intravenous Juan Miguel Becerra RN          potassium chloride 40 mEq in 250mL sodium chloride 0.9% IVPB premix       Date Action Dose Route User    7/9/2025 1000 New Bag 40 mEq Intravenous Juan Miguel Becerra RN          temazepam (Restoril) cap 7.5 mg       Date Action Dose Route User    7/8/2025 2045 Given 7.5 mg Oral Goldberg, Rachel, RN            Vitals (last day)       Date/Time Temp Pulse Resp BP SpO2 Weight O2 Device O2 Flow Rate (L/min) [facility]    07/09/25 0414 97.7 °F (36.5 °C) 67 16 116/68 95 % -- None (Room air) --     07/08/25 2046 97.8 °F (36.6 °C) 76 18 -- 97 % -- None (Room air) --     07/08/25 1234 97.8 °F (36.6 °C) 68 16 114/57 97 % -- None (Room air) -- NGOZI    07/08/25 0400 -- 71 -- -- -- -- -- -- GT    07/08/25 0359 97.8 °F (36.6 °C) 73 18 104/55 95 % -- None (Room air) --

## 2025-07-09 NOTE — PROGRESS NOTES
INFECTIOUS DISEASE PROGRESS NOTE    Arnaldo Gutierrez Patient Status:  Inpatient    4/15/1945 MRN [id number]   Location [facility] 4W/SW/SE Attending Declan Stearns MD   Hosp Day # 4 PCP PHYSICIAN NONSTAFF       SUBJECTIVE  ROS done. Had multiple Bms overnight. On CL diet. LUQ drain with 100 mL output, RLQ rain with minimal output.    ASSESSMENT/PLAN:    Antibiotics: Zerbaxa, dapto, flagyl, micafungin; (IV zosyn, fluconazole, augmentin, cefepime)     ASSESSMENT:     # Multiple large intra-abdominal abscesses               - now s/p IR drain placement on 25               - fluid cx so far with E. Coli, Enterobacter cloacae, Enterococcus faecalis, C. Glabrata, MDR PSAR, Lactobacillus               - surgery and IR following  # RUE DVT  # hx of Large bowel obstruction s/p OR 6/10/25 for lap transverse colon resection, small bowel resection x2, rsection of tumor nodule on small bowel - path with DLBCL               - s/p colonic stent placement by GI 25               - post op fluid collection s/p IR drain placement 25 with 260 cc of serosang fluid, cx negative      PLAN:  -  Continue on IV daptomycin, metronidazole, and micafungin. Stop ciprofloxacin and start zerbaxa.  -  Repeat CT A/P tomorrow.  -  Follow fever curve, wbc.  -  Reviewed labs, micro, imaging reports.  -  Case d/w patient using language line, RN.     History of Present Illness:  80 year old male with history of diabetes, HTN, BPH who was admitted on  with abdominal bloating, nausea, decreased appetite, unintentional weight loss. Initial CT A/P on  with 4 cm segment of masslike constriction and thickening in the proximal transverse colon with concern for partially obstructing chronic neoplasm. Seen by GI and underwent colonoscopy with chronic stent placement on . Also seen by general surgery and taken to the OR on 6/10 for large bowel obstruction status post laparoscopic assisted transverse colon resection, small  bowel resection and tumor nodule and small bowel. Pathology diffuse large B-cell lymphoma. Seen by oncology. On 6/19 had low-grade temperatures with increasing WBC up to 19 with repeat CT A/P showing moderate to large volume free fluid in the abdomen and pelvis in the upper quadrants. Organized fluid in the left lower retroperitoneum measuring 5.5 x 4.7 x 5.8 cm concern for seroma, liquefied hematoma versus inflammatory fluid. Seen by IR and underwent IR peritoneal drain placement on 6/19 with 260 cc of serosanguineous fluid. Cultures negative. Was on zosyn while inpatient and transitioned to PO augmentin on discharge. Discharged on 6/24/25 to subacute rehab. Came back to ED on 7/4/25 with abdominal pain. Repeat CT with multiple large peritoneal abscesses. Surgery and IR consult.  Now status post IR drainage with cx growing GNR and budding yeast.  Currently on IV Zosyn.  ID consulted for further antibiotics management.     OBJECTIVE  /68 (BP Location: Left arm)   Pulse 67   Temp 97.7 °F (36.5 °C) (Oral)   Resp 16   Ht 5' 11\" (1.803 m)   Wt 158 lb 4.8 oz (71.8 kg)   SpO2 95%   BMI 22.08 kg/m²     General: No acute distress. Alert.  HEENT: EOMI   Abdomen: Soft, nontender, nondistended. RUQ drain with minimal output; LLQ drain with tan output to gravity bag  Musculoskeletal: No edema  Integument: No rashes    [facility]  [phone]

## 2025-07-09 NOTE — PROGRESS NOTES
[facility]  part of [facility]    Progress Note    Arnaldo Gutierrez Patient Status:  Inpatient    4/15/1945 MRN [id number]   Location [facility] 4W/SW/SE Attending Declan Stearns MD   Hosp Day # 4 PCP PHYSICIAN NONSTAFF       Subjective:   Arnaldo Gutierrez is a(n) 80 year old male was seen and examined   Resting in bed, comfortably  Tolerating diet   Denies any RUE pain  No acute events overnight   No cp, sob, f,c,n,v abd pain or HA     Objective:   Blood pressure 116/68, pulse 67, temperature 97.7 °F (36.5 °C), temperature source Oral, resp. rate 16, height 5' 11\" (1.803 m), weight 158 lb 4.8 oz (71.8 kg), SpO2 95%.    GENERAL:  The patient appeared to be in no distress and was comfortable.  SKIN:  Warm and hydrated  PSYCHIATRIC: Calm and cooperative    HEENT:  Head was atraumatic and normocephalic.  Eyes: Sclera was anicteric.  Pupils were equal.  Ears:  There were no lesions.  Nose:  No lesions were noted.      NECK:  Supple.  There was no JVD.    CHEST:  Symmetrical movement on inspiration  CARDIAC: S1 S2+, RRR  LUNGS:  CTAB with decreased entry at bases   ABDOMEN: Non-distended, non-tender, BS+  MUSCULOSKELETAL:  There was no deformity.  There was full range of motion in all the extremities.    EXTREMITIES: There was no edema  NEUROLOGIC: Cranial nerves II-XII intact, no focal defecits    Current Inpatient Medications:   Current Hospital Medications[1]    Assessment and Plan:   Pneumoperitoneum with concern for persistent bowel leak  Abdominal fluid collection  Possibly trapped right lateral abdomen pigtail drain  - Patient with recent bowel resection.  Still has NGOZI drain  - IV antibiotics with Zosyn  - N.p.o. -> now tolerating low fiber soft diet   - General Surgery on consult  -IR has been consulted, s/p drain placement, cultures reviewed   -ID consulted for abx management  - Pain controlled  - SBFT pending -> no abnormality seen, previous fluid collections present      Right lower extremity DVT, LLE soleal vein thrombosis  -is on Xarelto outpatient  -on heparin gtt for now     RUE swelling  -Will need to r/o DVT, await US -> showing acute expansile dvt of subclavian and axillary veins  -cont heparin gtt for now  -hemtology/oncology consulted    Chronic anemia, stable  - Monitor     Anasarca with diffuse subcutaneous edema on CT. likely due to hypoalbuminemia  Hypoalbuminemia  - Echo 6/24/2025: EF 60 to 65%.  Normal diastolic function.    DM2  - SSI     B-cell lymphoma  -Patient oncology follow-up     HTN  HLD  GERD  BPH  Anxiety  Depression     Quality:  DVT Prophylaxis: heparin gtt   CODE status: Full code  ANNELISE: TBD    MDM: High     Results:     Recent Labs   Lab 07/07/25  0706 07/08/25  0718 07/09/25  0648   RBC 2.87* 2.98* 3.32*   HGB 7.8* 7.9* 8.8*   HCT 24.5* 25.3* 28.5*   MCV 85.4 84.9 85.8   MCH 27.2 26.5 26.5   MCHC 31.8 31.2 30.9*   RDW 17.1* 17.5* 17.2*   NEPRELIM 6.98 5.65 4.66   WBC 9.5 7.9 7.3   .0* 464.0* 490.0*         Recent Labs   Lab 07/07/25  0706 07/08/25  0718 07/09/25  0648   * 135* 157*   BUN 8* 7* 5*   CREATSERUM 0.48* 0.47* 0.49*   EGFRCR 106 106 105   CA 7.1* 7.4* 7.7*   * 137 136   K 3.3* 3.5  3.5 3.3*    103 101   CO2 26.0 27.0 29.0         Imaging:   XR ABDOMEN (1 VIEW) (CPT=74018)  Result Date: 7/9/2025  CONCLUSION: No evidence of acute abnormality in the abdomen or pelvis. Multiple peritoneal drains are seen. Previously demonstrated fluid collections are demonstrated to advantage on prior CT. Electronically Verified and Signed by Attending Radiologist: Gelacio Bullock MD 7/9/2025 9:50 AM Workstation: [id number]    XR SMALL BOWEL SINGLE CONTRAST (CPT=74250)  Result Date: 7/9/2025  CONCLUSION: No evidence of bowel leak. If clinical suspicion persists, CT may be considered. Electronically Verified and Signed by Attending Radiologist: Sunil Murillo MD 7/9/2025 9:00 AM Workstation: [id number]    US VENOUS DOPPLER ARM RIGHT - DIAG  IMG (CPT=93971)  Result Date: 7/8/2025  CONCLUSION: Acute expansile occlusive DVT within the right subclavian and axillary veins. The visualized segments of the right brachial, basilic, and cephalic veins are patent. The patient's nurse was notified by telephone of these results at 10:41 a.m. on 7/8/2025. Electronically Verified and Signed by Attending Radiologist: Duran Bhardwaj MD 7/8/2025 10:41 AM Workstation: [id number]              Declan Stearns MD  7/8/2025            [1]   Current Facility-Administered Medications:     ciprofloxacin in dextrose 5% (Cipro) 400 mg/200mL IVPB premix 400 mg, 400 mg, Intravenous, Q12H    potassium chloride 40 mEq in 250mL sodium chloride 0.9% IVPB premix, 40 mEq, Intravenous, Once    heparin (Porcine) 1000 UNIT/ML injection - BOLUS IV 2,200 Units, 30 Units/kg, Intravenous, Once    HYDROcodone-acetaminophen (Norco) 5-325 MG per tab 1 tablet, 1 tablet, Oral, Q6H PRN    glycerin-hypromellose- (Artificial Tears) 0.2-0.2-1 % ophthalmic solution 1 drop, 1 drop, Both Eyes, QID PRN    DAPTOmycin (Cubicin) 500 mg in sodium chloride 0.9% PF IV syringe, 500 mg, Intravenous, Q24H    micafungin (Mycamine) 100mg in 100 mL 0.9% IVPB-BRANDY, 100 mg, Intravenous, Q24H    metroNIDAZOLE (Flagyl) tab 500 mg, 500 mg, Oral, BID    melatonin cap/tab 5 mg, 5 mg, Oral, Nightly PRN    temazepam (Restoril) cap 7.5 mg, 7.5 mg, Oral, Nightly PRN    glucose (Dex4) 15 GM/59ML oral liquid 15 g, 15 g, Oral, Q15 Min PRN **OR** glucose (Glutose) 40% oral gel 15 g, 15 g, Oral, Q15 Min PRN **OR** glucose-vitamin C (Dex-4) chewable tab 4 tablet, 4 tablet, Oral, Q15 Min PRN **OR** dextrose 50% injection 50 mL, 50 mL, Intravenous, Q15 Min PRN **OR** glucose (Dex4) 15 GM/59ML oral liquid 30 g, 30 g, Oral, Q15 Min PRN **OR** glucose (Glutose) 40% oral gel 30 g, 30 g, Oral, Q15 Min PRN **OR** glucose-vitamin C (Dex-4) chewable tab 8 tablet, 8 tablet, Oral, Q15 Min PRN    morphINE PF 2 MG/ML injection 1 mg, 1 mg,  Intravenous, Q2H PRN **OR** morphINE PF 2 MG/ML injection 2 mg, 2 mg, Intravenous, Q2H PRN **OR** morphINE PF 4 MG/ML injection 4 mg, 4 mg, Intravenous, Q2H PRN    acetaminophen (Tylenol) rectal suppository 650 mg, 650 mg, Rectal, Q6H PRN    ondansetron (Zofran) 4 MG/2ML injection 4 mg, 4 mg, Intravenous, Q6H PRN    metoclopramide (Reglan) 5 mg/mL injection 10 mg, 10 mg, Intravenous, Q8H PRN    heparin (Porcine) 62636 units/250mL infusion PE/DVT/THROMBUS CONTINUOUS, 200-3,000 Units/hr, Intravenous, Continuous    pantoprazole (Protonix) 40 mg in sodium chloride 0.9% PF 10 mL IV push, 40 mg, Intravenous, Daily    insulin aspart (NovoLOG) 100 Units/mL FlexPen 1-5 Units, 1-5 Units, Subcutaneous, TID CC and HS

## 2025-07-09 NOTE — OCCUPATIONAL THERAPY NOTE
OCCUPATIONAL THERAPY TREATMENT NOTE - INPATIENT        Room Number: 454/454-A          Problem List  Principal Problem:    Abdominal distension  Active Problems:    Pneumoperitoneum    Anasarca    Intraabdominal fluid collection      OCCUPATIONAL THERAPY ASSESSMENT   Patient demonstrates good  progress this session, goals remain in progress.    Patient is requiring up to mod assist for ADLs as a result of the following impairments: decreased functional strength, decreased functional reach, decreased endurance, pain, impaired balance, and decreased muscular endurance.    Patient continues to function below baseline with ADLs and fx mobility/transfers.  Next session anticipate patient to progress toileting, lower body dressing, static standing balance, dynamic standing balance, and functional standing tolerance.  Occupational Therapy will continue to follow patient for duration of hospitalization.    Patient continues to benefit from continued skilled OT services: at discharge to promote prior level of function and safety with additional support and return home with home health OT.     PLAN DURING HOSPITALIZATION     OT Treatment Plan: Balance activities, Energy conservation/work simplification techniques, ADL training, Functional transfer training, UE strengthening/ROM, Endurance training, Patient/Family education, Compensatory technique education     SUBJECTIVE  Patient agreeable to OT treatment session.    OBJECTIVE  Precautions:  needed, Limb alert - right (Kinyarwanda-speaking)    WEIGHT BEARING RESTRICTION  None    PAIN ASSESSMENT  Ratin  Location: abdomen  Management Techniques: Relaxation; Repositioning    ACTIVITIES OF DAILY LIVING ASSESSMENT  AM-PAC ‘6-Clicks’ Inpatient Daily Activity Short Form  How much help from another person does the patient currently need…  -   Putting on and taking off regular lower body clothing?: A Little  -   Bathing (including washing, rinsing, drying)?: A Little  -    Toileting, which includes using toilet, bedpan or urinal? : A Little  -   Putting on and taking off regular upper body clothing?: A Little  -   Taking care of personal grooming such as brushing teeth?: A Little  -   Eating meals?: None    AM-PAC Score:  Score: 19  Approx Degree of Impairment: 42.8%  Standardized Score (AM-PAC Scale): 40.22  CMS Modifier (G-Code): CK    BED MOBILITY  Supine to Sit: min assist    FUNCTIONAL TRANSFER ASSESSMENT  Sit to Stand from EOB: contact guard assist  Chair Transfer: contact guard assist    FUNCTIONAL MOBILITY  contact guard assist for hallway fx mobility using RW    ACTIVITIES OF DAILY LIVING  UB Dressing: setup assist  LB Dressing: mod assist  Toileting: min assist    EDUCATION PROVIDED  Patient Education : Role of Occupational Therapy; Plan of Care; Discharge Recommendations; Functional Transfer Techniques; Fall Prevention; Posture/Positioning; Energy Conservation; Proper Body Mechanics  Patient's Response to Education: Verbalized Understanding; Returned Demonstration    The patient's Approx Degree of Impairment: 42.8% has been calculated based on documentation in the [facility] '6 clicks' Inpatient Daily Activity Short Form.  Research supports that patients with this level of impairment may benefit from  OT.  Final disposition will be made by interdisciplinary medical team.    Patient End of Session: Up in chair, Needs met, Call light within reach, RN aware of session/findings, All patient questions and concerns addressed, Hospital anti-slip socks, Family present    OT Goals:  Patient's self stated goal is: unstated     Patient will complete functional transfer with SBA  Comment: ongoing    Patient will complete toileting with SBA  Comment: ongoing    Patient will tolerate standing for 3 minutes in prep for adls with SBA   Comment: ongoing    Patient will complete LB dressing with Min A  Comment: ongoing          Goals  on: 25  Frequency: 3-5x/week    OT Session  Time  Therapeutic Activity: 15 minutes    Kat Duval OTR/L  [facility]  #[id number]

## 2025-07-09 NOTE — PLAN OF CARE
Patient alert and oriented, [place] speaking. Tele, no calls. Denies pain, denies nausea, abd xr, LFS. Multiple incontinent BM, primofit. RLQ NGOZI without output, LLQ drain with more output. Heparin drip continued, rate increased & bolus given per heme, following protocol afterwards. Multiple antibx. Up with assist and walker, up to chair, ambulated in hallway. Call light within reach, using appropriately.     Problem: Diabetes/Glucose Control  Goal: Glucose maintained within prescribed range  Description: INTERVENTIONS:  - Monitor Blood Glucose as ordered  - Assess for signs and symptoms of hyperglycemia and hypoglycemia  - Administer ordered medications to maintain glucose within target range  - Assess barriers to adequate nutritional intake and initiate nutrition consult as needed  - Instruct patient on self management of diabetes  Outcome: Progressing     Problem: PAIN - ADULT  Goal: Verbalizes/displays adequate comfort level or patient's stated pain goal  Description: INTERVENTIONS:  - Encourage pt to monitor pain and request assistance  - Assess pain using appropriate pain scale  - Administer analgesics based on type and severity of pain and evaluate response  - Implement non-pharmacological measures as appropriate and evaluate response  - Consider cultural and social influences on pain and pain management  - Manage/alleviate anxiety  - Utilize distraction and/or relaxation techniques  - Monitor for opioid side effects  - Notify MD/LIP if interventions unsuccessful or patient reports new pain  - Anticipate increased pain with activity and pre-medicate as appropriate  Outcome: Progressing     Problem: RISK FOR INFECTION - ADULT  Goal: Absence of fever/infection during anticipated neutropenic period  Description: INTERVENTIONS  - Monitor WBC  - Administer growth factors as ordered  - Implement neutropenic guidelines  Outcome: Progressing     Problem: SAFETY ADULT - FALL  Goal: Free from fall injury  Description:  INTERVENTIONS:  - Assess pt frequently for physical needs  - Identify cognitive and physical deficits and behaviors that affect risk of falls.  - [place] fall precautions as indicated by assessment.  - Educate pt/family on patient safety including physical limitations  - Instruct pt to call for assistance with activity based on assessment  - Modify environment to reduce risk of injury  - Provide assistive devices as appropriate  - Consider OT/PT consult to assist with strengthening/mobility  - Encourage toileting schedule  Outcome: Progressing     Problem: HEMATOLOGIC - ADULT  Goal: Free from bleeding injury  Description: (Example usage: patient with low platelets)  INTERVENTIONS:  - Avoid intramuscular injections, enemas and rectal medication administration  - Ensure safe mobilization of patient  - Hold pressure on venipuncture sites to achieve adequate hemostasis  - Assess for signs and symptoms of internal bleeding  - Monitor lab trends  - Patient is to report abnormal signs of bleeding to staff  - Avoid use of toothpicks and dental floss  - Use electric shaver for shaving  - Use soft bristle tooth brush  - Limit straining and forceful nose blowing  Outcome: Progressing     Problem: GASTROINTESTINAL - ADULT  Goal: Minimal or absence of nausea and vomiting  Description: INTERVENTIONS:  - Maintain adequate hydration with IV or PO as ordered and tolerated  - Nasogastric tube to low intermittent suction as ordered  - Evaluate effectiveness of ordered antiemetic medications  - Provide nonpharmacologic comfort measures as appropriate  - Advance diet as tolerated, if ordered  - Obtain nutritional consult as needed  - Evaluate fluid balance  Outcome: Progressing  Goal: Maintains or returns to baseline bowel function  Description: INTERVENTIONS:  - Assess bowel function  - Maintain adequate hydration with IV or PO as ordered and tolerated  - Evaluate effectiveness of GI medications  - Encourage mobilization and  activity  - Obtain nutritional consult as needed  - Establish a toileting routine/schedule  - Consider collaborating with pharmacy to review patient's medication profile  Outcome: Progressing

## 2025-07-09 NOTE — PROGRESS NOTES
[facility]  part of [facility]    Progress Note    Arnaldo Gutierrez Patient Status:  Inpatient    4/15/1945 MRN [id number]   Location [facility] 4W/SW/SE Attending Declan Stearns MD   Hosp Day # 4 PCP PHYSICIAN NONSTAFF     Subjective:  Feels ok     Objective/Physical Exam:  General: Alert, orientated x3.  Cooperative.  No apparent distress.  Vital Signs:  Blood pressure 105/51, pulse 67, temperature 97.7 °F (36.5 °C), temperature source Oral, resp. rate 14, height 180.3 cm (5' 11\"), weight 158 lb 4.8 oz (71.8 kg), SpO2 95%.  Abdomen:  Soft, non-distended, non-tender, with no rebound or guarding.  No peritoneal signs.  Drain 302   fairly clear  Labs:  Lab Results   Component Value Date    WBC 7.3 2025    HGB 8.8 (L) 2025    HCT 28.5 (L) 2025    .0 (H) 2025    CREATSERUM 0.49 (L) 2025    BUN 5 (L) 2025     2025    K 3.3 (L) 2025     2025    CO2 29.0 2025     (H) 2025    CA 7.7 (L) 2025    ALB 2.3 (L) 2025    ALKPHO 182 (H) 2025    BILT 0.3 2025    TP 5.0 (L) 2025    AST 22 2025    ALT 16 2025    .2 (H) 2025    TSH 2.417 2025    LIP 45 2025    MG 1.9 2025    PHOS 3.0 2025    CK <15 (L) 2025    B12 1,020 (H) 2025       Imaging:  XR ABDOMEN (1 VIEW) (CPT=74018)  Result Date: 2025  PROCEDURE(S): XR ABDOMEN (1 VIEW) (CPT=74018) WORKSTATION: [employer] HISTORY/INDICATIONS: s/p bowel resections for lymphoma  possible bowel leak Abdominal distension. COMPARISON: 2025 XR SMALL BOWEL SINGLE CONTRAST (CPT=74250) FINDINGS: Lines And Tubes: Right upper quadrant drainage pigtail catheter is again seen. Left approach presumed surgical drain is again seen. Lower Thorax: Unremarkable. Bowel Gas Pattern: No gas-filled, dilated loops. No differential distention. Multiple bowel sutures are noted throughout the  abdomen. Soft Tissues: No definite pneumoperitoneum. Previously described fluid collections are demonstrated to advantage on prior CT. Bones/Joints: No acute fracture.     CONCLUSION: No evidence of acute abnormality in the abdomen or pelvis. Multiple peritoneal drains are seen. Previously demonstrated fluid collections are demonstrated to advantage on prior CT. Electronically Verified and Signed by Attending Radiologist: Gelacio Bullock MD 7/9/2025 9:50 AM Workstation: [id number]    XR SMALL BOWEL SINGLE CONTRAST (CPT=74250)  Result Date: 7/9/2025  PROCEDURE: XR SMALL BOWEL SINGLE CONTRAST (CPT=74250) INDICATIONS: Multiple bowel resections for lymphoma. Abscess with possible leak PATIENT STATED HISTORY: Recent multiple bowel resection surgeries, history of lymphoma COMPARISON: CT 7/4/2025 TECHNIQUE: Small bowel series with multiple serial films was performed in the usual manner.  A  abdominal radiograph was performed. Fluoro/Cine Image Number: 9 Fluoro Time: 1.4 minutes FINDINGS: No dilated bowel to suggest ileus or obstruction. No extravasation of enteric contrast was noted to diagnose or identify a source of leak. Focused attention was paid to regions of surgical staples and known prior abscesses using compression and magnification, confirming no evident leak. Transit time from stomach to colon was 90 minutes.     CONCLUSION: No evidence of bowel leak. If clinical suspicion persists, CT may be considered. Electronically Verified and Signed by Attending Radiologist: Sunil Murillo MD 7/9/2025 9:00 AM Workstation: [id number]    US VENOUS DOPPLER ARM RIGHT - DIAG IMG (CPT=93971)  Result Date: 7/8/2025  PROCEDURE: US VENOUS DOPPLER ARM RIGHT - DIAG IMG (CPT=93971) INDICATIONS: swelling in RUE TECHNIQUE: Color duplex Doppler venous ultrasound of the right upper extremity was performed in the usual manner. FINDINGS: The internal jugular, subclavian, axillary, brachial, cephalic and basilic veins appear normal. Flow was  demonstrated with color and pulsed Doppler. Comparison scans of the left subclavian vein appear normal. There is acute expansile DVT within the right subclavian and axillary veins. The visualized segments of the right brachial vein, right cephalic vein, and right basilic vein are patent. There is moderate soft tissue edema within the right upper extremity.     CONCLUSION: Acute expansile occlusive DVT within the right subclavian and axillary veins. The visualized segments of the right brachial, basilic, and cephalic veins are patent. The patient's nurse was notified by telephone of these results at 10:41 a.m. on 7/8/2025. Electronically Verified and Signed by Attending Radiologist: Duran Bhardwaj MD 7/8/2025 10:41 AM Workstation: [id number]    CT ABDOMEN+PELVIS(CONTRAST ONLY)(CPT=74177)  Result Date: 7/5/2025  PROCEDURE(S): CT ABDOMEN+PELVIS(CONTRAST ONLY)(CPT=74177) WORKSTATION: [id number] HISTORY/INDICATIONS: distended abdomen, recent surgery for mass resection, cath placement distended abdomen, recent surgery for mass resection, cath placement COMPARISON: 06/19/2025 CT ABDOMEN+PELVIS(CONTRAST ONLY)(CPT=74177) TECHNIQUE: Helical CT of the abdomen and pelvis was obtained following administration of intravenous contrast material. Axial, coronal, and sagittal reformatted images were created and interpreted. For this exam, one or more of the following dose reductions techniques were used: Automated exposure control Adjustment of the mA and/or kV according to patient size Use of iterative reconstruction technique FINDINGS: Lower Thorax: Moderate bilateral pleural effusions are again seen. Heart is normal size. Few airspace opacities are noted in the lingula series 3 image 1. There is atelectasis in the lower lobes. Liver: Unremarkable. Gallbladder/Biliary tree: Cholelithiasis without evidence of acute cholecystitis. No biliary ductal dilatation. Pancreas:  Unremarkable. Spleen: Unremarkable. Adrenal glands:   Unremarkable. Kidneys: Simple cyst arises from the right kidney posterior interpolar region. No suspicious renal lesions. Symmetric nephrograms. No hydronephrosis. Retroperitoneum/extraperitoneum: Unremarkable. Vasculature: No aneurysm or dissection. Mild atherosclerotic calcification is seen. Peritoneum: Multiple abscesses are seen: Left upper quadrant abscess measures 24.3 x 10.9 x 23.9 cm series 2 image 73; series 5 image 23. Air-fluid levels are seen. It extends into the abdominal wall series 2 image 73. Right upper quadrant perihepatic abscess communicates with the left upper quadrant abscess and measures 14.2 x 5.1 x 15.8 cm series 2 image 35; series 6 image 96. Perisplenic abscess measures 7.9 x 2.8 x 9.8 cm series 2 image 50; series 6 image 19. This abscess also likely communicates with the left upper quadrant abscess. Small lower midline suspected abscess measuring 3.0 x 2.7 x 3.5 cm. There is a right mid abdominal pigtail drainage catheter which may terminate within the colon series 2 image 80. GI tract/mesentery/omentum: Multiple prior bowel resections are noted. Suspected fistula in the left mid abdomen series 2 image 103. Possible pigtail drainage catheter within the ascending colon series 2 image 80. No bowel obstruction is identified. Urinary Bladder: The bladder is grossly unremarkable. Reproductive organs: Unremarkable. Body wall: Mild anasarca. The dominant abscess extends into the anterior abdominal wall along the surgical incision series 2 image 73. Bones:  There are mild degenerative changes in the spine.     CONCLUSION: 1.  Multiple large peritoneal abscesses are seen. The largest is centered in the left upper quadrant and left midabdomen and measures 24.3 x 10.9 x 23.9 cm with air-fluid levels and extends into the abdominal wall. This is contiguous with smaller right upper quadrant perihepatic and posterior left upper quadrant perisplenic abscesses. A separate small 3 cm abscess is noted  adjacent to the bladder. Bladder diverticulum is thought less likely. 2.  Multiple prior bowel resections are seen. There may be fistulous connection between the bowel and left upper quadrant dominant abscess at the left mid abdomen. The location of a right-sided pigtail drainage catheter is uncertain but it may terminate within the ascending colon. 3.  Additional chronic and/or incidental findings are detailed in the body of this report. Preliminary report was given by [facility]. No clinically significant discrepancies are noted. Electronically Verified and Signed by Attending Radiologist: Gelacio Bullock MD 2025 7:14 AM Workstation: [id number]    CARD TTE STRAIN W DOPPLER ONCOLOGY (CPT=93306)  Result Date: 2025  Transthoracic Echocardiogram Name:Arnaldo Gutierrez Date: 2025 :  04/15/1945 Ht:  (71in)  BP: 101 / 61 MRN:  [id number]    Age:  80years    Wt:  (171lb) HR: 88bpm Loc:  [facility]       Gndr: M          BSA: 1.97m^2 Sonographer: Amelia CHERY Ordering:    Crys Wilkins Consulting:  Rafita Torres ---------------------------------------------------------------------------- History/Indications:   Encounter for Monitoring Cardiotoxic Drug Therapy. ---------------------------------------------------------------------------- Procedure information:  A transthoracic complete 2D study was performed. Additional evaluation included M-mode, complete spectral Doppler, and color Doppler.  Patient status:  Inpatient.  Location:  Echo laboratory.    This was a routine study. Transthoracic echocardiography for diagnosis. Image quality was adequate. ECG rhythm:   Normal sinus ---------------------------------------------------------------------------- Conclusions: 1. Left ventricle: The cavity size was normal. Wall thickness was normal.    Systolic function was normal. The estimated ejection fraction was 60-65%,    by biplane method of disks. Wall motion is normal; there are no regional    wall motion  abnormalities. Left ventricular diastolic function parameters    were normal. The Global Longitudinal Strain (GLS) was -19.40%. 2. Mitral valve: There was mild regurgitation. * ---------------------------------------------------------------------------- * Findings: Left ventricle:  The cavity size was normal. Wall thickness was normal. Systolic function was normal. The estimated ejection fraction was 60-65%, by biplane method of disks. Wall motion is normal; there are no regional wall motion abnormalities. The Global Longitudinal Strain (GLS) was -19.40%. Left ventricular diastolic function parameters were normal. Left atrium:  Well visualized. The atrium was normal in size. Right ventricle:  The cavity size was normal. Systolic function was normal. Systolic pressure was within the normal range. Right atrium:  Well visualized. The atrium was normal in size. Mitral valve:  Well visualized. The leaflets were normal thickness. No evidence for prolapse.  Doppler:  Transvalvular velocity was within the normal range. There was no evidence for stenosis. There was mild regurgitation. Aortic valve:  Well visualized.  The valve was trileaflet. The leaflets were normal thickness.  Doppler:  Transvalvular velocity was within the normal range. There was no evidence for stenosis. There was trivial regurgitation. Tricuspid valve:  Well visualized. The annulus is normal-sized. The leaflets are normal thickness. No echocardiographic evidence for tricuspid prolapse. Doppler:  Transvalvular velocity was within the normal range. There was no evidence for stenosis. There was trivial regurgitation. Pulmonic valve:   Well visualized. The annulus is normal-sized. The leaflets are normal thickness. No evidence for prolapse.  Doppler:  Transvalvular velocity was within the normal range. There was no evidence for stenosis. There was no significant regurgitation. Pericardium:   There was no pericardial effusion. Pleura:  No evidence of pleural  fluid accumulation. Aorta: Aortic root: The aortic root was normal. Ascending aorta: The ascending aorta was normal. Pulmonary arteries: There was no evidence of pulmonary hypertension. Systemic veins:  Central venous respirophasic diameter changes are in the normal range (>50%). Inferior vena cava: The IVC was normal-sized. ---------------------------------------------------------------------------- Measurements  Left ventricle                   Value         Ref  GLS, 2D                          -19.40 %      ---------  IVS thickness, ED, PLAX          0.9    cm     0.6 - 1.0  LV ID, ED, PLAX              (L) 3.5    cm     4.2 - 5.8  LV ID, ES, PLAX              (L) 2.1    cm     2.5 - 4.0  LV PW thickness, ED, PLAX        0.8    cm     0.6 - 1.0  IVS/LV PW ratio, ED, PLAX        1.13          ---------  LV PW/LV ID ratio, ED, PLAX      0.23          ---------  LV area, ES, A4C                 19.3   cm^2   ---------  LV ejection fraction             72     %      52 - 72  Stroke volume/bsa, 2D            43     ml/m^2 ---------  LV end-diastolic volume, 1-p     87     ml     69 - 185  A4C  LV end-systolic volume, 1-p      47     ml     22 - 78  A4C  LV ejection fraction, 1-p        57     %      46 - 74  A4C  Stroke volume, 1-p A4C           50     ml     ---------  LV end-diastolic volume/bsa,     44     ml/m^2 37 - 93  1-p A4C  LV end-systolic volume/bsa,      24     ml/m^2 12 - 40  1-p A4C  Stroke volume/bsa, 1-p A4C       25     ml/m^2 ---------  LV end-diastolic volume, 2-p     81     ml     62 - 150  LV end-systolic volume, 2-p      32     ml     21 - 61  LV ejection fraction, 2-p        60     %      52 - 72  Stroke volume, 2-p               48     ml     ---------  LV end-diastolic volume/bsa,     41     ml/m^2 34 - 74  2-p  LV end-systolic volume/bsa,      16     ml/m^2 11 - 31  2-p  Stroke volume/bsa, 2-p           24.5   ml/m^2 ---------  LV e', lateral                   14.9   cm/sec >=10.0  LV E/e',  lateral                 6             <=13  LV e', medial                    12.5   cm/sec >=7.0  LV E/e', medial                  7             ---------  LV e', average                   13.7   cm/sec ---------  LV E/e', average                 6             <=14  LVOT                             Value         Ref  LVOT ID                          2.1    cm     ---------  LVOT peak velocity, S            1.22   m/sec  ---------  LVOT VTI, S                      24.7   cm     ---------  LVOT peak gradient, S            6      mm Hg  ---------  LVOT mean gradient, S            3      mm Hg  ---------  Stroke volume (SV), LVOT DP      86     ml     ---------  Stroke index (SV/bsa), LVOT      43     ml/m^2 ---------  DP  Aortic valve                     Value         Ref  Aortic leaflet separation,       1.8    cm     ---------  MM  Aortic root                      Value         Ref  Aortic root ID, STJ, ED      (H) 3.6    cm     2.3 - 3.5  Ascending aorta                  Value         Ref  Ascending aorta ID               3.6    cm     2.2 - 3.8  Left atrium                      Value         Ref  LA volume, S                 (H) 62     ml     18 - 58  LA volume/bsa, S                 31     ml/m^2 16 - 34  LA volume, ES, 1-p A4C           53     ml     18 - 58  LA volume, ES, 1-p A2C       (H) 70     ml     18 - 58  LA volume, ES, A/L               68     ml     ---------  LA volume/bsa, ES, A/L           34     ml/m^2 16 - 34  Mitral valve                     Value         Ref  Mitral E-wave peak velocity      0.88   m/sec  ---------  Mitral A-wave peak velocity      0.99   m/sec  ---------  Mitral deceleration time         180    ms     ---------  Mitral peak gradient, D          3      mm Hg  ---------  Mitral E/A ratio, peak           0.9           ---------  Tricuspid valve                  Value         Ref  Tricuspid regurg peak            2.52   m/sec  <=2.8  velocity  Tricuspid peak RV-RA             25      mm Hg  ---------  gradient  Right ventricle                  Value         Ref  TAPSE, 2D                        2.50   cm     >=1.70  TAPSE, MM                        2.50   cm     >=1.70  RV s', lateral                   22.6   cm/sec >=9.5 Legend: (L)  and  (H)  farida values outside specified reference range. ---------------------------------------------------------------------------- Prepared and electronically signed by Ernst Parada 06/24/2025 13:42     US VENOUS DOPPLER LEG BILAT - DIAG IMG (CPT=93970)  Result Date: 6/24/2025  PROCEDURE: US VENOUS DOPPLER LEG BILAT - DIAG IMG (CPT=93970) INDICATIONS: right calf pain, r/o dvt COMPARISON: None TECHNIQUE: Color duplex Doppler venous ultrasound of both lower extremities was performed in the usual manner. FINDINGS: Thrombus is present within the right common femoral vein as well as in the adjacent deep femoral vein draining into the common femoral vein. There is also nonocclusive thrombus within the proximal and distal aspect of the popliteal vein thrombus is otherwise expansile with heterogeneous grayscale appearance and there is absence of normal color flow and compressibility in these regions. The femoral vein appears normal. Normal flow was demonstrated with color and pulsed Doppler. Visualized portions of the great and small saphenous, posterior tibial and peroneal veins appear normal. Contralateral left lower extremity is without DVT within the common femoral, femoral, and popliteal veins. There is occlusive thrombus within a left soleal vein based on abnormal grayscale appearance, Vansil morphology of the vein and absence of normal color flow and Doppler venous waveforms.     CONCLUSION: 1. Abnormal exam. Right lower extremity has nonocclusive acute appearing thrombus within the common femoral vein as well as the visualized portions of the deep femoral vein. There is also nonocclusive thrombus within the right popliteal vein. 2. No DVT in the left lower  extremity, however there is acute occlusive thrombus within a left soleal vein at the knee. Electronically Verified and Signed by Attending Radiologist: Jason Quiñones MD 6/24/2025 12:45 PM Workstation: [id number]    IR PERITONEAL DRAINAGE CATHETER  Result Date: 6/20/2025  PROCEDURE: IR PERITONEAL DRAINAGE CATHETER  INDICATIONS:  Status post colon resection, with postop intraperitoneal fluid  COMPARISON: None.  (S):  [place]  ESTIMATED BLOOD LOSS: Less than 5 mL  COMPLICATIONS: None  FINDINGS:  Informed consent was obtained. The patient was positioned supine. The right lower quadrant was sterilely prepped and draped. Preliminary ultrasound demonstrated complex appearing septated fluid collection.. Local Lidocaine was administered. Under ultrasound guidance, a [place] sheath needle was advanced into the collection. There was return of serosanguineous fluid from the access needle. Under ultrasound guidance, a wire was advanced through the needle. The access was dilated and a 10 [place] pigtail catheter was coiled in the collection.  Via the drain, approximately 260 cc of serosanguineous fluid were aspirated.  Sample was sent for cultures and sensitivities.  The catheter was secured to the skin with sutures and attached to bulb suction.  Sterile dressing applied.         CONCLUSION: Insertion of drainage catheter into intra-abdominal ascites yielding 260 cc serosanguineous fluid.    Dictated by (CST): Norris Reilly MD on 6/20/2025 at 6:09 PM     Finalized by (CST): Norris Reilly MD on 6/20/2025 at 6:10 PM          CT ABDOMEN+PELVIS(CONTRAST ONLY)(CPT=74177)  Result Date: 6/19/2025  PROCEDURE: CT ABDOMEN + PELVIS (CONTRAST ONLY) (CPT=74177)  COMPARISON: [facility], CT ABDOMEN + PELVIS (CONTRAST ONLY) (CPT=74177), 6/05/2025, 5:22 PM.  INDICATIONS: S/p colon resection 6/10, leukocytosis and increased pain  TECHNIQUE: CT images of the abdomen and pelvis were obtained with non-ionic intravenous contrast  material.  Automated exposure control for dose reduction was used. Adjustment of the mA and/or kV was done based on the patient's size. Use of iterative reconstruction technique for dose reduction was used.  Dose information is transmitted to the ACR (American College of Radiology) NRDR (National Radiology Data Registry) which includes the Dose Index Registry.  FINDINGS:  LIVER: No enlargement, atrophy, abnormal density, or significant focal lesion.  BILIARY: Intrahepatic biliary ductal dilatation.  Gallbladder is incompletely distended.  No significant extrahepatic biliary ductal dilatation. SPLEEN: Spleen is not enlarged. There are granulomatous calcifications in the spleen. No suspicious splenic lesion. STOMACH: No gastric obstruction.  Duodenum is distended with oral contrast material but is otherwise without evidence of obstruction or inflammation. PANCREAS: No lesion, fluid collection, ductal dilatation, or atrophy.  ADRENALS: No nodule or enlargement. KIDNEYS: Cortical based low density focus in the posterior midpole of the right kidney suggesting simple cyst.  Bilaterally no suspicious enhancing renal lesion or hydroureteronephrosis. AORTA/VASCULAR:   Atherosclerosis of the abdominal aorta.  No aneurysm. BOWEL/MESENTERY:  Peripherally enhancing fluid collection in the left lower quadrant, above the roof of the urinary bladder and measuring approximately 55 x 47 x 58 mm (AP x transverse x CC).  There is also a moderate-large volume of free fluid within the abdomen and pelvis mainly along the peripheral aspect of the liver and spleen, both pericolic gutters and across the omentum.  Moderate volume of foci of gas are present within this fluid.  There is peritoneal thickening/enhancement in both pericolic  gutters.  No evidence of bowel obstruction.  No lymphadenopathy.  Post large bowel resection with reanastomosis in the region of the hepatic flexure without focal obstruction or inflammatory change at the  anastomosis. ABDOMINAL WALL: Ventral line of skin staples are present above the umbilicus.  Additional scattered skin staples are present in the abdominal-pelvic anterior wall.  Mild body wall edema. URINARY BLADDER: Urinary bladder is distended with urine.  No stones or wall thickening. PELVIC NODES: No enlarged mass or adenopathy.   PELVIC ORGANS: No visible mass.  Pelvic organs appropriate for patient age.  BONES:   Mild endplate change and disc disease within the spine most advanced at L5-S1. LUNG BASES: Small-moderate sized bilateral pleural effusion.  Enhancing wedge-shaped pulmonary opacities with air bronchograms adjacent to the effusions likely representing secondary compressive atelectasis.  The visualized heart has coronary artery calcifications. OTHER: Negative.          CONCLUSION:  1.  Post large bowel resection in the region of the proximal transverse colon/hepatic flexure.  No obstruction or inflammation at the surgical site.  There is a moderate-large volume of free fluid within the abdomen and pelvis mainly distributed in both upper quadrants, across the omentum, and in the pericolic gutters.  Moderate volume of free air is also present in the same distribution as the fluid.  These may be related to recent surgery.  Organized fluid collection in the left lower retroperitoneum/left lower quadrant measuring approximately 55 x 47 x 58 mm which could represent a seroma, liquified hematoma, or other inflammatory fluid collection.  Peritoneal enhancement and thickening in the both pericolic gutters which could represent phlegmonous change, early organization of fluid collections with similar differential as the aforementioned organized collection, or peritonitis. 2.  Moderate-sized bilateral pleural effusions.  Enhancing bibasilar pulmonary opacities with air bronchograms likely representing secondary compressive atelectasis. 3.  Coronary atherosclerosis. 4.  Right renal cyst. 5.  Mild body wall  edema/anasarca.  Skin staples across the abdominal-pelvic anterior wall compatible with surgical access sites.   Dictated by (CST): Jason Quiñones MD on 6/19/2025 at 5:29 PM     Finalized by (CST): Jason Quiñones MD on 6/19/2025 at 5:36 PM          XR ABDOMEN (1 VIEW) (CPT=74018)  Result Date: 6/13/2025  PROCEDURE: XR ABDOMEN (1 VIEW) (CPT=74018)  COMPARISON: [facility], XR CHEST AP PORTABLE (CPT=71045), 6/13/2025, 8:58 AM.  [facility], CT ABDOMEN + PELVIS (CONTRAST ONLY) (CPT=74177), 6/05/2025, 5:22 PM.  [facility], XR ABDOMEN (1 VIEW) (CPT=74018), 6/10/2025, 6:07 PM.  INDICATIONS: History of presumed colon cancer status post transverse colonic resection and partial small bowel resection on 06/10/2025.  TECHNIQUE:   Single view.   FINDINGS:  BOWEL GAS PATTERN: An enteric tube tip in distal side hole project over the left upper quadrant the abdomen in the expected location of the stomach.  There is bowel gas throughout the abdomen, including the rectum.  No dilated gas-filled loops of bowel are seen to suggest obstruction.  Anastomotic staple lines are again noted in the right upper and left lower quadrants. SOFT TISSUES: Normal.  No masses or organomegaly.  CALCIFICATIONS: None significant. BONES: The osseous structures are unchanged. OTHER: Vertically oriented skin staples again project over the central abdomen.  Additional skin staples project over both lower quadrants and pelvis.  There is ill-defined lucency throughout the abdomen, which is consistent with the pneumoperitoneum visible on the preceding chest x-ray.         CONCLUSION:  1. Postoperative changes from a recent transverse colonic resection and partial small bowel resection including persistent pneumoperitoneum. 2. Nonobstructive bowel gas pattern.     Dictated by (CST): Chuy Schmid MD on 6/13/2025 at 11:38 AM     Finalized by (CST): Chuy Schmid MD on 6/13/2025 at 11:41 AM          XR CHEST AP  PORTABLE  (CPT=71045)  Result Date: 6/13/2025  PROCEDURE: XR CHEST AP PORTABLE  (CPT=71045) TIME: 8:59.   COMPARISON: [facility], CT ABDOMEN + PELVIS (CONTRAST ONLY) (CPT=74177), 6/05/2025, 5:22 PM.  [facility], XR CHEST AP PORTABLE (CPT=71045), 6/13/2025, 6:40 AM.  INDICATIONS: Post NG advancement.  Metastatic colon cancer status post transverse colonic resection on 06/10/2025.  Acute postoperative blood loss with hypotension.  TECHNIQUE:   Single view.   FINDINGS:  CARDIAC/VASC: The cardiac silhouette is not enlarged.  There is calcification of the aortic knob.  Unremarkable pulmonary vasculature.  MEDIAST/KRYSTA:   No visible mass or adenopathy. LUNGS/PLEURA: The lungs are again hypoinflated.  Streaky opacities at the lung bases with elevation of the right hemidiaphragm are all unchanged.  No pleural effusion or pneumothorax. BONES: There are mild spondylotic changes in the thoracic spine. OTHER: An enteric tube has been advanced.  The tip in distal side hole project over the gastric bubble in the left upper quadrant of the abdomen.  A right-sided PICC tip projects over the mid SVC.  Free air is again noted beneath the right hemidiaphragm,  likely related to the recent surgery.         CONCLUSION:  1. Enteric tube has been advanced with tip and distal side hole now projecting over the gastric bubble, in customary position. 2. Expiratory chest with stable atelectasis at both lung bases. 3. Persistent pneumoperitoneum, likely related to the recent abdominal surgery.    Dictated by (CST): Chuy Schmid MD on 6/13/2025 at 9:19 AM     Finalized by (CST): Chuy Schmid MD on 6/13/2025 at 9:21 AM          XR CHEST AP PORTABLE  (CPT=71045)  Result Date: 6/13/2025  PROCEDURE: XR CHEST AP PORTABLE  (CPT=71045) TIME: 6:43.   COMPARISON: [facility], XR ABDOMEN (1 VIEW) (CPT=74018), 6/10/2025, 6:07 PM.  [facility], CT ABDOMEN + PELVIS (CONTRAST ONLY)  (CPT=74177), 6/05/2025, 5:22 PM.  INDICATIONS: Hypoxia.  Suspected metastatic colon cancer status post transverse colonic resection on 06/10/2025.  Acute postoperative blood loss with hypotension.  TECHNIQUE:   Single view.   FINDINGS:  CARDIAC/VASC: Normal.  No cardiac silhouette abnormality or cardiomegaly.  Unremarkable pulmonary vasculature.  MEDIAST/KRYSTA:   No visible mass or adenopathy. LUNGS/PLEURA: The lungs are hypoinflated.  Streaky opacities have developed at the lung bases with elevation of the right hemidiaphragm.  No pleural effusion or pneumothorax. BONES: There are mild spondylotic changes in the thoracic spine. OTHER: An enteric tube tip again projects over the gastric bubble.  The distal side hole projects over the expected location of the distal esophagus.  A right-sided PICC tip projects over the mid SVC.  Free air is noted beneath the right hemidiaphragm, likely related to the recent surgery.         CONCLUSION:  1. Enteric tube tip projects over the stomach, although the distal side hole projects over the distal esophagus.  Consider advancing approximately 7.0 cm into the stomach. 2. Expiratory chest with the development of atelectasis at both lung bases. 3. Pneumoperitoneum, likely related to the recent abdominal surgery.    Dictated by (CST): Chuy Schmid MD on 6/13/2025 at 7:16 AM     Finalized by (CST): Chuy Schmid MD on 6/13/2025 at 7:19 AM          XR ABDOMEN (1 VIEW) (CPT=74018)  Result Date: 6/11/2025  PROCEDURE: XR ABDOMEN (1 VIEW) (CPT=74018)  COMPARISON: [facility], CT ABDOMEN + PELVIS (CONTRAST ONLY) (CPT=74177), 6/05/2025, 5:22 PM.  INDICATIONS: Partially obstructing colon tumor.  Opene tray. image taken due to protocol. Evaluate for foreign body.  TECHNIQUE:   Single view.           CONCLUSION:  1. No retained unexpected surgical foreign body. 2. Postsurgical changes with anastomotic staple lines and skin staples. 3. Catheter in the bladder.     Dictated by (CST): Chong Valdez MD on 6/11/2025 at 1:22 AM     Finalized by (CST): Chong Valdez MD on 6/11/2025 at 1:28 AM            Assessment/Plan:  Problem List[1]  SBFT   no leak     Increase po   etiology of fluid?    Obs  increase po  THUY ALSTON MD  7/9/2025  12:48 PM       [1]   Patient Active Problem List  Diagnosis    BPH (benign prostatic hyperplasia)    Chronic low back pain    Chronic pain of both knees    Depression with anxiety    Generalized osteoarthritis    Hyperlipidemia    Type 2 diabetes mellitus without complication, without long-term current use of insulin (HCC)    Essential hypertension    Bipolar I disorder, single manic episode (HCC)    Large bowel obstruction (HCC)    Colonic mass    Diffuse large B-cell lymphoma of solid organ excluding spleen    Pneumoperitoneum    Abdominal distension    Anasarca    Intraabdominal fluid collection

## 2025-07-10 ENCOUNTER — APPOINTMENT (OUTPATIENT)
Dept: CT IMAGING | Facility: HOSPITAL | Age: 80
End: 2025-07-10
Attending: INTERNAL MEDICINE
Payer: MEDICARE

## 2025-07-10 ENCOUNTER — APPOINTMENT (OUTPATIENT)
Dept: PICC SERVICES | Facility: HOSPITAL | Age: 80
End: 2025-07-10
Attending: PHYSICIAN ASSISTANT
Payer: MEDICARE

## 2025-07-10 LAB
ALBUMIN SERPL-MCNC: 2.1 G/DL (ref 3.2–4.8)
ANION GAP SERPL CALC-SCNC: 7 MMOL/L (ref 0–18)
APTT PPP: 106.1 SECONDS (ref 23–36)
APTT PPP: 42.4 SECONDS (ref 23–36)
APTT PPP: 70.9 SECONDS (ref 23–36)
BASOPHILS # BLD AUTO: 0.01 X10(3) UL (ref 0–0.2)
BASOPHILS NFR BLD AUTO: 0.2 %
BUN BLD-MCNC: 6 MG/DL (ref 9–23)
BUN/CREAT SERPL: 12.5 (ref 10–20)
CALCIUM BLD-MCNC: 7.5 MG/DL (ref 8.7–10.4)
CHLORIDE SERPL-SCNC: 104 MMOL/L (ref 98–112)
CO2 SERPL-SCNC: 28 MMOL/L (ref 21–32)
CREAT BLD-MCNC: 0.48 MG/DL (ref 0.7–1.3)
DEPRECATED RDW RBC AUTO: 54.6 FL (ref 35.1–46.3)
EGFRCR SERPLBLD CKD-EPI 2021: 106 ML/MIN/1.73M2 (ref 60–?)
EOSINOPHIL # BLD AUTO: 0.02 X10(3) UL (ref 0–0.7)
EOSINOPHIL NFR BLD AUTO: 0.4 %
ERYTHROCYTE [DISTWIDTH] IN BLOOD BY AUTOMATED COUNT: 17.9 % (ref 11–15)
GLUCOSE BLD-MCNC: 154 MG/DL (ref 70–99)
GLUCOSE BLDC GLUCOMTR-MCNC: 156 MG/DL (ref 70–99)
GLUCOSE BLDC GLUCOMTR-MCNC: 170 MG/DL (ref 70–99)
GLUCOSE BLDC GLUCOMTR-MCNC: 188 MG/DL (ref 70–99)
GLUCOSE BLDC GLUCOMTR-MCNC: 215 MG/DL (ref 70–99)
HCT VFR BLD AUTO: 24.7 % (ref 39–53)
HGB BLD-MCNC: 7.8 G/DL (ref 13–17.5)
IMM GRANULOCYTES # BLD AUTO: 0.05 X10(3) UL (ref 0–1)
IMM GRANULOCYTES NFR BLD: 1 %
LYMPHOCYTES # BLD AUTO: 1.69 X10(3) UL (ref 1–4)
LYMPHOCYTES NFR BLD AUTO: 33.6 %
MCH RBC QN AUTO: 26.8 PG (ref 26–34)
MCHC RBC AUTO-ENTMCNC: 31.6 G/DL (ref 31–37)
MCV RBC AUTO: 84.9 FL (ref 80–100)
MONOCYTES # BLD AUTO: 0.59 X10(3) UL (ref 0.1–1)
MONOCYTES NFR BLD AUTO: 11.7 %
NEUTROPHILS # BLD AUTO: 2.67 X10 (3) UL (ref 1.5–7.7)
NEUTROPHILS # BLD AUTO: 2.67 X10(3) UL (ref 1.5–7.7)
NEUTROPHILS NFR BLD AUTO: 53.1 %
OSMOLALITY SERPL CALC.SUM OF ELEC: 289 MOSM/KG (ref 275–295)
PHOSPHATE SERPL-MCNC: 2.7 MG/DL (ref 2.4–5.1)
PLATELET # BLD AUTO: 455 10(3)UL (ref 150–450)
POTASSIUM SERPL-SCNC: 3 MMOL/L (ref 3.5–5.1)
POTASSIUM SERPL-SCNC: 3 MMOL/L (ref 3.5–5.1)
POTASSIUM SERPL-SCNC: 3.5 MMOL/L (ref 3.5–5.1)
RBC # BLD AUTO: 2.91 X10(6)UL (ref 3.8–5.8)
SODIUM SERPL-SCNC: 139 MMOL/L (ref 136–145)
WBC # BLD AUTO: 5 X10(3) UL (ref 4–11)

## 2025-07-10 PROCEDURE — 74177 CT ABD & PELVIS W/CONTRAST: CPT | Performed by: RADIOLOGY

## 2025-07-10 PROCEDURE — 99233 SBSQ HOSP IP/OBS HIGH 50: CPT | Performed by: HOSPITALIST

## 2025-07-10 PROCEDURE — 02HV33Z INSERTION OF INFUSION DEVICE INTO SUPERIOR VENA CAVA, PERCUTANEOUS APPROACH: ICD-10-PCS | Performed by: HOSPITALIST

## 2025-07-10 RX ORDER — POTASSIUM CHLORIDE 1500 MG/1
40 TABLET, EXTENDED RELEASE ORAL EVERY 4 HOURS
Status: COMPLETED | OUTPATIENT
Start: 2025-07-10 | End: 2025-07-10

## 2025-07-10 RX ORDER — HEPARIN SODIUM 1000 [USP'U]/ML
30 INJECTION, SOLUTION INTRAVENOUS; SUBCUTANEOUS ONCE
Status: COMPLETED | OUTPATIENT
Start: 2025-07-10 | End: 2025-07-10

## 2025-07-10 RX ORDER — LIDOCAINE HYDROCHLORIDE 10 MG/ML
5 INJECTION, SOLUTION EPIDURAL; INFILTRATION; INTRACAUDAL; PERINEURAL
Status: COMPLETED | OUTPATIENT
Start: 2025-07-10 | End: 2025-07-10

## 2025-07-10 NOTE — PROGRESS NOTES
[facility]  part of [facility]    Progress Note    Arnaldo Gutierrez Patient Status:  Inpatient    4/15/1945 MRN [id number]   Location [facility] 4W/SW/SE Attending Rodrick Ramos MD   Hosp Day # 5 PCP PHYSICIAN NONSTAFF     Subjective:  Feels ok    po better       Objective/Physical Exam:  General: Alert, orientated x3.  Cooperative.  No apparent distress.  Vital Signs:  Blood pressure 120/59, pulse 68, temperature 97.6 °F (36.4 °C), temperature source Temporal, resp. rate 18, height 180.3 cm (5' 11\"), weight 158 lb 4.8 oz (71.8 kg), SpO2 95%.  Abdomen:  Soft, non-distended, non-tender, with no rebound or guarding.  No peritoneal signs. Drain 205   slightly bilious   Labs:  Lab Results   Component Value Date    WBC 5.0 07/10/2025    HGB 7.8 (L) 07/10/2025    HCT 24.7 (L) 07/10/2025    .0 (H) 07/10/2025    CREATSERUM 0.48 (L) 07/10/2025    BUN 6 (L) 07/10/2025     07/10/2025    K 3.0 (L) 07/10/2025    K 3.0 (L) 07/10/2025     07/10/2025    CO2 28.0 07/10/2025     (H) 07/10/2025    CA 7.5 (L) 07/10/2025    ALB 2.1 (L) 07/10/2025    ALKPHO 182 (H) 2025    BILT 0.3 2025    TP 5.0 (L) 2025    AST 22 2025    ALT 16 2025    .1 (H) 07/10/2025    TSH 2.417 2025    LIP 45 2025    MG 1.9 2025    PHOS 2.7 07/10/2025    CK <15 (L) 2025    B12 1,020 (H) 2025       Imaging:  CT CHEST (CPT=71250)  Result Date: 2025  PROCEDURE: CT CHEST (CPT=71250) INDICATIONS:  Upper extremity DVT COMPARISON: CT abdomen pelvis dated 2025 TECHNIQUE: Multidetector CT of the chest was obtained without non-ionic intravenous contrast material. Automated exposure control for dose reduction was used. Adjustment of the mA and/or kV was done based on the patient's size. Iterative reconstruction technique for dose reduction was employed. Dose information was transmitted to the ACR (American College of Radiology) NRDR (National  Radiology Data Registry), which includes the Dose Index Registry. Oral contrast was ingested. FINDINGS: LUNGS/PLEURA/AIRWAYS:No consolidation, mass or suspicious nodule.Small to moderate bilateral pleural effusions and passive atelectasis. Central airways are patent. MEDIASTINUM:No mediastinal or hilar lymphadenopathy. VESSELS:  The ascending thoracic aorta measures approximately 40 mm. Moderate coronary artery calcifications. CHEST WALL:Within normal limits. UPPER ABDOMEN: Partially visualized drainage catheter with decreased size of collections of fluid and air in the right upper quadrant. BONES: No suspicious osseous lesion or acute osseous abnormality.     CONCLUSION: 1.  Small to moderate bilateral pleural effusions with passive atelectasis. 2.  Partial visualized drainage catheter in the upper abdomen with decreased size of fluid and air collections in the right upper quadrant 3.  The ascending thoracic aorta measures 40 mm, the upper limits of normal. Electronically Verified and Signed by Attending Radiologist: Adam Neumann MD 7/9/2025 1:09 PM Workstation: [id number]    XR ABDOMEN (1 VIEW) (CPT=74018)  Result Date: 7/9/2025  PROCEDURE(S): XR ABDOMEN (1 VIEW) (CPT=74018) WORKSTATION: [id number] HISTORY/INDICATIONS: s/p bowel resections for lymphoma  possible bowel leak Abdominal distension. COMPARISON: 07/07/2025 XR SMALL BOWEL SINGLE CONTRAST (CPT=74250) FINDINGS: Lines And Tubes: Right upper quadrant drainage pigtail catheter is again seen. Left approach presumed surgical drain is again seen. Lower Thorax: Unremarkable. Bowel Gas Pattern: No gas-filled, dilated loops. No differential distention. Multiple bowel sutures are noted throughout the abdomen. Soft Tissues: No definite pneumoperitoneum. Previously described fluid collections are demonstrated to advantage on prior CT. Bones/Joints: No acute fracture.     CONCLUSION: No evidence of acute abnormality in the abdomen or pelvis. Multiple peritoneal  drains are seen. Previously demonstrated fluid collections are demonstrated to advantage on prior CT. Electronically Verified and Signed by Attending Radiologist: Gelacio Bullock MD 7/9/2025 9:50 AM Workstation: [id number]    XR SMALL BOWEL SINGLE CONTRAST (CPT=74250)  Result Date: 7/9/2025  PROCEDURE: XR SMALL BOWEL SINGLE CONTRAST (CPT=74250) INDICATIONS: Multiple bowel resections for lymphoma. Abscess with possible leak PATIENT STATED HISTORY: Recent multiple bowel resection surgeries, history of lymphoma COMPARISON: CT 7/4/2025 TECHNIQUE: Small bowel series with multiple serial films was performed in the usual manner.  A  abdominal radiograph was performed. Fluoro/Cine Image Number: 9 Fluoro Time: 1.4 minutes FINDINGS: No dilated bowel to suggest ileus or obstruction. No extravasation of enteric contrast was noted to diagnose or identify a source of leak. Focused attention was paid to regions of surgical staples and known prior abscesses using compression and magnification, confirming no evident leak. Transit time from stomach to colon was 90 minutes.     CONCLUSION: No evidence of bowel leak. If clinical suspicion persists, CT may be considered. Electronically Verified and Signed by Attending Radiologist: Sunil Murillo MD 7/9/2025 9:00 AM Workstation: [id number]    US VENOUS DOPPLER ARM RIGHT - DIAG IMG (CPT=93971)  Result Date: 7/8/2025  PROCEDURE: US VENOUS DOPPLER ARM RIGHT - DIAG IMG (CPT=93971) INDICATIONS: swelling in RUE TECHNIQUE: Color duplex Doppler venous ultrasound of the right upper extremity was performed in the usual manner. FINDINGS: The internal jugular, subclavian, axillary, brachial, cephalic and basilic veins appear normal. Flow was demonstrated with color and pulsed Doppler. Comparison scans of the left subclavian vein appear normal. There is acute expansile DVT within the right subclavian and axillary veins. The visualized segments of the right brachial vein, right cephalic vein, and right  basilic vein are patent. There is moderate soft tissue edema within the right upper extremity.     CONCLUSION: Acute expansile occlusive DVT within the right subclavian and axillary veins. The visualized segments of the right brachial, basilic, and cephalic veins are patent. The patient's nurse was notified by telephone of these results at 10:41 a.m. on 7/8/2025. Electronically Verified and Signed by Attending Radiologist: Duran Bhardwaj MD 7/8/2025 10:41 AM Workstation: [id number]    CT ABDOMEN+PELVIS(CONTRAST ONLY)(CPT=74177)  Result Date: 7/5/2025  PROCEDURE(S): CT ABDOMEN+PELVIS(CONTRAST ONLY)(CPT=74177) WORKSTATION: [id number] HISTORY/INDICATIONS: distended abdomen, recent surgery for mass resection, cath placement distended abdomen, recent surgery for mass resection, cath placement COMPARISON: 06/19/2025 CT ABDOMEN+PELVIS(CONTRAST ONLY)(CPT=74177) TECHNIQUE: Helical CT of the abdomen and pelvis was obtained following administration of intravenous contrast material. Axial, coronal, and sagittal reformatted images were created and interpreted. For this exam, one or more of the following dose reductions techniques were used: Automated exposure control Adjustment of the mA and/or kV according to patient size Use of iterative reconstruction technique FINDINGS: Lower Thorax: Moderate bilateral pleural effusions are again seen. Heart is normal size. Few airspace opacities are noted in the lingula series 3 image 1. There is atelectasis in the lower lobes. Liver: Unremarkable. Gallbladder/Biliary tree: Cholelithiasis without evidence of acute cholecystitis. No biliary ductal dilatation. Pancreas:  Unremarkable. Spleen: Unremarkable. Adrenal glands:  Unremarkable. Kidneys: Simple cyst arises from the right kidney posterior interpolar region. No suspicious renal lesions. Symmetric nephrograms. No hydronephrosis. Retroperitoneum/extraperitoneum: Unremarkable. Vasculature: No aneurysm or dissection. Mild atherosclerotic  calcification is seen. Peritoneum: Multiple abscesses are seen: Left upper quadrant abscess measures 24.3 x 10.9 x 23.9 cm series 2 image 73; series 5 image 23. Air-fluid levels are seen. It extends into the abdominal wall series 2 image 73. Right upper quadrant perihepatic abscess communicates with the left upper quadrant abscess and measures 14.2 x 5.1 x 15.8 cm series 2 image 35; series 6 image 96. Perisplenic abscess measures 7.9 x 2.8 x 9.8 cm series 2 image 50; series 6 image 19. This abscess also likely communicates with the left upper quadrant abscess. Small lower midline suspected abscess measuring 3.0 x 2.7 x 3.5 cm. There is a right mid abdominal pigtail drainage catheter which may terminate within the colon series 2 image 80. GI tract/mesentery/omentum: Multiple prior bowel resections are noted. Suspected fistula in the left mid abdomen series 2 image 103. Possible pigtail drainage catheter within the ascending colon series 2 image 80. No bowel obstruction is identified. Urinary Bladder: The bladder is grossly unremarkable. Reproductive organs: Unremarkable. Body wall: Mild anasarca. The dominant abscess extends into the anterior abdominal wall along the surgical incision series 2 image 73. Bones:  There are mild degenerative changes in the spine.     CONCLUSION: 1.  Multiple large peritoneal abscesses are seen. The largest is centered in the left upper quadrant and left midabdomen and measures 24.3 x 10.9 x 23.9 cm with air-fluid levels and extends into the abdominal wall. This is contiguous with smaller right upper quadrant perihepatic and posterior left upper quadrant perisplenic abscesses. A separate small 3 cm abscess is noted adjacent to the bladder. Bladder diverticulum is thought less likely. 2.  Multiple prior bowel resections are seen. There may be fistulous connection between the bowel and left upper quadrant dominant abscess at the left mid abdomen. The location of a right-sided pigtail  drainage catheter is uncertain but it may terminate within the ascending colon. 3.  Additional chronic and/or incidental findings are detailed in the body of this report. Preliminary report was given by [facility]. No clinically significant discrepancies are noted. Electronically Verified and Signed by Attending Radiologist: Gelacio Bullock MD 2025 7:14 AM Workstation: [id number] [id number]    CARD TTE STRAIN W DOPPLER ONCOLOGY (CPT=93306)  Result Date: 2025  Transthoracic Echocardiogram Name:Arnaldo Gutierrez Date: 2025 :  04/15/1945 Ht:  (71in)  BP: 101 / 61 MRN:  [id number]    Age:  80years    Wt:  (171lb) HR: 88bpm [facility]          BSA: 1.97m^2 Sonographer: Amelia CHERY Ordering:    Crys Wilkins Consulting:  Rafita Torres ---------------------------------------------------------------------------- History/Indications:   Encounter for Monitoring Cardiotoxic Drug Therapy. ---------------------------------------------------------------------------- Procedure information:  A transthoracic complete 2D study was performed. Additional evaluation included M-mode, complete spectral Doppler, and color Doppler.  Patient status:  Inpatient.  Location:  [facility]cho laboratory.    This was a routine study. Transthoracic echocardiography for diagnosis. Image quality was adequate. ECG rhythm:   Normal sinus ---------------------------------------------------------------------------- Conclusions: 1. Left ventricle: The cavity size was normal. Wall thickness was normal.    Systolic function was normal. The estimated ejection fraction was 60-65%,    by biplane method of disks. Wall motion is normal; there are no regional    wall motion abnormalities. Left ventricular diastolic function parameters    were normal. The Global Longitudinal Strain (GLS) was -19.40%. 2. Mitral valve: There was mild regurgitation. * ---------------------------------------------------------------------------- * Findings: Left  ventricle:  The cavity size was normal. Wall thickness was normal. Systolic function was normal. The estimated ejection fraction was 60-65%, by biplane method of disks. Wall motion is normal; there are no regional wall motion abnormalities. The Global Longitudinal Strain (GLS) was -19.40%. Left ventricular diastolic function parameters were normal. Left atrium:  Well visualized. The atrium was normal in size. Right ventricle:  The cavity size was normal. Systolic function was normal. Systolic pressure was within the normal range. Right atrium:  Well visualized. The atrium was normal in size. Mitral valve:  Well visualized. The leaflets were normal thickness. No evidence for prolapse.  Doppler:  Transvalvular velocity was within the normal range. There was no evidence for stenosis. There was mild regurgitation. Aortic valve:  Well visualized.  The valve was trileaflet. The leaflets were normal thickness.  Doppler:  Transvalvular velocity was within the normal range. There was no evidence for stenosis. There was trivial regurgitation. Tricuspid valve:  Well visualized. The annulus is normal-sized. The leaflets are normal thickness. No echocardiographic evidence for tricuspid prolapse. Doppler:  Transvalvular velocity was within the normal range. There was no evidence for stenosis. There was trivial regurgitation. Pulmonic valve:   Well visualized. The annulus is normal-sized. The leaflets are normal thickness. No evidence for prolapse.  Doppler:  Transvalvular velocity was within the normal range. There was no evidence for stenosis. There was no significant regurgitation. Pericardium:   There was no pericardial effusion. Pleura:  No evidence of pleural fluid accumulation. Aorta: Aortic root: The aortic root was normal. Ascending aorta: The ascending aorta was normal. Pulmonary arteries: There was no evidence of pulmonary hypertension. Systemic veins:  Central venous respirophasic diameter changes are in the normal  range (>50%). Inferior vena cava: The IVC was normal-sized. ---------------------------------------------------------------------------- Measurements  Left ventricle                   Value         Ref  GLS, 2D                          -19.40 %      ---------  IVS thickness, ED, PLAX          0.9    cm     0.6 - 1.0  LV ID, ED, PLAX              (L) 3.5    cm     4.2 - 5.8  LV ID, ES, PLAX              (L) 2.1    cm     2.5 - 4.0  LV PW thickness, ED, PLAX        0.8    cm     0.6 - 1.0  IVS/LV PW ratio, ED, PLAX        1.13          ---------  LV PW/LV ID ratio, ED, PLAX      0.23          ---------  LV area, ES, A4C                 19.3   cm^2   ---------  LV ejection fraction             72     %      52 - 72  Stroke volume/bsa, 2D            43     ml/m^2 ---------  LV end-diastolic volume, 1-p     87     ml     69 - 185  A4C  LV end-systolic volume, 1-p      47     ml     22 - 78  A4C  LV ejection fraction, 1-p        57     %      46 - 74  A4C  Stroke volume, 1-p A4C           50     ml     ---------  LV end-diastolic volume/bsa,     44     ml/m^2 37 - 93  1-p A4C  LV end-systolic volume/bsa,      24     ml/m^2 12 - 40  1-p A4C  Stroke volume/bsa, 1-p A4C       25     ml/m^2 ---------  LV end-diastolic volume, 2-p     81     ml     62 - 150  LV end-systolic volume, 2-p      32     ml     21 - 61  LV ejection fraction, 2-p        60     %      52 - 72  Stroke volume, 2-p               48     ml     ---------  LV end-diastolic volume/bsa,     41     ml/m^2 34 - 74  2-p  LV end-systolic volume/bsa,      16     ml/m^2 11 - 31  2-p  Stroke volume/bsa, 2-p           24.5   ml/m^2 ---------  LV e', lateral                   14.9   cm/sec >=10.0  LV E/e', lateral                 6             <=13  LV e', medial                    12.5   cm/sec >=7.0  LV E/e', medial                  7             ---------  LV e', average                   13.7   cm/sec ---------  LV E/e', average                 6             <=14   LVOT                             Value         Ref  LVOT ID                          2.1    cm     ---------  LVOT peak velocity, S            1.22   m/sec  ---------  LVOT VTI, S                      24.7   cm     ---------  LVOT peak gradient, S            6      mm Hg  ---------  LVOT mean gradient, S            3      mm Hg  ---------  Stroke volume (SV), LVOT DP      86     ml     ---------  Stroke index (SV/bsa), LVOT      43     ml/m^2 ---------  DP  Aortic valve                     Value         Ref  Aortic leaflet separation,       1.8    cm     ---------  MM  Aortic root                      Value         Ref  Aortic root ID, STJ, ED      (H) 3.6    cm     2.3 - 3.5  Ascending aorta                  Value         Ref  Ascending aorta ID               3.6    cm     2.2 - 3.8  Left atrium                      Value         Ref  LA volume, S                 (H) 62     ml     18 - 58  LA volume/bsa, S                 31     ml/m^2 16 - 34  LA volume, ES, 1-p A4C           53     ml     18 - 58  LA volume, ES, 1-p A2C       (H) 70     ml     18 - 58  LA volume, ES, A/L               68     ml     ---------  LA volume/bsa, ES, A/L           34     ml/m^2 16 - 34  Mitral valve                     Value         Ref  Mitral E-wave peak velocity      0.88   m/sec  ---------  Mitral A-wave peak velocity      0.99   m/sec  ---------  Mitral deceleration time         180    ms     ---------  Mitral peak gradient, D          3      mm Hg  ---------  Mitral E/A ratio, peak           0.9           ---------  Tricuspid valve                  Value         Ref  Tricuspid regurg peak            2.52   m/sec  <=2.8  velocity  Tricuspid peak RV-RA             25     mm Hg  ---------  gradient  Right ventricle                  Value         Ref  TAPSE, 2D                        2.50   cm     >=1.70  TAPSE, MM                        2.50   cm     >=1.70  RV s', lateral                   22.6   cm/sec >=9.5 Legend: (L)  and  (H)   farida values outside specified reference range. ---------------------------------------------------------------------------- Prepared and electronically signed by TalErnst 06/24/2025 13:42     US VENOUS DOPPLER LEG BILAT - DIAG IMG (CPT=93970)  Result Date: 6/24/2025  PROCEDURE: US VENOUS DOPPLER LEG BILAT - DIAG IMG (CPT=93970) INDICATIONS: right calf pain, r/o dvt COMPARISON: None TECHNIQUE: Color duplex Doppler venous ultrasound of both lower extremities was performed in the usual manner. FINDINGS: Thrombus is present within the right common femoral vein as well as in the adjacent deep femoral vein draining into the common femoral vein. There is also nonocclusive thrombus within the proximal and distal aspect of the popliteal vein thrombus is otherwise expansile with heterogeneous grayscale appearance and there is absence of normal color flow and compressibility in these regions. The femoral vein appears normal. Normal flow was demonstrated with color and pulsed Doppler. Visualized portions of the great and small saphenous, posterior tibial and peroneal veins appear normal. Contralateral left lower extremity is without DVT within the common femoral, femoral, and popliteal veins. There is occlusive thrombus within a left soleal vein based on abnormal grayscale appearance, Vansil morphology of the vein and absence of normal color flow and Doppler venous waveforms.     CONCLUSION: 1. Abnormal exam. Right lower extremity has nonocclusive acute appearing thrombus within the common femoral vein as well as the visualized portions of the deep femoral vein. There is also nonocclusive thrombus within the right popliteal vein. 2. No DVT in the left lower extremity, however there is acute occlusive thrombus within a left soleal vein at the knee. Electronically Verified and Signed by Attending Radiologist: Jason Quiñones MD 6/24/2025 12:45 PM Workstation: [id number]    IR PERITONEAL DRAINAGE CATHETER  Result Date:  6/20/2025  PROCEDURE: IR PERITONEAL DRAINAGE CATHETER  INDICATIONS:  Status post colon resection, with postop intraperitoneal fluid  COMPARISON: None.  (S):  [place]  ESTIMATED BLOOD LOSS: Less than 5 mL  COMPLICATIONS: None  FINDINGS:  Informed consent was obtained. The patient was positioned supine. The right lower quadrant was sterilely prepped and draped. Preliminary ultrasound demonstrated complex appearing septated fluid collection.. Local Lidocaine was administered. Under ultrasound guidance, a Yueh sheath needle was advanced into the collection. There was return of serosanguineous fluid from the access needle. Under ultrasound guidance, a wire was advanced through the needle. The access was dilated and a 10 Mont[place]grin pigtail catheter was coiled in the collection.  Via the drain, approximately 260 cc of serosanguineous fluid were aspirated.  Sample was sent for cultures and sensitivities.  The catheter was secured to the skin with sutures and attached to bulb suction.  Sterile dressing applied.         CONCLUSION: Insertion of drainage catheter into intra-abdominal ascites yielding 260 cc serosanguineous fluid.    Dictated by (CST): Norris Reilly MD on 6/20/2025 at 6:09 PM     Finalized by (CST): Norris Reilly MD on 6/20/2025 at 6:10 PM          CT ABDOMEN+PELVIS(CONTRAST ONLY)(CPT=74177)  Result Date: 6/19/2025  PROCEDURE: CT ABDOMEN + PELVIS (CONTRAST ONLY) (CPT=74177)  COMPARISON: [facility], CT ABDOMEN + PELVIS (CONTRAST ONLY) (CPT=74177), 6/05/2025, 5:22 PM.  INDICATIONS: S/p colon resection 6/10, leukocytosis and increased pain  TECHNIQUE: CT images of the abdomen and pelvis were obtained with non-ionic intravenous contrast material.  Automated exposure control for dose reduction was used. Adjustment of the mA and/or kV was done based on the patient's size. Use of iterative reconstruction technique for dose reduction was used.  Dose information is transmitted to the ACR (American  College of Radiology) NRDR (National Radiology Data Registry) which includes the Dose Index Registry.  FINDINGS:  LIVER: No enlargement, atrophy, abnormal density, or significant focal lesion.  BILIARY: Intrahepatic biliary ductal dilatation.  Gallbladder is incompletely distended.  No significant extrahepatic biliary ductal dilatation. SPLEEN: Spleen is not enlarged. There are granulomatous calcifications in the spleen. No suspicious splenic lesion. STOMACH: No gastric obstruction.  Duodenum is distended with oral contrast material but is otherwise without evidence of obstruction or inflammation. PANCREAS: No lesion, fluid collection, ductal dilatation, or atrophy.  ADRENALS: No nodule or enlargement. KIDNEYS: Cortical based low density focus in the posterior midpole of the right kidney suggesting simple cyst.  Bilaterally no suspicious enhancing renal lesion or hydroureteronephrosis. AORTA/VASCULAR:   Atherosclerosis of the abdominal aorta.  No aneurysm. BOWEL/MESENTERY:  Peripherally enhancing fluid collection in the left lower quadrant, above the roof of the urinary bladder and measuring approximately 55 x 47 x 58 mm (AP x transverse x CC).  There is also a moderate-large volume of free fluid within the abdomen and pelvis mainly along the peripheral aspect of the liver and spleen, both pericolic gutters and across the omentum.  Moderate volume of foci of gas are present within this fluid.  There is peritoneal thickening/enhancement in both pericolic  gutters.  No evidence of bowel obstruction.  No lymphadenopathy.  Post large bowel resection with reanastomosis in the region of the hepatic flexure without focal obstruction or inflammatory change at the anastomosis. ABDOMINAL WALL: Ventral line of skin staples are present above the umbilicus.  Additional scattered skin staples are present in the abdominal-pelvic anterior wall.  Mild body wall edema. URINARY BLADDER: Urinary bladder is distended with urine.  No  stones or wall thickening. PELVIC NODES: No enlarged mass or adenopathy.   PELVIC ORGANS: No visible mass.  Pelvic organs appropriate for patient age.  BONES:   Mild endplate change and disc disease within the spine most advanced at L5-S1. LUNG BASES: Small-moderate sized bilateral pleural effusion.  Enhancing wedge-shaped pulmonary opacities with air bronchograms adjacent to the effusions likely representing secondary compressive atelectasis.  The visualized heart has coronary artery calcifications. OTHER: Negative.          CONCLUSION:  1.  Post large bowel resection in the region of the proximal transverse colon/hepatic flexure.  No obstruction or inflammation at the surgical site.  There is a moderate-large volume of free fluid within the abdomen and pelvis mainly distributed in both upper quadrants, across the omentum, and in the pericolic gutters.  Moderate volume of free air is also present in the same distribution as the fluid.  These may be related to recent surgery.  Organized fluid collection in the left lower retroperitoneum/left lower quadrant measuring approximately 55 x 47 x 58 mm which could represent a seroma, liquified hematoma, or other inflammatory fluid collection.  Peritoneal enhancement and thickening in the both pericolic gutters which could represent phlegmonous change, early organization of fluid collections with similar differential as the aforementioned organized collection, or peritonitis. 2.  Moderate-sized bilateral pleural effusions.  Enhancing bibasilar pulmonary opacities with air bronchograms likely representing secondary compressive atelectasis. 3.  Coronary atherosclerosis. 4.  Right renal cyst. 5.  Mild body wall edema/anasarca.  Skin staples across the abdominal-pelvic anterior wall compatible with surgical access sites.   Dictated by (CST): Jason Quiñones MD on 6/19/2025 at 5:29 PM     Finalized by (CST): Jason Quiñones MD on 6/19/2025 at 5:36 PM          XR ABDOMEN (1 VIEW)  (CPT=74018)  Result Date: 6/13/2025  PROCEDURE: XR ABDOMEN (1 VIEW) (CPT=74018)  COMPARISON: [facility], XR CHEST AP PORTABLE (CPT=71045), 6/13/2025, 8:58 AM.  [facility], CT ABDOMEN + PELVIS (CONTRAST ONLY) (CPT=74177), 6/05/2025, 5:22 PM.  [facility], XR ABDOMEN (1 VIEW) (CPT=74018), 6/10/2025, 6:07 PM.  INDICATIONS: History of presumed colon cancer status post transverse colonic resection and partial small bowel resection on 06/10/2025.  TECHNIQUE:   Single view.   FINDINGS:  BOWEL GAS PATTERN: An enteric tube tip in distal side hole project over the left upper quadrant the abdomen in the expected location of the stomach.  There is bowel gas throughout the abdomen, including the rectum.  No dilated gas-filled loops of bowel are seen to suggest obstruction.  Anastomotic staple lines are again noted in the right upper and left lower quadrants. SOFT TISSUES: Normal.  No masses or organomegaly.  CALCIFICATIONS: None significant. BONES: The osseous structures are unchanged. OTHER: Vertically oriented skin staples again project over the central abdomen.  Additional skin staples project over both lower quadrants and pelvis.  There is ill-defined lucency throughout the abdomen, which is consistent with the pneumoperitoneum visible on the preceding chest x-ray.         CONCLUSION:  1. Postoperative changes from a recent transverse colonic resection and partial small bowel resection including persistent pneumoperitoneum. 2. Nonobstructive bowel gas pattern.     Dictated by (CST): Chuy Schmid MD on 6/13/2025 at 11:38 AM     Finalized by (CST): Chuy Schmid MD on 6/13/2025 at 11:41 AM          XR CHEST AP PORTABLE  (CPT=71045)  Result Date: 6/13/2025  PROCEDURE: XR CHEST AP PORTABLE  (CPT=71045) TIME: 8:59.   COMPARISON: [facility], CT ABDOMEN + PELVIS (CONTRAST ONLY) (CPT=74177), 6/05/2025, 5:22 PM.  [facility], XR CHEST AP PORTABLE  (CPT=71045), 6/13/2025, 6:40 AM.  INDICATIONS: Post NG advancement.  Metastatic colon cancer status post transverse colonic resection on 06/10/2025.  Acute postoperative blood loss with hypotension.  TECHNIQUE:   Single view.   FINDINGS:  CARDIAC/VASC: The cardiac silhouette is not enlarged.  There is calcification of the aortic knob.  Unremarkable pulmonary vasculature.  MEDIAST/KRYSTA:   No visible mass or adenopathy. LUNGS/PLEURA: The lungs are again hypoinflated.  Streaky opacities at the lung bases with elevation of the right hemidiaphragm are all unchanged.  No pleural effusion or pneumothorax. BONES: There are mild spondylotic changes in the thoracic spine. OTHER: An enteric tube has been advanced.  The tip in distal side hole project over the gastric bubble in the left upper quadrant of the abdomen.  A right-sided PICC tip projects over the mid SVC.  Free air is again noted beneath the right hemidiaphragm,  likely related to the recent surgery.         CONCLUSION:  1. Enteric tube has been advanced with tip and distal side hole now projecting over the gastric bubble, in customary position. 2. Expiratory chest with stable atelectasis at both lung bases. 3. Persistent pneumoperitoneum, likely related to the recent abdominal surgery.    Dictated by (CST): Chuy Schmid MD on 6/13/2025 at 9:19 AM     Finalized by (CST): Chuy Schmid MD on 6/13/2025 at 9:21 AM          XR CHEST AP PORTABLE  (CPT=71045)  Result Date: 6/13/2025  PROCEDURE: XR CHEST AP PORTABLE  (CPT=71045) TIME: 6:43.   COMPARISON: [facility], XR ABDOMEN (1 VIEW) (CPT=74018), 6/10/2025, 6:07 PM.  [facility], CT ABDOMEN + PELVIS (CONTRAST ONLY) (CPT=74177), 6/05/2025, 5:22 PM.  INDICATIONS: Hypoxia.  Suspected metastatic colon cancer status post transverse colonic resection on 06/10/2025.  Acute postoperative blood loss with hypotension.  TECHNIQUE:   Single view.   FINDINGS:  CARDIAC/VASC: Normal.  No  cardiac silhouette abnormality or cardiomegaly.  Unremarkable pulmonary vasculature.  MEDIAST/KRYSTA:   No visible mass or adenopathy. LUNGS/PLEURA: The lungs are hypoinflated.  Streaky opacities have developed at the lung bases with elevation of the right hemidiaphragm.  No pleural effusion or pneumothorax. BONES: There are mild spondylotic changes in the thoracic spine. OTHER: An enteric tube tip again projects over the gastric bubble.  The distal side hole projects over the expected location of the distal esophagus.  A right-sided PICC tip projects over the mid SVC.  Free air is noted beneath the right hemidiaphragm, likely related to the recent surgery.         CONCLUSION:  1. Enteric tube tip projects over the stomach, although the distal side hole projects over the distal esophagus.  Consider advancing approximately 7.0 cm into the stomach. 2. Expiratory chest with the development of atelectasis at both lung bases. 3. Pneumoperitoneum, likely related to the recent abdominal surgery.    Dictated by (CST): Chuy Schmid MD on 6/13/2025 at 7:16 AM     Finalized by (CST): Chuy Schmid MD on 6/13/2025 at 7:19 AM          XR ABDOMEN (1 VIEW) (CPT=74018)  Result Date: 6/11/2025  PROCEDURE: XR ABDOMEN (1 VIEW) (CPT=74018)  COMPARISON: [facility], CT ABDOMEN + PELVIS (CONTRAST ONLY) (CPT=74177), 6/05/2025, 5:22 PM.  INDICATIONS: Partially obstructing colon tumor.  Opene tray. image taken due to protocol. Evaluate for foreign body.  TECHNIQUE:   Single view.           CONCLUSION:  1. No retained unexpected surgical foreign body. 2. Postsurgical changes with anastomotic staple lines and skin staples. 3. Catheter in the bladder.    Dictated by (CST): Chong Valdez MD on 6/11/2025 at 1:22 AM     Finalized by (CST): Chong Valdez MD on 6/11/2025 at 1:28 AM            Assessment/Plan:  Problem List[1]  Stable    CT done    No report yet but still some fluid around liver  IR to re-evaluate  THUY  CAMILLE ALSTON MD  7/10/2025  2:01 PM       [1]   Patient Active Problem List  Diagnosis    BPH (benign prostatic hyperplasia)    Chronic low back pain    Chronic pain of both knees    Depression with anxiety    Generalized osteoarthritis    Hyperlipidemia    Type 2 diabetes mellitus without complication, without long-term current use of insulin (HCC)    Essential hypertension    Bipolar I disorder, single manic episode (HCC)    Large bowel obstruction (HCC)    Colonic mass    Diffuse large B-cell lymphoma of solid organ excluding spleen    Pneumoperitoneum    Abdominal distension    Anasarca    Intraabdominal fluid collection

## 2025-07-10 NOTE — PLAN OF CARE
Patient is alert and oriented x4, primarily [place] speaking. Heparin gtt infusing. Tolerating a low fiber soft diet, accucheck ACHS. Incontinent of bowel and bladder, primofit in place. Right side NGOZI drain to bulb suction. Left side drain to gravity drainage bag. PRN Norco given for pain management. Call light within reach. Fall precautions maintained.    Problem: Patient Centered Care  Goal: Patient preferences are identified and integrated in the patient's plan of care  Description: Interventions:  - What would you like us to know as we care for you? I came from [facility]  - Provide timely, complete, and accurate information to patient/family  - Incorporate patient and family knowledge, values, beliefs, and cultural backgrounds into the planning and delivery of care  - Encourage patient/family to participate in care and decision-making at the level they choose  - Honor patient and family perspectives and choices  Outcome: Progressing     Problem: Diabetes/Glucose Control  Goal: Glucose maintained within prescribed range  Description: INTERVENTIONS:  - Monitor Blood Glucose as ordered  - Assess for signs and symptoms of hyperglycemia and hypoglycemia  - Administer ordered medications to maintain glucose within target range  - Assess barriers to adequate nutritional intake and initiate nutrition consult as needed  - Instruct patient on self management of diabetes  Outcome: Progressing     Problem: Patient/Family Goals  Goal: Patient/Family Long Term Goal  Description: Patient's Long Term Goal: return home    Interventions:  - follow plan of care  - See additional Care Plan goals for specific interventions  Outcome: Progressing  Goal: Patient/Family Short Term Goal  Description: Patient's Short Term Goal: to get stronger and feel better    Interventions:   - PT/OT on consult  - IV abx  - surgery, IR on consult  - NGOZI drains  - monitor labs and vital signs  - See additional Care Plan goals for specific  interventions  Outcome: Progressing     Problem: PAIN - ADULT  Goal: Verbalizes/displays adequate comfort level or patient's stated pain goal  Description: INTERVENTIONS:  - Encourage pt to monitor pain and request assistance  - Assess pain using appropriate pain scale  - Administer analgesics based on type and severity of pain and evaluate response  - Implement non-pharmacological measures as appropriate and evaluate response  - Consider cultural and social influences on pain and pain management  - Manage/alleviate anxiety  - Utilize distraction and/or relaxation techniques  - Monitor for opioid side effects  - Notify MD/LIP if interventions unsuccessful or patient reports new pain  - Anticipate increased pain with activity and pre-medicate as appropriate  Outcome: Progressing     Problem: RISK FOR INFECTION - ADULT  Goal: Absence of fever/infection during anticipated neutropenic period  Description: INTERVENTIONS  - Monitor WBC  - Administer growth factors as ordered  - Implement neutropenic guidelines  Outcome: Progressing     Problem: SAFETY ADULT - FALL  Goal: Free from fall injury  Description: INTERVENTIONS:  - Assess pt frequently for physical needs  - Identify cognitive and physical deficits and behaviors that affect risk of falls.  - [place] fall precautions as indicated by assessment.  - Educate pt/family on patient safety including physical limitations  - Instruct pt to call for assistance with activity based on assessment  - Modify environment to reduce risk of injury  - Provide assistive devices as appropriate  - Consider OT/PT consult to assist with strengthening/mobility  - Encourage toileting schedule  Outcome: Progressing     Problem: HEMATOLOGIC - ADULT  Goal: Free from bleeding injury  Description: (Example usage: patient with low platelets)  INTERVENTIONS:  - Avoid intramuscular injections, enemas and rectal medication administration  - Ensure safe mobilization of patient  - Hold pressure on  venipuncture sites to achieve adequate hemostasis  - Assess for signs and symptoms of internal bleeding  - Monitor lab trends  - Patient is to report abnormal signs of bleeding to staff  - Avoid use of toothpicks and dental floss  - Use electric shaver for shaving  - Use soft bristle tooth brush  - Limit straining and forceful nose blowing  Outcome: Progressing     Problem: GASTROINTESTINAL - ADULT  Goal: Minimal or absence of nausea and vomiting  Description: INTERVENTIONS:  - Maintain adequate hydration with IV or PO as ordered and tolerated  - Nasogastric tube to low intermittent suction as ordered  - Evaluate effectiveness of ordered antiemetic medications  - Provide nonpharmacologic comfort measures as appropriate  - Advance diet as tolerated, if ordered  - Obtain nutritional consult as needed  - Evaluate fluid balance  Outcome: Progressing  Goal: Maintains or returns to baseline bowel function  Description: INTERVENTIONS:  - Assess bowel function  - Maintain adequate hydration with IV or PO as ordered and tolerated  - Evaluate effectiveness of GI medications  - Encourage mobilization and activity  - Obtain nutritional consult as needed  - Establish a toileting routine/schedule  - Consider collaborating with pharmacy to review patient's medication profile  Outcome: Progressing

## 2025-07-10 NOTE — PLAN OF CARE
Heparin gtt continued - nursing titration protocol followed as ordered. PICC line placed today. Down for CT this morning - plans for drain placement with IR tomorrow. NPO at midnight.     Problem: Patient Centered Care  Goal: Patient preferences are identified and integrated in the patient's plan of care  Description: Interventions:  - What would you like us to know as we care for you? I came from [facility]  - Provide timely, complete, and accurate information to patient/family  - Incorporate patient and family knowledge, values, beliefs, and cultural backgrounds into the planning and delivery of care  - Encourage patient/family to participate in care and decision-making at the level they choose  - Honor patient and family perspectives and choices  Outcome: Progressing     Problem: Diabetes/Glucose Control  Goal: Glucose maintained within prescribed range  Description: INTERVENTIONS:  - Monitor Blood Glucose as ordered  - Assess for signs and symptoms of hyperglycemia and hypoglycemia  - Administer ordered medications to maintain glucose within target range  - Assess barriers to adequate nutritional intake and initiate nutrition consult as needed  - Instruct patient on self management of diabetes  Outcome: Progressing     Problem: Patient/Family Goals  Goal: Patient/Family Long Term Goal  Description: Patient's Long Term Goal: return home    Interventions:  - follow plan of care  - See additional Care Plan goals for specific interventions  Outcome: Progressing  Goal: Patient/Family Short Term Goal  Description: Patient's Short Term Goal: to get stronger and feel better    Interventions:   - PT/OT on consult  - IV abx  - surgery, IR on consult  - NGOZI drains  - monitor labs and vital signs  - See additional Care Plan goals for specific interventions  Outcome: Progressing     Problem: PAIN - ADULT  Goal: Verbalizes/displays adequate comfort level or patient's stated pain goal  Description: INTERVENTIONS:  - Encourage  pt to monitor pain and request assistance  - Assess pain using appropriate pain scale  - Administer analgesics based on type and severity of pain and evaluate response  - Implement non-pharmacological measures as appropriate and evaluate response  - Consider cultural and social influences on pain and pain management  - Manage/alleviate anxiety  - Utilize distraction and/or relaxation techniques  - Monitor for opioid side effects  - Notify MD/LIP if interventions unsuccessful or patient reports new pain  - Anticipate increased pain with activity and pre-medicate as appropriate  Outcome: Progressing     Problem: RISK FOR INFECTION - ADULT  Goal: Absence of fever/infection during anticipated neutropenic period  Description: INTERVENTIONS  - Monitor WBC  - Administer growth factors as ordered  - Implement neutropenic guidelines  Outcome: Progressing     Problem: SAFETY ADULT - FALL  Goal: Free from fall injury  Description: INTERVENTIONS:  - Assess pt frequently for physical needs  - Identify cognitive and physical deficits and behaviors that affect risk of falls.  - [place] fall precautions as indicated by assessment.  - Educate pt/family on patient safety including physical limitations  - Instruct pt to call for assistance with activity based on assessment  - Modify environment to reduce risk of injury  - Provide assistive devices as appropriate  - Consider OT/PT consult to assist with strengthening/mobility  - Encourage toileting schedule  Outcome: Progressing     Problem: HEMATOLOGIC - ADULT  Goal: Free from bleeding injury  Description: (Example usage: patient with low platelets)  INTERVENTIONS:  - Avoid intramuscular injections, enemas and rectal medication administration  - Ensure safe mobilization of patient  - Hold pressure on venipuncture sites to achieve adequate hemostasis  - Assess for signs and symptoms of internal bleeding  - Monitor lab trends  - Patient is to report abnormal signs of bleeding to  staff  - Avoid use of toothpicks and dental loss  - Use electric shaver for shaving  - Use soft bristle tooth brush  - Limit straining and forceful nose blowing  Outcome: Progressing     Problem: GASTROINTESTINAL - ADULT  Goal: Minimal or absence of nausea and vomiting  Description: INTERVENTIONS:  - Maintain adequate hydration with IV or PO as ordered and tolerated  - Nasogastric tube to low intermittent suction as ordered  - Evaluate effectiveness of ordered antiemetic medications  - Provide nonpharmacologic comfort measures as appropriate  - Advance diet as tolerated, if ordered  - Obtain nutritional consult as needed  - Evaluate fluid balance  Outcome: Progressing  Goal: Maintains or returns to baseline bowel function  Description: INTERVENTIONS:  - Assess bowel function  - Maintain adequate hydration with IV or PO as ordered and tolerated  - Evaluate effectiveness of GI medications  - Encourage mobilization and activity  - Obtain nutritional consult as needed  - Establish a toileting routine/schedule  - Consider collaborating with pharmacy to review patient's medication profile  Outcome: Progressing

## 2025-07-10 NOTE — PROGRESS NOTES
INFECTIOUS DISEASE PROGRESS NOTE    Arnaldo Gutierrez Patient Status:  Inpatient    4/15/1945 MRN [id number]   Location [facility] 4W/SW/SE Attending Declan Stearns MD   Hosp Day # 5 PCP PHYSICIAN NONSTAFF       SUBJECTIVE  ROS done.  No complaints of pain. Awaiting repeat CT A/P. Tolerating diet.    ASSESSMENT/PLAN:    Antibiotics: Zerbaxa, dapto, flagyl, micafungin; (IV zosyn, fluconazole, augmentin, cefepime)     ASSESSMENT:     # Multiple large intra-abdominal abscesses               - now s/p IR drain placement on 25               - fluid cx so far with E. Coli, Enterobacter cloacae, Enterococcus faecalis, C. Glabrata, MDR PSAR, Lactobacillus               - surgery and IR following  # RUE DVT  # hx of Large bowel obstruction s/p OR 6/10/25 for lap transverse colon resection, small bowel resection x2, rsection of tumor nodule on small bowel - path with DLBCL               - s/p colonic stent placement by GI 25               - post op fluid collection s/p IR drain placement 25 with 260 cc of serosang fluid, cx negative      PLAN:  -  Continue on IV daptomycin, zerbaxa, metronidazole, and micafungin. Anticipate continued IV abx on DC. PICC ordered.  -  FU CT A/P.  -  Follow fever curve, wbc.  -  Reviewed labs, micro, imaging reports.  -  Case d/w patient using language line, RN.     History of Present Illness:  80 year old male with history of diabetes, HTN, BPH who was admitted on  with abdominal bloating, nausea, decreased appetite, unintentional weight loss. Initial CT A/P on  with 4 cm segment of masslike constriction and thickening in the proximal transverse colon with concern for partially obstructing chronic neoplasm. Seen by GI and underwent colonoscopy with chronic stent placement on . Also seen by general surgery and taken to the OR on 6/10 for large bowel obstruction status post laparoscopic assisted transverse colon resection, small bowel resection and tumor  nodule and small bowel. Pathology diffuse large B-cell lymphoma. Seen by oncology. On 6/19 had low-grade temperatures with increasing WBC up to 19 with repeat CT A/P showing moderate to large volume free fluid in the abdomen and pelvis in the upper quadrants. Organized fluid in the left lower retroperitoneum measuring 5.5 x 4.7 x 5.8 cm concern for seroma, liquefied hematoma versus inflammatory fluid. Seen by IR and underwent IR peritoneal drain placement on 6/19 with 260 cc of serosanguineous fluid. Cultures negative. Was on zosyn while inpatient and transitioned to PO augmentin on discharge. Discharged on 6/24/25 to subacute rehab. Came back to ED on 7/4/25 with abdominal pain. Repeat CT with multiple large peritoneal abscesses. Surgery and IR consult.  Now status post IR drainage with cx growing GNR and budding yeast.  Currently on IV Zosyn.  ID consulted for further antibiotics management.     OBJECTIVE  /59 (BP Location: Left arm)   Pulse 68   Temp 97.6 °F (36.4 °C) (Temporal)   Resp 18   Ht 5' 11\" (1.803 m)   Wt 158 lb 4.8 oz (71.8 kg)   SpO2 95%   BMI 22.08 kg/m²     General: No acute distress. Alert.  HEENT: EOMI  Abdomen: Soft, nontender, nondistended. RUQ drain with minimal output; LLQ drain with tan output to gravity bag  Musculoskeletal: No edema  Integument: No rashes    [facility]  [phone]

## 2025-07-10 NOTE — PROGRESS NOTES
[facility]  part of [facility]  Hospitalist Progress Note     Arnaldo Gutierrez Patient Status:  Inpatient    4/15/1945  80 year old CSN [id number]   Location 454/454-A Attending Rodrick Ramos MD   Hosp Day # 5 PCP PHYSICIAN NONSTAFF     Assessment & Plan:   ----------------------------------  Intra-abdominal abscesses.  Status post IR drainage.  Small bowel follow-through  negative for leak.  - Repeat CT scan 7/10 pending  - Pain control  - Maintain drains  - ID, general surgery on consult  - Continue zerbaxa, daptomycin, Flagyl, micafungin    DVT, acute.  Location is RUE (new), RLE (old).  Pulmonary embolism not present. Contributing factors malignancy, immobility.  -Anticoagulation: Heparin drip, changed to p.o. per heme.  -Hypercoagulable work-up not indicated  -Light activity as tolerated    B-cell lymphoma.  Recent diagnosis.  - Oncology    Other problems  Hypertension  Dyslipidemia  GERD  BPH  Anxiety  Depression    Supplementary Documentation:   DVT Mechanical Prophylaxis:     Early ambuation  DVT Pharmacologic Prophylaxis   Medication    heparin (Porcine) 53995 units/250mL infusion PE/DVT/THROMBUS CONTINUOUS                Code Status: Full Code  Muñoz: External urinary catheter in place  Muñoz Duration (in days):   Central line:    ANNELISE: 2025        **Certification      PHYSICIAN Certification of Need for Inpatient Hospitalization - Initial Certification    Patient will require inpatient services that will reasonably be expected to span two midnight's based on the clinical documentation in H+P.   Based on patients current state of illness, I anticipate that, after discharge, patient will require TBD.           I personally reviewed the available laboratories, imaging including. I discussed/will discuss the case with consultants. I ordered laboratories and/or radiographic studies. I adjusted medications as detailed above.  Medical decision making high, risk is  high.    Subjective:   ----------------------------------  Minimal pain.  Tolerated CT scan.  Appetite is poor but he is tolerating food.    Objective:   Chief Complaint:   Chief Complaint   Patient presents with    Abdomen/Flank Pain     ----------------------------------  Temp:  [97.6 °F (36.4 °C)-97.8 °F (36.6 °C)] 97.6 °F (36.4 °C)  Pulse:  [64-79] 68  Resp:  [18] 18  BP: (110-120)/(56-64) 120/59  SpO2:  [94 %-95 %] 95 %  Gen: A+Ox3.  No distress.   HEENT: NCAT, neck supple, no carotid bruit.  CV: RRR, S1S2, and intact distal pulses. No gallop, rub, murmur.  Pulm: Effort and breath sounds normal. No distress, wheezes, rales, rhonchi.  Abd: Soft, nontender nondistended.  Right upper quadrant and left lower quadrant drains in place with purulent output  Neuro: Normal reflexes, CN. Sensory/motor exams grossly normal deficit.   MS: No joint effusions.  No peripheral edema.  Skin: Skin is warm and dry. No rashes, erythema, diaphoresis.   Psych: Normal mood and affect. Calm, cooperative    Labs:  Lab Results   Component Value Date    HGB 7.8 (L) 07/10/2025    WBC 5.0 07/10/2025    .0 (H) 07/10/2025     07/10/2025    K 3.0 (L) 07/10/2025    K 3.0 (L) 07/10/2025    CREATSERUM 0.48 (L) 07/10/2025    AST 22 07/07/2025    ALT 16 07/07/2025           Scheduled Medications[1]  PRN Medications[2]             [1]    ceftolozane-tazobactam (ZERBAXA) 3 g in sodium chloride 0.9% 100 mL IVPB  3 g Intravenous Q8H    DAPTOmycin  500 mg Intravenous Q24H    micafungin  100 mg Intravenous Q24H    metroNIDAZOLE  500 mg Oral BID    pantoprazole  40 mg Intravenous Daily    insulin aspart  1-5 Units Subcutaneous TID CC and HS   [2]   HYDROcodone-acetaminophen    glycerin-hypromellose-    melatonin    temazepam    glucose **OR** glucose **OR** glucose-vitamin C **OR** dextrose **OR** glucose **OR** glucose **OR** glucose-vitamin C    morphINE **OR** morphINE **OR** morphINE    acetaminophen    ondansetron     metoclopramide

## 2025-07-10 NOTE — CM/SW NOTE
Per ID note medication regime is now  Continue on IV daptomycin, metronidazole, and micafungin. Stop ciprofloxacin and start zerbaxa.     CM sent upd to SNF to confirm they can accommodate at [place] if indicated.    Per [facility] liaison-the Zerbaxa is a problem due to cost. We would not be able to accommodate that one but the others are fine.  CM notified medical team with above.    Per ID this drug is indicated based on cx, anticipated need 2-4 weeks pending CT    CM upd [facility] liaison, asked if any [facility] location can accommodate med, also asked if pt can be considered for an exception. Liaison will submit to admin    Plan  Return to [facility] pending medication regime coverage    / to remain available for support and/or discharge planning.     Pooja Keita RN    Ext [phone]

## 2025-07-11 ENCOUNTER — APPOINTMENT (OUTPATIENT)
Dept: CT IMAGING | Facility: HOSPITAL | Age: 80
End: 2025-07-11
Attending: CLINICAL NURSE SPECIALIST
Payer: MEDICARE

## 2025-07-11 ENCOUNTER — APPOINTMENT (OUTPATIENT)
Dept: GENERAL RADIOLOGY | Facility: HOSPITAL | Age: 80
End: 2025-07-11
Attending: RADIOLOGY
Payer: MEDICARE

## 2025-07-11 LAB
ANION GAP SERPL CALC-SCNC: 5 MMOL/L (ref 0–18)
APTT PPP: 40.1 SECONDS (ref 23–36)
APTT PPP: 74.1 SECONDS (ref 23–36)
APTT PPP: 79.5 SECONDS (ref 23–36)
BUN BLD-MCNC: 6 MG/DL (ref 9–23)
BUN/CREAT SERPL: 14 (ref 10–20)
CALCIUM BLD-MCNC: 7.2 MG/DL (ref 8.7–10.4)
CHLORIDE SERPL-SCNC: 105 MMOL/L (ref 98–112)
CO2 SERPL-SCNC: 28 MMOL/L (ref 21–32)
CREAT BLD-MCNC: 0.43 MG/DL (ref 0.7–1.3)
DEPRECATED RDW RBC AUTO: 55.8 FL (ref 35.1–46.3)
EGFRCR SERPLBLD CKD-EPI 2021: 109 ML/MIN/1.73M2 (ref 60–?)
ERYTHROCYTE [DISTWIDTH] IN BLOOD BY AUTOMATED COUNT: 18.3 % (ref 11–15)
GLUCOSE BLD-MCNC: 150 MG/DL (ref 70–99)
GLUCOSE BLDC GLUCOMTR-MCNC: 147 MG/DL (ref 70–99)
GLUCOSE BLDC GLUCOMTR-MCNC: 152 MG/DL (ref 70–99)
GLUCOSE BLDC GLUCOMTR-MCNC: 168 MG/DL (ref 70–99)
HCT VFR BLD AUTO: 24.1 % (ref 39–53)
HGB BLD-MCNC: 7.5 G/DL (ref 13–17.5)
MCH RBC QN AUTO: 26.5 PG (ref 26–34)
MCHC RBC AUTO-ENTMCNC: 31.1 G/DL (ref 31–37)
MCV RBC AUTO: 85.2 FL (ref 80–100)
OSMOLALITY SERPL CALC.SUM OF ELEC: 286 MOSM/KG (ref 275–295)
PLATELET # BLD AUTO: 405 10(3)UL (ref 150–450)
POTASSIUM SERPL-SCNC: 3.3 MMOL/L (ref 3.5–5.1)
RBC # BLD AUTO: 2.83 X10(6)UL (ref 3.8–5.8)
SODIUM SERPL-SCNC: 138 MMOL/L (ref 136–145)
WBC # BLD AUTO: 4.5 X10(3) UL (ref 4–11)

## 2025-07-11 PROCEDURE — 71045 X-RAY EXAM CHEST 1 VIEW: CPT | Performed by: RADIOLOGY

## 2025-07-11 PROCEDURE — 99233 SBSQ HOSP IP/OBS HIGH 50: CPT | Performed by: HOSPITALIST

## 2025-07-11 PROCEDURE — 99153 MOD SED SAME PHYS/QHP EA: CPT | Performed by: CLINICAL NURSE SPECIALIST

## 2025-07-11 PROCEDURE — 0F9030Z DRAINAGE OF LIVER WITH DRAINAGE DEVICE, PERCUTANEOUS APPROACH: ICD-10-PCS | Performed by: RADIOLOGY

## 2025-07-11 PROCEDURE — 99233 SBSQ HOSP IP/OBS HIGH 50: CPT | Performed by: STUDENT IN AN ORGANIZED HEALTH CARE EDUCATION/TRAINING PROGRAM

## 2025-07-11 PROCEDURE — 99152 MOD SED SAME PHYS/QHP 5/>YRS: CPT | Performed by: CLINICAL NURSE SPECIALIST

## 2025-07-11 PROCEDURE — 49405 IMAGE CATH FLUID COLXN VISC: CPT | Performed by: CLINICAL NURSE SPECIALIST

## 2025-07-11 RX ORDER — SODIUM CHLORIDE 9 MG/ML
INJECTION, SOLUTION INTRAVENOUS CONTINUOUS
Status: DISCONTINUED | OUTPATIENT
Start: 2025-07-11 | End: 2025-07-11

## 2025-07-11 RX ORDER — POTASSIUM CHLORIDE 1500 MG/1
40 TABLET, EXTENDED RELEASE ORAL ONCE
Status: COMPLETED | OUTPATIENT
Start: 2025-07-11 | End: 2025-07-11

## 2025-07-11 RX ORDER — HEPARIN SODIUM 1000 [USP'U]/ML
30 INJECTION, SOLUTION INTRAVENOUS; SUBCUTANEOUS ONCE
Status: COMPLETED | OUTPATIENT
Start: 2025-07-11 | End: 2025-07-11

## 2025-07-11 RX ORDER — NALOXONE HYDROCHLORIDE 0.4 MG/ML
0.08 INJECTION, SOLUTION INTRAMUSCULAR; INTRAVENOUS; SUBCUTANEOUS AS NEEDED
Status: DISCONTINUED | OUTPATIENT
Start: 2025-07-11 | End: 2025-07-11

## 2025-07-11 RX ORDER — MIDAZOLAM HYDROCHLORIDE 1 MG/ML
1 INJECTION INTRAMUSCULAR; INTRAVENOUS EVERY 5 MIN PRN
Status: DISCONTINUED | OUTPATIENT
Start: 2025-07-11 | End: 2025-07-11

## 2025-07-11 RX ORDER — HYDROMORPHONE HYDROCHLORIDE 1 MG/ML
1 INJECTION, SOLUTION INTRAMUSCULAR; INTRAVENOUS; SUBCUTANEOUS EVERY 4 HOURS PRN
Refills: 0 | Status: DISCONTINUED | OUTPATIENT
Start: 2025-07-11 | End: 2025-07-19

## 2025-07-11 RX ORDER — VANCOMYCIN HYDROCHLORIDE 125 MG/1
125 CAPSULE ORAL DAILY
Status: DISCONTINUED | OUTPATIENT
Start: 2025-07-11 | End: 2025-07-19

## 2025-07-11 RX ORDER — HYDROMORPHONE HYDROCHLORIDE 1 MG/ML
0.5 INJECTION, SOLUTION INTRAMUSCULAR; INTRAVENOUS; SUBCUTANEOUS EVERY 4 HOURS PRN
Refills: 0 | Status: DISCONTINUED | OUTPATIENT
Start: 2025-07-11 | End: 2025-07-19

## 2025-07-11 RX ORDER — MIDAZOLAM HYDROCHLORIDE 1 MG/ML
INJECTION INTRAMUSCULAR; INTRAVENOUS
Status: COMPLETED
Start: 2025-07-11 | End: 2025-07-11

## 2025-07-11 RX ORDER — FLUMAZENIL 0.1 MG/ML
0.2 INJECTION INTRAVENOUS AS NEEDED
Status: DISCONTINUED | OUTPATIENT
Start: 2025-07-11 | End: 2025-07-11

## 2025-07-11 NOTE — PROGRESS NOTES
[facility]  part of [facility]  Progress Note      Arnaldo Gutierrez Patient Status:  Inpatient    4/15/1945 MRN [id number]   Location [facility] 4W/SW/SE Attending Rodrick Ramos MD   Hosp Day # 6 PCP PHYSICIAN NONSTAFF       Subjective:   Sitting up in bed    Objective:   LLQ drain purulent output   RUQ scant serous output     Vital Signs:  Blood pressure 130/65, pulse 68, temperature 97.6 °F (36.4 °C), temperature source Temporal, resp. rate 18, height 71\", weight 158 lb 4.8 oz (71.8 kg), SpO2 97%.    Input/Output:    Intake/Output Summary (Last 24 hours) at 2025 0842  Last data filed at 2025 0603  Gross per 24 hour   Intake 100 ml   Output 1730 ml   Net -1630 ml     24 hour drain output: Right sided drain 0mL  LLQ drain 130mL purulent    Results:   Labs:  Lab Results   Component Value Date    WBC 4.5 2025    RBC 2.83 2025    HGB 7.5 2025    HCT 24.1 2025    MCV 85.2 2025    MCH 26.5 2025    MCHC 31.1 2025    RDW 18.3 2025    .0 2025     Recent Labs   Lab 25  0648 07/10/25  0635 07/10/25  1415 25  0502   * 154*  --  150*   BUN 5* 6*  --  6*   CREATSERUM 0.49* 0.48*  --  0.43*   EGFRCR 105 106  --  109   CA 7.7* 7.5*  --  7.2*    139  --  138   K 3.3* 3.0*  3.0* 3.5 3.3*    104  --  105   CO2 29.0 28.0  --  28.0         Assessment and Plan:   79 yo male s/p 25 Laparoscopic-assisted transverse colon resection, small bowel resection x2, resection of tumor nodule on small bowel requiring postoperative percutaneous abscess drainage with IR  Repeat CT yesterday with perihepatic fluid collection, plan for perihepatic drain insertion today. Discussed procedure, risks, benefits with patient using a language line interpretor, he agrees to proceed.     FAIZAN Vargas  2025  8:42 AM

## 2025-07-11 NOTE — PROCEDURES
[facility]  part of [facility]  Procedure Note    Arnaldo Gutierrez Patient Status:  Inpatient    4/15/1945 MRN [id number]   Location [facility] 4W/SW/SE Attending Rodrick Ramos MD   Hosp Day # 6 PCP PHYSICIAN NONSTAFF     Procedure: ct guided perihepatic drain placement    Pre-Procedure Diagnosis:  post op abscess    Post-Procedure Diagnosis: same    Anesthesia:  Sedation    Findings:  purulent fluid surrounding liver-- 12 fr drain placed    Specimens: 10 mls of purulent fluid    Blood Loss:  minimal       Complications:  asymptomatic small ptx that was noted/aspirated after placement of radu-hepatic drain  Drains:  12 fr    Normal post drain instructions.    Juan Luis Cabrera MD  2025

## 2025-07-11 NOTE — PLAN OF CARE
Patient is alert and oriented x4, primarily Monegasque speaking. Heparin gtt infusing, plan to stop at 5am for procedure. NPO since midnight, Q6 accucheck. Incontinent of bowel and bladder, primofit in place. Right side NGOZI drain to bulb suction. Left side drain to gravity drainage bag. PRN Norco given for pain management. Call light within reach. Fall precautions maintained.    Problem: Patient Centered Care  Goal: Patient preferences are identified and integrated in the patient's plan of care  Description: Interventions:  - What would you like us to know as we care for you? I came from [facility]  - Provide timely, complete, and accurate information to patient/family  - Incorporate patient and family knowledge, values, beliefs, and cultural backgrounds into the planning and delivery of care  - Encourage patient/family to participate in care and decision-making at the level they choose  - Honor patient and family perspectives and choices  Outcome: Progressing     Problem: Diabetes/Glucose Control  Goal: Glucose maintained within prescribed range  Description: INTERVENTIONS:  - Monitor Blood Glucose as ordered  - Assess for signs and symptoms of hyperglycemia and hypoglycemia  - Administer ordered medications to maintain glucose within target range  - Assess barriers to adequate nutritional intake and initiate nutrition consult as needed  - Instruct patient on self management of diabetes  Outcome: Progressing     Problem: Patient/Family Goals  Goal: Patient/Family Long Term Goal  Description: Patient's Long Term Goal: return home    Interventions:  - follow plan of care  - See additional Care Plan goals for specific interventions  Outcome: Progressing  Goal: Patient/Family Short Term Goal  Description: Patient's Short Term Goal: to get stronger and feel better    Interventions:   - PT/OT on consult  - IV abx  - surgery, IR on consult  - NGOZI drains  - monitor labs and vital signs  - See additional Care Plan goals for  specific interventions  Outcome: Progressing     Problem: PAIN - ADULT  Goal: Verbalizes/displays adequate comfort level or patient's stated pain goal  Description: INTERVENTIONS:  - Encourage pt to monitor pain and request assistance  - Assess pain using appropriate pain scale  - Administer analgesics based on type and severity of pain and evaluate response  - Implement non-pharmacological measures as appropriate and evaluate response  - Consider cultural and social influences on pain and pain management  - Manage/alleviate anxiety  - Utilize distraction and/or relaxation techniques  - Monitor for opioid side effects  - Notify MD/LIP if interventions unsuccessful or patient reports new pain  - Anticipate increased pain with activity and pre-medicate as appropriate  Outcome: Progressing     Problem: RISK FOR INFECTION - ADULT  Goal: Absence of fever/infection during anticipated neutropenic period  Description: INTERVENTIONS  - Monitor WBC  - Administer growth factors as ordered  - Implement neutropenic guidelines  Outcome: Progressing     Problem: SAFETY ADULT - FALL  Goal: Free from fall injury  Description: INTERVENTIONS:  - Assess pt frequently for physical needs  - Identify cognitive and physical deficits and behaviors that affect risk of falls.  - [place] fall precautions as indicated by assessment.  - Educate pt/family on patient safety including physical limitations  - Instruct pt to call for assistance with activity based on assessment  - Modify environment to reduce risk of injury  - Provide assistive devices as appropriate  - Consider OT/PT consult to assist with strengthening/mobility  - Encourage toileting schedule  Outcome: Progressing     Problem: HEMATOLOGIC - ADULT  Goal: Free from bleeding injury  Description: (Example usage: patient with low platelets)  INTERVENTIONS:  - Avoid intramuscular injections, enemas and rectal medication administration  - Ensure safe mobilization of patient  - Hold  pressure on venipuncture sites to achieve adequate hemostasis  - Assess for signs and symptoms of internal bleeding  - Monitor lab trends  - Patient is to report abnormal signs of bleeding to staff  - Avoid use of toothpicks and dental floss  - Use electric shaver for shaving  - Use soft bristle tooth brush  - Limit straining and forceful nose blowing  Outcome: Progressing     Problem: GASTROINTESTINAL - ADULT  Goal: Minimal or absence of nausea and vomiting  Description: INTERVENTIONS:  - Maintain adequate hydration with IV or PO as ordered and tolerated  - Nasogastric tube to low intermittent suction as ordered  - Evaluate effectiveness of ordered antiemetic medications  - Provide nonpharmacologic comfort measures as appropriate  - Advance diet as tolerated, if ordered  - Obtain nutritional consult as needed  - Evaluate fluid balance  Outcome: Progressing  Goal: Maintains or returns to baseline bowel function  Description: INTERVENTIONS:  - Assess bowel function  - Maintain adequate hydration with IV or PO as ordered and tolerated  - Evaluate effectiveness of GI medications  - Encourage mobilization and activity  - Obtain nutritional consult as needed  - Establish a toileting routine/schedule  - Consider collaborating with pharmacy to review patient's medication profile  Outcome: Progressing

## 2025-07-11 NOTE — PROGRESS NOTES
[facility]  part of [facility]  Hospitalist Progress Note     Arnaldo Gutierrez Patient Status:  Inpatient    4/15/1945  80 year old CSN [id number]   Location 454/454-A Attending Rodrick Ramos MD   Hosp Day # 6 PCP PHYSICIAN NONSTAFF     Assessment & Plan:   ----------------------------------  Intra-abdominal abscesses.  Status post IR drainage.  Small bowel follow-through  negative for leak.  - Repeat CT scan 7/10 noted, IR drain today  - Pain control  - Maintain drains  - ID, general surgery on consult  - Continue zerbaxa, daptomycin, Flagyl, micafungin    DVT, acute.  Location is RUE (new), RLE (old).  Pulmonary embolism not present. Contributing factors malignancy, immobility.  -Anticoagulation: Heparin drip, changed to p.o. per heme.  -Hypercoagulable work-up not indicated  -Light activity as tolerated    B-cell lymphoma.  Recent diagnosis.  - Oncology    Other problems  Hypertension  Dyslipidemia  GERD  BPH  Anxiety  Depression    Supplementary Documentation:   DVT Mechanical Prophylaxis:     Early ambuation  DVT Pharmacologic Prophylaxis   Medication    [Held by provider] heparin (Porcine) 29336 units/250mL infusion PE/DVT/THROMBUS CONTINUOUS                Code Status: Full Code  Umñoz: External urinary catheter in place  Muñoz Duration (in days):   Central line:    ANNELISE: 2025        **Certification      PHYSICIAN Certification of Need for Inpatient Hospitalization - Initial Certification    Patient will require inpatient services that will reasonably be expected to span two midnight's based on the clinical documentation in H+P.   Based on patients current state of illness, I anticipate that, after discharge, patient will require TBD.           I personally reviewed the available laboratories, imaging including. I discussed/will discuss the case with consultants. I ordered laboratories and/or radiographic studies. I adjusted medications as detailed above.  Medical decision  making high, risk is high.    Subjective:   ----------------------------------  Minimal pain, no overnight events    Objective:   Chief Complaint:   Chief Complaint   Patient presents with    Abdomen/Flank Pain     ----------------------------------  Temp:  [97.6 °F (36.4 °C)-97.9 °F (36.6 °C)] 97.6 °F (36.4 °C)  Pulse:  [60-79] 68  Resp:  [18] 18  BP: (120-133)/(54-65) 130/65  SpO2:  [95 %-97 %] 97 %  Gen: A+Ox3.  No distress.   HEENT: NCAT, neck supple, no carotid bruit.  CV: RRR, S1S2, and intact distal pulses. No gallop, rub, murmur.  Pulm: Effort and breath sounds normal. No distress, wheezes, rales, rhonchi.  Abd: Soft, nontender nondistended.  Right upper quadrant and left lower quadrant drains in place with purulent output  Neuro: Normal reflexes, CN. Sensory/motor exams grossly normal deficit.   MS: No joint effusions.  No peripheral edema.  Skin: Skin is warm and dry. No rashes, erythema, diaphoresis.   Psych: Normal mood and affect. Calm, cooperative    Labs:  Lab Results   Component Value Date    HGB 7.5 (L) 07/11/2025    WBC 4.5 07/11/2025    .0 07/11/2025     07/11/2025    K 3.3 (L) 07/11/2025    CREATSERUM 0.43 (L) 07/11/2025    AST 22 07/07/2025    ALT 16 07/07/2025           Scheduled Medications[1]  PRN Medications[2]               [1]    vancomycin  125 mg Oral Daily    potassium chloride  40 mEq Oral Once    insulin aspart  1-5 Units Subcutaneous q6h    fentaNYL        midazolam        ceftolozane-tazobactam (ZERBAXA) 3 g in sodium chloride 0.9% 100 mL IVPB  3 g Intravenous Q8H    DAPTOmycin  500 mg Intravenous Q24H    micafungin  100 mg Intravenous Q24H    metroNIDAZOLE  500 mg Oral BID    pantoprazole  40 mg Intravenous Daily   [2]   midazolam    fentaNYL    naloxone    flumazenil    fentaNYL    midazolam    HYDROcodone-acetaminophen    glycerin-hypromellose-    melatonin    temazepam    glucose **OR** glucose **OR** glucose-vitamin C **OR** dextrose **OR** glucose **OR**  glucose **OR** glucose-vitamin C    morphINE **OR** morphINE **OR** morphINE    acetaminophen    ondansetron    metoclopramide

## 2025-07-11 NOTE — PLAN OF CARE
Patient is on room air and remote tele. Patient is ambulating. Patient denies n/v/d, pain and sob. Patient is on IV abx. Patient is NPO and has glucose monitoring q6h. Patient has bilateral NGOZI drains, dressings are C/D/I. Patient has bilateral arm precautions. Patient has a midline incision w/ steri strips in place. Patient has a primofit in place. Patient is on a heparin gtt at 11 units/hr and will be put on hold at 5am for IR procedure. Safety measures are in place.       Problem: Patient Centered Care  Goal: Patient preferences are identified and integrated in the patient's plan of care  Description: Interventions:  - What would you like us to know as we care for you? I came [place] [facility]  - Provide timely, complete, and accurate information to patient/family  - Incorporate patient and family knowledge, values, beliefs, and cultural backgrounds into the planning and delivery of care  - Encourage patient/family to participate in care and decision-making at the level they choose  - Honor patient and family perspectives and choices  Outcome: Progressing     Problem: Diabetes/Glucose Control  Goal: Glucose maintained within prescribed range  Description: INTERVENTIONS:  - Monitor Blood Glucose as ordered  - Assess for signs and symptoms of hyperglycemia and hypoglycemia  - Administer ordered medications to maintain glucose within target range  - Assess barriers to adequate nutritional intake and initiate nutrition consult as needed  - Instruct patient on self management of diabetes  Outcome: Progressing     Problem: Patient/Family Goals  Goal: Patient/Family Long Term Goal  Description: Patient's Long Term Goal: Discharge  home    Interventions:  -Monitor vitals  -Monitor labs  - Heparin gtt  - Glucose monitoring ACHS/q6h  - Monitor and manage NGOZI drains  - PT/OT on consult  - IR on consult  - Manage pain   - Monitor midline incision  - Remote tele   - IV abx  - ID onc onsult  - Gen sx on consult   - See additional  Care Plan goals for specific interventions  Outcome: Progressing  Goal: Patient/Family Short Term Goal  Description: Patient's Short Term Goal: Manage pain    Interventions:   - Frequent pain assessments  - Non-pharmacological interventions  - Pain medications as needed/indicated  - See additional Care Plan goals for specific interventions  Outcome: Progressing     Problem: PAIN - ADULT  Goal: Verbalizes/displays adequate comfort level or patient's stated pain goal  Description: INTERVENTIONS:  - Encourage pt to monitor pain and request assistance  - Assess pain using appropriate pain scale  - Administer analgesics based on type and severity of pain and evaluate response  - Implement non-pharmacological measures as appropriate and evaluate response  - Consider cultural and social influences on pain and pain management  - Manage/alleviate anxiety  - Utilize distraction and/or relaxation techniques  - Monitor for opioid side effects  - Notify MD/LIP if interventions unsuccessful or patient reports new pain  - Anticipate increased pain with activity and pre-medicate as appropriate  Outcome: Progressing     Problem: RISK FOR INFECTION - ADULT  Goal: Absence of fever/infection during anticipated neutropenic period  Description: INTERVENTIONS  - Monitor WBC  - Administer growth factors as ordered  - Implement neutropenic guidelines  Outcome: Progressing     Problem: SAFETY ADULT - FALL  Goal: Free from fall injury  Description: INTERVENTIONS:  - Assess pt frequently for physical needs  - Identify cognitive and physical deficits and behaviors that affect risk of falls.  - [place] fall precautions as indicated by assessment.  - Educate pt/family on patient safety including physical limitations  - Instruct pt to call for assistance with activity based on assessment  - Modify environment to reduce risk of injury  - Provide assistive devices as appropriate  - Consider OT/PT consult to assist with strengthening/mobility  -  Encourage toileting schedule  Outcome: Progressing     Problem: HEMATOLOGIC - ADULT  Goal: Free from bleeding injury  Description: (Example usage: patient with low platelets)  INTERVENTIONS:  - Avoid intramuscular injections, enemas and rectal medication administration  - Ensure safe mobilization of patient  - Hold pressure on venipuncture sites to achieve adequate hemostasis  - Assess for signs and symptoms of internal bleeding  - Monitor lab trends  - Patient is to report abnormal signs of bleeding to staff  - Avoid use of toothpicks and dental floss  - Use electric shaver for shaving  - Use soft bristle tooth brush  - Limit straining and forceful nose blowing  Outcome: Progressing  Goal: Maintains hematologic stability  Description: INTERVENTIONS  - Assess for signs and symptoms of bleeding or hemorrhage  - Monitor labs and vital signs for trends  - Administer supportive blood products/factors, fluids and medications as ordered and appropriate  - Administer supportive blood products/factors as ordered and appropriate  Outcome: Progressing  Goal: Free from bleeding injury  Description: (Example usage: patient with low platelets)  INTERVENTIONS:  - Avoid intramuscular injections, enemas and rectal medication administration  - Ensure safe mobilization of patient  - Hold pressure on venipuncture sites to achieve adequate hemostasis  - Assess for signs and symptoms of internal bleeding  - Monitor lab trends  - Patient is to report abnormal signs of bleeding to staff  - Avoid use of toothpicks and dental floss  - Use electric shaver for shaving  - Use soft bristle tooth brush  - Limit straining and forceful nose blowing  Outcome: Progressing     Problem: GASTROINTESTINAL - ADULT  Goal: Minimal or absence of nausea and vomiting  Description: INTERVENTIONS:  - Maintain adequate hydration with IV or PO as ordered and tolerated  - Nasogastric tube to low intermittent suction as ordered  - Evaluate effectiveness of ordered  antiemetic medications  - Provide nonpharmacologic comfort measures as appropriate  - Advance diet as tolerated, if ordered  - Obtain nutritional consult as needed  - Evaluate fluid balance  Outcome: Progressing  Goal: Maintains or returns to baseline bowel function  Description: INTERVENTIONS:  - Assess bowel function  - Maintain adequate hydration with IV or PO as ordered and tolerated  - Evaluate effectiveness of GI medications  - Encourage mobilization and activity  - Obtain nutritional consult as needed  - Establish a toileting routine/schedule  - Consider collaborating with pharmacy to review patient's medication profile  Outcome: Progressing  Goal: Maintains adequate nutritional intake (undernourished)  Description: INTERVENTIONS:  - Monitor percentage of each meal consumed  - Identify factors contributing to decreased intake, treat as appropriate  - Assist with meals as needed  - Monitor I&O, WT and lab values  - Obtain nutritional consult as needed  - Optimize oral hygiene and moisture  - Encourage food from home; allow for food preferences  - Enhance eating environment  Outcome: Progressing     Problem: DISCHARGE PLANNING  Goal: Discharge to home or other facility with appropriate resources  Description: INTERVENTIONS:  - Identify barriers to discharge w/pt and caregiver  - Include patient/family/discharge partner in discharge planning  - Arrange for needed discharge resources and transportation as appropriate  - Identify discharge learning needs (meds, wound care, etc)  - Arrange for interpreters to assist at discharge as needed  - Consider post-discharge preferences of patient/family/discharge partner  - Complete POLST form as appropriate  - Assess patient's ability to be responsible for managing their own health  - Refer to Case Management Department for coordinating discharge planning if the patient needs post-hospital services based on physician/LIP order or complex needs related to functional status,  cognitive ability or social support system  Outcome: Progressing     Problem: Altered Communication/Language Barrier  Goal: Patient/Family is able to understand and participate in their care  Description: Interventions:  - Assess communication ability and preferred communication style  - Implement communication aides and strategies  - Use visual cues when possible  - Listen attentively, be patient, do not interrupt  - Minimize distractions  - Allow time for understanding and response  - Establish method for patient to ask for assistance (call light)  - Provide an  as needed  - Communicate barriers and strategies to overcome with those who interact with patient  Outcome: Progressing     Problem: CARDIOVASCULAR - ADULT  Goal: Maintains optimal cardiac output and hemodynamic stability  Description: INTERVENTIONS:  - Monitor vital signs, rhythm, and trends  - Monitor for bleeding, hypotension and signs of decreased cardiac output  - Evaluate effectiveness of vasoactive medications to optimize hemodynamic stability  - Monitor arterial and/or venous puncture sites for bleeding and/or hematoma  - Assess quality of pulses, skin color and temperature  - Assess for signs of decreased coronary artery perfusion - ex. Angina  - Evaluate fluid balance, assess for edema, trend weights  Outcome: Progressing  Goal: Absence of cardiac arrhythmias or at baseline  Description: INTERVENTIONS:  - Continuous cardiac monitoring, monitor vital signs, obtain 12 lead EKG if indicated  - Evaluate effectiveness of antiarrhythmic and heart rate control medications as ordered  - Initiate emergency measures for life threatening arrhythmias  - Monitor electrolytes and administer replacement therapy as ordered  Outcome: Progressing     Problem: GENITOURINARY - ADULT  Goal: Absence of urinary retention  Description: INTERVENTIONS:  - Assess patient’s ability to void and empty bladder  - Monitor intake/output and perform bladder scan as  needed  - Follow urinary retention protocol/standard of care  - Consider collaborating with pharmacy to review patient's medication profile  - Implement strategies to promote bladder emptying  Outcome: Progressing     Problem: METABOLIC/FLUID AND ELECTROLYTES - ADULT  Goal: Electrolytes maintained within normal limits  Description: INTERVENTIONS:  - Monitor labs and rhythm and assess patient for signs and symptoms of electrolyte imbalances  - Administer electrolyte replacement as ordered  - Monitor response to electrolyte replacements, including rhythm and repeat lab results as appropriate  - Fluid restriction as ordered  - Instruct patient on fluid and nutrition restrictions as appropriate  Outcome: Progressing  Goal: Hemodynamic stability and optimal renal function maintained  Description: INTERVENTIONS:  - Monitor labs and assess for signs and symptoms of volume excess or deficit  - Monitor intake, output and patient weight  - Monitor urine specific gravity, serum osmolarity and serum sodium as indicated or ordered  - Monitor response to interventions for patient's volume status, including labs, urine output, blood pressure (other measures as available)  - Encourage oral intake as appropriate  - Instruct patient on fluid and nutrition restrictions as appropriate  Outcome: Progressing     Problem: SKIN/TISSUE INTEGRITY - ADULT  Goal: Skin integrity remains intact  Description: INTERVENTIONS  - Assess and document risk factors for pressure ulcer development  - Assess and document skin integrity  - Monitor for areas of redness and/or skin breakdown  - Initiate interventions, skin care algorithm/standards of care as needed  Outcome: Progressing  Goal: Incision(s), wounds(s) or drain site(s) healing without S/S of infection  Description: INTERVENTIONS:  - Assess and document risk factors for pressure ulcer development  - Assess and document skin integrity  - Assess and document dressing/incision, wound bed, drain  sites and surrounding tissue  - Implement wound care per orders  - Initiate isolation precautions as appropriate  - Initiate Pressure Ulcer prevention bundle as indicated  Outcome: Progressing  Goal: Oral mucous membranes remain intact  Description: INTERVENTIONS  - Assess oral mucosa and hygiene practices  - Implement preventative oral hygiene regimen  - Implement oral medicated treatments as ordered  Outcome: Progressing     Problem: COPING  Goal: Pt/Family able to verbalize concerns and demonstrate effective coping strategies  Description: INTERVENTIONS:  - Assist patient/family to identify coping skills, available support systems and cultural and spiritual values  - Provide emotional support, including active listening and acknowledgement of concerns of patient and caregivers  - Reduce environmental stimuli, as able  - Instruct patient/family in relaxation techniques, as appropriate  - Assess for spiritual and psychosocial needs and initiate Spiritual Care or Behavioral Health consult as needed  Outcome: Progressing

## 2025-07-11 NOTE — PLAN OF CARE
Order received for transfer to CCU. Telephone handoff report given to Yolanda GARCIA. Family aware of plan.

## 2025-07-11 NOTE — PLAN OF CARE
Down for IR drain placement today. Morphine/Norco for pain. Family remains at bedside. Heparin drip restarted per order, consulted with Hem/Onc. Nursing heparin titration protocol followed. IV abx as ordered/scheduled. Plans to transfer to CCU this evening. Family and patient aware of plan noted above.     Problem: RISK FOR INFECTION - ADULT  Goal: Absence of fever/infection during anticipated neutropenic period  Description: INTERVENTIONS  - Monitor WBC  - Administer growth factors as ordered  - Implement neutropenic guidelines  Outcome: Progressing     Problem: SAFETY ADULT - FALL  Goal: Free from fall injury  Description: INTERVENTIONS:  - Assess pt frequently for physical needs  - Identify cognitive and physical deficits and behaviors that affect risk of falls.  - [place] fall precautions as indicated by assessment.  - Educate pt/family on patient safety including physical limitations  - Instruct pt to call for assistance with activity based on assessment  - Modify environment to reduce risk of injury  - Provide assistive devices as appropriate  - Consider OT/PT consult to assist with strengthening/mobility  - Encourage toileting schedule  Outcome: Progressing     Problem: HEMATOLOGIC - ADULT  Goal: Free from bleeding injury  Description: (Example usage: patient with low platelets)  INTERVENTIONS:  - Avoid intramuscular injections, enemas and rectal medication administration  - Ensure safe mobilization of patient  - Hold pressure on venipuncture sites to achieve adequate hemostasis  - Assess for signs and symptoms of internal bleeding  - Monitor lab trends  - Patient is to report abnormal signs of bleeding to staff  - Avoid use of toothpicks and dental floss  - Use electric shaver for shaving  - Use soft bristle tooth brush  - Limit straining and forceful nose blowing  Outcome: Progressing     Problem: GASTROINTESTINAL - ADULT  Goal: Minimal or absence of nausea and vomiting  Description: INTERVENTIONS:  -  Maintain adequate hydration with IV or PO as ordered and tolerated  - Nasogastric tube to low intermittent suction as ordered  - Evaluate effectiveness of ordered antiemetic medications  - Provide nonpharmacologic comfort measures as appropriate  - Advance diet as tolerated, if ordered  - Obtain nutritional consult as needed  - Evaluate fluid balance  Outcome: Progressing  Goal: Maintains or returns to baseline bowel function  Description: INTERVENTIONS:  - Assess bowel function  - Maintain adequate hydration with IV or PO as ordered and tolerated  - Evaluate effectiveness of GI medications  - Encourage mobilization and activity  - Obtain nutritional consult as needed  - Establish a toileting routine/schedule  - Consider collaborating with pharmacy to review patient's medication profile  Outcome: Progressing  Goal: Maintains adequate nutritional intake (undernourished)  Description: INTERVENTIONS:  - Monitor percentage of each meal consumed  - Identify factors contributing to decreased intake, treat as appropriate  - Assist with meals as needed  - Monitor I&O, WT and lab values  - Obtain nutritional consult as needed  - Optimize oral hygiene and moisture  - Encourage food from home; allow for food preferences  - Enhance eating environment  Outcome: Progressing     Problem: Altered Communication/Language Barrier  Goal: Patient/Family is able to understand and participate in their care  Description: Interventions:  - Assess communication ability and preferred communication style  - Implement communication aides and strategies  - Use visual cues when possible  - Listen attentively, be patient, do not interrupt  - Minimize distractions  - Allow time for understanding and response  - Establish method for patient to ask for assistance (call light)  - Provide an  as needed  - Communicate barriers and strategies to overcome with those who interact with patient  Outcome: Progressing

## 2025-07-11 NOTE — CM/SW NOTE
Per [facility] liaison-unfortunately we are unable to accommodate on the Zerbaxa. I followed up with the admin team and insurance CM to see if there are any carve outs to help with cost and unfortunately his plan does not have any.    No other [facility] locations will accommodate the cost of this either.     CM met with wife and dtr Prashanth at bedside. Explained cost of meds and barrier to return to [facility].    They cannot afford cost or be able to admin 4 IV meds at home.    CM notified medical team with above. Tentative LTACH ref sent to [facility] for criteria    Plan  Pending    / to remain available for support and/or discharge planning.     Pooja Keita, JOSE    Ext [phone]

## 2025-07-11 NOTE — PROGRESS NOTES
INFECTIOUS DISEASE PROGRESS NOTE    Arnaldo Gutierrez Patient Status:  Inpatient    4/15/1945 MRN [id number]   Location [facility] 4W/SW/SE Attending Declan Stearns MD   Hosp Day # 6 PCP PHYSICIAN NONSTAFF       SUBJECTIVE  ROS done.  No complaints of pain. Just had IR drain placed.    ASSESSMENT/PLAN:    Antibiotics: Zerbaxa, dapto, flagyl, micafungin; (IV zosyn, fluconazole, augmentin, cefepime)     ASSESSMENT:     # Multiple large intra-abdominal abscesses               - now s/p IR drain placement on 25               - fluid cx so far with E. Coli, Enterobacter cloacae, Enterococcus faecalis, C. Glabrata, MDR PSAR, Lactobacillus               - surgery and IR following  # RUQ abscess s/p IR drainage , cx pending  # RUE DVT  # hx of Large bowel obstruction s/p OR 6/10/25 for lap transverse colon resection, small bowel resection x2, rsection of tumor nodule on small bowel - path with DLBCL               - s/p colonic stent placement by GI 25               - post op fluid collection s/p IR drain placement 25 with 260 cc of serosang fluid, cx negative      PLAN:  -  Continue on IV daptomycin, zerbaxa, metronidazole, and micafungin. Anticipate continued IV abx on DC. PICC in place.  -  FU IR drain cx.  -  Follow fever curve, wbc.  -  Reviewed labs, micro, imaging reports.  -  Case d/w patient using language line, RN.     History of Present Illness:  80 year old male with history of diabetes, HTN, BPH who was admitted on  with abdominal bloating, nausea, decreased appetite, unintentional weight loss. Initial CT A/P on  with 4 cm segment of masslike constriction and thickening in the proximal transverse colon with concern for partially obstructing chronic neoplasm. Seen by GI and underwent colonoscopy with chronic stent placement on . Also seen by general surgery and taken to the OR on 6/10 for large bowel obstruction status post laparoscopic assisted transverse colon  resection, small bowel resection and tumor nodule and small bowel. Pathology diffuse large B-cell lymphoma. Seen by oncology. On 6/19 had low-grade temperatures with increasing WBC up to 19 with repeat CT A/P showing moderate to large volume free fluid in the abdomen and pelvis in the upper quadrants. Organized fluid in the left lower retroperitoneum measuring 5.5 x 4.7 x 5.8 cm concern for seroma, liquefied hematoma versus inflammatory fluid. Seen by IR and underwent IR peritoneal drain placement on 6/19 with 260 cc of serosanguineous fluid. Cultures negative. Was on zosyn while inpatient and transitioned to PO augmentin on discharge. Discharged on 6/24/25 to subacute rehab. Came back to ED on 7/4/25 with abdominal pain. Repeat CT with multiple large peritoneal abscesses. Surgery and IR consult.  Now status post IR drainage with cx growing GNR and budding yeast.  Currently on IV Zosyn.  ID consulted for further antibiotics management.     OBJECTIVE  /60 (BP Location: Left leg)   Pulse 66   Temp 97.4 °F (36.3 °C) (Oral)   Resp 18   Ht 5' 11\" (1.803 m)   Wt 158 lb 4.8 oz (71.8 kg)   SpO2 96%   BMI 22.08 kg/m²     General: No acute distress. Alert.  HEENT: EOMI  Abdomen: Soft, nontender, nondistended. RUQ drain with minimal output; RUQ drain with purulent output, LLQ drain with tan output to gravity bag  Musculoskeletal: No edema  Integument: No rashes    [facility]  [phone]

## 2025-07-11 NOTE — PROGRESS NOTES
[facility]  part of [facility]    Progress Note    Arnaldo Gutierrez Patient Status:  Inpatient    4/15/1945 MRN [id number]   Location [facility] 4W/SW/SE Attending Rodrick Ramos MD   Hosp Day # 6 PCP PHYSICIAN NONSTAFF     Subjective:  Feels ok    Objective/Physical Exam:  General: Alert, orientated x3.  Cooperative.  No apparent distress.  Vital Signs:  Blood pressure 134/60, pulse 72, temperature 97.4 °F (36.3 °C), temperature source Oral, resp. rate 18, height 180.3 cm (5' 11\"), weight 158 lb 4.8 oz (71.8 kg), SpO2 96%.  Abdomen:  Soft,   Had drainage  Labs:  Lab Results   Component Value Date    WBC 4.5 2025    HGB 7.5 (L) 2025    HCT 24.1 (L) 2025    .0 2025    CREATSERUM 0.43 (L) 2025    BUN 6 (L) 2025     2025    K 3.3 (L) 2025     2025    CO2 28.0 2025     (H) 2025    CA 7.2 (L) 2025    ALB 2.1 (L) 07/10/2025    ALKPHO 182 (H) 2025    BILT 0.3 2025    TP 5.0 (L) 2025    AST 22 2025    ALT 16 2025    PTT 74.1 (H) 2025    TSH 2.417 2025    LIP 45 2025    MG 1.9 2025    PHOS 2.7 07/10/2025    CK <15 (L) 2025    B12 1,020 (H) 2025       Imaging:  CT ABDOMEN+PELVIS(CONTRAST ONLY)(CPT=74177)  Result Date: 2025  PROCEDURE: CT ABDOMEN+PELVIS(CONTRAST ONLY)(CPT=74177) INDICATIONS: follow up intraabdominal abscess COMPARISON: There are no comparisons for this exam. TECHNIQUE: Multislice CT scanning was performed from the dome of the diaphragm to the pubic symphysis with non-ionic intravenous contrast material. Post contrast coronal MPR imaging was performed. Automated exposure control dose reduction techniques were  used. Adjustment of the mA and/or kV was done based on the patient's size. Use of iterative reconstruction technique for dose reduction was used. Dose information is transmitted to the ACR (American College  of Radiology) NRDR (National Radiology Data Registry) which includes the Dose Index Registry. CONTRAST: IOPAMIDOL 76% IV SOLN FOR POWER INJECTOR:80 mL FINDINGS: LIVER: Normal. Portal vein and branches patent. Mass effect on the liver by the perihepatic abscesses. BILIARY: No evidence for cholecystitis or biliary dilatation. SPLEEN: Normal. PANCREAS: Normal. ADRENALS: Normal. KIDNEYS: Stable 2.5 cm posterior right interpolar renal cyst. No solid renal lesion or renal obstruction. Linear calcification in the lower pole the right kidney is likely vascular. AORTA/VASCULAR: Atherosclerotic vascular calcification including coronary artery calcification. No aneurysm or dissection. Major aortic branches patent. LYMPHADENOPATHY: None. GI/MESENTERY: Changes from transverse colon resection and 2 anastomotic staple lines in the left side of the abdomen from small bowel resections. No obstruction. ABDOMINAL WALL: Anasarca. Bilateral abdominal wall drains. Small amount of gas extending into a midline radu-incisional hernia. URINARY BLADDER: Normal. ASCITES/FLUID COLLECTIONS: Previously seen loculated gas/fluid collections in the anterior mid and upper abdomen have decreased in size with drainage catheter extending from left to right anteriorly. Tip of the drainage catheter is anterior to the gallbladder fossa. *  Right-sided subdiaphragmatic gas fluid collection measures 9.1 x 6.1 x 3.7 cm (prior 12.5 x 9.6 x 5.9 cm) *  Anterior to left lobe of liver just below the diaphragm measures 8.8 x 8 0.8 x 4.8 x 14.2 cm (prior 15.3 x 11.2 x 23 cm) *  Gas/fluid collection anteriorly to the left of the midline measures 2.2 x 13.4 cm on image 77 series 2 (prior 10.8 x 22.9 cm) *  Lateral perisplenic fluid collection with tiny focus of gas measures 6.3 x 2.3 cm (prior 7.9 x 2 2.8 cm) *  Small predominantly gas collection in the left lateral mid abdomen measuring 18 x 41 mm. *  Stable right infrahepatic paracolic drain with a small gas fluid  collection just posterior to the drain extending up to the edge of liver measuring 4.6 x 1.3 cm. *  Stable small 9 mm x 37 mm anterior right lower quadrant gas/fluid collection. *  Stable 3.5 x 2 cm cystlike gas fluid collection in left hemipelvis. *  Stable small amount of gastrosplenic ligament fluid without rim enhancement. PELVIC ORGANS: No suspect pelvic mass. BONES: No suspect bone lesion. LUNG BASES: Stable moderate bibasilar pleural effusions with compressive atelectasis. OTHER: Negative.     CONCLUSION: 1.  Bilateral abdominal drains with decreasing size to the abdominal abscesses. 2.  Small cystlike abscess in the left hemipelvis is unchanged and few other smaller abscess collections are unchanged. 3.  Transverse colectomy and sites of small bowel resection. No obstruction or contrast extravasation. 4.  Stable moderate bibasilar pleural effusions. 5.  Anasarca. Electronically Verified and Signed by Attending Radiologist: Chong Valdez MD 7/11/2025 12:52 AM Workstation: [id number]    CT CHEST (CPT=71250)  Result Date: 7/9/2025  PROCEDURE: CT CHEST (CPT=71250) INDICATIONS:  Upper extremity DVT COMPARISON: CT abdomen pelvis dated 7/4/2025 TECHNIQUE: Multidetector CT of the chest was obtained without non-ionic intravenous contrast material. Automated exposure control for dose reduction was used. Adjustment of the mA and/or kV was done based on the patient's size. Iterative reconstruction technique for dose reduction was employed. Dose information was transmitted to the ACR (American College of Radiology) NRDR (National Radiology Data Registry), which includes the Dose Index Registry. Oral contrast was ingested. FINDINGS: LUNGS/PLEURA/AIRWAYS:No consolidation, mass or suspicious nodule.Small to moderate bilateral pleural effusions and passive atelectasis. Central airways are patent. MEDIASTINUM:No mediastinal or hilar lymphadenopathy. VESSELS:  The ascending thoracic aorta measures approximately 40 mm.  Moderate coronary artery calcifications. CHEST WALL:Within normal limits. UPPER ABDOMEN: Partially visualized drainage catheter with decreased size of collections of fluid and air in the right upper quadrant. BONES: No suspicious osseous lesion or acute osseous abnormality.     CONCLUSION: 1.  Small to moderate bilateral pleural effusions with passive atelectasis. 2.  Partial visualized drainage catheter in the upper abdomen with decreased size of fluid and air collections in the right upper quadrant 3.  The ascending thoracic aorta measures 40 mm, the upper limits of normal. Electronically Verified and Signed by Attending Radiologist: Adam Neumann MD 7/9/2025 1:09 PM Workstation: [email or web address]    XR ABDOMEN (1 VIEW) (CPT=74018)  Result Date: 7/9/2025  PROCEDURE(S): XR ABDOMEN (1 VIEW) (CPT=74018) WORKSTATION: [employer] HISTORY/INDICATIONS: s/p bowel resections for lymphoma  possible bowel leak Abdominal distension. COMPARISON: 07/07/2025 XR SMALL BOWEL SINGLE CONTRAST (CPT=74250) FINDINGS: Lines And Tubes: Right upper quadrant drainage pigtail catheter is again seen. Left approach presumed surgical drain is again seen. Lower Thorax: Unremarkable. Bowel Gas Pattern: No gas-filled, dilated loops. No differential distention. Multiple bowel sutures are noted throughout the abdomen. Soft Tissues: No definite pneumoperitoneum. Previously described fluid collections are demonstrated to advantage on prior CT. Bones/Joints: No acute fracture.     CONCLUSION: No evidence of acute abnormality in the abdomen or pelvis. Multiple peritoneal drains are seen. Previously demonstrated fluid collections are demonstrated to advantage on prior CT. Electronically Verified and Signed by Attending Radiologist: Gelacio Bullock MD 7/9/2025 9:50 AM Workstation: [email or web address]    XR SMALL BOWEL SINGLE CONTRAST (CPT=74250)  Result Date: 7/9/2025  PROCEDURE: XR SMALL BOWEL SINGLE CONTRAST (CPT=74250) INDICATIONS: Multiple bowel resections for lymphoma.  Abscess with possible leak PATIENT STATED HISTORY: Recent multiple bowel resection surgeries, history of lymphoma COMPARISON: CT 7/4/2025 TECHNIQUE: Small bowel series with multiple serial films was performed in the usual manner.  A  abdominal radiograph was performed. Fluoro/Cine Image Number: 9 Fluoro Time: 1.4 minutes FINDINGS: No dilated bowel to suggest ileus or obstruction. No extravasation of enteric contrast was noted to diagnose or identify a source of leak. Focused attention was paid to regions of surgical staples and known prior abscesses using compression and magnification, confirming no evident leak. Transit time from stomach to colon was 90 minutes.     CONCLUSION: No evidence of bowel leak. If clinical suspicion persists, CT may be considered. Electronically Verified and Signed by Attending Radiologist: Sunil Murillo MD 7/9/2025 9:00 AM Workstation: [id number]    US VENOUS DOPPLER ARM RIGHT - DIAG IMG (CPT=93971)  Result Date: 7/8/2025  PROCEDURE: US VENOUS DOPPLER ARM RIGHT - DIAG IMG (CPT=93971) INDICATIONS: swelling in RUE TECHNIQUE: Color duplex Doppler venous ultrasound of the right upper extremity was performed in the usual manner. FINDINGS: The internal jugular, subclavian, axillary, brachial, cephalic and basilic veins appear normal. Flow was demonstrated with color and pulsed Doppler. Comparison scans of the left subclavian vein appear normal. There is acute expansile DVT within the right subclavian and axillary veins. The visualized segments of the right brachial vein, right cephalic vein, and right basilic vein are patent. There is moderate soft tissue edema within the right upper extremity.     CONCLUSION: Acute expansile occlusive DVT within the right subclavian and axillary veins. The visualized segments of the right brachial, basilic, and cephalic veins are patent. The patient's nurse was notified by telephone of these results at 10:41 a.m. on 7/8/2025. Electronically Verified and  Signed by Attending Radiologist: Duran Bhardwaj MD 7/8/2025 10:41 AM Workstation: [id number]    CT ABDOMEN+PELVIS(CONTRAST ONLY)(CPT=74177)  Result Date: 7/5/2025  PROCEDURE(S): CT ABDOMEN+PELVIS(CONTRAST ONLY)(CPT=74177) WORKSTATION: [id number] HISTORY/INDICATIONS: distended abdomen, recent surgery for mass resection, cath placement distended abdomen, recent surgery for mass resection, cath placement COMPARISON: 06/19/2025 CT ABDOMEN+PELVIS(CONTRAST ONLY)(CPT=74177) TECHNIQUE: Helical CT of the abdomen and pelvis was obtained following administration of intravenous contrast material. Axial, coronal, and sagittal reformatted images were created and interpreted. For this exam, one or more of the following dose reductions techniques were used: Automated exposure control Adjustment of the mA and/or kV according to patient size Use of iterative reconstruction technique FINDINGS: Lower Thorax: Moderate bilateral pleural effusions are again seen. Heart is normal size. Few airspace opacities are noted in the lingula series 3 image 1. There is atelectasis in the lower lobes. Liver: Unremarkable. Gallbladder/Biliary tree: Cholelithiasis without evidence of acute cholecystitis. No biliary ductal dilatation. Pancreas:  Unremarkable. Spleen: Unremarkable. Adrenal glands:  Unremarkable. Kidneys: Simple cyst arises from the right kidney posterior interpolar region. No suspicious renal lesions. Symmetric nephrograms. No hydronephrosis. Retroperitoneum/extraperitoneum: Unremarkable. Vasculature: No aneurysm or dissection. Mild atherosclerotic calcification is seen. Peritoneum: Multiple abscesses are seen: Left upper quadrant abscess measures 24.3 x 10.9 x 23.9 cm series 2 image 73; series 5 image 23. Air-fluid levels are seen. It extends into the abdominal wall series 2 image 73. Right upper quadrant perihepatic abscess communicates with the left upper quadrant abscess and measures 14.2 x 5.1 x 15.8 cm series 2 image 35; series 6  image 96. Perisplenic abscess measures 7.9 x 2.8 x 9.8 cm series 2 image 50; series 6 image 19. This abscess also likely communicates with the left upper quadrant abscess. Small lower midline suspected abscess measuring 3.0 x 2.7 x 3.5 cm. There is a right mid abdominal pigtail drainage catheter which may terminate within the colon series 2 image 80. GI tract/mesentery/omentum: Multiple prior bowel resections are noted. Suspected fistula in the left mid abdomen series 2 image 103. Possible pigtail drainage catheter within the ascending colon series 2 image 80. No bowel obstruction is identified. Urinary Bladder: The bladder is grossly unremarkable. Reproductive organs: Unremarkable. Body wall: Mild anasarca. The dominant abscess extends into the anterior abdominal wall along the surgical incision series 2 image 73. Bones:  There are mild degenerative changes in the spine.     CONCLUSION: 1.  Multiple large peritoneal abscesses are seen. The largest is centered in the left upper quadrant and left midabdomen and measures 24.3 x 10.9 x 23.9 cm with air-fluid levels and extends into the abdominal wall. This is contiguous with smaller right upper quadrant perihepatic and posterior left upper quadrant perisplenic abscesses. A separate small 3 cm abscess is noted adjacent to the bladder. Bladder diverticulum is thought less likely. 2.  Multiple prior bowel resections are seen. There may be fistulous connection between the bowel and left upper quadrant dominant abscess at the left mid abdomen. The location of a right-sided pigtail drainage catheter is uncertain but it may terminate within the ascending colon. 3.  Additional chronic and/or incidental findings are detailed in the body of this report. Preliminary report was given by [facility]. No clinically significant discrepancies are noted. Electronically Verified and Signed by Attending Radiologist: Gelacio Bullock MD 7/5/2025 7:14 AM Workstation: [email or web address]    CARD TTE  STRAIN W DOPPLER ONCOLOGY (CPT=93306)  Result Date: 2025  Transthoracic Echocardiogram Name:Arnaldo Gutierrez Date: 2025 :  04/15/1945 Ht:  (71in)  BP: 101 / 61 MRN:  [id number]    Age:  80years    Wt:  (171lb) HR: 88bpm Loc:  [facility]       Gndr: M          BSA: 1.97m^2 Sonographer: Amelia CHERY Ordering:    Crys Wilkins Consulting:  Rafita Torres ---------------------------------------------------------------------------- History/Indications:   Encounter for Monitoring Cardiotoxic Drug Therapy. ---------------------------------------------------------------------------- Procedure information:  A transthoracic complete 2D study was performed. Additional evaluation included M-mode, complete spectral Doppler, and color Doppler.  Patient status:  Inpatient.  Location:  Echo laboratory.    This was a routine study. Transthoracic echocardiography for diagnosis. Image quality was adequate. ECG rhythm:   Normal sinus ---------------------------------------------------------------------------- Conclusions: 1. Left ventricle: The cavity size was normal. Wall thickness was normal.    Systolic function was normal. The estimated ejection fraction was 60-65%,    by biplane method of disks. Wall motion is normal; there are no regional    wall motion abnormalities. Left ventricular diastolic function parameters    were normal. The Global Longitudinal Strain (GLS) was -19.40%. 2. Mitral valve: There was mild regurgitation. * ---------------------------------------------------------------------------- * Findings: Left ventricle:  The cavity size was normal. Wall thickness was normal. Systolic function was normal. The estimated ejection fraction was 60-65%, by biplane method of disks. Wall motion is normal; there are no regional wall motion abnormalities. The Global Longitudinal Strain (GLS) was -19.40%. Left ventricular diastolic function parameters were normal. Left atrium:  Well visualized. The atrium was normal in  size. Right ventricle:  The cavity size was normal. Systolic function was normal. Systolic pressure was within the normal range. Right atrium:  Well visualized. The atrium was normal in size. Mitral valve:  Well visualized. The leaflets were normal thickness. No evidence for prolapse.  Doppler:  Transvalvular velocity was within the normal range. There was no evidence for stenosis. There was mild regurgitation. Aortic valve:  Well visualized.  The valve was trileaflet. The leaflets were normal thickness.  Doppler:  Transvalvular velocity was within the normal range. There was no evidence for stenosis. There was trivial regurgitation. Tricuspid valve:  Well visualized. The annulus is normal-sized. The leaflets are normal thickness. No echocardiographic evidence for tricuspid prolapse. Doppler:  Transvalvular velocity was within the normal range. There was no evidence for stenosis. There was trivial regurgitation. Pulmonic valve:   Well visualized. The annulus is normal-sized. The leaflets are normal thickness. No evidence for prolapse.  Doppler:  Transvalvular velocity was within the normal range. There was no evidence for stenosis. There was no significant regurgitation. Pericardium:   There was no pericardial effusion. Pleura:  No evidence of pleural fluid accumulation. Aorta: Aortic root: The aortic root was normal. Ascending aorta: The ascending aorta was normal. Pulmonary arteries: There was no evidence of pulmonary hypertension. Systemic veins:  Central venous respirophasic diameter changes are in the normal range (>50%). Inferior vena cava: The IVC was normal-sized. ---------------------------------------------------------------------------- Measurements  Left ventricle                   Value         Ref  GLS, 2D                          -19.40 %      ---------  IVS thickness, ED, PLAX          0.9    cm     0.6 - 1.0  LV ID, ED, PLAX              (L) 3.5    cm     4.2 - 5.8  LV ID, ES, PLAX              (L)  2.1    cm     2.5 - 4.0  LV PW thickness, ED, PLAX        0.8    cm     0.6 - 1.0  IVS/LV PW ratio, ED, PLAX        1.13          ---------  LV PW/LV ID ratio, ED, PLAX      0.23          ---------  LV area, ES, A4C                 19.3   cm^2   ---------  LV ejection fraction             72     %      52 - 72  Stroke volume/bsa, 2D            43     ml/m^2 ---------  LV end-diastolic volume, 1-p     87     ml     69 - 185  A4C  LV end-systolic volume, 1-p      47     ml     22 - 78  A4C  LV ejection fraction, 1-p        57     %      46 - 74  A4C  Stroke volume, 1-p A4C           50     ml     ---------  LV end-diastolic volume/bsa,     44     ml/m^2 37 - 93  1-p A4C  LV end-systolic volume/bsa,      24     ml/m^2 12 - 40  1-p A4C  Stroke volume/bsa, 1-p A4C       25     ml/m^2 ---------  LV end-diastolic volume, 2-p     81     ml     62 - 150  LV end-systolic volume, 2-p      32     ml     21 - 61  LV ejection fraction, 2-p        60     %      52 - 72  Stroke volume, 2-p               48     ml     ---------  LV end-diastolic volume/bsa,     41     ml/m^2 34 - 74  2-p  LV end-systolic volume/bsa,      16     ml/m^2 11 - 31  2-p  Stroke volume/bsa, 2-p           24.5   ml/m^2 ---------  LV e', lateral                   14.9   cm/sec >=10.0  LV E/e', lateral                 6             <=13  LV e', medial                    12.5   cm/sec >=7.0  LV E/e', medial                  7             ---------  LV e', average                   13.7   cm/sec ---------  LV E/e', average                 6             <=14  LVOT                             Value         Ref  LVOT ID                          2.1    cm     ---------  LVOT peak velocity, S            1.22   m/sec  ---------  LVOT VTI, S                      24.7   cm     ---------  LVOT peak gradient, S            6      mm Hg  ---------  LVOT mean gradient, S            3      mm Hg  ---------  Stroke volume (SV), LVOT DP      86     ml     ---------  Stroke  index (SV/bsa), LVOT      43     ml/m^2 ---------  DP  Aortic valve                     Value         Ref  Aortic leaflet separation,       1.8    cm     ---------  MM  Aortic root                      Value         Ref  Aortic root ID, STJ, ED      (H) 3.6    cm     2.3 - 3.5  Ascending aorta                  Value         Ref  Ascending aorta ID               3.6    cm     2.2 - 3.8  Left atrium                      Value         Ref  LA volume, S                 (H) 62     ml     18 - 58  LA volume/bsa, S                 31     ml/m^2 16 - 34  LA volume, ES, 1-p A4C           53     ml     18 - 58  LA volume, ES, 1-p A2C       (H) 70     ml     18 - 58  LA volume, ES, A/L               68     ml     ---------  LA volume/bsa, ES, A/L           34     ml/m^2 16 - 34  Mitral valve                     Value         Ref  Mitral E-wave peak velocity      0.88   m/sec  ---------  Mitral A-wave peak velocity      0.99   m/sec  ---------  Mitral deceleration time         180    ms     ---------  Mitral peak gradient, D          3      mm Hg  ---------  Mitral E/A ratio, peak           0.9           ---------  Tricuspid valve                  Value         Ref  Tricuspid regurg peak            2.52   m/sec  <=2.8  velocity  Tricuspid peak RV-RA             25     mm Hg  ---------  gradient  Right ventricle                  Value         Ref  TAPSE, 2D                        2.50   cm     >=1.70  TAPSE, MM                        2.50   cm     >=1.70  RV s', lateral                   22.6   cm/sec >=9.5 Legend: (L)  and  (H)  farida values outside specified reference range. ---------------------------------------------------------------------------- Prepared and electronically signed by Ernst Parada 06/24/2025 13:42     US VENOUS DOPPLER LEG BILAT - DIAG IMG (CPT=93970)  Result Date: 6/24/2025  PROCEDURE: US VENOUS DOPPLER LEG BILAT - DIAG IMG (CPT=93970) INDICATIONS: right calf pain, r/o dvt COMPARISON: None TECHNIQUE: Color  duplex Doppler venous ultrasound of both lower extremities was performed in the usual manner. FINDINGS: Thrombus is present within the right common femoral vein as well as in the adjacent deep femoral vein draining into the common femoral vein. There is also nonocclusive thrombus within the proximal and distal aspect of the popliteal vein thrombus is otherwise expansile with heterogeneous grayscale appearance and there is absence of normal color flow and compressibility in these regions. The femoral vein appears normal. Normal flow was demonstrated with color and pulsed Doppler. Visualized portions of the great and small saphenous, posterior tibial and peroneal veins appear normal. Contralateral left lower extremity is without DVT within the common femoral, femoral, and popliteal veins. There is occlusive thrombus within a left soleal vein based on abnormal grayscale appearance, Vansil morphology of the vein and absence of normal color flow and Doppler venous waveforms.     CONCLUSION: 1. Abnormal exam. Right lower extremity has nonocclusive acute appearing thrombus within the common femoral vein as well as the visualized portions of the deep femoral vein. There is also nonocclusive thrombus within the right popliteal vein. 2. No DVT in the left lower extremity, however there is acute occlusive thrombus within a left soleal vein at the knee. Electronically Verified and Signed by Attending Radiologist: Jason Quiñones MD 6/24/2025 12:45 PM Workstation: [id number]    IR PERITONEAL DRAINAGE CATHETER  Result Date: 6/20/2025  PROCEDURE: IR PERITONEAL DRAINAGE CATHETER  INDICATIONS:  Status post colon resection, with postop intraperitoneal fluid  COMPARISON: None.  (S):  [place]  ESTIMATED BLOOD LOSS: Less than 5 mL  COMPLICATIONS: None  FINDINGS:  Informed consent was obtained. The patient was positioned supine. The right lower quadrant was sterilely prepped and draped. Preliminary ultrasound demonstrated complex  appearing septated fluid collection.. Local Lidocaine was administered. Under ultrasound guidance, a Yueh sheath needle was advanced into the collection. There was return of serosanguineous fluid from the access needle. Under ultrasound guidance, a wire was advanced through the needle. The access was dilated and a 10 Lithuanian pigtail catheter was coiled in the collection.  Via the drain, approximately 260 cc of serosanguineous fluid were aspirated.  Sample was sent for cultures and sensitivities.  The catheter was secured to the skin with sutures and attached to bulb suction.  Sterile dressing applied.         CONCLUSION: Insertion of drainage catheter into intra-abdominal ascites yielding 260 cc serosanguineous fluid.    Dictated by (CST): Norris Reilly MD on 6/20/2025 at 6:09 PM     Finalized by (CST): Norris Reilly MD on 6/20/2025 at 6:10 PM          CT ABDOMEN+PELVIS(CONTRAST ONLY)(CPT=74177)  Result Date: 6/19/2025  PROCEDURE: CT ABDOMEN + PELVIS (CONTRAST ONLY) (CPT=74177)  COMPARISON: [facility], CT ABDOMEN + PELVIS (CONTRAST ONLY) (CPT=74177), 6/05/2025, 5:22 PM.  INDICATIONS: S/p colon resection 6/10, leukocytosis and increased pain  TECHNIQUE: CT images of the abdomen and pelvis were obtained with non-ionic intravenous contrast material.  Automated exposure control for dose reduction was used. Adjustment of the mA and/or kV was done based on the patient's size. Use of iterative reconstruction technique for dose reduction was used.  Dose information is transmitted to the ACR (American College of Radiology) NRDR (National Radiology Data Registry) which includes the Dose Index Registry.  FINDINGS:  LIVER: No enlargement, atrophy, abnormal density, or significant focal lesion.  BILIARY: Intrahepatic biliary ductal dilatation.  Gallbladder is incompletely distended.  No significant extrahepatic biliary ductal dilatation. SPLEEN: Spleen is not enlarged. There are granulomatous calcifications in  the spleen. No suspicious splenic lesion. STOMACH: No gastric obstruction.  Duodenum is distended with oral contrast material but is otherwise without evidence of obstruction or inflammation. PANCREAS: No lesion, fluid collection, ductal dilatation, or atrophy.  ADRENALS: No nodule or enlargement. KIDNEYS: Cortical based low density focus in the posterior midpole of the right kidney suggesting simple cyst.  Bilaterally no suspicious enhancing renal lesion or hydroureteronephrosis. AORTA/VASCULAR:   Atherosclerosis of the abdominal aorta.  No aneurysm. BOWEL/MESENTERY:  Peripherally enhancing fluid collection in the left lower quadrant, above the roof of the urinary bladder and measuring approximately 55 x 47 x 58 mm (AP x transverse x CC).  There is also a moderate-large volume of free fluid within the abdomen and pelvis mainly along the peripheral aspect of the liver and spleen, both pericolic gutters and across the omentum.  Moderate volume of foci of gas are present within this fluid.  There is peritoneal thickening/enhancement in both pericolic  gutters.  No evidence of bowel obstruction.  No lymphadenopathy.  Post large bowel resection with reanastomosis in the region of the hepatic flexure without focal obstruction or inflammatory change at the anastomosis. ABDOMINAL WALL: Ventral line of skin staples are present above the umbilicus.  Additional scattered skin staples are present in the abdominal-pelvic anterior wall.  Mild body wall edema. URINARY BLADDER: Urinary bladder is distended with urine.  No stones or wall thickening. PELVIC NODES: No enlarged mass or adenopathy.   PELVIC ORGANS: No visible mass.  Pelvic organs appropriate for patient age.  BONES:   Mild endplate change and disc disease within the spine most advanced at L5-S1. LUNG BASES: Small-moderate sized bilateral pleural effusion.  Enhancing wedge-shaped pulmonary opacities with air bronchograms adjacent to the effusions likely representing  secondary compressive atelectasis.  The visualized heart has coronary artery calcifications. OTHER: Negative.          CONCLUSION:  1.  Post large bowel resection in the region of the proximal transverse colon/hepatic flexure.  No obstruction or inflammation at the surgical site.  There is a moderate-large volume of free fluid within the abdomen and pelvis mainly distributed in both upper quadrants, across the omentum, and in the pericolic gutters.  Moderate volume of free air is also present in the same distribution as the fluid.  These may be related to recent surgery.  Organized fluid collection in the left lower retroperitoneum/left lower quadrant measuring approximately 55 x 47 x 58 mm which could represent a seroma, liquified hematoma, or other inflammatory fluid collection.  Peritoneal enhancement and thickening in the both pericolic gutters which could represent phlegmonous change, early organization of fluid collections with similar differential as the aforementioned organized collection, or peritonitis. 2.  Moderate-sized bilateral pleural effusions.  Enhancing bibasilar pulmonary opacities with air bronchograms likely representing secondary compressive atelectasis. 3.  Coronary atherosclerosis. 4.  Right renal cyst. 5.  Mild body wall edema/anasarca.  Skin staples across the abdominal-pelvic anterior wall compatible with surgical access sites.   Dictated by (CST): Jason Quiñones MD on 6/19/2025 at 5:29 PM     Finalized by (CST): Jason Quiñones MD on 6/19/2025 at 5:36 PM          XR ABDOMEN (1 VIEW) (CPT=74018)  Result Date: 6/13/2025  PROCEDURE: XR ABDOMEN (1 VIEW) (CPT=74018)  COMPARISON: [facility], XR CHEST AP PORTABLE (CPT=71045), 6/13/2025, 8:58 AM.  [facility], CT ABDOMEN + PELVIS (CONTRAST ONLY) (CPT=74177), 6/05/2025, 5:22 PM.  [facility], XR ABDOMEN (1 VIEW) (CPT=74018), 6/10/2025, 6:07 PM.  INDICATIONS: History of presumed colon cancer status post  transverse colonic resection and partial small bowel resection on 06/10/2025.  TECHNIQUE:   Single view.   FINDINGS:  BOWEL GAS PATTERN: An enteric tube tip in distal side hole project over the left upper quadrant the abdomen in the expected location of the stomach.  There is bowel gas throughout the abdomen, including the rectum.  No dilated gas-filled loops of bowel are seen to suggest obstruction.  Anastomotic staple lines are again noted in the right upper and left lower quadrants. SOFT TISSUES: Normal.  No masses or organomegaly.  CALCIFICATIONS: None significant. BONES: The osseous structures are unchanged. OTHER: Vertically oriented skin staples again project over the central abdomen.  Additional skin staples project over both lower quadrants and pelvis.  There is ill-defined lucency throughout the abdomen, which is consistent with the pneumoperitoneum visible on the preceding chest x-ray.         CONCLUSION:  1. Postoperative changes from a recent transverse colonic resection and partial small bowel resection including persistent pneumoperitoneum. 2. Nonobstructive bowel gas pattern.     Dictated by (CST): Chuy Schmid MD on 6/13/2025 at 11:38 AM     Finalized by (CST): Chuy Schmid MD on 6/13/2025 at 11:41 AM          XR CHEST AP PORTABLE  (CPT=71045)  Result Date: 6/13/2025  PROCEDURE: XR CHEST AP PORTABLE  (CPT=71045) TIME: 8:59.   COMPARISON: [facility], CT ABDOMEN + PELVIS (CONTRAST ONLY) (CPT=74177), 6/05/2025, 5:22 PM.  [facility], XR CHEST AP PORTABLE (CPT=71045), 6/13/2025, 6:40 AM.  INDICATIONS: Post NG advancement.  Metastatic colon cancer status post transverse colonic resection on 06/10/2025.  Acute postoperative blood loss with hypotension.  TECHNIQUE:   Single view.   FINDINGS:  CARDIAC/VASC: The cardiac silhouette is not enlarged.  There is calcification of the aortic knob.  Unremarkable pulmonary vasculature.  MEDIAST/KRYSTA:   No visible mass or  adenopathy. LUNGS/PLEURA: The lungs are again hypoinflated.  Streaky opacities at the lung bases with elevation of the right hemidiaphragm are all unchanged.  No pleural effusion or pneumothorax. BONES: There are mild spondylotic changes in the thoracic spine. OTHER: An enteric tube has been advanced.  The tip in distal side hole project over the gastric bubble in the left upper quadrant of the abdomen.  A right-sided PICC tip projects over the mid SVC.  Free air is again noted beneath the right hemidiaphragm,  likely related to the recent surgery.         CONCLUSION:  1. Enteric tube has been advanced with tip and distal side hole now projecting over the gastric bubble, in customary position. 2. Expiratory chest with stable atelectasis at both lung bases. 3. Persistent pneumoperitoneum, likely related to the recent abdominal surgery.    Dictated by (CST): Chuy Schmid MD on 6/13/2025 at 9:19 AM     Finalized by (CST): Chuy Schmid MD on 6/13/2025 at 9:21 AM          XR CHEST AP PORTABLE  (CPT=71045)  Result Date: 6/13/2025  PROCEDURE: XR CHEST AP PORTABLE  (CPT=71045) TIME: 6:43.   COMPARISON: [facility], XR ABDOMEN (1 VIEW) (CPT=74018), 6/10/2025, 6:07 PM.  [facility], CT ABDOMEN + PELVIS (CONTRAST ONLY) (CPT=74177), 6/05/2025, 5:22 PM.  INDICATIONS: Hypoxia.  Suspected metastatic colon cancer status post transverse colonic resection on 06/10/2025.  Acute postoperative blood loss with hypotension.  TECHNIQUE:   Single view.   FINDINGS:  CARDIAC/VASC: Normal.  No cardiac silhouette abnormality or cardiomegaly.  Unremarkable pulmonary vasculature.  MEDIAST/KRYSTA:   No visible mass or adenopathy. LUNGS/PLEURA: The lungs are hypoinflated.  Streaky opacities have developed at the lung bases with elevation of the right hemidiaphragm.  No pleural effusion or pneumothorax. BONES: There are mild spondylotic changes in the thoracic spine. OTHER: An enteric tube tip again projects  over the gastric bubble.  The distal side hole projects over the expected location of the distal esophagus.  A right-sided PICC tip projects over the mid SVC.  Free air is noted beneath the right hemidiaphragm, likely related to the recent surgery.         CONCLUSION:  1. Enteric tube tip projects over the stomach, although the distal side hole projects over the distal esophagus.  Consider advancing approximately 7.0 cm into the stomach. 2. Expiratory chest with the development of atelectasis at both lung bases. 3. Pneumoperitoneum, likely related to the recent abdominal surgery.    Dictated by (CST): Chuy Schmid MD on 6/13/2025 at 7:16 AM     Finalized by (CST): Chuy Schmid MD on 6/13/2025 at 7:19 AM            Assessment/Plan:  Problem List[1]  Stable    PT  THUY ALSTON MD  7/11/2025  11:24 AM       [1]   Patient Active Problem List  Diagnosis    BPH (benign prostatic hyperplasia)    Chronic low back pain    Chronic pain of both knees    Depression with anxiety    Generalized osteoarthritis    Hyperlipidemia    Type 2 diabetes mellitus without complication, without long-term current use of insulin (HCC)    Essential hypertension    Bipolar I disorder, single manic episode (HCC)    Large bowel obstruction (HCC)    Colonic mass    Diffuse large B-cell lymphoma of solid organ excluding spleen    Pneumoperitoneum    Abdominal distension    Anasarca    Intraabdominal fluid collection

## 2025-07-12 ENCOUNTER — APPOINTMENT (OUTPATIENT)
Dept: GENERAL RADIOLOGY | Facility: HOSPITAL | Age: 80
End: 2025-07-12
Attending: HOSPITALIST
Payer: MEDICARE

## 2025-07-12 PROBLEM — I82.621 ACUTE DEEP VEIN THROMBOSIS (DVT) OF BRACHIAL VEIN OF RIGHT UPPER EXTREMITY (HCC): Status: ACTIVE | Noted: 2025-07-12

## 2025-07-12 PROBLEM — C83.33 DIFFUSE LARGE B-CELL LYMPHOMA OF INTRA-ABDOMINAL LYMPH NODES (HCC): Status: ACTIVE | Noted: 2025-07-12

## 2025-07-12 LAB
APTT PPP: 105 SECONDS (ref 23–36)
APTT PPP: 68.3 SECONDS (ref 23–36)
BASOPHILS # BLD AUTO: 0.02 X10(3) UL (ref 0–0.2)
BASOPHILS NFR BLD AUTO: 0.3 %
DEPRECATED RDW RBC AUTO: 57.8 FL (ref 35.1–46.3)
EOSINOPHIL # BLD AUTO: 0 X10(3) UL (ref 0–0.7)
EOSINOPHIL NFR BLD AUTO: 0 %
ERYTHROCYTE [DISTWIDTH] IN BLOOD BY AUTOMATED COUNT: 19 % (ref 11–15)
GLUCOSE BLDC GLUCOMTR-MCNC: 120 MG/DL (ref 70–99)
GLUCOSE BLDC GLUCOMTR-MCNC: 143 MG/DL (ref 70–99)
GLUCOSE BLDC GLUCOMTR-MCNC: 147 MG/DL (ref 70–99)
GLUCOSE BLDC GLUCOMTR-MCNC: 190 MG/DL (ref 70–99)
HCT VFR BLD AUTO: 26.5 % (ref 39–53)
HGB BLD-MCNC: 8.3 G/DL (ref 13–17.5)
IMM GRANULOCYTES # BLD AUTO: 0.04 X10(3) UL (ref 0–1)
IMM GRANULOCYTES NFR BLD: 0.5 %
LYMPHOCYTES # BLD AUTO: 1.21 X10(3) UL (ref 1–4)
LYMPHOCYTES NFR BLD AUTO: 15.4 %
MCH RBC QN AUTO: 26.8 PG (ref 26–34)
MCHC RBC AUTO-ENTMCNC: 31.3 G/DL (ref 31–37)
MCV RBC AUTO: 85.5 FL (ref 80–100)
MONOCYTES # BLD AUTO: 0.47 X10(3) UL (ref 0.1–1)
MONOCYTES NFR BLD AUTO: 6 %
NEUTROPHILS # BLD AUTO: 6.14 X10 (3) UL (ref 1.5–7.7)
NEUTROPHILS # BLD AUTO: 6.14 X10(3) UL (ref 1.5–7.7)
NEUTROPHILS NFR BLD AUTO: 77.8 %
PLATELET # BLD AUTO: 419 10(3)UL (ref 150–450)
POTASSIUM SERPL-SCNC: 3.7 MMOL/L (ref 3.5–5.1)
RBC # BLD AUTO: 3.1 X10(6)UL (ref 3.8–5.8)
WBC # BLD AUTO: 7.9 X10(3) UL (ref 4–11)

## 2025-07-12 PROCEDURE — 99233 SBSQ HOSP IP/OBS HIGH 50: CPT | Performed by: HOSPITALIST

## 2025-07-12 PROCEDURE — 71045 X-RAY EXAM CHEST 1 VIEW: CPT | Performed by: RADIOLOGY

## 2025-07-12 RX ORDER — TAMSULOSIN HYDROCHLORIDE 0.4 MG/1
0.8 CAPSULE ORAL
Status: DISCONTINUED | OUTPATIENT
Start: 2025-07-12 | End: 2025-07-19

## 2025-07-12 RX ORDER — PREGABALIN 50 MG/1
100 CAPSULE ORAL 3 TIMES DAILY
Status: DISCONTINUED | OUTPATIENT
Start: 2025-07-12 | End: 2025-07-19

## 2025-07-12 RX ORDER — HYDROCODONE BITARTRATE AND ACETAMINOPHEN 5; 325 MG/1; MG/1
1 TABLET ORAL EVERY 4 HOURS PRN
Refills: 0 | Status: DISCONTINUED | OUTPATIENT
Start: 2025-07-12 | End: 2025-07-19

## 2025-07-12 RX ORDER — HYDROCODONE BITARTRATE AND ACETAMINOPHEN 10; 325 MG/1; MG/1
1 TABLET ORAL EVERY 4 HOURS PRN
Refills: 0 | Status: DISCONTINUED | OUTPATIENT
Start: 2025-07-12 | End: 2025-07-19

## 2025-07-12 NOTE — PHYSICAL THERAPY NOTE
PHYSICAL THERAPY TREATMENT NOTE - INPATIENT     Room Number: 212/212-A       Presenting Problem: ABD pain with large retroperitoneal abscess. Pt is s/p IR procedure with 2 ABD drain placed on 7/05.   Pt with recent admission s/p on 6/12 ABD sx including SB resection x 2 and resection of tumor .  DVT noted last admission right LE and left LE currently therapeautic on heparin .     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      Patient is requiring minimal assist as a result of the following impairments: decreased functional strength and decreased muscular endurance.     Patient continues to function below baseline with bed mobility, transfers, gait, and standing prolonged periods.  Next session anticipate patient to progress bed mobility, transfers, gait, and standing prolonged periods.  Physical Therapy will continue to follow patient for duration of hospitalization.    Patient continues to benefit from continued skilled PT services: at discharge to promote prior level of function.  Anticipate patient will return home with home health PT, however pt has IV meds at d/c and would benefit from GR vs LTACH for therapy + medication.    PLAN DURING HOSPITALIZATION  Nursing Mobility Recommendation : 1 Assist  PT Device Recommendation: Rolling walker, Gait belt  PT Treatment Plan: Bed mobility, Body mechanics, Endurance, Energy conservation, Patient education, Family education, Gait training, Strengthening, Transfer training, Balance training  Frequency (Obs): 5x/week      SUBJECTIVE  I'm not strong.    OBJECTIVE  Precautions:  needed, Limb alert - right (Belgian-speaking)    WEIGHT BEARING RESTRICTION       PAIN ASSESSMENT   Ratin  Location: c/o weakness  Management Techniques: Activity promotion, Body mechanics, Breathing techniques, Relaxation, Repositioning    BALANCE  Static Sitting: Not tested  Dynamic Sitting: Not tested  Static Standing: Not tested  Dynamic Standing: Not tested    ACTIVITY TOLERANCE  Pulse: 69  Heart Rate Source: Monitor     BP: 156/66  BP Location: Left leg  BP Method: Automatic  Patient Position: Lying     O2 WALK  Oxygen Therapy  SPO2% on Room Air at Rest: 97    AM-PAC '6-Clicks' INPATIENT SHORT FORM - BASIC MOBILITY  How much difficulty does the patient currently have...  Patient Difficulty: Turning over in bed (including adjusting bedclothes, sheets and blankets)?: A Little   Patient Difficulty: Sitting down on and standing up from a chair with arms (e.g., wheelchair, bedside commode, etc.): A Little   Patient Difficulty: Moving from lying on back to sitting on the side of the bed?: A Little   How much help from another person does the patient currently need...   Help from Another: Moving to and from a bed to a chair (including a wheelchair)?: A Little   Help from Another: Need to walk in hospital room?: A Little   Help from Another: Climbing 3-5 steps with a railing?: A Lot     AM-PAC Score:  Raw Score: 17   Approx Degree of Impairment: 50.57%   Standardized Score (AM-PAC Scale): 42.13   CMS Modifier (G-Code): CK    FUNCTIONAL ABILITY STATUS  Functional Mobility/Gait Assessment  Gait Assistance: Not tested  Distance (ft): 0  Assistive Device: Rolling walker  Pattern: Shuffle    Bed mobility, transfers, and gait not assessed this session d/c nausea. JOSE Hook aware.    Skilled Therapy Provided: Pt ok to be seen per JOSE Hook. Pt now s/p perihepatic drain on , denied pain at drain site. Pt Belgian speaking, language line   Amanda ([id number]) used for session. Pt educ in role of PT and PT POC. Pt willing to participate and get OOB to R side. Initiated line management. Pt then c/o nausea, emesis bit provided. Pt wretched x1 but did not throw up. Pt provided with time. Pt declined participating in bed mobility, transfers, and gait at this time. Pt educ in basic supine therex, willing to complete later today. Educ limited by nausea. RN aware of nausea.    The patient's Approx Degree of Impairment: 50.57% has been calculated based on documentation in the [facility] '6 clicks' Inpatient Daily Activity Short Form.  Research supports that patients with this level of impairment may benefit from LTAC vs GR--needed d/t IV meds.  Final disposition will be made by interdisciplinary medical team.    THERAPEUTIC EXERCISES  Lower Extremity Ankle pumps  Heel slides     Position Supine       Patient End of Session: In bed, Needs met, Call light within reach, RN aware of session/findings, All patient questions and concerns addressed, Hospital anti-slip socks    CURRENT GOALS   Patient Goal Patient's self-stated goal is: return to [facility]    Goal #1 Patient is able to demonstrate supine - sit EOB @ level: CGA support log roll sequencing       Goal #1   Current Status NOT MET/IN PROGRESS    Goal #2 Patient is able to demonstrate transfers sit to stand and SPT with RW  at assistance level: CGA  with walker - rolling      Goal #2  Current Status MET 7/9/25   Goal #3 Patient is able to ambulate 20-40  feet progress distance as appropriate with assist device: walker - rolling at assistance level: CGA    Goal #3   Current Status MET 7/9/25   Goal #4 Patient is able to ambulate 150 feet with assist device: walker - rolling at assistance level: [place]       Goal #4   Current Status NEW GOAL 7/9/25    NT 7/12/25   Goal #5 Patient to demonstrate independence with home activity/exercise instructions provided to patient in preparation for discharge.   Goal #5   Current  Status IN PROGRESS/ONGOING    Goal #6     Goal #6  Current Status       Therapeutic Exercise: 10 minutes

## 2025-07-12 NOTE — PLAN OF CARE
Problem: Patient Centered Care  Goal: Patient preferences are identified and integrated in the patient's plan of care  Description: Interventions:  - What would you like us to know as we care for you? I came from [facility]  - Provide timely, complete, and accurate information to patient/family  - Incorporate patient and family knowledge, values, beliefs, and cultural backgrounds into the planning and delivery of care  - Encourage patient/family to participate in care and decision-making at the level they choose  - Honor patient and family perspectives and choices  Outcome: Progressing     Problem: Diabetes/Glucose Control  Goal: Glucose maintained within prescribed range  Description: INTERVENTIONS:  - Monitor Blood Glucose as ordered  - Assess for signs and symptoms of hyperglycemia and hypoglycemia  - Administer ordered medications to maintain glucose within target range  - Assess barriers to adequate nutritional intake and initiate nutrition consult as needed  - Instruct patient on self management of diabetes  Outcome: Progressing     Problem: PAIN - ADULT  Goal: Verbalizes/displays adequate comfort level or patient's stated pain goal  Description: INTERVENTIONS:  - Encourage pt to monitor pain and request assistance  - Assess pain using appropriate pain scale  - Administer analgesics based on type and severity of pain and evaluate response  - Implement non-pharmacological measures as appropriate and evaluate response  - Consider cultural and social influences on pain and pain management  - Manage/alleviate anxiety  - Utilize distraction and/or relaxation techniques  - Monitor for opioid side effects  - Notify MD/LIP if interventions unsuccessful or patient reports new pain  - Anticipate increased pain with activity and pre-medicate as appropriate  Outcome: Progressing     Problem: RISK FOR INFECTION - ADULT  Goal: Absence of fever/infection during anticipated neutropenic period  Description: INTERVENTIONS  -  Monitor WBC  - Administer growth factors as ordered  - Implement neutropenic guidelines  Outcome: Progressing     Problem: SAFETY ADULT - FALL  Goal: Free from fall injury  Description: INTERVENTIONS:  - Assess pt frequently for physical needs  - Identify cognitive and physical deficits and behaviors that affect risk of falls.  - [place] fall precautions as indicated by assessment.  - Educate pt/family on patient safety including physical limitations  - Instruct pt to call for assistance with activity based on assessment  - Modify environment to reduce risk of injury  - Provide assistive devices as appropriate  - Consider OT/PT consult to assist with strengthening/mobility  - Encourage toileting schedule  Outcome: Progressing     Problem: HEMATOLOGIC - ADULT  Goal: Free from bleeding injury  Description: (Example usage: patient with low platelets)  INTERVENTIONS:  - Avoid intramuscular injections, enemas and rectal medication administration  - Ensure safe mobilization of patient  - Hold pressure on venipuncture sites to achieve adequate hemostasis  - Assess for signs and symptoms of internal bleeding  - Monitor lab trends  - Patient is to report abnormal signs of bleeding to staff  - Avoid use of toothpicks and dental floss  - Use electric shaver for shaving  - Use soft bristle tooth brush  - Limit straining and forceful nose blowing  Outcome: Progressing  Goal: Maintains hematologic stability  Description: INTERVENTIONS  - Assess for signs and symptoms of bleeding or hemorrhage  - Monitor labs and vital signs for trends  - Administer supportive blood products/factors, fluids and medications as ordered and appropriate  - Administer supportive blood products/factors as ordered and appropriate  Outcome: Progressing     Problem: Altered Communication/Language Barrier  Goal: Patient/Family is able to understand and participate in their care  Description: Interventions:  - Assess communication ability and preferred  communication style  - Implement communication aides and strategies  - Use visual cues when possible  - Listen attentively, be patient, do not interrupt  - Minimize distractions  - Allow time for understanding and response  - Establish method for patient to ask for assistance (call light)  - Provide an  as needed  - Communicate barriers and strategies to overcome with those who interact with patient  Outcome: Progressing     Problem: GENITOURINARY - ADULT  Goal: Absence of urinary retention  Description: INTERVENTIONS:  - Assess patient’s ability to void and empty bladder  - Monitor intake/output and perform bladder scan as needed  - Follow urinary retention protocol/standard of care  - Consider collaborating with pharmacy to review patient's medication profile  - Implement strategies to promote bladder emptying  Outcome: Progressing     Problem: SKIN/TISSUE INTEGRITY - ADULT  Goal: Skin integrity remains intact  Description: INTERVENTIONS  - Assess and document risk factors for pressure ulcer development  - Assess and document skin integrity  - Monitor for areas of redness and/or skin breakdown  - Initiate interventions, skin care algorithm/standards of care as needed  Outcome: Progressing

## 2025-07-12 NOTE — PROGRESS NOTES
[facility]  part of [facility]  Hospitalist Progress Note     Arnaldo Gutierrez Patient Status:  Inpatient    4/15/1945  80 year old CSN [id number]   Location 454/454-A Attending Rodrick Ramos MD   Hosp Day # 7 PCP PHYSICIAN NONSTAFF     Assessment & Plan:   ----------------------------------  Intra-abdominal abscesses.  Status post IR drainage x2 .  Small bowel follow-through  negative for leak.  Repeat IR drainage  for perihepatic fluid collection.  - Pain control  - Maintain drains  - ID, general surgery on consult  - Continue zerbaxa, daptomycin, Flagyl, micafungin    DVT, acute.  Location is RUE (new), RLE (old).  Pulmonary embolism not present. Contributing factors malignancy, immobility.  -Anticoagulation: Heparin drip, changed to p.o. per heme.  -Hypercoagulable work-up not indicated  -Light activity as tolerated    Small pneumothorax noted during  procedure.  Aspirated at the time.  Patient having pleuritic pain likely related to drain placement rather than expanding pneumothorax.  - Chest x-ray    B-cell lymphoma.  Recent diagnosis.  - Oncology    Other problems  Hypertension  Dyslipidemia  GERD  BPH  Anxiety  Depression    Supplementary Documentation:   DVT Mechanical Prophylaxis:     Early ambuation  DVT Pharmacologic Prophylaxis   Medication    heparin (Porcine) 58831 units/250mL infusion PE/DVT/THROMBUS CONTINUOUS                Code Status: Full Code  Muñoz: External urinary catheter in place  Muñoz Duration (in days):   Central line:    ANNELISE: 2025        **Certification      PHYSICIAN Certification of Need for Inpatient Hospitalization - Initial Certification    Patient will require inpatient services that will reasonably be expected to span two midnight's based on the clinical documentation in H+P.   Based on patients current state of illness, I anticipate that, after discharge, patient will require TBD.           I personally reviewed the available  laboratories, imaging including. I discussed/will discuss the case with consultants. I ordered laboratories and/or radiographic studies. I adjusted medications as detailed above.  Medical decision making high, risk is high.    Subjective:   ----------------------------------  Minimal pain, no overnight events    Objective:   Chief Complaint:   Chief Complaint   Patient presents with    Abdomen/Flank Pain     ----------------------------------  Temp:  [97.1 °F (36.2 °C)-98.1 °F (36.7 °C)] 98.1 °F (36.7 °C)  Pulse:  [53-72] 63  Resp:  [10-27] 14  BP: (133-171)/(54-76) 158/66  SpO2:  [93 %-98 %] 94 %  Gen: A+Ox3.  No distress.   HEENT: NCAT, neck supple, no carotid bruit.  CV: RRR, S1S2, and intact distal pulses. No gallop, rub, murmur.  Pulm: Effort and breath sounds normal. No distress, wheezes, rales, rhonchi.  Abd: Soft, nontender nondistended.  Right upper quadrant and left lower quadrant drains in place with purulent output  Neuro: Normal reflexes, CN. Sensory/motor exams grossly normal deficit.   MS: No joint effusions.  No peripheral edema.  Skin: Skin is warm and dry. No rashes, erythema, diaphoresis.   Psych: Normal mood and affect. Calm, cooperative    Labs:  Lab Results   Component Value Date    HGB 8.3 (L) 07/12/2025    WBC 7.9 07/12/2025    .0 07/12/2025     07/11/2025    K 3.7 07/12/2025    CREATSERUM 0.43 (L) 07/11/2025    AST 22 07/07/2025    ALT 16 07/07/2025           Scheduled Medications[1]  PRN Medications[2]                 [1]    vancomycin  125 mg Oral Daily    insulin aspart  1-5 Units Subcutaneous q6h    ceftolozane-tazobactam (ZERBAXA) 3 g in sodium chloride 0.9% 100 mL IVPB  3 g Intravenous Q8H    DAPTOmycin  500 mg Intravenous Q24H    micafungin  100 mg Intravenous Q24H    metroNIDAZOLE  500 mg Oral BID    pantoprazole  40 mg Intravenous Daily   [2]   HYDROmorphone **OR** HYDROmorphone    HYDROcodone-acetaminophen    glycerin-hypromellose-    melatonin    temazepam     glucose **OR** glucose **OR** glucose-vitamin C **OR** dextrose **OR** glucose **OR** glucose **OR** glucose-vitamin C    acetaminophen    ondansetron    metoclopramide

## 2025-07-12 NOTE — PROGRESS NOTES
Hematology/Oncology Progress Note    Patient Name: Arnaldo Gutirerez  Medical Record Number: [id number]    YOB: 1945     Reason for Consultation:  Arnaldo Gutierrez was seen today for the diagnosis of VTE    Interval events:      Patient seen at bedside, eating his dinner.  Denies having any worsening abdominal pain.  Right arm swelling has improved.  Worsening leg swelling.  No bleeding noted.    Inpatient Meds:  Scheduled Medications[1]  Medication Infusions[2]    PRN Meds:  PRN Medications[3]    Allergies:   Allergies[4]    Vital Signs:  Height: --  Weight: --  BSA (Calculated - sq m): --  Pulse: 60 (07/12 0600)  BP: 157/59 (07/12 0600)  Temp: 97.1 °F (36.2 °C) (07/12 0400)  Do Not Use - Resp Rate: --  SpO2: 94 % (07/12 0600)    Wt Readings from Last 6 Encounters:   07/05/25 71.8 kg (158 lb 4.8 oz)   07/02/25 77.6 kg (171 lb)   06/24/25 77.7 kg (171 lb 3.2 oz)   05/28/25 67.1 kg (148 lb)   04/09/25 72.6 kg (160 lb)   04/08/25 72.6 kg (160 lb)       Physical Examination:  General: Patient is alert and oriented, not in acute distress  Psych: Mood and affect are appropriate  CV: Regular rate and rhythm,   Respiratory: Bilateral air entry_  GI/Abd: Soft, drains noted  Neurological: Grossly intact     Laboratory:  Recent Labs   Lab 07/09/25  0648 07/10/25  0635 07/11/25  0502 07/12/25  0532   WBC 7.3 5.0 4.5 7.9   HGB 8.8* 7.8* 7.5* 8.3*   HCT 28.5* 24.7* 24.1* 26.5*   .0* 455.0* 405.0 419.0   MCV 85.8 84.9 85.2 85.5   RDW 17.2* 17.9* 18.3* 19.0*   NEPRELIM 4.66 2.67  --  6.14       Recent Labs   Lab 07/06/25  0228 07/06/25  0228 07/07/25  0706 07/08/25  0718 07/09/25  0648 07/10/25  0635 07/10/25  1415 07/11/25  0502 07/12/25  0532   *  --  135* 137 136 139  --  138  --    K 3.9  --  3.3* 3.5  3.5 3.3* 3.0*  3.0* 3.5 3.3* 3.7     --  104 103 101 104  --  105  --    CO2 28.0  --  26.0 27.0 29.0 28.0  --  28.0  --    BUN 12  --  8* 7* 5* 6*  --  6*  --    CREATSERUM 0.53*  --  0.48*  0.47* 0.49* 0.48*  --  0.43*  --    *  --  139* 135* 157* 154*  --  150*  --    CA 7.2*  --  7.1* 7.4* 7.7* 7.5*  --  7.2*  --    PHOS  --    < > 3.0 2.9 3.0 2.7  --   --   --    TP 5.0*  --  5.0*  --   --   --   --   --   --    ALB 2.0*  --  2.0* 2.1* 2.3* 2.1*  --   --   --    ALKPHO 175*  --  182*  --   --   --   --   --   --    AST 11  --  22  --   --   --   --   --   --    ALT 9*  --  16  --   --   --   --   --   --    BILT 0.3  --  0.3  --   --   --   --   --   --     < > = values in this interval not displayed.       Recent Labs   Lab 07/11/25  1252 07/11/25  1928 07/12/25  0532   PTT 40.1* 79.5* 105.0*       Imaging:    CXR reviewed    Impression & Plan:     80 year old male with a history of diabetes hypertension GERD and other comorbidities who was admitted 6/5/2025 with iron deficiency anemia and partial large bowel obstruction with a mass in the transverse colon.  Went for colonoscopy 6/6 and found to have a malignant stricture of the transverse colon and underwent colonic stenting.  He went for transverse colon resection 6/10 and reportedly there were numerous peritoneal or omental metastasis and enlarged lymph nodes.  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  Postoperative course was complicated by intra-abdominal abscess.     Given the acute infection, we will defer treatment of his lymphoma until he recovers from the acute infection.  Discussed with daughter and family about the increased risk of sepsis and mortality while treating with active infection.  No signs of any bowel obstruction or peritonitis.  Follow-up outpatient for management of his lymphoma.     DVT  Of the right upper extremity.  CT of the chest did not reveal any external compression of the veins by the enlarged lymph node.Developed while on IV heparin with therapeutic PTT. Likely due to the underlying inflammation and lymphoma.      On heparin to higher intensity. PTT therapeutic.   - no  episodes of bleeding noted.   - Hb stable     Hematology will continue to follow along peripherally.     Please reach out with questions./  .      Crys Wilkins MD  Hematology/Medical Oncology               [1]    vancomycin  125 mg Oral Daily    insulin aspart  1-5 Units Subcutaneous q6h    ceftolozane-tazobactam (ZERBAXA) 3 g in sodium chloride 0.9% 100 mL IVPB  3 g Intravenous Q8H    DAPTOmycin  500 mg Intravenous Q24H    micafungin  100 mg Intravenous Q24H    metroNIDAZOLE  500 mg Oral BID    pantoprazole  40 mg Intravenous Daily   [2]    continuous dose heparin 1,100 Units/hr (07/12/25 0641)   [3]   HYDROmorphone **OR** HYDROmorphone    HYDROcodone-acetaminophen    glycerin-hypromellose-    melatonin    temazepam    glucose **OR** glucose **OR** glucose-vitamin C **OR** dextrose **OR** glucose **OR** glucose **OR** glucose-vitamin C    acetaminophen    ondansetron    metoclopramide  [4] No Known Allergies

## 2025-07-13 LAB
ANION GAP SERPL CALC-SCNC: 7 MMOL/L (ref 0–18)
APTT PPP: 54.7 SECONDS (ref 23–36)
APTT PPP: 95.7 SECONDS (ref 23–36)
BASOPHILS # BLD AUTO: 0.01 X10(3) UL (ref 0–0.2)
BASOPHILS NFR BLD AUTO: 0.1 %
BUN BLD-MCNC: 11 MG/DL (ref 9–23)
BUN/CREAT SERPL: 21.2 (ref 10–20)
CALCIUM BLD-MCNC: 7.8 MG/DL (ref 8.7–10.4)
CHLORIDE SERPL-SCNC: 101 MMOL/L (ref 98–112)
CO2 SERPL-SCNC: 28 MMOL/L (ref 21–32)
CREAT BLD-MCNC: 0.52 MG/DL (ref 0.7–1.3)
DEPRECATED RDW RBC AUTO: 58.4 FL (ref 35.1–46.3)
EGFRCR SERPLBLD CKD-EPI 2021: 103 ML/MIN/1.73M2 (ref 60–?)
EOSINOPHIL # BLD AUTO: 0.02 X10(3) UL (ref 0–0.7)
EOSINOPHIL NFR BLD AUTO: 0.2 %
ERYTHROCYTE [DISTWIDTH] IN BLOOD BY AUTOMATED COUNT: 19.4 % (ref 11–15)
GLUCOSE BLD-MCNC: 204 MG/DL (ref 70–99)
GLUCOSE BLDC GLUCOMTR-MCNC: 195 MG/DL (ref 70–99)
GLUCOSE BLDC GLUCOMTR-MCNC: 199 MG/DL (ref 70–99)
GLUCOSE BLDC GLUCOMTR-MCNC: 236 MG/DL (ref 70–99)
GLUCOSE BLDC GLUCOMTR-MCNC: 255 MG/DL (ref 70–99)
GLUCOSE BLDC GLUCOMTR-MCNC: 256 MG/DL (ref 70–99)
GLUCOSE BLDC GLUCOMTR-MCNC: 280 MG/DL (ref 70–99)
HCT VFR BLD AUTO: 26.2 % (ref 39–53)
HGB BLD-MCNC: 8.3 G/DL (ref 13–17.5)
IMM GRANULOCYTES # BLD AUTO: 0.04 X10(3) UL (ref 0–1)
IMM GRANULOCYTES NFR BLD: 0.4 %
LYMPHOCYTES # BLD AUTO: 1.17 X10(3) UL (ref 1–4)
LYMPHOCYTES NFR BLD AUTO: 13.1 %
MCH RBC QN AUTO: 26.8 PG (ref 26–34)
MCHC RBC AUTO-ENTMCNC: 31.7 G/DL (ref 31–37)
MCV RBC AUTO: 84.5 FL (ref 80–100)
MONOCYTES # BLD AUTO: 0.66 X10(3) UL (ref 0.1–1)
MONOCYTES NFR BLD AUTO: 7.4 %
NEUTROPHILS # BLD AUTO: 7.05 X10 (3) UL (ref 1.5–7.7)
NEUTROPHILS # BLD AUTO: 7.05 X10(3) UL (ref 1.5–7.7)
NEUTROPHILS NFR BLD AUTO: 78.8 %
OSMOLALITY SERPL CALC.SUM OF ELEC: 287 MOSM/KG (ref 275–295)
PLATELET # BLD AUTO: 421 10(3)UL (ref 150–450)
POTASSIUM SERPL-SCNC: 3.4 MMOL/L (ref 3.5–5.1)
RBC # BLD AUTO: 3.1 X10(6)UL (ref 3.8–5.8)
SODIUM SERPL-SCNC: 136 MMOL/L (ref 136–145)
WBC # BLD AUTO: 9 X10(3) UL (ref 4–11)

## 2025-07-13 PROCEDURE — 99233 SBSQ HOSP IP/OBS HIGH 50: CPT | Performed by: HOSPITALIST

## 2025-07-13 RX ORDER — POTASSIUM CHLORIDE 1500 MG/1
40 TABLET, EXTENDED RELEASE ORAL ONCE
Status: COMPLETED | OUTPATIENT
Start: 2025-07-13 | End: 2025-07-13

## 2025-07-13 RX ORDER — HEPARIN SODIUM AND DEXTROSE 10000; 5 [USP'U]/100ML; G/100ML
INJECTION INTRAVENOUS CONTINUOUS
Status: DISCONTINUED | OUTPATIENT
Start: 2025-07-13 | End: 2025-07-18

## 2025-07-13 NOTE — PLAN OF CARE
Problem: Diabetes/Glucose Control  Goal: Glucose maintained within prescribed range  Description: INTERVENTIONS:  - Monitor Blood Glucose as ordered  - Assess for signs and symptoms of hyperglycemia and hypoglycemia  - Administer ordered medications to maintain glucose within target range  - Assess barriers to adequate nutritional intake and initiate nutrition consult as needed  - Instruct patient on self management of diabetes  Outcome: Progressing     Problem: PAIN - ADULT  Goal: Verbalizes/displays adequate comfort level or patient's stated pain goal  Description: INTERVENTIONS:  - Encourage pt to monitor pain and request assistance  - Assess pain using appropriate pain scale  - Administer analgesics based on type and severity of pain and evaluate response  - Implement non-pharmacological measures as appropriate and evaluate response  - Consider cultural and social influences on pain and pain management  - Manage/alleviate anxiety  - Utilize distraction and/or relaxation techniques  - Monitor for opioid side effects  - Notify MD/LIP if interventions unsuccessful or patient reports new pain  - Anticipate increased pain with activity and pre-medicate as appropriate  Outcome: Progressing     Problem: RISK FOR INFECTION - ADULT  Goal: Absence of fever/infection during anticipated neutropenic period  Description: INTERVENTIONS  - Monitor WBC  - Administer growth factors as ordered  - Implement neutropenic guidelines  Outcome: Progressing     Problem: SAFETY ADULT - FALL  Goal: Free from fall injury  Description: INTERVENTIONS:  - Assess pt frequently for physical needs  - Identify cognitive and physical deficits and behaviors that affect risk of falls.  - Zullinger fall precautions as indicated by assessment.  - Educate pt/family on patient safety including physical limitations  - Instruct pt to call for assistance with activity based on assessment  - Modify environment to reduce risk of injury  - Provide assistive  devices as appropriate  - Consider OT/PT consult to assist with strengthening/mobility  - Encourage toileting schedule  Outcome: Progressing     Problem: HEMATOLOGIC - ADULT  Goal: Free from bleeding injury  Description: (Example usage: patient with low platelets)  INTERVENTIONS:  - Avoid intramuscular injections, enemas and rectal medication administration  - Ensure safe mobilization of patient  - Hold pressure on venipuncture sites to achieve adequate hemostasis  - Assess for signs and symptoms of internal bleeding  - Monitor lab trends  - Patient is to report abnormal signs of bleeding to staff  - Avoid use of toothpicks and dental floss  - Use electric shaver for shaving  - Use soft bristle tooth brush  - Limit straining and forceful nose blowing  Outcome: Progressing  Goal: Maintains hematologic stability  Description: INTERVENTIONS  - Assess for signs and symptoms of bleeding or hemorrhage  - Monitor labs and vital signs for trends  - Administer supportive blood products/factors, fluids and medications as ordered and appropriate  - Administer supportive blood products/factors as ordered and appropriate  Outcome: Progressing  Goal: Free from bleeding injury  Description: (Example usage: patient with low platelets)  INTERVENTIONS:  - Avoid intramuscular injections, enemas and rectal medication administration  - Ensure safe mobilization of patient  - Hold pressure on venipuncture sites to achieve adequate hemostasis  - Assess for signs and symptoms of internal bleeding  - Monitor lab trends  - Patient is to report abnormal signs of bleeding to staff  - Avoid use of toothpicks and dental floss  - Use electric shaver for shaving  - Use soft bristle tooth brush  - Limit straining and forceful nose blowing  Outcome: Progressing     Problem: GASTROINTESTINAL - ADULT  Goal: Minimal or absence of nausea and vomiting  Description: INTERVENTIONS:  - Maintain adequate hydration with IV or PO as ordered and tolerated  -  Nasogastric tube to low intermittent suction as ordered  - Evaluate effectiveness of ordered antiemetic medications  - Provide nonpharmacologic comfort measures as appropriate  - Advance diet as tolerated, if ordered  - Obtain nutritional consult as needed  - Evaluate fluid balance  Outcome: Progressing  Goal: Maintains or returns to baseline bowel function  Description: INTERVENTIONS:  - Assess bowel function  - Maintain adequate hydration with IV or PO as ordered and tolerated  - Evaluate effectiveness of GI medications  - Encourage mobilization and activity  - Obtain nutritional consult as needed  - Establish a toileting routine/schedule  - Consider collaborating with pharmacy to review patient's medication profile  Outcome: Progressing  Goal: Maintains adequate nutritional intake (undernourished)  Description: INTERVENTIONS:  - Monitor percentage of each meal consumed  - Identify factors contributing to decreased intake, treat as appropriate  - Assist with meals as needed  - Monitor I&O, WT and lab values  - Obtain nutritional consult as needed  - Optimize oral hygiene and moisture  - Encourage food from home; allow for food preferences  - Enhance eating environment  Outcome: Progressing     Problem: GENITOURINARY - ADULT  Goal: Absence of urinary retention  Description: INTERVENTIONS:  - Assess patient’s ability to void and empty bladder  - Monitor intake/output and perform bladder scan as needed  - Follow urinary retention protocol/standard of care  - Consider collaborating with pharmacy to review patient's medication profile  - Implement strategies to promote bladder emptying  Outcome: Progressing     Problem: SKIN/TISSUE INTEGRITY - ADULT  Goal: Skin integrity remains intact  Description: INTERVENTIONS  - Assess and document risk factors for pressure ulcer development  - Assess and document skin integrity  - Monitor for areas of redness and/or skin breakdown  - Initiate interventions, skin care  algorithm/standards of care as needed  Outcome: Progressing  Goal: Incision(s), wounds(s) or drain site(s) healing without S/S of infection  Description: INTERVENTIONS:  - Assess and document risk factors for pressure ulcer development  - Assess and document skin integrity  - Assess and document dressing/incision, wound bed, drain sites and surrounding tissue  - Implement wound care per orders  - Initiate isolation precautions as appropriate  - Initiate Pressure Ulcer prevention bundle as indicated  Outcome: Progressing  Goal: Oral mucous membranes remain intact  Description: INTERVENTIONS  - Assess oral mucosa and hygiene practices  - Implement preventative oral hygiene regimen  - Implement oral medicated treatments as ordered  Outcome: Progressing     Problem: COPING  Goal: Pt/Family able to verbalize concerns and demonstrate effective coping strategies  Description: INTERVENTIONS:  - Assist patient/family to identify coping skills, available support systems and cultural and spiritual values  - Provide emotional support, including active listening and acknowledgement of concerns of patient and caregivers  - Reduce environmental stimuli, as able  - Instruct patient/family in relaxation techniques, as appropriate  - Assess for spiritual and psychosocial needs and initiate Spiritual Care or Behavioral Health consult as needed  Outcome: Progressing

## 2025-07-13 NOTE — PROGRESS NOTES
[facility]  part of [facility]  Hospitalist Progress Note     Arnaldo Gutierrez Patient Status:  Inpatient    4/15/1945  80 year old CSN [id number]   Location 454/454-A Attending Rodrick Ramos MD   Hosp Day # 8 PCP PHYSICIAN NONSTAFF     Assessment & Plan:   ----------------------------------  Intra-abdominal abscesses.  Status post IR drainage x2 .  Small bowel follow-through  negative for leak.  Repeat IR drainage  for perihepatic fluid collection..  Pain is improved  - Pain control  - Maintain drains, drain study   - ID, general surgery on consult  - Continue zerbaxa, daptomycin, Flagyl, micafungin    DVT, acute.  Location is RUE (new), RLE (old).  Pulmonary embolism not present. Contributing factors malignancy, immobility.  -Anticoagulation: Heparin drip, change to p.o after procedures are completely done  -Hypercoagulable work-up not indicated  -Light activity as tolerated    Small pneumothorax resolved    B-cell lymphoma.  Recent diagnosis.  - Oncology  - Port placement     Other problems  Hypertension  Dyslipidemia  GERD  BPH  Anxiety  Depression    Supplementary Documentation:   DVT Mechanical Prophylaxis:     Early ambuation  DVT Pharmacologic Prophylaxis   Medication    heparin (Porcine) 28421 units/250mL infusion PE/DVT/THROMBUS CONTINUOUS                Code Status: Full Code  Muñoz: External urinary catheter in place  Muñoz Duration (in days):   Central line:    ANNELISE: 2025        **Certification      PHYSICIAN Certification of Need for Inpatient Hospitalization - Initial Certification    Patient will require inpatient services that will reasonably be expected to span two midnight's based on the clinical documentation in H+P.   Based on patients current state of illness, I anticipate that, after discharge, patient will require TBD.           I personally reviewed the available laboratories, imaging including. I discussed/will discuss the case with consultants.  I ordered laboratories and/or radiographic studies. I adjusted medications as detailed above.  Medical decision making high, risk is high.  Discussed with family at the bedside    Subjective:   ----------------------------------  Right-sided pain improved.  Has been ambulating today.    Objective:   Chief Complaint:   Chief Complaint   Patient presents with    Abdomen/Flank Pain     ----------------------------------  Temp:  [97.2 °F (36.2 °C)-97.5 °F (36.4 °C)] 97.4 °F (36.3 °C)  Pulse:  [60-82] 69  Resp:  [9-30] 19  BP: (136-158)/(62-77) 146/77  SpO2:  [92 %-97 %] 95 %  Gen: A+Ox3.  No distress.   HEENT: NCAT, neck supple, no carotid bruit.  CV: RRR, S1S2, and intact distal pulses. No gallop, rub, murmur.  Pulm: Effort and breath sounds normal. No distress, wheezes, rales, rhonchi.  Abd: Soft, nontender nondistended.  Right upper quadrant and left lower quadrant drains in place with purulent output  Neuro: Normal reflexes, CN. Sensory/motor exams grossly normal deficit.   MS: No joint effusions.  No peripheral edema.  Skin: Skin is warm and dry. No rashes, erythema, diaphoresis.   Psych: Normal mood and affect. Calm, cooperative    Labs:  Lab Results   Component Value Date    HGB 8.3 (L) 07/13/2025    WBC 9.0 07/13/2025    .0 07/13/2025     07/13/2025    K 3.4 (L) 07/13/2025    CREATSERUM 0.52 (L) 07/13/2025    AST 22 07/07/2025    ALT 16 07/07/2025           Scheduled Medications[1]  PRN Medications[2]                   [1]    FLUoxetine  20 mg Oral Daily    pregabalin  100 mg Oral TID    tamsulosin  0.8 mg Oral After dinner    vancomycin  125 mg Oral Daily    insulin aspart  1-5 Units Subcutaneous q6h    ceftolozane-tazobactam (ZERBAXA) 3 g in sodium chloride 0.9% 100 mL IVPB  3 g Intravenous Q8H    DAPTOmycin  500 mg Intravenous Q24H    micafungin  100 mg Intravenous Q24H    metroNIDAZOLE  500 mg Oral BID    pantoprazole  40 mg Intravenous Daily   [2]   HYDROcodone-acetaminophen     HYDROcodone-acetaminophen    HYDROmorphone **OR** HYDROmorphone    glycerin-hypromellose-    melatonin    temazepam    glucose **OR** glucose **OR** glucose-vitamin C **OR** dextrose **OR** glucose **OR** glucose **OR** glucose-vitamin C    acetaminophen    ondansetron    metoclopramide

## 2025-07-13 NOTE — SPIRITUAL CARE NOTE
Spiritual Care Visit Note    Patient Name: Arnaldo Gutierrez Date of Spiritual Care Visit: 25   : 4/15/1945 Primary Dx: Abdominal distension       Referred By: Referral From:     Spiritual Care Taxonomy:    Intended Effects: Build relationship of care and support    Methods: Accompany someone in their spiritual/Judaism practice outside your south tradition, Collaborate with care team member, Encourage self care, Encourage self reflection, Encourage sharing of feelings, Encourage story-telling, Offer emotional support, Offer spiritual/Judaism support, Offer support    Interventions: Acknowledge current situation, Acknowledge response to difficult experience, Active listening, Explain  role, Provide hospitality, Silent prayer    Visit Type/Summary:     - Spiritual Care: Responded to a request for spiritual care and assessed patient and family member for spiritual care needs. Consulted with RN prior to visit. Offered empathic listening and emotional support. Patient expressed appreciation for  visit. Provided information regarding how to contact Spiritual Care and left a Spiritual Care information card. Patient is feeling better even a long time hospitalization. Faithful and hopeful that everything is going to be ok. He has family support every day. Son at beside.  remains available as needed for follow up.    Spiritual Care support can be requested via an Epic consult. For urgent/immediate needs, please contact the On Call  at: [place]: ext [phone]    Hamilton SCHNEIDER  Chaplain Resident  [id number]

## 2025-07-13 NOTE — DISCHARGE INSTRUCTIONS
Therapy Referral    [facility]   [address]   [address] [place]   [address]  [phone]    Flush Left drain with 20 ml of normal saline q12 hours. Flush Right drain with 10 ml of normal saline [id number]

## 2025-07-13 NOTE — BH PROGRESS NOTE
[facility] was consulted for PHQ-4 score = 6.     Writer met with pt and son at bedside. Pt is calm and pleasant. Per chart review pt has a hx of depression with anxiety and is prescribed Prozac 20mg. Pt reports that since he has been in the hospital he does not feel anxious or depressed; pt expressed prior to coming to hospital that he felt anxious and depressed due to medical concerns.     Per chart review pt had SI attempt in 2018; currently pt denies SI. No safety concerns reported or observed. Pt will continue care with current PCP following discharge. Pt agreeable to additional referrals; they will be located in discharge instructions. Pt had no further questions/concerns at this time.

## 2025-07-13 NOTE — PLAN OF CARE
Patient afebrile. Belly soft with hypoactive bowel sounds. Incision site with steri strips dry and intact. Drains intact.Remains on heparin drip. For transfer to the floor.    Problem: Patient Centered Care  Goal: Patient preferences are identified and integrated in the patient's plan of care  Description: Interventions:  - What would you like us to know as we care for you? I came from [facility]  - Provide timely, complete, and accurate information to patient/family  - Incorporate patient and family knowledge, values, beliefs, and cultural backgrounds into the planning and delivery of care  - Encourage patient/family to participate in care and decision-making at the level they choose  - Honor patient and family perspectives and choices  Outcome: Progressing     Problem: Diabetes/Glucose Control  Goal: Glucose maintained within prescribed range  Description: INTERVENTIONS:  - Monitor Blood Glucose as ordered  - Assess for signs and symptoms of hyperglycemia and hypoglycemia  - Administer ordered medications to maintain glucose within target range  - Assess barriers to adequate nutritional intake and initiate nutrition consult as needed  - Instruct patient on self management of diabetes  Outcome: Progressing     Problem: Patient/Family Goals  Goal: Patient/Family Long Term Goal  Description: Patient's Long Term Goal: Discharge  home    Interventions:  -Monitor vitals  -Monitor labs  - Heparin gtt  - Glucose monitoring ACHS/q6h  - Monitor and manage NGOZI drains  - PT/OT on consult  - IR on consult  - Manage pain   - Monitor midline incision  - Remote tele   - IV abx  - ID onc onsult  - Gen sx on consult   - See additional Care Plan goals for specific interventions  Outcome: Progressing  Goal: Patient/Family Short Term Goal  Description: Patient's Short Term Goal: Manage pain    Interventions:   - Frequent pain assessments  - Non-pharmacological interventions  - Pain medications as needed/indicated  - See additional  Care Plan goals for specific interventions  Outcome: Progressing     Problem: PAIN - ADULT  Goal: Verbalizes/displays adequate comfort level or patient's stated pain goal  Description: INTERVENTIONS:  - Encourage pt to monitor pain and request assistance  - Assess pain using appropriate pain scale  - Administer analgesics based on type and severity of pain and evaluate response  - Implement non-pharmacological measures as appropriate and evaluate response  - Consider cultural and social influences on pain and pain management  - Manage/alleviate anxiety  - Utilize distraction and/or relaxation techniques  - Monitor for opioid side effects  - Notify MD/LIP if interventions unsuccessful or patient reports new pain  - Anticipate increased pain with activity and pre-medicate as appropriate  Outcome: Progressing     Problem: SAFETY ADULT - FALL  Goal: Free from fall injury  Description: INTERVENTIONS:  - Assess pt frequently for physical needs  - Identify cognitive and physical deficits and behaviors that affect risk of falls.  - [place] fall precautions as indicated by assessment.  - Educate pt/family on patient safety including physical limitations  - Instruct pt to call for assistance with activity based on assessment  - Modify environment to reduce risk of injury  - Provide assistive devices as appropriate  - Consider OT/PT consult to assist with strengthening/mobility  - Encourage toileting schedule  Outcome: Progressing     Problem: DISCHARGE PLANNING  Goal: Discharge to home or other facility with appropriate resources  Description: INTERVENTIONS:  - Identify barriers to discharge w/pt and caregiver  - Include patient/family/discharge partner in discharge planning  - Arrange for needed discharge resources and transportation as appropriate  - Identify discharge learning needs (meds, wound care, etc)  - Arrange for interpreters to assist at discharge as needed  - Consider post-discharge preferences of  patient/family/discharge partner  - Complete POLST form as appropriate  - Assess patient's ability to be responsible for managing their own health  - Refer to Case Management Department for coordinating discharge planning if the patient needs post-hospital services based on physician/LIP order or complex needs related to functional status, cognitive ability or social support system  Outcome: Progressing     Problem: Altered Communication/Language Barrier  Goal: Patient/Family is able to understand and participate in their care  Description: Interventions:  - Assess communication ability and preferred communication style  - Implement communication aides and strategies  - Use visual cues when possible  - Listen attentively, be patient, do not interrupt  - Minimize distractions  - Allow time for understanding and response  - Establish method for patient to ask for assistance (call light)  - Provide an  as needed  - Communicate barriers and strategies to overcome with those who interact with patient  Outcome: Progressing

## 2025-07-14 LAB
APTT PPP: 68.8 SECONDS (ref 23–36)
CK SERPL-CCNC: <15 U/L (ref 46–171)
GLUCOSE BLDC GLUCOMTR-MCNC: 160 MG/DL (ref 70–99)
GLUCOSE BLDC GLUCOMTR-MCNC: 265 MG/DL (ref 70–99)
GLUCOSE BLDC GLUCOMTR-MCNC: 280 MG/DL (ref 70–99)
GLUCOSE BLDC GLUCOMTR-MCNC: 311 MG/DL (ref 70–99)
POTASSIUM SERPL-SCNC: 3.4 MMOL/L (ref 3.5–5.1)

## 2025-07-14 PROCEDURE — 99233 SBSQ HOSP IP/OBS HIGH 50: CPT | Performed by: HOSPITALIST

## 2025-07-14 RX ORDER — POTASSIUM CHLORIDE 1500 MG/1
40 TABLET, EXTENDED RELEASE ORAL ONCE
Status: COMPLETED | OUTPATIENT
Start: 2025-07-14 | End: 2025-07-14

## 2025-07-14 NOTE — CM/SW NOTE
7/17 912am: [facility] been reserved in [facility].  SW will start insurance auth via [facility] portal when pt. Is closer to discharge.  MDs have been notified.   --------------------  7/15 1152am: SW received notifications from other facilities that the pt's insurance will do a carve out for expensive medications.  BIANKA requested [facility] to look into this again.  [facility] re-reviewed and is willing to accept the pt. BIANKA contacted the pt's dtr. Yao who is aware and agreeable to the above.  The pt. Is not ready at this time.  SW to submit for insurance auth when pt. Is closer to discharge.  Per [facility] when SW submits for auth via [facility] medication carve out can be requested.  BIANKA notified attending MD and ID MD of the above.    [facility] will be reserved when [facility] referral closes.  -------------------------  7/14 1100am: [facility] and [facility] are not able to accept due to the cost of the iv abx.    Referral has been extended and sent to 61 providers to see if any are able to accept with the current medications.      BIANKA/ANTHONY will continue to follow.     SHAMA Ridley ext [phone]

## 2025-07-14 NOTE — PHYSICAL THERAPY NOTE
PHYSICAL THERAPY TREATMENT NOTE - INPATIENT     Room Number: 456/456-A       Presenting Problem: ABD pain with large retroperitoneal abscess. Pt is s/p IR procedure with 2 ABD drain placed on 7/05.   Pt with recent admission s/p on 6/12 ABD sx including SB resection x 2 and resection of tumor .  DVT noted last admission right LE and left LE currently therapeautic on heparin .     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      Patient is requiring contact guard assist and minimal assist as a result of the following impairments: decreased functional strength, decreased endurance/aerobic capacity, impaired dynamic balance, and decreased muscular endurance.     Patient continues to function below baseline with bed mobility, transfers, gait, stair negotiation, maintaining seated position, and standing prolonged periods.  Next session anticipate patient to progress bed mobility, transfers, gait, stair negotiation, maintaining seated position, and standing prolonged periods.  Physical Therapy will continue to follow patient for duration of hospitalization.    Patient continues to benefit from continued skilled PT services: at discharge to promote prior level of function.  Anticipate patient will return home with home health PT, however pt has IV meds at d/c and would benefit from [facility]  for therapy + medication.    PLAN DURING HOSPITALIZATION  Nursing Mobility Recommendation : 1 Assist  PT Device Recommendation: Rolling walker, Gait belt  PT Treatment Plan: Bed mobility, Body  mechanics, Coordination, Endurance, Energy conservation, Patient education, Gait training, Strengthening, Transfer training, Balance training  Frequency (Obs): 5x/week     SUBJECTIVE  Pt was agreeable to therapy session.         OBJECTIVE  Precautions:  needed, Limb alert - right ([place]ian-speaking)    WEIGHT BEARING RESTRICTION       PAIN ASSESSMENT   Ratin  Location: c/o weakness  Management Techniques: Activity promotion, Body mechanics, Relaxation, Repositioning    BALANCE  Static Sitting: Good  Dynamic Sitting: Fair +  Static Standing: Fair -  Dynamic Standing: Fair -    ACTIVITY TOLERANCE  Pulse: (!) 144  Heart Rate Source: Monitor                    O2 WALK  Oxygen Therapy  SPO2% Ambulation on Room Air: 94    AM-PAC '6-Clicks' INPATIENT SHORT FORM - BASIC MOBILITY  How much difficulty does the patient currently have...  Patient Difficulty: Turning over in bed (including adjusting bedclothes, sheets and blankets)?: A Little   Patient Difficulty: Sitting down on and standing up from a chair with arms (e.g., wheelchair, bedside commode, etc.): A Little   Patient Difficulty: Moving from lying on back to sitting on the side of the bed?: A Little   How much help from another person does the patient currently need...   Help from Another: Moving to and from a bed to a chair (including a wheelchair)?: A Little   Help from Another: Need to walk in hospital room?: A Little   Help from Another: Climbing 3-5 steps with a railing?: A Lot     AM-PAC Score:  Raw Score: 17   Approx Degree of Impairment: 50.57%   Standardized Score (AM-PAC Scale): 42.13   CMS Modifier (G-Code): CK    FUNCTIONAL ABILITY STATUS  Functional Mobility/Gait Assessment  Gait Assistance: Minimum assistance  Distance (ft): 250'  Assistive Device: Rolling walker  Pattern: Shuffle (narrow JOHNNA)  Rolling: stand-by assist  Supine to Sit: stand-by assist  Sit to Supine: NT  Sit to Stand: contact guard assist    Skilled Therapy Provided: Pt is  received in the bed with family present and was cleared for therapy session. Co treat session with OT. Pt denied any dizziness and light headedness. Pt is SBA with bed mobility and to transfer to the EOB. Pt is CGA with sit<>stand transfers with the RW. Pt was able to AMB about 250' with the RW min/CGA for balance and safety. Pt with slightly unsteady inially but balance improved with distance. Pt also fatigues quickly with AMB and with some SOB. Returned pt back to the room and to sitting in the w/c. Pt's SpO2 was 94% after AMB but HR was 144 bpm. Pt is repositioned and left in the w/c with all needs within reach. Pt prefers sitting in the w/c vs the bedside chair. Reported ot the RN on the status of the pt.     The patient's Approx Degree of Impairment: 50.57% has been calculated based on documentation in the [facility] '6 clicks' Inpatient Daily Activity Short Form.  Research supports that patients with this level of impairment may benefit from Rehab.  Final disposition will be made by interdisciplinary medical team.      Patient End of Session: Up in chair, Needs met, Call light within reach, RN aware of session/findings, All patient questions and concerns addressed, Hospital anti-slip socks, Family present    CURRENT GOALS   Goals to be met by: 7/25/25  Patient Goal Patient's self-stated goal is: return to [facility]    Goal #1 Patient is able to demonstrate supine - sit EOB @ level: CGA support log roll sequencing       Goal #1   Current Status SBA    Goal #2 Patient is able to demonstrate transfers sit to stand and SPT with RW  at assistance level: CGA  with walker - rolling      Goal #2  Current Status CGA with the RW    Goal #3 Patient is able to ambulate 20-40  feet progress distance as appropriate with assist device: walker - rolling at assistance level: CGA    Goal #3   Current Status Same a below 7/14   Goal #4 Patient is able to ambulate 150 feet with assist device: walker - rolling at assistance level: [place]        Goal #4   Current Status NEW GOAL 7/9/25    250' with the RW min/CGA    Goal #5 Patient to demonstrate independence with home activity/exercise instructions provided to patient in preparation for discharge.   Goal #5   Current Status IN PROGRESS/ONGOING    Goal #6     Goal #6  Current Status         Therapeutic Activity: 25 minutes

## 2025-07-14 NOTE — DIETARY NOTE
ADULT NUTRITION REASSESSMENT    Pt is at moderate nutrition risk.  Pt does not meet malnutrition criteria at this time.     RECOMMENDATIONS TO MD: See nutrition intervention for ONS (oral nutrition supplements)    ADMITTING DIAGNOSIS:  Pneumoperitoneum [K66.8]  Anasarca [R60.1]  Abdominal distension [R14.0]  Intraabdominal fluid collection [R18.8]  PERTINENT PAST MEDICAL HISTORY:   Past Medical History:    Anxiety    BPH (benign prostatic hyperplasia)    CKD (chronic kidney disease) stage 3, GFR 30-59 ml/min (HCC)    Depression    Diabetes (HCC)    Diabetes mellitus (HCC)    Essential hypertension    GERD (gastroesophageal reflux disease)    High cholesterol    Hyperlipidemia       PATIENT STATUS: Initial 07/07/25: Pt assessed due to screening at risk, MST score of 5. Pt admitted with distended abdomen and abdominal discomfort. Pt recently admitted due to large colonic mass, s/p lap transverse colon resection, small bowel resection x2, resection of tumor nodule on small bowel on 6/12. Was on PN on previous visit. Per GI, CT showed multiple large peritoneal abscesses. No acute surgical intervention at this time, IR drain placement 7/5. Concerns for leaking from bowel, per surgery, plan for small bowel follow through. Met with pt bedside. Utilized language line (#[id number], Oscar). Diet advanced to low fiber/soft yesterday. Pt reports he has a good appetite and is tolerating the diet well with no N/V. Pt reports he was eating well PTA. Per EMR review, pt weighed 167 lbs 6/19/25, current weight at 158 lbs. 5.4% weight loss in 1 month, significant and severe. Pt noted with a stage 2 PI on coccyx (left). Pt previously on SF mighty shake and enjoyed, pt agreeable to receiving again this admission. Will monitor PO + ONS intake.     7/14/25 UPDATE: family in room and they preferred to translate. Pt is eating well with good appetite and likes the ONS we are providing. Discussed importance of good po intake.  Ongoing medical  status--IV abx. Pt with drains. Denies any GI sx's--BM 7/13.     FOOD/NUTRITION RELATED HISTORY:  Appetite: Good  Intake: average intake >75% and maximized with ONS intake.    Intake Meeting Needs: Yes, and oral nutrition supplements (ONS) to maximize  Percent Meals Eaten (last 6 days)       Date/Time Percent Meals Eaten (%)    07/08/25 0600 0 %    07/08/25 2045 0 %    07/09/25 0900 100 %     Percent Meals Eaten (%): jello & juice at 07/09/25 0900    07/09/25 1410 100 %    07/11/25 1116 100 %    07/12/25 1040 65 %    07/12/25 1823 65 %    07/13/25 0800 100 %    07/13/25 1922 75 %    07/14/25 1026 75 %           Food Allergies: No Known Food Allergies (NKFA)  Cultural/Ethnic/Gnosticism Preferences: Not Obtained    GASTROINTESTINAL: +BM 7/13 and denies significant other GI symptoms.        MEDICATIONS: reviewed   insulin aspart  1-5 Units Subcutaneous TID CC and HS    FLUoxetine  20 mg Oral Daily    pregabalin  100 mg Oral TID    tamsulosin  0.8 mg Oral After dinner    vancomycin  125 mg Oral Daily    ceftolozane-tazobactam (ZERBAXA) 3 g in sodium chloride 0.9% 100 mL IVPB  3 g Intravenous Q8H    DAPTOmycin  500 mg Intravenous Q24H    micafungin  100 mg Intravenous Q24H    metroNIDAZOLE  500 mg Oral BID    pantoprazole  40 mg Intravenous Daily      continuous dose heparin 1,200 Units/hr (07/14/25 0629)        LABS: reviewed -280--DM and infection.   Recent Labs     07/11/25  0502 07/12/25  0532 07/13/25  0517 07/14/25  0401   *  --  204*  --    BUN 6*  --  11  --    CREATSERUM 0.43*  --  0.52*  --    CA 7.2*  --  7.8*  --      --  136  --    K 3.3* 3.7 3.4* 3.4*     --  101  --    CO2 28.0  --  28.0  --    OSMOCALC 286  --  287  --        WEIGHT HISTORY:  Patient Weight(s) for the past 336 hrs:   Weight   07/13/25 0600 71 kg (156 lb 8.4 oz)   07/05/25 0243 71.8 kg (158 lb 4.8 oz)     Wt Readings from Last 10 Encounters:   07/13/25 71 kg (156 lb 8.4 oz)   07/02/25 77.6 kg (171 lb)   06/24/25  77.7 kg (171 lb 3.2 oz)   05/28/25 67.1 kg (148 lb)   04/09/25 72.6 kg (160 lb)   04/08/25 72.6 kg (160 lb)   03/18/25 81.6 kg (180 lb)   01/16/25 76.7 kg (169 lb)   11/21/24 77.6 kg (171 lb)   08/29/24 78.9 kg (174 lb)       ANTHROPOMETRICS:  HT: 180.3 cm (5' 11\")  Wt Readings from Last 1 Encounters:   07/13/25 71 kg (156 lb 8.4 oz)     Last weight: Likely accurate  BMI: Body mass index is 21.83 kg/m².  Dosing Weight: 71.8 kg - recent weight, utilized for anthropometric calculations  BMI CLASSIFICATION: <22 considered underweight for advanced age (>65)  IBW/lbs (Calculated) Male: 172 lbs             92% IBW  Usual Body Wt: 189 lbs 1 year ago     84% UBW    NUTRITION RELATED PHYSICAL FINDINGS:  - Nutrition Focused Physical Exam (NFPE): no wasting noted per visual exam   - Fluid Accumulation: non-pitting Left Upper extremity, Lower extremity, and Foot  and Right Upper extremity, Lower extremity, and Foot . See RN documentation for details  - Skin Integrity: Stage 2 PI coccyx left. See RN documentation for details      NUTRITION DIAGNOSIS/PROBLEM:   Unintended wt loss and increased nutrient needs related to Physiological/wound causes increasing nutrient needs as evidenced by 5.4% weight loss in 1 month     NUTRITION DIAGNOSIS PROGRESS:  Improvement (unresolved)--po improved and maximized with ONS.        NUTRITION INTERVENTION:     NUTRITION PRESCRIPTION:   Estimated Nutrition needs: --dosing wt of 71.8 kg - wt taken on 7/5/25  Calories: 1579-0732 calories/day (28-30 calories per kg Dosing wt)   Protein:  g protein/day (1.2-1.4 g protein/kg Dosing wt) -> Stage 2 PI    - Diet:       Procedures    Low Fiber/Soft diet Low Fiber/Soft; Is Patient on Accuchecks? Yes      - Nutrition Care Plan: Encouraged increased PO intake, Encouraged small frequent meals with emphasis on high calorie/high protein, and Initiated ONS (oral nutritional supplements)  - ONS (Oral Nutrition Supplements)/Meals/Snacks: SF Shake (200  calories/ 7 g protein each) BID Chocolate   - Vitamin and mineral supplements: none  - Feeding assistance: independent  - Nutrition education: Discussed importance of adequate energy and protein intake    - Coordination of nutrition care: collaboration with other providers  - Discharge and transfer of nutrition care to new setting or provider: monitor plans    MONITOR AND EVALUATE/NUTRITION GOALS:  - Food and Nutrient Intake:      Monitor: adequacy of PO intake and adequacy of supplement intake  - Food and Nutrient Administration:      Monitor: N/A  - Anthropometric Measurement:    Monitor weight  - Nutrition Goals:      halt wt loss, PO and supplement greater than 75% of needs, intake to meet needs, good supplement intake, return to normal GI function, labs within acceptable limits, euglycemia, minimize lean body mass loss, prevent skin breakdown, promote healing, support wound healing, support body systems, and improved GI status    DIETITIAN FOLLOW UP: RD to follow and monitor nutrition status    Chioma Eric RD, LDN   Clinical Dietitian [id number]

## 2025-07-14 NOTE — PAYOR COMM NOTE
--------------  CONTINUED STAY REVIEW FOR 7/14      Payor: [employer]  Subscriber #:  [id number]  Authorization Number: [id number]    Admit date: 7/5/25  Admit time:  2:31 AM    REVIEW DOCUMENTATION:          Date of Service: 7/14/2025 10:10 AM                INFECTIOUS DISEASE PROGRESS NOTE      SUBJECTIVE  ROS done. Tmax 99.4. No complaints of pain.     ASSESSMENT/PLAN:     Antibiotics: Zerbaxa, dapto, flagyl, micafungin; (IV zosyn, fluconazole, augmentin, cefepime)     ASSESSMENT:     # Multiple large intra-abdominal abscesses               - now s/p IR drain placement on 7/5/25               - fluid cx so far with E. Coli, Enterobacter cloacae, Enterococcus faecalis, C. glabrata, MDR PSAR, Lactobacillus               - surgery and IR following  # RUQ abscess s/p IR drainage 7/11, cx Enterobacter hormaechei, C. glabrata  # RUE DVT  # hx of Large bowel obstruction s/p OR 6/10/25 for lap transverse colon resection, small bowel resection x2, rsection of tumor nodule on small bowel - path with DLBCL               - s/p colonic stent placement by GI 6/6/25               - post op fluid collection s/p IR drain placement 6/19/25 with 260 cc of serosang fluid, cx negative      PLAN:  -  Continue on IV daptomycin, zerbaxa, metronidazole, and micafungin. PICC in place in anticipation of continued IV abx on DC. Social work assisting with placement.  -  Follow fever curve, wbc.  -  Reviewed labs, micro, imaging reports.  -  Case d/w patient, RN.     History of Present Illness:  80 year old male with history of diabetes, HTN, BPH who was admitted on 6/5 with abdominal bloating, nausea, decreased appetite, unintentional weight loss. Initial CT A/P on 6/5 with 4 cm segment of masslike constriction and thickening in the proximal transverse colon with concern for partially obstructing chronic neoplasm. Seen by GI and underwent colonoscopy with chronic stent placement on 6/6. Also seen by general surgery and taken  to the OR on 6/10 for large bowel obstruction status post laparoscopic assisted transverse colon resection, small bowel resection and tumor nodule and small bowel. Pathology diffuse large B-cell lymphoma. Seen by oncology. On 6/19 had low-grade temperatures with increasing WBC up to 19 with repeat CT A/P showing moderate to large volume free fluid in the abdomen and pelvis in the upper quadrants. Organized fluid in the left lower retroperitoneum measuring 5.5 x 4.7 x 5.8 cm concern for seroma, liquefied hematoma versus inflammatory fluid. Seen by IR and underwent IR peritoneal drain placement on 6/19 with 260 cc of serosanguineous fluid. Cultures negative. Was on zosyn while inpatient and transitioned to PO augmentin on discharge. Discharged on 6/24/25 to subacute rehab. Came back to ED on 7/4/25 with abdominal pain. Repeat CT with multiple large peritoneal abscesses. Surgery and IR consult.  Now status post IR drainage with cx growing GNR and budding yeast.  Currently on IV Zosyn.  ID consulted for further antibiotics management.      OBJECTIVE  /63 (BP Location: Left leg)   Pulse 82   Temp 98.2 °F (36.8 °C) (Oral)   Resp 16   Ht 5' 11\" (1.803 m)   Wt 156 lb 8.4 oz (71 kg)   SpO2 94%   BMI 21.83 kg/m²      General: No acute distress. Alert.  HEENT: EOMI  Abdomen: Soft, nontender, nondistended. RUQ drains in place, LLQ drain with tan output to gravity bag  Musculoskeletal: No edema  Integument: No rashes     [facility]                            MEDICATIONS ADMINISTERED IN LAST 1 DAY:  ceftolozane-tazobactam (ZERBAXA) 3 g in sodium chloride 0.9% 100 mL IVPB       Date Action Dose Route User    7/14/2025 0950 Given 3 g Intravenous Bertha Landaverde RN    7/14/2025 0116 Given 3 g Intravenous Johana Pérez RN    7/13/2025 1815 Given 3 g Intravenous Tamie Pastor RN          heparin (Porcine) 70953 units/250mL infusion PE/DVT/THROMBUS CONTINUOUS        Date Action Dose Route User    7/14/2025 0629 New Bag 1,200 Units/hr Intravenous ElisaJohana Scott RN          DAPTOmycin (Cubicin) 500 mg in sodium chloride 0.9% PF IV syringe       Date Action Dose Route User    7/14/2025 1601 New Bag 500 mg Intravenous Juan Miguel Becerra RN          FLUoxetine (PROzac) cap 20 mg       Date Action Dose Route User    7/14/2025 0914 Given 20 mg Oral Bertha Landaverde RN          HYDROcodone-acetaminophen (Norco)  MG per tab 1 tablet       Date Action Dose Route User    7/14/2025 1605 Given 1 tablet Oral Juan Miguel Becerra RN          HYDROcodone-acetaminophen (Norco) 5-325 MG per tab 1 tablet       Date Action Dose Route User    7/13/2025 2022 Given 1 tablet Oral ElisaJohana Scott RN          HYDROmorphone (Dilaudid) 1 MG/ML injection 0.5 mg       Date Action Dose Route User    7/14/2025 0435 Given 0.5 mg Intravenous ElisaJohana Scott RN          insulin aspart (NovoLOG) 100 Units/mL FlexPen 1-5 Units       Date Action Dose Route User    7/13/2025 1814 Given 3 Units Subcutaneous (Right Upper Arm) Tamie Pastor RN          insulin aspart (NovoLOG) 100 Units/mL FlexPen 1-5 Units       Date Action Dose Route User    7/14/2025 1313 Given 4 Units Subcutaneous (Right Upper Arm) Bertha Landaverde RN    7/14/2025 0915 Given 1 Units Subcutaneous (Right Upper Arm) Bertha Landaverde RN    7/13/2025 2103 Given 2 Units Subcutaneous (Right Upper Arm) Johana Pérez RN          metroNIDAZOLE (Flagyl) tab 500 mg       Date Action Dose Route User    7/14/2025 0914 Given 500 mg Oral Bertha Landaverde RN    7/13/2025 2020 Given 500 mg Oral ElisaJohana Scott RN          micafungin (Mycamine) 100mg in 100 mL 0.9% IVPB-BRANDY       Date Action Dose Route User    7/14/2025 1430 New Bag 100 mg Intravenous Bertha Landaverde RN          pantoprazole (Protonix) 40 mg in sodium chloride 0.9% PF 10 mL IV push       Date Action Dose Route User    7/14/2025 0945 Given 40  mg Intravenous Bertha Landaverde RN          potassium chloride (Klor-Con M20) tab 40 mEq       Date Action Dose Route User    7/14/2025 0914 Given 40 mEq Oral Bertha Landaverde RN          pregabalin (Lyrica) cap 100 mg       Date Action Dose Route User    7/14/2025 1601 Given 100 mg Oral Juan Miguel Becerra RN    7/14/2025 0914 Given 100 mg Oral Bertha Landaverde RN    7/13/2025 2020 Given 100 mg Oral ElisaJohana Scott RN          tamsulosin (Flomax) cap 0.8 mg       Date Action Dose Route User    7/13/2025 1814 Given 0.8 mg Oral Tamie Pastor RN          vancomycin (Vancocin) cap 125 mg       Date Action Dose Route User    7/14/2025 0914 Given 125 mg Oral Bertha Landaverde RN            Vitals (last day)       Date/Time Temp Pulse Resp BP SpO2 Weight O2 Device O2 Flow Rate (L/min) [facility]    07/14/25 1500 -- -- -- 143/69 -- -- -- --     07/14/25 1238 98.8 °F (37.1 °C) 102 18 -- 95 % -- None (Room air) -- ES    07/14/25 1200 -- 144 -- -- -- -- -- --     07/14/25 1130 -- 144 -- -- -- -- -- -- GR    07/14/25 0429 98.2 °F (36.8 °C) 82 16 135/63 94 % -- None (Room air) -- BS    07/13/25 1949 99.4 °F (37.4 °C) 86 16 133/66 96 % -- None (Room air) -- BS    07/13/25 1259 -- 78 18 157/61 95 % -- None (Room air) -- TJ    07/13/25 1200 -- 83 19 156/62 95 % -- None (Room air) -- RA    07/13/25 1000 -- 90 19 143/61 93 % -- None (Room air) -- RA    07/13/25 0800 -- 69 19 146/77 95 % -- None (Room air) -- RA    07/13/25 0600 -- 69 11 153/66 93 % 156 lb 8.4 oz (71 kg) None (Room air) --     07/13/25 0400 97.4 °F (36.3 °C) 68 10 139/71 93 % -- None (Room air) --     07/13/25 0200 -- 66 9 136/66 94 % -- None (Room air) --     07/13/25 0000 97.2 °F (36.2 °C) 72 17 158/68 92 % -- None (Room air) -- IH

## 2025-07-14 NOTE — PROGRESS NOTES
INFECTIOUS DISEASE PROGRESS NOTE    Arnaldo Gutierrez Patient Status:  Inpatient    4/15/1945 MRN [id number]   Location [facility] 4W/SW/SE Attending Declan Stearns MD   Hosp Day # 9 PCP PHYSICIAN NONSTAFF       SUBJECTIVE  ROS done. Tmax 99.4. No complaints of pain.    ASSESSMENT/PLAN:    Antibiotics: Zerbaxa, dapto, flagyl, micafungin; (IV zosyn, fluconazole, augmentin, cefepime)     ASSESSMENT:     # Multiple large intra-abdominal abscesses               - now s/p IR drain placement on 25               - fluid cx so far with E. Coli, Enterobacter cloacae, Enterococcus faecalis, C. glabrata, MDR PSAR, Lactobacillus               - surgery and IR following  # RUQ abscess s/p IR drainage , cx Enterobacter hormaechei, C. glabrata  # RUE DVT  # hx of Large bowel obstruction s/p OR 6/10/25 for lap transverse colon resection, small bowel resection x2, rsection of tumor nodule on small bowel - path with DLBCL               - s/p colonic stent placement by GI 25               - post op fluid collection s/p IR drain placement 25 with 260 cc of serosang fluid, cx negative      PLAN:  -  Continue on IV daptomycin, zerbaxa, metronidazole, and micafungin. PICC in place in anticipation of continued IV abx on DC. Social work assisting with placement.  -  Follow fever curve, wbc.  -  Reviewed labs, micro, imaging reports.  -  Case d/w patient, RN.     History of Present Illness:  80 year old male with history of diabetes, HTN, BPH who was admitted on  with abdominal bloating, nausea, decreased appetite, unintentional weight loss. Initial CT A/P on  with 4 cm segment of masslike constriction and thickening in the proximal transverse colon with concern for partially obstructing chronic neoplasm. Seen by GI and underwent colonoscopy with chronic stent placement on . Also seen by general surgery and taken to the OR on 6/10 for large bowel obstruction status post laparoscopic assisted  transverse colon resection, small bowel resection and tumor nodule and small bowel. Pathology diffuse large B-cell lymphoma. Seen by oncology. On 6/19 had low-grade temperatures with increasing WBC up to 19 with repeat CT A/P showing moderate to large volume free fluid in the abdomen and pelvis in the upper quadrants. Organized fluid in the left lower retroperitoneum measuring 5.5 x 4.7 x 5.8 cm concern for seroma, liquefied hematoma versus inflammatory fluid. Seen by IR and underwent IR peritoneal drain placement on 6/19 with 260 cc of serosanguineous fluid. Cultures negative. Was on zosyn while inpatient and transitioned to PO augmentin on discharge. Discharged on 6/24/25 to subacute rehab. Came back to ED on 7/4/25 with abdominal pain. Repeat CT with multiple large peritoneal abscesses. Surgery and IR consult.  Now status post IR drainage with cx growing GNR and budding yeast.  Currently on IV Zosyn.  ID consulted for further antibiotics management.     OBJECTIVE  /63 (BP Location: Left leg)   Pulse 82   Temp 98.2 °F (36.8 °C) (Oral)   Resp 16   Ht 5' 11\" (1.803 m)   Wt 156 lb 8.4 oz (71 kg)   SpO2 94%   BMI 21.83 kg/m²     General: No acute distress. Alert.  HEENT: EOMI  Abdomen: Soft, nontender, nondistended. RUQ drains in place, LLQ drain with tan output to gravity bag  Musculoskeletal: No edema  Integument: No rashes    [facility]  [phone]

## 2025-07-14 NOTE — PROGRESS NOTES
[facility]  part of [facility]  Progress Note      Arnaldo Gutierrez Patient Status:  Inpatient    4/15/1945 MRN [id number]   Location [facility] 4W/SW/SE Attending Rodrick Ramos MD   Hosp Day # 9 PCP PHYSICIAN NONSTAFF       Subjective:   In bed comfortable, Daughter is at the bedside    Objective:   Left sided drain opaque/purulent output  RUQ drain purulent output  RLQ drain serous output    Vital Signs:  Blood pressure 135/63, pulse 102, temperature 98.8 °F (37.1 °C), temperature source Oral, resp. rate 18, height 71\", weight 156 lb 8.4 oz (71 kg), SpO2 95%.    Input/Output:    Intake/Output Summary (Last 24 hours) at 2025 1420  Last data filed at 2025 1315  Gross per 24 hour   Intake 460 ml   Output 925 ml   Net -465 ml       Results:     Recent Labs   Lab 07/10/25  0635 25  0502 25  0532 25  0517   RBC 2.91* 2.83* 3.10* 3.10*   HGB 7.8* 7.5* 8.3* 8.3*   HCT 24.7* 24.1* 26.5* 26.2*   MCV 84.9 85.2 85.5 84.5   MCH 26.8 26.5 26.8 26.8   MCHC 31.6 31.1 31.3 31.7   RDW 17.9* 18.3* 19.0* 19.4*   NEPRELIM 2.67  --  6.14 7.05   WBC 5.0 4.5 7.9 9.0   .0* 405.0 419.0 421.0       Recent Labs   Lab 07/10/25  0635 07/10/25  1415 25  0502 25  0532 25  0517 25  0401   *  --  150*  --  204*  --    BUN 6*  --  6*  --  11  --    CREATSERUM 0.48*  --  0.43*  --  0.52*  --    EGFRCR 106  --  109  --  103  --    CA 7.5*  --  7.2*  --  7.8*  --      --  138  --  136  --    K 3.0*  3.0*   < > 3.3* 3.7 3.4* 3.4*     --  105  --  101  --    CO2 28.0  --  28.0  --  28.0  --     < > = values in this interval not displayed.     CT DRAIN ABSCESS LIVER (CPT=49405)  Result Date: 2025  IMPRESSION:  Technically successful CT drainage catheter placement into perihepatic fluid collection.  PLAN:  1. Follow-up outputs and repeat abscessogram in 1 week's time 2. F/u CXR to evaluate for residual ptx   _______________________________________________________________  PROCEDURE SUMMARY: - Drainage catheter insertion under CT-guidance with image stored to PACS. PROCEDURE DETAILS: Pre-procedure Consent: Informed consent for the procedure was obtained and time-out was performed prior to the procedure. Preparation: The site was prepared and draped using maximal sterile barrier technique including cutaneous antisepsis. Antibiotic administered and time: No antibiotics Anesthesia/sedation: Level of anesthesia/sedation: Moderate sedation (conscious sedation) Anesthesia/sedation administered by: Nurse or other independent trained observer under attending supervision with continuous monitoring of the patient's level of consciousness and physiologic status, under the direct supervision of the attending physician  \"MD intra-service time in sedation\" : 15 minutes    Drainage catheter placement The patient was positioned the supine. Initial imaging was performed. Local anesthesia was administered. Under CT guidance, the target was accessed. Fluid collection Initial imaging findings: Perihepatic Catheter type: APDL Catheter diameter: 10-Tamazight External catheter securement: Non-absorbable suture Final catheter location: perihepatic Post-drainage imaging findings: drain in subhepatic fluid collection Additional findings: After drain was placed, follow-up examination revealed a minimal pneumothorax that was iatrogenically caused by needle entry into the perihepatic region. A 5Fr Yueh needle was then placed within the pneumothorax via CT guidance and this region aspirated completely. Additional Details Additional description of procedure: None Additional findings: None Equipment details: None Specimens removed: 20 ml of turbid fluid was aspirated. A sample was sent for analysis. Estimated blood loss: Less than 10 mL Standardized report: [id number] Attestation I, Juan Luis Cabrera, attest that I was present for the entire procedure. I  reviewed the stored images and agree with the report as written. Electronically Verified and Signed by Attending Radiologist: Juan Luis Cabrera MD 7/13/2025 2:04 PM Workstation: [id number]    XR CHEST AP PORTABLE  (CPT=71045)  Result Date: 7/12/2025  CONCLUSION: 1.  Right perihepatic subdiaphragmatic pigtail drainage catheter. 2.  Bibasilar right greater than left pleural effusions with compressive atelectasis. 3.  No pneumothorax. 4.  Heart size remains normal. Pulmonary vascularity partially obscured. 5.  Atherosclerotic calcification aorta. 6.  Left PICC line tip at RASVC junction.  Electronically Verified and Signed by Attending Radiologist: Chong Valdez MD 7/12/2025 7:41 PM Workstation: [id number]    XR CHEST AP PORTABLE  (CPT=71045)  Result Date: 7/11/2025  CONCLUSION: 1.  No pneumothorax. 2.  Right upper quadrant subdiaphragmatic perihepatic drain. 3.  Moderate bibasilar pleural effusions with compressive atelectasis. 4.  Stable bilateral patchy perihilar opacities which may represent pneumonia or edema. 5.  Heart size remains normal. 6.  Left-sided PICC line tip at RASVC junction. Electronically Verified and Signed by Attending Radiologist: Chong Valdez MD 7/11/2025 2:52 PM Workstation: [id number]    CT ABDOMEN+PELVIS(CONTRAST ONLY)(CPT=74177)  Result Date: 7/11/2025  CONCLUSION: 1.  Bilateral abdominal drains with decreasing size to the abdominal abscesses. 2.  Small cystlike abscess in the left hemipelvis is unchanged and few other smaller abscess collections are unchanged. 3.  Transverse colectomy and sites of small bowel resection. No obstruction or contrast extravasation. 4.  Stable moderate bibasilar pleural effusions. 5.  Anasarca. Electronically Verified and Signed by Attending Radiologist: Chong Valdez MD 7/11/2025 12:52 AM Workstation: [id number]    CT CHEST (CPT=71250)  Result Date: 7/9/2025  CONCLUSION: 1.  Small to moderate bilateral pleural effusions with passive atelectasis. 2.   Partial visualized drainage catheter in the upper abdomen with decreased size of fluid and air collections in the right upper quadrant 3.  The ascending thoracic aorta measures 40 mm, the upper limits of normal. Electronically Verified and Signed by Attending Radiologist: Adam Neumann MD 7/9/2025 1:09 PM Workstation: [id number]    XR ABDOMEN (1 VIEW) (CPT=74018)  Result Date: 7/9/2025  CONCLUSION: No evidence of acute abnormality in the abdomen or pelvis. Multiple peritoneal drains are seen. Previously demonstrated fluid collections are demonstrated to advantage on prior CT. Electronically Verified and Signed by Attending Radiologist: Gelacio Bullock MD 7/9/2025 9:50 AM Workstation: [id number]    XR SMALL BOWEL SINGLE CONTRAST (CPT=74250)  Result Date: 7/9/2025  CONCLUSION: No evidence of bowel leak. If clinical suspicion persists, CT may be considered. Electronically Verified and Signed by Attending Radiologist: Sunil Murillo MD 7/9/2025 9:00 AM Workstation: [id number]    US VENOUS DOPPLER ARM RIGHT - DIAG IMG (CPT=93971)  Result Date: 7/8/2025  CONCLUSION: Acute expansile occlusive DVT within the right subclavian and axillary veins. The visualized segments of the right brachial, basilic, and cephalic veins are patent. The patient's nurse was notified by telephone of these results at 10:41 a.m. on 7/8/2025. Electronically Verified and Signed by Attending Radiologist: Duran Bhardwaj MD 7/8/2025 10:41 AM Workstation: [id number]    CT ABDOMEN+PELVIS(CONTRAST ONLY)(CPT=74177)  Result Date: 7/5/2025  CONCLUSION: 1.  Multiple large peritoneal abscesses are seen. The largest is centered in the left upper quadrant and left midabdomen and measures 24.3 x 10.9 x 23.9 cm with air-fluid levels and extends into the abdominal wall. This is contiguous with smaller right upper quadrant perihepatic and posterior left upper quadrant perisplenic abscesses. A separate small 3 cm abscess is noted adjacent to the bladder. Bladder  diverticulum is thought less likely. 2.  Multiple prior bowel resections are seen. There may be fistulous connection between the bowel and left upper quadrant dominant abscess at the left mid abdomen. The location of a right-sided pigtail drainage catheter is uncertain but it may terminate within the ascending colon. 3.  Additional chronic and/or incidental findings are detailed in the body of this report. Preliminary report was given by [facility]. No clinically significant discrepancies are noted. Electronically Verified and Signed by Attending Radiologist: Gelacio Bullock MD 7/5/2025 7:14 AM Workstation: [id number]        Assessment and Plan:   79 yo male with b cell lymphoma recently diagnosed, s/p colon resection/small bowel resection, resection of tumor nodule on small bowel,  multiple intra abdominal abscess s/p IR drainage x 3    Plan for port insertion tomorrow  Drain check/exchange on Wednesday  Hold heparin gtt 6hrs prior to each procedure    Discussed procedures, risks, benefits with patient and his daughter who agree to proceed. His daughter translated in Upper sorbian per his request.    Plan of care discussed with Dr Wilkins, Dr Ramos, IR MD Katherine Mack, FAIZAN  7/14/2025  2:20 PM

## 2025-07-14 NOTE — OCCUPATIONAL THERAPY NOTE
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    Patient is requiring up to min assist for ADLs as a result of the following impairments: decreased functional strength, decreased functional reach, decreased endurance, impaired balance, and decreased muscular endurance.    Patient continues to function below baseline with ADLs and fx mobility/transfers.  Next session anticipate patient to progress lower body dressing, transfers, static standing balance, dynamic standing balance, and functional standing tolerance.  Occupational Therapy will continue to follow patient for duration of hospitalization.    Patient continues to benefit from continued skilled OT services: to promote return to prior level of function and safety with continuous assistance and gradual rehabilitative therapy d/t IV med needs upon discharge .     PLAN DURING HOSPITALIZATION  OT Device Recommendations: TBD  OT Treatment Plan: Balance activities, Energy conservation/work simplification techniques, ADL training, Functional transfer training, UE strengthening/ROM, Endurance training, Patient/Family education, Compensatory technique education     SUBJECTIVE  Patient agreeable to OT treatment session.    OBJECTIVE  Precautions:  needed, Drain(s) (Yoruba-speaking)    WEIGHT BEARING RESTRICTION  None    PAIN ASSESSMENT  Ratin  Location: abdomen  Management Techniques: Relaxation; Repositioning    ACTIVITY TOLERANCE  Pulse: (!) 144  Heart Rate Source: Monitor    ACTIVITIES OF DAILY LIVING  ASSESSMENT  AM-PAC ‘6-Clicks’ Inpatient Daily Activity Short Form  How much help from another person does the patient currently need…  -   Putting on and taking off regular lower body clothing?: A Little  -   Bathing (including washing, rinsing, drying)?: A Little  -   Toileting, which includes using toilet, bedpan or urinal? : A Little  -   Putting on and taking off regular upper body clothing?: A Little  -   Taking care of personal grooming such as brushing teeth?: A Little  -   Eating meals?: None    AM-PAC Score:  Score: 19  Approx Degree of Impairment: 42.8%  Standardized Score (AM-PAC Scale): 40.22  CMS Modifier (G-Code): CK    BED MOBILITY  Supine to Sit: supervision    FUNCTIONAL TRANSFER ASSESSMENT  Sit to Stand from EOB: contact guard assist  Chair Transfer: contact guard assist    FUNCTIONAL MOBILITY  contact guard assist for community distance fx mobility using RW    ACTIVITIES OF DAILY LIVING  UB Dressing: min assist  LB Dressing: min assist    EDUCATION PROVIDED  Patient Education : Role of Occupational Therapy; Discharge Recommendations; Plan of Care; Functional Transfer Techniques; Fall Prevention; Posture/Positioning; Energy Conservation; Proper Body Mechanics  Patient's Response to Education: Verbalized Understanding; Returned Demonstration    The patient's Approx Degree of Impairment: 42.8% has been calculated based on documentation in the [facility] '6 clicks' Inpatient Daily Activity Short Form.  Research supports that patients with this level of impairment may benefit from  OT.  Final disposition will be made by interdisciplinary medical team.    Patient End of Session: Up in chair, Needs met, Call light within reach, RN aware of session/findings, All patient questions and concerns addressed, Hospital anti-slip socks, Family present    OT Goals:  Patient's self stated goal is: unstated     Patient will complete functional transfer with SBA  Comment: ongoing    Patient will complete toileting with  SBA  Comment: ongoing    Patient will tolerate standing for 3 minutes in prep for adls with SBA   Comment: ongoing    Patient will complete LB dressing with Min A  Comment: ongoing          Goals  on: 25  Frequency: 3-5x/week    OT Session Time  Therapeutic Activity: 25 minutes    Kat Duval OTR/L  [facility]  #[id number]

## 2025-07-14 NOTE — PROGRESS NOTES
[facility]  part of [facility]    Progress Note    Arnaldo Gutierrez Patient Status:  Inpatient    4/15/1945 MRN [id number]   Location [facility] 4W/SW/SE Attending Rodrick Ramos MD   Hosp Day # 9 PCP PHYSICIAN NONSTAFF     Subjective:  Feels ok   drainage x 2   amt ?    Objective/Physical Exam:  General: Alert, orientated x3.  Cooperative.  No apparent distress.  Vital Signs:  Blood pressure 135/63, pulse 82, temperature 98.2 °F (36.8 °C), temperature source Oral, resp. rate 16, height 180.3 cm (5' 11\"), weight 156 lb 8.4 oz (71 kg), SpO2 94%.  Abdomen:  Soft, non-distended, non-tender,   somewhat gray drainage r   left min   Labs:  Lab Results   Component Value Date    WBC 9.0 2025    HGB 8.3 (L) 2025    HCT 26.2 (L) 2025    .0 2025    CREATSERUM 0.52 (L) 2025    BUN 11 2025     2025    K 3.4 (L) 2025     2025    CO2 28.0 2025     (H) 2025    CA 7.8 (L) 2025    ALB 2.1 (L) 07/10/2025    ALKPHO 182 (H) 2025    BILT 0.3 2025    TP 5.0 (L) 2025    AST 22 2025    ALT 16 2025    PTT 68.8 (H) 2025    TSH 2.417 2025    LIP 45 2025    MG 1.9 2025    PHOS 2.7 07/10/2025    CK <15 (L) 2025    B12 1,020 (H) 2025       Imaging:  CT DRAIN ABSCESS LIVER (CPT=49405)  Result Date: 2025  PROCEDURE: Drainage catheter placement  Procedural Personnel Attending(s): Juan Luis Cabrera Pre-procedure diagnosis: abscess Post-procedure diagnosis: as above Indications  perihepatic abscess Additional clinical history: None   Complications: No immediate complications.      IMPRESSION:  Technically successful CT drainage catheter placement into perihepatic fluid collection.  PLAN:  1. Follow-up outputs and repeat abscessogram in 1 week's time 2. F/u CXR to evaluate for residual ptx  _______________________________________________________________   PROCEDURE SUMMARY: - Drainage catheter insertion under CT-guidance with image stored to PACS. PROCEDURE DETAILS: Pre-procedure Consent: Informed consent for the procedure was obtained and time-out was performed prior to the procedure. Preparation: The site was prepared and draped using maximal sterile barrier technique including cutaneous antisepsis. Antibiotic administered and time: No antibiotics Anesthesia/sedation: Level of anesthesia/sedation: Moderate sedation (conscious sedation) Anesthesia/sedation administered by: Nurse or other independent trained observer under attending supervision with continuous monitoring of the patient's level of consciousness and physiologic status, under the direct supervision of the attending physician  \"MD intra-service time in sedation\" : 15 minutes    Drainage catheter placement The patient was positioned the supine. Initial imaging was performed. Local anesthesia was administered. Under CT guidance, the target was accessed. Fluid collection Initial imaging findings: Perihepatic Catheter type: APDL Catheter diameter: 10-Setswana External catheter securement: Non-absorbable suture Final catheter location: perihepatic Post-drainage imaging findings: drain in subhepatic fluid collection Additional findings: After drain was placed, follow-up examination revealed a minimal pneumothorax that was iatrogenically caused by needle entry into the perihepatic region. A 5Fr Yueh needle was then placed within the pneumothorax via CT guidance and this region aspirated completely. Additional Details Additional description of procedure: None Additional findings: None Equipment details: None Specimens removed: 20 ml of turbid fluid was aspirated. A sample was sent for analysis. Estimated blood loss: Less than 10 mL Standardized report: [id number] Attestation I, Juan Luis Cabrera, attest that I was present for the entire procedure. I reviewed the stored images and agree with the report as written.  Electronically Verified and Signed by Attending Radiologist: Juan Luis Cabrera MD 7/13/2025 2:04 PM Workstation: [id number]    XR CHEST AP PORTABLE  (CPT=71045)  Result Date: 7/12/2025  PROCEDURE: XR CHEST AP PORTABLE  (CPT=71045) INDICATIONS: small ptx 7/11. pleuritic pain after perihepatic drain placement COMPARISON: 7/11/2025 TECHNIQUE: AP portable semiupright chest     CONCLUSION: 1.  Right perihepatic subdiaphragmatic pigtail drainage catheter. 2.  Bibasilar right greater than left pleural effusions with compressive atelectasis. 3.  No pneumothorax. 4.  Heart size remains normal. Pulmonary vascularity partially obscured. 5.  Atherosclerotic calcification aorta. 6.  Left PICC line tip at RASVC junction.  Electronically Verified and Signed by Attending Radiologist: Chong Valdez MD 7/12/2025 7:41 PM Workstation: [id number]    XR CHEST AP PORTABLE  (CPT=71045)  Result Date: 7/11/2025  PROCEDURE: XR CHEST AP PORTABLE  (CPT=71045) INDICATIONS: Shortness of breath S/P LIVER DRAIN PLACEMENT.   R/O PNEUMOTHORAX COMPARISON: 6/13/2025 TECHNIQUE: AP portable upright chest     CONCLUSION: 1.  No pneumothorax. 2.  Right upper quadrant subdiaphragmatic perihepatic drain. 3.  Moderate bibasilar pleural effusions with compressive atelectasis. 4.  Stable bilateral patchy perihilar opacities which may represent pneumonia or edema. 5.  Heart size remains normal. 6.  Left-sided PICC line tip at RASVC junction. Electronically Verified and Signed by Attending Radiologist: Chong Valdez MD 7/11/2025 2:52 PM Workstation: [id number]    CT ABDOMEN+PELVIS(CONTRAST ONLY)(CPT=74177)  Result Date: 7/11/2025  PROCEDURE: CT ABDOMEN+PELVIS(CONTRAST ONLY)(CPT=74177) INDICATIONS: follow up intraabdominal abscess COMPARISON: There are no comparisons for this exam. TECHNIQUE: Multislice CT scanning was performed from the dome of the diaphragm to the pubic symphysis with non-ionic intravenous contrast material. Post contrast coronal MPR  imaging was performed. Automated exposure control dose reduction techniques were  used. Adjustment of the mA and/or kV was done based on the patient's size. Use of iterative reconstruction technique for dose reduction was used. Dose information is transmitted to the ACR (American College of Radiology) NRDR (National Radiology Data Registry) which includes the Dose Index Registry. CONTRAST: IOPAMIDOL 76% IV SOLN FOR POWER INJECTOR:80 mL FINDINGS: LIVER: Normal. Portal vein and branches patent. Mass effect on the liver by the perihepatic abscesses. BILIARY: No evidence for cholecystitis or biliary dilatation. SPLEEN: Normal. PANCREAS: Normal. ADRENALS: Normal. KIDNEYS: Stable 2.5 cm posterior right interpolar renal cyst. No solid renal lesion or renal obstruction. Linear calcification in the lower pole the right kidney is likely vascular. AORTA/VASCULAR: Atherosclerotic vascular calcification including coronary artery calcification. No aneurysm or dissection. Major aortic branches patent. LYMPHADENOPATHY: None. GI/MESENTERY: Changes from transverse colon resection and 2 anastomotic staple lines in the left side of the abdomen from small bowel resections. No obstruction. ABDOMINAL WALL: Anasarca. Bilateral abdominal wall drains. Small amount of gas extending into a midline radu-incisional hernia. URINARY BLADDER: Normal. ASCITES/FLUID COLLECTIONS: Previously seen loculated gas/fluid collections in the anterior mid and upper abdomen have decreased in size with drainage catheter extending from left to right anteriorly. Tip of the drainage catheter is anterior to the gallbladder fossa. *  Right-sided subdiaphragmatic gas fluid collection measures 9.1 x 6.1 x 3.7 cm (prior 12.5 x 9.6 x 5.9 cm) *  Anterior to left lobe of liver just below the diaphragm measures 8.8 x 8 0.8 x 4.8 x 14.2 cm (prior 15.3 x 11.2 x 23 cm) *  Gas/fluid collection anteriorly to the left of the midline measures 2.2 x 13.4 cm on image 77 series 2  (prior 10.8 x 22.9 cm) *  Lateral perisplenic fluid collection with tiny focus of gas measures 6.3 x 2.3 cm (prior 7.9 x 2 2.8 cm) *  Small predominantly gas collection in the left lateral mid abdomen measuring 18 x 41 mm. *  Stable right infrahepatic paracolic drain with a small gas fluid collection just posterior to the drain extending up to the edge of liver measuring 4.6 x 1.3 cm. *  Stable small 9 mm x 37 mm anterior right lower quadrant gas/fluid collection. *  Stable 3.5 x 2 cm cystlike gas fluid collection in left hemipelvis. *  Stable small amount of gastrosplenic ligament fluid without rim enhancement. PELVIC ORGANS: No suspect pelvic mass. BONES: No suspect bone lesion. LUNG BASES: Stable moderate bibasilar pleural effusions with compressive atelectasis. OTHER: Negative.     CONCLUSION: 1.  Bilateral abdominal drains with decreasing size to the abdominal abscesses. 2.  Small cystlike abscess in the left hemipelvis is unchanged and few other smaller abscess collections are unchanged. 3.  Transverse colectomy and sites of small bowel resection. No obstruction or contrast extravasation. 4.  Stable moderate bibasilar pleural effusions. 5.  Anasarca. Electronically Verified and Signed by Attending Radiologist: Chong Valdez MD 7/11/2025 12:52 AM Workstation: [id number]    CT CHEST (CPT=71250)  Result Date: 7/9/2025  PROCEDURE: CT CHEST (CPT=71250) INDICATIONS:  Upper extremity DVT COMPARISON: CT abdomen pelvis dated 7/4/2025 TECHNIQUE: Multidetector CT of the chest was obtained without non-ionic intravenous contrast material. Automated exposure control for dose reduction was used. Adjustment of the mA and/or kV was done based on the patient's size. Iterative reconstruction technique for dose reduction was employed. Dose information was transmitted to the ACR (American College of Radiology) NRDR (National Radiology Data Registry), which includes the Dose Index Registry. Oral contrast was ingested. FINDINGS:  LUNGS/PLEURA/AIRWAYS:No consolidation, mass or suspicious nodule.Small to moderate bilateral pleural effusions and passive atelectasis. Central airways are patent. MEDIASTINUM:No mediastinal or hilar lymphadenopathy. VESSELS:  The ascending thoracic aorta measures approximately 40 mm. Moderate coronary artery calcifications. CHEST WALL:Within normal limits. UPPER ABDOMEN: Partially visualized drainage catheter with decreased size of collections of fluid and air in the right upper quadrant. BONES: No suspicious osseous lesion or acute osseous abnormality.     CONCLUSION: 1.  Small to moderate bilateral pleural effusions with passive atelectasis. 2.  Partial visualized drainage catheter in the upper abdomen with decreased size of fluid and air collections in the right upper quadrant 3.  The ascending thoracic aorta measures 40 mm, the upper limits of normal. Electronically Verified and Signed by Attending Radiologist: Adam Neumann MD 7/9/2025 1:09 PM Workstation: [id number]    XR ABDOMEN (1 VIEW) (CPT=74018)  Result Date: 7/9/2025  PROCEDURE(S): XR ABDOMEN (1 VIEW) (CPT=74018) WORKSTATION: [employer] HISTORY/INDICATIONS: s/p bowel resections for lymphoma  possible bowel leak Abdominal distension. COMPARISON: 07/07/2025 XR SMALL BOWEL SINGLE CONTRAST (CPT=74250) FINDINGS: Lines And Tubes: Right upper quadrant drainage pigtail catheter is again seen. Left approach presumed surgical drain is again seen. Lower Thorax: Unremarkable. Bowel Gas Pattern: No gas-filled, dilated loops. No differential distention. Multiple bowel sutures are noted throughout the abdomen. Soft Tissues: No definite pneumoperitoneum. Previously described fluid collections are demonstrated to advantage on prior CT. Bones/Joints: No acute fracture.     CONCLUSION: No evidence of acute abnormality in the abdomen or pelvis. Multiple peritoneal drains are seen. Previously demonstrated fluid collections are demonstrated to advantage on prior CT.  Electronically Verified and Signed by Attending Radiologist: Gelacio Bullock MD 7/9/2025 9:50 AM Workstation: [id number]    XR SMALL BOWEL SINGLE CONTRAST (CPT=74250)  Result Date: 7/9/2025  PROCEDURE: XR SMALL BOWEL SINGLE CONTRAST (CPT=74250) INDICATIONS: Multiple bowel resections for lymphoma. Abscess with possible leak PATIENT STATED HISTORY: Recent multiple bowel resection surgeries, history of lymphoma COMPARISON: CT 7/4/2025 TECHNIQUE: Small bowel series with multiple serial films was performed in the usual manner.  A  abdominal radiograph was performed. Fluoro/Cine Image Number: 9 Fluoro Time: 1.4 minutes FINDINGS: No dilated bowel to suggest ileus or obstruction. No extravasation of enteric contrast was noted to diagnose or identify a source of leak. Focused attention was paid to regions of surgical staples and known prior abscesses using compression and magnification, confirming no evident leak. Transit time from stomach to colon was 90 minutes.     CONCLUSION: No evidence of bowel leak. If clinical suspicion persists, CT may be considered. Electronically Verified and Signed by Attending Radiologist: Sunil Murillo MD 7/9/2025 9:00 AM Workstation: [id number]    US VENOUS DOPPLER ARM RIGHT - DIAG IMG (CPT=93971)  Result Date: 7/8/2025  PROCEDURE: US VENOUS DOPPLER ARM RIGHT - DIAG IMG (CPT=93971) INDICATIONS: swelling in RUE TECHNIQUE: Color duplex Doppler venous ultrasound of the right upper extremity was performed in the usual manner. FINDINGS: The internal jugular, subclavian, axillary, brachial, cephalic and basilic veins appear normal. Flow was demonstrated with color and pulsed Doppler. Comparison scans of the left subclavian vein appear normal. There is acute expansile DVT within the right subclavian and axillary veins. The visualized segments of the right brachial vein, right cephalic vein, and right basilic vein are patent. There is moderate soft tissue edema within the right upper extremity.      CONCLUSION: Acute expansile occlusive DVT within the right subclavian and axillary veins. The visualized segments of the right brachial, basilic, and cephalic veins are patent. The patient's nurse was notified by telephone of these results at 10:41 a.m. on 7/8/2025. Electronically Verified and Signed by Attending Radiologist: Duran Bhardwaj MD 7/8/2025 10:41 AM Workstation: [id number]    CT ABDOMEN+PELVIS(CONTRAST ONLY)(CPT=74177)  Result Date: 7/5/2025  PROCEDURE(S): CT ABDOMEN+PELVIS(CONTRAST ONLY)(CPT=74177) WORKSTATION: [id number] HISTORY/INDICATIONS: distended abdomen, recent surgery for mass resection, cath placement distended abdomen, recent surgery for mass resection, cath placement COMPARISON: 06/19/2025 CT ABDOMEN+PELVIS(CONTRAST ONLY)(CPT=74177) TECHNIQUE: Helical CT of the abdomen and pelvis was obtained following administration of intravenous contrast material. Axial, coronal, and sagittal reformatted images were created and interpreted. For this exam, one or more of the following dose reductions techniques were used: Automated exposure control Adjustment of the mA and/or kV according to patient size Use of iterative reconstruction technique FINDINGS: Lower Thorax: Moderate bilateral pleural effusions are again seen. Heart is normal size. Few airspace opacities are noted in the lingula series 3 image 1. There is atelectasis in the lower lobes. Liver: Unremarkable. Gallbladder/Biliary tree: Cholelithiasis without evidence of acute cholecystitis. No biliary ductal dilatation. Pancreas:  Unremarkable. Spleen: Unremarkable. Adrenal glands:  Unremarkable. Kidneys: Simple cyst arises from the right kidney posterior interpolar region. No suspicious renal lesions. Symmetric nephrograms. No hydronephrosis. Retroperitoneum/extraperitoneum: Unremarkable. Vasculature: No aneurysm or dissection. Mild atherosclerotic calcification is seen. Peritoneum: Multiple abscesses are seen: Left upper quadrant abscess  measures 24.3 x 10.9 x 23.9 cm series 2 image 73; series 5 image 23. Air-fluid levels are seen. It extends into the abdominal wall series 2 image 73. Right upper quadrant perihepatic abscess communicates with the left upper quadrant abscess and measures 14.2 x 5.1 x 15.8 cm series 2 image 35; series 6 image 96. Perisplenic abscess measures 7.9 x 2.8 x 9.8 cm series 2 image 50; series 6 image 19. This abscess also likely communicates with the left upper quadrant abscess. Small lower midline suspected abscess measuring 3.0 x 2.7 x 3.5 cm. There is a right mid abdominal pigtail drainage catheter which may terminate within the colon series 2 image 80. GI tract/mesentery/omentum: Multiple prior bowel resections are noted. Suspected fistula in the left mid abdomen series 2 image 103. Possible pigtail drainage catheter within the ascending colon series 2 image 80. No bowel obstruction is identified. Urinary Bladder: The bladder is grossly unremarkable. Reproductive organs: Unremarkable. Body wall: Mild anasarca. The dominant abscess extends into the anterior abdominal wall along the surgical incision series 2 image 73. Bones:  There are mild degenerative changes in the spine.     CONCLUSION: 1.  Multiple large peritoneal abscesses are seen. The largest is centered in the left upper quadrant and left midabdomen and measures 24.3 x 10.9 x 23.9 cm with air-fluid levels and extends into the abdominal wall. This is contiguous with smaller right upper quadrant perihepatic and posterior left upper quadrant perisplenic abscesses. A separate small 3 cm abscess is noted adjacent to the bladder. Bladder diverticulum is thought less likely. 2.  Multiple prior bowel resections are seen. There may be fistulous connection between the bowel and left upper quadrant dominant abscess at the left mid abdomen. The location of a right-sided pigtail drainage catheter is uncertain but it may terminate within the ascending colon. 3.  Additional  chronic and/or incidental findings are detailed in the body of this report. Preliminary report was given by [facility] radiology. No clinically significant discrepancies are noted. Electronically Verified and Signed by Attending Radiologist: Gelacio Bullock MD 2025 7:14 AM Workstation: [id number]    CARD TTE STRAIN W DOPPLER ONCOLOGY (CPT=93306)  Result Date: 2025  Transthoracic Echocardiogram Name:Arnaldo Gutierrez Date: 2025 :  04/15/1945 Ht:  (71in)  BP: 101 / 61 MRN:  [id number]    Age:  80years    Wt:  (171lb) HR: 88bpm Loc:  [facility]       Gndr: M          BSA: 1.97m^2 Sonographer: Amelia CHERY Ordering:    Crys Wilkins Consulting:  Rafita Torres ---------------------------------------------------------------------------- History/Indications:   Encounter for Monitoring Cardiotoxic Drug Therapy. ---------------------------------------------------------------------------- Procedure information:  A transthoracic complete 2D study was performed. Additional evaluation included M-mode, complete spectral Doppler, and color Doppler.  Patient status:  Inpatient.  Location:  Echo laboratory.    This was a routine study. Transthoracic echocardiography for diagnosis. Image quality was adequate. ECG rhythm:   Normal sinus ---------------------------------------------------------------------------- Conclusions: 1. Left ventricle: The cavity size was normal. Wall thickness was normal.    Systolic function was normal. The estimated ejection fraction was 60-65%,    by biplane method of disks. Wall motion is normal; there are no regional    wall motion abnormalities. Left ventricular diastolic function parameters    were normal. The Global Longitudinal Strain (GLS) was -19.40%. 2. Mitral valve: There was mild regurgitation. * ---------------------------------------------------------------------------- * Findings: Left ventricle:  The cavity size was normal. Wall thickness was normal. Systolic function was normal. The  estimated ejection fraction was 60-65%, by biplane method of disks. Wall motion is normal; there are no regional wall motion abnormalities. The Global Longitudinal Strain (GLS) was -19.40%. Left ventricular diastolic function parameters were normal. Left atrium:  Well visualized. The atrium was normal in size. Right ventricle:  The cavity size was normal. Systolic function was normal. Systolic pressure was within the normal range. Right atrium:  Well visualized. The atrium was normal in size. Mitral valve:  Well visualized. The leaflets were normal thickness. No evidence for prolapse.  Doppler:  Transvalvular velocity was within the normal range. There was no evidence for stenosis. There was mild regurgitation. Aortic valve:  Well visualized.  The valve was trileaflet. The leaflets were normal thickness.  Doppler:  Transvalvular velocity was within the normal range. There was no evidence for stenosis. There was trivial regurgitation. Tricuspid valve:  Well visualized. The annulus is normal-sized. The leaflets are normal thickness. No echocardiographic evidence for tricuspid prolapse. Doppler:  Transvalvular velocity was within the normal range. There was no evidence for stenosis. There was trivial regurgitation. Pulmonic valve:   Well visualized. The annulus is normal-sized. The leaflets are normal thickness. No evidence for prolapse.  Doppler:  Transvalvular velocity was within the normal range. There was no evidence for stenosis. There was no significant regurgitation. Pericardium:   There was no pericardial effusion. Pleura:  No evidence of pleural fluid accumulation. Aorta: Aortic root: The aortic root was normal. Ascending aorta: The ascending aorta was normal. Pulmonary arteries: There was no evidence of pulmonary hypertension. Systemic veins:  Central venous respirophasic diameter changes are in the normal range (>50%). Inferior vena cava: The IVC was normal-sized.  ---------------------------------------------------------------------------- Measurements  Left ventricle                   Value         Ref  GLS, 2D                          -19.40 %      ---------  IVS thickness, ED, PLAX          0.9    cm     0.6 - 1.0  LV ID, ED, PLAX              (L) 3.5    cm     4.2 - 5.8  LV ID, ES, PLAX              (L) 2.1    cm     2.5 - 4.0  LV PW thickness, ED, PLAX        0.8    cm     0.6 - 1.0  IVS/LV PW ratio, ED, PLAX        1.13          ---------  LV PW/LV ID ratio, ED, PLAX      0.23          ---------  LV area, ES, A4C                 19.3   cm^2   ---------  LV ejection fraction             72     %      52 - 72  Stroke volume/bsa, 2D            43     ml/m^2 ---------  LV end-diastolic volume, 1-p     87     ml     69 - 185  A4C  LV end-systolic volume, 1-p      47     ml     22 - 78  A4C  LV ejection fraction, 1-p        57     %      46 - 74  A4C  Stroke volume, 1-p A4C           50     ml     ---------  LV end-diastolic volume/bsa,     44     ml/m^2 37 - 93  1-p A4C  LV end-systolic volume/bsa,      24     ml/m^2 12 - 40  1-p A4C  Stroke volume/bsa, 1-p A4C       25     ml/m^2 ---------  LV end-diastolic volume, 2-p     81     ml     62 - 150  LV end-systolic volume, 2-p      32     ml     21 - 61  LV ejection fraction, 2-p        60     %      52 - 72  Stroke volume, 2-p               48     ml     ---------  LV end-diastolic volume/bsa,     41     ml/m^2 34 - 74  2-p  LV end-systolic volume/bsa,      16     ml/m^2 11 - 31  2-p  Stroke volume/bsa, 2-p           24.5   ml/m^2 ---------  LV e', lateral                   14.9   cm/sec >=10.0  LV E/e', lateral                 6             <=13  LV e', medial                    12.5   cm/sec >=7.0  LV E/e', medial                  7             ---------  LV e', average                   13.7   cm/sec ---------  LV E/e', average                 6             <=14  LVOT                             Value         Ref  LVOT ID                           2.1    cm     ---------  LVOT peak velocity, S            1.22   m/sec  ---------  LVOT VTI, S                      24.7   cm     ---------  LVOT peak gradient, S            6      mm Hg  ---------  LVOT mean gradient, S            3      mm Hg  ---------  Stroke volume (SV), LVOT DP      86     ml     ---------  Stroke index (SV/bsa), LVOT      43     ml/m^2 ---------  DP  Aortic valve                     Value         Ref  Aortic leaflet separation,       1.8    cm     ---------  MM  Aortic root                      Value         Ref  Aortic root ID, STJ, ED      (H) 3.6    cm     2.3 - 3.5  Ascending aorta                  Value         Ref  Ascending aorta ID               3.6    cm     2.2 - 3.8  Left atrium                      Value         Ref  LA volume, S                 (H) 62     ml     18 - 58  LA volume/bsa, S                 31     ml/m^2 16 - 34  LA volume, ES, 1-p A4C           53     ml     18 - 58  LA volume, ES, 1-p A2C       (H) 70     ml     18 - 58  LA volume, ES, A/L               68     ml     ---------  LA volume/bsa, ES, A/L           34     ml/m^2 16 - 34  Mitral valve                     Value         Ref  Mitral E-wave peak velocity      0.88   m/sec  ---------  Mitral A-wave peak velocity      0.99   m/sec  ---------  Mitral deceleration time         180    ms     ---------  Mitral peak gradient, D          3      mm Hg  ---------  Mitral E/A ratio, peak           0.9           ---------  Tricuspid valve                  Value         Ref  Tricuspid regurg peak            2.52   m/sec  <=2.8  velocity  Tricuspid peak RV-RA             25     mm Hg  ---------  gradient  Right ventricle                  Value         Ref  TAPSE, 2D                        2.50   cm     >=1.70  TAPSE, MM                        2.50   cm     >=1.70  RV s', lateral                   22.6   cm/sec >=9.5 Legend: (L)  and  (H)  farida values outside specified reference range.  ---------------------------------------------------------------------------- Prepared and electronically signed by Ernst Parada 06/24/2025 13:42     US VENOUS DOPPLER LEG BILAT - DIAG IMG (CPT=93970)  Result Date: 6/24/2025  PROCEDURE: US VENOUS DOPPLER LEG BILAT - DIAG IMG (CPT=93970) INDICATIONS: right calf pain, r/o dvt COMPARISON: None TECHNIQUE: Color duplex Doppler venous ultrasound of both lower extremities was performed in the usual manner. FINDINGS: Thrombus is present within the right common femoral vein as well as in the adjacent deep femoral vein draining into the common femoral vein. There is also nonocclusive thrombus within the proximal and distal aspect of the popliteal vein thrombus is otherwise expansile with heterogeneous grayscale appearance and there is absence of normal color flow and compressibility in these regions. The femoral vein appears normal. Normal flow was demonstrated with color and pulsed Doppler. Visualized portions of the great and small saphenous, posterior tibial and peroneal veins appear normal. Contralateral left lower extremity is without DVT within the common femoral, femoral, and popliteal veins. There is occlusive thrombus within a left soleal vein based on abnormal grayscale appearance, Vansil morphology of the vein and absence of normal color flow and Doppler venous waveforms.     CONCLUSION: 1. Abnormal exam. Right lower extremity has nonocclusive acute appearing thrombus within the common femoral vein as well as the visualized portions of the deep femoral vein. There is also nonocclusive thrombus within the right popliteal vein. 2. No DVT in the left lower extremity, however there is acute occlusive thrombus within a left soleal vein at the knee. Electronically Verified and Signed by Attending Radiologist: Jason Quiñones MD 6/24/2025 12:45 PM Workstation: [id number]    IR PERITONEAL DRAINAGE CATHETER  Result Date: 6/20/2025  PROCEDURE: IR PERITONEAL DRAINAGE CATHETER   INDICATIONS:  Status post colon resection, with postop intraperitoneal fluid  COMPARISON: None.  (S):  [place]  ESTIMATED BLOOD LOSS: Less than 5 mL  COMPLICATIONS: None  FINDINGS:  Informed consent was obtained. The patient was positioned supine. The right lower quadrant was sterilely prepped and draped. Preliminary ultrasound demonstrated complex appearing septated fluid collection.. Local Lidocaine was administered. Under ultrasound guidance, a Yueh sheath needle was advanced into the collection. There was return of serosanguineous fluid from the access needle. Under ultrasound guidance, a wire was advanced through the needle. The access was dilated and a 10 [place] pigtail catheter was coiled in the collection.  Via the drain, approximately 260 cc of serosanguineous fluid were aspirated.  Sample was sent for cultures and sensitivities.  The catheter was secured to the skin with sutures and attached to bulb suction.  Sterile dressing applied.         CONCLUSION: Insertion of drainage catheter into intra-abdominal ascites yielding 260 cc serosanguineous fluid.    Dictated by (CST): Norris Reilly MD on 6/20/2025 at 6:09 PM     Finalized by (CST): Norris Reilly MD on 6/20/2025 at 6:10 PM          CT ABDOMEN+PELVIS(CONTRAST ONLY)(CPT=74177)  Result Date: 6/19/2025  PROCEDURE: CT ABDOMEN + PELVIS (CONTRAST ONLY) (CPT=74177)  COMPARISON: [facility], CT ABDOMEN + PELVIS (CONTRAST ONLY) (CPT=74177), 6/05/2025, 5:22 PM.  INDICATIONS: S/p colon resection 6/10, leukocytosis and increased pain  TECHNIQUE: CT images of the abdomen and pelvis were obtained with non-ionic intravenous contrast material.  Automated exposure control for dose reduction was used. Adjustment of the mA and/or kV was done based on the patient's size. Use of iterative reconstruction technique for dose reduction was used.  Dose information is transmitted to the ACR (American College of Radiology) NRDR (National Radiology Data  Registry) which includes the Dose Index Registry.  FINDINGS:  LIVER: No enlargement, atrophy, abnormal density, or significant focal lesion.  BILIARY: Intrahepatic biliary ductal dilatation.  Gallbladder is incompletely distended.  No significant extrahepatic biliary ductal dilatation. SPLEEN: Spleen is not enlarged. There are granulomatous calcifications in the spleen. No suspicious splenic lesion. STOMACH: No gastric obstruction.  Duodenum is distended with oral contrast material but is otherwise without evidence of obstruction or inflammation. PANCREAS: No lesion, fluid collection, ductal dilatation, or atrophy.  ADRENALS: No nodule or enlargement. KIDNEYS: Cortical based low density focus in the posterior midpole of the right kidney suggesting simple cyst.  Bilaterally no suspicious enhancing renal lesion or hydroureteronephrosis. AORTA/VASCULAR:   Atherosclerosis of the abdominal aorta.  No aneurysm. BOWEL/MESENTERY:  Peripherally enhancing fluid collection in the left lower quadrant, above the roof of the urinary bladder and measuring approximately 55 x 47 x 58 mm (AP x transverse x CC).  There is also a moderate-large volume of free fluid within the abdomen and pelvis mainly along the peripheral aspect of the liver and spleen, both pericolic gutters and across the omentum.  Moderate volume of foci of gas are present within this fluid.  There is peritoneal thickening/enhancement in both pericolic  gutters.  No evidence of bowel obstruction.  No lymphadenopathy.  Post large bowel resection with reanastomosis in the region of the hepatic flexure without focal obstruction or inflammatory change at the anastomosis. ABDOMINAL WALL: Ventral line of skin staples are present above the umbilicus.  Additional scattered skin staples are present in the abdominal-pelvic anterior wall.  Mild body wall edema. URINARY BLADDER: Urinary bladder is distended with urine.  No stones or wall thickening. PELVIC NODES: No enlarged  mass or adenopathy.   PELVIC ORGANS: No visible mass.  Pelvic organs appropriate for patient age.  BONES:   Mild endplate change and disc disease within the spine most advanced at L5-S1. LUNG BASES: Small-moderate sized bilateral pleural effusion.  Enhancing wedge-shaped pulmonary opacities with air bronchograms adjacent to the effusions likely representing secondary compressive atelectasis.  The visualized heart has coronary artery calcifications. OTHER: Negative.          CONCLUSION:  1.  Post large bowel resection in the region of the proximal transverse colon/hepatic flexure.  No obstruction or inflammation at the surgical site.  There is a moderate-large volume of free fluid within the abdomen and pelvis mainly distributed in both upper quadrants, across the omentum, and in the pericolic gutters.  Moderate volume of free air is also present in the same distribution as the fluid.  These may be related to recent surgery.  Organized fluid collection in the left lower retroperitoneum/left lower quadrant measuring approximately 55 x 47 x 58 mm which could represent a seroma, liquified hematoma, or other inflammatory fluid collection.  Peritoneal enhancement and thickening in the both pericolic gutters which could represent phlegmonous change, early organization of fluid collections with similar differential as the aforementioned organized collection, or peritonitis. 2.  Moderate-sized bilateral pleural effusions.  Enhancing bibasilar pulmonary opacities with air bronchograms likely representing secondary compressive atelectasis. 3.  Coronary atherosclerosis. 4.  Right renal cyst. 5.  Mild body wall edema/anasarca.  Skin staples across the abdominal-pelvic anterior wall compatible with surgical access sites.   Dictated by (CST): Jason Quiñones MD on 6/19/2025 at 5:29 PM     Finalized by (CST): Jason Quiñones MD on 6/19/2025 at 5:36 PM            Assessment/Plan:  Problem List[1]  Stable  cpm  CT soon  THUY ALSTON,  MD  7/14/2025  7:51 AM       [1]   Patient Active Problem List  Diagnosis    BPH (benign prostatic hyperplasia)    Chronic low back pain    Chronic pain of both knees    Depression with anxiety    Generalized osteoarthritis    Hyperlipidemia    Type 2 diabetes mellitus without complication, without long-term current use of insulin (HCC)    Essential hypertension    Bipolar I disorder, single manic episode (HCC)    Large bowel obstruction (HCC)    Colonic mass    Diffuse large B-cell lymphoma of solid organ excluding spleen    Pneumoperitoneum    Abdominal distension    Anasarca    Intraabdominal fluid collection    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

## 2025-07-14 NOTE — PLAN OF CARE
Patient is on room air. Patient denies n/v/d and sob. Patient reported pain, prn po norco 5-325 mg x1 and prn ivp dilaudid 0.5mg x1 were administered and were effective. Patient is on IV abx. Patient is on a low fiber/soft diet and has glucose monitoring ACHS. Patient has 3 drains, dressings are C/D/I. Patient has bilateral arm precautions. Patient has a midline incision w/ steri strips in place. Patient has a primofit in place. Patient is on a heparin gtt at 12 units/hr. Safety measures are in place.     Problem: Patient Centered Care  Goal: Patient preferences are identified and integrated in the patient's plan of care  Description: Interventions:  - What would you like us to know as we care for you? I came from [facility]  - Provide timely, complete, and accurate information to patient/family  - Incorporate patient and family knowledge, values, beliefs, and cultural backgrounds into the planning and delivery of care  - Encourage patient/family to participate in care and decision-making at the level they choose  - Honor patient and family perspectives and choices  7/13/2025 1918 by Johana Pérez, RN  Outcome: Progressing  7/13/2025 1918 by Johana Pérez, RN  Outcome: Progressing     Problem: Diabetes/Glucose Control  Goal: Glucose maintained within prescribed range  Description: INTERVENTIONS:  - Monitor Blood Glucose as ordered  - Assess for signs and symptoms of hyperglycemia and hypoglycemia  - Administer ordered medications to maintain glucose within target range  - Assess barriers to adequate nutritional intake and initiate nutrition consult as needed  - Instruct patient on self management of diabetes  7/13/2025 1918 by Johana Pérez, RN  Outcome: Progressing  7/13/2025 1918 by Johana Pérez, RN  Outcome: Progressing     Problem: Patient/Family Goals  Goal: Patient/Family Long Term Goal  Description: Patient's Long Term Goal: Discharge  home    Interventions:  -Monitor  vitals  -Monitor labs  - Heparin gtt  - Glucose monitoring ACHS/q6h  - Monitor and manage NGOZI drains  - PT/OT on consult  - IR on consult  - Manage pain   - Monitor midline incision  - Remote tele   - IV abx  - ID onc onsult  - Gen sx on consult   - See additional Care Plan goals for specific interventions  7/13/2025 1918 by Johana Pérez RN  Outcome: Progressing  7/13/2025 1918 by Johana Pérez RN  Outcome: Progressing  Goal: Patient/Family Short Term Goal  Description: Patient's Short Term Goal: Manage pain    Interventions:   - Frequent pain assessments  - Non-pharmacological interventions  - Pain medications as needed/indicated  - See additional Care Plan goals for specific interventions  7/13/2025 1918 by Johana Pérez RN  Outcome: Progressing  7/13/2025 1918 by Johana Pérez RN  Outcome: Progressing     Problem: PAIN - ADULT  Goal: Verbalizes/displays adequate comfort level or patient's stated pain goal  Description: INTERVENTIONS:  - Encourage pt to monitor pain and request assistance  - Assess pain using appropriate pain scale  - Administer analgesics based on type and severity of pain and evaluate response  - Implement non-pharmacological measures as appropriate and evaluate response  - Consider cultural and social influences on pain and pain management  - Manage/alleviate anxiety  - Utilize distraction and/or relaxation techniques  - Monitor for opioid side effects  - Notify MD/LIP if interventions unsuccessful or patient reports new pain  - Anticipate increased pain with activity and pre-medicate as appropriate  7/13/2025 1918 by Johana Pérez RN  Outcome: Progressing  7/13/2025 1918 by Johana Pérez RN  Outcome: Progressing     Problem: RISK FOR INFECTION - ADULT  Goal: Absence of fever/infection during anticipated neutropenic period  Description: INTERVENTIONS  - Monitor WBC  - Administer growth factors as ordered  - Implement neutropenic  guidelines  7/13/2025 1918 by Johana Pérez RN  Outcome: Progressing  7/13/2025 1918 by Johana Pérez RN  Outcome: Progressing     Problem: SAFETY ADULT - FALL  Goal: Free from fall injury  Description: INTERVENTIONS:  - Assess pt frequently for physical needs  - Identify cognitive and physical deficits and behaviors that affect risk of falls.  - [place] fall precautions as indicated by assessment.  - Educate pt/family on patient safety including physical limitations  - Instruct pt to call for assistance with activity based on assessment  - Modify environment to reduce risk of injury  - Provide assistive devices as appropriate  - Consider OT/PT consult to assist with strengthening/mobility  - Encourage toileting schedule  7/13/2025 1918 by Johana Pérez RN  Outcome: Progressing  7/13/2025 1918 by Johana Pérez RN  Outcome: Progressing     Problem: HEMATOLOGIC - ADULT  Goal: Free from bleeding injury  Description: (Example usage: patient with low platelets)  INTERVENTIONS:  - Avoid intramuscular injections, enemas and rectal medication administration  - Ensure safe mobilization of patient  - Hold pressure on venipuncture sites to achieve adequate hemostasis  - Assess for signs and symptoms of internal bleeding  - Monitor lab trends  - Patient is to report abnormal signs of bleeding to staff  - Avoid use of toothpicks and dental floss  - Use electric shaver for shaving  - Use soft bristle tooth brush  - Limit straining and forceful nose blowing  7/13/2025 1918 by Johana Pérez RN  Outcome: Progressing  7/13/2025 1918 by Johana Pérez RN  Outcome: Progressing  Goal: Maintains hematologic stability  Description: INTERVENTIONS  - Assess for signs and symptoms of bleeding or hemorrhage  - Monitor labs and vital signs for trends  - Administer supportive blood products/factors, fluids and medications as ordered and appropriate  - Administer supportive blood  products/factors as ordered and appropriate  7/13/2025 1918 by Johana Pérez RN  Outcome: Progressing  7/13/2025 1918 by Johana Pérez RN  Outcome: Progressing  Goal: Free from bleeding injury  Description: (Example usage: patient with low platelets)  INTERVENTIONS:  - Avoid intramuscular injections, enemas and rectal medication administration  - Ensure safe mobilization of patient  - Hold pressure on venipuncture sites to achieve adequate hemostasis  - Assess for signs and symptoms of internal bleeding  - Monitor lab trends  - Patient is to report abnormal signs of bleeding to staff  - Avoid use of toothpicks and dental floss  - Use electric shaver for shaving  - Use soft bristle tooth brush  - Limit straining and forceful nose blowing  7/13/2025 1918 by Johana Pérez RN  Outcome: Progressing  7/13/2025 1918 by Johana Pérez RN  Outcome: Progressing     Problem: GASTROINTESTINAL - ADULT  Goal: Minimal or absence of nausea and vomiting  Description: INTERVENTIONS:  - Maintain adequate hydration with IV or PO as ordered and tolerated  - Nasogastric tube to low intermittent suction as ordered  - Evaluate effectiveness of ordered antiemetic medications  - Provide nonpharmacologic comfort measures as appropriate  - Advance diet as tolerated, if ordered  - Obtain nutritional consult as needed  - Evaluate fluid balance  7/13/2025 1918 by Johana Pérez RN  Outcome: Progressing  7/13/2025 1918 by Johana Pérez RN  Outcome: Progressing  Goal: Maintains or returns to baseline bowel function  Description: INTERVENTIONS:  - Assess bowel function  - Maintain adequate hydration with IV or PO as ordered and tolerated  - Evaluate effectiveness of GI medications  - Encourage mobilization and activity  - Obtain nutritional consult as needed  - Establish a toileting routine/schedule  - Consider collaborating with pharmacy to review patient's medication profile  7/13/2025 1918 by de  Johana Scott RN  Outcome: Progressing  7/13/2025 1918 by Johana Pérez RN  Outcome: Progressing  Goal: Maintains adequate nutritional intake (undernourished)  Description: INTERVENTIONS:  - Monitor percentage of each meal consumed  - Identify factors contributing to decreased intake, treat as appropriate  - Assist with meals as needed  - Monitor I&O, WT and lab values  - Obtain nutritional consult as needed  - Optimize oral hygiene and moisture  - Encourage food from home; allow for food preferences  - Enhance eating environment  7/13/2025 1918 by Johana Pérez RN  Outcome: Progressing  7/13/2025 1918 by Johana Pérez RN  Outcome: Progressing     Problem: DISCHARGE PLANNING  Goal: Discharge to home or other facility with appropriate resources  Description: INTERVENTIONS:  - Identify barriers to discharge w/pt and caregiver  - Include patient/family/discharge partner in discharge planning  - Arrange for needed discharge resources and transportation as appropriate  - Identify discharge learning needs (meds, wound care, etc)  - Arrange for interpreters to assist at discharge as needed  - Consider post-discharge preferences of patient/family/discharge partner  - Complete POLST form as appropriate  - Assess patient's ability to be responsible for managing their own health  - Refer to Case Management Department for coordinating discharge planning if the patient needs post-hospital services based on physician/LIP order or complex needs related to functional status, cognitive ability or social support system  7/13/2025 1918 by Johana Pérez RN  Outcome: Progressing  7/13/2025 1918 by Johana Pérez RN  Outcome: Progressing     Problem: Altered Communication/Language Barrier  Goal: Patient/Family is able to understand and participate in their care  Description: Interventions:  - Assess communication ability and preferred communication style  - Implement communication aides  and strategies  - Use visual cues when possible  - Listen attentively, be patient, do not interrupt  - Minimize distractions  - Allow time for understanding and response  - Establish method for patient to ask for assistance (call light)  - Provide an  as needed  - Communicate barriers and strategies to overcome with those who interact with patient  7/13/2025 1918 by Johana Pérez RN  Outcome: Progressing  7/13/2025 1918 by Johana Pérez RN  Outcome: Progressing     Problem: CARDIOVASCULAR - ADULT  Goal: Maintains optimal cardiac output and hemodynamic stability  Description: INTERVENTIONS:  - Monitor vital signs, rhythm, and trends  - Monitor for bleeding, hypotension and signs of decreased cardiac output  - Evaluate effectiveness of vasoactive medications to optimize hemodynamic stability  - Monitor arterial and/or venous puncture sites for bleeding and/or hematoma  - Assess quality of pulses, skin color and temperature  - Assess for signs of decreased coronary artery perfusion - ex. Angina  - Evaluate fluid balance, assess for edema, trend weights  7/13/2025 1918 by Johana Pérez RN  Outcome: Progressing  7/13/2025 1918 by Johana Pérez RN  Outcome: Progressing  Goal: Absence of cardiac arrhythmias or at baseline  Description: INTERVENTIONS:  - Continuous cardiac monitoring, monitor vital signs, obtain 12 lead EKG if indicated  - Evaluate effectiveness of antiarrhythmic and heart rate control medications as ordered  - Initiate emergency measures for life threatening arrhythmias  - Monitor electrolytes and administer replacement therapy as ordered  7/13/2025 1918 by Johana Pérez RN  Outcome: Progressing  7/13/2025 1918 by Johana Pérez RN  Outcome: Progressing     Problem: GENITOURINARY - ADULT  Goal: Absence of urinary retention  Description: INTERVENTIONS:  - Assess patient’s ability to void and empty bladder  - Monitor intake/output and perform  bladder scan as needed  - Follow urinary retention protocol/standard of care  - Consider collaborating with pharmacy to review patient's medication profile  - Implement strategies to promote bladder emptying  7/13/2025 1918 by Johana Pérez RN  Outcome: Progressing  7/13/2025 1918 by Johana Pérez RN  Outcome: Progressing     Problem: METABOLIC/FLUID AND ELECTROLYTES - ADULT  Goal: Electrolytes maintained within normal limits  Description: INTERVENTIONS:  - Monitor labs and rhythm and assess patient for signs and symptoms of electrolyte imbalances  - Administer electrolyte replacement as ordered  - Monitor response to electrolyte replacements, including rhythm and repeat lab results as appropriate  - Fluid restriction as ordered  - Instruct patient on fluid and nutrition restrictions as appropriate  7/13/2025 1918 by Johana Pérez RN  Outcome: Progressing  7/13/2025 1918 by Johana Pérez RN  Outcome: Progressing  Goal: Hemodynamic stability and optimal renal function maintained  Description: INTERVENTIONS:  - Monitor labs and assess for signs and symptoms of volume excess or deficit  - Monitor intake, output and patient weight  - Monitor urine specific gravity, serum osmolarity and serum sodium as indicated or ordered  - Monitor response to interventions for patient's volume status, including labs, urine output, blood pressure (other measures as available)  - Encourage oral intake as appropriate  - Instruct patient on fluid and nutrition restrictions as appropriate  7/13/2025 1918 by Johana Pérez RN  Outcome: Progressing  7/13/2025 1918 by Johana Pérez RN  Outcome: Progressing     Problem: SKIN/TISSUE INTEGRITY - ADULT  Goal: Skin integrity remains intact  Description: INTERVENTIONS  - Assess and document risk factors for pressure ulcer development  - Assess and document skin integrity  - Monitor for areas of redness and/or skin breakdown  - Initiate  interventions, skin care algorithm/standards of care as needed  7/13/2025 1918 by Johana Pérez RN  Outcome: Progressing  7/13/2025 1918 by Johana Pérez RN  Outcome: Progressing  Goal: Incision(s), wounds(s) or drain site(s) healing without S/S of infection  Description: INTERVENTIONS:  - Assess and document risk factors for pressure ulcer development  - Assess and document skin integrity  - Assess and document dressing/incision, wound bed, drain sites and surrounding tissue  - Implement wound care per orders  - Initiate isolation precautions as appropriate  - Initiate Pressure Ulcer prevention bundle as indicated  7/13/2025 1918 by Johana Pérez RN  Outcome: Progressing  7/13/2025 1918 by Johana Pérez RN  Outcome: Progressing  Goal: Oral mucous membranes remain intact  Description: INTERVENTIONS  - Assess oral mucosa and hygiene practices  - Implement preventative oral hygiene regimen  - Implement oral medicated treatments as ordered  7/13/2025 1918 by Johana Pérez RN  Outcome: Progressing  7/13/2025 1918 by Johana Pérez RN  Outcome: Progressing     Problem: COPING  Goal: Pt/Family able to verbalize concerns and demonstrate effective coping strategies  Description: INTERVENTIONS:  - Assist patient/family to identify coping skills, available support systems and cultural and spiritual values  - Provide emotional support, including active listening and acknowledgement of concerns of patient and caregivers  - Reduce environmental stimuli, as able  - Instruct patient/family in relaxation techniques, as appropriate  - Assess for spiritual and psychosocial needs and initiate Spiritual Care or Behavioral Health consult as needed  7/13/2025 1918 by Johana Pérez RN  Outcome: Progressing  7/13/2025 1918 by Johana Pérez RN  Outcome: Progressing

## 2025-07-15 ENCOUNTER — APPOINTMENT (OUTPATIENT)
Dept: INTERVENTIONAL RADIOLOGY/VASCULAR | Facility: HOSPITAL | Age: 80
End: 2025-07-15
Attending: NURSE PRACTITIONER
Payer: MEDICARE

## 2025-07-15 ENCOUNTER — HOSPITAL ENCOUNTER (OUTPATIENT)
Dept: INTERVENTIONAL RADIOLOGY/VASCULAR | Facility: HOSPITAL | Age: 80
Discharge: HOME | End: 2025-07-15
Attending: STUDENT IN AN ORGANIZED HEALTH CARE EDUCATION/TRAINING PROGRAM
Payer: MEDICARE

## 2025-07-15 ENCOUNTER — APPOINTMENT (OUTPATIENT)
Facility: LOCATION | Age: 80
End: 2025-07-15

## 2025-07-15 LAB
APTT PPP: 43.7 SECONDS (ref 23–36)
APTT PPP: 63.2 SECONDS (ref 23–36)
GLUCOSE BLDC GLUCOMTR-MCNC: 120 MG/DL (ref 70–99)
GLUCOSE BLDC GLUCOMTR-MCNC: 133 MG/DL (ref 70–99)
GLUCOSE BLDC GLUCOMTR-MCNC: 137 MG/DL (ref 70–99)
GLUCOSE BLDC GLUCOMTR-MCNC: 275 MG/DL (ref 70–99)
POTASSIUM SERPL-SCNC: 3.5 MMOL/L (ref 3.5–5.1)

## 2025-07-15 PROCEDURE — 99233 SBSQ HOSP IP/OBS HIGH 50: CPT | Performed by: HOSPITALIST

## 2025-07-15 PROCEDURE — 02HV33Z INSERTION OF INFUSION DEVICE INTO SUPERIOR VENA CAVA, PERCUTANEOUS APPROACH: ICD-10-PCS | Performed by: RADIOLOGY

## 2025-07-15 PROCEDURE — 0JH60WZ INSERTION OF TOTALLY IMPLANTABLE VASCULAR ACCESS DEVICE INTO CHEST SUBCUTANEOUS TISSUE AND FASCIA, OPEN APPROACH: ICD-10-PCS | Performed by: RADIOLOGY

## 2025-07-15 RX ORDER — HEPARIN SODIUM 1000 [USP'U]/ML
30 INJECTION, SOLUTION INTRAVENOUS; SUBCUTANEOUS ONCE
Status: COMPLETED | OUTPATIENT
Start: 2025-07-15 | End: 2025-07-15

## 2025-07-15 RX ORDER — MIDAZOLAM HYDROCHLORIDE 1 MG/ML
INJECTION INTRAMUSCULAR; INTRAVENOUS
Status: COMPLETED
Start: 2025-07-15 | End: 2025-07-15

## 2025-07-15 RX ORDER — LIDOCAINE HYDROCHLORIDE 20 MG/ML
INJECTION, SOLUTION INFILTRATION; PERINEURAL
Status: COMPLETED
Start: 2025-07-15 | End: 2025-07-15

## 2025-07-15 NOTE — PROGRESS NOTES
[facility]  part of [facility]  Hospitalist Progress Note     Arnaldo Gutierrez Patient Status:  Inpatient    4/15/1945  80 year old CSN [id number]   Location 454/454-A Attending Rodrick Ramos MD   Hosp Day # 10 PCP PHYSICIAN NONSTAFF     Assessment & Plan:   ----------------------------------  Intra-abdominal abscesses.  Status post IR drainage x2 .  Small bowel follow-through  negative for leak.  Repeat IR drainage  for perihepatic fluid collection..  Pain is improved  - Pain control  - Maintain drains, drain study   - ID, general surgery on consult  - Continue zerbaxa, daptomycin, Flagyl, micafungin    DVT, acute.  Location is RUE (new), RLE (old).  Pulmonary embolism not present. Contributing factors malignancy, immobility.  -Anticoagulation: Heparin drip, change to p.o after procedures are completely done  -Hypercoagulable work-up not indicated  -Light activity as tolerated    Small pneumothorax resolved    B-cell lymphoma.  Recent diagnosis.  - Oncology  - Port placement 7/15    Other problems  Hypertension  Dyslipidemia  GERD  BPH  Anxiety  Depression    Supplementary Documentation:   DVT Mechanical Prophylaxis:     Early ambuation  DVT Pharmacologic Prophylaxis   Medication    heparin (Porcine) 26717 units/250mL infusion PE/DVT/THROMBUS CONTINUOUS                Code Status: Full Code  Muñoz: External urinary catheter in place  Muñoz Duration (in days):   Central line:    ANNELISE: 2025        **Certification      PHYSICIAN Certification of Need for Inpatient Hospitalization - Initial Certification    Patient will require inpatient services that will reasonably be expected to span two midnight's based on the clinical documentation in H+P.   Based on patients current state of illness, I anticipate that, after discharge, patient will require TBD.           I personally reviewed the available laboratories, imaging including. I discussed/will discuss the case with  consultants. I ordered laboratories and/or radiographic studies. I adjusted medications as detailed above.  Medical decision making high, risk is high.  Discussed with family at the bedside    Subjective:   ----------------------------------  Pain controlled.  Agreeable to port placement.  No new complaints.    Objective:   Chief Complaint:   Chief Complaint   Patient presents with    Abdomen/Flank Pain     ----------------------------------  Temp:  [97.7 °F (36.5 °C)-98.4 °F (36.9 °C)] 98.1 °F (36.7 °C)  Pulse:  [] 87  Resp:  [16-18] 16  BP: (101-143)/(55-69) 120/56  SpO2:  [93 %-95 %] 93 %  Gen: A+Ox3.  No distress.   HEENT: NCAT, neck supple, no carotid bruit.  CV: RRR, S1S2, and intact distal pulses. No gallop, rub, murmur.  Pulm: Effort and breath sounds normal. No distress, wheezes, rales, rhonchi.  Abd: Soft, nontender nondistended.  Right upper quadrant drains with serous drainage.  Both lower quadrant drain with purulent drainage.  Neuro: Normal reflexes, CN. Sensory/motor exams grossly normal deficit.   MS: No joint effusions.  No peripheral edema.  Skin: Skin is warm and dry. No rashes, erythema, diaphoresis.   Psych: Normal mood and affect. Calm, cooperative    Labs:  Lab Results   Component Value Date    HGB 8.3 (L) 07/13/2025    WBC 9.0 07/13/2025    .0 07/13/2025     07/13/2025    K 3.5 07/15/2025    CREATSERUM 0.52 (L) 07/13/2025    AST 22 07/07/2025    ALT 16 07/07/2025           Scheduled Medications[1]  PRN Medications[2]                       [1]    insulin aspart  1-5 Units Subcutaneous TID CC and HS    FLUoxetine  20 mg Oral Daily    pregabalin  100 mg Oral TID    tamsulosin  0.8 mg Oral After dinner    vancomycin  125 mg Oral Daily    ceftolozane-tazobactam (ZERBAXA) 3 g in sodium chloride 0.9% 100 mL IVPB  3 g Intravenous Q8H    DAPTOmycin  500 mg Intravenous Q24H    micafungin  100 mg Intravenous Q24H    metroNIDAZOLE  500 mg Oral BID    pantoprazole  40 mg Intravenous  Daily   [2]   HYDROcodone-acetaminophen    HYDROcodone-acetaminophen    HYDROmorphone **OR** HYDROmorphone    glycerin-hypromellose-    melatonin    temazepam    glucose **OR** glucose **OR** glucose-vitamin C **OR** dextrose **OR** glucose **OR** glucose **OR** glucose-vitamin C    acetaminophen    ondansetron    metoclopramide

## 2025-07-15 NOTE — PROGRESS NOTES
[facility]  part of [facility]    Progress Note    Arnaldo Gutierrez Patient Status:  Inpatient    4/15/1945 MRN [id number]   Location [facility] 4W/SW/SE Attending Rodrick Ramos MD   Hosp Day # 10 PCP PHYSICIAN NONSTAFF     Subjective:  Feels ok        Objective/Physical Exam:  General: Alert, orientated x3.  Cooperative.  No apparent distress.  Vital Signs:  Blood pressure 134/65, pulse 84, temperature 98.4 °F (36.9 °C), temperature source Oral, resp. rate 16, height 180.3 cm (5' 11\"), weight 156 lb 8.4 oz (71 kg), SpO2 95%.  Abdomen:  Soft, non-distended, non-tender, drainage total 130    Labs:  Lab Results   Component Value Date    WBC 9.0 2025    HGB 8.3 (L) 2025    HCT 26.2 (L) 2025    .0 2025    CREATSERUM 0.52 (L) 2025    BUN 11 2025     2025    K 3.5 07/15/2025     2025    CO2 28.0 2025     (H) 2025    CA 7.8 (L) 2025    ALB 2.1 (L) 07/10/2025    ALKPHO 182 (H) 2025    BILT 0.3 2025    TP 5.0 (L) 2025    AST 22 2025    ALT 16 2025    PTT 63.2 (H) 07/15/2025    TSH 2.417 2025    LIP 45 2025    MG 1.9 2025    PHOS 2.7 07/10/2025    CK <15 (L) 2025    B12 1,020 (H) 2025       Imaging:  CT DRAIN ABSCESS LIVER (CPT=49405)  Result Date: 2025  PROCEDURE: Drainage catheter placement  Procedural Personnel Attending(s): Juan Luis Cabrera Pre-procedure diagnosis: abscess Post-procedure diagnosis: as above Indications  perihepatic abscess Additional clinical history: None   Complications: No immediate complications.      IMPRESSION:  Technically successful CT drainage catheter placement into perihepatic fluid collection.  PLAN:  1. Follow-up outputs and repeat abscessogram in 1 week's time 2. F/u CXR to evaluate for residual ptx  _______________________________________________________________  PROCEDURE SUMMARY: - Drainage catheter  insertion under CT-guidance with image stored to PACS. PROCEDURE DETAILS: Pre-procedure Consent: Informed consent for the procedure was obtained and time-out was performed prior to the procedure. Preparation: The site was prepared and draped using maximal sterile barrier technique including cutaneous antisepsis. Antibiotic administered and time: No antibiotics Anesthesia/sedation: Level of anesthesia/sedation: Moderate sedation (conscious sedation) Anesthesia/sedation administered by: Nurse or other independent trained observer under attending supervision with continuous monitoring of the patient's level of consciousness and physiologic status, under the direct supervision of the attending physician  \"MD intra-service time in sedation\" : 15 minutes    Drainage catheter placement The patient was positioned the supine. Initial imaging was performed. Local anesthesia was administered. Under CT guidance, the target was accessed. Fluid collection Initial imaging findings: Perihepatic Catheter type: APDL Catheter diameter: 10-Luxembourgish External catheter securement: Non-absorbable suture Final catheter location: perihepatic Post-drainage imaging findings: drain in subhepatic fluid collection Additional findings: After drain was placed, follow-up examination revealed a minimal pneumothorax that was iatrogenically caused by needle entry into the perihepatic region. A 5Fr Yueh needle was then placed within the pneumothorax via CT guidance and this region aspirated completely. Additional Details Additional description of procedure: None Additional findings: None Equipment details: None Specimens removed: 20 ml of turbid fluid was aspirated. A sample was sent for analysis. Estimated blood loss: Less than 10 mL Standardized report: [id number] Attestation I, Juan Luis Cabrera, attest that I was present for the entire procedure. I reviewed the stored images and agree with the report as written. Electronically Verified and Signed by  Attending Radiologist: Juan Luis Cabrera MD 7/13/2025 2:04 PM Workstation: [id number]    XR CHEST AP PORTABLE  (CPT=71045)  Result Date: 7/12/2025  PROCEDURE: XR CHEST AP PORTABLE  (CPT=71045) INDICATIONS: small ptx 7/11. pleuritic pain after perihepatic drain placement COMPARISON: 7/11/2025 TECHNIQUE: AP portable semiupright chest     CONCLUSION: 1.  Right perihepatic subdiaphragmatic pigtail drainage catheter. 2.  Bibasilar right greater than left pleural effusions with compressive atelectasis. 3.  No pneumothorax. 4.  Heart size remains normal. Pulmonary vascularity partially obscured. 5.  Atherosclerotic calcification aorta. 6.  Left PICC line tip at RASVC junction.  Electronically Verified and Signed by Attending Radiologist: Chong Valdez MD 7/12/2025 7:41 PM Workstation: [id number]    XR CHEST AP PORTABLE  (CPT=71045)  Result Date: 7/11/2025  PROCEDURE: XR CHEST AP PORTABLE  (CPT=71045) INDICATIONS: Shortness of breath S/P LIVER DRAIN PLACEMENT.   R/O PNEUMOTHORAX COMPARISON: 6/13/2025 TECHNIQUE: AP portable upright chest     CONCLUSION: 1.  No pneumothorax. 2.  Right upper quadrant subdiaphragmatic perihepatic drain. 3.  Moderate bibasilar pleural effusions with compressive atelectasis. 4.  Stable bilateral patchy perihilar opacities which may represent pneumonia or edema. 5.  Heart size remains normal. 6.  Left-sided PICC line tip at RASVC junction. Electronically Verified and Signed by Attending Radiologist: Chong Valdez MD 7/11/2025 2:52 PM Workstation: [id number]    CT ABDOMEN+PELVIS(CONTRAST ONLY)(CPT=74177)  Result Date: 7/11/2025  PROCEDURE: CT ABDOMEN+PELVIS(CONTRAST ONLY)(CPT=74177) INDICATIONS: follow up intraabdominal abscess COMPARISON: There are no comparisons for this exam. TECHNIQUE: Multislice CT scanning was performed from the dome of the diaphragm to the pubic symphysis with non-ionic intravenous contrast material. Post contrast coronal MPR imaging was performed. Automated exposure  control dose reduction techniques were  used. Adjustment of the mA and/or kV was done based on the patient's size. Use of iterative reconstruction technique for dose reduction was used. Dose information is transmitted to the ACR (American College of Radiology) NRDR (National Radiology Data Registry) which includes the Dose Index Registry. CONTRAST: IOPAMIDOL 76% IV SOLN FOR POWER INJECTOR:80 mL FINDINGS: LIVER: Normal. Portal vein and branches patent. Mass effect on the liver by the perihepatic abscesses. BILIARY: No evidence for cholecystitis or biliary dilatation. SPLEEN: Normal. PANCREAS: Normal. ADRENALS: Normal. KIDNEYS: Stable 2.5 cm posterior right interpolar renal cyst. No solid renal lesion or renal obstruction. Linear calcification in the lower pole the right kidney is likely vascular. AORTA/VASCULAR: Atherosclerotic vascular calcification including coronary artery calcification. No aneurysm or dissection. Major aortic branches patent. LYMPHADENOPATHY: None. GI/MESENTERY: Changes from transverse colon resection and 2 anastomotic staple lines in the left side of the abdomen from small bowel resections. No obstruction. ABDOMINAL WALL: Anasarca. Bilateral abdominal wall drains. Small amount of gas extending into a midline radu-incisional hernia. URINARY BLADDER: Normal. ASCITES/FLUID COLLECTIONS: Previously seen loculated gas/fluid collections in the anterior mid and upper abdomen have decreased in size with drainage catheter extending from left to right anteriorly. Tip of the drainage catheter is anterior to the gallbladder fossa. *  Right-sided subdiaphragmatic gas fluid collection measures 9.1 x 6.1 x 3.7 cm (prior 12.5 x 9.6 x 5.9 cm) *  Anterior to left lobe of liver just below the diaphragm measures 8.8 x 8 0.8 x 4.8 x 14.2 cm (prior 15.3 x 11.2 x 23 cm) *  Gas/fluid collection anteriorly to the left of the midline measures 2.2 x 13.4 cm on image 77 series 2 (prior 10.8 x 22.9 cm) *  Lateral perisplenic  fluid collection with tiny focus of gas measures 6.3 x 2.3 cm (prior 7.9 x 2 2.8 cm) *  Small predominantly gas collection in the left lateral mid abdomen measuring 18 x 41 mm. *  Stable right infrahepatic paracolic drain with a small gas fluid collection just posterior to the drain extending up to the edge of liver measuring 4.6 x 1.3 cm. *  Stable small 9 mm x 37 mm anterior right lower quadrant gas/fluid collection. *  Stable 3.5 x 2 cm cystlike gas fluid collection in left hemipelvis. *  Stable small amount of gastrosplenic ligament fluid without rim enhancement. PELVIC ORGANS: No suspect pelvic mass. BONES: No suspect bone lesion. LUNG BASES: Stable moderate bibasilar pleural effusions with compressive atelectasis. OTHER: Negative.     CONCLUSION: 1.  Bilateral abdominal drains with decreasing size to the abdominal abscesses. 2.  Small cystlike abscess in the left hemipelvis is unchanged and few other smaller abscess collections are unchanged. 3.  Transverse colectomy and sites of small bowel resection. No obstruction or contrast extravasation. 4.  Stable moderate bibasilar pleural effusions. 5.  Anasarca. Electronically Verified and Signed by Attending Radiologist: Chong Valdez MD 7/11/2025 12:52 AM Workstation: [id number]    CT CHEST (CPT=71250)  Result Date: 7/9/2025  PROCEDURE: CT CHEST (CPT=71250) INDICATIONS:  Upper extremity DVT COMPARISON: CT abdomen pelvis dated 7/4/2025 TECHNIQUE: Multidetector CT of the chest was obtained without non-ionic intravenous contrast material. Automated exposure control for dose reduction was used. Adjustment of the mA and/or kV was done based on the patient's size. Iterative reconstruction technique for dose reduction was employed. Dose information was transmitted to the ACR (American College of Radiology) NRDR (National Radiology Data Registry), which includes the Dose Index Registry. Oral contrast was ingested. FINDINGS: LUNGS/PLEURA/AIRWAYS:No consolidation, mass  or suspicious nodule.Small to moderate bilateral pleural effusions and passive atelectasis. Central airways are patent. MEDIASTINUM:No mediastinal or hilar lymphadenopathy. VESSELS:  The ascending thoracic aorta measures approximately 40 mm. Moderate coronary artery calcifications. CHEST WALL:Within normal limits. UPPER ABDOMEN: Partially visualized drainage catheter with decreased size of collections of fluid and air in the right upper quadrant. BONES: No suspicious osseous lesion or acute osseous abnormality.     CONCLUSION: 1.  Small to moderate bilateral pleural effusions with passive atelectasis. 2.  Partial visualized drainage catheter in the upper abdomen with decreased size of fluid and air collections in the right upper quadrant 3.  The ascending thoracic aorta measures 40 mm, the upper limits of normal. Electronically Verified and Signed by Attending Radiologist: Adam Neumann MD 7/9/2025 1:09 PM Workstation: [id number]    XR ABDOMEN (1 VIEW) (CPT=74018)  Result Date: 7/9/2025  PROCEDURE(S): XR ABDOMEN (1 VIEW) (CPT=74018) WORKSTATION: Tangled HISTORY/INDICATIONS: s/p bowel resections for lymphoma  possible bowel leak Abdominal distension. COMPARISON: 07/07/2025 XR SMALL BOWEL SINGLE CONTRAST (CPT=74250) FINDINGS: Lines And Tubes: Right upper quadrant drainage pigtail catheter is again seen. Left approach presumed surgical drain is again seen. Lower Thorax: Unremarkable. Bowel Gas Pattern: No gas-filled, dilated loops. No differential distention. Multiple bowel sutures are noted throughout the abdomen. Soft Tissues: No definite pneumoperitoneum. Previously described fluid collections are demonstrated to advantage on prior CT. Bones/Joints: No acute fracture.     CONCLUSION: No evidence of acute abnormality in the abdomen or pelvis. Multiple peritoneal drains are seen. Previously demonstrated fluid collections are demonstrated to advantage on prior CT. Electronically Verified and Signed by Attending  Radiologist: Gelacio Bullock MD 7/9/2025 9:50 AM Workstation: [id number]    XR SMALL BOWEL SINGLE CONTRAST (CPT=74250)  Result Date: 7/9/2025  PROCEDURE: XR SMALL BOWEL SINGLE CONTRAST (CPT=74250) INDICATIONS: Multiple bowel resections for lymphoma. Abscess with possible leak PATIENT STATED HISTORY: Recent multiple bowel resection surgeries, history of lymphoma COMPARISON: CT 7/4/2025 TECHNIQUE: Small bowel series with multiple serial films was performed in the usual manner.  A  abdominal radiograph was performed. Fluoro/Cine Image Number: 9 Fluoro Time: 1.4 minutes FINDINGS: No dilated bowel to suggest ileus or obstruction. No extravasation of enteric contrast was noted to diagnose or identify a source of leak. Focused attention was paid to regions of surgical staples and known prior abscesses using compression and magnification, confirming no evident leak. Transit time from stomach to colon was 90 minutes.     CONCLUSION: No evidence of bowel leak. If clinical suspicion persists, CT may be considered. Electronically Verified and Signed by Attending Radiologist: Sunil Murillo MD 7/9/2025 9:00 AM Workstation: [id number]    US VENOUS DOPPLER ARM RIGHT - DIAG IMG (CPT=93971)  Result Date: 7/8/2025  PROCEDURE: US VENOUS DOPPLER ARM RIGHT - DIAG IMG (CPT=93971) INDICATIONS: swelling in RUE TECHNIQUE: Color duplex Doppler venous ultrasound of the right upper extremity was performed in the usual manner. FINDINGS: The internal jugular, subclavian, axillary, brachial, cephalic and basilic veins appear normal. Flow was demonstrated with color and pulsed Doppler. Comparison scans of the left subclavian vein appear normal. There is acute expansile DVT within the right subclavian and axillary veins. The visualized segments of the right brachial vein, right cephalic vein, and right basilic vein are patent. There is moderate soft tissue edema within the right upper extremity.     CONCLUSION: Acute expansile occlusive DVT within  the right subclavian and axillary veins. The visualized segments of the right brachial, basilic, and cephalic veins are patent. The patient's nurse was notified by telephone of these results at 10:41 a.m. on 7/8/2025. Electronically Verified and Signed by Attending Radiologist: Duran Bhardwaj MD 7/8/2025 10:41 AM Workstation: [id number]    CT ABDOMEN+PELVIS(CONTRAST ONLY)(CPT=74177)  Result Date: 7/5/2025  PROCEDURE(S): CT ABDOMEN+PELVIS(CONTRAST ONLY)(CPT=74177) WORKSTATION: [id number] HISTORY/INDICATIONS: distended abdomen, recent surgery for mass resection, cath placement distended abdomen, recent surgery for mass resection, cath placement COMPARISON: 06/19/2025 CT ABDOMEN+PELVIS(CONTRAST ONLY)(CPT=74177) TECHNIQUE: Helical CT of the abdomen and pelvis was obtained following administration of intravenous contrast material. Axial, coronal, and sagittal reformatted images were created and interpreted. For this exam, one or more of the following dose reductions techniques were used: Automated exposure control Adjustment of the mA and/or kV according to patient size Use of iterative reconstruction technique FINDINGS: Lower Thorax: Moderate bilateral pleural effusions are again seen. Heart is normal size. Few airspace opacities are noted in the lingula series 3 image 1. There is atelectasis in the lower lobes. Liver: Unremarkable. Gallbladder/Biliary tree: Cholelithiasis without evidence of acute cholecystitis. No biliary ductal dilatation. Pancreas:  Unremarkable. Spleen: Unremarkable. Adrenal glands:  Unremarkable. Kidneys: Simple cyst arises from the right kidney posterior interpolar region. No suspicious renal lesions. Symmetric nephrograms. No hydronephrosis. Retroperitoneum/extraperitoneum: Unremarkable. Vasculature: No aneurysm or dissection. Mild atherosclerotic calcification is seen. Peritoneum: Multiple abscesses are seen: Left upper quadrant abscess measures 24.3 x 10.9 x 23.9 cm series 2 image 73; series  5 image 23. Air-fluid levels are seen. It extends into the abdominal wall series 2 image 73. Right upper quadrant perihepatic abscess communicates with the left upper quadrant abscess and measures 14.2 x 5.1 x 15.8 cm series 2 image 35; series 6 image 96. Perisplenic abscess measures 7.9 x 2.8 x 9.8 cm series 2 image 50; series 6 image 19. This abscess also likely communicates with the left upper quadrant abscess. Small lower midline suspected abscess measuring 3.0 x 2.7 x 3.5 cm. There is a right mid abdominal pigtail drainage catheter which may terminate within the colon series 2 image 80. GI tract/mesentery/omentum: Multiple prior bowel resections are noted. Suspected fistula in the left mid abdomen series 2 image 103. Possible pigtail drainage catheter within the ascending colon series 2 image 80. No bowel obstruction is identified. Urinary Bladder: The bladder is grossly unremarkable. Reproductive organs: Unremarkable. Body wall: Mild anasarca. The dominant abscess extends into the anterior abdominal wall along the surgical incision series 2 image 73. Bones:  There are mild degenerative changes in the spine.     CONCLUSION: 1.  Multiple large peritoneal abscesses are seen. The largest is centered in the left upper quadrant and left midabdomen and measures 24.3 x 10.9 x 23.9 cm with air-fluid levels and extends into the abdominal wall. This is contiguous with smaller right upper quadrant perihepatic and posterior left upper quadrant perisplenic abscesses. A separate small 3 cm abscess is noted adjacent to the bladder. Bladder diverticulum is thought less likely. 2.  Multiple prior bowel resections are seen. There may be fistulous connection between the bowel and left upper quadrant dominant abscess at the left mid abdomen. The location of a right-sided pigtail drainage catheter is uncertain but it may terminate within the ascending colon. 3.  Additional chronic and/or incidental findings are detailed in the body  of this report. Preliminary report was given by [employer] [employer] radiology. No clinically significant discrepancies are noted. Electronically Verified and Signed by Attending Radiologist: Gelacio Bullock MD 2025 7:14 AM Workstation: [email or web address]    CARD TTE STRAIN W DOPPLER ONCOLOGY (CPT=93306)  Result Date: 2025  Transthoracic Echocardiogram Name:Arnaldo Gutierrez Date: 2025 :  04/15/1945 Ht:  (71in)  BP: 101 / 61 MRN:  [id number]    Age:  80years    Wt:  (171lb) HR: 88bpm Loc:  [facility]       Gndr: M          BSA: 1.97m^2 Sonographer: Amelia CHERY Ordering:    Crys Wilkins Consulting:  Rafita Torres ---------------------------------------------------------------------------- History/Indications:   Encounter for Monitoring Cardiotoxic Drug Therapy. ---------------------------------------------------------------------------- Procedure information:  A transthoracic complete 2D study was performed. Additional evaluation included M-mode, complete spectral Doppler, and color Doppler.  Patient status:  Inpatient.  Location:  Echo laboratory.    This was a routine study. Transthoracic echocardiography for diagnosis. Image quality was adequate. ECG rhythm:   Normal sinus ---------------------------------------------------------------------------- Conclusions: 1. Left ventricle: The cavity size was normal. Wall thickness was normal.    Systolic function was normal. The estimated ejection fraction was 60-65%,    by biplane method of disks. Wall motion is normal; there are no regional    wall motion abnormalities. Left ventricular diastolic function parameters    were normal. The Global Longitudinal Strain (GLS) was -19.40%. 2. Mitral valve: There was mild regurgitation. * ---------------------------------------------------------------------------- * Findings: Left ventricle:  The cavity size was normal. Wall thickness was normal. Systolic function was normal. The estimated ejection fraction was 60-65%, by biplane method of  disks. Wall motion is normal; there are no regional wall motion abnormalities. The Global Longitudinal Strain (GLS) was -19.40%. Left ventricular diastolic function parameters were normal. Left atrium:  Well visualized. The atrium was normal in size. Right ventricle:  The cavity size was normal. Systolic function was normal. Systolic pressure was within the normal range. Right atrium:  Well visualized. The atrium was normal in size. Mitral valve:  Well visualized. The leaflets were normal thickness. No evidence for prolapse.  Doppler:  Transvalvular velocity was within the normal range. There was no evidence for stenosis. There was mild regurgitation. Aortic valve:  Well visualized.  The valve was trileaflet. The leaflets were normal thickness.  Doppler:  Transvalvular velocity was within the normal range. There was no evidence for stenosis. There was trivial regurgitation. Tricuspid valve:  Well visualized. The annulus is normal-sized. The leaflets are normal thickness. No echocardiographic evidence for tricuspid prolapse. Doppler:  Transvalvular velocity was within the normal range. There was no evidence for stenosis. There was trivial regurgitation. Pulmonic valve:   Well visualized. The annulus is normal-sized. The leaflets are normal thickness. No evidence for prolapse.  Doppler:  Transvalvular velocity was within the normal range. There was no evidence for stenosis. There was no significant regurgitation. Pericardium:   There was no pericardial effusion. Pleura:  No evidence of pleural fluid accumulation. Aorta: Aortic root: The aortic root was normal. Ascending aorta: The ascending aorta was normal. Pulmonary arteries: There was no evidence of pulmonary hypertension. Systemic veins:  Central venous respirophasic diameter changes are in the normal range (>50%). Inferior vena cava: The IVC was normal-sized. ---------------------------------------------------------------------------- Measurements  Left ventricle                    Value         Ref  GLS, 2D                          -19.40 %      ---------  IVS thickness, ED, PLAX          0.9    cm     0.6 - 1.0  LV ID, ED, PLAX              (L) 3.5    cm     4.2 - 5.8  LV ID, ES, PLAX              (L) 2.1    cm     2.5 - 4.0  LV PW thickness, ED, PLAX        0.8    cm     0.6 - 1.0  IVS/LV PW ratio, ED, PLAX        1.13          ---------  LV PW/LV ID ratio, ED, PLAX      0.23          ---------  LV area, ES, A4C                 19.3   cm^2   ---------  LV ejection fraction             72     %      52 - 72  Stroke volume/bsa, 2D            43     ml/m^2 ---------  LV end-diastolic volume, 1-p     87     ml     69 - 185  A4C  LV end-systolic volume, 1-p      47     ml     22 - 78  A4C  LV ejection fraction, 1-p        57     %      46 - 74  A4C  Stroke volume, 1-p A4C           50     ml     ---------  LV end-diastolic volume/bsa,     44     ml/m^2 37 - 93  1-p A4C  LV end-systolic volume/bsa,      24     ml/m^2 12 - 40  1-p A4C  Stroke volume/bsa, 1-p A4C       25     ml/m^2 ---------  LV end-diastolic volume, 2-p     81     ml     62 - 150  LV end-systolic volume, 2-p      32     ml     21 - 61  LV ejection fraction, 2-p        60     %      52 - 72  Stroke volume, 2-p               48     ml     ---------  LV end-diastolic volume/bsa,     41     ml/m^2 34 - 74  2-p  LV end-systolic volume/bsa,      16     ml/m^2 11 - 31  2-p  Stroke volume/bsa, 2-p           24.5   ml/m^2 ---------  LV e', lateral                   14.9   cm/sec >=10.0  LV E/e', lateral                 6             <=13  LV e', medial                    12.5   cm/sec >=7.0  LV E/e', medial                  7             ---------  LV e', average                   13.7   cm/sec ---------  LV E/e', average                 6             <=14  LVOT                             Value         Ref  LVOT ID                          2.1    cm     ---------  LVOT peak velocity, S            1.22   m/sec  ---------   LVOT VTI, S                      24.7   cm     ---------  LVOT peak gradient, S            6      mm Hg  ---------  LVOT mean gradient, S            3      mm Hg  ---------  Stroke volume (SV), LVOT DP      86     ml     ---------  Stroke index (SV/bsa), LVOT      43     ml/m^2 ---------  DP  Aortic valve                     Value         Ref  Aortic leaflet separation,       1.8    cm     ---------  MM  Aortic root                      Value         Ref  Aortic root ID, STJ, ED      (H) 3.6    cm     2.3 - 3.5  Ascending aorta                  Value         Ref  Ascending aorta ID               3.6    cm     2.2 - 3.8  Left atrium                      Value         Ref  LA volume, S                 (H) 62     ml     18 - 58  LA volume/bsa, S                 31     ml/m^2 16 - 34  LA volume, ES, 1-p A4C           53     ml     18 - 58  LA volume, ES, 1-p A2C       (H) 70     ml     18 - 58  LA volume, ES, A/L               68     ml     ---------  LA volume/bsa, ES, A/L           34     ml/m^2 16 - 34  Mitral valve                     Value         Ref  Mitral E-wave peak velocity      0.88   m/sec  ---------  Mitral A-wave peak velocity      0.99   m/sec  ---------  Mitral deceleration time         180    ms     ---------  Mitral peak gradient, D          3      mm Hg  ---------  Mitral E/A ratio, peak           0.9           ---------  Tricuspid valve                  Value         Ref  Tricuspid regurg peak            2.52   m/sec  <=2.8  velocity  Tricuspid peak RV-RA             25     mm Hg  ---------  gradient  Right ventricle                  Value         Ref  TAPSE, 2D                        2.50   cm     >=1.70  TAPSE, MM                        2.50   cm     >=1.70  RV s', lateral                   22.6   cm/sec >=9.5 Legend: (L)  and  (H)  farida values outside specified reference range. ---------------------------------------------------------------------------- Prepared and electronically signed by  Ernst Parada 06/24/2025 13:42     US VENOUS DOPPLER LEG BILAT - DIAG IMG (CPT=93970)  Result Date: 6/24/2025  PROCEDURE: US VENOUS DOPPLER LEG BILAT - DIAG IMG (CPT=93970) INDICATIONS: right calf pain, r/o dvt COMPARISON: None TECHNIQUE: Color duplex Doppler venous ultrasound of both lower extremities was performed in the usual manner. FINDINGS: Thrombus is present within the right common femoral vein as well as in the adjacent deep femoral vein draining into the common femoral vein. There is also nonocclusive thrombus within the proximal and distal aspect of the popliteal vein thrombus is otherwise expansile with heterogeneous grayscale appearance and there is absence of normal color flow and compressibility in these regions. The femoral vein appears normal. Normal flow was demonstrated with color and pulsed Doppler. Visualized portions of the great and small saphenous, posterior tibial and peroneal veins appear normal. Contralateral left lower extremity is without DVT within the common femoral, femoral, and popliteal veins. There is occlusive thrombus within a left soleal vein based on abnormal grayscale appearance, Vansil morphology of the vein and absence of normal color flow and Doppler venous waveforms.     CONCLUSION: 1. Abnormal exam. Right lower extremity has nonocclusive acute appearing thrombus within the common femoral vein as well as the visualized portions of the deep femoral vein. There is also nonocclusive thrombus within the right popliteal vein. 2. No DVT in the left lower extremity, however there is acute occlusive thrombus within a left soleal vein at the knee. Electronically Verified and Signed by Attending Radiologist: Jason Quiñones MD 6/24/2025 12:45 PM Workstation: [id number]    IR PERITONEAL DRAINAGE CATHETER  Result Date: 6/20/2025  PROCEDURE: IR PERITONEAL DRAINAGE CATHETER  INDICATIONS:  Status post colon resection, with postop intraperitoneal fluid  COMPARISON: None.  (S):  [place]   ESTIMATED BLOOD LOSS: Less than 5 mL  COMPLICATIONS: None  FINDINGS:  Informed consent was obtained. The patient was positioned supine. The right lower quadrant was sterilely prepped and draped. Preliminary ultrasound demonstrated complex appearing septated fluid collection.. Local Lidocaine was administered. Under ultrasound guidance, a Yueh sheath needle was advanced into the collection. There was return of serosanguineous fluid from the access needle. Under ultrasound guidance, a wire was advanced through the needle. The access was dilated and a 10 Citizen of the [place] pigtail catheter was coiled in the collection.  Via the drain, approximately 260 cc of serosanguineous fluid were aspirated.  Sample was sent for cultures and sensitivities.  The catheter was secured to the skin with sutures and attached to bulb suction.  Sterile dressing applied.         CONCLUSION: Insertion of drainage catheter into intra-abdominal ascites yielding 260 cc serosanguineous fluid.    Dictated by (CST): Norris Reilly MD on 6/20/2025 at 6:09 PM     Finalized by (CST): Norris Reilly MD on 6/20/2025 at 6:10 PM          CT ABDOMEN+PELVIS(CONTRAST ONLY)(CPT=74177)  Result Date: 6/19/2025  PROCEDURE: CT ABDOMEN + PELVIS (CONTRAST ONLY) (CPT=74177)  COMPARISON: [facility], CT ABDOMEN + PELVIS (CONTRAST ONLY) (CPT=74177), 6/05/2025, 5:22 PM.  INDICATIONS: S/p colon resection 6/10, leukocytosis and increased pain  TECHNIQUE: CT images of the abdomen and pelvis were obtained with non-ionic intravenous contrast material.  Automated exposure control for dose reduction was used. Adjustment of the mA and/or kV was done based on the patient's size. Use of iterative reconstruction technique for dose reduction was used.  Dose information is transmitted to the ACR (American College of Radiology) NRDR (National Radiology Data Registry) which includes the Dose Index Registry.  FINDINGS:  LIVER: No enlargement, atrophy, abnormal density, or  significant focal lesion.  BILIARY: Intrahepatic biliary ductal dilatation.  Gallbladder is incompletely distended.  No significant extrahepatic biliary ductal dilatation. SPLEEN: Spleen is not enlarged. There are granulomatous calcifications in the spleen. No suspicious splenic lesion. STOMACH: No gastric obstruction.  Duodenum is distended with oral contrast material but is otherwise without evidence of obstruction or inflammation. PANCREAS: No lesion, fluid collection, ductal dilatation, or atrophy.  ADRENALS: No nodule or enlargement. KIDNEYS: Cortical based low density focus in the posterior midpole of the right kidney suggesting simple cyst.  Bilaterally no suspicious enhancing renal lesion or hydroureteronephrosis. AORTA/VASCULAR:   Atherosclerosis of the abdominal aorta.  No aneurysm. BOWEL/MESENTERY:  Peripherally enhancing fluid collection in the left lower quadrant, above the roof of the urinary bladder and measuring approximately 55 x 47 x 58 mm (AP x transverse x CC).  There is also a moderate-large volume of free fluid within the abdomen and pelvis mainly along the peripheral aspect of the liver and spleen, both pericolic gutters and across the omentum.  Moderate volume of foci of gas are present within this fluid.  There is peritoneal thickening/enhancement in both pericolic  gutters.  No evidence of bowel obstruction.  No lymphadenopathy.  Post large bowel resection with reanastomosis in the region of the hepatic flexure without focal obstruction or inflammatory change at the anastomosis. ABDOMINAL WALL: Ventral line of skin staples are present above the umbilicus.  Additional scattered skin staples are present in the abdominal-pelvic anterior wall.  Mild body wall edema. URINARY BLADDER: Urinary bladder is distended with urine.  No stones or wall thickening. PELVIC NODES: No enlarged mass or adenopathy.   PELVIC ORGANS: No visible mass.  Pelvic organs appropriate for patient age.  BONES:   Mild  endplate change and disc disease within the spine most advanced at L5-S1. LUNG BASES: Small-moderate sized bilateral pleural effusion.  Enhancing wedge-shaped pulmonary opacities with air bronchograms adjacent to the effusions likely representing secondary compressive atelectasis.  The visualized heart has coronary artery calcifications. OTHER: Negative.          CONCLUSION:  1.  Post large bowel resection in the region of the proximal transverse colon/hepatic flexure.  No obstruction or inflammation at the surgical site.  There is a moderate-large volume of free fluid within the abdomen and pelvis mainly distributed in both upper quadrants, across the omentum, and in the pericolic gutters.  Moderate volume of free air is also present in the same distribution as the fluid.  These may be related to recent surgery.  Organized fluid collection in the left lower retroperitoneum/left lower quadrant measuring approximately 55 x 47 x 58 mm which could represent a seroma, liquified hematoma, or other inflammatory fluid collection.  Peritoneal enhancement and thickening in the both pericolic gutters which could represent phlegmonous change, early organization of fluid collections with similar differential as the aforementioned organized collection, or peritonitis. 2.  Moderate-sized bilateral pleural effusions.  Enhancing bibasilar pulmonary opacities with air bronchograms likely representing secondary compressive atelectasis. 3.  Coronary atherosclerosis. 4.  Right renal cyst. 5.  Mild body wall edema/anasarca.  Skin staples across the abdominal-pelvic anterior wall compatible with surgical access sites.   Dictated by (CST): Jason Quiñones MD on 6/19/2025 at 5:29 PM     Finalized by (CST): Jason Quiñones MD on 6/19/2025 at 5:36 PM            Assessment/Plan:  Problem List[1]  Stable   cultures back   rx per IR  Rt drains somewhat bilious?  Left less    Will check repeat hepato-biliary scan  to see if leak from stump    Also  small concern for bowel leak though previous small bowel follow through neg  Port and repeat drain evaluation per ID  THUY ALSTON MD  7/15/2025  10:31 AM       [1]   Patient Active Problem List  Diagnosis    BPH (benign prostatic hyperplasia)    Chronic low back pain    Chronic pain of both knees    Depression with anxiety    Generalized osteoarthritis    Hyperlipidemia    Type 2 diabetes mellitus without complication, without long-term current use of insulin (HCC)    Essential hypertension    Bipolar I disorder, single manic episode (HCC)    Large bowel obstruction (HCC)    Colonic mass    Diffuse large B-cell lymphoma of solid organ excluding spleen    Pneumoperitoneum    Abdominal distension    Anasarca    Intraabdominal fluid collection    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

## 2025-07-15 NOTE — PRE-SEDATION ASSESSMENT
[facility]  part of [facility] Pre-Procedure Sedation Assessment    History of snoring or sleep or apnea?   No    History of previous problems with anesthesia or sedation  No    Physical Findings:  Neck: nl ROM  CV: rrr  PULM: normal respiratory rate/effort    Mallampati Score:  II (hard and soft palate, upper portion of tonsils anduvula visible)    ASA Classification:   2. Patient with mild systemic disease    Plan:   -IV moderate sedation    Juan Luis Cabrera MD

## 2025-07-15 NOTE — PHYSICAL THERAPY NOTE
Physical Therapy Contact Note    Attempted to see patient for Physical Therapy services. Patient not available secondary to off the floor at IR for port and repeat drain evaluation. Will re-schedule visit.    Malini Jeter, PT, DPT

## 2025-07-15 NOTE — PROGRESS NOTES
[facility]  part of [facility]  Hospitalist Progress Note     Arnaldo Gutierrez Patient Status:  Inpatient    4/15/1945  80 year old CSN [id number]   Location 454/454-A Attending Rodrick Ramos MD   Hosp Day # 10 PCP PHYSICIAN NONSTAFF     Assessment & Plan:   ----------------------------------  Intra-abdominal abscesses.  Status post IR drainage x2 .  Small bowel follow-through  negative for leak.  Repeat IR drainage  for perihepatic fluid collection..  Pain is improved  - Pain control  - Maintain drains, drain study   - ID, general surgery on consult  - Continue zerbaxa, daptomycin, Flagyl, micafungin    DVT, acute.  Location is RUE (new), RLE (old).  Pulmonary embolism not present. Contributing factors malignancy, immobility.  -Anticoagulation: Heparin drip, change to p.o after procedures are completely done  -Hypercoagulable work-up not indicated  -Light activity as tolerated    Small pneumothorax resolved    B-cell lymphoma.  Recent diagnosis.  - Oncology  - Port placement     Other problems  Hypertension  Dyslipidemia  GERD  BPH  Anxiety  Depression    Supplementary Documentation:   DVT Mechanical Prophylaxis:     Early ambuation  DVT Pharmacologic Prophylaxis   Medication    heparin (Porcine) 81878 units/250mL infusion PE/DVT/THROMBUS CONTINUOUS                Code Status: Full Code  Muñoz: External urinary catheter in place  Muñoz Duration (in days):   Central line:    ANNELISE: 2025        **Certification      PHYSICIAN Certification of Need for Inpatient Hospitalization - Initial Certification    Patient will require inpatient services that will reasonably be expected to span two midnight's based on the clinical documentation in H+P.   Based on patients current state of illness, I anticipate that, after discharge, patient will require TBD.           I personally reviewed the available laboratories, imaging including. I discussed/will discuss the case with  consultants. I ordered laboratories and/or radiographic studies. I adjusted medications as detailed above.  Medical decision making high, risk is high.  Discussed with family at the bedside    Subjective:   ----------------------------------  Right-sided pain improved.  Has been ambulating today.    Objective:   Chief Complaint:   Chief Complaint   Patient presents with    Abdomen/Flank Pain     ----------------------------------  Temp:  [97.7 °F (36.5 °C)-98.4 °F (36.9 °C)] 98.1 °F (36.7 °C)  Pulse:  [] 87  Resp:  [16-18] 16  BP: (101-143)/(55-69) 120/56  SpO2:  [93 %-95 %] 93 %  Gen: A+Ox3.  No distress.   HEENT: NCAT, neck supple, no carotid bruit.  CV: RRR, S1S2, and intact distal pulses. No gallop, rub, murmur.  Pulm: Effort and breath sounds normal. No distress, wheezes, rales, rhonchi.  Abd: Soft, nontender nondistended.  Right upper quadrant and left lower quadrant drains in place with purulent output  Neuro: Normal reflexes, CN. Sensory/motor exams grossly normal deficit.   MS: No joint effusions.  No peripheral edema.  Skin: Skin is warm and dry. No rashes, erythema, diaphoresis.   Psych: Normal mood and affect. Calm, cooperative    Labs:  Lab Results   Component Value Date    HGB 8.3 (L) 07/13/2025    WBC 9.0 07/13/2025    .0 07/13/2025     07/13/2025    K 3.5 07/15/2025    CREATSERUM 0.52 (L) 07/13/2025    AST 22 07/07/2025    ALT 16 07/07/2025           Scheduled Medications[1]  PRN Medications[2]                     [1]    insulin aspart  1-5 Units Subcutaneous TID CC and HS    FLUoxetine  20 mg Oral Daily    pregabalin  100 mg Oral TID    tamsulosin  0.8 mg Oral After dinner    vancomycin  125 mg Oral Daily    ceftolozane-tazobactam (ZERBAXA) 3 g in sodium chloride 0.9% 100 mL IVPB  3 g Intravenous Q8H    DAPTOmycin  500 mg Intravenous Q24H    micafungin  100 mg Intravenous Q24H    metroNIDAZOLE  500 mg Oral BID    pantoprazole  40 mg Intravenous Daily   [2]    HYDROcodone-acetaminophen    HYDROcodone-acetaminophen    HYDROmorphone **OR** HYDROmorphone    glycerin-hypromellose-    melatonin    temazepam    glucose **OR** glucose **OR** glucose-vitamin C **OR** dextrose **OR** glucose **OR** glucose **OR** glucose-vitamin C    acetaminophen    ondansetron    metoclopramide

## 2025-07-15 NOTE — PLAN OF CARE
Problem: Patient Centered Care  Goal: Patient preferences are identified and integrated in the patient's plan of care  Description: Interventions:  - What would you like us to know as we care for you? I came [place] [facility]  - Provide timely, complete, and accurate information to patient/family  - Incorporate patient and family knowledge, values, beliefs, and cultural backgrounds into the planning and delivery of care  - Encourage patient/family to participate in care and decision-making at the level they choose  - Honor patient and family perspectives and choices  7/15/2025 0737 by Kumar Pham, RN  Outcome: Progressing       Problem: Diabetes/Glucose Control  Goal: Glucose maintained within prescribed range  Description: INTERVENTIONS:  - Monitor Blood Glucose as ordered  - Assess for signs and symptoms of hyperglycemia and hypoglycemia  - Administer ordered medications to maintain glucose within target range  - Assess barriers to adequate nutritional intake and initiate nutrition consult as needed  - Instruct patient on self management of diabetes  7/15/2025 0737 by Kumar Pham, RN  Outcome: Progressing    Problem: Patient/Family Goals  Goal: Patient/Family Long Term Goal  Description: Patient's Long Term Goal: Discharge  home    Interventions:  -Monitor vitals  -Monitor labs  - Heparin gtt  - Glucose monitoring ACHS/q6h  - Monitor and manage NGOZI drains  - PT/OT on consult  - IR on consult  - Manage pain   - Monitor midline incision  - Remote tele   - IV abx  - ID onc onsult  - Gen sx on consult   - See additional Care Plan goals for specific interventions  7/15/2025 0737 by Kumar Pham, RN  Outcome: Progressing    Goal: Patient/Family Short Term Goal  Description: Patient's Short Term Goal: Manage pain    Interventions:   - Frequent pain assessments  - Non-pharmacological interventions  - Pain medications as needed/indicated  - See additional Care Plan goals for specific interventions  7/15/2025 0737  by Kumar Pham, RN  Outcome: Progressing       Problem: PAIN - ADULT  Goal: Verbalizes/displays adequate comfort level or patient's stated pain goal  Description: INTERVENTIONS:  - Encourage pt to monitor pain and request assistance  - Assess pain using appropriate pain scale  - Administer analgesics based on type and severity of pain and evaluate response  - Implement non-pharmacological measures as appropriate and evaluate response  - Consider cultural and social influences on pain and pain management  - Manage/alleviate anxiety  - Utilize distraction and/or relaxation techniques  - Monitor for opioid side effects  - Notify MD/LIP if interventions unsuccessful or patient reports new pain  - Anticipate increased pain with activity and pre-medicate as appropriate  7/15/2025 0737 by Kumar Pham, RN  Outcome: Progressing       Problem: RISK FOR INFECTION - ADULT  Goal: Absence of fever/infection during anticipated neutropenic period  Description: INTERVENTIONS  - Monitor WBC  - Administer growth factors as ordered  - Implement neutropenic guidelines  7/15/2025 0737 by Kumar Pham, RN  Outcome: Progressing       Problem: SAFETY ADULT - FALL  Goal: Free from fall injury  Description: INTERVENTIONS:  - Assess pt frequently for physical needs  - Identify cognitive and physical deficits and behaviors that affect risk of falls.  - [place] fall precautions as indicated by assessment.  - Educate pt/family on patient safety including physical limitations  - Instruct pt to call for assistance with activity based on assessment  - Modify environment to reduce risk of injury  - Provide assistive devices as appropriate  - Consider OT/PT consult to assist with strengthening/mobility  - Encourage toileting schedule  7/15/2025 0737 by Kumar Pham, RN  Outcome: Progressing       Problem: HEMATOLOGIC - ADULT  Goal: Free from bleeding injury  Description: (Example usage: patient with low platelets)  INTERVENTIONS:  - Avoid  intramuscular injections, enemas and rectal medication administration  - Ensure safe mobilization of patient  - Hold pressure on venipuncture sites to achieve adequate hemostasis  - Assess for signs and symptoms of internal bleeding  - Monitor lab trends  - Patient is to report abnormal signs of bleeding to staff  - Avoid use of toothpicks and dental floss  - Use electric shaver for shaving  - Use soft bristle tooth brush  - Limit straining and forceful nose blowing  7/15/2025 0737 by Kumar Pham, RN  Outcome: Progressing    Goal: Maintains hematologic stability  Description: INTERVENTIONS  - Assess for signs and symptoms of bleeding or hemorrhage  - Monitor labs and vital signs for trends  - Administer supportive blood products/factors, fluids and medications as ordered and appropriate  - Administer supportive blood products/factors as ordered and appropriate  7/15/2025 0737 by Kumar Pham, RN  Outcome: Progressing    Goal: Free from bleeding injury  Description: (Example usage: patient with low platelets)  INTERVENTIONS:  - Avoid intramuscular injections, enemas and rectal medication administration  - Ensure safe mobilization of patient  - Hold pressure on venipuncture sites to achieve adequate hemostasis  - Assess for signs and symptoms of internal bleeding  - Monitor lab trends  - Patient is to report abnormal signs of bleeding to staff  - Avoid use of toothpicks and dental floss  - Use electric shaver for shaving  - Use soft bristle tooth brush  - Limit straining and forceful nose blowing  7/15/2025 0737 by Kumar Pham, RN  Outcome: Progressing       Problem: GASTROINTESTINAL - ADULT  Goal: Minimal or absence of nausea and vomiting  Description: INTERVENTIONS:  - Maintain adequate hydration with IV or PO as ordered and tolerated  - Nasogastric tube to low intermittent suction as ordered  - Evaluate effectiveness of ordered antiemetic medications  - Provide nonpharmacologic comfort measures as  appropriate  - Advance diet as tolerated, if ordered  - Obtain nutritional consult as needed  - Evaluate fluid balance  7/15/2025 0737 by Kumar Pham, RN  Outcome: Progressing    Goal: Maintains or returns to baseline bowel function  Description: INTERVENTIONS:  - Assess bowel function  - Maintain adequate hydration with IV or PO as ordered and tolerated  - Evaluate effectiveness of GI medications  - Encourage mobilization and activity  - Obtain nutritional consult as needed  - Establish a toileting routine/schedule  - Consider collaborating with pharmacy to review patient's medication profile  7/15/2025 0737 by Kumar Pham, RN  Outcome: Progressing    Goal: Maintains adequate nutritional intake (undernourished)  Description: INTERVENTIONS:  - Monitor percentage of each meal consumed  - Identify factors contributing to decreased intake, treat as appropriate  - Assist with meals as needed  - Monitor I&O, WT and lab values  - Obtain nutritional consult as needed  - Optimize oral hygiene and moisture  - Encourage food from home; allow for food preferences  - Enhance eating environment  7/15/2025 0737 by Kumar Pham, RN  Outcome: Progressing       Problem: DISCHARGE PLANNING  Goal: Discharge to home or other facility with appropriate resources  Description: INTERVENTIONS:  - Identify barriers to discharge w/pt and caregiver  - Include patient/family/discharge partner in discharge planning  - Arrange for needed discharge resources and transportation as appropriate  - Identify discharge learning needs (meds, wound care, etc)  - Arrange for interpreters to assist at discharge as needed  - Consider post-discharge preferences of patient/family/discharge partner  - Complete POLST form as appropriate  - Assess patient's ability to be responsible for managing their own health  - Refer to Case Management Department for coordinating discharge planning if the patient needs post-hospital services based on physician/LIP  order or complex needs related to functional status, cognitive ability or social support system  7/15/2025 0737 by Kumar Pham, RN  Outcome: Progressing       Problem: Altered Communication/Language Barrier  Goal: Patient/Family is able to understand and participate in their care  Description: Interventions:  - Assess communication ability and preferred communication style  - Implement communication aides and strategies  - Use visual cues when possible  - Listen attentively, be patient, do not interrupt  - Minimize distractions  - Allow time for understanding and response  - Establish method for patient to ask for assistance (call light)  - Provide an  as needed  - Communicate barriers and strategies to overcome with those who interact with patient  7/15/2025 0737 by Kumar Pham, RN  Outcome: Progressing       Problem: CARDIOVASCULAR - ADULT  Goal: Maintains optimal cardiac output and hemodynamic stability  Description: INTERVENTIONS:  - Monitor vital signs, rhythm, and trends  - Monitor for bleeding, hypotension and signs of decreased cardiac output  - Evaluate effectiveness of vasoactive medications to optimize hemodynamic stability  - Monitor arterial and/or venous puncture sites for bleeding and/or hematoma  - Assess quality of pulses, skin color and temperature  - Assess for signs of decreased coronary artery perfusion - ex. Angina  - Evaluate fluid balance, assess for edema, trend weights  7/15/2025 0737 by Kumar Pham, RN  Outcome: Progressing    Goal: Absence of cardiac arrhythmias or at baseline  Description: INTERVENTIONS:  - Continuous cardiac monitoring, monitor vital signs, obtain 12 lead EKG if indicated  - Evaluate effectiveness of antiarrhythmic and heart rate control medications as ordered  - Initiate emergency measures for life threatening arrhythmias  - Monitor electrolytes and administer replacement therapy as ordered  7/15/2025 0737 by Kumar Pham, RN  Outcome:  Progressing       Problem: GENITOURINARY - ADULT  Goal: Absence of urinary retention  Description: INTERVENTIONS:  - Assess patient’s ability to void and empty bladder  - Monitor intake/output and perform bladder scan as needed  - Follow urinary retention protocol/standard of care  - Consider collaborating with pharmacy to review patient's medication profile  - Implement strategies to promote bladder emptying  7/15/2025 0737 by Kumar Pham, RN  Outcome: Progressing       Problem: METABOLIC/FLUID AND ELECTROLYTES - ADULT  Goal: Electrolytes maintained within normal limits  Description: INTERVENTIONS:  - Monitor labs and rhythm and assess patient for signs and symptoms of electrolyte imbalances  - Administer electrolyte replacement as ordered  - Monitor response to electrolyte replacements, including rhythm and repeat lab results as appropriate  - Fluid restriction as ordered  - Instruct patient on fluid and nutrition restrictions as appropriate  7/15/2025 0737 by Kumar Pham, RN  Outcome: Progressing    Goal: Hemodynamic stability and optimal renal function maintained  Description: INTERVENTIONS:  - Monitor labs and assess for signs and symptoms of volume excess or deficit  - Monitor intake, output and patient weight  - Monitor urine specific gravity, serum osmolarity and serum sodium as indicated or ordered  - Monitor response to interventions for patient's volume status, including labs, urine output, blood pressure (other measures as available)  - Encourage oral intake as appropriate  - Instruct patient on fluid and nutrition restrictions as appropriate  7/15/2025 0737 by Kumar Pham, RN  Outcome: Progressing       Problem: SKIN/TISSUE INTEGRITY - ADULT  Goal: Skin integrity remains intact  Description: INTERVENTIONS  - Assess and document risk factors for pressure ulcer development  - Assess and document skin integrity  - Monitor for areas of redness and/or skin breakdown  - Initiate interventions, skin  care algorithm/standards of care as needed  7/15/2025 0737 by Kumar Pham, RN  Outcome: Progressing    Goal: Incision(s), wounds(s) or drain site(s) healing without S/S of infection  Description: INTERVENTIONS:  - Assess and document risk factors for pressure ulcer development  - Assess and document skin integrity  - Assess and document dressing/incision, wound bed, drain sites and surrounding tissue  - Implement wound care per orders  - Initiate isolation precautions as appropriate  - Initiate Pressure Ulcer prevention bundle as indicated  7/15/2025 0737 by Kumar Pham, RN  Outcome: Progressing    Goal: Oral mucous membranes remain intact  Description: INTERVENTIONS  - Assess oral mucosa and hygiene practices  - Implement preventative oral hygiene regimen  - Implement oral medicated treatments as ordered  7/15/2025 0737 by Kumar Pham, RN  Outcome: Progressing       Problem: COPING  Goal: Pt/Family able to verbalize concerns and demonstrate effective coping strategies  Description: INTERVENTIONS:  - Assist patient/family to identify coping skills, available support systems and cultural and spiritual values  - Provide emotional support, including active listening and acknowledgement of concerns of patient and caregivers  - Reduce environmental stimuli, as able  - Instruct patient/family in relaxation techniques, as appropriate  - Assess for spiritual and psychosocial needs and initiate Spiritual Care or Behavioral Health consult as needed  7/15/2025 0737 by Kumar Pham, RN  Outcome: Progressing       A/ox4, on RA, primarily Indonesian speaking, x2 max assist, voiding via primofit, no BM overnight, x2 NGOZI drains in place to bulb suction, LLQ drain in place to mcfadden bag draining to gravity, Heparin gtt continued at 12mL/hr, to be stopped at 0800 for scheduled procedure, denies nausea, ACHS, NPO at midnight, single lumen picc in place, IV antibiotics continued, vital signs stable, no acute changes overnight.  Call light within reach, safety measures in place, frequent rounding done, plan of care continued.

## 2025-07-15 NOTE — PLAN OF CARE
Problem: Patient Centered Care  Goal: Patient preferences are identified and integrated in the patient's plan of care  Description: Interventions:  - What would you like us to know as we care for you? I came [place] [facility]  - Provide timely, complete, and accurate information to patient/family  - Incorporate patient and family knowledge, values, beliefs, and cultural backgrounds into the planning and delivery of care  - Encourage patient/family to participate in care and decision-making at the level they choose  - Honor patient and family perspectives and choices  Outcome: Progressing     Problem: Diabetes/Glucose Control  Goal: Glucose maintained within prescribed range  Description: INTERVENTIONS:  - Monitor Blood Glucose as ordered  - Assess for signs and symptoms of hyperglycemia and hypoglycemia  - Administer ordered medications to maintain glucose within target range  - Assess barriers to adequate nutritional intake and initiate nutrition consult as needed  - Instruct patient on self management of diabetes  Outcome: Progressing     Problem: Patient/Family Goals  Goal: Patient/Family Long Term Goal  Description: Patient's Long Term Goal: Discharge  home    Interventions:  -Monitor vitals  -Monitor labs  - Heparin gtt  - Glucose monitoring ACHS/q6h  - Monitor and manage NGOZI drains  - PT/OT on consult  - IR on consult  - Manage pain   - Monitor midline incision  - Remote tele   - IV abx  - ID onc onsult  - Gen sx on consult   - See additional Care Plan goals for specific interventions  Outcome: Progressing  Goal: Patient/Family Short Term Goal  Description: Patient's Short Term Goal: Manage pain    Interventions:   - Frequent pain assessments  - Non-pharmacological interventions  - Pain medications as needed/indicated  - See additional Care Plan goals for specific interventions  Outcome: Progressing     Problem: PAIN - ADULT  Goal: Verbalizes/displays adequate comfort level or patient's stated pain  goal  Description: INTERVENTIONS:  - Encourage pt to monitor pain and request assistance  - Assess pain using appropriate pain scale  - Administer analgesics based on type and severity of pain and evaluate response  - Implement non-pharmacological measures as appropriate and evaluate response  - Consider cultural and social influences on pain and pain management  - Manage/alleviate anxiety  - Utilize distraction and/or relaxation techniques  - Monitor for opioid side effects  - Notify MD/LIP if interventions unsuccessful or patient reports new pain  - Anticipate increased pain with activity and pre-medicate as appropriate  Outcome: Progressing     Problem: RISK FOR INFECTION - ADULT  Goal: Absence of fever/infection during anticipated neutropenic period  Description: INTERVENTIONS  - Monitor WBC  - Administer growth factors as ordered  - Implement neutropenic guidelines  Outcome: Progressing     Problem: SAFETY ADULT - FALL  Goal: Free from fall injury  Description: INTERVENTIONS:  - Assess pt frequently for physical needs  - Identify cognitive and physical deficits and behaviors that affect risk of falls.  - Garibaldi fall precautions as indicated by assessment.  - Educate pt/family on patient safety including physical limitations  - Instruct pt to call for assistance with activity based on assessment  - Modify environment to reduce risk of injury  - Provide assistive devices as appropriate  - Consider OT/PT consult to assist with strengthening/mobility  - Encourage toileting schedule  Outcome: Progressing     Problem: HEMATOLOGIC - ADULT  Goal: Free from bleeding injury  Description: (Example usage: patient with low platelets)  INTERVENTIONS:  - Avoid intramuscular injections, enemas and rectal medication administration  - Ensure safe mobilization of patient  - Hold pressure on venipuncture sites to achieve adequate hemostasis  - Assess for signs and symptoms of internal bleeding  - Monitor lab trends  - Patient  is to report abnormal signs of bleeding to staff  - Avoid use of toothpicks and dental floss  - Use electric shaver for shaving  - Use soft bristle tooth brush  - Limit straining and forceful nose blowing  Outcome: Progressing  Goal: Maintains hematologic stability  Description: INTERVENTIONS  - Assess for signs and symptoms of bleeding or hemorrhage  - Monitor labs and vital signs for trends  - Administer supportive blood products/factors, fluids and medications as ordered and appropriate  - Administer supportive blood products/factors as ordered and appropriate  Outcome: Progressing  Goal: Free from bleeding injury  Description: (Example usage: patient with low platelets)  INTERVENTIONS:  - Avoid intramuscular injections, enemas and rectal medication administration  - Ensure safe mobilization of patient  - Hold pressure on venipuncture sites to achieve adequate hemostasis  - Assess for signs and symptoms of internal bleeding  - Monitor lab trends  - Patient is to report abnormal signs of bleeding to staff  - Avoid use of toothpicks and dental floss  - Use electric shaver for shaving  - Use soft bristle tooth brush  - Limit straining and forceful nose blowing  Outcome: Progressing     Problem: GASTROINTESTINAL - ADULT  Goal: Minimal or absence of nausea and vomiting  Description: INTERVENTIONS:  - Maintain adequate hydration with IV or PO as ordered and tolerated  - Nasogastric tube to low intermittent suction as ordered  - Evaluate effectiveness of ordered antiemetic medications  - Provide nonpharmacologic comfort measures as appropriate  - Advance diet as tolerated, if ordered  - Obtain nutritional consult as needed  - Evaluate fluid balance  Outcome: Progressing  Goal: Maintains or returns to baseline bowel function  Description: INTERVENTIONS:  - Assess bowel function  - Maintain adequate hydration with IV or PO as ordered and tolerated  - Evaluate effectiveness of GI medications  - Encourage mobilization and  activity  - Obtain nutritional consult as needed  - Establish a toileting routine/schedule  - Consider collaborating with pharmacy to review patient's medication profile  Outcome: Progressing  Goal: Maintains adequate nutritional intake (undernourished)  Description: INTERVENTIONS:  - Monitor percentage of each meal consumed  - Identify factors contributing to decreased intake, treat as appropriate  - Assist with meals as needed  - Monitor I&O, WT and lab values  - Obtain nutritional consult as needed  - Optimize oral hygiene and moisture  - Encourage food from home; allow for food preferences  - Enhance eating environment  Outcome: Progressing     Problem: DISCHARGE PLANNING  Goal: Discharge to home or other facility with appropriate resources  Description: INTERVENTIONS:  - Identify barriers to discharge w/pt and caregiver  - Include patient/family/discharge partner in discharge planning  - Arrange for needed discharge resources and transportation as appropriate  - Identify discharge learning needs (meds, wound care, etc)  - Arrange for interpreters to assist at discharge as needed  - Consider post-discharge preferences of patient/family/discharge partner  - Complete POLST form as appropriate  - Assess patient's ability to be responsible for managing their own health  - Refer to Case Management Department for coordinating discharge planning if the patient needs post-hospital services based on physician/LIP order or complex needs related to functional status, cognitive ability or social support system  Outcome: Progressing     Problem: Altered Communication/Language Barrier  Goal: Patient/Family is able to understand and participate in their care  Description: Interventions:  - Assess communication ability and preferred communication style  - Implement communication aides and strategies  - Use visual cues when possible  - Listen attentively, be patient, do not interrupt  - Minimize distractions  - Allow time for  understanding and response  - Establish method for patient to ask for assistance (call light)  - Provide an  as needed  - Communicate barriers and strategies to overcome with those who interact with patient  Outcome: Progressing     Problem: CARDIOVASCULAR - ADULT  Goal: Maintains optimal cardiac output and hemodynamic stability  Description: INTERVENTIONS:  - Monitor vital signs, rhythm, and trends  - Monitor for bleeding, hypotension and signs of decreased cardiac output  - Evaluate effectiveness of vasoactive medications to optimize hemodynamic stability  - Monitor arterial and/or venous puncture sites for bleeding and/or hematoma  - Assess quality of pulses, skin color and temperature  - Assess for signs of decreased coronary artery perfusion - ex. Angina  - Evaluate fluid balance, assess for edema, trend weights  Outcome: Progressing  Goal: Absence of cardiac arrhythmias or at baseline  Description: INTERVENTIONS:  - Continuous cardiac monitoring, monitor vital signs, obtain 12 lead EKG if indicated  - Evaluate effectiveness of antiarrhythmic and heart rate control medications as ordered  - Initiate emergency measures for life threatening arrhythmias  - Monitor electrolytes and administer replacement therapy as ordered  Outcome: Progressing     Problem: GENITOURINARY - ADULT  Goal: Absence of urinary retention  Description: INTERVENTIONS:  - Assess patient’s ability to void and empty bladder  - Monitor intake/output and perform bladder scan as needed  - Follow urinary retention protocol/standard of care  - Consider collaborating with pharmacy to review patient's medication profile  - Implement strategies to promote bladder emptying  Outcome: Progressing     Problem: METABOLIC/FLUID AND ELECTROLYTES - ADULT  Goal: Electrolytes maintained within normal limits  Description: INTERVENTIONS:  - Monitor labs and rhythm and assess patient for signs and symptoms of electrolyte imbalances  - Administer  electrolyte replacement as ordered  - Monitor response to electrolyte replacements, including rhythm and repeat lab results as appropriate  - Fluid restriction as ordered  - Instruct patient on fluid and nutrition restrictions as appropriate  Outcome: Progressing  Goal: Hemodynamic stability and optimal renal function maintained  Description: INTERVENTIONS:  - Monitor labs and assess for signs and symptoms of volume excess or deficit  - Monitor intake, output and patient weight  - Monitor urine specific gravity, serum osmolarity and serum sodium as indicated or ordered  - Monitor response to interventions for patient's volume status, including labs, urine output, blood pressure (other measures as available)  - Encourage oral intake as appropriate  - Instruct patient on fluid and nutrition restrictions as appropriate  Outcome: Progressing     Problem: SKIN/TISSUE INTEGRITY - ADULT  Goal: Skin integrity remains intact  Description: INTERVENTIONS  - Assess and document risk factors for pressure ulcer development  - Assess and document skin integrity  - Monitor for areas of redness and/or skin breakdown  - Initiate interventions, skin care algorithm/standards of care as needed  Outcome: Progressing  Goal: Incision(s), wounds(s) or drain site(s) healing without S/S of infection  Description: INTERVENTIONS:  - Assess and document risk factors for pressure ulcer development  - Assess and document skin integrity  - Assess and document dressing/incision, wound bed, drain sites and surrounding tissue  - Implement wound care per orders  - Initiate isolation precautions as appropriate  - Initiate Pressure Ulcer prevention bundle as indicated  Outcome: Progressing  Goal: Oral mucous membranes remain intact  Description: INTERVENTIONS  - Assess oral mucosa and hygiene practices  - Implement preventative oral hygiene regimen  - Implement oral medicated treatments as ordered  Outcome: Progressing     Problem: COPING  Goal:  Pt/Family able to verbalize concerns and demonstrate effective coping strategies  Description: INTERVENTIONS:  - Assist patient/family to identify coping skills, available support systems and cultural and spiritual values  - Provide emotional support, including active listening and acknowledgement of concerns of patient and caregivers  - Reduce environmental stimuli, as able  - Instruct patient/family in relaxation techniques, as appropriate  - Assess for spiritual and psychosocial needs and initiate Spiritual Care or Behavioral Health consult as needed  Outcome: Progressing     A/ox3, primarily Burkinan speaking, x2 max assist, on RA, Heparin gtt continued at 12, to be stopped 6 hours prior to procedure today, voiding via primofit, no BM overnight, x2 NGOZI drains in place to bulb suction, LLQ drain place, vital signs stable, no acute changes overnight. Call light within reach, safety measures in place, frequent rounding done, plan of care continued.

## 2025-07-15 NOTE — PLAN OF CARE
Patient alert and oriented, vital signs stable on room air, ambulating with 2 assists and walker with PT/OT. Heparin gtt running at therapeutic rate. Tolerating diet. Pain managed with PRN Norco x 1. IV antibiotics continued. Drains in place, output recorded, Right NGOZI drains to bulb suction, Left drain to mcfadden catheter. Primofit in place. Bowel movement x 1. Low fiber soft diet, well-tolerated with good appetite - ACHS. Consents signed for Port-a-cath placement (Tuesday) and drain check/exchange (Wednesday). Call light within reach, fall precautions in place, patient calling appropriately.     Problem: Patient Centered Care  Goal: Patient preferences are identified and integrated in the patient's plan of care  Description: Interventions:  - What would you like us to know as we care for you? I came [facility]  - Provide timely, complete, and accurate information to patient/family  - Incorporate patient and family knowledge, values, beliefs, and cultural backgrounds into the planning and delivery of care  - Encourage patient/family to participate in care and decision-making at the level they choose  - Honor patient and family perspectives and choices  Outcome: Progressing     Problem: Diabetes/Glucose Control  Goal: Glucose maintained within prescribed range  Description: INTERVENTIONS:  - Monitor Blood Glucose as ordered  - Assess for signs and symptoms of hyperglycemia and hypoglycemia  - Administer ordered medications to maintain glucose within target range  - Assess barriers to adequate nutritional intake and initiate nutrition consult as needed  - Instruct patient on self management of diabetes  Outcome: Progressing     Problem: Patient/Family Goals  Goal: Patient/Family Long Term Goal  Description: Patient's Long Term Goal: Discharge  home    Interventions:  -Monitor vitals  -Monitor labs  - Heparin gtt  - Glucose monitoring ACHS/q6h  - Monitor and manage NGOZI drains  - PT/OT on consult  - IR on consult  -  Manage pain   - Monitor midline incision  - Remote tele   - IV abx  - ID onc onsult  - Gen sx on consult   - See additional Care Plan goals for specific interventions  Outcome: Progressing  Goal: Patient/Family Short Term Goal  Description: Patient's Short Term Goal: Manage pain    Interventions:   - Frequent pain assessments  - Non-pharmacological interventions  - Pain medications as needed/indicated  - See additional Care Plan goals for specific interventions  Outcome: Progressing     Problem: PAIN - ADULT  Goal: Verbalizes/displays adequate comfort level or patient's stated pain goal  Description: INTERVENTIONS:  - Encourage pt to monitor pain and request assistance  - Assess pain using appropriate pain scale  - Administer analgesics based on type and severity of pain and evaluate response  - Implement non-pharmacological measures as appropriate and evaluate response  - Consider cultural and social influences on pain and pain management  - Manage/alleviate anxiety  - Utilize distraction and/or relaxation techniques  - Monitor for opioid side effects  - Notify MD/LIP if interventions unsuccessful or patient reports new pain  - Anticipate increased pain with activity and pre-medicate as appropriate  Outcome: Progressing     Problem: RISK FOR INFECTION - ADULT  Goal: Absence of fever/infection during anticipated neutropenic period  Description: INTERVENTIONS  - Monitor WBC  - Administer growth factors as ordered  - Implement neutropenic guidelines  Outcome: Progressing     Problem: SAFETY ADULT - FALL  Goal: Free from fall injury  Description: INTERVENTIONS:  - Assess pt frequently for physical needs  - Identify cognitive and physical deficits and behaviors that affect risk of falls.  - [place] fall precautions as indicated by assessment.  - Educate pt/family on patient safety including physical limitations  - Instruct pt to call for assistance with activity based on assessment  - Modify environment to reduce risk  of injury  - Provide assistive devices as appropriate  - Consider OT/PT consult to assist with strengthening/mobility  - Encourage toileting schedule  Outcome: Progressing     Problem: HEMATOLOGIC - ADULT  Goal: Free from bleeding injury  Description: (Example usage: patient with low platelets)  INTERVENTIONS:  - Avoid intramuscular injections, enemas and rectal medication administration  - Ensure safe mobilization of patient  - Hold pressure on venipuncture sites to achieve adequate hemostasis  - Assess for signs and symptoms of internal bleeding  - Monitor lab trends  - Patient is to report abnormal signs of bleeding to staff  - Avoid use of toothpicks and dental floss  - Use electric shaver for shaving  - Use soft bristle tooth brush  - Limit straining and forceful nose blowing  Outcome: Progressing  Goal: Maintains hematologic stability  Description: INTERVENTIONS  - Assess for signs and symptoms of bleeding or hemorrhage  - Monitor labs and vital signs for trends  - Administer supportive blood products/factors, fluids and medications as ordered and appropriate  - Administer supportive blood products/factors as ordered and appropriate  Outcome: Progressing  Goal: Free from bleeding injury  Description: (Example usage: patient with low platelets)  INTERVENTIONS:  - Avoid intramuscular injections, enemas and rectal medication administration  - Ensure safe mobilization of patient  - Hold pressure on venipuncture sites to achieve adequate hemostasis  - Assess for signs and symptoms of internal bleeding  - Monitor lab trends  - Patient is to report abnormal signs of bleeding to staff  - Avoid use of toothpicks and dental floss  - Use electric shaver for shaving  - Use soft bristle tooth brush  - Limit straining and forceful nose blowing  Outcome: Progressing     Problem: GASTROINTESTINAL - ADULT  Goal: Minimal or absence of nausea and vomiting  Description: INTERVENTIONS:  - Maintain adequate hydration with IV or PO  as ordered and tolerated  - Nasogastric tube to low intermittent suction as ordered  - Evaluate effectiveness of ordered antiemetic medications  - Provide nonpharmacologic comfort measures as appropriate  - Advance diet as tolerated, if ordered  - Obtain nutritional consult as needed  - Evaluate fluid balance  Outcome: Progressing  Goal: Maintains or returns to baseline bowel function  Description: INTERVENTIONS:  - Assess bowel function  - Maintain adequate hydration with IV or PO as ordered and tolerated  - Evaluate effectiveness of GI medications  - Encourage mobilization and activity  - Obtain nutritional consult as needed  - Establish a toileting routine/schedule  - Consider collaborating with pharmacy to review patient's medication profile  Outcome: Progressing  Goal: Maintains adequate nutritional intake (undernourished)  Description: INTERVENTIONS:  - Monitor percentage of each meal consumed  - Identify factors contributing to decreased intake, treat as appropriate  - Assist with meals as needed  - Monitor I&O, WT and lab values  - Obtain nutritional consult as needed  - Optimize oral hygiene and moisture  - Encourage food from home; allow for food preferences  - Enhance eating environment  Outcome: Progressing     Problem: DISCHARGE PLANNING  Goal: Discharge to home or other facility with appropriate resources  Description: INTERVENTIONS:  - Identify barriers to discharge w/pt and caregiver  - Include patient/family/discharge partner in discharge planning  - Arrange for needed discharge resources and transportation as appropriate  - Identify discharge learning needs (meds, wound care, etc)  - Arrange for interpreters to assist at discharge as needed  - Consider post-discharge preferences of patient/family/discharge partner  - Complete POLST form as appropriate  - Assess patient's ability to be responsible for managing their own health  - Refer to Case Management Department for coordinating discharge  planning if the patient needs post-hospital services based on physician/LIP order or complex needs related to functional status, cognitive ability or social support system  Outcome: Progressing     Problem: Altered Communication/Language Barrier  Goal: Patient/Family is able to understand and participate in their care  Description: Interventions:  - Assess communication ability and preferred communication style  - Implement communication aides and strategies  - Use visual cues when possible  - Listen attentively, be patient, do not interrupt  - Minimize distractions  - Allow time for understanding and response  - Establish method for patient to ask for assistance (call light)  - Provide an  as needed  - Communicate barriers and strategies to overcome with those who interact with patient  Outcome: Progressing     Problem: CARDIOVASCULAR - ADULT  Goal: Maintains optimal cardiac output and hemodynamic stability  Description: INTERVENTIONS:  - Monitor vital signs, rhythm, and trends  - Monitor for bleeding, hypotension and signs of decreased cardiac output  - Evaluate effectiveness of vasoactive medications to optimize hemodynamic stability  - Monitor arterial and/or venous puncture sites for bleeding and/or hematoma  - Assess quality of pulses, skin color and temperature  - Assess for signs of decreased coronary artery perfusion - ex. Angina  - Evaluate fluid balance, assess for edema, trend weights  Outcome: Progressing  Goal: Absence of cardiac arrhythmias or at baseline  Description: INTERVENTIONS:  - Continuous cardiac monitoring, monitor vital signs, obtain 12 lead EKG if indicated  - Evaluate effectiveness of antiarrhythmic and heart rate control medications as ordered  - Initiate emergency measures for life threatening arrhythmias  - Monitor electrolytes and administer replacement therapy as ordered  Outcome: Progressing     Problem: GENITOURINARY - ADULT  Goal: Absence of urinary  retention  Description: INTERVENTIONS:  - Assess patient’s ability to void and empty bladder  - Monitor intake/output and perform bladder scan as needed  - Follow urinary retention protocol/standard of care  - Consider collaborating with pharmacy to review patient's medication profile  - Implement strategies to promote bladder emptying  Outcome: Progressing     Problem: METABOLIC/FLUID AND ELECTROLYTES - ADULT  Goal: Electrolytes maintained within normal limits  Description: INTERVENTIONS:  - Monitor labs and rhythm and assess patient for signs and symptoms of electrolyte imbalances  - Administer electrolyte replacement as ordered  - Monitor response to electrolyte replacements, including rhythm and repeat lab results as appropriate  - Fluid restriction as ordered  - Instruct patient on fluid and nutrition restrictions as appropriate  Outcome: Progressing  Goal: Hemodynamic stability and optimal renal function maintained  Description: INTERVENTIONS:  - Monitor labs and assess for signs and symptoms of volume excess or deficit  - Monitor intake, output and patient weight  - Monitor urine specific gravity, serum osmolarity and serum sodium as indicated or ordered  - Monitor response to interventions for patient's volume status, including labs, urine output, blood pressure (other measures as available)  - Encourage oral intake as appropriate  - Instruct patient on fluid and nutrition restrictions as appropriate  Outcome: Progressing     Problem: SKIN/TISSUE INTEGRITY - ADULT  Goal: Skin integrity remains intact  Description: INTERVENTIONS  - Assess and document risk factors for pressure ulcer development  - Assess and document skin integrity  - Monitor for areas of redness and/or skin breakdown  - Initiate interventions, skin care algorithm/standards of care as needed  Outcome: Progressing  Goal: Incision(s), wounds(s) or drain site(s) healing without S/S of infection  Description: INTERVENTIONS:  - Assess and document  risk factors for pressure ulcer development  - Assess and document skin integrity  - Assess and document dressing/incision, wound bed, drain sites and surrounding tissue  - Implement wound care per orders  - Initiate isolation precautions as appropriate  - Initiate Pressure Ulcer prevention bundle as indicated  Outcome: Progressing  Goal: Oral mucous membranes remain intact  Description: INTERVENTIONS  - Assess oral mucosa and hygiene practices  - Implement preventative oral hygiene regimen  - Implement oral medicated treatments as ordered  Outcome: Progressing     Problem: COPING  Goal: Pt/Family able to verbalize concerns and demonstrate effective coping strategies  Description: INTERVENTIONS:  - Assist patient/family to identify coping skills, available support systems and cultural and spiritual values  - Provide emotional support, including active listening and acknowledgement of concerns of patient and caregivers  - Reduce environmental stimuli, as able  - Instruct patient/family in relaxation techniques, as appropriate  - Assess for spiritual and psychosocial needs and initiate Spiritual Care or Behavioral Health consult as needed  Outcome: Progressing

## 2025-07-15 NOTE — PLAN OF CARE
Patient alert and oriented, family at bedside. Vital signs stable on room air. Port-a-cath placed in IR. NPO prior to procedure, back to low fiber/soft diet afterwards, tolerating diet, ACHS. Heparin gtt restarted per order with plans for PTT to be drawn 6 hours after restart. HIDA scan scheduled for 7:30AM, drain check with IR scheduled for 2:00PM with plans to stop Heparin gtt 6 hours prior. Voiding via primofit, incontinent of stool x 1. Call light within reach, fall precautions in place, patient calling appropriately.     Problem: Patient Centered Care  Goal: Patient preferences are identified and integrated in the patient's plan of care  Description: Interventions:  - What would you like us to know as we care for you? I came from [facility]  - Provide timely, complete, and accurate information to patient/family  - Incorporate patient and family knowledge, values, beliefs, and cultural backgrounds into the planning and delivery of care  - Encourage patient/family to participate in care and decision-making at the level they choose  - Honor patient and family perspectives and choices  Outcome: Progressing     Problem: Diabetes/Glucose Control  Goal: Glucose maintained within prescribed range  Description: INTERVENTIONS:  - Monitor Blood Glucose as ordered  - Assess for signs and symptoms of hyperglycemia and hypoglycemia  - Administer ordered medications to maintain glucose within target range  - Assess barriers to adequate nutritional intake and initiate nutrition consult as needed  - Instruct patient on self management of diabetes  Outcome: Progressing     Problem: Patient/Family Goals  Goal: Patient/Family Long Term Goal  Description: Patient's Long Term Goal: Discharge  home    Interventions:  -Monitor vitals  -Monitor labs  - Heparin gtt  - Glucose monitoring ACHS/q6h  - Monitor and manage NGOZI drains  - PT/OT on consult  - IR on consult  - Manage pain   - Monitor midline incision  - Remote tele   - IV abx  -  ID onc onsult  - Gen sx on consult   - See additional Care Plan goals for specific interventions  Outcome: Progressing  Goal: Patient/Family Short Term Goal  Description: Patient's Short Term Goal: Manage pain    Interventions:   - Frequent pain assessments  - Non-pharmacological interventions  - Pain medications as needed/indicated  - See additional Care Plan goals for specific interventions  Outcome: Progressing     Problem: PAIN - ADULT  Goal: Verbalizes/displays adequate comfort level or patient's stated pain goal  Description: INTERVENTIONS:  - Encourage pt to monitor pain and request assistance  - Assess pain using appropriate pain scale  - Administer analgesics based on type and severity of pain and evaluate response  - Implement non-pharmacological measures as appropriate and evaluate response  - Consider cultural and social influences on pain and pain management  - Manage/alleviate anxiety  - Utilize distraction and/or relaxation techniques  - Monitor for opioid side effects  - Notify MD/LIP if interventions unsuccessful or patient reports new pain  - Anticipate increased pain with activity and pre-medicate as appropriate  Outcome: Progressing     Problem: RISK FOR INFECTION - ADULT  Goal: Absence of fever/infection during anticipated neutropenic period  Description: INTERVENTIONS  - Monitor WBC  - Administer growth factors as ordered  - Implement neutropenic guidelines  Outcome: Progressing     Problem: SAFETY ADULT - FALL  Goal: Free from fall injury  Description: INTERVENTIONS:  - Assess pt frequently for physical needs  - Identify cognitive and physical deficits and behaviors that affect risk of falls.  - [facility] fall precautions as indicated by assessment.  - Educate pt/family on patient safety including physical limitations  - Instruct pt to call for assistance with activity based on assessment  - Modify environment to reduce risk of injury  - Provide assistive devices as appropriate  - Consider  OT/PT consult to assist with strengthening/mobility  - Encourage toileting schedule  Outcome: Progressing     Problem: HEMATOLOGIC - ADULT  Goal: Free from bleeding injury  Description: (Example usage: patient with low platelets)  INTERVENTIONS:  - Avoid intramuscular injections, enemas and rectal medication administration  - Ensure safe mobilization of patient  - Hold pressure on venipuncture sites to achieve adequate hemostasis  - Assess for signs and symptoms of internal bleeding  - Monitor lab trends  - Patient is to report abnormal signs of bleeding to staff  - Avoid use of toothpicks and dental floss  - Use electric shaver for shaving  - Use soft bristle tooth brush  - Limit straining and forceful nose blowing  Outcome: Progressing  Goal: Maintains hematologic stability  Description: INTERVENTIONS  - Assess for signs and symptoms of bleeding or hemorrhage  - Monitor labs and vital signs for trends  - Administer supportive blood products/factors, fluids and medications as ordered and appropriate  - Administer supportive blood products/factors as ordered and appropriate  Outcome: Progressing  Goal: Free from bleeding injury  Description: (Example usage: patient with low platelets)  INTERVENTIONS:  - Avoid intramuscular injections, enemas and rectal medication administration  - Ensure safe mobilization of patient  - Hold pressure on venipuncture sites to achieve adequate hemostasis  - Assess for signs and symptoms of internal bleeding  - Monitor lab trends  - Patient is to report abnormal signs of bleeding to staff  - Avoid use of toothpicks and dental floss  - Use electric shaver for shaving  - Use soft bristle tooth brush  - Limit straining and forceful nose blowing  Outcome: Progressing     Problem: GASTROINTESTINAL - ADULT  Goal: Minimal or absence of nausea and vomiting  Description: INTERVENTIONS:  - Maintain adequate hydration with IV or PO as ordered and tolerated  - Nasogastric tube to low intermittent  suction as ordered  - Evaluate effectiveness of ordered antiemetic medications  - Provide nonpharmacologic comfort measures as appropriate  - Advance diet as tolerated, if ordered  - Obtain nutritional consult as needed  - Evaluate fluid balance  Outcome: Progressing  Goal: Maintains or returns to baseline bowel function  Description: INTERVENTIONS:  - Assess bowel function  - Maintain adequate hydration with IV or PO as ordered and tolerated  - Evaluate effectiveness of GI medications  - Encourage mobilization and activity  - Obtain nutritional consult as needed  - Establish a toileting routine/schedule  - Consider collaborating with pharmacy to review patient's medication profile  Outcome: Progressing  Goal: Maintains adequate nutritional intake (undernourished)  Description: INTERVENTIONS:  - Monitor percentage of each meal consumed  - Identify factors contributing to decreased intake, treat as appropriate  - Assist with meals as needed  - Monitor I&O, WT and lab values  - Obtain nutritional consult as needed  - Optimize oral hygiene and moisture  - Encourage food from home; allow for food preferences  - Enhance eating environment  Outcome: Progressing     Problem: DISCHARGE PLANNING  Goal: Discharge to home or other facility with appropriate resources  Description: INTERVENTIONS:  - Identify barriers to discharge w/pt and caregiver  - Include patient/family/discharge partner in discharge planning  - Arrange for needed discharge resources and transportation as appropriate  - Identify discharge learning needs (meds, wound care, etc)  - Arrange for interpreters to assist at discharge as needed  - Consider post-discharge preferences of patient/family/discharge partner  - Complete POLST form as appropriate  - Assess patient's ability to be responsible for managing their own health  - Refer to Case Management Department for coordinating discharge planning if the patient needs post-hospital services based on  physician/LIP order or complex needs related to functional status, cognitive ability or social support system  Outcome: Progressing     Problem: Altered Communication/Language Barrier  Goal: Patient/Family is able to understand and participate in their care  Description: Interventions:  - Assess communication ability and preferred communication style  - Implement communication aides and strategies  - Use visual cues when possible  - Listen attentively, be patient, do not interrupt  - Minimize distractions  - Allow time for understanding and response  - Establish method for patient to ask for assistance (call light)  - Provide an  as needed  - Communicate barriers and strategies to overcome with those who interact with patient  Outcome: Progressing     Problem: CARDIOVASCULAR - ADULT  Goal: Maintains optimal cardiac output and hemodynamic stability  Description: INTERVENTIONS:  - Monitor vital signs, rhythm, and trends  - Monitor for bleeding, hypotension and signs of decreased cardiac output  - Evaluate effectiveness of vasoactive medications to optimize hemodynamic stability  - Monitor arterial and/or venous puncture sites for bleeding and/or hematoma  - Assess quality of pulses, skin color and temperature  - Assess for signs of decreased coronary artery perfusion - ex. Angina  - Evaluate fluid balance, assess for edema, trend weights  Outcome: Progressing  Goal: Absence of cardiac arrhythmias or at baseline  Description: INTERVENTIONS:  - Continuous cardiac monitoring, monitor vital signs, obtain 12 lead EKG if indicated  - Evaluate effectiveness of antiarrhythmic and heart rate control medications as ordered  - Initiate emergency measures for life threatening arrhythmias  - Monitor electrolytes and administer replacement therapy as ordered  Outcome: Progressing     Problem: GENITOURINARY - ADULT  Goal: Absence of urinary retention  Description: INTERVENTIONS:  - Assess patient’s ability to void and  empty bladder  - Monitor intake/output and perform bladder scan as needed  - Follow urinary retention protocol/standard of care  - Consider collaborating with pharmacy to review patient's medication profile  - Implement strategies to promote bladder emptying  Outcome: Progressing     Problem: METABOLIC/FLUID AND ELECTROLYTES - ADULT  Goal: Electrolytes maintained within normal limits  Description: INTERVENTIONS:  - Monitor labs and rhythm and assess patient for signs and symptoms of electrolyte imbalances  - Administer electrolyte replacement as ordered  - Monitor response to electrolyte replacements, including rhythm and repeat lab results as appropriate  - Fluid restriction as ordered  - Instruct patient on fluid and nutrition restrictions as appropriate  Outcome: Progressing  Goal: Hemodynamic stability and optimal renal function maintained  Description: INTERVENTIONS:  - Monitor labs and assess for signs and symptoms of volume excess or deficit  - Monitor intake, output and patient weight  - Monitor urine specific gravity, serum osmolarity and serum sodium as indicated or ordered  - Monitor response to interventions for patient's volume status, including labs, urine output, blood pressure (other measures as available)  - Encourage oral intake as appropriate  - Instruct patient on fluid and nutrition restrictions as appropriate  Outcome: Progressing     Problem: SKIN/TISSUE INTEGRITY - ADULT  Goal: Skin integrity remains intact  Description: INTERVENTIONS  - Assess and document risk factors for pressure ulcer development  - Assess and document skin integrity  - Monitor for areas of redness and/or skin breakdown  - Initiate interventions, skin care algorithm/standards of care as needed  Outcome: Progressing  Goal: Incision(s), wounds(s) or drain site(s) healing without S/S of infection  Description: INTERVENTIONS:  - Assess and document risk factors for pressure ulcer development  - Assess and document skin  integrity  - Assess and document dressing/incision, wound bed, drain sites and surrounding tissue  - Implement wound care per orders  - Initiate isolation precautions as appropriate  - Initiate Pressure Ulcer prevention bundle as indicated  Outcome: Progressing  Goal: Oral mucous membranes remain intact  Description: INTERVENTIONS  - Assess oral mucosa and hygiene practices  - Implement preventative oral hygiene regimen  - Implement oral medicated treatments as ordered  Outcome: Progressing     Problem: COPING  Goal: Pt/Family able to verbalize concerns and demonstrate effective coping strategies  Description: INTERVENTIONS:  - Assist patient/family to identify coping skills, available support systems and cultural and spiritual values  - Provide emotional support, including active listening and acknowledgement of concerns of patient and caregivers  - Reduce environmental stimuli, as able  - Instruct patient/family in relaxation techniques, as appropriate  - Assess for spiritual and psychosocial needs and initiate Spiritual Care or Behavioral Health consult as needed  Outcome: Progressing

## 2025-07-15 NOTE — PROCEDURES
[facility]  part of [facility]  Procedure Note    Arnaldo Gutierrez Patient Status:  Inpatient    4/15/1945 MRN [id number]   Location [facility] 4W/SW/SE Attending Rodrick Ramos MD   Hosp Day # 10 PCP PHYSICIAN NONSTAFF     Procedure: image guided right transjugular power port placement    Pre-Procedure Diagnosis:  access for chemotherapy    Post-Procedure Diagnosis: same    Anesthesia:  Sedation    Findings:  tip at cavo-atrial junction-- ready for immediate use      Blood Loss:  minimal       Complications:  None       Juan Luis Cabrera MD  7/15/2025

## 2025-07-16 ENCOUNTER — APPOINTMENT (OUTPATIENT)
Dept: INTERVENTIONAL RADIOLOGY/VASCULAR | Facility: HOSPITAL | Age: 80
End: 2025-07-16
Attending: NURSE PRACTITIONER
Payer: MEDICARE

## 2025-07-16 ENCOUNTER — APPOINTMENT (OUTPATIENT)
Dept: NUCLEAR MEDICINE | Facility: HOSPITAL | Age: 80
End: 2025-07-16
Attending: SPECIALIST
Payer: MEDICARE

## 2025-07-16 LAB
APTT PPP: 66.3 SECONDS (ref 23–36)
GLUCOSE BLDC GLUCOMTR-MCNC: 142 MG/DL (ref 70–99)
GLUCOSE BLDC GLUCOMTR-MCNC: 145 MG/DL (ref 70–99)
GLUCOSE BLDC GLUCOMTR-MCNC: 150 MG/DL (ref 70–99)
GLUCOSE BLDC GLUCOMTR-MCNC: 219 MG/DL (ref 70–99)
GLUCOSE BLDC GLUCOMTR-MCNC: 223 MG/DL (ref 70–99)

## 2025-07-16 PROCEDURE — 99233 SBSQ HOSP IP/OBS HIGH 50: CPT | Performed by: INTERNAL MEDICINE

## 2025-07-16 PROCEDURE — 78226 HEPATOBILIARY SYSTEM IMAGING: CPT | Performed by: SPECIALIST

## 2025-07-16 PROCEDURE — 0FP0X0Z REMOVAL OF DRAINAGE DEVICE FROM LIVER, EXTERNAL APPROACH: ICD-10-PCS | Performed by: RADIOLOGY

## 2025-07-16 PROCEDURE — 3E1M38Z IRRIGATION OF PERITONEAL CAVITY USING IRRIGATING SUBSTANCE, PERCUTANEOUS APPROACH: ICD-10-PCS | Performed by: RADIOLOGY

## 2025-07-16 PROCEDURE — 0W2GX0Z CHANGE DRAINAGE DEVICE IN PERITONEAL CAVITY, EXTERNAL APPROACH: ICD-10-PCS | Performed by: RADIOLOGY

## 2025-07-16 RX ORDER — IOPAMIDOL 612 MG/ML
100 INJECTION, SOLUTION INTRAVASCULAR
Status: COMPLETED | OUTPATIENT
Start: 2025-07-16 | End: 2025-07-16

## 2025-07-16 RX ORDER — MIDAZOLAM HYDROCHLORIDE 1 MG/ML
INJECTION INTRAMUSCULAR; INTRAVENOUS
Status: COMPLETED
Start: 2025-07-16 | End: 2025-07-16

## 2025-07-16 RX ORDER — LIDOCAINE HYDROCHLORIDE 20 MG/ML
INJECTION, SOLUTION INFILTRATION; PERINEURAL
Status: COMPLETED
Start: 2025-07-16 | End: 2025-07-16

## 2025-07-16 RX ORDER — MIDAZOLAM HYDROCHLORIDE 1 MG/ML
INJECTION INTRAMUSCULAR; INTRAVENOUS
Status: DISCONTINUED
Start: 2025-07-16 | End: 2025-07-16 | Stop reason: WASHOUT

## 2025-07-16 NOTE — OCCUPATIONAL THERAPY NOTE
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    Patient is requiring up to min A functional mobility, TFRs, and ADL management as a result of the following impairments: decreased functional strength, decreased functional reach, decreased endurance, impaired   balance, decreased muscular endurance, and decreased safety awareness.    Patient continues to function below baseline with ADLs and functional mobility/TFRs.  Next session anticipate patient to progress toileting, lower body dressing, bed mobility, transfers, dynamic sitting balance, dynamic standing balance, and energy conservation strategies.  Occupational Therapy will continue to follow patient for duration of hospitalization.    Patient continues to benefit from continued skilled OT services: to promote return to prior level of function and safety with continuous assistance and gradual rehabilitative therapy d/t IV med needs upon discharge .     PLAN DURING HOSPITALIZATION  OT Device Recommendations: TBD  OT Treatment Plan: Balance activities, Energy conservation/work simplification techniques, ADL training, Functional transfer training, UE strengthening/ROM, Endurance training, Patient/Family education     SUBJECTIVE  \"I will only get to the chair today\"   utilized    OBJECTIVE  Precautions:  needed, Drain(s), Other (Comment) ( ID [id number])    WEIGHT BEARING RESTRICTION     PAIN ASSESSMENT  Ratin  Location: abdomen  Management Techniques: Relaxation;  Repositioning         ACTIVITIES OF DAILY LIVING ASSESSMENT  AM-PAC ‘6-Clicks’ Inpatient Daily Activity Short Form  How much help from another person does the patient currently need…  -   Putting on and taking off regular lower body clothing?: A Little  -   Bathing (including washing, rinsing, drying)?: A Little  -   Toileting, which includes using toilet, bedpan or urinal? : A Little  -   Putting on and taking off regular upper body clothing?: A Little  -   Taking care of personal grooming such as brushing teeth?: A Little  -   Eating meals?: None    AM-PAC Score:  Score: 19  Approx Degree of Impairment: 42.8%  Standardized Score (AM-PAC Scale): 40.22  CMS Modifier (G-Code): CK    BED MOBILITY  Supine to Sit: min assist  Comments:    Head of bed elevated, use of rails     FUNCTIONAL TRANSFER ASSESSMENT  Sit to stand Stand from EOB: min assist  Stand Pivot Transfer from EOB to Wheel Chair: min assist. 3 ft pivot  Comments:    EDU on proper positioning, hand placement, overall mechanics for TFR. Use of 2ww     ACTIVITIES OF DAILY LIVING  Grooming: setup assist-seated  LB Dressing: min assist-sock management    Skilled Therapy Provided: Pt seen for OT session, RN approved. See ADL and mobility grids, DC recs for details. At the end, Pt positioned upright in wheelchair (pt preference) with  call light and personal items in reach, RN Aware.       EDUCATION PROVIDED  Patient Education : Plan of Care; Role of Occupational Therapy; Discharge Recommendations; DME Recommendations; Functional Transfer Techniques; Fall Prevention; Posture/Positioning; Energy Conservation; Proper Body Mechanics  Patient's Response to Education: Verbalized Understanding; Requires Further Education    The patient's Approx Degree of Impairment: 42.8% has been calculated based on documentation in the [facility] '6 clicks' Inpatient Daily Activity Short Form.  Research supports that patients with this level of impairment may benefit from [facility].  Final  disposition will be made by interdisciplinary medical team.    Patient End of Session: Up in chair, Needs met, Call light within reach, RN aware of session/findings, All patient questions and concerns addressed, Hospital anti-slip socks, Other (Comment) (pt preference to sit in WC in room ok w RNn)    OT Goals:  Patient's self stated goal is: unstated      Patient will complete functional transfer with SBA  Comment: ongoing    Patient will complete toileting with SBA  Comment: ongoing    Patient will tolerate standing for 3 minutes in prep for adls with SBA   Comment: ongoing    Patient will complete LB dressing with Min A  Comment: ongoing            Goals  on: 25  Frequency: 3-5x/week    OT Session Time: 17 minutes  Therapeutic Activity: 17 minutes

## 2025-07-16 NOTE — PRE-SEDATION ASSESSMENT
[facility]  part of [facility] Pre-Procedure Sedation Assessment    History of snoring or sleep or apnea?   No    History of previous problems with anesthesia or sedation  No    Physical Findings:  Neck: nl ROM  CV: rrr  PULM: normal respiratory rate/effort    Mallampati Score:  II (hard and soft palate, upper portion of tonsils anduvula visible)    ASA Classification:   2. Patient with mild systemic disease    Plan:   -IV moderate sedation    Juan Luis Cabrera MD

## 2025-07-16 NOTE — PROGRESS NOTES
07/16/25 1746   Closed Drain RUQ Bulb 12 Fr.   Placement Date/Time: 07/16/25 1619   Inserted by: JUVENCIO Cabrera MD  Location: RUQ  Drain Tube Type: Bulb  Size (Fr.): 12 Fr.   Drainage Appearance Serosanguineous   Status To bulb suction   Output (mL) 15 mL   Closed Drain LUQ Other (Comment) 22 Fr.   Placement Date/Time: 07/16/25 1626   Inserted by: JUVENCIO Cabrera MD  Location: LUQ  Drain Tube Type: (c) Other (Comment)  Size (Fr.): 22 Fr.   Drainage Appearance Milky;Tan   Status Open to gravity drainage   Output (mL) 10 mL

## 2025-07-16 NOTE — PROGRESS NOTES
[facility]  part of [facility]    Progress Note    Arnaldo Gutierrez Patient Status:  Inpatient    4/15/1945 MRN [id number]   Location [facility] 4W/SW/SE Attending Aracelis Costello MD   Hosp Day # 11 PCP PHYSICIAN NONSTAFF     Subjective:  Feels ok    Objective/Physical Exam:  General: Alert, orientated x3.  Cooperative.  No apparent distress.  Vital Signs:  Blood pressure (!) 150/93, pulse 98, temperature 97.9 °F (36.6 °C), temperature source Oral, resp. rate 18, height 180.3 cm (5' 11\"), weight 156 lb 8.4 oz (71 kg), SpO2 100%.  Abdomen:  Soft, non-distended, non-tender, with no rebound or guarding.  No peritoneal signs.  Greenish fluid on rt drains   left white tinged    Labs:  Lab Results   Component Value Date    WBC 9.0 2025    HGB 8.3 (L) 2025    HCT 26.2 (L) 2025    .0 2025    CREATSERUM 0.52 (L) 2025    BUN 11 2025     2025    K 3.5 07/15/2025     2025    CO2 28.0 2025     (H) 2025    CA 7.8 (L) 2025    ALB 2.1 (L) 07/10/2025    ALKPHO 182 (H) 2025    BILT 0.3 2025    TP 5.0 (L) 2025    AST 22 2025    ALT 16 2025    PTT 66.3 (H) 2025    TSH 2.417 2025    LIP 45 2025    MG 1.9 2025    PHOS 2.7 07/10/2025    CK <15 (L) 2025    B12 1,020 (H) 2025       Imaging:  CT DRAIN ABSCESS LIVER (CPT=49405)  Result Date: 2025  PROCEDURE: Drainage catheter placement  Procedural Personnel Attending(s): Juan Luis Cabrera Pre-procedure diagnosis: abscess Post-procedure diagnosis: as above Indications  perihepatic abscess Additional clinical history: None   Complications: No immediate complications.      IMPRESSION:  Technically successful CT drainage catheter placement into perihepatic fluid collection.  PLAN:  1. Follow-up outputs and repeat abscessogram in 1 week's time 2. F/u CXR to evaluate for residual ptx   _______________________________________________________________  PROCEDURE SUMMARY: - Drainage catheter insertion under CT-guidance with image stored to PACS. PROCEDURE DETAILS: Pre-procedure Consent: Informed consent for the procedure was obtained and time-out was performed prior to the procedure. Preparation: The site was prepared and draped using maximal sterile barrier technique including cutaneous antisepsis. Antibiotic administered and time: No antibiotics Anesthesia/sedation: Level of anesthesia/sedation: Moderate sedation (conscious sedation) Anesthesia/sedation administered by: Nurse or other independent trained observer under attending supervision with continuous monitoring of the patient's level of consciousness and physiologic status, under the direct supervision of the attending physician  \"MD intra-service time in sedation\" : 15 minutes    Drainage catheter placement The patient was positioned the supine. Initial imaging was performed. Local anesthesia was administered. Under CT guidance, the target was accessed. Fluid collection Initial imaging findings: Perihepatic Catheter type: APDL Catheter diameter: 10-Czech External catheter securement: Non-absorbable suture Final catheter location: perihepatic Post-drainage imaging findings: drain in subhepatic fluid collection Additional findings: After drain was placed, follow-up examination revealed a minimal pneumothorax that was iatrogenically caused by needle entry into the perihepatic region. A 5Fr Yueh needle was then placed within the pneumothorax via CT guidance and this region aspirated completely. Additional Details Additional description of procedure: None Additional findings: None Equipment details: None Specimens removed: 20 ml of turbid fluid was aspirated. A sample was sent for analysis. Estimated blood loss: Less than 10 mL Standardized report: [id number] Attestation I, Juan Luis Cabrera, attest that I was present for the entire procedure. I  reviewed the stored images and agree with the report as written. Electronically Verified and Signed by Attending Radiologist: Juan Luis Cabrera MD 7/13/2025 2:04 PM Workstation: [id number]    XR CHEST AP PORTABLE  (CPT=71045)  Result Date: 7/12/2025  PROCEDURE: XR CHEST AP PORTABLE  (CPT=71045) INDICATIONS: small ptx 7/11. pleuritic pain after perihepatic drain placement COMPARISON: 7/11/2025 TECHNIQUE: AP portable semiupright chest     CONCLUSION: 1.  Right perihepatic subdiaphragmatic pigtail drainage catheter. 2.  Bibasilar right greater than left pleural effusions with compressive atelectasis. 3.  No pneumothorax. 4.  Heart size remains normal. Pulmonary vascularity partially obscured. 5.  Atherosclerotic calcification aorta. 6.  Left PICC line tip at RASVC junction.  Electronically Verified and Signed by Attending Radiologist: Chong Valdez MD 7/12/2025 7:41 PM Workstation: [id number]    XR CHEST AP PORTABLE  (CPT=71045)  Result Date: 7/11/2025  PROCEDURE: XR CHEST AP PORTABLE  (CPT=71045) INDICATIONS: Shortness of breath S/P LIVER DRAIN PLACEMENT.   R/O PNEUMOTHORAX COMPARISON: 6/13/2025 TECHNIQUE: AP portable upright chest     CONCLUSION: 1.  No pneumothorax. 2.  Right upper quadrant subdiaphragmatic perihepatic drain. 3.  Moderate bibasilar pleural effusions with compressive atelectasis. 4.  Stable bilateral patchy perihilar opacities which may represent pneumonia or edema. 5.  Heart size remains normal. 6.  Left-sided PICC line tip at RASVC junction. Electronically Verified and Signed by Attending Radiologist: Chong Valdez MD 7/11/2025 2:52 PM Workstation: [id number]    CT ABDOMEN+PELVIS(CONTRAST ONLY)(CPT=74177)  Result Date: 7/11/2025  PROCEDURE: CT ABDOMEN+PELVIS(CONTRAST ONLY)(CPT=74177) INDICATIONS: follow up intraabdominal abscess COMPARISON: There are no comparisons for this exam. TECHNIQUE: Multislice CT scanning was performed from the dome of the diaphragm to the pubic symphysis with  non-ionic intravenous contrast material. Post contrast coronal MPR imaging was performed. Automated exposure control dose reduction techniques were  used. Adjustment of the mA and/or kV was done based on the patient's size. Use of iterative reconstruction technique for dose reduction was used. Dose information is transmitted to the ACR (American College of Radiology) NRDR (National Radiology Data Registry) which includes the Dose Index Registry. CONTRAST: IOPAMIDOL 76% IV SOLN FOR POWER INJECTOR:80 mL FINDINGS: LIVER: Normal. Portal vein and branches patent. Mass effect on the liver by the perihepatic abscesses. BILIARY: No evidence for cholecystitis or biliary dilatation. SPLEEN: Normal. PANCREAS: Normal. ADRENALS: Normal. KIDNEYS: Stable 2.5 cm posterior right interpolar renal cyst. No solid renal lesion or renal obstruction. Linear calcification in the lower pole the right kidney is likely vascular. AORTA/VASCULAR: Atherosclerotic vascular calcification including coronary artery calcification. No aneurysm or dissection. Major aortic branches patent. LYMPHADENOPATHY: None. GI/MESENTERY: Changes from transverse colon resection and 2 anastomotic staple lines in the left side of the abdomen from small bowel resections. No obstruction. ABDOMINAL WALL: Anasarca. Bilateral abdominal wall drains. Small amount of gas extending into a midline radu-incisional hernia. URINARY BLADDER: Normal. ASCITES/FLUID COLLECTIONS: Previously seen loculated gas/fluid collections in the anterior mid and upper abdomen have decreased in size with drainage catheter extending from left to right anteriorly. Tip of the drainage catheter is anterior to the gallbladder fossa. *  Right-sided subdiaphragmatic gas fluid collection measures 9.1 x 6.1 x 3.7 cm (prior 12.5 x 9.6 x 5.9 cm) *  Anterior to left lobe of liver just below the diaphragm measures 8.8 x 8 0.8 x 4.8 x 14.2 cm (prior 15.3 x 11.2 x 23 cm) *  Gas/fluid collection anteriorly to the  left of the midline measures 2.2 x 13.4 cm on image 77 series 2 (prior 10.8 x 22.9 cm) *  Lateral perisplenic fluid collection with tiny focus of gas measures 6.3 x 2.3 cm (prior 7.9 x 2 2.8 cm) *  Small predominantly gas collection in the left lateral mid abdomen measuring 18 x 41 mm. *  Stable right infrahepatic paracolic drain with a small gas fluid collection just posterior to the drain extending up to the edge of liver measuring 4.6 x 1.3 cm. *  Stable small 9 mm x 37 mm anterior right lower quadrant gas/fluid collection. *  Stable 3.5 x 2 cm cystlike gas fluid collection in left hemipelvis. *  Stable small amount of gastrosplenic ligament fluid without rim enhancement. PELVIC ORGANS: No suspect pelvic mass. BONES: No suspect bone lesion. LUNG BASES: Stable moderate bibasilar pleural effusions with compressive atelectasis. OTHER: Negative.     CONCLUSION: 1.  Bilateral abdominal drains with decreasing size to the abdominal abscesses. 2.  Small cystlike abscess in the left hemipelvis is unchanged and few other smaller abscess collections are unchanged. 3.  Transverse colectomy and sites of small bowel resection. No obstruction or contrast extravasation. 4.  Stable moderate bibasilar pleural effusions. 5.  Anasarca. Electronically Verified and Signed by Attending Radiologist: Chong Valdez MD 7/11/2025 12:52 AM Workstation: [id number]    CT CHEST (CPT=71250)  Result Date: 7/9/2025  PROCEDURE: CT CHEST (CPT=71250) INDICATIONS:  Upper extremity DVT COMPARISON: CT abdomen pelvis dated 7/4/2025 TECHNIQUE: Multidetector CT of the chest was obtained without non-ionic intravenous contrast material. Automated exposure control for dose reduction was used. Adjustment of the mA and/or kV was done based on the patient's size. Iterative reconstruction technique for dose reduction was employed. Dose information was transmitted to the ACR (American College of Radiology) NRDR (National Radiology Data Registry), which  includes the Dose Index Registry. Oral contrast was ingested. FINDINGS: LUNGS/PLEURA/AIRWAYS:No consolidation, mass or suspicious nodule.Small to moderate bilateral pleural effusions and passive atelectasis. Central airways are patent. MEDIASTINUM:No mediastinal or hilar lymphadenopathy. VESSELS:  The ascending thoracic aorta measures approximately 40 mm. Moderate coronary artery calcifications. CHEST WALL:Within normal limits. UPPER ABDOMEN: Partially visualized drainage catheter with decreased size of collections of fluid and air in the right upper quadrant. BONES: No suspicious osseous lesion or acute osseous abnormality.     CONCLUSION: 1.  Small to moderate bilateral pleural effusions with passive atelectasis. 2.  Partial visualized drainage catheter in the upper abdomen with decreased size of fluid and air collections in the right upper quadrant 3.  The ascending thoracic aorta measures 40 mm, the upper limits of normal. Electronically Verified and Signed by Attending Radiologist: Adam Neumann MD 7/9/2025 1:09 PM Workstation: [id number]    XR ABDOMEN (1 VIEW) (CPT=74018)  Result Date: 7/9/2025  PROCEDURE(S): XR ABDOMEN (1 VIEW) (CPT=74018) WORKSTATION: [employer] HISTORY/INDICATIONS: s/p bowel resections for lymphoma  possible bowel leak Abdominal distension. COMPARISON: 07/07/2025 XR SMALL BOWEL SINGLE CONTRAST (CPT=74250) FINDINGS: Lines And Tubes: Right upper quadrant drainage pigtail catheter is again seen. Left approach presumed surgical drain is again seen. Lower Thorax: Unremarkable. Bowel Gas Pattern: No gas-filled, dilated loops. No differential distention. Multiple bowel sutures are noted throughout the abdomen. Soft Tissues: No definite pneumoperitoneum. Previously described fluid collections are demonstrated to advantage on prior CT. Bones/Joints: No acute fracture.     CONCLUSION: No evidence of acute abnormality in the abdomen or pelvis. Multiple peritoneal drains are seen. Previously  demonstrated fluid collections are demonstrated to advantage on prior CT. Electronically Verified and Signed by Attending Radiologist: Gelacio Bullock MD 7/9/2025 9:50 AM Workstation: [id number]    XR SMALL BOWEL SINGLE CONTRAST (CPT=74250)  Result Date: 7/9/2025  PROCEDURE: XR SMALL BOWEL SINGLE CONTRAST (CPT=74250) INDICATIONS: Multiple bowel resections for lymphoma. Abscess with possible leak PATIENT STATED HISTORY: Recent multiple bowel resection surgeries, history of lymphoma COMPARISON: CT 7/4/2025 TECHNIQUE: Small bowel series with multiple serial films was performed in the usual manner.  A  abdominal radiograph was performed. Fluoro/Cine Image Number: 9 Fluoro Time: 1.4 minutes FINDINGS: No dilated bowel to suggest ileus or obstruction. No extravasation of enteric contrast was noted to diagnose or identify a source of leak. Focused attention was paid to regions of surgical staples and known prior abscesses using compression and magnification, confirming no evident leak. Transit time from stomach to colon was 90 minutes.     CONCLUSION: No evidence of bowel leak. If clinical suspicion persists, CT may be considered. Electronically Verified and Signed by Attending Radiologist: Sunil Murillo MD 7/9/2025 9:00 AM Workstation: [id number]    US VENOUS DOPPLER ARM RIGHT - DIAG IMG (CPT=93971)  Result Date: 7/8/2025  PROCEDURE: US VENOUS DOPPLER ARM RIGHT - DIAG IMG (CPT=93971) INDICATIONS: swelling in RUE TECHNIQUE: Color duplex Doppler venous ultrasound of the right upper extremity was performed in the usual manner. FINDINGS: The internal jugular, subclavian, axillary, brachial, cephalic and basilic veins appear normal. Flow was demonstrated with color and pulsed Doppler. Comparison scans of the left subclavian vein appear normal. There is acute expansile DVT within the right subclavian and axillary veins. The visualized segments of the right brachial vein, right cephalic vein, and right basilic vein are patent.  There is moderate soft tissue edema within the right upper extremity.     CONCLUSION: Acute expansile occlusive DVT within the right subclavian and axillary veins. The visualized segments of the right brachial, basilic, and cephalic veins are patent. The patient's nurse was notified by telephone of these results at 10:41 a.m. on 7/8/2025. Electronically Verified and Signed by Attending Radiologist: Duran Bhardwaj MD 7/8/2025 10:41 AM Workstation: [id number]    CT ABDOMEN+PELVIS(CONTRAST ONLY)(CPT=74177)  Result Date: 7/5/2025  PROCEDURE(S): CT ABDOMEN+PELVIS(CONTRAST ONLY)(CPT=74177) WORKSTATION: [id number] HISTORY/INDICATIONS: distended abdomen, recent surgery for mass resection, cath placement distended abdomen, recent surgery for mass resection, cath placement COMPARISON: 06/19/2025 CT ABDOMEN+PELVIS(CONTRAST ONLY)(CPT=74177) TECHNIQUE: Helical CT of the abdomen and pelvis was obtained following administration of intravenous contrast material. Axial, coronal, and sagittal reformatted images were created and interpreted. For this exam, one or more of the following dose reductions techniques were used: Automated exposure control Adjustment of the mA and/or kV according to patient size Use of iterative reconstruction technique FINDINGS: Lower Thorax: Moderate bilateral pleural effusions are again seen. Heart is normal size. Few airspace opacities are noted in the lingula series 3 image 1. There is atelectasis in the lower lobes. Liver: Unremarkable. Gallbladder/Biliary tree: Cholelithiasis without evidence of acute cholecystitis. No biliary ductal dilatation. Pancreas:  Unremarkable. Spleen: Unremarkable. Adrenal glands:  Unremarkable. Kidneys: Simple cyst arises from the right kidney posterior interpolar region. No suspicious renal lesions. Symmetric nephrograms. No hydronephrosis. Retroperitoneum/extraperitoneum: Unremarkable. Vasculature: No aneurysm or dissection. Mild atherosclerotic calcification is seen.  Peritoneum: Multiple abscesses are seen: Left upper quadrant abscess measures 24.3 x 10.9 x 23.9 cm series 2 image 73; series 5 image 23. Air-fluid levels are seen. It extends into the abdominal wall series 2 image 73. Right upper quadrant perihepatic abscess communicates with the left upper quadrant abscess and measures 14.2 x 5.1 x 15.8 cm series 2 image 35; series 6 image 96. Perisplenic abscess measures 7.9 x 2.8 x 9.8 cm series 2 image 50; series 6 image 19. This abscess also likely communicates with the left upper quadrant abscess. Small lower midline suspected abscess measuring 3.0 x 2.7 x 3.5 cm. There is a right mid abdominal pigtail drainage catheter which may terminate within the colon series 2 image 80. GI tract/mesentery/omentum: Multiple prior bowel resections are noted. Suspected fistula in the left mid abdomen series 2 image 103. Possible pigtail drainage catheter within the ascending colon series 2 image 80. No bowel obstruction is identified. Urinary Bladder: The bladder is grossly unremarkable. Reproductive organs: Unremarkable. Body wall: Mild anasarca. The dominant abscess extends into the anterior abdominal wall along the surgical incision series 2 image 73. Bones:  There are mild degenerative changes in the spine.     CONCLUSION: 1.  Multiple large peritoneal abscesses are seen. The largest is centered in the left upper quadrant and left midabdomen and measures 24.3 x 10.9 x 23.9 cm with air-fluid levels and extends into the abdominal wall. This is contiguous with smaller right upper quadrant perihepatic and posterior left upper quadrant perisplenic abscesses. A separate small 3 cm abscess is noted adjacent to the bladder. Bladder diverticulum is thought less likely. 2.  Multiple prior bowel resections are seen. There may be fistulous connection between the bowel and left upper quadrant dominant abscess at the left mid abdomen. The location of a right-sided pigtail drainage catheter is  uncertain but it may terminate within the ascending colon. 3.  Additional chronic and/or incidental findings are detailed in the body of this report. Preliminary report was given by [facility] [employer] radiology. No clinically significant discrepancies are noted. Electronically Verified and Signed by Attending Radiologist: Gelacio Bullock MD 2025 7:14 AM Workstation: [id number]    CARD TTE STRAIN W DOPPLER ONCOLOGY (CPT=93306)  Result Date: 2025  Transthoracic Echocardiogram Name:Arnaldo Gutierrez Date: 2025 :  04/15/1945 Ht:  (71in)  BP: 101 / 61 MRN:  [id number]    Age:  80years    Wt:  (171lb) HR: 88bpm Loc:  [facility]       Gndr: M          BSA: 1.97m^2 Sonographer: Amelia CHERY Ordering:    Crys Wilkins Consulting:  Rafita Torres ---------------------------------------------------------------------------- History/Indications:   Encounter for Monitoring Cardiotoxic Drug Therapy. ---------------------------------------------------------------------------- Procedure information:  A transthoracic complete 2D study was performed. Additional evaluation included M-mode, complete spectral Doppler, and color Doppler.  Patient status:  Inpatient.  Location:  Echo laboratory.    This was a routine study. Transthoracic echocardiography for diagnosis. Image quality was adequate. ECG rhythm:   Normal sinus ---------------------------------------------------------------------------- Conclusions: 1. Left ventricle: The cavity size was normal. Wall thickness was normal.    Systolic function was normal. The estimated ejection fraction was 60-65%,    by biplane method of disks. Wall motion is normal; there are no regional    wall motion abnormalities. Left ventricular diastolic function parameters    were normal. The Global Longitudinal Strain (GLS) was -19.40%. 2. Mitral valve: There was mild regurgitation. * ---------------------------------------------------------------------------- * Findings: Left ventricle:  The cavity size  was normal. Wall thickness was normal. Systolic function was normal. The estimated ejection fraction was 60-65%, by biplane method of disks. Wall motion is normal; there are no regional wall motion abnormalities. The Global Longitudinal Strain (GLS) was -19.40%. Left ventricular diastolic function parameters were normal. Left atrium:  Well visualized. The atrium was normal in size. Right ventricle:  The cavity size was normal. Systolic function was normal. Systolic pressure was within the normal range. Right atrium:  Well visualized. The atrium was normal in size. Mitral valve:  Well visualized. The leaflets were normal thickness. No evidence for prolapse.  Doppler:  Transvalvular velocity was within the normal range. There was no evidence for stenosis. There was mild regurgitation. Aortic valve:  Well visualized.  The valve was trileaflet. The leaflets were normal thickness.  Doppler:  Transvalvular velocity was within the normal range. There was no evidence for stenosis. There was trivial regurgitation. Tricuspid valve:  Well visualized. The annulus is normal-sized. The leaflets are normal thickness. No echocardiographic evidence for tricuspid prolapse. Doppler:  Transvalvular velocity was within the normal range. There was no evidence for stenosis. There was trivial regurgitation. Pulmonic valve:   Well visualized. The annulus is normal-sized. The leaflets are normal thickness. No evidence for prolapse.  Doppler:  Transvalvular velocity was within the normal range. There was no evidence for stenosis. There was no significant regurgitation. Pericardium:   There was no pericardial effusion. Pleura:  No evidence of pleural fluid accumulation. Aorta: Aortic root: The aortic root was normal. Ascending aorta: The ascending aorta was normal. Pulmonary arteries: There was no evidence of pulmonary hypertension. Systemic veins:  Central venous respirophasic diameter changes are in the normal range (>50%). Inferior vena  cava: The IVC was normal-sized. ---------------------------------------------------------------------------- Measurements  Left ventricle                   Value         Ref  GLS, 2D                          -19.40 %      ---------  IVS thickness, ED, PLAX          0.9    cm     0.6 - 1.0  LV ID, ED, PLAX              (L) 3.5    cm     4.2 - 5.8  LV ID, ES, PLAX              (L) 2.1    cm     2.5 - 4.0  LV PW thickness, ED, PLAX        0.8    cm     0.6 - 1.0  IVS/LV PW ratio, ED, PLAX        1.13          ---------  LV PW/LV ID ratio, ED, PLAX      0.23          ---------  LV area, ES, A4C                 19.3   cm^2   ---------  LV ejection fraction             72     %      52 - 72  Stroke volume/bsa, 2D            43     ml/m^2 ---------  LV end-diastolic volume, 1-p     87     ml     69 - 185  A4C  LV end-systolic volume, 1-p      47     ml     22 - 78  A4C  LV ejection fraction, 1-p        57     %      46 - 74  A4C  Stroke volume, 1-p A4C           50     ml     ---------  LV end-diastolic volume/bsa,     44     ml/m^2 37 - 93  1-p A4C  LV end-systolic volume/bsa,      24     ml/m^2 12 - 40  1-p A4C  Stroke volume/bsa, 1-p A4C       25     ml/m^2 ---------  LV end-diastolic volume, 2-p     81     ml     62 - 150  LV end-systolic volume, 2-p      32     ml     21 - 61  LV ejection fraction, 2-p        60     %      52 - 72  Stroke volume, 2-p               48     ml     ---------  LV end-diastolic volume/bsa,     41     ml/m^2 34 - 74  2-p  LV end-systolic volume/bsa,      16     ml/m^2 11 - 31  2-p  Stroke volume/bsa, 2-p           24.5   ml/m^2 ---------  LV e', lateral                   14.9   cm/sec >=10.0  LV E/e', lateral                 6             <=13  LV e', medial                    12.5   cm/sec >=7.0  LV E/e', medial                  7             ---------  LV e', average                   13.7   cm/sec ---------  LV E/e', average                 6             <=14  LVOT                              Value         Ref  LVOT ID                          2.1    cm     ---------  LVOT peak velocity, S            1.22   m/sec  ---------  LVOT VTI, S                      24.7   cm     ---------  LVOT peak gradient, S            6      mm Hg  ---------  LVOT mean gradient, S            3      mm Hg  ---------  Stroke volume (SV), LVOT DP      86     ml     ---------  Stroke index (SV/bsa), LVOT      43     ml/m^2 ---------  DP  Aortic valve                     Value         Ref  Aortic leaflet separation,       1.8    cm     ---------  MM  Aortic root                      Value         Ref  Aortic root ID, STJ, ED      (H) 3.6    cm     2.3 - 3.5  Ascending aorta                  Value         Ref  Ascending aorta ID               3.6    cm     2.2 - 3.8  Left atrium                      Value         Ref  LA volume, S                 (H) 62     ml     18 - 58  LA volume/bsa, S                 31     ml/m^2 16 - 34  LA volume, ES, 1-p A4C           53     ml     18 - 58  LA volume, ES, 1-p A2C       (H) 70     ml     18 - 58  LA volume, ES, A/L               68     ml     ---------  LA volume/bsa, ES, A/L           34     ml/m^2 16 - 34  Mitral valve                     Value         Ref  Mitral E-wave peak velocity      0.88   m/sec  ---------  Mitral A-wave peak velocity      0.99   m/sec  ---------  Mitral deceleration time         180    ms     ---------  Mitral peak gradient, D          3      mm Hg  ---------  Mitral E/A ratio, peak           0.9           ---------  Tricuspid valve                  Value         Ref  Tricuspid regurg peak            2.52   m/sec  <=2.8  velocity  Tricuspid peak RV-RA             25     mm Hg  ---------  gradient  Right ventricle                  Value         Ref  TAPSE, 2D                        2.50   cm     >=1.70  TAPSE, MM                        2.50   cm     >=1.70  RV s', lateral                   22.6   cm/sec >=9.5 Legend: (L)  and  (H)  farida values outside  specified reference range. ---------------------------------------------------------------------------- Prepared and electronically signed by Ernst Parada 06/24/2025 13:42     US VENOUS DOPPLER LEG BILAT - DIAG IMG (CPT=93970)  Result Date: 6/24/2025  PROCEDURE: US VENOUS DOPPLER LEG BILAT - DIAG IMG (CPT=93970) INDICATIONS: right calf pain, r/o dvt COMPARISON: None TECHNIQUE: Color duplex Doppler venous ultrasound of both lower extremities was performed in the usual manner. FINDINGS: Thrombus is present within the right common femoral vein as well as in the adjacent deep femoral vein draining into the common femoral vein. There is also nonocclusive thrombus within the proximal and distal aspect of the popliteal vein thrombus is otherwise expansile with heterogeneous grayscale appearance and there is absence of normal color flow and compressibility in these regions. The femoral vein appears normal. Normal flow was demonstrated with color and pulsed Doppler. Visualized portions of the great and small saphenous, posterior tibial and peroneal veins appear normal. Contralateral left lower extremity is without DVT within the common femoral, femoral, and popliteal veins. There is occlusive thrombus within a left soleal vein based on abnormal grayscale appearance, Vansil morphology of the vein and absence of normal color flow and Doppler venous waveforms.     CONCLUSION: 1. Abnormal exam. Right lower extremity has nonocclusive acute appearing thrombus within the common femoral vein as well as the visualized portions of the deep femoral vein. There is also nonocclusive thrombus within the right popliteal vein. 2. No DVT in the left lower extremity, however there is acute occlusive thrombus within a left soleal vein at the knee. Electronically Verified and Signed by Attending Radiologist: Jason Quiñones MD 6/24/2025 12:45 PM Workstation: [id number]    IR PERITONEAL DRAINAGE CATHETER  Result Date: 6/20/2025  PROCEDURE: IR  PERITONEAL DRAINAGE CATHETER  INDICATIONS:  Status post colon resection, with postop intraperitoneal fluid  COMPARISON: None.  (S):  [place]  ESTIMATED BLOOD LOSS: Less than 5 mL  COMPLICATIONS: None  FINDINGS:  Informed consent was obtained. The patient was positioned supine. The right lower quadrant was sterilely prepped and draped. Preliminary ultrasound demonstrated complex appearing septated fluid collection.. Local Lidocaine was administered. Under ultrasound guidance, a Yueh sheath needle was advanced into the collection. There was return of serosanguineous fluid from the access needle. Under ultrasound guidance, a wire was advanced through the needle. The access was dilated and a 10 Danish pigtail catheter was coiled in the collection.  Via the drain, approximately 260 cc of serosanguineous fluid were aspirated.  Sample was sent for cultures and sensitivities.  The catheter was secured to the skin with sutures and attached to bulb suction.  Sterile dressing applied.         CONCLUSION: Insertion of drainage catheter into intra-abdominal ascites yielding 260 cc serosanguineous fluid.    Dictated by (CST): Norris Reilly MD on 6/20/2025 at 6:09 PM     Finalized by (CST): Norris Reilly MD on 6/20/2025 at 6:10 PM          CT ABDOMEN+PELVIS(CONTRAST ONLY)(CPT=74177)  Result Date: 6/19/2025  PROCEDURE: CT ABDOMEN + PELVIS (CONTRAST ONLY) (CPT=74177)  COMPARISON: [facility], CT ABDOMEN + PELVIS (CONTRAST ONLY) (CPT=74177), 6/05/2025, 5:22 PM.  INDICATIONS: S/p colon resection 6/10, leukocytosis and increased pain  TECHNIQUE: CT images of the abdomen and pelvis were obtained with non-ionic intravenous contrast material.  Automated exposure control for dose reduction was used. Adjustment of the mA and/or kV was done based on the patient's size. Use of iterative reconstruction technique for dose reduction was used.  Dose information is transmitted to the ACR (American College of Radiology) NRDR  (National Radiology Data Registry) which includes the Dose Index Registry.  FINDINGS:  LIVER: No enlargement, atrophy, abnormal density, or significant focal lesion.  BILIARY: Intrahepatic biliary ductal dilatation.  Gallbladder is incompletely distended.  No significant extrahepatic biliary ductal dilatation. SPLEEN: Spleen is not enlarged. There are granulomatous calcifications in the spleen. No suspicious splenic lesion. STOMACH: No gastric obstruction.  Duodenum is distended with oral contrast material but is otherwise without evidence of obstruction or inflammation. PANCREAS: No lesion, fluid collection, ductal dilatation, or atrophy.  ADRENALS: No nodule or enlargement. KIDNEYS: Cortical based low density focus in the posterior midpole of the right kidney suggesting simple cyst.  Bilaterally no suspicious enhancing renal lesion or hydroureteronephrosis. AORTA/VASCULAR:   Atherosclerosis of the abdominal aorta.  No aneurysm. BOWEL/MESENTERY:  Peripherally enhancing fluid collection in the left lower quadrant, above the roof of the urinary bladder and measuring approximately 55 x 47 x 58 mm (AP x transverse x CC).  There is also a moderate-large volume of free fluid within the abdomen and pelvis mainly along the peripheral aspect of the liver and spleen, both pericolic gutters and across the omentum.  Moderate volume of foci of gas are present within this fluid.  There is peritoneal thickening/enhancement in both pericolic  gutters.  No evidence of bowel obstruction.  No lymphadenopathy.  Post large bowel resection with reanastomosis in the region of the hepatic flexure without focal obstruction or inflammatory change at the anastomosis. ABDOMINAL WALL: Ventral line of skin staples are present above the umbilicus.  Additional scattered skin staples are present in the abdominal-pelvic anterior wall.  Mild body wall edema. URINARY BLADDER: Urinary bladder is distended with urine.  No stones or wall thickening.  PELVIC NODES: No enlarged mass or adenopathy.   PELVIC ORGANS: No visible mass.  Pelvic organs appropriate for patient age.  BONES:   Mild endplate change and disc disease within the spine most advanced at L5-S1. LUNG BASES: Small-moderate sized bilateral pleural effusion.  Enhancing wedge-shaped pulmonary opacities with air bronchograms adjacent to the effusions likely representing secondary compressive atelectasis.  The visualized heart has coronary artery calcifications. OTHER: Negative.          CONCLUSION:  1.  Post large bowel resection in the region of the proximal transverse colon/hepatic flexure.  No obstruction or inflammation at the surgical site.  There is a moderate-large volume of free fluid within the abdomen and pelvis mainly distributed in both upper quadrants, across the omentum, and in the pericolic gutters.  Moderate volume of free air is also present in the same distribution as the fluid.  These may be related to recent surgery.  Organized fluid collection in the left lower retroperitoneum/left lower quadrant measuring approximately 55 x 47 x 58 mm which could represent a seroma, liquified hematoma, or other inflammatory fluid collection.  Peritoneal enhancement and thickening in the both pericolic gutters which could represent phlegmonous change, early organization of fluid collections with similar differential as the aforementioned organized collection, or peritonitis. 2.  Moderate-sized bilateral pleural effusions.  Enhancing bibasilar pulmonary opacities with air bronchograms likely representing secondary compressive atelectasis. 3.  Coronary atherosclerosis. 4.  Right renal cyst. 5.  Mild body wall edema/anasarca.  Skin staples across the abdominal-pelvic anterior wall compatible with surgical access sites.   Dictated by (CST): Jason Quiñones MD on 6/19/2025 at 5:29 PM     Finalized by (CST): Jason Quiñones MD on 6/19/2025 at 5:36 PM            Assessment/Plan:  Problem List[1]  Stable   need  documentation of drainage  Repeat hepato biliary scan   await report    Discussed with BEVERLEY ALSTON MD  7/16/2025  11:36 AM       [1]   Patient Active Problem List  Diagnosis    BPH (benign prostatic hyperplasia)    Chronic low back pain    Chronic pain of both knees    Depression with anxiety    Generalized osteoarthritis    Hyperlipidemia    Type 2 diabetes mellitus without complication, without long-term current use of insulin (HCC)    Essential hypertension    Bipolar I disorder, single manic episode (HCC)    Large bowel obstruction (HCC)    Colonic mass    Diffuse large B-cell lymphoma of solid organ excluding spleen    Pneumoperitoneum    Abdominal distension    Anasarca    Intraabdominal fluid collection    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

## 2025-07-16 NOTE — PROGRESS NOTES
[facility]  part of [facility]  Hospitalist Progress Note     Arnaldo Gutierrez Patient Status:  Inpatient    4/15/1945  80 year old CSN [id number]   Location 454/454-A Attending Rodrick Ramos MD   Hosp Day # 11 PCP PHYSICIAN NONSTAFF     Subjective:   ----------------------------------  Resting comfortably and in NAD. Denied any active complaints at the time of interview. All questions and concerns addressed.    Objective:   Chief Complaint:   Chief Complaint   Patient presents with    Abdomen/Flank Pain     ----------------------------------  Temp:  [98 °F (36.7 °C)-99 °F (37.2 °C)] 98.9 °F (37.2 °C)  Pulse:  [87-96] 87  Resp:  [16-18] 16  BP: (107-123)/(53-56) 123/54  SpO2:  [92 %-95 %] 92 %    Gen: A+Ox3.  No distress.   CV: RRR, S1S2, and intact distal pulses. No gallop, rub, murmur.  Pulm: Effort and breath sounds normal. No distress, wheezes, rales, rhonchi.  Abd: Soft, nontender nondistended.  Right upper quadrant drains with serous drainage.  Both lower quadrant drain with purulent drainage.  Neuro: Normal reflexes, CN. Sensory/motor exams grossly normal deficit.   Psych: Normal mood and affect. Calm, cooperative    Labs:  Lab Results   Component Value Date    HGB 8.3 (L) 2025    WBC 9.0 2025    .0 2025     2025    K 3.5 07/15/2025    CREATSERUM 0.52 (L) 2025    AST 22 2025    ALT 16 2025           Scheduled Medications[1]  PRN Medications[2]        Assessment & Plan:   ----------------------------------    Disposition  CM/SW on board to assist with placement    Intra-abdominal abscesses.  Status post IR drainage x2 .  Small bowel follow-through  negative for leak.  Repeat IR drainage  for perihepatic fluid collection.  Port placement 7/15  - Pain control  - Maintain drains, drain study   - ID, general surgery on consult  - Continue zerbaxa, daptomycin, Flagyl, Micafungin  - no significant change in  management    DVT, acute.  Location is RUE (new), RLE (old).  Pulmonary embolism not present. Contributing factors malignancy, immobility.  -Anticoagulation: Heparin drip, change to p.o after procedures are completely done  -Hypercoagulable work-up not indicated  -Light activity as tolerated    Small pneumothorax resolved    B-cell lymphoma.  Recent diagnosis.  - Oncology  - Port placement 7/15  - close opt follow up    Other problems  Hypertension  Dyslipidemia  GERD  BPH  Anxiety  Depression    Supplementary Documentation:   DVT Mechanical Prophylaxis:     Early ambuation  DVT Pharmacologic Prophylaxis   Medication    heparin (Porcine) 100 Units/mL lock flush 150 Units    heparin (Porcine) 81021 units/250mL infusion PE/DVT/THROMBUS CONTINUOUS                Code Status: Full Code  Muñoz: External urinary catheter in place  Muñoz Duration (in days):   Central line:    ANNELISE: 7/17/2025        **Certification      PHYSICIAN Certification of Need for Inpatient Hospitalization - Initial Certification    Patient will require inpatient services that will reasonably be expected to span two midnight's based on the clinical documentation in H+P.   Based on patients current state of illness, I anticipate that, after discharge, patient will require TBD.           I personally reviewed the available laboratories, imaging including. I discussed/will discuss the case with consultants. I ordered laboratories and/or radiographic studies. I adjusted medications as detailed above.  Medical decision making high, risk is high.  Discussed with family at the bedside                       [1]    heparin  1.5 mL Intravenous Once    insulin aspart  1-5 Units Subcutaneous TID CC and HS    FLUoxetine  20 mg Oral Daily    pregabalin  100 mg Oral TID    tamsulosin  0.8 mg Oral After dinner    vancomycin  125 mg Oral Daily    ceftolozane-tazobactam (ZERBAXA) 3 g in sodium chloride 0.9% 100 mL IVPB  3 g Intravenous Q8H    DAPTOmycin  500 mg  Intravenous Q24H    micafungin  100 mg Intravenous Q24H    metroNIDAZOLE  500 mg Oral BID    pantoprazole  40 mg Intravenous Daily   [2]   HYDROcodone-acetaminophen    HYDROcodone-acetaminophen    HYDROmorphone **OR** HYDROmorphone    glycerin-hypromellose-    melatonin    temazepam    glucose **OR** glucose **OR** glucose-vitamin C **OR** dextrose **OR** glucose **OR** glucose **OR** glucose-vitamin C    acetaminophen    ondansetron    metoclopramide

## 2025-07-16 NOTE — PHYSICAL THERAPY NOTE
PHYSICAL THERAPY TREATMENT NOTE - INPATIENT     Room Number: 456/456-A       Presenting Problem: ABD pain with large retroperitoneal abscess. Pt is s/p IR procedure with 2 ABD drain placed on 7/05.   Pt with recent admission s/p on 6/12 ABD sx including SB resection x 2 and resection of tumor .  DVT noted last admission right LE and left LE currently therapeautic on heparin .     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      Patient is requiring minimal assist as a result of the following impairments: decreased functional strength, impaired standing balance, decreased muscular endurance, medical status, and decreased compliance/participation.     Patient continues to function below baseline with bed mobility, transfers, gait, standing prolonged periods, and performing household tasks.  Next session anticipate patient to progress bed mobility, transfers, and gait.  Physical Therapy will continue to follow patient for duration of hospitalization.    Patient continues to benefit from continued skilled PT services: to promote return to prior level of function and safety with continuous assistance and gradual rehabilitative therapy .    PLAN DURING HOSPITALIZATION  Nursing Mobility Recommendation : 1 Assist  PT Device Recommendation: Rolling walker  PT Treatment Plan: Bed mobility, Body mechanics, Coordination, Endurance, Energy conservation, Patient education, Gait training, Strengthening, Transfer training, Balance training  Frequency (Obs): 5x/week      SUBJECTIVE  Agreeable to activity.     OBJECTIVE  Precautions:  needed, Drain(s)    WEIGHT BEARING RESTRICTION  none    PAIN ASSESSMENT   Ratin  Location: c/o weakness  Management Techniques: Activity promotion, Body mechanics, Relaxation, Repositioning    BALANCE  Static Sitting: Good  Dynamic Sitting: Fair +  Static Standing: Poor +  Dynamic Standing: Poor +    AM-PAC '6-Clicks' INPATIENT SHORT FORM - BASIC MOBILITY  How much difficulty does the patient currently have...  Patient Difficulty: Turning over in bed (including adjusting bedclothes, sheets and blankets)?: A Little   Patient Difficulty: Sitting down on and standing up from a chair with arms (e.g., wheelchair, bedside commode, etc.): A Little   Patient Difficulty: Moving from lying on back to sitting on the side of the bed?: A Little   How much help from another person does the patient currently need...   Help from Another: Moving to and from a bed to a chair (including a wheelchair)?: A Little   Help from Another: Need to walk in hospital room?: A Little   Help from Another: Climbing 3-5 steps with a railing?: A Little     AM-PAC Score:  Raw Score: 18   Approx Degree of Impairment: 46.58%   Standardized Score (AM-PAC Scale): 43.63   CMS Modifier (G-Code): CK    FUNCTIONAL ABILITY STATUS  Functional Mobility/Gait Assessment  Gait Assistance: Minimum assistance  Distance (ft): 4  Assistive Device: Rolling walker  Pattern: Shuffle (slow, flexed posture, unsteady)  Supine to Sit: minimal assist  Sit to Stand: minimal assist    Skilled Therapy Provided: Pt received resting in bed and agreeable to activity.  used. Pt requesting to limit activity to WC transfer only. Demos bed mobility with min A; STS from bed with RW and min A: steps to chair with RW and min A. Slow and unsteady gait noted. Pt was left sitting in WC with needs within reach, handoff to RN complete.     The patient's Approx Degree of Impairment: 46.58% has  been calculated based on documentation in the [facility] '6 clicks' Inpatient Daily Activity Short Form.  Research supports that patients with this level of impairment may benefit from home with HH PT.  Final disposition will be made by interdisciplinary medical team.    Patient End of Session: Up in chair, Needs met, RN aware of session/findings, Call light within reach, All patient questions and concerns addressed, Hospital anti-slip socks (sitting in WC; RN notified)    CURRENT GOALS   Goals to be met by: 7/25/25  Patient Goal Patient's self-stated goal is: return to [facility]    Goal #1 Patient is able to demonstrate supine - sit EOB @ level: CGA support log roll sequencing       Goal #1   Current Status In progress   Goal #2 Patient is able to demonstrate transfers sit to stand and SPT with RW  at assistance level: CGA  with walker - rolling      Goal #2  Current Status In progress   Goal #3 Patient is able to ambulate 20-40  feet progress distance as appropriate with assist device: walker - rolling at assistance level: CGA    Goal #3   Current Status In progress   Goal #4 Patient is able to ambulate 150 feet with assist device: walker - rolling at assistance level: [place]       Goal #4   Current Status In progress   Goal #5 Patient to demonstrate independence with home activity/exercise instructions provided to patient in preparation for discharge.   Goal #5   Current Status IN PROGRESS/ONGOING    Goal #6     Goal #6  Current Status        Therapeutic Activity: 15 minutes

## 2025-07-16 NOTE — PROCEDURES
[facility]  part of [facility]  Procedure Note    Arnaldomulugeta Gutierrez Patient Status:  Inpatient    4/15/1945 MRN [id number]   Location [facility] 4W/SW/SE Attending Aracelis Costello MD   Hosp Day # 11 PCP PHYSICIAN NONSTAFF     Procedure: abscessogram x 3, drain exchange x 2, removal of right lower drain in subhepatic region    Pre-Procedure Diagnosis:  post op abscess    Post-Procedure Diagnosis: same    Anesthesia:  Sedation    Findings:  subdiaphragmatic and anterior drains with residual collections/ drains exchanged and extensively irrigated with further purulent material removed; subhepatic drain removed as no collection remains      Blood Loss:  minimal       Complications:  None  Drains:  continue irrigation of drains     1. F/U ct in one week/abscessogram    Juan Luis Cabrera MD  2025       Yes - the patient is able to be screened

## 2025-07-16 NOTE — PROGRESS NOTES
INFECTIOUS DISEASE PROGRESS NOTE    Arnaldo Gutierrez Patient Status:  Inpatient    4/15/1945 MRN [id number]   Location [facility] 4W/SW/SE Attending Declan Stearns MD   Hosp Day # 11 PCP PHYSICIAN NONSTAFF       SUBJECTIVE  ROS done. Had port-a-cath placed yesterday. Has been eating. Went for HIDA scan this AM.    ASSESSMENT/PLAN:    Antibiotics: Zerbaxa, dapto, flagyl, micafungin; (IV zosyn, fluconazole, augmentin, cefepime)     ASSESSMENT:     # Multiple large intra-abdominal abscesses               - now s/p IR drain placement on 25               - fluid cx so far with E. Coli, Enterobacter cloacae, Enterococcus faecalis, C. glabrata, MDR PSAR, Lactobacillus               - surgery and IR following  # RUQ abscess s/p IR drainage , cx Enterobacter hormaechei, C. glabrata  # RUE DVT  # hx of Large bowel obstruction s/p OR 6/10/25 for lap transverse colon resection, small bowel resection x2, rsection of tumor nodule on small bowel - path with DLBCL               - s/p colonic stent placement by GI 25               - post op fluid collection s/p IR drain placement 25 with 260 cc of serosang fluid, cx negative      PLAN:  -  Continue on IV daptomycin, zerbaxa, metronidazole, and micafungin. FU HIDA scan and IR drain check. Port-a-cath in place.  -  Follow fever curve, wbc.  -  Reviewed labs, micro, imaging reports.  -  Case d/w patient, RN.     History of Present Illness:  80 year old male with history of diabetes, HTN, BPH who was admitted on  with abdominal bloating, nausea, decreased appetite, unintentional weight loss. Initial CT A/P on  with 4 cm segment of masslike constriction and thickening in the proximal transverse colon with concern for partially obstructing chronic neoplasm. Seen by GI and underwent colonoscopy with chronic stent placement on . Also seen by general surgery and taken to the OR on 6/10 for large bowel obstruction status post laparoscopic assisted  transverse colon resection, small bowel resection and tumor nodule and small bowel. Pathology diffuse large B-cell lymphoma. Seen by oncology. On 6/19 had low-grade temperatures with increasing WBC up to 19 with repeat CT A/P showing moderate to large volume free fluid in the abdomen and pelvis in the upper quadrants. Organized fluid in the left lower retroperitoneum measuring 5.5 x 4.7 x 5.8 cm concern for seroma, liquefied hematoma versus inflammatory fluid. Seen by IR and underwent IR peritoneal drain placement on 6/19 with 260 cc of serosanguineous fluid. Cultures negative. Was on zosyn while inpatient and transitioned to PO augmentin on discharge. Discharged on 6/24/25 to subacute rehab. Came back to ED on 7/4/25 with abdominal pain. Repeat CT with multiple large peritoneal abscesses. Surgery and IR consult.  Now status post IR drainage with cx growing GNR and budding yeast.  Currently on IV Zosyn.  ID consulted for further antibiotics management.     OBJECTIVE  BP (!) 150/93 (BP Location: Left leg)   Pulse 98   Temp 97.9 °F (36.6 °C) (Oral)   Resp 18   Ht 5' 11\" (1.803 m)   Wt 156 lb 8.4 oz (71 kg)   SpO2 100%   BMI 21.83 kg/m²     General: No acute distress. Alert.  HEENT: EOMI  Abdomen: Soft, nontender, nondistended. RUQ drains in place, LLQ drain with tan output to gravity bag  Musculoskeletal: No edema  Integument: No rashes    [facility]  [phone]

## 2025-07-16 NOTE — PLAN OF CARE
Problem: Patient Centered Care  Goal: Patient preferences are identified and integrated in the patient's plan of care  Description: Interventions:  - What would you like us to know as we care for you? [place] came [place] [facility]  - Provide timely, complete, and accurate information to patient/family  - Incorporate patient and family knowledge, values, beliefs, and cultural backgrounds into the planning and delivery of care  - Encourage patient/family to participate in care and decision-making at the level they choose  - Honor patient and family perspectives and choices  Outcome: Progressing     Problem: Diabetes/Glucose Control  Goal: Glucose maintained within prescribed range  Description: INTERVENTIONS:  - Monitor Blood Glucose as ordered  - Assess for signs and symptoms of hyperglycemia and hypoglycemia  - Administer ordered medications to maintain glucose within target range  - Assess barriers to adequate nutritional intake and initiate nutrition consult as needed  - Instruct patient on self management of diabetes  Outcome: Progressing     Problem: Patient/Family Goals  Goal: Patient/Family Long Term Goal  Description: Patient's Long Term Goal: Discharge  home    Interventions:  -Monitor vitals  -Monitor labs  - Heparin gtt  - Glucose monitoring ACHS/q6h  - Monitor and manage NGOZI drains  - PT/OT on consult  - IR on consult  - Manage pain   - Monitor midline incision  - Remote tele   - IV abx  - ID onc onsult  - Gen sx on consult   - See additional Care Plan goals for specific interventions  Outcome: Progressing  Goal: Patient/Family Short Term Goal  Description: Patient's Short Term Goal: Manage pain    Interventions:   - Frequent pain assessments  - Non-pharmacological interventions  - Pain medications as needed/indicated  - See additional Care Plan goals for specific interventions  Outcome: Progressing     Problem: PAIN - ADULT  Goal: Verbalizes/displays adequate comfort level or patient's stated pain  goal  Description: INTERVENTIONS:  - Encourage pt to monitor pain and request assistance  - Assess pain using appropriate pain scale  - Administer analgesics based on type and severity of pain and evaluate response  - Implement non-pharmacological measures as appropriate and evaluate response  - Consider cultural and social influences on pain and pain management  - Manage/alleviate anxiety  - Utilize distraction and/or relaxation techniques  - Monitor for opioid side effects  - Notify MD/LIP if interventions unsuccessful or patient reports new pain  - Anticipate increased pain with activity and pre-medicate as appropriate  Outcome: Progressing     Problem: RISK FOR INFECTION - ADULT  Goal: Absence of fever/infection during anticipated neutropenic period  Description: INTERVENTIONS  - Monitor WBC  - Administer growth factors as ordered  - Implement neutropenic guidelines  Outcome: Progressing     Problem: SAFETY ADULT - FALL  Goal: Free from fall injury  Description: INTERVENTIONS:  - Assess pt frequently for physical needs  - Identify cognitive and physical deficits and behaviors that affect risk of falls.  - [place] fall precautions as indicated by assessment.  - Educate pt/family on patient safety including physical limitations  - Instruct pt to call for assistance with activity based on assessment  - Modify environment to reduce risk of injury  - Provide assistive devices as appropriate  - Consider OT/PT consult to assist with strengthening/mobility  - Encourage toileting schedule  Outcome: Progressing     Problem: HEMATOLOGIC - ADULT  Goal: Free from bleeding injury  Description: (Example usage: patient with low platelets)  INTERVENTIONS:  - Avoid intramuscular injections, enemas and rectal medication administration  - Ensure safe mobilization of patient  - Hold pressure on venipuncture sites to achieve adequate hemostasis  - Assess for signs and symptoms of internal bleeding  - Monitor lab trends  - Patient  is to report abnormal signs of bleeding to staff  - Avoid use of toothpicks and dental floss  - Use electric shaver for shaving  - Use soft bristle tooth brush  - Limit straining and forceful nose blowing  Outcome: Progressing  Goal: Maintains hematologic stability  Description: INTERVENTIONS  - Assess for signs and symptoms of bleeding or hemorrhage  - Monitor labs and vital signs for trends  - Administer supportive blood products/factors, fluids and medications as ordered and appropriate  - Administer supportive blood products/factors as ordered and appropriate  Outcome: Progressing  Goal: Free from bleeding injury  Description: (Example usage: patient with low platelets)  INTERVENTIONS:  - Avoid intramuscular injections, enemas and rectal medication administration  - Ensure safe mobilization of patient  - Hold pressure on venipuncture sites to achieve adequate hemostasis  - Assess for signs and symptoms of internal bleeding  - Monitor lab trends  - Patient is to report abnormal signs of bleeding to staff  - Avoid use of toothpicks and dental floss  - Use electric shaver for shaving  - Use soft bristle tooth brush  - Limit straining and forceful nose blowing  Outcome: Progressing     Problem: GASTROINTESTINAL - ADULT  Goal: Minimal or absence of nausea and vomiting  Description: INTERVENTIONS:  - Maintain adequate hydration with IV or PO as ordered and tolerated  - Nasogastric tube to low intermittent suction as ordered  - Evaluate effectiveness of ordered antiemetic medications  - Provide nonpharmacologic comfort measures as appropriate  - Advance diet as tolerated, if ordered  - Obtain nutritional consult as needed  - Evaluate fluid balance  Outcome: Progressing  Goal: Maintains or returns to baseline bowel function  Description: INTERVENTIONS:  - Assess bowel function  - Maintain adequate hydration with IV or PO as ordered and tolerated  - Evaluate effectiveness of GI medications  - Encourage mobilization and  activity  - Obtain nutritional consult as needed  - Establish a toileting routine/schedule  - Consider collaborating with pharmacy to review patient's medication profile  Outcome: Progressing  Goal: Maintains adequate nutritional intake (undernourished)  Description: INTERVENTIONS:  - Monitor percentage of each meal consumed  - Identify factors contributing to decreased intake, treat as appropriate  - Assist with meals as needed  - Monitor I&O, WT and lab values  - Obtain nutritional consult as needed  - Optimize oral hygiene and moisture  - Encourage food from home; allow for food preferences  - Enhance eating environment  Outcome: Progressing     Problem: DISCHARGE PLANNING  Goal: Discharge to home or other facility with appropriate resources  Description: INTERVENTIONS:  - Identify barriers to discharge w/pt and caregiver  - Include patient/family/discharge partner in discharge planning  - Arrange for needed discharge resources and transportation as appropriate  - Identify discharge learning needs (meds, wound care, etc)  - Arrange for interpreters to assist at discharge as needed  - Consider post-discharge preferences of patient/family/discharge partner  - Complete POLST form as appropriate  - Assess patient's ability to be responsible for managing their own health  - Refer to Case Management Department for coordinating discharge planning if the patient needs post-hospital services based on physician/LIP order or complex needs related to functional status, cognitive ability or social support system  Outcome: Progressing     Problem: Altered Communication/Language Barrier  Goal: Patient/Family is able to understand and participate in their care  Description: Interventions:  - Assess communication ability and preferred communication style  - Implement communication aides and strategies  - Use visual cues when possible  - Listen attentively, be patient, do not interrupt  - Minimize distractions  - Allow time for  understanding and response  - Establish method for patient to ask for assistance (call light)  - Provide an  as needed  - Communicate barriers and strategies to overcome with those who interact with patient  Outcome: Progressing     Problem: CARDIOVASCULAR - ADULT  Goal: Maintains optimal cardiac output and hemodynamic stability  Description: INTERVENTIONS:  - Monitor vital signs, rhythm, and trends  - Monitor for bleeding, hypotension and signs of decreased cardiac output  - Evaluate effectiveness of vasoactive medications to optimize hemodynamic stability  - Monitor arterial and/or venous puncture sites for bleeding and/or hematoma  - Assess quality of pulses, skin color and temperature  - Assess for signs of decreased coronary artery perfusion - ex. Angina  - Evaluate fluid balance, assess for edema, trend weights  Outcome: Progressing  Goal: Absence of cardiac arrhythmias or at baseline  Description: INTERVENTIONS:  - Continuous cardiac monitoring, monitor vital signs, obtain 12 lead EKG if indicated  - Evaluate effectiveness of antiarrhythmic and heart rate control medications as ordered  - Initiate emergency measures for life threatening arrhythmias  - Monitor electrolytes and administer replacement therapy as ordered  Outcome: Progressing     Problem: GENITOURINARY - ADULT  Goal: Absence of urinary retention  Description: INTERVENTIONS:  - Assess patient’s ability to void and empty bladder  - Monitor intake/output and perform bladder scan as needed  - Follow urinary retention protocol/standard of care  - Consider collaborating with pharmacy to review patient's medication profile  - Implement strategies to promote bladder emptying  Outcome: Progressing     Problem: METABOLIC/FLUID AND ELECTROLYTES - ADULT  Goal: Electrolytes maintained within normal limits  Description: INTERVENTIONS:  - Monitor labs and rhythm and assess patient for signs and symptoms of electrolyte imbalances  - Administer  electrolyte replacement as ordered  - Monitor response to electrolyte replacements, including rhythm and repeat lab results as appropriate  - Fluid restriction as ordered  - Instruct patient on fluid and nutrition restrictions as appropriate  Outcome: Progressing  Goal: Hemodynamic stability and optimal renal function maintained  Description: INTERVENTIONS:  - Monitor labs and assess for signs and symptoms of volume excess or deficit  - Monitor intake, output and patient weight  - Monitor urine specific gravity, serum osmolarity and serum sodium as indicated or ordered  - Monitor response to interventions for patient's volume status, including labs, urine output, blood pressure (other measures as available)  - Encourage oral intake as appropriate  - Instruct patient on fluid and nutrition restrictions as appropriate  Outcome: Progressing     Problem: SKIN/TISSUE INTEGRITY - ADULT  Goal: Skin integrity remains intact  Description: INTERVENTIONS  - Assess and document risk factors for pressure ulcer development  - Assess and document skin integrity  - Monitor for areas of redness and/or skin breakdown  - Initiate interventions, skin care algorithm/standards of care as needed  Outcome: Progressing  Goal: Incision(s), wounds(s) or drain site(s) healing without S/S of infection  Description: INTERVENTIONS:  - Assess and document risk factors for pressure ulcer development  - Assess and document skin integrity  - Assess and document dressing/incision, wound bed, drain sites and surrounding tissue  - Implement wound care per orders  - Initiate isolation precautions as appropriate  - Initiate Pressure Ulcer prevention bundle as indicated  Outcome: Progressing  Goal: Oral mucous membranes remain intact  Description: INTERVENTIONS  - Assess oral mucosa and hygiene practices  - Implement preventative oral hygiene regimen  - Implement oral medicated treatments as ordered  Outcome: Progressing     Problem: COPING  Goal:  Pt/Family able to verbalize concerns and demonstrate effective coping strategies  Description: INTERVENTIONS:  - Assist patient/family to identify coping skills, available support systems and cultural and spiritual values  - Provide emotional support, including active listening and acknowledgement of concerns of patient and caregivers  - Reduce environmental stimuli, as able  - Instruct patient/family in relaxation techniques, as appropriate  - Assess for spiritual and psychosocial needs and initiate Spiritual Care or Behavioral Health consult as needed  Outcome: Progressing     A/ox4, primarily Georgian speaking, on RA, x2 max assist, ACHS, denies nausea,  NPO at midnight, no complaints of pain, voiding via primofit, x1 BM overnight, Heparin gtt at 1300units/hr, redraw APTT in the morning, heparin gtt stopped at 0700 for procedure scheduled for this afternoon, x2 NGOZI drains in place to RUQ and RLQ to bulb suction, left abdominal drain in place draining to gravity, vital signs stable, no acute changes overnight. Call light within reach, safety measures in place, frequent rounding done, plan of care continued.

## 2025-07-16 NOTE — PLAN OF CARE
Pt A/O x3-4, Stateless speaking. Forgetful at times, frequent reorientation. Flat affect. IV abx continued. HIDA scan this AM. Drain check with IR today. PT/OT. Ambulating with assist. Tolerating diet, accu-checks AC/HS. Denies pain. Hep gtt to restart tonight @ 2000. Fall precautions in place. Call light within reach. Calls appropriately. Frequent rounding. Updated daughter on plan of care.     Problem: Patient Centered Care  Goal: Patient preferences are identified and integrated in the patient's plan of care  Description: Interventions:  - What would you like us to know as we care for you? I came [place] [facility]  - Provide timely, complete, and accurate information to patient/family  - Incorporate patient and family knowledge, values, beliefs, and cultural backgrounds into the planning and delivery of care  - Encourage patient/family to participate in care and decision-making at the level they choose  - Honor patient and family perspectives and choices  Outcome: Progressing     Problem: Diabetes/Glucose Control  Goal: Glucose maintained within prescribed range  Description: INTERVENTIONS:  - Monitor Blood Glucose as ordered  - Assess for signs and symptoms of hyperglycemia and hypoglycemia  - Administer ordered medications to maintain glucose within target range  - Assess barriers to adequate nutritional intake and initiate nutrition consult as needed  - Instruct patient on self management of diabetes  Outcome: Progressing     Problem: Patient/Family Goals  Goal: Patient/Family Long Term Goal  Description: Patient's Long Term Goal: Discharge  home    Interventions:  -Monitor vitals  -Monitor labs  - Heparin gtt  - Glucose monitoring ACHS/q6h  - Monitor and manage NGOZI drains  - PT/OT on consult  - IR on consult  - Manage pain   - Monitor midline incision  - Remote tele   - IV abx  - ID onc onsult  - Gen sx on consult   - See additional Care Plan goals for specific interventions  Outcome: Progressing  Goal:  Patient/Family Short Term Goal  Description: Patient's Short Term Goal: Manage pain    Interventions:   - Frequent pain assessments  - Non-pharmacological interventions  - Pain medications as needed/indicated  - See additional Care Plan goals for specific interventions  Outcome: Progressing     Problem: PAIN - ADULT  Goal: Verbalizes/displays adequate comfort level or patient's stated pain goal  Description: INTERVENTIONS:  - Encourage pt to monitor pain and request assistance  - Assess pain using appropriate pain scale  - Administer analgesics based on type and severity of pain and evaluate response  - Implement non-pharmacological measures as appropriate and evaluate response  - Consider cultural and social influences on pain and pain management  - Manage/alleviate anxiety  - Utilize distraction and/or relaxation techniques  - Monitor for opioid side effects  - Notify MD/LIP if interventions unsuccessful or patient reports new pain  - Anticipate increased pain with activity and pre-medicate as appropriate  Outcome: Progressing     Problem: RISK FOR INFECTION - ADULT  Goal: Absence of fever/infection during anticipated neutropenic period  Description: INTERVENTIONS  - Monitor WBC  - Administer growth factors as ordered  - Implement neutropenic guidelines  Outcome: Progressing     Problem: SAFETY ADULT - FALL  Goal: Free from fall injury  Description: INTERVENTIONS:  - Assess pt frequently for physical needs  - Identify cognitive and physical deficits and behaviors that affect risk of falls.  - Donalds fall precautions as indicated by assessment.  - Educate pt/family on patient safety including physical limitations  - Instruct pt to call for assistance with activity based on assessment  - Modify environment to reduce risk of injury  - Provide assistive devices as appropriate  - Consider OT/PT consult to assist with strengthening/mobility  - Encourage toileting schedule  Outcome: Progressing     Problem:  HEMATOLOGIC - ADULT  Goal: Free from bleeding injury  Description: (Example usage: patient with low platelets)  INTERVENTIONS:  - Avoid intramuscular injections, enemas and rectal medication administration  - Ensure safe mobilization of patient  - Hold pressure on venipuncture sites to achieve adequate hemostasis  - Assess for signs and symptoms of internal bleeding  - Monitor lab trends  - Patient is to report abnormal signs of bleeding to staff  - Avoid use of toothpicks and dental floss  - Use electric shaver for shaving  - Use soft bristle tooth brush  - Limit straining and forceful nose blowing  Outcome: Progressing  Goal: Maintains hematologic stability  Description: INTERVENTIONS  - Assess for signs and symptoms of bleeding or hemorrhage  - Monitor labs and vital signs for trends  - Administer supportive blood products/factors, fluids and medications as ordered and appropriate  - Administer supportive blood products/factors as ordered and appropriate  Outcome: Progressing  Goal: Free from bleeding injury  Description: (Example usage: patient with low platelets)  INTERVENTIONS:  - Avoid intramuscular injections, enemas and rectal medication administration  - Ensure safe mobilization of patient  - Hold pressure on venipuncture sites to achieve adequate hemostasis  - Assess for signs and symptoms of internal bleeding  - Monitor lab trends  - Patient is to report abnormal signs of bleeding to staff  - Avoid use of toothpicks and dental floss  - Use electric shaver for shaving  - Use soft bristle tooth brush  - Limit straining and forceful nose blowing  Outcome: Progressing     Problem: GASTROINTESTINAL - ADULT  Goal: Minimal or absence of nausea and vomiting  Description: INTERVENTIONS:  - Maintain adequate hydration with IV or PO as ordered and tolerated  - Nasogastric tube to low intermittent suction as ordered  - Evaluate effectiveness of ordered antiemetic medications  - Provide nonpharmacologic comfort  measures as appropriate  - Advance diet as tolerated, if ordered  - Obtain nutritional consult as needed  - Evaluate fluid balance  Outcome: Progressing  Goal: Maintains or returns to baseline bowel function  Description: INTERVENTIONS:  - Assess bowel function  - Maintain adequate hydration with IV or PO as ordered and tolerated  - Evaluate effectiveness of GI medications  - Encourage mobilization and activity  - Obtain nutritional consult as needed  - Establish a toileting routine/schedule  - Consider collaborating with pharmacy to review patient's medication profile  Outcome: Progressing  Goal: Maintains adequate nutritional intake (undernourished)  Description: INTERVENTIONS:  - Monitor percentage of each meal consumed  - Identify factors contributing to decreased intake, treat as appropriate  - Assist with meals as needed  - Monitor I&O, WT and lab values  - Obtain nutritional consult as needed  - Optimize oral hygiene and moisture  - Encourage food from home; allow for food preferences  - Enhance eating environment  Outcome: Progressing     Problem: DISCHARGE PLANNING  Goal: Discharge to home or other facility with appropriate resources  Description: INTERVENTIONS:  - Identify barriers to discharge w/pt and caregiver  - Include patient/family/discharge partner in discharge planning  - Arrange for needed discharge resources and transportation as appropriate  - Identify discharge learning needs (meds, wound care, etc)  - Arrange for interpreters to assist at discharge as needed  - Consider post-discharge preferences of patient/family/discharge partner  - Complete POLST form as appropriate  - Assess patient's ability to be responsible for managing their own health  - Refer to Case Management Department for coordinating discharge planning if the patient needs post-hospital services based on physician/LIP order or complex needs related to functional status, cognitive ability or social support system  Outcome:  Progressing     Problem: Altered Communication/Language Barrier  Goal: Patient/Family is able to understand and participate in their care  Description: Interventions:  - Assess communication ability and preferred communication style  - Implement communication aides and strategies  - Use visual cues when possible  - Listen attentively, be patient, do not interrupt  - Minimize distractions  - Allow time for understanding and response  - Establish method for patient to ask for assistance (call light)  - Provide an  as needed  - Communicate barriers and strategies to overcome with those who interact with patient  Outcome: Progressing     Problem: CARDIOVASCULAR - ADULT  Goal: Maintains optimal cardiac output and hemodynamic stability  Description: INTERVENTIONS:  - Monitor vital signs, rhythm, and trends  - Monitor for bleeding, hypotension and signs of decreased cardiac output  - Evaluate effectiveness of vasoactive medications to optimize hemodynamic stability  - Monitor arterial and/or venous puncture sites for bleeding and/or hematoma  - Assess quality of pulses, skin color and temperature  - Assess for signs of decreased coronary artery perfusion - ex. Angina  - Evaluate fluid balance, assess for edema, trend weights  Outcome: Progressing  Goal: Absence of cardiac arrhythmias or at baseline  Description: INTERVENTIONS:  - Continuous cardiac monitoring, monitor vital signs, obtain 12 lead EKG if indicated  - Evaluate effectiveness of antiarrhythmic and heart rate control medications as ordered  - Initiate emergency measures for life threatening arrhythmias  - Monitor electrolytes and administer replacement therapy as ordered  Outcome: Progressing     Problem: GENITOURINARY - ADULT  Goal: Absence of urinary retention  Description: INTERVENTIONS:  - Assess patient’s ability to void and empty bladder  - Monitor intake/output and perform bladder scan as needed  - Follow urinary retention protocol/standard of  care  - Consider collaborating with pharmacy to review patient's medication profile  - Implement strategies to promote bladder emptying  Outcome: Progressing     Problem: METABOLIC/FLUID AND ELECTROLYTES - ADULT  Goal: Electrolytes maintained within normal limits  Description: INTERVENTIONS:  - Monitor labs and rhythm and assess patient for signs and symptoms of electrolyte imbalances  - Administer electrolyte replacement as ordered  - Monitor response to electrolyte replacements, including rhythm and repeat lab results as appropriate  - Fluid restriction as ordered  - Instruct patient on fluid and nutrition restrictions as appropriate  Outcome: Progressing  Goal: Hemodynamic stability and optimal renal function maintained  Description: INTERVENTIONS:  - Monitor labs and assess for signs and symptoms of volume excess or deficit  - Monitor intake, output and patient weight  - Monitor urine specific gravity, serum osmolarity and serum sodium as indicated or ordered  - Monitor response to interventions for patient's volume status, including labs, urine output, blood pressure (other measures as available)  - Encourage oral intake as appropriate  - Instruct patient on fluid and nutrition restrictions as appropriate  Outcome: Progressing     Problem: SKIN/TISSUE INTEGRITY - ADULT  Goal: Skin integrity remains intact  Description: INTERVENTIONS  - Assess and document risk factors for pressure ulcer development  - Assess and document skin integrity  - Monitor for areas of redness and/or skin breakdown  - Initiate interventions, skin care algorithm/standards of care as needed  Outcome: Progressing  Goal: Incision(s), wounds(s) or drain site(s) healing without S/S of infection  Description: INTERVENTIONS:  - Assess and document risk factors for pressure ulcer development  - Assess and document skin integrity  - Assess and document dressing/incision, wound bed, drain sites and surrounding tissue  - Implement wound care per  orders  - Initiate isolation precautions as appropriate  - Initiate Pressure Ulcer prevention bundle as indicated  Outcome: Progressing  Goal: Oral mucous membranes remain intact  Description: INTERVENTIONS  - Assess oral mucosa and hygiene practices  - Implement preventative oral hygiene regimen  - Implement oral medicated treatments as ordered  Outcome: Progressing     Problem: COPING  Goal: Pt/Family able to verbalize concerns and demonstrate effective coping strategies  Description: INTERVENTIONS:  - Assist patient/family to identify coping skills, available support systems and cultural and spiritual values  - Provide emotional support, including active listening and acknowledgement of concerns of patient and caregivers  - Reduce environmental stimuli, as able  - Instruct patient/family in relaxation techniques, as appropriate  - Assess for spiritual and psychosocial needs and initiate Spiritual Care or Behavioral Health consult as needed  Outcome: Progressing

## 2025-07-17 LAB
ANION GAP SERPL CALC-SCNC: 4 MMOL/L (ref 0–18)
APTT PPP: 81.3 SECONDS (ref 23–36)
BASOPHILS # BLD AUTO: 0.02 X10(3) UL (ref 0–0.2)
BASOPHILS NFR BLD AUTO: 0.4 %
BUN BLD-MCNC: 10 MG/DL (ref 9–23)
BUN/CREAT SERPL: 19.6 (ref 10–20)
CALCIUM BLD-MCNC: 7.5 MG/DL (ref 8.7–10.4)
CHLORIDE SERPL-SCNC: 101 MMOL/L (ref 98–112)
CO2 SERPL-SCNC: 29 MMOL/L (ref 21–32)
CREAT BLD-MCNC: 0.51 MG/DL (ref 0.7–1.3)
DEPRECATED RDW RBC AUTO: 62.5 FL (ref 35.1–46.3)
EGFRCR SERPLBLD CKD-EPI 2021: 104 ML/MIN/1.73M2 (ref 60–?)
EOSINOPHIL # BLD AUTO: 0.01 X10(3) UL (ref 0–0.7)
EOSINOPHIL NFR BLD AUTO: 0.2 %
ERYTHROCYTE [DISTWIDTH] IN BLOOD BY AUTOMATED COUNT: 19.9 % (ref 11–15)
GLUCOSE BLD-MCNC: 202 MG/DL (ref 70–99)
GLUCOSE BLDC GLUCOMTR-MCNC: 162 MG/DL (ref 70–99)
GLUCOSE BLDC GLUCOMTR-MCNC: 251 MG/DL (ref 70–99)
GLUCOSE BLDC GLUCOMTR-MCNC: 267 MG/DL (ref 70–99)
GLUCOSE BLDC GLUCOMTR-MCNC: 300 MG/DL (ref 70–99)
HCT VFR BLD AUTO: 22.9 % (ref 39–53)
HGB BLD-MCNC: 7.3 G/DL (ref 13–17.5)
IMM GRANULOCYTES # BLD AUTO: 0.03 X10(3) UL (ref 0–1)
IMM GRANULOCYTES NFR BLD: 0.6 %
LYMPHOCYTES # BLD AUTO: 1.22 X10(3) UL (ref 1–4)
LYMPHOCYTES NFR BLD AUTO: 26.3 %
MAGNESIUM SERPL-MCNC: 1.3 MG/DL (ref 1.6–2.6)
MCH RBC QN AUTO: 27.8 PG (ref 26–34)
MCHC RBC AUTO-ENTMCNC: 31.9 G/DL (ref 31–37)
MCV RBC AUTO: 87.1 FL (ref 80–100)
MONOCYTES # BLD AUTO: 0.57 X10(3) UL (ref 0.1–1)
MONOCYTES NFR BLD AUTO: 12.3 %
NEUTROPHILS # BLD AUTO: 2.79 X10 (3) UL (ref 1.5–7.7)
NEUTROPHILS # BLD AUTO: 2.79 X10(3) UL (ref 1.5–7.7)
NEUTROPHILS NFR BLD AUTO: 60.2 %
OSMOLALITY SERPL CALC.SUM OF ELEC: 283 MOSM/KG (ref 275–295)
PHOSPHATE SERPL-MCNC: 2.5 MG/DL (ref 2.4–5.1)
PLATELET # BLD AUTO: 297 10(3)UL (ref 150–450)
POTASSIUM SERPL-SCNC: 3.1 MMOL/L (ref 3.5–5.1)
RBC # BLD AUTO: 2.63 X10(6)UL (ref 3.8–5.8)
SODIUM SERPL-SCNC: 134 MMOL/L (ref 136–145)
WBC # BLD AUTO: 4.6 X10(3) UL (ref 4–11)

## 2025-07-17 PROCEDURE — 99233 SBSQ HOSP IP/OBS HIGH 50: CPT | Performed by: HOSPITALIST

## 2025-07-17 RX ORDER — MAGNESIUM OXIDE 400 MG/1
800 TABLET ORAL ONCE
Status: COMPLETED | OUTPATIENT
Start: 2025-07-17 | End: 2025-07-17

## 2025-07-17 RX ORDER — POTASSIUM CHLORIDE 1500 MG/1
40 TABLET, EXTENDED RELEASE ORAL ONCE
Status: COMPLETED | OUTPATIENT
Start: 2025-07-17 | End: 2025-07-17

## 2025-07-17 RX ORDER — POTASSIUM CHLORIDE 14.9 MG/ML
20 INJECTION INTRAVENOUS ONCE
Status: DISCONTINUED | OUTPATIENT
Start: 2025-07-17 | End: 2025-07-17

## 2025-07-17 NOTE — PROGRESS NOTES
07/17/25 1415 07/17/25 1600   Closed Drain RUQ Bulb 12 Fr.   Placement Date/Time: 07/16/25 1619   Inserted by: JUVENCIO Cabrera MD  Location: RUQ  Drain Tube Type: Bulb  Size (Fr.): 12 Fr.   Drainage Appearance  --  Serosanguineous   Status To bulb suction To bulb suction   Output (mL)  --  10 mL   Additional I/O Irrigation (mL);Aspiration (mL)  --    Irrigation (mL) 10 mL  --    Aspiration (mL) 6 mL  --    Closed Drain LUQ Other (Comment) 22 Fr.   Placement Date/Time: 07/16/25 1626   Inserted by: JUVENCIO Cabrera MD  Location: LUQ  Drain Tube Type: (c) Other (Comment)  Size (Fr.): 22 Fr.   Drainage Appearance  --  Milky;Cardozo   Status Open to gravity drainage Open to gravity drainage   Output (mL)  --  50 mL   Additional I/O Irrigation (mL);Aspiration (mL)  --    Irrigation (mL) 20 mL  --    Aspiration (mL) 15 mL  --

## 2025-07-17 NOTE — PAYOR COMM NOTE
--------------  CONTINUED STAY REVIEW    Payor: [employer]  Subscriber #:  [id number]  Authorization Number: [id number]    Admit date: 7/5/25  Admit time:  2:31 AM      7/16      Procedure: abscessogram x 3, drain exchange x 2, removal of right lower drain in subhepatic region     Pre-Procedure Diagnosis:  post op abscess     Post-Procedure Diagnosis: same    Findings:  subdiaphragmatic and anterior drains with residual collections/ drains exchanged and extensively irrigated with further purulent material removed; subhepatic drain removed as no collection remains     HGB 8.3  MG 1.3     Intra-abdominal abscesses.  Status post IR drainage x2 7/5.  Small bowel follow-through 7/9 negative for leak.  Repeat IR drainage 7/11 for perihepatic fluid collection.  Port placement 7/15  - Pain control  - Maintain drains, drain study 7/16  - ID, general surgery on consult  - Continue zerbaxa, daptomycin, Flagyl, Micafungin  - no significant change in management     DVT, acute.  Location is RUE (new), RLE (old).  Pulmonary embolism not present. Contributing factors malignancy, immobility.  -Anticoagulation: Heparin drip, change to p.o after procedures are completely done  -Hypercoagulable work-up not indicated  -Light activity as tolerated     Small pneumothorax resolved     B-cell lymphoma.  Recent diagnosis.  - Oncology  - Port placement 7/15  - close opt follow up      MEDICATIONS ADMINISTERED IN LAST 1 DAY:  ceftolozane-tazobactam (ZERBAXA) 3 g in sodium chloride 0.9% 100 mL IVPB       Date Action Dose Route User    7/17/2025 0855 Given 3 g Intravenous Genevieve Jeter RN    7/17/2025 0030 Given 3 g Intravenous Johana Pérez RN    7/16/2025 1731 Given 3 g Intravenous Genevieve Jeter RN    7/16/2025 1050 Given 3 g Intravenous Genevieve Jeter RN          heparin (Porcine) 15447 units/250mL infusion PE/DVT/THROMBUS CONTINUOUS       Date Action Dose Route User    7/16/2025 2359 New Bag 1,300 Units/hr  Intravenous Kumar Pham RN    7/16/2025 2003 Hi-Risk Restarted 1,300 Units/hr Intravenous ElisaJohana Gutierrez RN          DAPTOmycin (Cubicin) 500 mg in sodium chloride 0.9% PF IV syringe       Date Action Dose Route User    7/16/2025 1731 New Bag 500 mg Intravenous Genevieve Jeter RN          FLUoxetine (PROzac) cap 20 mg       Date Action Dose Route User    7/17/2025 0855 Given 20 mg Oral Genevieve Jeter RN    7/16/2025 1744 Given 20 mg Oral Genevieve Jeter RN          insulin aspart (NovoLOG) 100 Units/mL FlexPen 1-5 Units       Date Action Dose Route User    7/17/2025 0936 Given 1 Units Subcutaneous (Left Lower Arm) Genevieve Jeter RN    7/16/2025 2140 Given 2 Units Subcutaneous (Right Upper Arm) ElisaJohana Gutierrez RN          iopamidol (ISOVUE-300) 61 % injection 100 mL       Date Action Dose Route User    7/16/2025 1638 Given 100 mL Injection Wanda Ridley RN          melatonin cap/tab 5 mg       Date Action Dose Route User    7/16/2025 2009 Given 5 mg Oral ElisaJohana Scott RN          metroNIDAZOLE (Flagyl) tab 500 mg       Date Action Dose Route User    7/17/2025 0855 Given 500 mg Oral Genevieve Jeter RN    7/16/2025 2009 Given 500 mg Oral ElisaJohana Scott RN          micafungin (Mycamine) 100mg in 100 mL 0.9% IVPB-BRANDY       Date Action Dose Route User    7/16/2025 1357 New Bag 100 mg Intravenous Genevieve Jeter RN          pantoprazole (Protonix) 40 mg in sodium chloride 0.9% PF 10 mL IV push       Date Action Dose Route User    7/17/2025 0855 Given 40 mg Intravenous Genevieve Jeter RN    7/16/2025 1050 Given 40 mg Intravenous Genevieve Jeter RN          potassium chloride (Klor-Con M20) tab 40 mEq       Date Action Dose Route User    7/17/2025 0856 Given 40 mEq Oral Genevieve Jeter RN          pregabalin (Lyrica) cap 100 mg       Date Action Dose Route User    7/17/2025 0855 Given 100 mg Oral Genevieve Jeter, RN    7/16/2025 2009 Given 100 mg Oral Johana Pérez,  JOSE          tamsulosin (Flomax) cap 0.8 mg       Date Action Dose Route User    7/16/2025 1743 Given 0.8 mg Oral Genevieve Jeter RN          temazepam (Restoril) cap 7.5 mg       Date Action Dose Route User    7/16/2025 2009 Given 7.5 mg Oral Johana Pérez RN          vancomycin (Vancocin) cap 125 mg       Date Action Dose Route User    7/17/2025 0855 Given 125 mg Oral Genevieve Jeter RN    7/16/2025 1743 Given 125 mg Oral Genevieve Jeter RN            Vitals (last day)       Date/Time Temp Pulse Resp BP SpO2 Weight O2 Device O2 Flow Rate (L/min) [facility]    07/17/25 0424 97.9 °F (36.6 °C) 78 16 141/68 94 % -- None (Room air) -- SD    07/16/25 1949 98.2 °F (36.8 °C) 87 16 116/52 96 % -- None (Room air) -- SD    07/16/25 1651 -- 83 16 115/62 95 % -- None (Room air) -- LG    07/16/25 1133 97.9 °F (36.6 °C) 98 18 150/93 100 % -- None (Room air) -- KM    07/16/25 0510 98.9 °F (37.2 °C) 87 16 123/54 92 % -- None (Room air) -- KMA          CIWA Scores (since admission)       None

## 2025-07-17 NOTE — PROGRESS NOTES
[facility]  part of [facility]  Progress Note      Arnaldo Gutierrez Patient Status:  Inpatient    4/15/1945 MRN [id number]   Location [facility] 4W/SW/SE Attending Declan Stearns MD   Hosp Day # 12 PCP PHYSICIAN NONSTAFF       Subjective:   Sitting up in wheelchair, comfortable, family at bedside daughter is translating     Objective:   Physical Exam:   General: Alert, orientated x3.  Cooperative.  No apparent distress.  Vital Signs:  Blood pressure 141/68, pulse 78, temperature 97.9 °F (36.6 °C), temperature source Axillary, resp. rate 16, height 71\", weight 156 lb 8.4 oz (71 kg), SpO2 94%.  HEENT: Exam is unremarkable.  Neck: Supple.  Lungs: Clear bilaterally.  Cardiac: Regular rate and rhythm.  Abdomen:  non distended. Left sided drain purulent opaque output, right sided drain cloudy serous output   Skin: Normal texture and turgor.    Input/Output:    Intake/Output Summary (Last 24 hours) at 2025 1203  Last data filed at 2025 0600  Gross per 24 hour   Intake 1110 ml   Output 665 ml   Net 445 ml     24 hour drain output: LUQ drain: 40mL, RUQ drain: 25mL    Results:   Labs:  Lab Results   Component Value Date    WBC 4.6 2025    RBC 2.63 2025    HGB 7.3 2025    HCT 22.9 2025    MCV 87.1 2025    MCH 27.8 2025    MCHC 31.9 2025    RDW 19.9 2025    .0 2025     Recent Labs   Lab 25  0502 25  0532 25  0517 25  0401 07/15/25  0519 25  0401   *  --  204*  --   --  202*   BUN 6*  --  11  --   --  10   CREATSERUM 0.43*  --  0.52*  --   --  0.51*   EGFRCR 109  --  103  --   --  104   CA 7.2*  --  7.8*  --   --  7.5*     --  136  --   --  134*   K 3.3*   < > 3.4* 3.4* 3.5 3.1*     --  101  --   --  101   CO2 28.0  --  28.0  --   --  29.0    < > = values in this interval not displayed.     IR DRAIN  Result Date: 2025  CONCLUSION: 1. Removal of right midabdomen drain.   Exchange of the subdiaphragmatic drain for new 12 Vincentian drain. Exchange and manipulation of large midline 22 Vincentian Davin drainage catheter. Continued current irrigation of catheters with follow-up CT and abscessogram in 1 week's time. Electronically Verified and Signed by Attending Radiologist: Juan Luis Cabrera MD 7/17/2025 11:34 AM Workstation: [id number]    IR PORT A CATH INSERTION EXCHNGE CHECK  Result Date: 7/16/2025  IMPRESSION:  Technically successful placement of a central venous power port via the right internal jugular vein using fluoroscopic guidance. PLAN: The port is ready to use. Tip at cavo-atrial junction.  PROCEDURE SUMMARY: - Image-guided venous right chest port placement (6Fr Power Port Slim)  PROCEDURE DETAILS:  Pre-procedure Informed consent for the procedure was obtained and time-out was performed prior to the procedure. Preparation: The site was prepared and draped using maximal sterile barrier technique including cutaneous antisepsis.  Anesthesia/sedation: Level of anesthesia/sedation: Moderate sedation (conscious sedation) Anesthesia/sedation administered by: Nurse or other independent trained observer under attending supervision with continuous monitoring of the patient's level of consciousness and physiologic status, under the direct supervision of the attending physician  \"MD intra-service time in sedation\" : 19 minutes   Procedural Details/Findings: Informed consent for the procedure was obtained and a time-out was performed prior to the procedure.  The right upper chest and lower neck were prepped and draped in the usual sterile fashion.  Local lidocaine was administered. Ultrasound demonstrates a patent right internal jugular vein without thrombus. Under direct sonographic guidance, the right internal jugular vein was accessed with a micro puncture set and an image was stored. This wire was used to measure the intravascular length. Under fluoroscopic guidance, an Amplatz wire was  advanced into the inferior vena cava. A peel-away sheath was advanced over the Amplatz wire and secured.  Attention was then turned to the right upper chest. Local lidocaine was administered to anesthetize the soft tissues. A small incision was made utilizing a 15 blade. Blunt dissection of the soft tissues was then performed to create a pocket for the chest  port device. The catheter was securely connected to the chest port device. The chest port device was inserted into the subcutaneous pocket.  A tunneling cannula attached to the chest port catheter was used to tunnel the chest port catheter from the newly created pocket in the right upper chest soft tissues to the incision for the right internal jugular venotomy. The catheter was cut to the intra-vascular length.  The chest port catheter was then advanced through the right internal jugular vein peel-away sheath.  The peel-away sheath was removed.  The position was confirmed fluoroscopically, and an image was recorded.  The newly placed venous chest port was then accessed with a non-coring needle. The catheter was flushed/aspirated and locked with heparinized saline. The subcutaneous pocket was then closed with interrupted deep 3-0 Vicryl sutures. Skin glue was used to cover the incision at the superficial level, as well as close the right internal jugular incision site.  The patient tolerated the procedure well.  Imaging Guidance: Fluoroscopy and Ultrasound  Radiation: 0.4 minutes, 1 mGy  Attestation I, Juan Luis Cabrera, attest that I was present for the entire procedure. I reviewed the stored images and agree with the report as written.   Electronically Verified and Signed by Attending Radiologist: Juan Luis Cabrera MD 7/16/2025 1:19 PM Workstation: [id number]    NM GB HEPATOBILIARY SCAN  (CPT=78226)  Result Date: 7/16/2025  IMPRESSION:  1.  Patent cystic and common bile duct. 2.  No evidence of peritoneal leak. 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

## 2025-07-17 NOTE — PLAN OF CARE
Pt A/O x3-4, [place] speaking. Forgetful at times, frequent reorientation. Flat affect. IV abx continued. Drains flushing and aspirating per IR orders -- see flowsheets. Tolerating diet, accu-checks AC/HS. Norco for pain. Hep gtt continued. Mag & K+ replaced per protocol. Fall precautions in place. Call light within reach. Calls appropriately. Frequent rounding. Family updated on care of plan.     Problem: Patient Centered Care  Goal: Patient preferences are identified and integrated in the patient's plan of care  Description: Interventions:  - What would you like us to know as we care for you? [facility] came [facility]  - Provide timely, complete, and accurate information to patient/family  - Incorporate patient and family knowledge, values, beliefs, and cultural backgrounds into the planning and delivery of care  - Encourage patient/family to participate in care and decision-making at the level they choose  - Honor patient and family perspectives and choices  Outcome: Progressing     Problem: Diabetes/Glucose Control  Goal: Glucose maintained within prescribed range  Description: INTERVENTIONS:  - Monitor Blood Glucose as ordered  - Assess for signs and symptoms of hyperglycemia and hypoglycemia  - Administer ordered medications to maintain glucose within target range  - Assess barriers to adequate nutritional intake and initiate nutrition consult as needed  - Instruct patient on self management of diabetes  Outcome: Progressing     Problem: Patient/Family Goals  Goal: Patient/Family Long Term Goal  Description: Patient's Long Term Goal: Discharge  home    Interventions:  -Monitor vitals  -Monitor labs  - Heparin gtt  - Glucose monitoring ACHS/q6h  - Monitor and manage NGOZI drains  - PT/OT on consult  - IR on consult  - Manage pain   - Monitor midline incision  - Remote tele   - IV abx  - ID onc onsult  - Gen sx on consult   - See additional Care Plan goals for specific interventions  Outcome: Progressing  Goal:  Patient/Family Short Term Goal  Description: Patient's Short Term Goal: Manage pain    Interventions:   - Frequent pain assessments  - Non-pharmacological interventions  - Pain medications as needed/indicated  - See additional Care Plan goals for specific interventions  Outcome: Progressing     Problem: PAIN - ADULT  Goal: Verbalizes/displays adequate comfort level or patient's stated pain goal  Description: INTERVENTIONS:  - Encourage pt to monitor pain and request assistance  - Assess pain using appropriate pain scale  - Administer analgesics based on type and severity of pain and evaluate response  - Implement non-pharmacological measures as appropriate and evaluate response  - Consider cultural and social influences on pain and pain management  - Manage/alleviate anxiety  - Utilize distraction and/or relaxation techniques  - Monitor for opioid side effects  - Notify MD/LIP if interventions unsuccessful or patient reports new pain  - Anticipate increased pain with activity and pre-medicate as appropriate  Outcome: Progressing     Problem: RISK FOR INFECTION - ADULT  Goal: Absence of fever/infection during anticipated neutropenic period  Description: INTERVENTIONS  - Monitor WBC  - Administer growth factors as ordered  - Implement neutropenic guidelines  Outcome: Progressing     Problem: SAFETY ADULT - FALL  Goal: Free from fall injury  Description: INTERVENTIONS:  - Assess pt frequently for physical needs  - Identify cognitive and physical deficits and behaviors that affect risk of falls.  - [place] fall precautions as indicated by assessment.  - Educate pt/family on patient safety including physical limitations  - Instruct pt to call for assistance with activity based on assessment  - Modify environment to reduce risk of injury  - Provide assistive devices as appropriate  - Consider OT/PT consult to assist with strengthening/mobility  - Encourage toileting schedule  Outcome: Progressing     Problem:  HEMATOLOGIC - ADULT  Goal: Free from bleeding injury  Description: (Example usage: patient with low platelets)  INTERVENTIONS:  - Avoid intramuscular injections, enemas and rectal medication administration  - Ensure safe mobilization of patient  - Hold pressure on venipuncture sites to achieve adequate hemostasis  - Assess for signs and symptoms of internal bleeding  - Monitor lab trends  - Patient is to report abnormal signs of bleeding to staff  - Avoid use of toothpicks and dental floss  - Use electric shaver for shaving  - Use soft bristle tooth brush  - Limit straining and forceful nose blowing  Outcome: Progressing  Goal: Maintains hematologic stability  Description: INTERVENTIONS  - Assess for signs and symptoms of bleeding or hemorrhage  - Monitor labs and vital signs for trends  - Administer supportive blood products/factors, fluids and medications as ordered and appropriate  - Administer supportive blood products/factors as ordered and appropriate  Outcome: Progressing  Goal: Free from bleeding injury  Description: (Example usage: patient with low platelets)  INTERVENTIONS:  - Avoid intramuscular injections, enemas and rectal medication administration  - Ensure safe mobilization of patient  - Hold pressure on venipuncture sites to achieve adequate hemostasis  - Assess for signs and symptoms of internal bleeding  - Monitor lab trends  - Patient is to report abnormal signs of bleeding to staff  - Avoid use of toothpicks and dental floss  - Use electric shaver for shaving  - Use soft bristle tooth brush  - Limit straining and forceful nose blowing  Outcome: Progressing     Problem: GASTROINTESTINAL - ADULT  Goal: Minimal or absence of nausea and vomiting  Description: INTERVENTIONS:  - Maintain adequate hydration with IV or PO as ordered and tolerated  - Nasogastric tube to low intermittent suction as ordered  - Evaluate effectiveness of ordered antiemetic medications  - Provide nonpharmacologic comfort  measures as appropriate  - Advance diet as tolerated, if ordered  - Obtain nutritional consult as needed  - Evaluate fluid balance  Outcome: Progressing  Goal: Maintains or returns to baseline bowel function  Description: INTERVENTIONS:  - Assess bowel function  - Maintain adequate hydration with IV or PO as ordered and tolerated  - Evaluate effectiveness of GI medications  - Encourage mobilization and activity  - Obtain nutritional consult as needed  - Establish a toileting routine/schedule  - Consider collaborating with pharmacy to review patient's medication profile  Outcome: Progressing  Goal: Maintains adequate nutritional intake (undernourished)  Description: INTERVENTIONS:  - Monitor percentage of each meal consumed  - Identify factors contributing to decreased intake, treat as appropriate  - Assist with meals as needed  - Monitor I&O, WT and lab values  - Obtain nutritional consult as needed  - Optimize oral hygiene and moisture  - Encourage food from home; allow for food preferences  - Enhance eating environment  Outcome: Progressing     Problem: DISCHARGE PLANNING  Goal: Discharge to home or other facility with appropriate resources  Description: INTERVENTIONS:  - Identify barriers to discharge w/pt and caregiver  - Include patient/family/discharge partner in discharge planning  - Arrange for needed discharge resources and transportation as appropriate  - Identify discharge learning needs (meds, wound care, etc)  - Arrange for interpreters to assist at discharge as needed  - Consider post-discharge preferences of patient/family/discharge partner  - Complete POLST form as appropriate  - Assess patient's ability to be responsible for managing their own health  - Refer to Case Management Department for coordinating discharge planning if the patient needs post-hospital services based on physician/LIP order or complex needs related to functional status, cognitive ability or social support system  Outcome:  Progressing     Problem: Altered Communication/Language Barrier  Goal: Patient/Family is able to understand and participate in their care  Description: Interventions:  - Assess communication ability and preferred communication style  - Implement communication aides and strategies  - Use visual cues when possible  - Listen attentively, be patient, do not interrupt  - Minimize distractions  - Allow time for understanding and response  - Establish method for patient to ask for assistance (call light)  - Provide an  as needed  - Communicate barriers and strategies to overcome with those who interact with patient  Outcome: Progressing     Problem: CARDIOVASCULAR - ADULT  Goal: Maintains optimal cardiac output and hemodynamic stability  Description: INTERVENTIONS:  - Monitor vital signs, rhythm, and trends  - Monitor for bleeding, hypotension and signs of decreased cardiac output  - Evaluate effectiveness of vasoactive medications to optimize hemodynamic stability  - Monitor arterial and/or venous puncture sites for bleeding and/or hematoma  - Assess quality of pulses, skin color and temperature  - Assess for signs of decreased coronary artery perfusion - ex. Angina  - Evaluate fluid balance, assess for edema, trend weights  Outcome: Progressing  Goal: Absence of cardiac arrhythmias or at baseline  Description: INTERVENTIONS:  - Continuous cardiac monitoring, monitor vital signs, obtain 12 lead EKG if indicated  - Evaluate effectiveness of antiarrhythmic and heart rate control medications as ordered  - Initiate emergency measures for life threatening arrhythmias  - Monitor electrolytes and administer replacement therapy as ordered  Outcome: Progressing     Problem: GENITOURINARY - ADULT  Goal: Absence of urinary retention  Description: INTERVENTIONS:  - Assess patient’s ability to void and empty bladder  - Monitor intake/output and perform bladder scan as needed  - Follow urinary retention protocol/standard of  care  - Consider collaborating with pharmacy to review patient's medication profile  - Implement strategies to promote bladder emptying  Outcome: Progressing     Problem: METABOLIC/FLUID AND ELECTROLYTES - ADULT  Goal: Electrolytes maintained within normal limits  Description: INTERVENTIONS:  - Monitor labs and rhythm and assess patient for signs and symptoms of electrolyte imbalances  - Administer electrolyte replacement as ordered  - Monitor response to electrolyte replacements, including rhythm and repeat lab results as appropriate  - Fluid restriction as ordered  - Instruct patient on fluid and nutrition restrictions as appropriate  Outcome: Progressing  Goal: Hemodynamic stability and optimal renal function maintained  Description: INTERVENTIONS:  - Monitor labs and assess for signs and symptoms of volume excess or deficit  - Monitor intake, output and patient weight  - Monitor urine specific gravity, serum osmolarity and serum sodium as indicated or ordered  - Monitor response to interventions for patient's volume status, including labs, urine output, blood pressure (other measures as available)  - Encourage oral intake as appropriate  - Instruct patient on fluid and nutrition restrictions as appropriate  Outcome: Progressing     Problem: SKIN/TISSUE INTEGRITY - ADULT  Goal: Skin integrity remains intact  Description: INTERVENTIONS  - Assess and document risk factors for pressure ulcer development  - Assess and document skin integrity  - Monitor for areas of redness and/or skin breakdown  - Initiate interventions, skin care algorithm/standards of care as needed  Outcome: Progressing  Goal: Incision(s), wounds(s) or drain site(s) healing without S/S of infection  Description: INTERVENTIONS:  - Assess and document risk factors for pressure ulcer development  - Assess and document skin integrity  - Assess and document dressing/incision, wound bed, drain sites and surrounding tissue  - Implement wound care per  orders  - Initiate isolation precautions as appropriate  - Initiate Pressure Ulcer prevention bundle as indicated  Outcome: Progressing  Goal: Oral mucous membranes remain intact  Description: INTERVENTIONS  - Assess oral mucosa and hygiene practices  - Implement preventative oral hygiene regimen  - Implement oral medicated treatments as ordered  Outcome: Progressing     Problem: COPING  Goal: Pt/Family able to verbalize concerns and demonstrate effective coping strategies  Description: INTERVENTIONS:  - Assist patient/family to identify coping skills, available support systems and cultural and spiritual values  - Provide emotional support, including active listening and acknowledgement of concerns of patient and caregivers  - Reduce environmental stimuli, as able  - Instruct patient/family in relaxation techniques, as appropriate  - Assess for spiritual and psychosocial needs and initiate Spiritual Care or Behavioral Health consult as needed  Outcome: Progressing

## 2025-07-17 NOTE — PROGRESS NOTES
[facility]  part of [facility]  Hospitalist Progress Note     Arnaldo Gutierrez Patient Status:  Inpatient    4/15/1945  80 year old CSN [id number]   Location 454/454-A Attending Declan Stearns MD   Hosp Day # 12 PCP PHYSICIAN NONSTAFF     Subjective:   ----------------------------------  Pt was seen and examined.  No cp, sob, f,c,n,v abd pain or HA   Resting in bed comfortably    Objective:   Chief Complaint:   Chief Complaint   Patient presents with    Abdomen/Flank Pain     ----------------------------------  Temp:  [98 °F (36.7 °C)-99 °F (37.2 °C)] 98.9 °F (37.2 °C)  Pulse:  [87-96] 87  Resp:  [16-18] 16  BP: (107-123)/(53-56) 123/54  SpO2:  [92 %-95 %] 92 %    Gen: A+Ox3.  No distress.   CV: RRR, S1S2, and intact distal pulses. No gallop, rub, murmur.  Pulm: Effort and breath sounds normal. No distress, wheezes, rales, rhonchi.  Abd: Soft, nontender nondistended.  Right drain with serous output.  L drain with purulent output   Neuro: Normal reflexes, CN. Sensory/motor exams grossly normal deficit.   Psych: Normal mood and affect. Calm, cooperative    Labs:  Lab Results   Component Value Date    HGB 8.3 (L) 2025    WBC 9.0 2025    .0 2025     2025    K 3.5 07/15/2025    CREATSERUM 0.52 (L) 2025    AST 22 2025    ALT 16 2025           Scheduled Medications[1]  PRN Medications[2]        Assessment & Plan:   ----------------------------------    Disposition  CM/SW on board to assist with placement    Intra-abdominal abscesses.  Status post IR drainage x2 .  Small bowel follow-through  negative for leak.  Repeat IR drainage  for perihepatic fluid collection.  Port placement 7/15  - Pain control  - S/p 25 abscessogram x 3, drain exchange x 2, removal of right lower drain in subhepatic region   - ID, general surgery on consult  - Continue zerbaxa, daptomycin, Flagyl, Micafungin    DVT, acute.  Location is RUE (new), RLE  (old).  Pulmonary embolism not present. Contributing factors malignancy, immobility.  -Anticoagulation: Heparin drip, change to p.o after procedures are completely done  -Hypercoagulable work-up not indicated  -Light activity as tolerated    Small pneumothorax resolved    B-cell lymphoma.  Recent diagnosis.  - Oncology  - Port placement 7/15  - close opt follow up    Other problems  Hypertension  Dyslipidemia  GERD  BPH  Anxiety  Depression    Supplementary Documentation:   DVT Mechanical Prophylaxis:     Early ambuation  DVT Pharmacologic Prophylaxis   Medication    heparin (Porcine) 100 Units/mL lock flush 150 Units    heparin (Porcine) 33687 units/250mL infusion PE/DVT/THROMBUS CONTINUOUS                Code Status: Full Code  Muñoz: External urinary catheter in place  Muñoz Duration (in days):   Central line:    ANNELISE: 7/19/2025         [1]    magnesium oxide  800 mg Oral Once    potassium phosphate dibasic 15 mmol in sodium chloride 0.9% 250 mL IVPB  15 mmol Intravenous Once    Followed by    potassium chloride  20 mEq Intravenous Once    heparin  1.5 mL Intravenous Once    insulin aspart  1-5 Units Subcutaneous TID CC and HS    FLUoxetine  20 mg Oral Daily    pregabalin  100 mg Oral TID    tamsulosin  0.8 mg Oral After dinner    vancomycin  125 mg Oral Daily    ceftolozane-tazobactam (ZERBAXA) 3 g in sodium chloride 0.9% 100 mL IVPB  3 g Intravenous Q8H    DAPTOmycin  500 mg Intravenous Q24H    micafungin  100 mg Intravenous Q24H    metroNIDAZOLE  500 mg Oral BID    pantoprazole  40 mg Intravenous Daily   [2]   HYDROcodone-acetaminophen    HYDROcodone-acetaminophen    HYDROmorphone **OR** HYDROmorphone    glycerin-hypromellose-    melatonin    temazepam    glucose **OR** glucose **OR** glucose-vitamin C **OR** dextrose **OR** glucose **OR** glucose **OR** glucose-vitamin C    acetaminophen    ondansetron    metoclopramide

## 2025-07-17 NOTE — PROGRESS NOTES
07/17/25 0616    Closed Drain RUQ Bulb 12 Fr.   Placement Date/Time: 07/16/25 1619   Inserted by: JUVENCIO Cabrera MD  Location: RUQ  Drain Tube Type: Bulb  Size (Fr.): 12 Fr.   Drainage Appearance Serosanguineous   Status To bulb suction   Output (mL) 10 mL   Closed Drain LUQ Other (Comment) 22 Fr.   Placement Date/Time: 07/16/25 1626   Inserted by: JUVENCIO Cabrera MD  Location: LUQ  Drain Tube Type: (c) Other (Comment)  Size (Fr.): 22 Fr.   Drainage Appearance Milky;Tan   Status Open to gravity drainage   Output (mL) 30 mL   7    Total output: [id number] (7/16/25)   According to the [facility] website that doctor is in network with pt's insurance

## 2025-07-17 NOTE — PROGRESS NOTES
INFECTIOUS DISEASE PROGRESS NOTE    Arnaldo Gutierrez Patient Status:  Inpatient    4/15/1945 MRN [id number]   Location [facility] 4W/SW/SE Attending Declan Stearns MD   Hosp Day # 12 PCP PHYSICIAN NONSTAFF       SUBJECTIVE  ROS done. Afebrile. Went for drain check yesterday.     ASSESSMENT/PLAN:    Antibiotics: Zerbaxa, dapto, flagyl, micafungin; (IV zosyn, fluconazole, augmentin, cefepime)     ASSESSMENT:     # Multiple large intra-abdominal abscesses               - now s/p IR drain placement on 25               - fluid cx so far with E. Coli, Enterobacter cloacae, Enterococcus faecalis, C. glabrata, MDR PSAR, Lactobacillus               - surgery and IR following  # RUQ abscess s/p IR drainage , cx Enterobacter hormaechei, C. 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 Port-a-cath in place.  -  Follow fever curve, wbc.  -  Reviewed labs, micro, imaging reports.  -  Case d/w patient, social work, RN.     History of Present Illness:  80 year old male with history of diabetes, HTN, BPH who was admitted on  with abdominal bloating, nausea, decreased appetite, unintentional weight loss. Initial CT A/P on  with 4 cm segment of masslike constriction and thickening in the proximal transverse colon with concern for partially obstructing chronic neoplasm. Seen by GI and underwent colonoscopy with chronic stent placement on . Also seen by general surgery and taken to the OR on 6/10 for large bowel obstruction status post laparoscopic  assisted transverse colon resection, small bowel resection and tumor nodule and small bowel. Pathology diffuse large B-cell lymphoma. Seen by oncology. On 6/19 had low-grade temperatures with increasing WBC up to 19 with repeat CT A/P showing moderate to large volume free fluid in the abdomen and pelvis in the upper quadrants. Organized fluid in the left lower retroperitoneum measuring 5.5 x 4.7 x 5.8 cm concern for seroma, liquefied hematoma versus inflammatory fluid. Seen by IR and underwent IR peritoneal drain placement on 6/19 with 260 cc of serosanguineous fluid. Cultures negative. Was on zosyn while inpatient and transitioned to PO augmentin on discharge. Discharged on 6/24/25 to subacute rehab. Came back to ED on 7/4/25 with abdominal pain. Repeat CT with multiple large peritoneal abscesses. Surgery and IR consult.  Now status post IR drainage with cx growing GNR and budding yeast.  Currently on IV Zosyn.  ID consulted for further antibiotics management.     OBJECTIVE  /50 (BP Location: Right arm)   Pulse 93   Temp 98.2 °F (36.8 °C) (Oral)   Resp 18   Ht 5' 11\" (1.803 m)   Wt 156 lb 8.4 oz (71 kg)   SpO2 96%   BMI 21.83 kg/m²     General: Awake, in bed  HEENT: EOMI  Abdomen: Soft, no TTP, RUQ drain in place, LLQ drain with tan output to gravity bag  Musculoskeletal: No edema  Integument: No rashes    [facility]  [phone]

## 2025-07-17 NOTE — PROGRESS NOTES
[facility]  part of [facility]    Progress Note    Arnaldo Gutierrez Patient Status:  Inpatient    4/15/1945 MRN [id number]   Location [facility] 4W/SW/SE Attending Declan Stearns MD   Hosp Day # 12 PCP PHYSICIAN NONSTAFF     Subjective:  Feels ok     Objective/Physical Exam:  General: Alert, orientated x3.  Cooperative.  No apparent distress.  Vital Signs:  Blood pressure 141/68, pulse 78, temperature 97.9 °F (36.6 °C), temperature source Axillary, resp. rate 16, height 180.3 cm (5' 11\"), weight 156 lb 8.4 oz (71 kg), SpO2 94%.    Abdomen:  Soft, non-distended, non-tender,   Drains as per IR   total 65cc      Labs:  Lab Results   Component Value Date    WBC 4.6 2025    HGB 7.3 (L) 2025    HCT 22.9 (L) 2025    .0 2025    CREATSERUM 0.51 (L) 2025    BUN 10 2025     (L) 2025    K 3.1 (L) 2025     2025    CO2 29.0 2025     (H) 2025    CA 7.5 (L) 2025    ALB 2.1 (L) 07/10/2025    ALKPHO 182 (H) 2025    BILT 0.3 2025    TP 5.0 (L) 2025    AST 22 2025    ALT 16 2025    PTT 81.3 (H) 2025    TSH 2.417 2025    LIP 45 2025    MG 1.3 (L) 2025    PHOS 2.5 2025    CK <15 (L) 2025    B12 1,020 (H) 2025       Imaging:  IR DRAIN  Result Date: 2025  PROCEDURE: IR DRAIN EXCHANGE INDICATIONS: follow up 3 drains PATIENT STATED HISTORY: Postop abscess with the 3 existing abdominal drains COMPARISON: CT abdomen 7/10/2025 TECHNIQUE:  Prior to the procedure, I discussed with the patient and/or legal representative the potential benefits, risks, and side effects of this procedure, the likelihood of the patient achieving goals; and the potential problems that might occur during recuperation. I discussed reasonable alternatives to the procedure, including risks, benefits, steps to prevent infection and side effects related to the  alternatives, and risks related to not receiving this procedure. A witnessed verbal and signed consent were obtained and documented in the patient's chart. IV was checked and maintained by the nurse. Moderate conscious sedation was performed under continuous pulse oximetry and cardiac monitoring under my direct supervision. The radiology nurse was in attendance throughout the exam. Moderate conscious sedation with Versed and  Fentanyl was given. Patient was assessed and monitoring of oxygen saturation, heart rate, and blood pressure by the nursing staff and myself during the exam for a total intraservice time of 25 minutes of conscious sedation time.    The patient was placed supine on the angiographic table and the  subhepatic and right midabdomen   and left midline existing catheter were prepped and covered with a full body drape in the usual sterile fashion. All operators present for the case performed standard pre-procedural prep including hand washing, sterile gloves, gown, mask, and cap. All aspects of the maximum sterile barrier technique were followed. A preprocedural time out was performed with all physicians, technologists, and nurses involved with the procedure. Local anesthesia was obtained utilizing 1% lidocaine Contrast was injected through the right midabdomen  revealed no residual pocket and free spill into the peritoneal cavity. This catheter was cut and removed without incident. Next, the some diaphragmatic drain was injected. Debris and fluid remain distal  to the drainage catheter. This catheter was cut and over a wire, a new 12 Pakistani drain was placed and extensively irrigated until only serous fluid was removed. The catheter was sewn to the skin with nonabsorbable suture and connected to bulb drainage. Finally, the left midline Jane 22 Pakistani drain was addressed. Gentle injection of contrast revealed significant improvement in abscess cavity with, however, residual cavity that is not yet  collapsed around the catheter. The catheter was cut and over a wire and after additional sideholes were placed in the catheter, replaced within the mid abdomen extending to the right upper quadrant. Extensive irrigation was performed with return of about 10 mL of purulent material with ultimately serosanguinous fluid returned. The catheter was sewn the skin with nonabsorbable suture and connected to accordion drainage. The patient tolerated the procedure well and there were no immediate complications. Routine post catheter insertion instructions were communicated to the patient. ESTIMATED BLOOD LOSS: Less than 5cc.  FLUORO TIME: 3.2 minutes AIR KERMA: 18 mGy     CONCLUSION: 1. Removal of right midabdomen drain.  Exchange of the subdiaphragmatic drain for new 12 Georgian drain. Exchange and manipulation of large midline 22 Georgian Davin drainage catheter. Continued current irrigation of catheters with follow-up CT and abscessogram in 1 week's time. Electronically Verified and Signed by Attending Radiologist: Juan Luis Cabrera MD 7/17/2025 11:34 AM Workstation: [id number]    IR PORT A CATH INSERTION EXCHNGE CHECK  Result Date: 7/16/2025  PROCEDURE: IR Venous Chest Port Placement  Procedural Personnel Attending(s): Juan Luis Cabrera Pre-procedure diagnosis: B cell lymphoma Post-procedure diagnosis: as above Indications: Chemotherapy Additional clinical history: None  Complications: No immediate complications.      IMPRESSION:  Technically successful placement of a central venous power port via the right internal jugular vein using fluoroscopic guidance. PLAN: The port is ready to use. Tip at cavo-atrial junction.  PROCEDURE SUMMARY: - Image-guided venous right chest port placement (6Fr Power Port Slim)  PROCEDURE DETAILS:  Pre-procedure Informed consent for the procedure was obtained and time-out was performed prior to the procedure. Preparation: The site was prepared and draped using maximal sterile barrier technique including  cutaneous antisepsis.  Anesthesia/sedation: Level of anesthesia/sedation: Moderate sedation (conscious sedation) Anesthesia/sedation administered by: Nurse or other independent trained observer under attending supervision with continuous monitoring of the patient's level of consciousness and physiologic status, under the direct supervision of the attending physician  \"MD intra-service time in sedation\" : 19 minutes   Procedural Details/Findings: Informed consent for the procedure was obtained and a time-out was performed prior to the procedure.  The right upper chest and lower neck were prepped and draped in the usual sterile fashion.  Local lidocaine was administered. Ultrasound demonstrates a patent right internal jugular vein without thrombus. Under direct sonographic guidance, the right internal jugular vein was accessed with a micro puncture set and an image was stored. This wire was used to measure the intravascular length. Under fluoroscopic guidance, an Amplatz wire was advanced into the inferior vena cava. A peel-away sheath was advanced over the Amplatz wire and secured.  Attention was then turned to the right upper chest. Local lidocaine was administered to anesthetize the soft tissues. A small incision was made utilizing a 15 blade. Blunt dissection of the soft tissues was then performed to create a pocket for the chest  port device. The catheter was securely connected to the chest port device. The chest port device was inserted into the subcutaneous pocket.  A tunneling cannula attached to the chest port catheter was used to tunnel the chest port catheter from the newly created pocket in the right upper chest soft tissues to the incision for the right internal jugular venotomy. The catheter was cut to the intra-vascular length.  The chest port catheter was then advanced through the right internal jugular vein peel-away sheath.  The peel-away sheath was removed.  The position was confirmed  fluoroscopically, and an image was recorded.  The newly placed venous chest port was then accessed with a non-coring needle. The catheter was flushed/aspirated and locked with heparinized saline. The subcutaneous pocket was then closed with interrupted deep 3-0 Vicryl sutures. Skin glue was used to cover the incision at the superficial level, as well as close the right internal jugular incision site.  The patient tolerated the procedure well.  Imaging Guidance: Fluoroscopy and Ultrasound  Radiation: 0.4 minutes, 1 mGy  Attestation I, Juan Luis Cabrera, attest that I was present for the entire procedure. I reviewed the stored images and agree with the report as written.   Electronically Verified and Signed by Attending Radiologist: Juan Luis Cabrera MD 7/16/2025 1:19 PM Workstation: [id number]    NM GB HEPATOBILIARY SCAN  (CPT=78226)  Result Date: 7/16/2025  EXAM: NM GB HEPATOBILIARY SCAN  (SNH=[id number]) CLINICAL INDICATION: partial cholecystectomy for abscess of gallbladder  r/o leak COMPARISON: CT abdomen pelvis dated 7/10/2025 TECHNIQUE: 8.5 mCi Tc-99m Choletec was injected intravenously and planar imaging obtained for 60 minutes. FINDINGS:  There is prompt homogeneous extraction of radiopharmaceutical from the blood pool by the hepatic parenchyma, with prompt excretion into a non- dilated biliary system. The gallbladder fills at approximately 20 minute. Activity is seen in the common bile duct on the 10 minute image Activity is seen in the proximal small bowel on the 11 minute image. No additional radiotracer activity to suggest peritoneal leak.     IMPRESSION:  1.  Patent cystic and common bile duct. 2.  No evidence of peritoneal leak. Electronically Verified and Signed by Attending Radiologist: Adam Neumann MD 7/16/2025 12:46 PM Workstation: [id number]    CT DRAIN ABSCESS LIVER (CPT=49405)  Result Date: 7/13/2025  PROCEDURE: Drainage catheter placement  Procedural Personnel Attending(s): Juan Luis Cabrera Pre-procedure  diagnosis: abscess Post-procedure diagnosis: as above Indications  perihepatic abscess Additional clinical history: None   Complications: No immediate complications.      IMPRESSION:  Technically successful CT drainage catheter placement into perihepatic fluid collection.  PLAN:  1. Follow-up outputs and repeat abscessogram in 1 week's time 2. F/u CXR to evaluate for residual ptx  _______________________________________________________________  PROCEDURE SUMMARY: - Drainage catheter insertion under CT-guidance with image stored to PACS. PROCEDURE DETAILS: Pre-procedure Consent: Informed consent for the procedure was obtained and time-out was performed prior to the procedure. Preparation: The site was prepared and draped using maximal sterile barrier technique including cutaneous antisepsis. Antibiotic administered and time: No antibiotics Anesthesia/sedation: Level of anesthesia/sedation: Moderate sedation (conscious sedation) Anesthesia/sedation administered by: Nurse or other independent trained observer under attending supervision with continuous monitoring of the patient's level of consciousness and physiologic status, under the direct supervision of the attending physician  \"MD intra-service time in sedation\" : 15 minutes    Drainage catheter placement The patient was positioned the supine. Initial imaging was performed. Local anesthesia was administered. Under CT guidance, the target was accessed. Fluid collection Initial imaging findings: Perihepatic Catheter type: APDL Catheter diameter: 10-Taiwanese External catheter securement: Non-absorbable suture Final catheter location: perihepatic Post-drainage imaging findings: drain in subhepatic fluid collection Additional findings: After drain was placed, follow-up examination revealed a minimal pneumothorax that was iatrogenically caused by needle entry into the perihepatic region. A 5Fr Yueh needle was then placed within the pneumothorax via CT guidance and this  region aspirated completely. Additional Details Additional description of procedure: None Additional findings: None Equipment details: None Specimens removed: 20 ml of turbid fluid was aspirated. A sample was sent for analysis. Estimated blood loss: Less than 10 mL Standardized report: [id number] Attestation I, Juan Luis Cabrera, attest that I was present for the entire procedure. I reviewed the stored images and agree with the report as written. Electronically Verified and Signed by Attending Radiologist: Juan Luis Cabrera MD 7/13/2025 2:04 PM Workstation: [id number]    XR CHEST AP PORTABLE  (CPT=71045)  Result Date: 7/12/2025  PROCEDURE: XR CHEST AP PORTABLE  (CPT=71045) INDICATIONS: small ptx 7/11. pleuritic pain after perihepatic drain placement COMPARISON: 7/11/2025 TECHNIQUE: AP portable semiupright chest     CONCLUSION: 1.  Right perihepatic subdiaphragmatic pigtail drainage catheter. 2.  Bibasilar right greater than left pleural effusions with compressive atelectasis. 3.  No pneumothorax. 4.  Heart size remains normal. Pulmonary vascularity partially obscured. 5.  Atherosclerotic calcification aorta. 6.  Left PICC line tip at RASVC junction.  Electronically Verified and Signed by Attending Radiologist: Chong Valdez MD 7/12/2025 7:41 PM Workstation: [id number]    XR CHEST AP PORTABLE  (CPT=71045)  Result Date: 7/11/2025  PROCEDURE: XR CHEST AP PORTABLE  (CPT=71045) INDICATIONS: Shortness of breath S/P LIVER DRAIN PLACEMENT.   R/O PNEUMOTHORAX COMPARISON: 6/13/2025 TECHNIQUE: AP portable upright chest     CONCLUSION: 1.  No pneumothorax. 2.  Right upper quadrant subdiaphragmatic perihepatic drain. 3.  Moderate bibasilar pleural effusions with compressive atelectasis. 4.  Stable bilateral patchy perihilar opacities which may represent pneumonia or edema. 5.  Heart size remains normal. 6.  Left-sided PICC line tip at RASVC junction. Electronically Verified and Signed by Attending Radiologist: Chong Elizabeth  José Miguel SANTANA 7/11/2025 2:52 PM Workstation: [id number]    CT ABDOMEN+PELVIS(CONTRAST ONLY)(CPT=74177)  Result Date: 7/11/2025  PROCEDURE: CT ABDOMEN+PELVIS(CONTRAST ONLY)(CPT=74177) INDICATIONS: follow up intraabdominal abscess COMPARISON: There are no comparisons for this exam. TECHNIQUE: Multislice CT scanning was performed from the dome of the diaphragm to the pubic symphysis with non-ionic intravenous contrast material. Post contrast coronal MPR imaging was performed. Automated exposure control dose reduction techniques were  used. Adjustment of the mA and/or kV was done based on the patient's size. Use of iterative reconstruction technique for dose reduction was used. Dose information is transmitted to the ACR (American College of Radiology) NRDR (National Radiology Data Registry) which includes the Dose Index Registry. CONTRAST: IOPAMIDOL 76% IV SOLN FOR POWER INJECTOR:80 mL FINDINGS: LIVER: Normal. Portal vein and branches patent. Mass effect on the liver by the perihepatic abscesses. BILIARY: No evidence for cholecystitis or biliary dilatation. SPLEEN: Normal. PANCREAS: Normal. ADRENALS: Normal. KIDNEYS: Stable 2.5 cm posterior right interpolar renal cyst. No solid renal lesion or renal obstruction. Linear calcification in the lower pole the right kidney is likely vascular. AORTA/VASCULAR: Atherosclerotic vascular calcification including coronary artery calcification. No aneurysm or dissection. Major aortic branches patent. LYMPHADENOPATHY: None. GI/MESENTERY: Changes from transverse colon resection and 2 anastomotic staple lines in the left side of the abdomen from small bowel resections. No obstruction. ABDOMINAL WALL: Anasarca. Bilateral abdominal wall drains. Small amount of gas extending into a midline radu-incisional hernia. URINARY BLADDER: Normal. ASCITES/FLUID COLLECTIONS: Previously seen loculated gas/fluid collections in the anterior mid and upper abdomen have decreased in size with drainage catheter  extending from left to right anteriorly. Tip of the drainage catheter is anterior to the gallbladder fossa. *  Right-sided subdiaphragmatic gas fluid collection measures 9.1 x 6.1 x 3.7 cm (prior 12.5 x 9.6 x 5.9 cm) *  Anterior to left lobe of liver just below the diaphragm measures 8.8 x 8 0.8 x 4.8 x 14.2 cm (prior 15.3 x 11.2 x 23 cm) *  Gas/fluid collection anteriorly to the left of the midline measures 2.2 x 13.4 cm on image 77 series 2 (prior 10.8 x 22.9 cm) *  Lateral perisplenic fluid collection with tiny focus of gas measures 6.3 x 2.3 cm (prior 7.9 x 2 2.8 cm) *  Small predominantly gas collection in the left lateral mid abdomen measuring 18 x 41 mm. *  Stable right infrahepatic paracolic drain with a small gas fluid collection just posterior to the drain extending up to the edge of liver measuring 4.6 x 1.3 cm. *  Stable small 9 mm x 37 mm anterior right lower quadrant gas/fluid collection. *  Stable 3.5 x 2 cm cystlike gas fluid collection in left hemipelvis. *  Stable small amount of gastrosplenic ligament fluid without rim enhancement. PELVIC ORGANS: No suspect pelvic mass. BONES: No suspect bone lesion. LUNG BASES: Stable moderate bibasilar pleural effusions with compressive atelectasis. OTHER: Negative.     CONCLUSION: 1.  Bilateral abdominal drains with decreasing size to the abdominal abscesses. 2.  Small cystlike abscess in the left hemipelvis is unchanged and few other smaller abscess collections are unchanged. 3.  Transverse colectomy and sites of small bowel resection. No obstruction or contrast extravasation. 4.  Stable moderate bibasilar pleural effusions. 5.  Anasarca. Electronically Verified and Signed by Attending Radiologist: Chong Valdez MD 7/11/2025 12:52 AM Workstation: [id number]    CT CHEST (CPT=71250)  Result Date: 7/9/2025  PROCEDURE: CT CHEST (CPT=71250) INDICATIONS:  Upper extremity DVT COMPARISON: CT abdomen pelvis dated 7/4/2025 TECHNIQUE: Multidetector CT of the chest  was obtained without non-ionic intravenous contrast material. Automated exposure control for dose reduction was used. Adjustment of the mA and/or kV was done based on the patient's size. Iterative reconstruction technique for dose reduction was employed. Dose information was transmitted to the ACR (American College of Radiology) NRDR (National Radiology Data Registry), which includes the Dose Index Registry. Oral contrast was ingested. FINDINGS: LUNGS/PLEURA/AIRWAYS:No consolidation, mass or suspicious nodule.Small to moderate bilateral pleural effusions and passive atelectasis. Central airways are patent. MEDIASTINUM:No mediastinal or hilar lymphadenopathy. VESSELS:  The ascending thoracic aorta measures approximately 40 mm. Moderate coronary artery calcifications. CHEST WALL:Within normal limits. UPPER ABDOMEN: Partially visualized drainage catheter with decreased size of collections of fluid and air in the right upper quadrant. BONES: No suspicious osseous lesion or acute osseous abnormality.     CONCLUSION: 1.  Small to moderate bilateral pleural effusions with passive atelectasis. 2.  Partial visualized drainage catheter in the upper abdomen with decreased size of fluid and air collections in the right upper quadrant 3.  The ascending thoracic aorta measures 40 mm, the upper limits of normal. Electronically Verified and Signed by Attending Radiologist: Adam Neumann MD 7/9/2025 1:09 PM Workstation: [id number]    XR ABDOMEN (1 VIEW) (CPT=74018)  Result Date: 7/9/2025  PROCEDURE(S): XR ABDOMEN (1 VIEW) (CPT=74018) WORKSTATION: [id number] HISTORY/INDICATIONS: s/p bowel resections for lymphoma  possible bowel leak Abdominal distension. COMPARISON: 07/07/2025 XR SMALL BOWEL SINGLE CONTRAST (CPT=74250) FINDINGS: Lines And Tubes: Right upper quadrant drainage pigtail catheter is again seen. Left approach presumed surgical drain is again seen. Lower Thorax: Unremarkable. Bowel Gas Pattern: No gas-filled, dilated  loops. No differential distention. Multiple bowel sutures are noted throughout the abdomen. Soft Tissues: No definite pneumoperitoneum. Previously described fluid collections are demonstrated to advantage on prior CT. Bones/Joints: No acute fracture.     CONCLUSION: No evidence of acute abnormality in the abdomen or pelvis. Multiple peritoneal drains are seen. Previously demonstrated fluid collections are demonstrated to advantage on prior CT. Electronically Verified and Signed by Attending Radiologist: Gelacio Bullock MD 7/9/2025 9:50 AM Workstation: [id number]    XR SMALL BOWEL SINGLE CONTRAST (CPT=74250)  Result Date: 7/9/2025  PROCEDURE: XR SMALL BOWEL SINGLE CONTRAST (CPT=74250) INDICATIONS: Multiple bowel resections for lymphoma. Abscess with possible leak PATIENT STATED HISTORY: Recent multiple bowel resection surgeries, history of lymphoma COMPARISON: CT 7/4/2025 TECHNIQUE: Small bowel series with multiple serial films was performed in the usual manner.  A  abdominal radiograph was performed. Fluoro/Cine Image Number: 9 Fluoro Time: 1.4 minutes FINDINGS: No dilated bowel to suggest ileus or obstruction. No extravasation of enteric contrast was noted to diagnose or identify a source of leak. Focused attention was paid to regions of surgical staples and known prior abscesses using compression and magnification, confirming no evident leak. Transit time from stomach to colon was 90 minutes.     CONCLUSION: No evidence of bowel leak. If clinical suspicion persists, CT may be considered. Electronically Verified and Signed by Attending Radiologist: Sunil Murillo MD 7/9/2025 9:00 AM Workstation: [id number]    US VENOUS DOPPLER ARM RIGHT - DIAG IMG (CPT=93971)  Result Date: 7/8/2025  PROCEDURE: US VENOUS DOPPLER ARM RIGHT - DIAG IMG (CPT=93971) INDICATIONS: swelling in RUE TECHNIQUE: Color duplex Doppler venous ultrasound of the right upper extremity was performed in the usual manner. FINDINGS: The internal jugular,  subclavian, axillary, brachial, cephalic and basilic veins appear normal. Flow was demonstrated with color and pulsed Doppler. Comparison scans of the left subclavian vein appear normal. There is acute expansile DVT within the right subclavian and axillary veins. The visualized segments of the right brachial vein, right cephalic vein, and right basilic vein are patent. There is moderate soft tissue edema within the right upper extremity.     CONCLUSION: Acute expansile occlusive DVT within the right subclavian and axillary veins. The visualized segments of the right brachial, basilic, and cephalic veins are patent. The patient's nurse was notified by telephone of these results at 10:41 a.m. on 7/8/2025. Electronically Verified and Signed by Attending Radiologist: Duran Bhardwaj MD 7/8/2025 10:41 AM Workstation: [id number]    CT ABDOMEN+PELVIS(CONTRAST ONLY)(CPT=74177)  Result Date: 7/5/2025  PROCEDURE(S): CT ABDOMEN+PELVIS(CONTRAST ONLY)(CPT=74177) WORKSTATION: [id number] HISTORY/INDICATIONS: distended abdomen, recent surgery for mass resection, cath placement distended abdomen, recent surgery for mass resection, cath placement COMPARISON: 06/19/2025 CT ABDOMEN+PELVIS(CONTRAST ONLY)(CPT=74177) TECHNIQUE: Helical CT of the abdomen and pelvis was obtained following administration of intravenous contrast material. Axial, coronal, and sagittal reformatted images were created and interpreted. For this exam, one or more of the following dose reductions techniques were used: Automated exposure control Adjustment of the mA and/or kV according to patient size Use of iterative reconstruction technique FINDINGS: Lower Thorax: Moderate bilateral pleural effusions are again seen. Heart is normal size. Few airspace opacities are noted in the lingula series 3 image 1. There is atelectasis in the lower lobes. Liver: Unremarkable. Gallbladder/Biliary tree: Cholelithiasis without evidence of acute cholecystitis. No biliary ductal  dilatation. Pancreas:  Unremarkable. Spleen: Unremarkable. Adrenal glands:  Unremarkable. Kidneys: Simple cyst arises from the right kidney posterior interpolar region. No suspicious renal lesions. Symmetric nephrograms. No hydronephrosis. Retroperitoneum/extraperitoneum: Unremarkable. Vasculature: No aneurysm or dissection. Mild atherosclerotic calcification is seen. Peritoneum: Multiple abscesses are seen: Left upper quadrant abscess measures 24.3 x 10.9 x 23.9 cm series 2 image 73; series 5 image 23. Air-fluid levels are seen. It extends into the abdominal wall series 2 image 73. Right upper quadrant perihepatic abscess communicates with the left upper quadrant abscess and measures 14.2 x 5.1 x 15.8 cm series 2 image 35; series 6 image 96. Perisplenic abscess measures 7.9 x 2.8 x 9.8 cm series 2 image 50; series 6 image 19. This abscess also likely communicates with the left upper quadrant abscess. Small lower midline suspected abscess measuring 3.0 x 2.7 x 3.5 cm. There is a right mid abdominal pigtail drainage catheter which may terminate within the colon series 2 image 80. GI tract/mesentery/omentum: Multiple prior bowel resections are noted. Suspected fistula in the left mid abdomen series 2 image 103. Possible pigtail drainage catheter within the ascending colon series 2 image 80. No bowel obstruction is identified. Urinary Bladder: The bladder is grossly unremarkable. Reproductive organs: Unremarkable. Body wall: Mild anasarca. The dominant abscess extends into the anterior abdominal wall along the surgical incision series 2 image 73. Bones:  There are mild degenerative changes in the spine.     CONCLUSION: 1.  Multiple large peritoneal abscesses are seen. The largest is centered in the left upper quadrant and left midabdomen and measures 24.3 x 10.9 x 23.9 cm with air-fluid levels and extends into the abdominal wall. This is contiguous with smaller right upper quadrant perihepatic and posterior left upper  quadrant perisplenic abscesses. A separate small 3 cm abscess is noted adjacent to the bladder. Bladder diverticulum is thought less likely. 2.  Multiple prior bowel resections are seen. There may be fistulous connection between the bowel and left upper quadrant dominant abscess at the left mid abdomen. The location of a right-sided pigtail drainage catheter is uncertain but it may terminate within the ascending colon. 3.  Additional chronic and/or incidental findings are detailed in the body of this report. Preliminary report was given by [facility]. No clinically significant discrepancies are noted. Electronically Verified and Signed by Attending Radiologist: Gelacio Bullock MD 2025 7:14 AM Workstation: [id number]    CARD TTE STRAIN W DOPPLER ONCOLOGY (CPT=93306)  Result Date: 2025  Transthoracic Echocardiogram Name:Arnaldo Gutierrez Date: 2025 :  04/15/1945 Ht:  (71in)  BP: 101 / 61 MRN:  [id number]    Age:  80years    Wt:  (171lb) HR: 88bpm Loc:  [facility]       Gndr: M          BSA: 1.97m^2 Sonographer: Amelia CHERY Ordering:    Crys Wilkins Consulting:  Rafita Torres ---------------------------------------------------------------------------- History/Indications:   Encounter for Monitoring Cardiotoxic Drug Therapy. ---------------------------------------------------------------------------- Procedure information:  A transthoracic complete 2D study was performed. Additional evaluation included M-mode, complete spectral Doppler, and color Doppler.  Patient status:  Inpatient.  Location:  [facility] laboratory.    This was a routine study. Transthoracic echocardiography for diagnosis. Image quality was adequate. ECG rhythm:   Normal sinus ---------------------------------------------------------------------------- Conclusions: 1. Left ventricle: The cavity size was normal. Wall thickness was normal.    Systolic function was normal. The estimated ejection fraction was 60-65%,    by biplane method of  disks. Wall motion is normal; there are no regional    wall motion abnormalities. Left ventricular diastolic function parameters    were normal. The Global Longitudinal Strain (GLS) was -19.40%. 2. Mitral valve: There was mild regurgitation. * ---------------------------------------------------------------------------- * Findings: Left ventricle:  The cavity size was normal. Wall thickness was normal. Systolic function was normal. The estimated ejection fraction was 60-65%, by biplane method of disks. Wall motion is normal; there are no regional wall motion abnormalities. The Global Longitudinal Strain (GLS) was -19.40%. Left ventricular diastolic function parameters were normal. Left atrium:  Well visualized. The atrium was normal in size. Right ventricle:  The cavity size was normal. Systolic function was normal. Systolic pressure was within the normal range. Right atrium:  Well visualized. The atrium was normal in size. Mitral valve:  Well visualized. The leaflets were normal thickness. No evidence for prolapse.  Doppler:  Transvalvular velocity was within the normal range. There was no evidence for stenosis. There was mild regurgitation. Aortic valve:  Well visualized.  The valve was trileaflet. The leaflets were normal thickness.  Doppler:  Transvalvular velocity was within the normal range. There was no evidence for stenosis. There was trivial regurgitation. Tricuspid valve:  Well visualized. The annulus is normal-sized. The leaflets are normal thickness. No echocardiographic evidence for tricuspid prolapse. Doppler:  Transvalvular velocity was within the normal range. There was no evidence for stenosis. There was trivial regurgitation. Pulmonic valve:   Well visualized. The annulus is normal-sized. The leaflets are normal thickness. No evidence for prolapse.  Doppler:  Transvalvular velocity was within the normal range. There was no evidence for stenosis. There was no significant regurgitation. Pericardium:    There was no pericardial effusion. Pleura:  No evidence of pleural fluid accumulation. Aorta: Aortic root: The aortic root was normal. Ascending aorta: The ascending aorta was normal. Pulmonary arteries: There was no evidence of pulmonary hypertension. Systemic veins:  Central venous respirophasic diameter changes are in the normal range (>50%). Inferior vena cava: The IVC was normal-sized. ---------------------------------------------------------------------------- Measurements  Left ventricle                   Value         Ref  GLS, 2D                          -19.40 %      ---------  IVS thickness, ED, PLAX          0.9    cm     0.6 - 1.0  LV ID, ED, PLAX              (L) 3.5    cm     4.2 - 5.8  LV ID, ES, PLAX              (L) 2.1    cm     2.5 - 4.0  LV PW thickness, ED, PLAX        0.8    cm     0.6 - 1.0  IVS/LV PW ratio, ED, PLAX        1.13          ---------  LV PW/LV ID ratio, ED, PLAX      0.23          ---------  LV area, ES, A4C                 19.3   cm^2   ---------  LV ejection fraction             72     %      52 - 72  Stroke volume/bsa, 2D            43     ml/m^2 ---------  LV end-diastolic volume, 1-p     87     ml     69 - 185  A4C  LV end-systolic volume, 1-p      47     ml     22 - 78  A4C  LV ejection fraction, 1-p        57     %      46 - 74  A4C  Stroke volume, 1-p A4C           50     ml     ---------  LV end-diastolic volume/bsa,     44     ml/m^2 37 - 93  1-p A4C  LV end-systolic volume/bsa,      24     ml/m^2 12 - 40  1-p A4C  Stroke volume/bsa, 1-p A4C       25     ml/m^2 ---------  LV end-diastolic volume, 2-p     81     ml     62 - 150  LV end-systolic volume, 2-p      32     ml     21 - 61  LV ejection fraction, 2-p        60     %      52 - 72  Stroke volume, 2-p               48     ml     ---------  LV end-diastolic volume/bsa,     41     ml/m^2 34 - 74  2-p  LV end-systolic volume/bsa,      16     ml/m^2 11 - 31  2-p  Stroke volume/bsa, 2-p           24.5   ml/m^2  ---------  LV e', lateral                   14.9   cm/sec >=10.0  LV E/e', lateral                 6             <=13  LV e', medial                    12.5   cm/sec >=7.0  LV E/e', medial                  7             ---------  LV e', average                   13.7   cm/sec ---------  LV E/e', average                 6             <=14  LVOT                             Value         Ref  LVOT ID                          2.1    cm     ---------  LVOT peak velocity, S            1.22   m/sec  ---------  LVOT VTI, S                      24.7   cm     ---------  LVOT peak gradient, S            6      mm Hg  ---------  LVOT mean gradient, S            3      mm Hg  ---------  Stroke volume (SV), LVOT DP      86     ml     ---------  Stroke index (SV/bsa), LVOT      43     ml/m^2 ---------  DP  Aortic valve                     Value         Ref  Aortic leaflet separation,       1.8    cm     ---------  MM  Aortic root                      Value         Ref  Aortic root ID, STJ, ED      (H) 3.6    cm     2.3 - 3.5  Ascending aorta                  Value         Ref  Ascending aorta ID               3.6    cm     2.2 - 3.8  Left atrium                      Value         Ref  LA volume, S                 (H) 62     ml     18 - 58  LA volume/bsa, S                 31     ml/m^2 16 - 34  LA volume, ES, 1-p A4C           53     ml     18 - 58  LA volume, ES, 1-p A2C       (H) 70     ml     18 - 58  LA volume, ES, A/L               68     ml     ---------  LA volume/bsa, ES, A/L           34     ml/m^2 16 - 34  Mitral valve                     Value         Ref  Mitral E-wave peak velocity      0.88   m/sec  ---------  Mitral A-wave peak velocity      0.99   m/sec  ---------  Mitral deceleration time         180    ms     ---------  Mitral peak gradient, D          3      mm Hg  ---------  Mitral E/A ratio, peak           0.9           ---------  Tricuspid valve                  Value         Ref  Tricuspid regurg peak             2.52   m/sec  <=2.8  velocity  Tricuspid peak RV-RA             25     mm Hg  ---------  gradient  Right ventricle                  Value         Ref  TAPSE, 2D                        2.50   cm     >=1.70  TAPSE, MM                        2.50   cm     >=1.70  RV s', lateral                   22.6   cm/sec >=9.5 Legend: (L)  and  (H)  farida values outside specified reference range. ---------------------------------------------------------------------------- Prepared and electronically signed by Ernst Parada 06/24/2025 13:42     US VENOUS DOPPLER LEG BILAT - DIAG IMG (CPT=93970)  Result Date: 6/24/2025  PROCEDURE: US VENOUS DOPPLER LEG BILAT - DIAG IMG (CPT=93970) INDICATIONS: right calf pain, r/o dvt COMPARISON: None TECHNIQUE: Color duplex Doppler venous ultrasound of both lower extremities was performed in the usual manner. FINDINGS: Thrombus is present within the right common femoral vein as well as in the adjacent deep femoral vein draining into the common femoral vein. There is also nonocclusive thrombus within the proximal and distal aspect of the popliteal vein thrombus is otherwise expansile with heterogeneous grayscale appearance and there is absence of normal color flow and compressibility in these regions. The femoral vein appears normal. Normal flow was demonstrated with color and pulsed Doppler. Visualized portions of the great and small saphenous, posterior tibial and peroneal veins appear normal. Contralateral left lower extremity is without DVT within the common femoral, femoral, and popliteal veins. There is occlusive thrombus within a left soleal vein based on abnormal grayscale appearance, Vansil morphology of the vein and absence of normal color flow and Doppler venous waveforms.     CONCLUSION: 1. Abnormal exam. Right lower extremity has nonocclusive acute appearing thrombus within the common femoral vein as well as the visualized portions of the deep femoral vein. There is also nonocclusive  thrombus within the right popliteal vein. 2. No DVT in the left lower extremity, however there is acute occlusive thrombus within a left soleal vein at the knee. Electronically Verified and Signed by Attending Radiologist: Jason Quiñones MD 6/24/2025 12:45 PM Workstation: [id number]    IR PERITONEAL DRAINAGE CATHETER  Result Date: 6/20/2025  PROCEDURE: IR PERITONEAL DRAINAGE CATHETER  INDICATIONS:  Status post colon resection, with postop intraperitoneal fluid  COMPARISON: None.  (S):  [place]  ESTIMATED BLOOD LOSS: Less than 5 mL  COMPLICATIONS: None  FINDINGS:  Informed consent was obtained. The patient was positioned supine. The right lower quadrant was sterilely prepped and draped. Preliminary ultrasound demonstrated complex appearing septated fluid collection.. Local Lidocaine was administered. Under ultrasound guidance, a [place] sheath needle was advanced into the collection. There was return of serosanguineous fluid from the access needle. Under ultrasound guidance, a wire was advanced through the needle. The access was dilated and a 10 [place] pigtail catheter was coiled in the collection.  Via the drain, approximately 260 cc of serosanguineous fluid were aspirated.  Sample was sent for cultures and sensitivities.  The catheter was secured to the skin with sutures and attached to bulb suction.  Sterile dressing applied.         CONCLUSION: Insertion of drainage catheter into intra-abdominal ascites yielding 260 cc serosanguineous fluid.    Dictated by (CST): Norris Reilly MD on 6/20/2025 at 6:09 PM     Finalized by (CST): Norris Reilly MD on 6/20/2025 at 6:10 PM          CT ABDOMEN+PELVIS(CONTRAST ONLY)(CPT=74177)  Result Date: 6/19/2025  PROCEDURE: CT ABDOMEN + PELVIS (CONTRAST ONLY) (CPT=74177)  COMPARISON: [facility], CT ABDOMEN + PELVIS (CONTRAST ONLY) (CPT=74177), 6/05/2025, 5:22 PM.  INDICATIONS: S/p colon resection 6/10, leukocytosis and increased pain  TECHNIQUE: CT images of the  abdomen and pelvis were obtained with non-ionic intravenous contrast material.  Automated exposure control for dose reduction was used. Adjustment of the mA and/or kV was done based on the patient's size. Use of iterative reconstruction technique for dose reduction was used.  Dose information is transmitted to the ACR (American College of Radiology) NRDR (National Radiology Data Registry) which includes the Dose Index Registry.  FINDINGS:  LIVER: No enlargement, atrophy, abnormal density, or significant focal lesion.  BILIARY: Intrahepatic biliary ductal dilatation.  Gallbladder is incompletely distended.  No significant extrahepatic biliary ductal dilatation. SPLEEN: Spleen is not enlarged. There are granulomatous calcifications in the spleen. No suspicious splenic lesion. STOMACH: No gastric obstruction.  Duodenum is distended with oral contrast material but is otherwise without evidence of obstruction or inflammation. PANCREAS: No lesion, fluid collection, ductal dilatation, or atrophy.  ADRENALS: No nodule or enlargement. KIDNEYS: Cortical based low density focus in the posterior midpole of the right kidney suggesting simple cyst.  Bilaterally no suspicious enhancing renal lesion or hydroureteronephrosis. AORTA/VASCULAR:   Atherosclerosis of the abdominal aorta.  No aneurysm. BOWEL/MESENTERY:  Peripherally enhancing fluid collection in the left lower quadrant, above the roof of the urinary bladder and measuring approximately 55 x 47 x 58 mm (AP x transverse x CC).  There is also a moderate-large volume of free fluid within the abdomen and pelvis mainly along the peripheral aspect of the liver and spleen, both pericolic gutters and across the omentum.  Moderate volume of foci of gas are present within this fluid.  There is peritoneal thickening/enhancement in both pericolic  gutters.  No evidence of bowel obstruction.  No lymphadenopathy.  Post large bowel resection with reanastomosis in the region of the hepatic  flexure without focal obstruction or inflammatory change at the anastomosis. ABDOMINAL WALL: Ventral line of skin staples are present above the umbilicus.  Additional scattered skin staples are present in the abdominal-pelvic anterior wall.  Mild body wall edema. URINARY BLADDER: Urinary bladder is distended with urine.  No stones or wall thickening. PELVIC NODES: No enlarged mass or adenopathy.   PELVIC ORGANS: No visible mass.  Pelvic organs appropriate for patient age.  BONES:   Mild endplate change and disc disease within the spine most advanced at L5-S1. LUNG BASES: Small-moderate sized bilateral pleural effusion.  Enhancing wedge-shaped pulmonary opacities with air bronchograms adjacent to the effusions likely representing secondary compressive atelectasis.  The visualized heart has coronary artery calcifications. OTHER: Negative.          CONCLUSION:  1.  Post large bowel resection in the region of the proximal transverse colon/hepatic flexure.  No obstruction or inflammation at the surgical site.  There is a moderate-large volume of free fluid within the abdomen and pelvis mainly distributed in both upper quadrants, across the omentum, and in the pericolic gutters.  Moderate volume of free air is also present in the same distribution as the fluid.  These may be related to recent surgery.  Organized fluid collection in the left lower retroperitoneum/left lower quadrant measuring approximately 55 x 47 x 58 mm which could represent a seroma, liquified hematoma, or other inflammatory fluid collection.  Peritoneal enhancement and thickening in the both pericolic gutters which could represent phlegmonous change, early organization of fluid collections with similar differential as the aforementioned organized collection, or peritonitis. 2.  Moderate-sized bilateral pleural effusions.  Enhancing bibasilar pulmonary opacities with air bronchograms likely representing secondary compressive atelectasis. 3.  Coronary  atherosclerosis. 4.  Right renal cyst. 5.  Mild body wall edema/anasarca.  Skin staples across the abdominal-pelvic anterior wall compatible with surgical access sites.   Dictated by (CST): Jason Quiñones MD on 6/19/2025 at 5:29 PM     Finalized by (CST): Jason Quiñones MD on 6/19/2025 at 5:36 PM            Assessment/Plan:  Problem List[1]  Stable   improving slowly  THUY ALSTON MD  7/17/2025  12:33 PM       [1]   Patient Active Problem List  Diagnosis    BPH (benign prostatic hyperplasia)    Chronic low back pain    Chronic pain of both knees    Depression with anxiety    Generalized osteoarthritis    Hyperlipidemia    Type 2 diabetes mellitus without complication, without long-term current use of insulin (HCC)    Essential hypertension    Bipolar I disorder, single manic episode (HCC)    Large bowel obstruction (HCC)    Colonic mass    Diffuse large B-cell lymphoma of solid organ excluding spleen    Pneumoperitoneum    Abdominal distension    Anasarca    Intraabdominal fluid collection    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

## 2025-07-17 NOTE — PLAN OF CARE
Patient is on room air. Patient denies n/v/d and sob. Patient is on IV abx. Patient is on a low fiber/soft diet and has glucose monitoring ACHS. Patient has 2 drains, dressings are C/D/I. Patient has bilateral arm precautions. Patient has a midline incision w/ steri strips in place. Patient has a primofit in place. Patient is on a heparin gtt at 13 units/hr. Safety measures are in place.       Problem: Patient Centered Care  Goal: Patient preferences are identified and integrated in the patient's plan of care  Description: Interventions:  - What would you like us to know as we care for you? I came from [facility]  - Provide timely, complete, and accurate information to patient/family  - Incorporate patient and family knowledge, values, beliefs, and cultural backgrounds into the planning and delivery of care  - Encourage patient/family to participate in care and decision-making at the level they choose  - Honor patient and family perspectives and choices  Outcome: Progressing     Problem: Diabetes/Glucose Control  Goal: Glucose maintained within prescribed range  Description: INTERVENTIONS:  - Monitor Blood Glucose as ordered  - Assess for signs and symptoms of hyperglycemia and hypoglycemia  - Administer ordered medications to maintain glucose within target range  - Assess barriers to adequate nutritional intake and initiate nutrition consult as needed  - Instruct patient on self management of diabetes  Outcome: Progressing     Problem: Patient/Family Goals  Goal: Patient/Family Long Term Goal  Description: Patient's Long Term Goal: Discharge  home    Interventions:  -Monitor vitals  -Monitor labs  - Heparin gtt  - Glucose monitoring ACHS/q6h  - Monitor and manage NGOZI drains  - PT/OT on consult  - IR on consult  - Manage pain   - Monitor midline incision  - Remote tele   - IV abx  - ID onc onsult  - Gen sx on consult   - See additional Care Plan goals for specific interventions  Outcome: Progressing  Goal:  Patient/Family Short Term Goal  Description: Patient's Short Term Goal: Manage pain    Interventions:   - Frequent pain assessments  - Non-pharmacological interventions  - Pain medications as needed/indicated  - See additional Care Plan goals for specific interventions  Outcome: Progressing     Problem: PAIN - ADULT  Goal: Verbalizes/displays adequate comfort level or patient's stated pain goal  Description: INTERVENTIONS:  - Encourage pt to monitor pain and request assistance  - Assess pain using appropriate pain scale  - Administer analgesics based on type and severity of pain and evaluate response  - Implement non-pharmacological measures as appropriate and evaluate response  - Consider cultural and social influences on pain and pain management  - Manage/alleviate anxiety  - Utilize distraction and/or relaxation techniques  - Monitor for opioid side effects  - Notify MD/LIP if interventions unsuccessful or patient reports new pain  - Anticipate increased pain with activity and pre-medicate as appropriate  Outcome: Progressing     Problem: RISK FOR INFECTION - ADULT  Goal: Absence of fever/infection during anticipated neutropenic period  Description: INTERVENTIONS  - Monitor WBC  - Administer growth factors as ordered  - Implement neutropenic guidelines  Outcome: Progressing     Problem: SAFETY ADULT - FALL  Goal: Free from fall injury  Description: INTERVENTIONS:  - Assess pt frequently for physical needs  - Identify cognitive and physical deficits and behaviors that affect risk of falls.  - Loa fall precautions as indicated by assessment.  - Educate pt/family on patient safety including physical limitations  - Instruct pt to call for assistance with activity based on assessment  - Modify environment to reduce risk of injury  - Provide assistive devices as appropriate  - Consider OT/PT consult to assist with strengthening/mobility  - Encourage toileting schedule  Outcome: Progressing     Problem:  HEMATOLOGIC - ADULT  Goal: Free from bleeding injury  Description: (Example usage: patient with low platelets)  INTERVENTIONS:  - Avoid intramuscular injections, enemas and rectal medication administration  - Ensure safe mobilization of patient  - Hold pressure on venipuncture sites to achieve adequate hemostasis  - Assess for signs and symptoms of internal bleeding  - Monitor lab trends  - Patient is to report abnormal signs of bleeding to staff  - Avoid use of toothpicks and dental floss  - Use electric shaver for shaving  - Use soft bristle tooth brush  - Limit straining and forceful nose blowing  Outcome: Progressing  Goal: Maintains hematologic stability  Description: INTERVENTIONS  - Assess for signs and symptoms of bleeding or hemorrhage  - Monitor labs and vital signs for trends  - Administer supportive blood products/factors, fluids and medications as ordered and appropriate  - Administer supportive blood products/factors as ordered and appropriate  Outcome: Progressing  Goal: Free from bleeding injury  Description: (Example usage: patient with low platelets)  INTERVENTIONS:  - Avoid intramuscular injections, enemas and rectal medication administration  - Ensure safe mobilization of patient  - Hold pressure on venipuncture sites to achieve adequate hemostasis  - Assess for signs and symptoms of internal bleeding  - Monitor lab trends  - Patient is to report abnormal signs of bleeding to staff  - Avoid use of toothpicks and dental floss  - Use electric shaver for shaving  - Use soft bristle tooth brush  - Limit straining and forceful nose blowing  Outcome: Progressing     Problem: GASTROINTESTINAL - ADULT  Goal: Minimal or absence of nausea and vomiting  Description: INTERVENTIONS:  - Maintain adequate hydration with IV or PO as ordered and tolerated  - Nasogastric tube to low intermittent suction as ordered  - Evaluate effectiveness of ordered antiemetic medications  - Provide nonpharmacologic comfort  measures as appropriate  - Advance diet as tolerated, if ordered  - Obtain nutritional consult as needed  - Evaluate fluid balance  Outcome: Progressing  Goal: Maintains or returns to baseline bowel function  Description: INTERVENTIONS:  - Assess bowel function  - Maintain adequate hydration with IV or PO as ordered and tolerated  - Evaluate effectiveness of GI medications  - Encourage mobilization and activity  - Obtain nutritional consult as needed  - Establish a toileting routine/schedule  - Consider collaborating with pharmacy to review patient's medication profile  Outcome: Progressing  Goal: Maintains adequate nutritional intake (undernourished)  Description: INTERVENTIONS:  - Monitor percentage of each meal consumed  - Identify factors contributing to decreased intake, treat as appropriate  - Assist with meals as needed  - Monitor I&O, WT and lab values  - Obtain nutritional consult as needed  - Optimize oral hygiene and moisture  - Encourage food from home; allow for food preferences  - Enhance eating environment  Outcome: Progressing     Problem: DISCHARGE PLANNING  Goal: Discharge to home or other facility with appropriate resources  Description: INTERVENTIONS:  - Identify barriers to discharge w/pt and caregiver  - Include patient/family/discharge partner in discharge planning  - Arrange for needed discharge resources and transportation as appropriate  - Identify discharge learning needs (meds, wound care, etc)  - Arrange for interpreters to assist at discharge as needed  - Consider post-discharge preferences of patient/family/discharge partner  - Complete POLST form as appropriate  - Assess patient's ability to be responsible for managing their own health  - Refer to Case Management Department for coordinating discharge planning if the patient needs post-hospital services based on physician/LIP order or complex needs related to functional status, cognitive ability or social support system  Outcome:  Progressing     Problem: Altered Communication/Language Barrier  Goal: Patient/Family is able to understand and participate in their care  Description: Interventions:  - Assess communication ability and preferred communication style  - Implement communication aides and strategies  - Use visual cues when possible  - Listen attentively, be patient, do not interrupt  - Minimize distractions  - Allow time for understanding and response  - Establish method for patient to ask for assistance (call light)  - Provide an  as needed  - Communicate barriers and strategies to overcome with those who interact with patient  Outcome: Progressing     Problem: CARDIOVASCULAR - ADULT  Goal: Maintains optimal cardiac output and hemodynamic stability  Description: INTERVENTIONS:  - Monitor vital signs, rhythm, and trends  - Monitor for bleeding, hypotension and signs of decreased cardiac output  - Evaluate effectiveness of vasoactive medications to optimize hemodynamic stability  - Monitor arterial and/or venous puncture sites for bleeding and/or hematoma  - Assess quality of pulses, skin color and temperature  - Assess for signs of decreased coronary artery perfusion - ex. Angina  - Evaluate fluid balance, assess for edema, trend weights  Outcome: Progressing  Goal: Absence of cardiac arrhythmias or at baseline  Description: INTERVENTIONS:  - Continuous cardiac monitoring, monitor vital signs, obtain 12 lead EKG if indicated  - Evaluate effectiveness of antiarrhythmic and heart rate control medications as ordered  - Initiate emergency measures for life threatening arrhythmias  - Monitor electrolytes and administer replacement therapy as ordered  Outcome: Progressing     Problem: GENITOURINARY - ADULT  Goal: Absence of urinary retention  Description: INTERVENTIONS:  - Assess patient’s ability to void and empty bladder  - Monitor intake/output and perform bladder scan as needed  - Follow urinary retention protocol/standard of  care  - Consider collaborating with pharmacy to review patient's medication profile  - Implement strategies to promote bladder emptying  Outcome: Progressing     Problem: METABOLIC/FLUID AND ELECTROLYTES - ADULT  Goal: Electrolytes maintained within normal limits  Description: INTERVENTIONS:  - Monitor labs and rhythm and assess patient for signs and symptoms of electrolyte imbalances  - Administer electrolyte replacement as ordered  - Monitor response to electrolyte replacements, including rhythm and repeat lab results as appropriate  - Fluid restriction as ordered  - Instruct patient on fluid and nutrition restrictions as appropriate  Outcome: Progressing  Goal: Hemodynamic stability and optimal renal function maintained  Description: INTERVENTIONS:  - Monitor labs and assess for signs and symptoms of volume excess or deficit  - Monitor intake, output and patient weight  - Monitor urine specific gravity, serum osmolarity and serum sodium as indicated or ordered  - Monitor response to interventions for patient's volume status, including labs, urine output, blood pressure (other measures as available)  - Encourage oral intake as appropriate  - Instruct patient on fluid and nutrition restrictions as appropriate  Outcome: Progressing     Problem: SKIN/TISSUE INTEGRITY - ADULT  Goal: Skin integrity remains intact  Description: INTERVENTIONS  - Assess and document risk factors for pressure ulcer development  - Assess and document skin integrity  - Monitor for areas of redness and/or skin breakdown  - Initiate interventions, skin care algorithm/standards of care as needed  Outcome: Progressing  Goal: Incision(s), wounds(s) or drain site(s) healing without S/S of infection  Description: INTERVENTIONS:  - Assess and document risk factors for pressure ulcer development  - Assess and document skin integrity  - Assess and document dressing/incision, wound bed, drain sites and surrounding tissue  - Implement wound care per  orders  - Initiate isolation precautions as appropriate  - Initiate Pressure Ulcer prevention bundle as indicated  Outcome: Progressing  Goal: Oral mucous membranes remain intact  Description: INTERVENTIONS  - Assess oral mucosa and hygiene practices  - Implement preventative oral hygiene regimen  - Implement oral medicated treatments as ordered  Outcome: Progressing     Problem: COPING  Goal: Pt/Family able to verbalize concerns and demonstrate effective coping strategies  Description: INTERVENTIONS:  - Assist patient/family to identify coping skills, available support systems and cultural and spiritual values  - Provide emotional support, including active listening and acknowledgement of concerns of patient and caregivers  - Reduce environmental stimuli, as able  - Instruct patient/family in relaxation techniques, as appropriate  - Assess for spiritual and psychosocial needs and initiate Spiritual Care or Behavioral Health consult as needed  Outcome: Progressing

## 2025-07-18 LAB
ALBUMIN SERPL-MCNC: 2.1 G/DL (ref 3.2–4.8)
ANION GAP SERPL CALC-SCNC: 4 MMOL/L (ref 0–18)
APTT PPP: 83.3 SECONDS (ref 23–36)
BASOPHILS # BLD AUTO: 0.03 X10(3) UL (ref 0–0.2)
BASOPHILS NFR BLD AUTO: 0.6 %
BUN BLD-MCNC: 7 MG/DL (ref 9–23)
BUN/CREAT SERPL: 18.4 (ref 10–20)
CALCIUM BLD-MCNC: 7.2 MG/DL (ref 8.7–10.4)
CHLORIDE SERPL-SCNC: 103 MMOL/L (ref 98–112)
CO2 SERPL-SCNC: 29 MMOL/L (ref 21–32)
CREAT BLD-MCNC: 0.38 MG/DL (ref 0.7–1.3)
DEPRECATED RDW RBC AUTO: 62.5 FL (ref 35.1–46.3)
EGFRCR SERPLBLD CKD-EPI 2021: 113 ML/MIN/1.73M2 (ref 60–?)
EOSINOPHIL # BLD AUTO: 0.01 X10(3) UL (ref 0–0.7)
EOSINOPHIL NFR BLD AUTO: 0.2 %
ERYTHROCYTE [DISTWIDTH] IN BLOOD BY AUTOMATED COUNT: 19.9 % (ref 11–15)
GLUCOSE BLD-MCNC: 138 MG/DL (ref 70–99)
GLUCOSE BLDC GLUCOMTR-MCNC: 152 MG/DL (ref 70–99)
GLUCOSE BLDC GLUCOMTR-MCNC: 164 MG/DL (ref 70–99)
GLUCOSE BLDC GLUCOMTR-MCNC: 215 MG/DL (ref 70–99)
GLUCOSE BLDC GLUCOMTR-MCNC: 217 MG/DL (ref 70–99)
HCT VFR BLD AUTO: 23 % (ref 39–53)
HGB BLD-MCNC: 7.3 G/DL (ref 13–17.5)
IMM GRANULOCYTES # BLD AUTO: 0.03 X10(3) UL (ref 0–1)
IMM GRANULOCYTES NFR BLD: 0.6 %
LYMPHOCYTES # BLD AUTO: 1.69 X10(3) UL (ref 1–4)
LYMPHOCYTES NFR BLD AUTO: 31.1 %
MAGNESIUM SERPL-MCNC: 1.3 MG/DL (ref 1.6–2.6)
MCH RBC QN AUTO: 27.5 PG (ref 26–34)
MCHC RBC AUTO-ENTMCNC: 31.7 G/DL (ref 31–37)
MCV RBC AUTO: 86.8 FL (ref 80–100)
MONOCYTES # BLD AUTO: 0.57 X10(3) UL (ref 0.1–1)
MONOCYTES NFR BLD AUTO: 10.5 %
NEUTROPHILS # BLD AUTO: 3.11 X10 (3) UL (ref 1.5–7.7)
NEUTROPHILS # BLD AUTO: 3.11 X10(3) UL (ref 1.5–7.7)
NEUTROPHILS NFR BLD AUTO: 57 %
OSMOLALITY SERPL CALC.SUM OF ELEC: 282 MOSM/KG (ref 275–295)
PHOSPHATE SERPL-MCNC: 2.2 MG/DL (ref 2.4–5.1)
PHOSPHATE SERPL-MCNC: 2.2 MG/DL (ref 2.4–5.1)
PLATELET # BLD AUTO: 276 10(3)UL (ref 150–450)
POTASSIUM SERPL-SCNC: 3.4 MMOL/L (ref 3.5–5.1)
POTASSIUM SERPL-SCNC: 3.4 MMOL/L (ref 3.5–5.1)
RBC # BLD AUTO: 2.65 X10(6)UL (ref 3.8–5.8)
SODIUM SERPL-SCNC: 136 MMOL/L (ref 136–145)
WBC # BLD AUTO: 5.4 X10(3) UL (ref 4–11)

## 2025-07-18 RX ORDER — VANCOMYCIN HYDROCHLORIDE 125 MG/1
125 CAPSULE ORAL DAILY
Qty: 20 CAPSULE | Refills: 0 | Status: SHIPPED | OUTPATIENT
Start: 2025-07-18 | End: 2025-08-07

## 2025-07-18 RX ORDER — MAGNESIUM OXIDE 400 MG/1
800 TABLET ORAL ONCE
Status: COMPLETED | OUTPATIENT
Start: 2025-07-18 | End: 2025-07-18

## 2025-07-18 RX ORDER — POTASSIUM CHLORIDE 14.9 MG/ML
20 INJECTION INTRAVENOUS ONCE
Status: COMPLETED | OUTPATIENT
Start: 2025-07-18 | End: 2025-07-18

## 2025-07-18 RX ORDER — METRONIDAZOLE 500 MG/1
500 TABLET ORAL 2 TIMES DAILY
Qty: 40 TABLET | Refills: 0 | Status: SHIPPED | OUTPATIENT
Start: 2025-07-18 | End: 2025-08-07

## 2025-07-18 NOTE — CM/SW NOTE
07/18/25 0946   Prior Authorization - Destination   Service Provider Pippa Menezes   Payer Communication Destination Comments Auth ID [id number]   Prior Authorization Status Submitted/Pending     SW submitted insurance auth via [employer].  SW included carve out for [employer]er[employer] medication.  BIANKA notified Sarah, liaison with Pippa Loomis of the above. Possible discharge this weekend if auth is obtained.  Pippa Loomis is aware as well.      SHAMA Ridley ext [phone]

## 2025-07-18 NOTE — PROGRESS NOTES
[facility]  part of [facility]    Progress Note    Arnaldo Gutierrez Patient Status:  Inpatient    4/15/1945 MRN [id number]   Location [facility] 4W/SW/SE Attending Declan Stearns MD   Hosp Day # 13 PCP PHYSICIAN NONSTAFF     Subjective:  Feels ok    Objective/Physical Exam:  General: Alert, orientated x3.  Cooperative.  No apparent distress.  Vital Signs:  Blood pressure 140/62, pulse 89, temperature 98.2 °F (36.8 °C), temperature source Oral, resp. rate 17, height 180.3 cm (5' 11\"), weight 156 lb 8.4 oz (71 kg), SpO2 93%.  Abdomen:  Soft, non-distended, non-tender, drain 113  same color  Lab Results   Component Value Date    WBC 5.4 2025    HGB 7.3 (L) 2025    HCT 23.0 (L) 2025    .0 2025    CREATSERUM 0.38 (L) 2025    BUN 7 (L) 2025     2025    K 3.4 (L) 2025    K 3.4 (L) 2025     2025    CO2 29.0 2025     (H) 2025    CA 7.2 (L) 2025    ALB 2.1 (L) 2025    ALKPHO 182 (H) 2025    BILT 0.3 2025    TP 5.0 (L) 2025    AST 22 2025    ALT 16 2025    PTT 83.3 (H) 2025    TSH 2.417 2025    LIP 45 2025    MG 1.3 (L) 2025    PHOS 2.2 (L) 2025    PHOS 2.2 (L) 2025    CK <15 (L) 2025    B12 1,020 (H) 2025       Imaging:  IR DRAIN  Result Date: 2025  PROCEDURE: IR DRAIN EXCHANGE INDICATIONS: follow up 3 drains PATIENT STATED HISTORY: Postop abscess with the 3 existing abdominal drains COMPARISON: CT abdomen 7/10/2025 TECHNIQUE:  Prior to the procedure, I discussed with the patient and/or legal representative the potential benefits, risks, and side effects of this procedure, the likelihood of the patient achieving goals; and the potential problems that might occur during recuperation. I discussed reasonable alternatives to the procedure, including risks, benefits, steps to prevent infection and side  effects related to the alternatives, and risks related to not receiving this procedure. A witnessed verbal and signed consent were obtained and documented in the patient's chart. IV was checked and maintained by the nurse. Moderate conscious sedation was performed under continuous pulse oximetry and cardiac monitoring under my direct supervision. The radiology nurse was in attendance throughout the exam. Moderate conscious sedation with Versed and  Fentanyl was given. Patient was assessed and monitoring of oxygen saturation, heart rate, and blood pressure by the nursing staff and myself during the exam for a total intraservice time of 25 minutes of conscious sedation time.    The patient was placed supine on the angiographic table and the  subhepatic and right midabdomen   and left midline existing catheter were prepped and covered with a full body drape in the usual sterile fashion. All operators present for the case performed standard pre-procedural prep including hand washing, sterile gloves, gown, mask, and cap. All aspects of the maximum sterile barrier technique were followed. A preprocedural time out was performed with all physicians, technologists, and nurses involved with the procedure. Local anesthesia was obtained utilizing 1% lidocaine Contrast was injected through the right midabdomen  revealed no residual pocket and free spill into the peritoneal cavity. This catheter was cut and removed without incident. Next, the some diaphragmatic drain was injected. Debris and fluid remain distal  to the drainage catheter. This catheter was cut and over a wire, a new 12 Croatian drain was placed and extensively irrigated until only serous fluid was removed. The catheter was sewn to the skin with nonabsorbable suture and connected to bulb drainage. Finally, the left midline Jane 22 Croatian drain was addressed. Gentle injection of contrast revealed significant improvement in abscess cavity with, however, residual  cavity that is not yet collapsed around the catheter. The catheter was cut and over a wire and after additional sideholes were placed in the catheter, replaced within the mid abdomen extending to the right upper quadrant. Extensive irrigation was performed with return of about 10 mL of purulent material with ultimately serosanguinous fluid returned. The catheter was sewn the skin with nonabsorbable suture and connected to accordion drainage. The patient tolerated the procedure well and there were no immediate complications. Routine post catheter insertion instructions were communicated to the patient. ESTIMATED BLOOD LOSS: Less than 5cc.  FLUORO TIME: 3.2 minutes AIR KERMA: 18 mGy     CONCLUSION: 1. Removal of right midabdomen drain.  Exchange of the subdiaphragmatic drain for new 12 Lithuanian drain. Exchange and manipulation of large midline 22 Lithuanian Davin drainage catheter. Continued current irrigation of catheters with follow-up CT and abscessogram in 1 week's time. Electronically Verified and Signed by Attending Radiologist: Juan Luis Cabrera MD 7/17/2025 11:34 AM Workstation: [id number]    IR PORT A CATH INSERTION EXCHNGE CHECK  Result Date: 7/16/2025  PROCEDURE: IR Venous Chest Port Placement  Procedural Personnel Attending(s): Juan Luis Cabrera Pre-procedure diagnosis: B cell lymphoma Post-procedure diagnosis: as above Indications: Chemotherapy Additional clinical history: None  Complications: No immediate complications.      IMPRESSION:  Technically successful placement of a central venous power port via the right internal jugular vein using fluoroscopic guidance. PLAN: The port is ready to use. Tip at cavo-atrial junction.  PROCEDURE SUMMARY: - Image-guided venous right chest port placement (6Fr Power Port Slim)  PROCEDURE DETAILS:  Pre-procedure Informed consent for the procedure was obtained and time-out was performed prior to the procedure. Preparation: The site was prepared and draped using maximal sterile  barrier technique including cutaneous antisepsis.  Anesthesia/sedation: Level of anesthesia/sedation: Moderate sedation (conscious sedation) Anesthesia/sedation administered by: Nurse or other independent trained observer under attending supervision with continuous monitoring of the patient's level of consciousness and physiologic status, under the direct supervision of the attending physician  \"MD intra-service time in sedation\" : 19 minutes   Procedural Details/Findings: Informed consent for the procedure was obtained and a time-out was performed prior to the procedure.  The right upper chest and lower neck were prepped and draped in the usual sterile fashion.  Local lidocaine was administered. Ultrasound demonstrates a patent right internal jugular vein without thrombus. Under direct sonographic guidance, the right internal jugular vein was accessed with a micro puncture set and an image was stored. This wire was used to measure the intravascular length. Under fluoroscopic guidance, an Amplatz wire was advanced into the inferior vena cava. A peel-away sheath was advanced over the Amplatz wire and secured.  Attention was then turned to the right upper chest. Local lidocaine was administered to anesthetize the soft tissues. A small incision was made utilizing a 15 blade. Blunt dissection of the soft tissues was then performed to create a pocket for the chest  port device. The catheter was securely connected to the chest port device. The chest port device was inserted into the subcutaneous pocket.  A tunneling cannula attached to the chest port catheter was used to tunnel the chest port catheter from the newly created pocket in the right upper chest soft tissues to the incision for the right internal jugular venotomy. The catheter was cut to the intra-vascular length.  The chest port catheter was then advanced through the right internal jugular vein peel-away sheath.  The peel-away sheath was removed.  The position  was confirmed fluoroscopically, and an image was recorded.  The newly placed venous chest port was then accessed with a non-coring needle. The catheter was flushed/aspirated and locked with heparinized saline. The subcutaneous pocket was then closed with interrupted deep 3-0 Vicryl sutures. Skin glue was used to cover the incision at the superficial level, as well as close the right internal jugular incision site.  The patient tolerated the procedure well.  Imaging Guidance: Fluoroscopy and Ultrasound  Radiation: 0.4 minutes, 1 mGy  Attestation I, Juan Luis Cabrera, attest that I was present for the entire procedure. I reviewed the stored images and agree with the report as written.   Electronically Verified and Signed by Attending Radiologist: Juan Luis aCbrera MD 7/16/2025 1:19 PM Workstation: [id number]    NM GB HEPATOBILIARY SCAN  (CPT=78226)  Result Date: 7/16/2025  EXAM: NM GB HEPATOBILIARY SCAN  (CCZ=09455) CLINICAL INDICATION: partial cholecystectomy for abscess of gallbladder  r/o leak COMPARISON: CT abdomen pelvis dated 7/10/2025 TECHNIQUE: 8.5 mCi Tc-99m Choletec was injected intravenously and planar imaging obtained for 60 minutes. FINDINGS:  There is prompt homogeneous extraction of radiopharmaceutical from the blood pool by the hepatic parenchyma, with prompt excretion into a non- dilated biliary system. The gallbladder fills at approximately 20 minute. Activity is seen in the common bile duct on the 10 minute image Activity is seen in the proximal small bowel on the 11 minute image. No additional radiotracer activity to suggest peritoneal leak.     IMPRESSION:  1.  Patent cystic and common bile duct. 2.  No evidence of peritoneal leak. Electronically Verified and Signed by Attending Radiologist: Adam Neumann MD 7/16/2025 12:46 PM Workstation: [id number]    CT DRAIN ABSCESS LIVER (CPT=49405)  Result Date: 7/13/2025  PROCEDURE: Drainage catheter placement  Procedural Personnel Attending(s): Juan Luis Cabrera  Pre-procedure diagnosis: abscess Post-procedure diagnosis: as above Indications  perihepatic abscess Additional clinical history: None   Complications: No immediate complications.      IMPRESSION:  Technically successful CT drainage catheter placement into perihepatic fluid collection.  PLAN:  1. Follow-up outputs and repeat abscessogram in 1 week's time 2. F/u CXR to evaluate for residual ptx  _______________________________________________________________  PROCEDURE SUMMARY: - Drainage catheter insertion under CT-guidance with image stored to PACS. PROCEDURE DETAILS: Pre-procedure Consent: Informed consent for the procedure was obtained and time-out was performed prior to the procedure. Preparation: The site was prepared and draped using maximal sterile barrier technique including cutaneous antisepsis. Antibiotic administered and time: No antibiotics Anesthesia/sedation: Level of anesthesia/sedation: Moderate sedation (conscious sedation) Anesthesia/sedation administered by: Nurse or other independent trained observer under attending supervision with continuous monitoring of the patient's level of consciousness and physiologic status, under the direct supervision of the attending physician  \"MD intra-service time in sedation\" : 15 minutes    Drainage catheter placement The patient was positioned the supine. Initial imaging was performed. Local anesthesia was administered. Under CT guidance, the target was accessed. Fluid collection Initial imaging findings: Perihepatic Catheter type: APDL Catheter diameter: 10-Syrian External catheter securement: Non-absorbable suture Final catheter location: perihepatic Post-drainage imaging findings: drain in subhepatic fluid collection Additional findings: After drain was placed, follow-up examination revealed a minimal pneumothorax that was iatrogenically caused by needle entry into the perihepatic region. A 5Fr Yueh needle was then placed within the pneumothorax via CT  guidance and this region aspirated completely. Additional Details Additional description of procedure: None Additional findings: None Equipment details: None Specimens removed: 20 ml of turbid fluid was aspirated. A sample was sent for analysis. Estimated blood loss: Less than 10 mL Standardized report: [id number] Attestation I, Juan Luis Cabrera, attest that I was present for the entire procedure. I reviewed the stored images and agree with the report as written. Electronically Verified and Signed by Attending Radiologist: Juan Luis Cabrera MD 7/13/2025 2:04 PM Workstation: [id number]    XR CHEST AP PORTABLE  (CPT=71045)  Result Date: 7/12/2025  PROCEDURE: XR CHEST AP PORTABLE  (CPT=71045) INDICATIONS: small ptx 7/11. pleuritic pain after perihepatic drain placement COMPARISON: 7/11/2025 TECHNIQUE: AP portable semiupright chest     CONCLUSION: 1.  Right perihepatic subdiaphragmatic pigtail drainage catheter. 2.  Bibasilar right greater than left pleural effusions with compressive atelectasis. 3.  No pneumothorax. 4.  Heart size remains normal. Pulmonary vascularity partially obscured. 5.  Atherosclerotic calcification aorta. 6.  Left PICC line tip at RASVC junction.  Electronically Verified and Signed by Attending Radiologist: Chong Valdez MD 7/12/2025 7:41 PM Workstation: [id number]    XR CHEST AP PORTABLE  (CPT=71045)  Result Date: 7/11/2025  PROCEDURE: XR CHEST AP PORTABLE  (CPT=71045) INDICATIONS: Shortness of breath S/P LIVER DRAIN PLACEMENT.   R/O PNEUMOTHORAX COMPARISON: 6/13/2025 TECHNIQUE: AP portable upright chest     CONCLUSION: 1.  No pneumothorax. 2.  Right upper quadrant subdiaphragmatic perihepatic drain. 3.  Moderate bibasilar pleural effusions with compressive atelectasis. 4.  Stable bilateral patchy perihilar opacities which may represent pneumonia or edema. 5.  Heart size remains normal. 6.  Left-sided PICC line tip at RASVC junction. Electronically Verified and Signed by Attending Radiologist:  Chong Valdez MD 7/11/2025 2:52 PM Workstation: [id number]    CT ABDOMEN+PELVIS(CONTRAST ONLY)(CPT=74177)  Result Date: 7/11/2025  PROCEDURE: CT ABDOMEN+PELVIS(CONTRAST ONLY)(CPT=74177) INDICATIONS: follow up intraabdominal abscess COMPARISON: There are no comparisons for this exam. TECHNIQUE: Multislice CT scanning was performed from the dome of the diaphragm to the pubic symphysis with non-ionic intravenous contrast material. Post contrast coronal MPR imaging was performed. Automated exposure control dose reduction techniques were  used. Adjustment of the mA and/or kV was done based on the patient's size. Use of iterative reconstruction technique for dose reduction was used. Dose information is transmitted to the ACR (American College of Radiology) NRDR (National Radiology Data Registry) which includes the Dose Index Registry. CONTRAST: IOPAMIDOL 76% IV SOLN FOR POWER INJECTOR:80 mL FINDINGS: LIVER: Normal. Portal vein and branches patent. Mass effect on the liver by the perihepatic abscesses. BILIARY: No evidence for cholecystitis or biliary dilatation. SPLEEN: Normal. PANCREAS: Normal. ADRENALS: Normal. KIDNEYS: Stable 2.5 cm posterior right interpolar renal cyst. No solid renal lesion or renal obstruction. Linear calcification in the lower pole the right kidney is likely vascular. AORTA/VASCULAR: Atherosclerotic vascular calcification including coronary artery calcification. No aneurysm or dissection. Major aortic branches patent. LYMPHADENOPATHY: None. GI/MESENTERY: Changes from transverse colon resection and 2 anastomotic staple lines in the left side of the abdomen from small bowel resections. No obstruction. ABDOMINAL WALL: Anasarca. Bilateral abdominal wall drains. Small amount of gas extending into a midline radu-incisional hernia. URINARY BLADDER: Normal. ASCITES/FLUID COLLECTIONS: Previously seen loculated gas/fluid collections in the anterior mid and upper abdomen have decreased in size with  drainage catheter extending from left to right anteriorly. Tip of the drainage catheter is anterior to the gallbladder fossa. *  Right-sided subdiaphragmatic gas fluid collection measures 9.1 x 6.1 x 3.7 cm (prior 12.5 x 9.6 x 5.9 cm) *  Anterior to left lobe of liver just below the diaphragm measures 8.8 x 8 0.8 x 4.8 x 14.2 cm (prior 15.3 x 11.2 x 23 cm) *  Gas/fluid collection anteriorly to the left of the midline measures 2.2 x 13.4 cm on image 77 series 2 (prior 10.8 x 22.9 cm) *  Lateral perisplenic fluid collection with tiny focus of gas measures 6.3 x 2.3 cm (prior 7.9 x 2 2.8 cm) *  Small predominantly gas collection in the left lateral mid abdomen measuring 18 x 41 mm. *  Stable right infrahepatic paracolic drain with a small gas fluid collection just posterior to the drain extending up to the edge of liver measuring 4.6 x 1.3 cm. *  Stable small 9 mm x 37 mm anterior right lower quadrant gas/fluid collection. *  Stable 3.5 x 2 cm cystlike gas fluid collection in left hemipelvis. *  Stable small amount of gastrosplenic ligament fluid without rim enhancement. PELVIC ORGANS: No suspect pelvic mass. BONES: No suspect bone lesion. LUNG BASES: Stable moderate bibasilar pleural effusions with compressive atelectasis. OTHER: Negative.     CONCLUSION: 1.  Bilateral abdominal drains with decreasing size to the abdominal abscesses. 2.  Small cystlike abscess in the left hemipelvis is unchanged and few other smaller abscess collections are unchanged. 3.  Transverse colectomy and sites of small bowel resection. No obstruction or contrast extravasation. 4.  Stable moderate bibasilar pleural effusions. 5.  Anasarca. Electronically Verified and Signed by Attending Radiologist: Chong Valdez MD 7/11/2025 12:52 AM Workstation: [id number]    CT CHEST (CPT=71250)  Result Date: 7/9/2025  PROCEDURE: CT CHEST (CPT=71250) INDICATIONS:  Upper extremity DVT COMPARISON: CT abdomen pelvis dated 7/4/2025 TECHNIQUE: Multidetector  CT of the chest was obtained without non-ionic intravenous contrast material. Automated exposure control for dose reduction was used. Adjustment of the mA and/or kV was done based on the patient's size. Iterative reconstruction technique for dose reduction was employed. Dose information was transmitted to the ACR (American College of Radiology) NRDR (National Radiology Data Registry), which includes the Dose Index Registry. Oral contrast was ingested. FINDINGS: LUNGS/PLEURA/AIRWAYS:No consolidation, mass or suspicious nodule.Small to moderate bilateral pleural effusions and passive atelectasis. Central airways are patent. MEDIASTINUM:No mediastinal or hilar lymphadenopathy. VESSELS:  The ascending thoracic aorta measures approximately 40 mm. Moderate coronary artery calcifications. CHEST WALL:Within normal limits. UPPER ABDOMEN: Partially visualized drainage catheter with decreased size of collections of fluid and air in the right upper quadrant. BONES: No suspicious osseous lesion or acute osseous abnormality.     CONCLUSION: 1.  Small to moderate bilateral pleural effusions with passive atelectasis. 2.  Partial visualized drainage catheter in the upper abdomen with decreased size of fluid and air collections in the right upper quadrant 3.  The ascending thoracic aorta measures 40 mm, the upper limits of normal. Electronically Verified and Signed by Attending Radiologist: Adam Neumann MD 7/9/2025 1:09 PM Workstation: [id number]    XR ABDOMEN (1 VIEW) (CPT=74018)  Result Date: 7/9/2025  PROCEDURE(S): XR ABDOMEN (1 VIEW) (CPT=74018) WORKSTATION: [id number] HISTORY/INDICATIONS: s/p bowel resections for lymphoma  possible bowel leak Abdominal distension. COMPARISON: 07/07/2025 XR SMALL BOWEL SINGLE CONTRAST (CPT=74250) FINDINGS: Lines And Tubes: Right upper quadrant drainage pigtail catheter is again seen. Left approach presumed surgical drain is again seen. Lower Thorax: Unremarkable. Bowel Gas Pattern: No  gas-filled, dilated loops. No differential distention. Multiple bowel sutures are noted throughout the abdomen. Soft Tissues: No definite pneumoperitoneum. Previously described fluid collections are demonstrated to advantage on prior CT. Bones/Joints: No acute fracture.     CONCLUSION: No evidence of acute abnormality in the abdomen or pelvis. Multiple peritoneal drains are seen. Previously demonstrated fluid collections are demonstrated to advantage on prior CT. Electronically Verified and Signed by Attending Radiologist: Gelacio Bullock MD 7/9/2025 9:50 AM Workstation: [email or web address]    XR SMALL BOWEL SINGLE CONTRAST (CPT=74250)  Result Date: 7/9/2025  PROCEDURE: XR SMALL BOWEL SINGLE CONTRAST (CPT=74250) INDICATIONS: Multiple bowel resections for lymphoma. Abscess with possible leak PATIENT STATED HISTORY: Recent multiple bowel resection surgeries, history of lymphoma COMPARISON: CT 7/4/2025 TECHNIQUE: Small bowel series with multiple serial films was performed in the usual manner.  A  abdominal radiograph was performed. Fluoro/Cine Image Number: 9 Fluoro Time: 1.4 minutes FINDINGS: No dilated bowel to suggest ileus or obstruction. No extravasation of enteric contrast was noted to diagnose or identify a source of leak. Focused attention was paid to regions of surgical staples and known prior abscesses using compression and magnification, confirming no evident leak. Transit time from stomach to colon was 90 minutes.     CONCLUSION: No evidence of bowel leak. If clinical suspicion persists, CT may be considered. Electronically Verified and Signed by Attending Radiologist: Sunil Murillo MD 7/9/2025 9:00 AM Workstation: [id number]    US VENOUS DOPPLER ARM RIGHT - DIAG IMG (CPT=93971)  Result Date: 7/8/2025  PROCEDURE: US VENOUS DOPPLER ARM RIGHT - DIAG IMG (CPT=93971) INDICATIONS: swelling in RUE TECHNIQUE: Color duplex Doppler venous ultrasound of the right upper extremity was performed in the usual manner. FINDINGS:  The internal jugular, subclavian, axillary, brachial, cephalic and basilic veins appear normal. Flow was demonstrated with color and pulsed Doppler. Comparison scans of the left subclavian vein appear normal. There is acute expansile DVT within the right subclavian and axillary veins. The visualized segments of the right brachial vein, right cephalic vein, and right basilic vein are patent. There is moderate soft tissue edema within the right upper extremity.     CONCLUSION: Acute expansile occlusive DVT within the right subclavian and axillary veins. The visualized segments of the right brachial, basilic, and cephalic veins are patent. The patient's nurse was notified by telephone of these results at 10:41 a.m. on 7/8/2025. Electronically Verified and Signed by Attending Radiologist: Duran Bhardwaj MD 7/8/2025 10:41 AM Workstation: [id number]    CT ABDOMEN+PELVIS(CONTRAST ONLY)(CPT=74177)  Result Date: 7/5/2025  PROCEDURE(S): CT ABDOMEN+PELVIS(CONTRAST ONLY)(CPT=74177) WORKSTATION: [id number] HISTORY/INDICATIONS: distended abdomen, recent surgery for mass resection, cath placement distended abdomen, recent surgery for mass resection, cath placement COMPARISON: 06/19/2025 CT ABDOMEN+PELVIS(CONTRAST ONLY)(CPT=74177) TECHNIQUE: Helical CT of the abdomen and pelvis was obtained following administration of intravenous contrast material. Axial, coronal, and sagittal reformatted images were created and interpreted. For this exam, one or more of the following dose reductions techniques were used: Automated exposure control Adjustment of the mA and/or kV according to patient size Use of iterative reconstruction technique FINDINGS: Lower Thorax: Moderate bilateral pleural effusions are again seen. Heart is normal size. Few airspace opacities are noted in the lingula series 3 image 1. There is atelectasis in the lower lobes. Liver: Unremarkable. Gallbladder/Biliary tree: Cholelithiasis without evidence of acute  cholecystitis. No biliary ductal dilatation. Pancreas:  Unremarkable. Spleen: Unremarkable. Adrenal glands:  Unremarkable. Kidneys: Simple cyst arises from the right kidney posterior interpolar region. No suspicious renal lesions. Symmetric nephrograms. No hydronephrosis. Retroperitoneum/extraperitoneum: Unremarkable. Vasculature: No aneurysm or dissection. Mild atherosclerotic calcification is seen. Peritoneum: Multiple abscesses are seen: Left upper quadrant abscess measures 24.3 x 10.9 x 23.9 cm series 2 image 73; series 5 image 23. Air-fluid levels are seen. It extends into the abdominal wall series 2 image 73. Right upper quadrant perihepatic abscess communicates with the left upper quadrant abscess and measures 14.2 x 5.1 x 15.8 cm series 2 image 35; series 6 image 96. Perisplenic abscess measures 7.9 x 2.8 x 9.8 cm series 2 image 50; series 6 image 19. This abscess also likely communicates with the left upper quadrant abscess. Small lower midline suspected abscess measuring 3.0 x 2.7 x 3.5 cm. There is a right mid abdominal pigtail drainage catheter which may terminate within the colon series 2 image 80. GI tract/mesentery/omentum: Multiple prior bowel resections are noted. Suspected fistula in the left mid abdomen series 2 image 103. Possible pigtail drainage catheter within the ascending colon series 2 image 80. No bowel obstruction is identified. Urinary Bladder: The bladder is grossly unremarkable. Reproductive organs: Unremarkable. Body wall: Mild anasarca. The dominant abscess extends into the anterior abdominal wall along the surgical incision series 2 image 73. Bones:  There are mild degenerative changes in the spine.     CONCLUSION: 1.  Multiple large peritoneal abscesses are seen. The largest is centered in the left upper quadrant and left midabdomen and measures 24.3 x 10.9 x 23.9 cm with air-fluid levels and extends into the abdominal wall. This is contiguous with smaller right upper quadrant  perihepatic and posterior left upper quadrant perisplenic abscesses. A separate small 3 cm abscess is noted adjacent to the bladder. Bladder diverticulum is thought less likely. 2.  Multiple prior bowel resections are seen. There may be fistulous connection between the bowel and left upper quadrant dominant abscess at the left mid abdomen. The location of a right-sided pigtail drainage catheter is uncertain but it may terminate within the ascending colon. 3.  Additional chronic and/or incidental findings are detailed in the body of this report. Preliminary report was given by [facility]. No clinically significant discrepancies are noted. Electronically Verified and Signed by Attending Radiologist: Gelacio Bullock MD 2025 7:14 AM Workstation: [id number]    CARD TTE STRAIN W DOPPLER ONCOLOGY (CPT=93306)  Result Date: 2025  Transthoracic Echocardiogram Name:Arnaldo Gutierrez Date: 2025 :  04/15/1945 Ht:  (71in)  BP: 101 / 61 MRN:  [id number]    Age:  80years    Wt:  (171lb) HR: 88bpm Loc:  [facility]       Gndr: M          BSA: 1.97m^2 Sonographer: Amelia CHERY Ordering:    Crys Wilkins Consulting:  Rafita Torres ---------------------------------------------------------------------------- History/Indications:   Encounter for Monitoring Cardiotoxic Drug Therapy. ---------------------------------------------------------------------------- Procedure information:  A transthoracic complete 2D study was performed. Additional evaluation included M-mode, complete spectral Doppler, and color Doppler.  Patient status:  Inpatient.  Location:  [facility] laboratory.    This was a routine study. Transthoracic echocardiography for diagnosis. Image quality was adequate. ECG rhythm:   Normal sinus ---------------------------------------------------------------------------- Conclusions: 1. Left ventricle: The cavity size was normal. Wall thickness was normal.    Systolic function was normal. The estimated ejection fraction was  60-65%,    by biplane method of disks. Wall motion is normal; there are no regional    wall motion abnormalities. Left ventricular diastolic function parameters    were normal. The Global Longitudinal Strain (GLS) was -19.40%. 2. Mitral valve: There was mild regurgitation. * ---------------------------------------------------------------------------- * Findings: Left ventricle:  The cavity size was normal. Wall thickness was normal. Systolic function was normal. The estimated ejection fraction was 60-65%, by biplane method of disks. Wall motion is normal; there are no regional wall motion abnormalities. The Global Longitudinal Strain (GLS) was -19.40%. Left ventricular diastolic function parameters were normal. Left atrium:  Well visualized. The atrium was normal in size. Right ventricle:  The cavity size was normal. Systolic function was normal. Systolic pressure was within the normal range. Right atrium:  Well visualized. The atrium was normal in size. Mitral valve:  Well visualized. The leaflets were normal thickness. No evidence for prolapse.  Doppler:  Transvalvular velocity was within the normal range. There was no evidence for stenosis. There was mild regurgitation. Aortic valve:  Well visualized.  The valve was trileaflet. The leaflets were normal thickness.  Doppler:  Transvalvular velocity was within the normal range. There was no evidence for stenosis. There was trivial regurgitation. Tricuspid valve:  Well visualized. The annulus is normal-sized. The leaflets are normal thickness. No echocardiographic evidence for tricuspid prolapse. Doppler:  Transvalvular velocity was within the normal range. There was no evidence for stenosis. There was trivial regurgitation. Pulmonic valve:   Well visualized. The annulus is normal-sized. The leaflets are normal thickness. No evidence for prolapse.  Doppler:  Transvalvular velocity was within the normal range. There was no evidence for stenosis. There was no  significant regurgitation. Pericardium:   There was no pericardial effusion. Pleura:  No evidence of pleural fluid accumulation. Aorta: Aortic root: The aortic root was normal. Ascending aorta: The ascending aorta was normal. Pulmonary arteries: There was no evidence of pulmonary hypertension. Systemic veins:  Central venous respirophasic diameter changes are in the normal range (>50%). Inferior vena cava: The IVC was normal-sized. ---------------------------------------------------------------------------- Measurements  Left ventricle                   Value         Ref  GLS, 2D                          -19.40 %      ---------  IVS thickness, ED, PLAX          0.9    cm     0.6 - 1.0  LV ID, ED, PLAX              (L) 3.5    cm     4.2 - 5.8  LV ID, ES, PLAX              (L) 2.1    cm     2.5 - 4.0  LV PW thickness, ED, PLAX        0.8    cm     0.6 - 1.0  IVS/LV PW ratio, ED, PLAX        1.13          ---------  LV PW/LV ID ratio, ED, PLAX      0.23          ---------  LV area, ES, A4C                 19.3   cm^2   ---------  LV ejection fraction             72     %      52 - 72  Stroke volume/bsa, 2D            43     ml/m^2 ---------  LV end-diastolic volume, 1-p     87     ml     69 - 185  A4C  LV end-systolic volume, 1-p      47     ml     22 - 78  A4C  LV ejection fraction, 1-p        57     %      46 - 74  A4C  Stroke volume, 1-p A4C           50     ml     ---------  LV end-diastolic volume/bsa,     44     ml/m^2 37 - 93  1-p A4C  LV end-systolic volume/bsa,      24     ml/m^2 12 - 40  1-p A4C  Stroke volume/bsa, 1-p A4C       25     ml/m^2 ---------  LV end-diastolic volume, 2-p     81     ml     62 - 150  LV end-systolic volume, 2-p      32     ml     21 - 61  LV ejection fraction, 2-p        60     %      52 - 72  Stroke volume, 2-p               48     ml     ---------  LV end-diastolic volume/bsa,     41     ml/m^2 34 - 74  2-p  LV end-systolic volume/bsa,      16     ml/m^2 11 - 31  2-p  Stroke  volume/bsa, 2-p           24.5   ml/m^2 ---------  LV e', lateral                   14.9   cm/sec >=10.0  LV E/e', lateral                 6             <=13  LV e', medial                    12.5   cm/sec >=7.0  LV E/e', medial                  7             ---------  LV e', average                   13.7   cm/sec ---------  LV E/e', average                 6             <=14  LVOT                             Value         Ref  LVOT ID                          2.1    cm     ---------  LVOT peak velocity, S            1.22   m/sec  ---------  LVOT VTI, S                      24.7   cm     ---------  LVOT peak gradient, S            6      mm Hg  ---------  LVOT mean gradient, S            3      mm Hg  ---------  Stroke volume (SV), LVOT DP      86     ml     ---------  Stroke index (SV/bsa), LVOT      43     ml/m^2 ---------  DP  Aortic valve                     Value         Ref  Aortic leaflet separation,       1.8    cm     ---------  MM  Aortic root                      Value         Ref  Aortic root ID, STJ, ED      (H) 3.6    cm     2.3 - 3.5  Ascending aorta                  Value         Ref  Ascending aorta ID               3.6    cm     2.2 - 3.8  Left atrium                      Value         Ref  LA volume, S                 (H) 62     ml     18 - 58  LA volume/bsa, S                 31     ml/m^2 16 - 34  LA volume, ES, 1-p A4C           53     ml     18 - 58  LA volume, ES, 1-p A2C       (H) 70     ml     18 - 58  LA volume, ES, A/L               68     ml     ---------  LA volume/bsa, ES, A/L           34     ml/m^2 16 - 34  Mitral valve                     Value         Ref  Mitral E-wave peak velocity      0.88   m/sec  ---------  Mitral A-wave peak velocity      0.99   m/sec  ---------  Mitral deceleration time         180    ms     ---------  Mitral peak gradient, D          3      mm Hg  ---------  Mitral E/A ratio, peak           0.9           ---------  Tricuspid valve                  Value          Ref  Tricuspid regurg peak            2.52   m/sec  <=2.8  velocity  Tricuspid peak RV-RA             25     mm Hg  ---------  gradient  Right ventricle                  Value         Ref  TAPSE, 2D                        2.50   cm     >=1.70  TAPSE, MM                        2.50   cm     >=1.70  RV s', lateral                   22.6   cm/sec >=9.5 Legend: (L)  and  (H)  farida values outside specified reference range. ---------------------------------------------------------------------------- Prepared and electronically signed by Ernst Parada 06/24/2025 13:42     US VENOUS DOPPLER LEG BILAT - DIAG IMG (CPT=93970)  Result Date: 6/24/2025  PROCEDURE: US VENOUS DOPPLER LEG BILAT - DIAG IMG (CPT=93970) INDICATIONS: right calf pain, r/o dvt COMPARISON: None TECHNIQUE: Color duplex Doppler venous ultrasound of both lower extremities was performed in the usual manner. FINDINGS: Thrombus is present within the right common femoral vein as well as in the adjacent deep femoral vein draining into the common femoral vein. There is also nonocclusive thrombus within the proximal and distal aspect of the popliteal vein thrombus is otherwise expansile with heterogeneous grayscale appearance and there is absence of normal color flow and compressibility in these regions. The femoral vein appears normal. Normal flow was demonstrated with color and pulsed Doppler. Visualized portions of the great and small saphenous, posterior tibial and peroneal veins appear normal. Contralateral left lower extremity is without DVT within the common femoral, femoral, and popliteal veins. There is occlusive thrombus within a left soleal vein based on abnormal grayscale appearance, Vansil morphology of the vein and absence of normal color flow and Doppler venous waveforms.     CONCLUSION: 1. Abnormal exam. Right lower extremity has nonocclusive acute appearing thrombus within the common femoral vein as well as the visualized portions of the deep  femoral vein. There is also nonocclusive thrombus within the right popliteal vein. 2. No DVT in the left lower extremity, however there is acute occlusive thrombus within a left soleal vein at the knee. Electronically Verified and Signed by Attending Radiologist: Jason Quiñones MD 6/24/2025 12:45 PM Workstation: [id number]    IR PERITONEAL DRAINAGE CATHETER  Result Date: 6/20/2025  PROCEDURE: IR PERITONEAL DRAINAGE CATHETER  INDICATIONS:  Status post colon resection, with postop intraperitoneal fluid  COMPARISON: None.  (S):  [place]  ESTIMATED BLOOD LOSS: Less than 5 mL  COMPLICATIONS: None  FINDINGS:  Informed consent was obtained. The patient was positioned supine. The right lower quadrant was sterilely prepped and draped. Preliminary ultrasound demonstrated complex appearing septated fluid collection.. Local Lidocaine was administered. Under ultrasound guidance, a moziy sheath needle was advanced into the collection. There was return of serosanguineous fluid from the access needle. Under ultrasound guidance, a wire was advanced through the needle. The access was dilated and a 10 Georgian pigtail catheter was coiled in the collection.  Via the drain, approximately 260 cc of serosanguineous fluid were aspirated.  Sample was sent for cultures and sensitivities.  The catheter was secured to the skin with sutures and attached to bulb suction.  Sterile dressing applied.         CONCLUSION: Insertion of drainage catheter into intra-abdominal ascites yielding 260 cc serosanguineous fluid.    Dictated by (CST): Norris Reilly MD on 6/20/2025 at 6:09 PM     Finalized by (CST): Norris Relily MD on 6/20/2025 at 6:10 PM          CT ABDOMEN+PELVIS(CONTRAST ONLY)(CPT=74177)  Result Date: 6/19/2025  PROCEDURE: CT ABDOMEN + PELVIS (CONTRAST ONLY) (CPT=74177)  COMPARISON: [facility], CT ABDOMEN + PELVIS (CONTRAST ONLY) (CPT=74177), 6/05/2025, 5:22 PM.  INDICATIONS: S/p colon resection 6/10, leukocytosis and  increased pain  TECHNIQUE: CT images of the abdomen and pelvis were obtained with non-ionic intravenous contrast material.  Automated exposure control for dose reduction was used. Adjustment of the mA and/or kV was done based on the patient's size. Use of iterative reconstruction technique for dose reduction was used.  Dose information is transmitted to the ACR (American College of Radiology) NRDR (National Radiology Data Registry) which includes the Dose Index Registry.  FINDINGS:  LIVER: No enlargement, atrophy, abnormal density, or significant focal lesion.  BILIARY: Intrahepatic biliary ductal dilatation.  Gallbladder is incompletely distended.  No significant extrahepatic biliary ductal dilatation. SPLEEN: Spleen is not enlarged. There are granulomatous calcifications in the spleen. No suspicious splenic lesion. STOMACH: No gastric obstruction.  Duodenum is distended with oral contrast material but is otherwise without evidence of obstruction or inflammation. PANCREAS: No lesion, fluid collection, ductal dilatation, or atrophy.  ADRENALS: No nodule or enlargement. KIDNEYS: Cortical based low density focus in the posterior midpole of the right kidney suggesting simple cyst.  Bilaterally no suspicious enhancing renal lesion or hydroureteronephrosis. AORTA/VASCULAR:   Atherosclerosis of the abdominal aorta.  No aneurysm. BOWEL/MESENTERY:  Peripherally enhancing fluid collection in the left lower quadrant, above the roof of the urinary bladder and measuring approximately 55 x 47 x 58 mm (AP x transverse x CC).  There is also a moderate-large volume of free fluid within the abdomen and pelvis mainly along the peripheral aspect of the liver and spleen, both pericolic gutters and across the omentum.  Moderate volume of foci of gas are present within this fluid.  There is peritoneal thickening/enhancement in both pericolic  gutters.  No evidence of bowel obstruction.  No lymphadenopathy.  Post large bowel resection  with reanastomosis in the region of the hepatic flexure without focal obstruction or inflammatory change at the anastomosis. ABDOMINAL WALL: Ventral line of skin staples are present above the umbilicus.  Additional scattered skin staples are present in the abdominal-pelvic anterior wall.  Mild body wall edema. URINARY BLADDER: Urinary bladder is distended with urine.  No stones or wall thickening. PELVIC NODES: No enlarged mass or adenopathy.   PELVIC ORGANS: No visible mass.  Pelvic organs appropriate for patient age.  BONES:   Mild endplate change and disc disease within the spine most advanced at L5-S1. LUNG BASES: Small-moderate sized bilateral pleural effusion.  Enhancing wedge-shaped pulmonary opacities with air bronchograms adjacent to the effusions likely representing secondary compressive atelectasis.  The visualized heart has coronary artery calcifications. OTHER: Negative.          CONCLUSION:  1.  Post large bowel resection in the region of the proximal transverse colon/hepatic flexure.  No obstruction or inflammation at the surgical site.  There is a moderate-large volume of free fluid within the abdomen and pelvis mainly distributed in both upper quadrants, across the omentum, and in the pericolic gutters.  Moderate volume of free air is also present in the same distribution as the fluid.  These may be related to recent surgery.  Organized fluid collection in the left lower retroperitoneum/left lower quadrant measuring approximately 55 x 47 x 58 mm which could represent a seroma, liquified hematoma, or other inflammatory fluid collection.  Peritoneal enhancement and thickening in the both pericolic gutters which could represent phlegmonous change, early organization of fluid collections with similar differential as the aforementioned organized collection, or peritonitis. 2.  Moderate-sized bilateral pleural effusions.  Enhancing bibasilar pulmonary opacities with air bronchograms likely representing  secondary compressive atelectasis. 3.  Coronary atherosclerosis. 4.  Right renal cyst. 5.  Mild body wall edema/anasarca.  Skin staples across the abdominal-pelvic anterior wall compatible with surgical access sites.   Dictated by (CST): Jason Quiñones MD on 6/19/2025 at 5:29 PM     Finalized by (CST): Jason Quiñones MD on 6/19/2025 at 5:36 PM            Assessment/Plan:  Problem List[1]  Stable   cpm  THUY ALSTON MD  7/18/2025  10:30 AM       [1]   Patient Active Problem List  Diagnosis    BPH (benign prostatic hyperplasia)    Chronic low back pain    Chronic pain of both knees    Depression with anxiety    Generalized osteoarthritis    Hyperlipidemia    Type 2 diabetes mellitus without complication, without long-term current use of insulin (HCC)    Essential hypertension    Bipolar I disorder, single manic episode (HCC)    Large bowel obstruction (HCC)    Colonic mass    Diffuse large B-cell lymphoma of solid organ excluding spleen    Pneumoperitoneum    Abdominal distension    Anasarca    Intraabdominal fluid collection    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

## 2025-07-18 NOTE — PLAN OF CARE
A&Ox4. Denies pain overnight. Tolerating diet, no complaints of n/v. Loose stools overnight. Voiding via primofit. LUQ and RUQ drains in place. IV and PO abt continued. Safety measures in place, call light within reach.     Problem: Patient Centered Care  Goal: Patient preferences are identified and integrated in the patient's plan of care  Description: Interventions:  - What would you like us to know as we care for you? I came from [facility]  - Provide timely, complete, and accurate information to patient/family  - Incorporate patient and family knowledge, values, beliefs, and cultural backgrounds into the planning and delivery of care  - Encourage patient/family to participate in care and decision-making at the level they choose  - Honor patient and family perspectives and choices  Outcome: Progressing     Problem: Diabetes/Glucose Control  Goal: Glucose maintained within prescribed range  Description: INTERVENTIONS:  - Monitor Blood Glucose as ordered  - Assess for signs and symptoms of hyperglycemia and hypoglycemia  - Administer ordered medications to maintain glucose within target range  - Assess barriers to adequate nutritional intake and initiate nutrition consult as needed  - Instruct patient on self management of diabetes  Outcome: Progressing     Problem: Patient/Family Goals  Goal: Patient/Family Long Term Goal  Description: Patient's Long Term Goal: Discharge  home    Interventions:  -Monitor vitals  -Monitor labs  - Heparin gtt  - Glucose monitoring ACHS/q6h  - Monitor and manage NGOZI drains  - PT/OT on consult  - IR on consult  - Manage pain   - Monitor midline incision  - Remote tele   - IV abx  - ID onc onsult  - Gen sx on consult   - See additional Care Plan goals for specific interventions  Outcome: Progressing  Goal: Patient/Family Short Term Goal  Description: Patient's Short Term Goal: Manage pain    Interventions:   - Frequent pain assessments  - Non-pharmacological interventions  - Pain  medications as needed/indicated  - See additional Care Plan goals for specific interventions  Outcome: Progressing     Problem: PAIN - ADULT  Goal: Verbalizes/displays adequate comfort level or patient's stated pain goal  Description: INTERVENTIONS:  - Encourage pt to monitor pain and request assistance  - Assess pain using appropriate pain scale  - Administer analgesics based on type and severity of pain and evaluate response  - Implement non-pharmacological measures as appropriate and evaluate response  - Consider cultural and social influences on pain and pain management  - Manage/alleviate anxiety  - Utilize distraction and/or relaxation techniques  - Monitor for opioid side effects  - Notify MD/LIP if interventions unsuccessful or patient reports new pain  - Anticipate increased pain with activity and pre-medicate as appropriate  Outcome: Progressing     Problem: RISK FOR INFECTION - ADULT  Goal: Absence of fever/infection during anticipated neutropenic period  Description: INTERVENTIONS  - Monitor WBC  - Administer growth factors as ordered  - Implement neutropenic guidelines  Outcome: Progressing     Problem: SAFETY ADULT - FALL  Goal: Free from fall injury  Description: INTERVENTIONS:  - Assess pt frequently for physical needs  - Identify cognitive and physical deficits and behaviors that affect risk of falls.  - [place] fall precautions as indicated by assessment.  - Educate pt/family on patient safety including physical limitations  - Instruct pt to call for assistance with activity based on assessment  - Modify environment to reduce risk of injury  - Provide assistive devices as appropriate  - Consider OT/PT consult to assist with strengthening/mobility  - Encourage toileting schedule  Outcome: Progressing     Problem: HEMATOLOGIC - ADULT  Goal: Free from bleeding injury  Description: (Example usage: patient with low platelets)  INTERVENTIONS:  - Avoid intramuscular injections, enemas and rectal  medication administration  - Ensure safe mobilization of patient  - Hold pressure on venipuncture sites to achieve adequate hemostasis  - Assess for signs and symptoms of internal bleeding  - Monitor lab trends  - Patient is to report abnormal signs of bleeding to staff  - Avoid use of toothpicks and dental floss  - Use electric shaver for shaving  - Use soft bristle tooth brush  - Limit straining and forceful nose blowing  Outcome: Progressing  Goal: Maintains hematologic stability  Description: INTERVENTIONS  - Assess for signs and symptoms of bleeding or hemorrhage  - Monitor labs and vital signs for trends  - Administer supportive blood products/factors, fluids and medications as ordered and appropriate  - Administer supportive blood products/factors as ordered and appropriate  Outcome: Progressing  Goal: Free from bleeding injury  Description: (Example usage: patient with low platelets)  INTERVENTIONS:  - Avoid intramuscular injections, enemas and rectal medication administration  - Ensure safe mobilization of patient  - Hold pressure on venipuncture sites to achieve adequate hemostasis  - Assess for signs and symptoms of internal bleeding  - Monitor lab trends  - Patient is to report abnormal signs of bleeding to staff  - Avoid use of toothpicks and dental floss  - Use electric shaver for shaving  - Use soft bristle tooth brush  - Limit straining and forceful nose blowing  Outcome: Progressing     Problem: GASTROINTESTINAL - ADULT  Goal: Minimal or absence of nausea and vomiting  Description: INTERVENTIONS:  - Maintain adequate hydration with IV or PO as ordered and tolerated  - Nasogastric tube to low intermittent suction as ordered  - Evaluate effectiveness of ordered antiemetic medications  - Provide nonpharmacologic comfort measures as appropriate  - Advance diet as tolerated, if ordered  - Obtain nutritional consult as needed  - Evaluate fluid balance  Outcome: Progressing  Goal: Maintains or returns to  baseline bowel function  Description: INTERVENTIONS:  - Assess bowel function  - Maintain adequate hydration with IV or PO as ordered and tolerated  - Evaluate effectiveness of GI medications  - Encourage mobilization and activity  - Obtain nutritional consult as needed  - Establish a toileting routine/schedule  - Consider collaborating with pharmacy to review patient's medication profile  Outcome: Progressing  Goal: Maintains adequate nutritional intake (undernourished)  Description: INTERVENTIONS:  - Monitor percentage of each meal consumed  - Identify factors contributing to decreased intake, treat as appropriate  - Assist with meals as needed  - Monitor I&O, WT and lab values  - Obtain nutritional consult as needed  - Optimize oral hygiene and moisture  - Encourage food from home; allow for food preferences  - Enhance eating environment  Outcome: Progressing     Problem: DISCHARGE PLANNING  Goal: Discharge to home or other facility with appropriate resources  Description: INTERVENTIONS:  - Identify barriers to discharge w/pt and caregiver  - Include patient/family/discharge partner in discharge planning  - Arrange for needed discharge resources and transportation as appropriate  - Identify discharge learning needs (meds, wound care, etc)  - Arrange for interpreters to assist at discharge as needed  - Consider post-discharge preferences of patient/family/discharge partner  - Complete POLST form as appropriate  - Assess patient's ability to be responsible for managing their own health  - Refer to Case Management Department for coordinating discharge planning if the patient needs post-hospital services based on physician/LIP order or complex needs related to functional status, cognitive ability or social support system  Outcome: Progressing     Problem: Altered Communication/Language Barrier  Goal: Patient/Family is able to understand and participate in their care  Description: Interventions:  - Assess  communication ability and preferred communication style  - Implement communication aides and strategies  - Use visual cues when possible  - Listen attentively, be patient, do not interrupt  - Minimize distractions  - Allow time for understanding and response  - Establish method for patient to ask for assistance (call light)  - Provide an  as needed  - Communicate barriers and strategies to overcome with those who interact with patient  Outcome: Progressing     Problem: CARDIOVASCULAR - ADULT  Goal: Maintains optimal cardiac output and hemodynamic stability  Description: INTERVENTIONS:  - Monitor vital signs, rhythm, and trends  - Monitor for bleeding, hypotension and signs of decreased cardiac output  - Evaluate effectiveness of vasoactive medications to optimize hemodynamic stability  - Monitor arterial and/or venous puncture sites for bleeding and/or hematoma  - Assess quality of pulses, skin color and temperature  - Assess for signs of decreased coronary artery perfusion - ex. Angina  - Evaluate fluid balance, assess for edema, trend weights  Outcome: Progressing  Goal: Absence of cardiac arrhythmias or at baseline  Description: INTERVENTIONS:  - Continuous cardiac monitoring, monitor vital signs, obtain 12 lead EKG if indicated  - Evaluate effectiveness of antiarrhythmic and heart rate control medications as ordered  - Initiate emergency measures for life threatening arrhythmias  - Monitor electrolytes and administer replacement therapy as ordered  Outcome: Progressing     Problem: GENITOURINARY - ADULT  Goal: Absence of urinary retention  Description: INTERVENTIONS:  - Assess patient’s ability to void and empty bladder  - Monitor intake/output and perform bladder scan as needed  - Follow urinary retention protocol/standard of care  - Consider collaborating with pharmacy to review patient's medication profile  - Implement strategies to promote bladder emptying  Outcome: Progressing     Problem:  METABOLIC/FLUID AND ELECTROLYTES - ADULT  Goal: Electrolytes maintained within normal limits  Description: INTERVENTIONS:  - Monitor labs and rhythm and assess patient for signs and symptoms of electrolyte imbalances  - Administer electrolyte replacement as ordered  - Monitor response to electrolyte replacements, including rhythm and repeat lab results as appropriate  - Fluid restriction as ordered  - Instruct patient on fluid and nutrition restrictions as appropriate  Outcome: Progressing  Goal: Hemodynamic stability and optimal renal function maintained  Description: INTERVENTIONS:  - Monitor labs and assess for signs and symptoms of volume excess or deficit  - Monitor intake, output and patient weight  - Monitor urine specific gravity, serum osmolarity and serum sodium as indicated or ordered  - Monitor response to interventions for patient's volume status, including labs, urine output, blood pressure (other measures as available)  - Encourage oral intake as appropriate  - Instruct patient on fluid and nutrition restrictions as appropriate  Outcome: Progressing     Problem: SKIN/TISSUE INTEGRITY - ADULT  Goal: Skin integrity remains intact  Description: INTERVENTIONS  - Assess and document risk factors for pressure ulcer development  - Assess and document skin integrity  - Monitor for areas of redness and/or skin breakdown  - Initiate interventions, skin care algorithm/standards of care as needed  Outcome: Progressing  Goal: Incision(s), wounds(s) or drain site(s) healing without S/S of infection  Description: INTERVENTIONS:  - Assess and document risk factors for pressure ulcer development  - Assess and document skin integrity  - Assess and document dressing/incision, wound bed, drain sites and surrounding tissue  - Implement wound care per orders  - Initiate isolation precautions as appropriate  - Initiate Pressure Ulcer prevention bundle as indicated  Outcome: Progressing  Goal: Oral mucous membranes remain  intact  Description: INTERVENTIONS  - Assess oral mucosa and hygiene practices  - Implement preventative oral hygiene regimen  - Implement oral medicated treatments as ordered  Outcome: Progressing     Problem: COPING  Goal: Pt/Family able to verbalize concerns and demonstrate effective coping strategies  Description: INTERVENTIONS:  - Assist patient/family to identify coping skills, available support systems and cultural and spiritual values  - Provide emotional support, including active listening and acknowledgement of concerns of patient and caregivers  - Reduce environmental stimuli, as able  - Instruct patient/family in relaxation techniques, as appropriate  - Assess for spiritual and psychosocial needs and initiate Spiritual Care or Behavioral Health consult as needed  Outcome: Progressing

## 2025-07-18 NOTE — PROGRESS NOTES
INFECTIOUS DISEASE PROGRESS NOTE    Arnaldo Gutierrez Patient Status:  Inpatient    4/15/1945 MRN [id number]   Location [facility] 4W/SW/SE Attending Declan Stearns MD   Hosp Day # 13 PCP PHYSICIAN NONSTAFF       SUBJECTIVE  ROS done. Afebrile. Had some diarrhea last night.    ASSESSMENT/PLAN:    Antibiotics: Zerbaxa, dapto, flagyl, micafungin; (IV zosyn, fluconazole, augmentin, cefepime)     ASSESSMENT:     # Multiple large intra-abdominal abscesses               - now s/p IR drain placement on 25               - fluid cx so far with E. Coli, Enterobacter cloacae, Enterococcus faecalis, C. glabrata, MDR PSAR, Lactobacillus               - surgery and IR following  # RUQ abscess s/p IR drainage , cx Enterobacter hormaechei, C. Glabrata   -S/p RLQ abdominal drain removal   # RUE DVT  # hx of Large bowel obstruction s/p OR 6/10/25 for lap transverse colon resection, small bowel resection x2, rsection of tumor nodule on small bowel - path with DLBCL               - s/p colonic stent placement by GI 25               - post op fluid collection s/p IR drain placement 25 with 260 cc of serosang fluid, cx negative      PLAN:  -  Continue on IV daptomycin, zerbaxa, metronidazole, and micafungin x three weeks (EOT 25) with repeat CT A/P as outpatient. Port-a-cath in place.  -  Follow fever curve, wbc.  -  Reviewed labs, micro, imaging reports.  -  Case d/w patient, RN.     History of Present Illness:  80 year old male with history of diabetes, HTN, BPH who was admitted on  with abdominal bloating, nausea, decreased appetite, unintentional weight loss. Initial CT A/P on  with 4 cm segment of masslike constriction and thickening in the proximal transverse colon with concern for partially obstructing chronic neoplasm. Seen by GI and underwent colonoscopy with chronic stent placement on . Also seen by general surgery and taken to the OR on 6/10 for large bowel obstruction status  post laparoscopic assisted transverse colon resection, small bowel resection and tumor nodule and small bowel. Pathology diffuse large B-cell lymphoma. Seen by oncology. On 6/19 had low-grade temperatures with increasing WBC up to 19 with repeat CT A/P showing moderate to large volume free fluid in the abdomen and pelvis in the upper quadrants. Organized fluid in the left lower retroperitoneum measuring 5.5 x 4.7 x 5.8 cm concern for seroma, liquefied hematoma versus inflammatory fluid. Seen by IR and underwent IR peritoneal drain placement on 6/19 with 260 cc of serosanguineous fluid. Cultures negative. Was on zosyn while inpatient and transitioned to PO augmentin on discharge. Discharged on 6/24/25 to subacute rehab. Came back to ED on 7/4/25 with abdominal pain. Repeat CT with multiple large peritoneal abscesses. Surgery and IR consult.  Now status post IR drainage with cx growing GNR and budding yeast.  Currently on IV Zosyn.  ID consulted for further antibiotics management.     OBJECTIVE  /50 (BP Location: Left leg)   Pulse 88   Temp 98 °F (36.7 °C) (Axillary)   Resp 18   Ht 5' 11\" (1.803 m)   Wt 156 lb 8.4 oz (71 kg)   SpO2 95%   BMI 21.83 kg/m²     General: Awake, in bed  HEENT: EOMI, no oral lesions  Abdomen: Soft, no TTP, RUQ drain in place, LLQ drain with tan output to gravity bag  Musculoskeletal: No edema  Integument: No rashes  Port-a-cath+    [facility]  [phone]

## 2025-07-18 NOTE — PROGRESS NOTES
07/18/25 1450   Closed Drain RUQ Bulb 12 Fr.   Placement Date/Time: 07/16/25 1619   Inserted by: JUVENCIO Cabrera MD  Location: RUQ  Drain Tube Type: Bulb  Size (Fr.): 12 Fr.   Dressing Status Clean;Dry;Intact   Drainage Appearance Serosanguineous   Status To bulb suction   Output (mL) 5 mL   Closed Drain LUQ Other (Comment) 22 Fr.   Placement Date/Time: 07/16/25 1626   Inserted by: JUVENCIO Cabrera MD  Location: LUQ  Drain Tube Type: (c) Other (Comment)  Size (Fr.): 22 Fr.   Drainage Appearance Cloudy;Milky;Purulent;Yellow   Status Open to gravity drainage   Output (mL) 75 mL

## 2025-07-18 NOTE — PROGRESS NOTES
[facility]  part of [facility]  Hospitalist Progress Note     Arnaldo Gutierrez Patient Status:  Inpatient    4/15/1945  80 year old CSN [id number]   Location 454/454-A Attending Declan Stearns MD   Hosp Day # 13 PCP PHYSICIAN NONSTAFF     Subjective:   ----------------------------------  Pt was seen and examined.  No cp, sob, f,c,n,v abd pain or HA   Resting in bed comfortably  Family at bedside     Objective:   Chief Complaint:   Chief Complaint   Patient presents with    Abdomen/Flank Pain     ----------------------------------  Temp:  [98 °F (36.7 °C)-99 °F (37.2 °C)] 98.9 °F (37.2 °C)  Pulse:  [87-96] 87  Resp:  [16-18] 16  BP: (107-123)/(53-56) 123/54  SpO2:  [92 %-95 %] 92 %    Gen: A+Ox3.  No distress.   CV: RRR, S1S2, and intact distal pulses. No gallop, rub, murmur.  Pulm: Effort and breath sounds normal. No distress, wheezes, rales, rhonchi.  Abd: Soft, nontender nondistended.  Right drain with serous output.  L drain with purulent output   Neuro: Normal reflexes, CN. Sensory/motor exams grossly normal deficit.   Psych: Normal mood and affect. Calm, cooperative    Labs:  Lab Results   Component Value Date    HGB 8.3 (L) 2025    WBC 9.0 2025    .0 2025     2025    K 3.5 07/15/2025    CREATSERUM 0.52 (L) 2025    AST 22 2025    ALT 16 2025           Scheduled Medications[1]  PRN Medications[2]        Assessment & Plan:   ----------------------------------    Disposition  CM/SW on board to assist with placement    Intra-abdominal abscesses.  Status post IR drainage x2 .  Small bowel follow-through  negative for leak.  Repeat IR drainage  for perihepatic fluid collection.  Port placement 7/15  - Pain control  - S/p 25 abscessogram x 3, drain exchange x 2, removal of right lower drain in subhepatic region   - ID, general surgery on consult  - Continue zerbaxa, daptomycin, Flagyl, Micafungin    DVT, acute.  Location  is RUE (new), RLE (old).  Pulmonary embolism not present. Contributing factors malignancy, immobility.  -Anticoagulation: Heparin drip, change to p.o after procedures are completely done  -Hypercoagulable work-up not indicated  -Light activity as tolerated    Small pneumothorax resolved    B-cell lymphoma.  Recent diagnosis.  - Oncology  - Port placement 7/15  - close opt follow up    Other problems  Hypertension  Dyslipidemia  GERD  BPH  Anxiety  Depression    Dispo: Stable for dc to SNF, transport set up for tomorrow.      Supplementary Documentation:   DVT Mechanical Prophylaxis:     Early ambuation  DVT Pharmacologic Prophylaxis   Medication    heparin (Porcine) 100 Units/mL lock flush 150 Units    heparin (Porcine) 57244 units/250mL infusion PE/DVT/THROMBUS CONTINUOUS                Code Status: Full Code  Muñoz: External urinary catheter in place  Muñoz Duration (in days):   Central line:    ANNELISE: 7/19/2025           [1]    potassium phosphate dibasic 15 mmol in sodium chloride 0.9% 250 mL IVPB  15 mmol Intravenous Once    Followed by    potassium chloride  20 mEq Intravenous Once    heparin  1.5 mL Intravenous Once    insulin aspart  1-5 Units Subcutaneous TID CC and HS    FLUoxetine  20 mg Oral Daily    pregabalin  100 mg Oral TID    tamsulosin  0.8 mg Oral After dinner    vancomycin  125 mg Oral Daily    ceftolozane-tazobactam (ZERBAXA) 3 g in sodium chloride 0.9% 100 mL IVPB  3 g Intravenous Q8H    DAPTOmycin  500 mg Intravenous Q24H    micafungin  100 mg Intravenous Q24H    metroNIDAZOLE  500 mg Oral BID    pantoprazole  40 mg Intravenous Daily   [2]   HYDROcodone-acetaminophen    HYDROcodone-acetaminophen    HYDROmorphone **OR** HYDROmorphone    glycerin-hypromellose-    melatonin    temazepam    glucose **OR** glucose **OR** glucose-vitamin C **OR** dextrose **OR** glucose **OR** glucose **OR** glucose-vitamin C    acetaminophen    ondansetron    metoclopramide

## 2025-07-18 NOTE — CM/SW NOTE
07/18/25 1147   Prior Authorization - Destination   Prior Authorization Status Approved   Prior Authorization Start Date 07/19/25   Prior Authorization Number Auth ID [id number]  Approved from 7/19-7/22 2025     BIANKA notified Pippa Loomis of Auth.  BIANKA requested auth verification from Pippa Loomis due to cost of meds.    BIANKA will continue to follow     SHAMA Ridley ext [phone]

## 2025-07-18 NOTE — CM/SW NOTE
BIANKA/ANTHONY supervisor scheduled [facility]  for tomorrow at 1pm for transfer to [facility] for LACHO. Ciro Smith at [facility] notified CM that facility is unsure of time of delivery of IV Zerbaxa, and would like to know if dose can accompany patient upon his arrival for LACHO.    ANTHONY confirmed with ID that pt's 5:30pm scheduled dose can be given at 2:30pm and that 1:30am dose can be skipped. ID confirmed medication can be restarted for 9:30am dose on Sunday.    Dhaval Smith confirmed that facility will be able to accept patient on Saturday after 2:30pm IV Zerbaxa infusion.     [facility]  rescheduled for 4:30pm on Saturday.    Plan: BTE for LACHO on Saturday at 4:30pm via [facility].    Glenn Khoury, EDOUARDN    [phone]

## 2025-07-18 NOTE — PLAN OF CARE
No acute changes. Vitals stable. Afebrile. Zerbaxa, micafungin, cubicin, and flagyl for antibiotic coverage. PO vanco for GI prophylaxis. Heparin gtt continued. PTT therapeutic today. Chest port in place but not accessed.  Left PICC line in place. LUQ drain draining into mcfadden bag with purulent drainage noted. RUQ NGOZI drain in place. Q12 hr flushes; will endorse to oncoming RN.  Skin tear to sacrum; vasoline gauze and mepilex in place. Plan to transfer to [facility] tomorrow at 1pm via [facility]. Family and patient aware  frequent rounding, calls appropriately. All needs met at this time   Problem: Patient Centered Care  Goal: Patient preferences are identified and integrated in the patient's plan of care  Description: Interventions:  - What would you like us to know as we care for you? I came from [facility]  - Provide timely, complete, and accurate information to patient/family  - Incorporate patient and family knowledge, values, beliefs, and cultural backgrounds into the planning and delivery of care  - Encourage patient/family to participate in care and decision-making at the level they choose  - Honor patient and family perspectives and choices  Outcome: Progressing     Problem: Diabetes/Glucose Control  Goal: Glucose maintained within prescribed range  Description: INTERVENTIONS:  - Monitor Blood Glucose as ordered  - Assess for signs and symptoms of hyperglycemia and hypoglycemia  - Administer ordered medications to maintain glucose within target range  - Assess barriers to adequate nutritional intake and initiate nutrition consult as needed  - Instruct patient on self management of diabetes  Outcome: Progressing     Problem: Patient/Family Goals  Goal: Patient/Family Long Term Goal  Description: Patient's Long Term Goal: Discharge  home    Interventions:  -Monitor vitals  -Monitor labs  - Heparin gtt  - Glucose monitoring ACHS/q6h  - Monitor and manage NGOZI drains  - PT/OT on consult  - IR on consult  - Manage  pain   - Monitor midline incision  - Remote tele   - IV abx  - ID onc onsult  - Gen sx on consult   - See additional Care Plan goals for specific interventions  Outcome: Progressing  Goal: Patient/Family Short Term Goal  Description: Patient's Short Term Goal: Manage pain    Interventions:   - Frequent pain assessments  - Non-pharmacological interventions  - Pain medications as needed/indicated  - See additional Care Plan goals for specific interventions  Outcome: Progressing     Problem: PAIN - ADULT  Goal: Verbalizes/displays adequate comfort level or patient's stated pain goal  Description: INTERVENTIONS:  - Encourage pt to monitor pain and request assistance  - Assess pain using appropriate pain scale  - Administer analgesics based on type and severity of pain and evaluate response  - Implement non-pharmacological measures as appropriate and evaluate response  - Consider cultural and social influences on pain and pain management  - Manage/alleviate anxiety  - Utilize distraction and/or relaxation techniques  - Monitor for opioid side effects  - Notify MD/LIP if interventions unsuccessful or patient reports new pain  - Anticipate increased pain with activity and pre-medicate as appropriate  Outcome: Progressing     Problem: SAFETY ADULT - FALL  Goal: Free from fall injury  Description: INTERVENTIONS:  - Assess pt frequently for physical needs  - Identify cognitive and physical deficits and behaviors that affect risk of falls.  - Kinder fall precautions as indicated by assessment.  - Educate pt/family on patient safety including physical limitations  - Instruct pt to call for assistance with activity based on assessment  - Modify environment to reduce risk of injury  - Provide assistive devices as appropriate  - Consider OT/PT consult to assist with strengthening/mobility  - Encourage toileting schedule  Outcome: Progressing     Problem: DISCHARGE PLANNING  Goal: Discharge to home or other facility with  appropriate resources  Description: INTERVENTIONS:  - Identify barriers to discharge w/pt and caregiver  - Include patient/family/discharge partner in discharge planning  - Arrange for needed discharge resources and transportation as appropriate  - Identify discharge learning needs (meds, wound care, etc)  - Arrange for interpreters to assist at discharge as needed  - Consider post-discharge preferences of patient/family/discharge partner  - Complete POLST form as appropriate  - Assess patient's ability to be responsible for managing their own health  - Refer to Case Management Department for coordinating discharge planning if the patient needs post-hospital services based on physician/LIP order or complex needs related to functional status, cognitive ability or social support system  Outcome: Progressing     Problem: Altered Communication/Language Barrier  Goal: Patient/Family is able to understand and participate in their care  Description: Interventions:  - Assess communication ability and preferred communication style  - Implement communication aides and strategies  - Use visual cues when possible  - Listen attentively, be patient, do not interrupt  - Minimize distractions  - Allow time for understanding and response  - Establish method for patient to ask for assistance (call light)  - Provide an  as needed  - Communicate barriers and strategies to overcome with those who interact with patient  Outcome: Progressing     Problem: HEMATOLOGIC - ADULT  Goal: Free from bleeding injury  Description: (Example usage: patient with low platelets)  INTERVENTIONS:  - Avoid intramuscular injections, enemas and rectal medication administration  - Ensure safe mobilization of patient  - Hold pressure on venipuncture sites to achieve adequate hemostasis  - Assess for signs and symptoms of internal bleeding  - Monitor lab trends  - Patient is to report abnormal signs of bleeding to staff  - Avoid use of toothpicks and  dental floss  - Use electric shaver for shaving  - Use soft bristle tooth brush  - Limit straining and forceful nose blowing  Outcome: Progressing  Goal: Maintains hematologic stability  Description: INTERVENTIONS  - Assess for signs and symptoms of bleeding or hemorrhage  - Monitor labs and vital signs for trends  - Administer supportive blood products/factors, fluids and medications as ordered and appropriate  - Administer supportive blood products/factors as ordered and appropriate  Outcome: Progressing  Goal: Free from bleeding injury  Description: (Example usage: patient with low platelets)  INTERVENTIONS:  - Avoid intramuscular injections, enemas and rectal medication administration  - Ensure safe mobilization of patient  - Hold pressure on venipuncture sites to achieve adequate hemostasis  - Assess for signs and symptoms of internal bleeding  - Monitor lab trends  - Patient is to report abnormal signs of bleeding to staff  - Avoid use of toothpicks and dental floss  - Use electric shaver for shaving  - Use soft bristle tooth brush  - Limit straining and forceful nose blowing  Outcome: Progressing     Problem: GASTROINTESTINAL - ADULT  Goal: Minimal or absence of nausea and vomiting  Description: INTERVENTIONS:  - Maintain adequate hydration with IV or PO as ordered and tolerated  - Nasogastric tube to low intermittent suction as ordered  - Evaluate effectiveness of ordered antiemetic medications  - Provide nonpharmacologic comfort measures as appropriate  - Advance diet as tolerated, if ordered  - Obtain nutritional consult as needed  - Evaluate fluid balance  Outcome: Progressing  Goal: Maintains or returns to baseline bowel function  Description: INTERVENTIONS:  - Assess bowel function  - Maintain adequate hydration with IV or PO as ordered and tolerated  - Evaluate effectiveness of GI medications  - Encourage mobilization and activity  - Obtain nutritional consult as needed  - Establish a toileting  routine/schedule  - Consider collaborating with pharmacy to review patient's medication profile  Outcome: Progressing  Goal: Maintains adequate nutritional intake (undernourished)  Description: INTERVENTIONS:  - Monitor percentage of each meal consumed  - Identify factors contributing to decreased intake, treat as appropriate  - Assist with meals as needed  - Monitor I&O, WT and lab values  - Obtain nutritional consult as needed  - Optimize oral hygiene and moisture  - Encourage food from home; allow for food preferences  - Enhance eating environment  Outcome: Progressing     Problem: CARDIOVASCULAR - ADULT  Goal: Maintains optimal cardiac output and hemodynamic stability  Description: INTERVENTIONS:  - Monitor vital signs, rhythm, and trends  - Monitor for bleeding, hypotension and signs of decreased cardiac output  - Evaluate effectiveness of vasoactive medications to optimize hemodynamic stability  - Monitor arterial and/or venous puncture sites for bleeding and/or hematoma  - Assess quality of pulses, skin color and temperature  - Assess for signs of decreased coronary artery perfusion - ex. Angina  - Evaluate fluid balance, assess for edema, trend weights  Outcome: Progressing  Goal: Absence of cardiac arrhythmias or at baseline  Description: INTERVENTIONS:  - Continuous cardiac monitoring, monitor vital signs, obtain 12 lead EKG if indicated  - Evaluate effectiveness of antiarrhythmic and heart rate control medications as ordered  - Initiate emergency measures for life threatening arrhythmias  - Monitor electrolytes and administer replacement therapy as ordered  Outcome: Progressing     Problem: GENITOURINARY - ADULT  Goal: Absence of urinary retention  Description: INTERVENTIONS:  - Assess patient’s ability to void and empty bladder  - Monitor intake/output and perform bladder scan as needed  - Follow urinary retention protocol/standard of care  - Consider collaborating with pharmacy to review patient's  medication profile  - Implement strategies to promote bladder emptying  Outcome: Progressing     Problem: METABOLIC/FLUID AND ELECTROLYTES - ADULT  Goal: Electrolytes maintained within normal limits  Description: INTERVENTIONS:  - Monitor labs and rhythm and assess patient for signs and symptoms of electrolyte imbalances  - Administer electrolyte replacement as ordered  - Monitor response to electrolyte replacements, including rhythm and repeat lab results as appropriate  - Fluid restriction as ordered  - Instruct patient on fluid and nutrition restrictions as appropriate  Outcome: Progressing  Goal: Hemodynamic stability and optimal renal function maintained  Description: INTERVENTIONS:  - Monitor labs and assess for signs and symptoms of volume excess or deficit  - Monitor intake, output and patient weight  - Monitor urine specific gravity, serum osmolarity and serum sodium as indicated or ordered  - Monitor response to interventions for patient's volume status, including labs, urine output, blood pressure (other measures as available)  - Encourage oral intake as appropriate  - Instruct patient on fluid and nutrition restrictions as appropriate  Outcome: Progressing     Problem: SKIN/TISSUE INTEGRITY - ADULT  Goal: Skin integrity remains intact  Description: INTERVENTIONS  - Assess and document risk factors for pressure ulcer development  - Assess and document skin integrity  - Monitor for areas of redness and/or skin breakdown  - Initiate interventions, skin care algorithm/standards of care as needed  Outcome: Progressing  Goal: Incision(s), wounds(s) or drain site(s) healing without S/S of infection  Description: INTERVENTIONS:  - Assess and document risk factors for pressure ulcer development  - Assess and document skin integrity  - Assess and document dressing/incision, wound bed, drain sites and surrounding tissue  - Implement wound care per orders  - Initiate isolation precautions as appropriate  - Initiate  Pressure Ulcer prevention bundle as indicated  Outcome: Progressing  Goal: Oral mucous membranes remain intact  Description: INTERVENTIONS  - Assess oral mucosa and hygiene practices  - Implement preventative oral hygiene regimen  - Implement oral medicated treatments as ordered  Outcome: Progressing     Problem: COPING  Goal: Pt/Family able to verbalize concerns and demonstrate effective coping strategies  Description: INTERVENTIONS:  - Assist patient/family to identify coping skills, available support systems and cultural and spiritual values  - Provide emotional support, including active listening and acknowledgement of concerns of patient and caregivers  - Reduce environmental stimuli, as able  - Instruct patient/family in relaxation techniques, as appropriate  - Assess for spiritual and psychosocial needs and initiate Spiritual Care or Behavioral Health consult as needed  Outcome: Progressing

## 2025-07-18 NOTE — OCCUPATIONAL THERAPY NOTE
Chart reviewed, RN stated patient okay to attempt. Patient received while lying in bed and declining to participate with therapy at this time s/t fatigue. Will f/u as appropriate/able.    Kat Duval OTR/L  [facility]  #[id number]

## 2025-07-18 NOTE — CM/SW NOTE
07/18/25 1322   Discharge disposition   Expected discharge disposition subacute   Post Acute Care Provider Napoleon Arvizu  ([facility])   Discharge transportation [facility]     The pt. Is scheduled to discharge to [facility] tomorrow 7/19 at 1p, via medicar.  The pt's dtr. Is aware and agreeable to discharge and medicar costs.      PCS is complete in Epic, RN to print with AVS.     Report [phone]    SHAMA Ridley ext [phone]

## 2025-07-18 NOTE — PHYSICAL THERAPY NOTE
Attempted follow up treatment this AM, chart reviewed, RN approved participation in session. Pt rec'd in bed declining therapy at this time 2/2 fatigue. Will follow up at later time as schedule allows.

## 2025-07-18 NOTE — PROGRESS NOTES
07/18/25 1819   Closed Drain RUQ Bulb 12 Fr.   Placement Date/Time: 07/16/25 1619   Inserted by: JUVENCIO Cabrera MD  Location: RUQ  Drain Tube Type: Bulb  Size (Fr.): 12 Fr.   Dressing Status Clean;Dry;Intact   Drainage Appearance Serosanguineous;Milky   Status Irrigated;To bulb suction   Output (mL) 10 mL   Additional I/O Irrigation (mL);Aspiration (mL)   Irrigation (mL) 10 mL   Aspiration (mL) 6 mL   Closed Drain LUQ Other (Comment) 22 Fr.   Placement Date/Time: 07/16/25 1626   Inserted by: JUVENCIO Cabrera MD  Location: LUQ  Drain Tube Type: (c) Other (Comment)  Size (Fr.): 22 Fr.   Dressing Status Clean;Dry;Intact   Drainage Appearance Cloudy;Purulent   Status Irrigated;Open to gravity drainage   Output (mL) 10 mL   Additional I/O Irrigation (mL);Aspiration (mL)   Irrigation (mL) 20 mL   Aspiration (mL) 18 mL

## 2025-07-19 VITALS
DIASTOLIC BLOOD PRESSURE: 61 MMHG | BODY MASS INDEX: 21.91 KG/M2 | TEMPERATURE: 98 F | OXYGEN SATURATION: 94 % | RESPIRATION RATE: 18 BRPM | SYSTOLIC BLOOD PRESSURE: 103 MMHG | WEIGHT: 156.5 LBS | HEART RATE: 88 BPM | HEIGHT: 71 IN

## 2025-07-19 LAB
GLUCOSE BLDC GLUCOMTR-MCNC: 139 MG/DL (ref 70–99)
GLUCOSE BLDC GLUCOMTR-MCNC: 198 MG/DL (ref 70–99)
GLUCOSE BLDC GLUCOMTR-MCNC: 251 MG/DL (ref 70–99)
MAGNESIUM SERPL-MCNC: 1.4 MG/DL (ref 1.6–2.6)
PHOSPHATE SERPL-MCNC: 2.8 MG/DL (ref 2.4–5.1)
POTASSIUM SERPL-SCNC: 3.6 MMOL/L (ref 3.5–5.1)

## 2025-07-19 RX ORDER — MAGNESIUM OXIDE 400 MG/1
800 TABLET ORAL ONCE
Status: COMPLETED | OUTPATIENT
Start: 2025-07-19 | End: 2025-07-19

## 2025-07-19 RX ORDER — HYDROCODONE BITARTRATE AND ACETAMINOPHEN 5; 325 MG/1; MG/1
1 TABLET ORAL EVERY 4 HOURS PRN
Qty: 15 TABLET | Refills: 0 | Status: SHIPPED | OUTPATIENT
Start: 2025-07-19

## 2025-07-19 NOTE — DISCHARGE SUMMARY
[facility]  part of [facility]    Discharge Summary    Arnaldo Gutierrez Patient Status:  Inpatient    4/15/1945 MRN [id number]   Location [facility] 4W/SW/SE Attending Declan Stearns MD   Hosp Day # 14 PCP PHYSICIAN NONSTAFF     Date of Admission: 2025 Disposition: SNF Subacute Rehab     Date of Discharge: 25    Admitting Diagnosis: Pneumoperitoneum [K66.8]  Anasarca [R60.1]  Abdominal distension [R14.0]  Intraabdominal fluid collection [R18.8]    Hospital Discharge Diagnoses: Intra-abdominal abscesses, acute RUE DVT, DLBCL    Lace+ Score: 85  59-90 High Risk  29-58 Medium Risk  0-28   Low Risk.    TCM Follow-Up Recommendation:  LACE > 58: High Risk of readmission after discharge from the hospital.    Problem List: Problem List[1]    Reason for Admission: abdominal distention/discomfort.     Physical Exam:   Vitals:    25 1246   BP: 103/61   Pulse: 88   Resp: 18   Temp: 97.6 °F (36.4 °C)   Gen: A+Ox3.  No distress.   CV: RRR, S1S2, and intact distal pulses. No gallop, rub, murmur.  Pulm: Effort and breath sounds normal. No distress, wheezes, rales, rhonchi.  Abd: Soft, nontender nondistended.  Right drain with serous output.  L drain with purulent output   Neuro:  Normal reflexes, CN. Sensory/motor exams grossly normal deficit.   Psych:  Normal mood and affect. Calm, cooperative      History of Present Illness:   Per Dr. Christian Perezmulugeta Gutierrez is a 80 year old male with B-cell lymphoma, BPH, CKD, depression, DM, HTN, GERD, HLD, anxiety who was recently hospitalized due to large colonic mass, subsequently discharged to nursing home.  Sent to ED today for evaluation of distended abdomen and abdominal discomfort.  At the time of my evaluation he is resting comfortably.  Pain controlled.  Denies nausea or vomiting.  No fever or chills.  Denies diarrhea.  Denies chest pain or shortness of breath.     Vital stable.  Labs stable  CT today shows a large area of  pneumoperitoneum.  Large collection with fluid and gas is seen within the anterior abdomen.  Findings concerning for continued bowel leak.  Also anasarca with bilateral pleural effusions.     Antibiotics started.  General surgery consulted.    Hospital Course:   Disposition  Stable for dc to SNF      Intra-abdominal abscesses.  Status post IR drainage x2 7/5.  Small bowel follow-through 7/9 negative for leak.  Repeat IR drainage 7/11 for perihepatic fluid collection.  Port placement 7/15  - Pain control adequate  - S/p 7/16/25 abscessogram x 3, drain exchange x 2, removal of right lower drain in subhepatic region   - ID, general surgery were on consult  - Continue zerbaxa, daptomycin, Flagyl, Micafungin and PO vanc -> EOT 8/7/25     DVT, acute.  Location is RUE (new), RLE (old).  Pulmonary embolism not present. Contributing factors malignancy, immobility.  -Anticoagulation: was on heparin gtt, will dc on eliquis starter pack    Small pneumothorax resolved     B-cell lymphoma.  Recent diagnosis.  - Oncology was following  - Port placement 7/15  - close opt follow up     Other problems  Hypertension  Dyslipidemia  GERD  BPH  Anxiety  Depression    Consultations: Surgery, ID, Oncology, IR    Procedures: see above    Complications: none    Discharge Condition: Stable    Discharge Medications:      Discharge Medications        START taking these medications        Instructions Prescription details   apixaban 5 MG Tabs  Commonly known as: Eliquis  Notes to patient: 10 mg dose 7/18 evening - 7/25 morning  Change to 5 mg 7/25 in the evening      Take 2 tabs (10mg) by mouth twice daily for 7 days, then take 1 tab (5mg) by mouth twice daily thereafter.   Quantity: 74 tablet  Refills: 0     DAPTOmycin 50 mg/mL in sodium chloride 0.9% PF      Inject 10 mL (500 mg total) into the vein daily for 20 days.   Stop taking on: August 7, 2025  Quantity: 200 mL  Refills: 0     metroNIDAZOLE 500 MG Tabs  Commonly known as: Flagyl       Take 1 tablet (500 mg total) by mouth 2 (two) times daily for 20 days.   Stop taking on: August 7, 2025  Quantity: 40 tablet  Refills: 0     sodium chloride 0.9% SOLN 100 mL with ceftolozane-tazobactam 1.5 (1-0.5) g SOLR 3 g      Inject 3 g into the vein every 8 (eight) hours for 20 days.   Stop taking on: August 7, 2025  Quantity: 1 Bag   Refills: 0     sodium chloride 0.9% SOLN 100 mL with micafungin 100 MG SOLR 100 mg      Inject 100 mg into the vein daily for 20 days.   Stop taking on: August 7, 2025  Quantity: 1 Bag  Refills: 0     vancomycin 125 MG Caps  Commonly known as: Vancocin      Take 1 capsule (125 mg total) by mouth daily for 20 days.   Stop taking on: August 7, 2025  Quantity: 20 capsule  Refills: 0            CHANGE how you take these medications        Instructions Prescription details   HYDROcodone-acetaminophen 5-325 MG Tabs  Commonly known as: Norco  What changed:   how much to take  when to take this  reasons to take this      Take 1 tablet by mouth every 4 (four) hours as needed.   Quantity: 15 tablet  Refills: 0            CONTINUE taking these medications        Instructions Prescription details   Accu-Chek FastClix Lancets Misc      Apply 100 Lancets topically daily.   Quantity: 100 each  Refills: 3     Ferrous Sulfate 325 (65 Fe) MG Tabs      Take 1 tablet (325 mg total) by mouth daily with breakfast. With orange juice.   Quantity: 90 tablet  Refills: 1     FLUoxetine 20 MG Caps  Commonly known as: PROzac      Take 1 capsule (20 mg total) by mouth daily.   Quantity: 90 capsule  Refills: 3     loratadine 10 MG Tabs  Commonly known as: Claritin      Take 1 tablet (10 mg total) by mouth daily as needed for Allergies.   Refills: 0     melatonin 3 MG Tabs      Take 2 tablets (6 mg total) by mouth daily.   Refills: 0     pantoprazole 40 MG Tbec  Commonly known as: Protonix      Take 1 tablet (40 mg total) by mouth every morning before breakfast.   Quantity: 90 tablet  Refills: 3     pregabalin  100 MG Caps  Commonly known as: Lyrica      Take 1 capsule (100 mg total) by mouth in the morning, at noon, and at bedtime.   Quantity: 90 capsule  Refills: 5     simvastatin 40 MG Tabs  Commonly known as: Zocor      Take 1 tablet (40 mg total) by mouth nightly.   Quantity: 90 tablet  Refills: 3     tamsulosin 0.4 MG Caps  Commonly known as: Flomax      Take 2 capsules (0.8 mg total) by mouth daily. AFTER SAME MEAL EACH DAY   Quantity: 180 capsule  Refills: 3     vitamin B-12 50 MCG Tabs  Commonly known as: CYANOCOBALAMIN      Take 1 tablet (50 mcg total) by mouth in the morning.   Refills: 0            STOP taking these medications      Accu-Chek Guide Strp        Accu-Chek Guide Test Strp  Generic drug: Glucose Blood        alum-mag hydroxide-simethicone 200-200-20 MG/5ML Susp  Commonly known as: Maalox        amoxicillin clavulanate 875-125 MG Tabs  Commonly known as: Augmentin        docusate sodium 100 MG Caps  Commonly known as: COLACE        Naloxone HCl 4 MG/0.1ML Liqd        polyethylene glycol (PEG 3350) 17 g Pack  Commonly known as: Miralax        PreserVision/Lutein Caps        rivaroxaban 15 MG Tabs  Commonly known as: Xarelto        rivaroxaban 20 MG Tabs  Commonly known as: Xarelto        SYSTANE ULTRA OP                  Where to Get Your Medications        These medications were sent to [id number] - [place], [place] - [address] [place] [address], [phone], [phone]  [address] [place] [id number]      Phone: [phone]   apixaban 5 MG Tabs       Please  your prescriptions at the location directed by your doctor or nurse    Bring a paper prescription for each of these medications  DAPTOmycin 50 mg/mL in sodium chloride 0.9% PF  HYDROcodone-acetaminophen 5-325 MG Tabs  metroNIDAZOLE 500 MG Tabs  sodium chloride 0.9% SOLN 100 mL with ceftolozane-tazobactam 1.5 (1-0.5) g SOLR 3 g  sodium chloride 0.9% SOLN 100 mL with micafungin 100 MG SOLR 100 mg  vancomycin  125 MG Caps         Greater than 35 minutes spent, >50% spent counseling re: treatment plan and workup     Declan Stearns MD  7/19/2025           [1]   Patient Active Problem List  Diagnosis    BPH (benign prostatic hyperplasia)    Chronic low back pain    Chronic pain of both knees    Depression with anxiety    Generalized osteoarthritis    Hyperlipidemia    Type 2 diabetes mellitus without complication, without long-term current use of insulin (HCC)    Essential hypertension    Bipolar I disorder, single manic episode (HCC)    Large bowel obstruction (HCC)    Colonic mass    Diffuse large B-cell lymphoma of solid organ excluding spleen    Pneumoperitoneum    Abdominal distension    Anasarca    Intraabdominal fluid collection    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

## 2025-07-19 NOTE — PLAN OF CARE
Problem: Patient Centered Care  Goal: Patient preferences are identified and integrated in the patient's plan of care  Description: Interventions:  - What would you like us to know as we care for you? [place] came [place] [facility]  - Provide timely, complete, and accurate information to patient/family  - Incorporate patient and family knowledge, values, beliefs, and cultural backgrounds into the planning and delivery of care  - Encourage patient/family to participate in care and decision-making at the level they choose  - Honor patient and family perspectives and choices  Outcome: Progressing     Problem: Diabetes/Glucose Control  Goal: Glucose maintained within prescribed range  Description: INTERVENTIONS:  - Monitor Blood Glucose as ordered  - Assess for signs and symptoms of hyperglycemia and hypoglycemia  - Administer ordered medications to maintain glucose within target range  - Assess barriers to adequate nutritional intake and initiate nutrition consult as needed  - Instruct patient on self management of diabetes  Outcome: Progressing     Problem: Patient/Family Goals  Goal: Patient/Family Long Term Goal  Description: Patient's Long Term Goal: Discharge  home    Interventions:  -Monitor vital signs and labs  - Monitor and manage NGOZI drains  - PT/OT on consult  - IR on consult  - Manage pain   - Monitor midline incision  - Remote tele   - IV abx  - ID onc onsult  - Gen sx on consult   - See additional Care Plan goals for specific interventions  Outcome: Progressing  Goal: Patient/Family Short Term Goal  Description: Patient's Short Term Goal: Manage pain    Interventions:   - Frequent pain assessments  - Non-pharmacological interventions  - Pain medications as needed/indicated  - See additional Care Plan goals for specific interventions  Outcome: Progressing     Problem: PAIN - ADULT  Goal: Verbalizes/displays adequate comfort level or patient's stated pain goal  Description: INTERVENTIONS:  - Encourage pt to  monitor pain and request assistance  - Assess pain using appropriate pain scale  - Administer analgesics based on type and severity of pain and evaluate response  - Implement non-pharmacological measures as appropriate and evaluate response  - Consider cultural and social influences on pain and pain management  - Manage/alleviate anxiety  - Utilize distraction and/or relaxation techniques  - Monitor for opioid side effects  - Notify MD/LIP if interventions unsuccessful or patient reports new pain  - Anticipate increased pain with activity and pre-medicate as appropriate  Outcome: Progressing     Problem: RISK FOR INFECTION - ADULT  Goal: Absence of fever/infection during anticipated neutropenic period  Description: INTERVENTIONS  - Monitor WBC  - Administer growth factors as ordered  - Implement neutropenic guidelines  Outcome: Progressing     Problem: SAFETY ADULT - FALL  Goal: Free from fall injury  Description: INTERVENTIONS:  - Assess pt frequently for physical needs  - Identify cognitive and physical deficits and behaviors that affect risk of falls.  - [place] fall precautions as indicated by assessment.  - Educate pt/family on patient safety including physical limitations  - Instruct pt to call for assistance with activity based on assessment  - Modify environment to reduce risk of injury  - Provide assistive devices as appropriate  - Consider OT/PT consult to assist with strengthening/mobility  - Encourage toileting schedule  Outcome: Progressing     Problem: HEMATOLOGIC - ADULT  Goal: Free from bleeding injury  Description: (Example usage: patient with low platelets)  INTERVENTIONS:  - Avoid intramuscular injections, enemas and rectal medication administration  - Ensure safe mobilization of patient  - Hold pressure on venipuncture sites to achieve adequate hemostasis  - Assess for signs and symptoms of internal bleeding  - Monitor lab trends  - Patient is to report abnormal signs of bleeding to staff  -  Avoid use of toothpicks and dental floss  - Use electric shaver for shaving  - Use soft bristle tooth brush  - Limit straining and forceful nose blowing  Outcome: Progressing  Goal: Maintains hematologic stability  Description: INTERVENTIONS  - Assess for signs and symptoms of bleeding or hemorrhage  - Monitor labs and vital signs for trends  - Administer supportive blood products/factors, fluids and medications as ordered and appropriate  - Administer supportive blood products/factors as ordered and appropriate  Outcome: Progressing  Goal: Free from bleeding injury  Description: (Example usage: patient with low platelets)  INTERVENTIONS:  - Avoid intramuscular injections, enemas and rectal medication administration  - Ensure safe mobilization of patient  - Hold pressure on venipuncture sites to achieve adequate hemostasis  - Assess for signs and symptoms of internal bleeding  - Monitor lab trends  - Patient is to report abnormal signs of bleeding to staff  - Avoid use of toothpicks and dental floss  - Use electric shaver for shaving  - Use soft bristle tooth brush  - Limit straining and forceful nose blowing  Outcome: Progressing     Problem: GASTROINTESTINAL - ADULT  Goal: Minimal or absence of nausea and vomiting  Description: INTERVENTIONS:  - Maintain adequate hydration with IV or PO as ordered and tolerated  - Nasogastric tube to low intermittent suction as ordered  - Evaluate effectiveness of ordered antiemetic medications  - Provide nonpharmacologic comfort measures as appropriate  - Advance diet as tolerated, if ordered  - Obtain nutritional consult as needed  - Evaluate fluid balance  Outcome: Progressing  Goal: Maintains or returns to baseline bowel function  Description: INTERVENTIONS:  - Assess bowel function  - Maintain adequate hydration with IV or PO as ordered and tolerated  - Evaluate effectiveness of GI medications  - Encourage mobilization and activity  - Obtain nutritional consult as needed  -  Establish a toileting routine/schedule  - Consider collaborating with pharmacy to review patient's medication profile  Outcome: Progressing  Goal: Maintains adequate nutritional intake (undernourished)  Description: INTERVENTIONS:  - Monitor percentage of each meal consumed  - Identify factors contributing to decreased intake, treat as appropriate  - Assist with meals as needed  - Monitor I&O, WT and lab values  - Obtain nutritional consult as needed  - Optimize oral hygiene and moisture  - Encourage food from home; allow for food preferences  - Enhance eating environment  Outcome: Progressing     Problem: DISCHARGE PLANNING  Goal: Discharge to home or other facility with appropriate resources  Description: INTERVENTIONS:  - Identify barriers to discharge w/pt and caregiver  - Include patient/family/discharge partner in discharge planning  - Arrange for needed discharge resources and transportation as appropriate  - Identify discharge learning needs (meds, wound care, etc)  - Arrange for interpreters to assist at discharge as needed  - Consider post-discharge preferences of patient/family/discharge partner  - Complete POLST form as appropriate  - Assess patient's ability to be responsible for managing their own health  - Refer to Case Management Department for coordinating discharge planning if the patient needs post-hospital services based on physician/LIP order or complex needs related to functional status, cognitive ability or social support system  Outcome: Progressing     Problem: Altered Communication/Language Barrier  Goal: Patient/Family is able to understand and participate in their care  Description: Interventions:  - Assess communication ability and preferred communication style  - Implement communication aides and strategies  - Use visual cues when possible  - Listen attentively, be patient, do not interrupt  - Minimize distractions  - Allow time for understanding and response  - Establish method for  patient to ask for assistance (call light)  - Provide an  as needed  - Communicate barriers and strategies to overcome with those who interact with patient  Outcome: Progressing     Problem: CARDIOVASCULAR - ADULT  Goal: Maintains optimal cardiac output and hemodynamic stability  Description: INTERVENTIONS:  - Monitor vital signs, rhythm, and trends  - Monitor for bleeding, hypotension and signs of decreased cardiac output  - Evaluate effectiveness of vasoactive medications to optimize hemodynamic stability  - Monitor arterial and/or venous puncture sites for bleeding and/or hematoma  - Assess quality of pulses, skin color and temperature  - Assess for signs of decreased coronary artery perfusion - ex. Angina  - Evaluate fluid balance, assess for edema, trend weights  Outcome: Progressing  Goal: Absence of cardiac arrhythmias or at baseline  Description: INTERVENTIONS:  - Continuous cardiac monitoring, monitor vital signs, obtain 12 lead EKG if indicated  - Evaluate effectiveness of antiarrhythmic and heart rate control medications as ordered  - Initiate emergency measures for life threatening arrhythmias  - Monitor electrolytes and administer replacement therapy as ordered  Outcome: Progressing     Problem: GENITOURINARY - ADULT  Goal: Absence of urinary retention  Description: INTERVENTIONS:  - Assess patient’s ability to void and empty bladder  - Monitor intake/output and perform bladder scan as needed  - Follow urinary retention protocol/standard of care  - Consider collaborating with pharmacy to review patient's medication profile  - Implement strategies to promote bladder emptying  Outcome: Progressing     Problem: METABOLIC/FLUID AND ELECTROLYTES - ADULT  Goal: Electrolytes maintained within normal limits  Description: INTERVENTIONS:  - Monitor labs and rhythm and assess patient for signs and symptoms of electrolyte imbalances  - Administer electrolyte replacement as ordered  - Monitor response to  electrolyte replacements, including rhythm and repeat lab results as appropriate  - Fluid restriction as ordered  - Instruct patient on fluid and nutrition restrictions as appropriate  Outcome: Progressing  Goal: Hemodynamic stability and optimal renal function maintained  Description: INTERVENTIONS:  - Monitor labs and assess for signs and symptoms of volume excess or deficit  - Monitor intake, output and patient weight  - Monitor urine specific gravity, serum osmolarity and serum sodium as indicated or ordered  - Monitor response to interventions for patient's volume status, including labs, urine output, blood pressure (other measures as available)  - Encourage oral intake as appropriate  - Instruct patient on fluid and nutrition restrictions as appropriate  Outcome: Progressing     Problem: SKIN/TISSUE INTEGRITY - ADULT  Goal: Skin integrity remains intact  Description: INTERVENTIONS  - Assess and document risk factors for pressure ulcer development  - Assess and document skin integrity  - Monitor for areas of redness and/or skin breakdown  - Initiate interventions, skin care algorithm/standards of care as needed  Outcome: Progressing  Goal: Incision(s), wounds(s) or drain site(s) healing without S/S of infection  Description: INTERVENTIONS:  - Assess and document risk factors for pressure ulcer development  - Assess and document skin integrity  - Assess and document dressing/incision, wound bed, drain sites and surrounding tissue  - Implement wound care per orders  - Initiate isolation precautions as appropriate  - Initiate Pressure Ulcer prevention bundle as indicated  Outcome: Progressing  Goal: Oral mucous membranes remain intact  Description: INTERVENTIONS  - Assess oral mucosa and hygiene practices  - Implement preventative oral hygiene regimen  - Implement oral medicated treatments as ordered  Outcome: Progressing     Problem: COPING  Goal: Pt/Family able to verbalize concerns and demonstrate effective  coping strategies  Description: INTERVENTIONS:  - Assist patient/family to identify coping skills, available support systems and cultural and spiritual values  - Provide emotional support, including active listening and acknowledgement of concerns of patient and caregivers  - Reduce environmental stimuli, as able  - Instruct patient/family in relaxation techniques, as appropriate  - Assess for spiritual and psychosocial needs and initiate Spiritual Care or Behavioral Health consult as needed  Outcome: Progressing

## 2025-07-19 NOTE — PROGRESS NOTES
07/19/25 0923   Stool Output/Assessment   Stool Occurrence 1   Bowel Incontinence Yes   Stool Appearance Soft   Stool Color Brown   Stool Amount Large   Closed Drain RUQ Bulb 12 Fr.   Placement Date/Time: 07/16/25 1619   Inserted by: JUVENCIO Cabrrea MD  Location: RUQ  Drain Tube Type: Bulb  Size (Fr.): 12 Fr.   Site Description Unable to view   Dressing Status Clean;Dry;Intact   Drainage Appearance Cloudy;Yellow   Status Irrigated;To bulb suction   Output (mL) 7.5 mL   Additional I/O Irrigation (mL)   Irrigation (mL) 10 mL   Aspiration (mL) 6.5 mL   Closed Drain LUQ Other (Comment) 22 Fr.   Placement Date/Time: 07/16/25 1626   Inserted by: JUVENCIO Cabrera MD  Location: LUQ  Drain Tube Type: (c) Other (Comment)  Size (Fr.): 22 Fr.   Site Description Unable to view   Dressing Status Clean;Dry;Intact   Drainage Appearance Cardozo;Milky   Status Irrigated;Open to gravity drainage   Output (mL) 50 mL   Additional I/O Irrigation (mL)   Irrigation (mL) 20 mL   Aspiration (mL) 18 mL   OTHER   Stool Count Calculated for I/O 1

## 2025-07-19 NOTE — PHYSICAL THERAPY NOTE
Attempted to see pt to follow up PT session, pt is eating lunch right now. Pt is scheduled to transfer/discharge to rehab at 4:30 pm today.

## 2025-07-19 NOTE — PROGRESS NOTES
07/18/25 2257   Closed Drain RUQ Bulb 12 Fr.   Placement Date/Time: 07/16/25 1619   Inserted by: JUVENCIO Cabrera MD  Location: RUQ  Drain Tube Type: Bulb  Size (Fr.): 12 Fr.   Site Description Unable to view   Dressing Status Clean;Dry;Intact   Drainage Appearance Serosanguineous   Status Irrigated;To bulb suction   Output (mL) 5 mL   Additional I/O Irrigation (mL);Aspiration (mL)   Irrigation (mL) 10 mL   Aspiration (mL) 7.5 mL   Closed Drain LUQ Other (Comment) 22 Fr.   Placement Date/Time: 07/16/25 1626   Inserted by: JUVENCIO Cabrera MD  Location: LUQ  Drain Tube Type: (c) Other (Comment)  Size (Fr.): 22 Fr.   Site Description Unable to view   Dressing Status Clean;Dry;Intact   Drainage Appearance Cardozo;Milky   Status Irrigated;Open to gravity drainage   Output (mL) 75 mL   Additional I/O Irrigation (mL);Aspiration (mL)   Irrigation (mL) 20 mL   Aspiration (mL) 10 mL

## 2025-07-19 NOTE — PROGRESS NOTES
[facility]  Progress Note    Arnaldo Gutierrez Patient Status:  Inpatient    4/15/1945 MRN [id number]   Location [facility] 4W/SW/SE Attending Declan Stearns MD   Hosp Day # 14 PCP PHYSICIAN NONSTAFF     Subjective:  Patient resting in bed.  No overnight events noted.  Vital signs stable.  Having bowel function.  Tolerating general diet without vomiting.  Working with PT and OT for mobilization.  Plan to discharge to [facility] today after antibiotic infusion    Objective/Physical Exam:  General: Alert, orientated x3.  Cooperative.  No apparent distress.  Vital Signs:  Blood pressure 134/59, pulse 79, temperature 98 °F (36.7 °C), temperature source Oral, resp. rate 18, height 5' 11\" (1.803 m), weight 156 lb 8.4 oz (71 kg), SpO2 96%.  Wt Readings from Last 3 Encounters:   25 156 lb 8.4 oz (71 kg)   25 171 lb (77.6 kg)   25 171 lb 3.2 oz (77.7 kg)     Lungs: No respiratory distress.  Cardiac: Regular rate and rhythm.   Abdomen:  Soft, not distended, not tender, with no rebound or guarding.  No peritoneal signs.   Extremities:  No lower extremity edema noted.    Drain: Right drain serosanguineous output noted X1.  Left drain with purulent output noted X1    Intake/Output:    Intake/Output Summary (Last 24 hours) at 2025 1127  Last data filed at 2025 0923  Gross per 24 hour   Intake 635.2 ml   Output 1865.5 ml   Net -1230.3 ml     I/O last 3 completed shifts:  In: 785.2 [P.O.:470; I.V.:95.2; IV PIGGYBACK:100]  Out: 3159 [Urine:2890; Drains:269]  I/O this shift:  In: 30   Out: 82 [Drains:82]    Medications: Scheduled Medications[1]    Labs:     Lab Results   Component Value Date    K 3.6 2025     No results found for: \"PT\", \"INR\"      Assessment  Problem List[2]    Large bowel obstruction s/p laparoscopic assisted transverse colon resection, small bowel resection, resection of tumor nodule on small bowel 2025  Perihepatic abscess s/p IR drain   B-cell  lymphoma    Plan:  Continue general diet as tolerated  Patient stable from surgical standpoint to discharge to [facility] today pending clearance from medical team  Eliquis resumed  Patient to follow-up with Dr. Randall as needed  Patient to follow-up with IR in 1 week, oncology as outpatient.    Quality:  DVT Mechanical Prophylaxis:     Early ambuation  DVT Pharmacologic Prophylaxis   Medication    apixaban (Eliquis) tab 10 mg    In followed-by linked group with    [START ON 7/25/2025] apixaban (Eliquis) tab 5 mg    heparin (Porcine) 100 Units/mL lock flush 150 Units                Code Status: Full Code  Muñoz: External urinary catheter in place  Muñoz Duration (in days):   Central line:    ANNELISE: 7/19/2025        PRAVEEN Maria  7/19/2025  11:27 AM               [1]    apixaban  10 mg Oral BID    Followed by    [START ON 7/25/2025] apixaban  5 mg Oral BID    heparin  1.5 mL Intravenous Once    insulin aspart  1-5 Units Subcutaneous TID CC and HS    FLUoxetine  20 mg Oral Daily    pregabalin  100 mg Oral TID    tamsulosin  0.8 mg Oral After dinner    vancomycin  125 mg Oral Daily    ceftolozane-tazobactam (ZERBAXA) 3 g in sodium chloride 0.9% 100 mL IVPB  3 g Intravenous Q8H    DAPTOmycin  500 mg Intravenous Q24H    micafungin  100 mg Intravenous Q24H    metroNIDAZOLE  500 mg Oral BID    pantoprazole  40 mg Intravenous Daily   [2]   Patient Active Problem List  Diagnosis    BPH (benign prostatic hyperplasia)    Chronic low back pain    Chronic pain of both knees    Depression with anxiety    Generalized osteoarthritis    Hyperlipidemia    Type 2 diabetes mellitus without complication, without long-term current use of insulin (HCC)    Essential hypertension    Bipolar I disorder, single manic episode (HCC)    Large bowel obstruction (HCC)    Colonic mass    Diffuse large B-cell lymphoma of solid organ excluding spleen    Pneumoperitoneum    Abdominal distension    Anasarca    Intraabdominal fluid collection    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

## 2025-07-19 NOTE — PROGRESS NOTES
07/19/25 1540   Closed Drain RUQ Bulb 12 Fr.   Placement Date/Time: 07/16/25 1619   Inserted by: JUVENCIO Cabrera MD  Location: RUQ  Drain Tube Type: Bulb  Size (Fr.): 12 Fr.   Site Description Unable to view   Dressing Status Clean;Dry;Intact   Drainage Appearance Cloudy;Serous   Status To bulb suction   Output (mL) 5 mL   Closed Drain LUQ Other (Comment) 22 Fr.   Placement Date/Time: 07/16/25 1626   Inserted by: JUVENCIO Cabrera MD  Location: LUQ  Drain Tube Type: (c) Other (Comment)  Size (Fr.): 22 Fr.   Site Description Unable to view   Dressing Status Clean;Dry;Intact   Drainage Appearance Cardozo;Milky   Status Open to gravity drainage   Output (mL) 10 mL

## 2025-07-19 NOTE — PROGRESS NOTES
07/19/25 0530   Closed Drain RUQ Bulb 12 Fr.   Placement Date/Time: 07/16/25 1619   Inserted by: JUVENCIO Cabrera MD  Location: RUQ  Drain Tube Type: Bulb  Size (Fr.): 12 Fr.   Site Description Unable to view   Dressing Status Clean;Dry;Intact   Drainage Appearance Serosanguineous   Status To bulb suction   Output (mL) 2 mL   Closed Drain LUQ Other (Comment) 22 Fr.   Placement Date/Time: 07/16/25 1626   Inserted by: JUVENCIO Cabrera MD  Location: LUQ  Drain Tube Type: (c) Other (Comment)  Size (Fr.): 22 Fr.   Site Description Unable to view   Dressing Status Clean;Dry;Intact   Drainage Appearance Tan;Milky   Output (mL) 10 mL

## 2025-07-19 NOTE — PLAN OF CARE
A&Ox4. Denies pain overnight. Tolerating diet, no complaints of n/v. Loose stools overnight. Voiding via primofit. LUQ and RUQ drains in place. IV and PO abt continued. Safety measures in place, call light within reach.        Problem: Patient Centered Care  Goal: Patient preferences are identified and integrated in the patient's plan of care  Description: Interventions:  - What would you like us to know as we care for you? I came [facility] [place] [facility]  - Provide timely, complete, and accurate information to patient/family  - Incorporate patient and family knowledge, values, beliefs, and cultural backgrounds into the planning and delivery of care  - Encourage patient/family to participate in care and decision-making at the level they choose  - Honor patient and family perspectives and choices  Outcome: Progressing     Problem: Diabetes/Glucose Control  Goal: Glucose maintained within prescribed range  Description: INTERVENTIONS:  - Monitor Blood Glucose as ordered  - Assess for signs and symptoms of hyperglycemia and hypoglycemia  - Administer ordered medications to maintain glucose within target range  - Assess barriers to adequate nutritional intake and initiate nutrition consult as needed  - Instruct patient on self management of diabetes  Outcome: Progressing     Problem: Patient/Family Goals  Goal: Patient/Family Long Term Goal  Description: Patient's Long Term Goal: Discharge  home    Interventions:  -Monitor vitals  -Monitor labs  - Heparin gtt  - Glucose monitoring ACHS/q6h  - Monitor and manage NGOZI drains  - PT/OT on consult  - IR on consult  - Manage pain   - Monitor midline incision  - Remote tele   - IV abx  - ID onc onsult  - Gen sx on consult   - See additional Care Plan goals for specific interventions  Outcome: Progressing  Goal: Patient/Family Short Term Goal  Description: Patient's Short Term Goal: Manage pain    Interventions:   - Frequent pain assessments  - Non-pharmacological interventions  - Pain  medications as needed/indicated  - See additional Care Plan goals for specific interventions  Outcome: Progressing     Problem: PAIN - ADULT  Goal: Verbalizes/displays adequate comfort level or patient's stated pain goal  Description: INTERVENTIONS:  - Encourage pt to monitor pain and request assistance  - Assess pain using appropriate pain scale  - Administer analgesics based on type and severity of pain and evaluate response  - Implement non-pharmacological measures as appropriate and evaluate response  - Consider cultural and social influences on pain and pain management  - Manage/alleviate anxiety  - Utilize distraction and/or relaxation techniques  - Monitor for opioid side effects  - Notify MD/LIP if interventions unsuccessful or patient reports new pain  - Anticipate increased pain with activity and pre-medicate as appropriate  Outcome: Progressing     Problem: RISK FOR INFECTION - ADULT  Goal: Absence of fever/infection during anticipated neutropenic period  Description: INTERVENTIONS  - Monitor WBC  - Administer growth factors as ordered  - Implement neutropenic guidelines  Outcome: Progressing     Problem: SAFETY ADULT - FALL  Goal: Free from fall injury  Description: INTERVENTIONS:  - Assess pt frequently for physical needs  - Identify cognitive and physical deficits and behaviors that affect risk of falls.  - [place] fall precautions as indicated by assessment.  - Educate pt/family on patient safety including physical limitations  - Instruct pt to call for assistance with activity based on assessment  - Modify environment to reduce risk of injury  - Provide assistive devices as appropriate  - Consider OT/PT consult to assist with strengthening/mobility  - Encourage toileting schedule  Outcome: Progressing     Problem: HEMATOLOGIC - ADULT  Goal: Free from bleeding injury  Description: (Example usage: patient with low platelets)  INTERVENTIONS:  - Avoid intramuscular injections, enemas and rectal  medication administration  - Ensure safe mobilization of patient  - Hold pressure on venipuncture sites to achieve adequate hemostasis  - Assess for signs and symptoms of internal bleeding  - Monitor lab trends  - Patient is to report abnormal signs of bleeding to staff  - Avoid use of toothpicks and dental floss  - Use electric shaver for shaving  - Use soft bristle tooth brush  - Limit straining and forceful nose blowing  Outcome: Progressing  Goal: Maintains hematologic stability  Description: INTERVENTIONS  - Assess for signs and symptoms of bleeding or hemorrhage  - Monitor labs and vital signs for trends  - Administer supportive blood products/factors, fluids and medications as ordered and appropriate  - Administer supportive blood products/factors as ordered and appropriate  Outcome: Progressing  Goal: Free from bleeding injury  Description: (Example usage: patient with low platelets)  INTERVENTIONS:  - Avoid intramuscular injections, enemas and rectal medication administration  - Ensure safe mobilization of patient  - Hold pressure on venipuncture sites to achieve adequate hemostasis  - Assess for signs and symptoms of internal bleeding  - Monitor lab trends  - Patient is to report abnormal signs of bleeding to staff  - Avoid use of toothpicks and dental floss  - Use electric shaver for shaving  - Use soft bristle tooth brush  - Limit straining and forceful nose blowing  Outcome: Progressing     Problem: GASTROINTESTINAL - ADULT  Goal: Minimal or absence of nausea and vomiting  Description: INTERVENTIONS:  - Maintain adequate hydration with IV or PO as ordered and tolerated  - Nasogastric tube to low intermittent suction as ordered  - Evaluate effectiveness of ordered antiemetic medications  - Provide nonpharmacologic comfort measures as appropriate  - Advance diet as tolerated, if ordered  - Obtain nutritional consult as needed  - Evaluate fluid balance  Outcome: Progressing  Goal: Maintains or returns to  baseline bowel function  Description: INTERVENTIONS:  - Assess bowel function  - Maintain adequate hydration with IV or PO as ordered and tolerated  - Evaluate effectiveness of GI medications  - Encourage mobilization and activity  - Obtain nutritional consult as needed  - Establish a toileting routine/schedule  - Consider collaborating with pharmacy to review patient's medication profile  Outcome: Progressing  Goal: Maintains adequate nutritional intake (undernourished)  Description: INTERVENTIONS:  - Monitor percentage of each meal consumed  - Identify factors contributing to decreased intake, treat as appropriate  - Assist with meals as needed  - Monitor I&O, WT and lab values  - Obtain nutritional consult as needed  - Optimize oral hygiene and moisture  - Encourage food from home; allow for food preferences  - Enhance eating environment  Outcome: Progressing     Problem: DISCHARGE PLANNING  Goal: Discharge to home or other facility with appropriate resources  Description: INTERVENTIONS:  - Identify barriers to discharge w/pt and caregiver  - Include patient/family/discharge partner in discharge planning  - Arrange for needed discharge resources and transportation as appropriate  - Identify discharge learning needs (meds, wound care, etc)  - Arrange for interpreters to assist at discharge as needed  - Consider post-discharge preferences of patient/family/discharge partner  - Complete POLST form as appropriate  - Assess patient's ability to be responsible for managing their own health  - Refer to Case Management Department for coordinating discharge planning if the patient needs post-hospital services based on physician/LIP order or complex needs related to functional status, cognitive ability or social support system  Outcome: Progressing     Problem: Altered Communication/Language Barrier  Goal: Patient/Family is able to understand and participate in their care  Description: Interventions:  - Assess  communication ability and preferred communication style  - Implement communication aides and strategies  - Use visual cues when possible  - Listen attentively, be patient, do not interrupt  - Minimize distractions  - Allow time for understanding and response  - Establish method for patient to ask for assistance (call light)  - Provide an  as needed  - Communicate barriers and strategies to overcome with those who interact with patient  Outcome: Progressing     Problem: CARDIOVASCULAR - ADULT  Goal: Maintains optimal cardiac output and hemodynamic stability  Description: INTERVENTIONS:  - Monitor vital signs, rhythm, and trends  - Monitor for bleeding, hypotension and signs of decreased cardiac output  - Evaluate effectiveness of vasoactive medications to optimize hemodynamic stability  - Monitor arterial and/or venous puncture sites for bleeding and/or hematoma  - Assess quality of pulses, skin color and temperature  - Assess for signs of decreased coronary artery perfusion - ex. Angina  - Evaluate fluid balance, assess for edema, trend weights  Outcome: Progressing  Goal: Absence of cardiac arrhythmias or at baseline  Description: INTERVENTIONS:  - Continuous cardiac monitoring, monitor vital signs, obtain 12 lead EKG if indicated  - Evaluate effectiveness of antiarrhythmic and heart rate control medications as ordered  - Initiate emergency measures for life threatening arrhythmias  - Monitor electrolytes and administer replacement therapy as ordered  Outcome: Progressing     Problem: GENITOURINARY - ADULT  Goal: Absence of urinary retention  Description: INTERVENTIONS:  - Assess patient’s ability to void and empty bladder  - Monitor intake/output and perform bladder scan as needed  - Follow urinary retention protocol/standard of care  - Consider collaborating with pharmacy to review patient's medication profile  - Implement strategies to promote bladder emptying  Outcome: Progressing     Problem:  METABOLIC/FLUID AND ELECTROLYTES - ADULT  Goal: Electrolytes maintained within normal limits  Description: INTERVENTIONS:  - Monitor labs and rhythm and assess patient for signs and symptoms of electrolyte imbalances  - Administer electrolyte replacement as ordered  - Monitor response to electrolyte replacements, including rhythm and repeat lab results as appropriate  - Fluid restriction as ordered  - Instruct patient on fluid and nutrition restrictions as appropriate  Outcome: Progressing  Goal: Hemodynamic stability and optimal renal function maintained  Description: INTERVENTIONS:  - Monitor labs and assess for signs and symptoms of volume excess or deficit  - Monitor intake, output and patient weight  - Monitor urine specific gravity, serum osmolarity and serum sodium as indicated or ordered  - Monitor response to interventions for patient's volume status, including labs, urine output, blood pressure (other measures as available)  - Encourage oral intake as appropriate  - Instruct patient on fluid and nutrition restrictions as appropriate  Outcome: Progressing     Problem: SKIN/TISSUE INTEGRITY - ADULT  Goal: Skin integrity remains intact  Description: INTERVENTIONS  - Assess and document risk factors for pressure ulcer development  - Assess and document skin integrity  - Monitor for areas of redness and/or skin breakdown  - Initiate interventions, skin care algorithm/standards of care as needed  Outcome: Progressing  Goal: Incision(s), wounds(s) or drain site(s) healing without S/S of infection  Description: INTERVENTIONS:  - Assess and document risk factors for pressure ulcer development  - Assess and document skin integrity  - Assess and document dressing/incision, wound bed, drain sites and surrounding tissue  - Implement wound care per orders  - Initiate isolation precautions as appropriate  - Initiate Pressure Ulcer prevention bundle as indicated  Outcome: Progressing  Goal: Oral mucous membranes remain  intact  Description: INTERVENTIONS  - Assess oral mucosa and hygiene practices  - Implement preventative oral hygiene regimen  - Implement oral medicated treatments as ordered  Outcome: Progressing     Problem: COPING  Goal: Pt/Family able to verbalize concerns and demonstrate effective coping strategies  Description: INTERVENTIONS:  - Assist patient/family to identify coping skills, available support systems and cultural and spiritual values  - Provide emotional support, including active listening and acknowledgement of concerns of patient and caregivers  - Reduce environmental stimuli, as able  - Instruct patient/family in relaxation techniques, as appropriate  - Assess for spiritual and psychosocial needs and initiate Spiritual Care or Behavioral Health consult as needed  Outcome: Progressing

## 2025-07-20 NOTE — PROGRESS NOTES
Patient and daughter Yao were provided with discharge instructions, education, and follow up information. Printed prescriptions were placed in discharge packet and taken with patient to [facility]. Patient verbalizes understanding of follow up information, specifically IR/drain follow up. Report called to Phillip GARCIA at [facility]. All questions answered. Patient transported to [facility] in wheelchair with [employer] attendant. Belongings taken with patient.

## 2025-07-21 NOTE — PAYOR COMM NOTE
--------------  DISCHARGE REVIEW    Payor: [employer]  Subscriber #:  [id number]  Authorization Number: [id number]    Admit date: 25  Admit time:   2:31 AM  Discharge Date: 2025  6:50 PM     Admitting Physician: Montse Gonzales MD  Attending Physician:  No att. providers found  Primary Care Physician: Nonstaff, Physician          Discharge Summary Notes        Discharge Summary signed by Declan Stearns MD at 2025  9:57 AM       Author: Declan Stearns MD Specialty: HOSPITALIST, Internal Medicine Author Type: Physician    Filed: 2025  9:57 AM Date of Service: 2025 12:25 PM Status: Signed    : Declan Stearns MD (Physician)           [facility], [place]  part of [facility]    Discharge Summary    Arnaldo Gutierrez Patient Status:  Inpatient    4/15/1945 MRN [id number]   Location [facility] 4W/SW/SE Attending Declan Stearns MD   Hosp Day # 14 PCP PHYSICIAN NONSTAFF     Date of Admission: 2025 Disposition: SNF Subacute Rehab     Date of Discharge: 25    Admitting Diagnosis: Pneumoperitoneum [K66.8]  Anasarca [R60.1]  Abdominal distension [R14.0]  Intraabdominal fluid collection [R18.8]    Hospital Discharge Diagnoses: Intra-abdominal abscesses, acute RUE DVT, DLBCL    Lace+ Score: 85  59-90 High Risk  29-58 Medium Risk  0-28   Low Risk.    TCM Follow-Up Recommendation:  LACE > 58: High Risk of readmission after discharge from the hospital.    Problem List: Problem List  Patient Active Problem List  Diagnosis    BPH (benign prostatic hyperplasia)    Chronic low back pain    Chronic pain of both knees    Depression with anxiety    Generalized osteoarthritis    Hyperlipidemia    Type 2 diabetes mellitus without complication, without long-term current use of insulin (HCC)    Essential hypertension    Bipolar I disorder, single manic episode (HCC)    Large bowel obstruction (HCC)    Colonic mass    Diffuse large B-cell lymphoma of  solid organ excluding spleen    Pneumoperitoneum    Abdominal distension    Anasarca    Intraabdominal fluid collection    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     Physical Exam:   Vitals:    07/19/25 1246   BP: 103/61   Pulse: 88   Resp: 18   Temp: 97.6 °F (36.4 °C)   Gen: A+Ox3.  No distress.   CV: RRR, S1S2, and intact distal pulses. No gallop, rub, murmur.  Pulm: Effort and breath sounds normal. No distress, wheezes, rales, rhonchi.  Abd: Soft, nontender nondistended.  Right drain with serous output.  L drain with purulent output   Neuro:  Normal reflexes, CN. Sensory/motor exams grossly normal deficit.   Psych:  Normal mood and affect. Calm, cooperative      History of Present Illness:   Per Dr. Christian Gutierrez is a 80 year old male with B-cell lymphoma, BPH, CKD, depression, DM, HTN, GERD, HLD, anxiety who was recently hospitalized due to large colonic mass, subsequently discharged to nursing home.  Sent to ED today for evaluation of distended abdomen and abdominal discomfort.  At the time of my evaluation he is resting comfortably.  Pain controlled.  Denies nausea or vomiting.  No fever or chills.  Denies diarrhea.  Denies chest pain or shortness of breath.     Vital stable.  Labs stable  CT today shows a large area of pneumoperitoneum.  Large collection with fluid and gas is seen within the anterior abdomen.  Findings concerning for continued bowel leak.  Also anasarca with bilateral pleural effusions.     Antibiotics started.  General surgery consulted.    Hospital Course:   Disposition  Stable for dc to SNF      Intra-abdominal abscesses.  Status post IR drainage x2 7/5.  Small bowel follow-through 7/9 negative for leak.  Repeat IR drainage 7/11 for perihepatic fluid collection.  Port placement 7/15  - Pain control adequate  -  S/p 7/16/25 abscessogram x 3, drain exchange x 2, removal of right lower drain in subhepatic region   - ID, general surgery were on consult  - Continue zerbaxa, daptomycin, Flagyl, Micafungin and PO vanc -> EOT 8/7/25     DVT, acute.  Location is RUE (new), RLE (old).  Pulmonary embolism not present. Contributing factors malignancy, immobility.  -Anticoagulation: was on heparin gtt, will dc on eliquis starter pack    Small pneumothorax resolved     B-cell lymphoma.  Recent diagnosis.  - Oncology was following  - Port placement 7/15  - close opt follow up     Other problems  Hypertension  Dyslipidemia  GERD  BPH  Anxiety  Depression    Consultations: Surgery, ID, Oncology, IR    Procedures: see above    Complications: none    Discharge Condition: Stable    Discharge Medications:      Discharge Medications        START taking these medications        Instructions Prescription details   apixaban 5 MG Tabs  Commonly known as: Eliquis  Notes to patient: 10 mg dose 7/18 evening - 7/25 morning  Change to 5 mg 7/25 in the evening      Take 2 tabs (10mg) by mouth twice daily for 7 days, then take 1 tab (5mg) by mouth twice daily thereafter.   Quantity: 74 tablet  Refills: 0     DAPTOmycin 50 mg/mL in sodium chloride 0.9% PF      Inject 10 mL (500 mg total) into the vein daily for 20 days.   Stop taking on: August 7, 2025  Quantity: 200 mL  Refills: 0     metroNIDAZOLE 500 MG Tabs  Commonly known as: Flagyl      Take 1 tablet (500 mg total) by mouth 2 (two) times daily for 20 days.   Stop taking on: August 7, 2025  Quantity: 40 tablet  Refills: 0     sodium chloride 0.9% SOLN 100 mL with ceftolozane-tazobactam 1.5 (1-0.5) g SOLR 3 g      Inject 3 g into the vein every 8 (eight) hours for 20 days.   Stop taking on: August 7, 2025  Quantity: 1 Bag   Refills: 0     sodium chloride 0.9% SOLN 100 mL with micafungin 100 MG SOLR 100 mg      Inject 100 mg into the vein daily for 20 days.   Stop taking on: August 7, 2025  Quantity: 1  Bag  Refills: 0     vancomycin 125 MG Caps  Commonly known as: Vancocin      Take 1 capsule (125 mg total) by mouth daily for 20 days.   Stop taking on: August 7, 2025  Quantity: 20 capsule  Refills: 0            CHANGE how you take these medications        Instructions Prescription details   HYDROcodone-acetaminophen 5-325 MG Tabs  Commonly known as: Norco  What changed:   how much to take  when to take this  reasons to take this      Take 1 tablet by mouth every 4 (four) hours as needed.   Quantity: 15 tablet  Refills: 0            CONTINUE taking these medications        Instructions Prescription details   Accu-Chek FastClix Lancets Misc      Apply 100 Lancets topically daily.   Quantity: 100 each  Refills: 3     Ferrous Sulfate 325 (65 Fe) MG Tabs      Take 1 tablet (325 mg total) by mouth daily with breakfast. With orange juice.   Quantity: 90 tablet  Refills: 1     FLUoxetine 20 MG Caps  Commonly known as: PROzac      Take 1 capsule (20 mg total) by mouth daily.   Quantity: 90 capsule  Refills: 3     loratadine 10 MG Tabs  Commonly known as: Claritin      Take 1 tablet (10 mg total) by mouth daily as needed for Allergies.   Refills: 0     melatonin 3 MG Tabs      Take 2 tablets (6 mg total) by mouth daily.   Refills: 0     pantoprazole 40 MG Tbec  Commonly known as: Protonix      Take 1 tablet (40 mg total) by mouth every morning before breakfast.   Quantity: 90 tablet  Refills: 3     pregabalin 100 MG Caps  Commonly known as: Lyrica      Take 1 capsule (100 mg total) by mouth in the morning, at noon, and at bedtime.   Quantity: 90 capsule  Refills: 5     simvastatin 40 MG Tabs  Commonly known as: Zocor      Take 1 tablet (40 mg total) by mouth nightly.   Quantity: 90 tablet  Refills: 3     tamsulosin 0.4 MG Caps  Commonly known as: Flomax      Take 2 capsules (0.8 mg total) by mouth daily. AFTER SAME MEAL EACH DAY   Quantity: 180 capsule  Refills: 3     vitamin B-12 50 MCG Tabs  Commonly known as:  CYANOCOBALAMIN      Take 1 tablet (50 mcg total) by mouth in the morning.   Refills: 0            STOP taking these medications      Accu-Chek Guide Strp        Accu-Chek Guide Test Strp  Generic drug: Glucose Blood        alum-mag hydroxide-simethicone 200-200-20 MG/5ML Susp  Commonly known as: Maalox        amoxicillin clavulanate 875-125 MG Tabs  Commonly known as: Augmentin        docusate sodium 100 MG Caps  Commonly known as: COLACE        Naloxone HCl 4 MG/0.1ML Liqd        polyethylene glycol (PEG 3350) 17 g Pack  Commonly known as: Miralax        PreserVision/Lutein Caps        rivaroxaban 15 MG Tabs  Commonly known as: Xarelto        rivaroxaban 20 MG Tabs  Commonly known as: Xarelto        SYSTANE ULTRA OP                  Where to Get Your Medications        These medications were sent to [id number] [address], [phone], [phone]  [address] [place] [id number]      Phone: [phone]   apixaban 5 MG Tabs       Please  your prescriptions at the location directed by your doctor or nurse    Bring a paper prescription for each of these medications  DAPTOmycin 50 mg/mL in sodium chloride 0.9% PF  HYDROcodone-acetaminophen 5-325 MG Tabs  metroNIDAZOLE 500 MG Tabs  sodium chloride 0.9% SOLN 100 mL with ceftolozane-tazobactam 1.5 (1-0.5) g SOLR 3 g  sodium chloride 0.9% SOLN 100 mL with micafungin 100 MG SOLR 100 mg  vancomycin 125 MG Caps         Greater than 35 minutes spent, >50% spent counseling re: treatment plan and workup     Declan Stearns MD  7/19/2025          Electronically signed by Declan Stearns MD on 7/20/2025  9:57 AM         REVIEWER COMMENTS

## 2025-07-23 VITALS — HEIGHT: 71 IN | WEIGHT: 156 LBS | BODY MASS INDEX: 21.84 KG/M2

## 2025-07-23 DIAGNOSIS — R18.8 INTRAABDOMINAL FLUID COLLECTION: Primary | ICD-10-CM

## 2025-07-23 RX ORDER — SODIUM CHLORIDE 9 MG/ML
INJECTION, SOLUTION INTRAVENOUS CONTINUOUS
Status: CANCELLED | OUTPATIENT
Start: 2025-07-23

## 2025-07-29 ENCOUNTER — APPOINTMENT (OUTPATIENT)
Facility: LOCATION | Age: 80
End: 2025-07-29

## 2025-07-30 ENCOUNTER — HOSPITAL ENCOUNTER (OUTPATIENT)
Dept: INTERVENTIONAL RADIOLOGY/VASCULAR | Facility: HOSPITAL | Age: 80
Discharge: HOME | End: 2025-07-30
Attending: NURSE PRACTITIONER

## 2025-08-01 ENCOUNTER — OFFICE VISIT (OUTPATIENT)
Facility: LOCATION | Age: 80
End: 2025-08-01
Attending: STUDENT IN AN ORGANIZED HEALTH CARE EDUCATION/TRAINING PROGRAM

## 2025-08-01 VITALS
TEMPERATURE: 97 F | DIASTOLIC BLOOD PRESSURE: 62 MMHG | OXYGEN SATURATION: 98 % | SYSTOLIC BLOOD PRESSURE: 106 MMHG | HEART RATE: 97 BPM | RESPIRATION RATE: 16 BRPM | BODY MASS INDEX: 22 KG/M2 | HEIGHT: 70 IN

## 2025-08-01 DIAGNOSIS — C83.398 DIFFUSE LARGE B-CELL LYMPHOMA OF SOLID ORGAN EXCLUDING SPLEEN: Primary | ICD-10-CM

## 2025-08-01 DIAGNOSIS — I82.621 ACUTE DEEP VEIN THROMBOSIS (DVT) OF BRACHIAL VEIN OF RIGHT UPPER EXTREMITY (HCC): ICD-10-CM

## 2025-08-01 DIAGNOSIS — B99.9 INTRA-ABDOMINAL INFECTION: ICD-10-CM

## 2025-08-04 ENCOUNTER — HOSPITAL ENCOUNTER (OUTPATIENT)
Dept: INTERVENTIONAL RADIOLOGY/VASCULAR | Facility: HOSPITAL | Age: 80
Discharge: HOME | End: 2025-08-04
Attending: NURSE PRACTITIONER | Admitting: RADIOLOGY

## 2025-08-04 VITALS
HEIGHT: 70 IN | RESPIRATION RATE: 21 BRPM | TEMPERATURE: 98 F | HEART RATE: 82 BPM | OXYGEN SATURATION: 98 % | BODY MASS INDEX: 20.33 KG/M2 | DIASTOLIC BLOOD PRESSURE: 75 MMHG | SYSTOLIC BLOOD PRESSURE: 155 MMHG | WEIGHT: 142 LBS

## 2025-08-04 DIAGNOSIS — R18.8 INTRAABDOMINAL FLUID COLLECTION: ICD-10-CM

## 2025-08-04 DIAGNOSIS — K65.1 INTRA-ABDOMINAL ABSCESS (HCC): Primary | ICD-10-CM

## 2025-08-04 PROCEDURE — 76080 X-RAY EXAM OF FISTULA: CPT | Performed by: RADIOLOGY

## 2025-08-04 PROCEDURE — 49424 ASSESS CYST CONTRAST INJECT: CPT | Performed by: RADIOLOGY

## 2025-08-04 RX ORDER — SODIUM CHLORIDE 9 MG/ML
INJECTION, SOLUTION INTRAVENOUS CONTINUOUS
Status: DISCONTINUED | OUTPATIENT
Start: 2025-08-04 | End: 2025-08-04

## 2025-08-04 RX ORDER — IOPAMIDOL 612 MG/ML
30 INJECTION, SOLUTION INTRAVASCULAR
Status: COMPLETED | OUTPATIENT
Start: 2025-08-04 | End: 2025-08-04

## 2025-08-04 RX ADMIN — IOPAMIDOL 5 ML: 612 INJECTION, SOLUTION INTRAVASCULAR at 12:44:00

## 2025-08-08 ENCOUNTER — HOSPITAL ENCOUNTER (OUTPATIENT)
Dept: CT IMAGING | Facility: HOSPITAL | Age: 80
Discharge: HOME | End: 2025-08-08
Attending: NURSE PRACTITIONER

## 2025-08-08 DIAGNOSIS — R18.8 INTRAABDOMINAL FLUID COLLECTION: Primary | ICD-10-CM

## 2025-08-08 DIAGNOSIS — K65.1 INTRA-ABDOMINAL ABSCESS (HCC): ICD-10-CM

## 2025-08-08 PROCEDURE — 74177 CT ABD & PELVIS W/CONTRAST: CPT | Performed by: NURSE PRACTITIONER

## 2025-08-22 ENCOUNTER — HOSPITAL ENCOUNTER (OUTPATIENT)
Dept: INTERVENTIONAL RADIOLOGY/VASCULAR | Facility: HOSPITAL | Age: 80
Discharge: HOME | End: 2025-08-22
Attending: NURSE PRACTITIONER | Admitting: RADIOLOGY

## 2025-08-22 VITALS
DIASTOLIC BLOOD PRESSURE: 70 MMHG | HEIGHT: 70 IN | OXYGEN SATURATION: 100 % | BODY MASS INDEX: 20.33 KG/M2 | SYSTOLIC BLOOD PRESSURE: 107 MMHG | TEMPERATURE: 98 F | HEART RATE: 75 BPM | WEIGHT: 142 LBS | RESPIRATION RATE: 26 BRPM

## 2025-08-22 DIAGNOSIS — R18.8 INTRAABDOMINAL FLUID COLLECTION: ICD-10-CM

## 2025-08-22 PROCEDURE — 49424 ASSESS CYST CONTRAST INJECT: CPT | Performed by: RADIOLOGY

## 2025-08-22 PROCEDURE — 76080 X-RAY EXAM OF FISTULA: CPT | Performed by: RADIOLOGY

## 2025-08-22 RX ORDER — IOPAMIDOL 612 MG/ML
30 INJECTION, SOLUTION INTRAVASCULAR
Status: COMPLETED | OUTPATIENT
Start: 2025-08-22 | End: 2025-08-22

## 2025-08-22 RX ADMIN — IOPAMIDOL 10 ML: 612 INJECTION, SOLUTION INTRAVASCULAR at 11:04:00

## 2025-08-25 ENCOUNTER — PATIENT MESSAGE (OUTPATIENT)
Dept: FAMILY MEDICINE CLINIC | Facility: CLINIC | Age: 80
End: 2025-08-25

## 2025-08-28 ENCOUNTER — OFFICE VISIT (OUTPATIENT)
Dept: FAMILY MEDICINE CLINIC | Facility: CLINIC | Age: 80
End: 2025-08-28

## 2025-08-28 VITALS
HEART RATE: 100 BPM | WEIGHT: 132 LBS | SYSTOLIC BLOOD PRESSURE: 110 MMHG | DIASTOLIC BLOOD PRESSURE: 75 MMHG | BODY MASS INDEX: 18.9 KG/M2 | HEIGHT: 70 IN

## 2025-08-28 DIAGNOSIS — G89.29 CHRONIC MIDLINE LOW BACK PAIN WITHOUT SCIATICA: ICD-10-CM

## 2025-08-28 DIAGNOSIS — N40.0 BENIGN PROSTATIC HYPERPLASIA WITHOUT LOWER URINARY TRACT SYMPTOMS: ICD-10-CM

## 2025-08-28 DIAGNOSIS — M25.562 CHRONIC PAIN OF BOTH KNEES: ICD-10-CM

## 2025-08-28 DIAGNOSIS — F41.1 GAD (GENERALIZED ANXIETY DISORDER): ICD-10-CM

## 2025-08-28 DIAGNOSIS — E78.2 MIXED HYPERLIPIDEMIA: ICD-10-CM

## 2025-08-28 DIAGNOSIS — R26.81 UNSTEADY GAIT: ICD-10-CM

## 2025-08-28 DIAGNOSIS — M25.561 CHRONIC PAIN OF BOTH KNEES: ICD-10-CM

## 2025-08-28 DIAGNOSIS — M54.50 CHRONIC MIDLINE LOW BACK PAIN WITHOUT SCIATICA: ICD-10-CM

## 2025-08-28 DIAGNOSIS — E11.9 TYPE 2 DIABETES MELLITUS WITHOUT COMPLICATION, WITHOUT LONG-TERM CURRENT USE OF INSULIN (HCC): Primary | ICD-10-CM

## 2025-08-28 DIAGNOSIS — K21.9 GASTROESOPHAGEAL REFLUX DISEASE WITHOUT ESOPHAGITIS: ICD-10-CM

## 2025-08-28 DIAGNOSIS — G89.29 CHRONIC PAIN OF BOTH KNEES: ICD-10-CM

## 2025-08-28 LAB — HEMOGLOBIN A1C: 7.6 % (ref 4.3–5.6)

## 2025-08-28 PROCEDURE — 99213 OFFICE O/P EST LOW 20 MIN: CPT | Performed by: PHYSICIAN ASSISTANT

## 2025-08-28 PROCEDURE — 3078F DIAST BP <80 MM HG: CPT | Performed by: PHYSICIAN ASSISTANT

## 2025-08-28 PROCEDURE — 1159F MED LIST DOCD IN RCRD: CPT | Performed by: PHYSICIAN ASSISTANT

## 2025-08-28 PROCEDURE — 1126F AMNT PAIN NOTED NONE PRSNT: CPT | Performed by: PHYSICIAN ASSISTANT

## 2025-08-28 PROCEDURE — 3008F BODY MASS INDEX DOCD: CPT | Performed by: PHYSICIAN ASSISTANT

## 2025-08-28 PROCEDURE — 83036 HEMOGLOBIN GLYCOSYLATED A1C: CPT | Performed by: PHYSICIAN ASSISTANT

## 2025-08-28 PROCEDURE — 3074F SYST BP LT 130 MM HG: CPT | Performed by: PHYSICIAN ASSISTANT

## 2025-08-28 RX ORDER — MULTIVITAMIN WITH IRON
50 TABLET ORAL DAILY
Qty: 90 TABLET | Refills: 3 | Status: SHIPPED | OUTPATIENT
Start: 2025-08-28

## 2025-08-28 RX ORDER — METRONIDAZOLE 500 MG/1
TABLET ORAL
COMMUNITY
End: 2025-08-28

## 2025-08-28 RX ORDER — PANTOPRAZOLE SODIUM 40 MG/1
40 TABLET, DELAYED RELEASE ORAL
Qty: 90 TABLET | Refills: 3 | Status: SHIPPED | OUTPATIENT
Start: 2025-08-28

## 2025-08-28 RX ORDER — TAMSULOSIN HYDROCHLORIDE 0.4 MG/1
0.8 CAPSULE ORAL DAILY
Qty: 180 CAPSULE | Refills: 3 | Status: SHIPPED | OUTPATIENT
Start: 2025-08-28

## 2025-08-28 RX ORDER — FERROUS SULFATE 325(65) MG
325 TABLET ORAL
Qty: 90 TABLET | Refills: 1 | Status: SHIPPED | OUTPATIENT
Start: 2025-08-28

## 2025-08-28 RX ORDER — SIMVASTATIN 40 MG
40 TABLET ORAL NIGHTLY
Qty: 90 TABLET | Refills: 3 | Status: SHIPPED | OUTPATIENT
Start: 2025-08-28

## 2025-08-28 RX ORDER — BUSPIRONE HYDROCHLORIDE 5 MG/1
5 TABLET ORAL 2 TIMES DAILY
Qty: 60 TABLET | Refills: 2 | Status: SHIPPED | OUTPATIENT
Start: 2025-08-28

## 2025-08-28 RX ORDER — PREGABALIN 100 MG/1
100 CAPSULE ORAL 3 TIMES DAILY
Qty: 90 CAPSULE | Refills: 5 | Status: SHIPPED | OUTPATIENT
Start: 2025-08-28

## (undated) DEVICE — A P RESECTION: Brand: MEDLINE INDUSTRIES, INC.

## (undated) DEVICE — [HIGH FLOW INSUFFLATOR,  DO NOT USE IF PACKAGE IS DAMAGED,  KEEP DRY,  KEEP AWAY FROM SUNLIGHT,  PROTECT FROM HEAT AND RADIOACTIVE SOURCES.]: Brand: PNEUMOSURE

## (undated) DEVICE — MEDI-VAC NON-CONDUCTIVE SUCTION TUBING 6MM X 1.8M (6FT.) L: Brand: CARDINAL HEALTH

## (undated) DEVICE — DRAPE,ABDOMINAL,MAJOR,STERILE: Brand: MEDLINE

## (undated) DEVICE — ENCORE® LATEX MICRO SIZE 8.5, STERILE LATEX POWDER-FREE SURGICAL GLOVE: Brand: ENCORE

## (undated) DEVICE — STAPLER WITH DST SERIES TECHNOLOGY: Brand: TA

## (undated) DEVICE — TROCAR: Brand: KII® SLEEVE

## (undated) DEVICE — GAUZE,SPONGE,USP,4"X4",12PLY,STRL,10/PK: Brand: MEDLINE INDUSTRIES, INC.

## (undated) DEVICE — Device

## (undated) DEVICE — SUT PERMA- 2-0 30IN NABSRB BLK TIE SILK

## (undated) DEVICE — V2 SPECIMEN COLLECTION MANIFOLD KIT: Brand: NEPTUNE

## (undated) DEVICE — PAD,ABDOMINAL,8"X7.5",STERILE,LF,1/PK: Brand: MEDLINE

## (undated) DEVICE — LOADING UNIT WITH DST SERIES TECHNOLOGY: Brand: GIA

## (undated) DEVICE — SLIM BODY SKIN STAPLER: Brand: APPOSE ULC

## (undated) DEVICE — YANKAUER,FLEXIBLE HANDLE,REGLR CAPACITY: Brand: MEDLINE INDUSTRIES, INC.

## (undated) DEVICE — GLOVE SUR 8.5 SENSICARE NEOPR PWD F

## (undated) DEVICE — VISUALIZATION SYSTEM: Brand: CLEARIFY

## (undated) DEVICE — LAPAROSCOPIC WIRE L-HOOK ELECTRODE COATED: Brand: CLEANCOAT

## (undated) DEVICE — SUT PDS II 0 L60IN ABSRB VLT L48MM CTX 1/2

## (undated) DEVICE — SPONGE LAP 18X18IN WHT COT 4 PLY FLD STRUNG

## (undated) DEVICE — KIT,ANTI FOG,W/SPONGE & FLUID,SOFT PACK: Brand: MEDLINE

## (undated) DEVICE — YANKAUER,POOLE TIP,STERILE,50/CS: Brand: MEDLINE

## (undated) DEVICE — TROCAR: Brand: KII FIOS FIRST ENTRY

## (undated) DEVICE — PROVIDES A STERILE INTERFACE BETWEEN THE OPERATING ROOM SURGICAL LAMPS (NON-STERILE) AND THE SURGEON OR NURSE (STERILE).: Brand: STERION®CLAMP COVER FABRIC

## (undated) DEVICE — SPONGE SURG 0.25X9/16IN WHT COT KTNR DISECT

## (undated) DEVICE — SOLUTION IRRIG 1000ML 0.9% NACL USP BTL

## (undated) DEVICE — CORE REPOSABLE TRUMPET FOR SINGLE SOLUTION BAG: Brand: CORE DYNAMICS

## (undated) DEVICE — RETRIEVAL BALLOON CATHETER: Brand: EXTRACTOR™ PRO RX

## (undated) DEVICE — INTEGRATED BIOPSY CAP AND GUIDEWIRE LOCKING DEVICE: Brand: AUTOCAP™ RX

## (undated) DEVICE — DISPOSABLE GRASPER CARTRIDGE: Brand: DIRECT DRIVE REPOSABLE GRASPERS

## (undated) DEVICE — STERILE LATEX POWDER-FREE SURGICAL GLOVESWITH NITRILE COATING: Brand: PROTEXIS

## (undated) DEVICE — CURVED, LARGE JAW, OPEN SEALER/DIVIDER NANO-COATED: Brand: LIGASURE IMPACT

## (undated) DEVICE — KIT CLEAN ENDOKIT 1.1OZ GOWNX2

## (undated) DEVICE — STAPLER WITH DST SERIES TECHNOLOGY: Brand: GIA

## (undated) DEVICE — TAPE UMBILICAL

## (undated) DEVICE — SOLUTION IRRIG 3000ML 0.9% NACL FLX CONT

## (undated) DEVICE — KIT ENDO ORCAPOD 160/180/190

## (undated) DEVICE — 3M™ IOBAN™ 2 ANTIMICROBIAL INCISE DRAPE 6650EZ: Brand: IOBAN™ 2

## (undated) DEVICE — SUT PERMA- 2-0 30IN SH NABSRB BLK L26MM 1/

## (undated) DEVICE — CORE TRUMPET FOR SINGLE SOLUTION BAG: Brand: CORE DYNAMICS

## (undated) DEVICE — ACROBAT 2 CALIBRATED TIP WIRE GUIDE: Brand: ACROBAT

## (undated) DEVICE — 60 ML SYRINGE REGULAR TIP: Brand: MONOJECT

## (undated) NOTE — [place] [address]  Authorization for Invasive Procedures  Date: 6/19/2025           Time: 1450    I hereby authorize  Dr Reilly , my physician and his/her assistants (if applicable), which may include medical students, residents, and/or fellows, to perform the following surgical operation/ procedure and administer such anesthesia as may be determined necessary by my physician: Ultrasound guided abdominal fluid drain placement   on Arnaldo Gutierrez  2.   I recognize that during the surgical operation/procedure, unforeseen conditions may necessitate additional or different procedures than those listed above.  I, therefore, further authorize and request that the above-named surgeon, assistants, or designees perform such procedures as are, in their judgment, necessary and desirable.    3.   My surgeon/physician has discussed prior to my surgery the potential benefits, risks and side effects of this procedure; the likelihood of achieving goals; and potential problems that might occur during recuperation.  They also discussed reasonable alternatives to the procedure, including risks, benefits, and side effects related to the alternatives and risks related to not receiving this procedure.  I have had all my questions answered and I acknowledge that no guarantee has been made as to the result that may be obtained.    4.   Should the need arise during my operation/procedure, which includes change of level of care prior to discharge, I also consent to the administration of blood and/or blood products.  Further, I understand that despite careful testing and screening of blood or blood products by collecting agencies, I may still be subject to ill effects as a result of receiving a blood transfusion and/or blood products.  The following are some, but not all, of the potential risks that can occur: fever and allergic reactions, hemolytic reactions, transmission of diseases such as Hepatitis, AIDS and  Cytomegalovirus (CMV) and fluid overload.  In the event that I wish to have an autologous transfusion of my own blood, or a directed donor transfusion, I will discuss this with my physician.   Check only if Refusing Blood or Blood Products  I understand refusal of blood or blood products as deemed necessary by my physician may have serious consequences to my condition to include possible death. I hereby assume responsibility for my refusal and release the hospital, its personnel, and my physicians from any responsibility for the consequences of my refusal.         o  Refuse         5.   I authorize the use of any specimen, organs, tissues, body parts or foreign objects that may be removed from my body during the operation/procedure for diagnosis, research or teaching purposes and their subsequent disposal by hospital authorities.  I also authorize the release of specimen test results and/or written reports to my treating physician on the hospital medical staff or other referring or consulting physicians involved in my care, at the discretion of the Pathologist or my treating physician.    6.   I consent to the photographing or videotaping of the operations or procedures to be performed, including appropriate portions of my body for medical, scientific, or educational purposes, provided my identity is not revealed by the pictures or by descriptive texts accompanying them.  If the procedure has been photographed/videotaped, the surgeon will obtain the original picture, image, videotape or CD.  The hospital will not be responsible for storage, release or maintenance of the picture, image, tape or CD.    7.   I consent to the presence of a  or observers in the operating room as deemed necessary by my physician or their designees.    8.   I recognize that in the event my procedure results in extended X-Ray/fluoroscopy time, I may develop a skin reaction.    9. If I have a Do Not Attempt Resuscitation  (DNAR) order in place, that status will be suspended while in the operating room, procedural suite, and during the recovery period unless otherwise explicitly stated by me (or a person authorized to consent on my behalf). The surgeon or my attending physician will determine when the applicable recovery period ends for purposes of reinstating the DNAR order.  10. Patients having a sterilization procedure: I understand that if the procedure is successful the results will be permanent and it will therefore be impossible for me to inseminate, conceive, or bear children.  I also understand that the procedure is intended to result in sterility, although the result has not been guaranteed.   11. I acknowledge that my physician has explained sedation/analgesia administration to me including the risk and benefits I consent to the administration of sedation/analgesia as may be necessary or desirable in the judgment of my physician.    I CERTIFY THAT I HAVE READ AND FULLY UNDERSTAND THE ABOVE CONSENT TO OPERATION and/or OTHER PROCEDURE.        ____________________________________       _________________________________      ______________________________  Signature of Patient         Signature of Responsible Person        Printed Name of Responsible Person        ____________________________________      _________________________________      ______________________________       Signature of Witness          Relationship to Patient                       Date                                       Time  Patient Name: Arnaldo Gutierrez  : 4/15/1945    Reviewed: 2024   Printed: 2025  Medical Record #: [id number] Page 1 of 2             STATEMENT OF PHYSICIAN My signature below affirms that prior to the time of the procedure; I have explained to the patient and/or his/her legal representative, the risks and benefits involved in the proposed treatment and any reasonable alternative to the proposed treatment. I have  also explained the risks and benefits involved in refusal of the proposed treatment and alternatives to the proposed treatment and have answered the patient's questions. If I have a significant financial interest in a co-management agreement or a significant financial interest in any product or implant, or other significant relationship used in this procedure/surgery, I have disclosed this and had a discussion with my patient.     _______________________________________________________________ _____________________________  (Signature of Physician)                                                                                         (Date)                                   (Time)  Patient Name: Arnaldo Gutierrez  : 4/15/1945    Reviewed: 2024   Printed: 2025  Medical Record #: [id number] Page 2 of 2

## (undated) NOTE — LETTER
[facility]  [address], [place], [place]    Authorization for Surgical Operation and Procedure                               I hereby authorize Eusebio Palmer MD, my physician and his/her assistants (if applicable), which may include medical students, residents, and/or fellows, to perform the following surgical operation/ procedure and administer such anesthesia as may be determined necessary by my physician: Operation/Procedure name (s) COLONOSCOPY with colonic stent placement on Arnaldo Gutierrez   2.   I recognize that during the surgical operation/procedure, unforeseen conditions may necessitate additional or different procedures than those listed above.  I, therefore, further authorize and request that the above-named surgeon, assistants, or designees perform such procedures as are, in their judgment, necessary and desirable.    3.   My surgeon/physician has discussed prior to my surgery the potential benefits, risks and side effects of this procedure; the likelihood of achieving goals; and potential problems that might occur during recuperation.  They also discussed reasonable alternatives to the procedure, including risks, benefits, and side effects related to the alternatives and risks related to not receiving this procedure.  I have had all my questions answered and I acknowledge that no guarantee has been made as to the result that may be obtained.    4.   Should the need arise during my operation/procedure, which includes change of level of care prior to discharge, I also consent to the administration of blood and/or blood products.  Further, I understand that despite careful testing and screening of blood or blood products by collecting agencies, I may still be subject to ill effects as a result of receiving a blood transfusion and/or blood products.  The following are some, but not all, of the potential risks that can occur: fever and allergic reactions, hemolytic  reactions, transmission of diseases such as Hepatitis, AIDS and Cytomegalovirus (CMV) and fluid overload.  In the event that I wish to have an autologous transfusion of my own blood, or a directed donor transfusion, I will discuss this with my physician.  Check only if Refusing Blood or Blood Products  I understand refusal of blood or blood products as deemed necessary by my physician may have serious consequences to my condition to include possible death. I hereby assume responsibility for my refusal and release the hospital, its personnel, and my physicians from any responsibility for the consequences of my refusal.    o  Refuse   5.   I authorize the use of any specimen, organs, tissues, body parts or foreign objects that may be removed from my body during the operation/procedure for diagnosis, research or teaching purposes and their subsequent disposal by hospital authorities.  I also authorize the release of specimen test results and/or written reports to my treating physician on the hospital medical staff or other referring or consulting physicians involved in my care, at the discretion of the Pathologist or my treating physician.    6.   I consent to the photographing or videotaping of the operations or procedures to be performed, including appropriate portions of my body for medical, scientific, or educational purposes, provided my identity is not revealed by the pictures or by descriptive texts accompanying them.  If the procedure has been photographed/videotaped, the surgeon will obtain the original picture, image, videotape or CD.  The hospital will not be responsible for storage, release or maintenance of the picture, image, tape or CD.    7.   I consent to the presence of a  or observers in the operating room as deemed necessary by my physician or their designees.    8.   I recognize that in the event my procedure results in extended X-Ray/fluoroscopy time, I may develop a skin  reaction.    9. If I have a Do Not Attempt Resuscitation (DNAR) order in place, that status will be suspended while in the operating room, procedural suite, and during the recovery period unless otherwise explicitly stated by me (or a person authorized to consent on my behalf). The surgeon or my attending physician will determine when the applicable recovery period ends for purposes of reinstating the DNAR order.  10. Patients having a sterilization procedure: I understand that if the procedure is successful the results will be permanent and it will therefore be impossible for me to inseminate, conceive, or bear children.  I also understand that the procedure is intended to result in sterility, although the result has not been guaranteed.   11. I acknowledge that my physician has explained sedation/analgesia administration to me including the risk and benefits I consent to the administration of sedation/analgesia as may be necessary or desirable in the judgment of my physician.    I CERTIFY THAT I HAVE READ AND FULLY UNDERSTAND THE ABOVE CONSENT TO OPERATION and/or OTHER PROCEDURE.     ____________________________________  _________________________________        ______________________________  Signature of Patient    Signature of Responsible Person                Printed Name of Responsible Person                                      ____________________________________  _____________________________                ________________________________  Signature of Witness        Date  Time         Relationship to Patient    STATEMENT OF PHYSICIAN My signature below affirms that prior to the time of the procedure; I have explained to the patient and/or his/her legal representative, the risks and benefits involved in the proposed treatment and any reasonable alternative to the proposed treatment. I have also explained the risks and benefits involved in refusal of the proposed treatment and alternatives to the proposed  treatment and have answered the patient's questions. If I have a significant financial interest in a co-management agreement or a significant financial interest in any product or implant, or other significant relationship used in this procedure/surgery, I have disclosed this and had a discussion with my patient.     _____________________________________________________              _____________________________  (Signature of Physician)                                                                                         (Date)                                   (Time)  Patient Name: Arnaldo Gutierrez      : 4/15/1945      Printed: 2025     Medical Record #: [id number]                                      Page 1 of 1

## (undated) NOTE — LETTER
[facility]  part of [facility]     PICC INSERTION CONSENT     I agree to have a Peripherally Inserted Central Catheter (PICC) placed in my arm.   1. The PICC insertion procedure, care, maintenance, risks, benefits, and complications have been explained to me by my physician, ________________________, and I understand them.   2. I understand that this may not be the only way I can receive my medication. I understand that my physician has determined that the PICC would be the safest and most effective means of administering my medication at this time. If there are other options of giving medication into my veins those options have been explained to me by my physician and I have chosen this one.   3. I realize a nurse who has been specially trained and certified by the hospital and ’s representative to insert a PICC will perform this procedure. My catheter will be inserted by _____________________________.   4. I have been informed by my doctor of the nature and purpose of this procedure and the risks involved and the possibility of complications. I realize that this is an invasive procedure and has certain risks such as air embolism (air entering the catheter or my vein), arterial puncture (a tearing of one of my arteries), infection, irregular heartbeat and venous thrombosis (a blood clot in a vein) nerve injury and fracture of the catheter with or without migration.   5. In order to numb the area where the line will be placed, a small amount of anesthetic medication will be injected as ordered by my physician.   6. I understand that while the catheter will be placed in my upper arm the end of the catheter will come to rest in an area near my heart.     7. The person performing this procedure has discussed the potential benefits, risks, and side effects of the PICC; the likelihood of achieving goals; and potential problems that might occur during recuperation. They also discussed  reasonable alternatives to the PICC, including risks, benefits, and side effects related to the alternatives and risks related to not receiving this procedure.    8.  I have expressed any questions about this procedure to my physician or the PICC Proceduralist and he/she has answered them.  I certify that I have read and understand this consent to the insertion of a PICC.      _________________________________________________________   Date     Time     Patient/Guardian Signature       ____________________________________   Printed name of Patient/Guardian          ________________________________________________________________    Date        Time                   Witnessing RN Signature      Patient Name: Arnaldo Gutierrez     : 4/15/1945                 Printed: July 10, 2025     Medical Record #: [id number]

## (undated) NOTE — LETTER
[place] [address]  Authorization for Invasive Procedures  Date: 07/14/25           Time: 2:15 PM    I hereby authorize Dr. Juan Luis Cabrera, my physician and his/her assistants (if applicable), which may include medical students, residents, and/or fellows, to perform the following surgical operation/ procedure and administer such anesthesia as may be determined necessary by my physician: Drain check/exchange  on Arnaldo Gutierrez  2.   I recognize that during the surgical operation/procedure, unforeseen conditions may necessitate additional or different procedures than those listed above.  I, therefore, further authorize and request that the above-named surgeon, assistants, or designees perform such procedures as are, in their judgment, necessary and desirable.    3.   My surgeon/physician has discussed prior to my surgery the potential benefits, risks and side effects of this procedure; the likelihood of achieving goals; and potential problems that might occur during recuperation.  They also discussed reasonable alternatives to the procedure, including risks, benefits, and side effects related to the alternatives and risks related to not receiving this procedure.  I have had all my questions answered and I acknowledge that no guarantee has been made as to the result that may be obtained.    4.   Should the need arise during my operation/procedure, which includes change of level of care prior to discharge, I also consent to the administration of blood and/or blood products.  Further, I understand that despite careful testing and screening of blood or blood products by collecting agencies, I may still be subject to ill effects as a result of receiving a blood transfusion and/or blood products.  The following are some, but not all, of the potential risks that can occur: fever and allergic reactions, hemolytic reactions, transmission of diseases such as Hepatitis, AIDS and Cytomegalovirus (CMV)  and fluid overload.  In the event that I wish to have an autologous transfusion of my own blood, or a directed donor transfusion, I will discuss this with my physician.   Check only if Refusing Blood or Blood Products  I understand refusal of blood or blood products as deemed necessary by my physician may have serious consequences to my condition to include possible death. I hereby assume responsibility for my refusal and release the hospital, its personnel, and my physicians from any responsibility for the consequences of my refusal.         o  Refuse         5.   I authorize the use of any specimen, organs, tissues, body parts or foreign objects that may be removed from my body during the operation/procedure for diagnosis, research or teaching purposes and their subsequent disposal by hospital authorities.  I also authorize the release of specimen test results and/or written reports to my treating physician on the hospital medical staff or other referring or consulting physicians involved in my care, at the discretion of the Pathologist or my treating physician.    6.   I consent to the photographing or videotaping of the operations or procedures to be performed, including appropriate portions of my body for medical, scientific, or educational purposes, provided my identity is not revealed by the pictures or by descriptive texts accompanying them.  If the procedure has been photographed/videotaped, the surgeon will obtain the original picture, image, videotape or CD.  The hospital will not be responsible for storage, release or maintenance of the picture, image, tape or CD.    7.   I consent to the presence of a  or observers in the operating room as deemed necessary by my physician or their designees.    8.   I recognize that in the event my procedure results in extended X-Ray/fluoroscopy time, I may develop a skin reaction.    9. If I have a Do Not Attempt Resuscitation (DNAR) order in place,  that status will be suspended while in the operating room, procedural suite, and during the recovery period unless otherwise explicitly stated by me (or a person authorized to consent on my behalf). The surgeon or my attending physician will determine when the applicable recovery period ends for purposes of reinstating the DNAR order.  10. Patients having a sterilization procedure: I understand that if the procedure is successful the results will be permanent and it will therefore be impossible for me to inseminate, conceive, or bear children.  I also understand that the procedure is intended to result in sterility, although the result has not been guaranteed.   11. I acknowledge that my physician has explained sedation/analgesia administration to me including the risk and benefits I consent to the administration of sedation/analgesia as may be necessary or desirable in the judgment of my physician.    I CERTIFY THAT I HAVE READ AND FULLY UNDERSTAND THE ABOVE CONSENT TO OPERATION and/or OTHER PROCEDURE.        ____________________________________       _________________________________      ______________________________  Signature of Patient         Signature of Responsible Person        Printed Name of Responsible Person        ____________________________________      _________________________________      ______________________________       Signature of Witness          Relationship to Patient                       Date                                       Time  Patient Name: Arnaldo Gutierrez  : 4/15/1945    Reviewed: 2024   Printed: 2025  Medical Record #: [id number] Page 1 of 2             STATEMENT OF PHYSICIAN My signature below affirms that prior to the time of the procedure; I have explained to the patient and/or his/her legal representative, the risks and benefits involved in the proposed treatment and any reasonable alternative to the proposed treatment. I have also explained the  risks and benefits involved in refusal of the proposed treatment and alternatives to the proposed treatment and have answered the patient's questions. If I have a significant financial interest in a co-management agreement or a significant financial interest in any product or implant, or other significant relationship used in this procedure/surgery, I have disclosed this and had a discussion with my patient.     _______________________________________________________________ _____________________________  (Signature of Physician)                                                                                         (Date)                                   (Time)  Patient Name: Arnaldo Gutierrez  : 4/15/1945    Reviewed: 2024   Printed: 2025  Medical Record #: [id number] Page 2 of 2

## (undated) NOTE — LETTER
[place] [address]  Authorization for Invasive Procedures  Date: 07/14/25           Time: 2:18 PM     Por la presente, autorizo al doctor Dr. Juan Luis Cabrera, mi médico y al asistente a realizar la operación/procedimiento quirúrgico a continuación, así matty a administrar la anestesia que determine necesaria mi médico Nombre (s) de la operación/procedimiento: Drain check/exchange  en Arnaldo Gutierrez            Reconozco que nasima la operación/procedimiento quirúrgico, las condiciones imprevistas pueden requerir de procedimientos adicionales o diferentes a aquellos mencionados anteriormente.  Por lo tanto, autorizo y solicito además que el cirujano antes mencionado, los asistentes o las personas designadas realicen los procedimientos que, a mcmanus juicio, yariel necesarios y convenientes.    Mi cirujano/médico lam discutido antes de mi cirugía los posibles beneficios, riesgos y efectos secundarios de mary procedimiento, la probabilidad de alcanzar las metas y los posibles problemas que puedan ocurrir nasima la recuperación.  Ellos también lao discutido las alternativas razonables al procedimiento, incluso los riesgos, beneficios y efectos secundarios relacionados con las alternativas y los riesgos relacionados con no realizar mary procedimiento.  Lao respondido a todas mis preguntas y confirmo que no se ha dado ninguna garantía en cuanto a los resultados que pueda obtener.    En naresh de que surja la necesidad nasima mi operación o nasima el periodo postoperatorio, también autorizo se aplique edilia y/o productos sanguíneos.  Asimismo, entiendo que a pesar de las cuidadosas pruebas y análisis de edilia o de los productos sanguíneos que realizan las entidades recolectoras, todavía puedo estar sujeto a efectos adversos matty resultado de recibir eduardo transfusión de edilia y/o productos sanguíneos.  A continuación se mencionan algunos, aunque no todos, los riesgos potenciales que pueden ocurrir:  fiebre y reacciones alérgicas, reacciones hemolíticas, trasmisión de enfermedades matty hepatitis, SIDA y citomegalovirus (CMV), así matty sobrecarga de líquidos.   En naresh de que desee tener eduardo transfusión autóloga de mi propia edilia o eduardo transfusión de un donante dirigido.  Lo discutiré con mi médico.   Check only if Refusing Blood or Blood Products  I understand refusal of blood or blood products as deemed necessary by my physician may have serious consequences to my condition to include possible death. I hereby assume responsibility for my refusal and release the hospital, its personnel, and my physicians from any responsibility for the consequences of my refusal.           o  Refuse   Autorizo el uso de cualquier muestra, órgano, tejido, parte del cuerpo u objeto extraño que pueda ser extraído de mi cuerpo nasima la operación/procedimiento para fines de diagnóstico, investigación o de enseñanza y mcmanus desecho posterior por las autoridades del hospital.  También, autorizo la revelación de los resultados de las pruebas de muestras y/o los informes escritos al médico tratante matty personal médico del hospital u otros médicos de referencia o consulta involucrados en mi atención, a discreción del patólogo o de mi médico tratante.    Doy consentimiento para que se fotografíen o graben vídeos de las operaciones o procedimientos a realizarse, incluidas las partes de mi cuerpo que yariel adecuadas para propósitos médicos, científicos o educativos, en el entendido de que, mi identidad no sea revelada por las fotografías o por textos descriptivos que las acompañen.  Si el procedimiento es fotografiado o grabado en vídeo, el cirujano obtendrá la imagen, cinta de vídeo o CD original.  El hospital no se hará responsable por el almacenamiento, la revelación o el mantenimiento de la imagen, cinta o CD.    Autorizo la presencia de un especialista de producto u observadores en el quirófano, según lo considere necesario mi médico o  las personas que éste designe.     Reconozco que en naresh de que mi procedimiento resulte en un tiempo prolongado de radiografía/fluoroscopia, puedo desarrollar eduardo reacción en la piel.    Si tengo eduardo orden de No intentar la reanimación (NICOLÁS), adam estado se suspenderá mientras esté en el quirófano, la alise de procedimientos y nasima el periodo de recuperación a menos que yo indique lo contrario explícitamente (o eduardo persona autorizada a valerio el consentimiento en mi nombre). El cirujano o mi médico tratante determinarán cuándo termina el periodo de recuperación aplicable a los efectos de restablecer la orden de NICOLÁS.  Pacientes que se realizan un procedimiento de esterilización: Entiendo que si el procedimiento tiene éxito, los resultados serán permanentes y que, por lo tanto, me será imposible inseminar, concebir o tener hijos.  Asimismo, entiendo que el procedimiento tiene matty propósito la esterilidad, aunque el resultado no está garantizado.   Admito que mi médico me ha explicado la aplicación de sedación/analgesia, incluidos los riesgos y beneficios y, consiento a la administración de sedación/analgesia conforme sea necesario o conveniente a juicio de mi médico.    CERTIFICO QUE HE LEÍDO Y COMPRENDIDO EL CONSENTIMIENTO ANTERIOR PARA LA OPERACIÓN y/o PROCEDIMIENTO.      ___________________________________________________________________________________________________________________   (Firma del paciente)             (Fecha)     (Hora)    ___________________________________________________________________________________________________________________   (Persona responsable del paciente becky de edad o inconsciente) (Relación con el paciente)  ___________________________________________________________________________________________________________________  (Firma del testigo)             (Fecha)     (Hora)   Patient Name: Arnaldo Gutierrez  : 4/15/1945    Reviewed: 2024   Printed: 2025   Medical Record #: [id number] Page 1            DECLARACIÓN DEL MÉDICO: Mi firma a continuación ratifica que, antes de la hora del procedimiento, he explicado al paciente y/o a mcmanus representante legal los riesgos y beneficios que comprende el tratamiento propuesto y cualquier alternativa razonable al tratamiento propuesto. También he explicado los riesgos y beneficios que comprende rechazar el tratamiento propuesto y las alternativas al tratamiento propuesto, y he respondido las preguntas del paciente. Si tengo algún interés financiero importante en un acuerdo de gestión compartida o un interés financiero importante en cualquier producto o implante, u otra relación importante usada en mary procedimiento/cirugía, lo he divulgado y discutido con mi paciente.      _______________________________________________________________________________________________________                (Firma del médico)                                                                                    (Fecha)                                       (Hora)              Patient Name: Arnaldo Gutierrez  : 4/15/1945    Reviewed: 2024   Printed: 2025  Medical Record #: [id number] Page 2 of

## (undated) NOTE — LETTER
[place] [address]  Authorization for Invasive Procedures  Date: 7/11/2025           Time: 0900     Por la presente, autorizo al doctor INDER POZO MD, mi médico y al asistente a realizar la operación/procedimiento quirúrgico a continuación, así matty a administrar la anestesia que determine necesaria mi médico Nombre (s) de la operación/procedimiento: CT GUIDED MARCIN-HEPATIC ABSCESS DRAIN PLACEMENT en Arnaldo Gutierrez            Reconozco que nasima la operación/procedimiento quirúrgico, las condiciones imprevistas pueden requerir de procedimientos adicionales o diferentes a aquellos mencionados anteriormente.  Por lo tanto, autorizo y solicito además que el cirujano antes mencionado, los asistentes o las personas designadas realicen los procedimientos que, a mcmanus juicio, yariel necesarios y convenientes.    Mi cirujano/médico lam discutido antes de mi cirugía los posibles beneficios, riesgos y efectos secundarios de mary procedimiento, la probabilidad de alcanzar las metas y los posibles problemas que puedan ocurrir nasima la recuperación.  Ellos también lao discutido las alternativas razonables al procedimiento, incluso los riesgos, beneficios y efectos secundarios relacionados con las alternativas y los riesgos relacionados con no realizar mary procedimiento.  Lao respondido a todas mis preguntas y confirmo que no se ha dado ninguna garantía en cuanto a los resultados que pueda obtener.    En naresh de que surja la necesidad nasima mi operación o nasima el periodo postoperatorio, también autorizo se aplique edilia y/o productos sanguíneos.  Asimismo, entiendo que a pesar de las cuidadosas pruebas y análisis de edilia o de los productos sanguíneos que realizan las entidades recolectoras, todavía puedo estar sujeto a efectos adversos matty resultado de recibir eduardo transfusión de edilia y/o productos sanguíneos.  A continuación se mencionan algunos, aunque no todos, los riesgos potenciales  que pueden ocurrir: fiebre y reacciones alérgicas, reacciones hemolíticas, trasmisión de enfermedades matty hepatitis, SIDA y citomegalovirus (CMV), así matty sobrecarga de líquidos.   En naresh de que desee tener eduardo transfusión autóloga de mi propia edilia o eduardo transfusión de un donante dirigido.  Lo discutiré con mi médico.   Check only if Refusing Blood or Blood Products  I understand refusal of blood or blood products as deemed necessary by my physician may have serious consequences to my condition to include possible death. I hereby assume responsibility for my refusal and release the hospital, its personnel, and my physicians from any responsibility for the consequences of my refusal.           o  Refuse   Autorizo el uso de cualquier muestra, órgano, tejido, parte del cuerpo u objeto extraño que pueda ser extraído de mi cuerpo nasima la operación/procedimiento para fines de diagnóstico, investigación o de enseñanza y mcmanus desecho posterior por las autoridades del hospital.  También, autorizo la revelación de los resultados de las pruebas de muestras y/o los informes escritos al médico tratante matty personal médico del hospital u otros médicos de referencia o consulta involucrados en mi atención, a discreción del patólogo o de mi médico tratante.    Doy consentimiento para que se fotografíen o graben vídeos de las operaciones o procedimientos a realizarse, incluidas las partes de mi cuerpo que yariel adecuadas para propósitos médicos, científicos o educativos, en el entendido de que, mi identidad no sea revelada por las fotografías o por textos descriptivos que las acompañen.  Si el procedimiento es fotografiado o grabado en vídeo, el cirujano obtendrá la imagen, cinta de vídeo o CD original.  El hospital no se hará responsable por el almacenamiento, la revelación o el mantenimiento de la imagen, cinta o CD.    Autorizo la presencia de un especialista de producto u observadores en el quirófano, según lo considere  necesario mi médico o las personas que éste designe.     Reconozco que en naresh de que mi procedimiento resulte en un tiempo prolongado de radiografía/fluoroscopia, puedo desarrollar eduardo reacción en la piel.    Si tengo eduardo orden de No intentar la reanimación (NICOLÁS), adam estado se suspenderá mientras esté en el quirófano, la alise de procedimientos y nasima el periodo de recuperación a menos que yo indique lo contrario explícitamente (o eduardo persona autorizada a valerio el consentimiento en mi nombre). El cirujano o mi médico tratante determinarán cuándo termina el periodo de recuperación aplicable a los efectos de restablecer la orden de NICOLÁS.  Pacientes que se realizan un procedimiento de esterilización: Entiendo que si el procedimiento tiene éxito, los resultados serán permanentes y que, por lo tanto, me será imposible inseminar, concebir o tener hijos.  Asimismo, entiendo que el procedimiento tiene matty propósito la esterilidad, aunque el resultado no está garantizado.   Admito que mi médico me ha explicado la aplicación de sedación/analgesia, incluidos los riesgos y beneficios y, consiento a la administración de sedación/analgesia conforme sea necesario o conveniente a juicio de mi médico.    CERTIFICO QUE HE LEÍDO Y COMPRENDIDO EL CONSENTIMIENTO ANTERIOR PARA LA OPERACIÓN y/o PROCEDIMIENTO.      ___________________________________________________________________________________________________________________   (Firma del paciente)             (Fecha)     (Hora)    ___________________________________________________________________________________________________________________   (Persona responsable del paciente becky de edad o inconsciente) (Relación con el paciente)  ___________________________________________________________________________________________________________________  (Firma del testigo)             (Fecha)     (Hora)   Patient Name: Arnaldo Gutierrez  : 4/15/1945    Reviewed: 2024   Printed:  July 10, 2025  Medical Record #: [id number] Page 1 of            DECLARACIÓN DEL MÉDICO: Mi firma a continuación ratifica que, antes de la hora del procedimiento, he explicado al paciente y/o a mcmanus representante legal los riesgos y beneficios que comprende el tratamiento propuesto y cualquier alternativa razonable al tratamiento propuesto. También he explicado los riesgos y beneficios que comprende rechazar el tratamiento propuesto y las alternativas al tratamiento propuesto, y he respondido las preguntas del paciente. Si tengo algún interés financiero importante en un acuerdo de gestión compartida o un interés financiero importante en cualquier producto o implante, u otra relación importante usada en mary procedimiento/cirugía, lo he divulgado y discutido con mi paciente.      _______________________________________________________________________________________________________                (Firma del médico)                                                                                    (Fecha)                                       (Hora)              Patient Name: Arnaldo Gutierrez  : 4/15/1945    Reviewed: 2024   Printed: July 10, 2025  Medical Record #: [id number] Page 2 of

## (undated) NOTE — LETTER
[place] [address]  Authorization for Invasive Procedures  Date: 06/06/25           Time: 1227     Por la presente, autorizo Peripheral inserted central catheter (PICC) certified registered nurse mi médico y al asistente a realizar la operación/procedimiento quirúrgico a continuación, así matty a administrar la anestesia que determine necesaria mi médico Nombre (s) de la operación/procedimiento: Peripherally inserted central catheter  en Arnaldo Gutierrez            Reconozco que nasima la operación/procedimiento quirúrgico, las condiciones imprevistas pueden requerir de procedimientos adicionales o diferentes a aquellos mencionados anteriormente.  Por lo tanto, autorizo y solicito además que el cirujano antes mencionado, los asistentes o las personas designadas realicen los procedimientos que, a mcmanus juicio, yariel necesarios y convenientes.    Mi cirujano/médico lam discutido antes de mi cirugía los posibles beneficios, riesgos y efectos secundarios de mary procedimiento, la probabilidad de alcanzar las metas y los posibles problemas que puedan ocurrir nasima la recuperación.  Ellos también lao discutido las alternativas razonables al procedimiento, incluso los riesgos, beneficios y efectos secundarios relacionados con las alternativas y los riesgos relacionados con no realizar mary procedimiento.  Lao respondido a todas mis preguntas y confirmo que no se ha dado ninguna garantía en cuanto a los resultados que pueda obtener.    En naresh de que surja la necesidad nasima mi operación o nasima el periodo postoperatorio, también autorizo se aplique edilia y/o productos sanguíneos.  Asimismo, entiendo que a pesar de las cuidadosas pruebas y análisis de edilia o de los productos sanguíneos que realizan las entidades recolectoras, todavía puedo estar sujeto a efectos adversos matty resultado de recibir eduardo transfusión de edilia y/o productos sanguíneos.  A continuación se mencionan algunos, aunque  no todos, los riesgos potenciales que pueden ocurrir: fiebre y reacciones alérgicas, reacciones hemolíticas, trasmisión de enfermedades matty hepatitis, SIDA y citomegalovirus (CMV), así matty sobrecarga de líquidos.   En naresh de que desee tener eduardo transfusión autóloga de mi propia edilia o eduardo transfusión de un donante dirigido.  Lo discutiré con mi médico.   Check only if Refusing Blood or Blood Products  I understand refusal of blood or blood products as deemed necessary by my physician may have serious consequences to my condition to include possible death. I hereby assume responsibility for my refusal and release the hospital, its personnel, and my physicians from any responsibility for the consequences of my refusal.           o  Refuse   Autorizo el uso de cualquier muestra, órgano, tejido, parte del cuerpo u objeto extraño que pueda ser extraído de mi cuerpo nasima la operación/procedimiento para fines de diagnóstico, investigación o de enseñanza y mcmanus desecho posterior por las autoridades del hospital.  También, autorizo la revelación de los resultados de las pruebas de muestras y/o los informes escritos al médico tratante matty personal médico del hospital u otros médicos de referencia o consulta involucrados en mi atención, a discreción del patólogo o de mi médico tratante.    Doy consentimiento para que se fotografíen o graben vídeos de las operaciones o procedimientos a realizarse, incluidas las partes de mi cuerpo que yariel adecuadas para propósitos médicos, científicos o educativos, en el entendido de que, mi identidad no sea revelada por las fotografías o por textos descriptivos que las acompañen.  Si el procedimiento es fotografiado o grabado en vídeo, el cirujano obtendrá la imagen, cinta de vídeo o CD original.  El hospital no se hará responsable por el almacenamiento, la revelación o el mantenimiento de la imagen, cinta o CD.    Autorizo la presencia de un especialista de producto u observadores en  el quirófano, según lo considere necesario mi médico o las personas que éste designe.     Reconozco que en naresh de que mi procedimiento resulte en un tiempo prolongado de radiografía/fluoroscopia, puedo desarrollar eduardo reacción en la piel.    Si tengo eduardo orden de No intentar la reanimación (NICOLÁS), adam estado se suspenderá mientras esté en el quirófano, la alise de procedimientos y nasima el periodo de recuperación a menos que yo indique lo contrario explícitamente (o eduardo persona autorizada a valerio el consentimiento en mi nombre). El cirujano o mi médico tratante determinarán cuándo termina el periodo de recuperación aplicable a los efectos de restablecer la orden de NICOLÁS.  Pacientes que se realizan un procedimiento de esterilización: Entiendo que si el procedimiento tiene éxito, los resultados serán permanentes y que, por lo tanto, me será imposible inseminar, concebir o tener hijos.  Asimismo, entiendo que el procedimiento tiene matty propósito la esterilidad, aunque el resultado no está garantizado.   Admito que mi médico me ha explicado la aplicación de sedación/analgesia, incluidos los riesgos y beneficios y, consiento a la administración de sedación/analgesia conforme sea necesario o conveniente a juicio de mi médico.    CERTIFICO QUE HE LEÍDO Y COMPRENDIDO EL CONSENTIMIENTO ANTERIOR PARA LA OPERACIÓN y/o PROCEDIMIENTO.      ___________________________________________________________________________________________________________________   (Firma del paciente)             (Fecha)     (Hora)    ___________________________________________________________________________________________________________________   (Persona responsable del paciente becky de edad o inconsciente) (Relación con el paciente)  ___________________________________________________________________________________________________________________  (Firma del testigo)             (Fecha)     (Hora)   Patient Name: Arnaldo Gutierrez  : 4/15/1945     Reviewed: 2024   Printed: 2025  Medical Record #: [id number] Page 1 of 2           DECLARACIÓN DEL MÉDICO: Mi firma a continuación ratifica que, antes de la hora del procedimiento, he explicado al paciente y/o a mcmanus representante legal los riesgos y beneficios que comprende el tratamiento propuesto y cualquier alternativa razonable al tratamiento propuesto. También he explicado los riesgos y beneficios que comprende rechazar el tratamiento propuesto y las alternativas al tratamiento propuesto, y he respondido las preguntas del paciente. Si tengo algún interés financiero importante en un acuerdo de gestión compartida o un interés financiero importante en cualquier producto o implante, u otra relación importante usada en mary procedimiento/cirugía, lo he divulgado y discutido con mi paciente.      _______________________________________________________________________________________________________                (Firma del médico)                                                                                    (Fecha)                                       (Hora)              Patient Name: Arnaldo Gutierrez  : 4/15/1945    Reviewed: 2024   Printed: 2025  Medical Record #: [id number] Page 2 of 2

## (undated) NOTE — LETTER
[facility]  part of [facility]     PICC INSERTION CONSENT     I agree to have a Peripherally Inserted Central Catheter (PICC) placed in my arm.   1. The PICC insertion procedure, care, maintenance, risks, benefits, and complications have been explained to me by my physician, ________________________, and I understand them.   2. I understand that this may not be the only way I can receive my medication. I understand that my physician has determined that the PICC would be the safest and most effective means of administering my medication at this time. If there are other options of giving medication into my veins those options have been explained to me by my physician and I have chosen this one.   3. I realize a nurse who has been specially trained and certified by the hospital and ’s representative to insert a PICC will perform this procedure. My catheter will be inserted by _____________________________.   4. I have been informed by my doctor of the nature and purpose of this procedure and the risks involved and the possibility of complications. I realize that this is an invasive procedure and has certain risks such as air embolism (air entering the catheter or my vein), arterial puncture (a tearing of one of my arteries), infection, irregular heartbeat and venous thrombosis (a blood clot in a vein) nerve injury and fracture of the catheter with or without migration.   5. In order to numb the area where the line will be placed, a small amount of anesthetic medication will be injected as ordered by my physician.   6. I understand that while the catheter will be placed in my upper arm the end of the catheter will come to rest in an area near my heart.     7. The person performing this procedure has discussed the potential benefits, risks, and side effects of the PICC; the likelihood of achieving goals; and potential problems that might occur during recuperation. They also discussed  reasonable alternatives to the PICC, including risks, benefits, and side effects related to the alternatives and risks related to not receiving this procedure.    8.  I have expressed any questions about this procedure to my physician or the PICC Proceduralist and he/she has answered them.  I certify that I have read and understand this consent to the insertion of a PICC.      _________________________________________________________   Date     Time     Patient/Guardian Signature       ____________________________________   Printed name of Patient/Guardian          ________________________________________________________________    Date        Time                   Witnessing RN Signature      Patient Name: Arnaldo Gutierrez     : 4/15/1945                 Printed: 2025     Medical Record #: [id number]

## (undated) NOTE — LETTER
[facility]  [address], [place], [place]    Authorization for Surgical Operation and Procedure                               I hereby authorize Eusebio Palmer MD, my physician and his/her assistants (if applicable), which may include medical students, residents, and/or fellows, to perform the following surgical operation/ procedure and administer such anesthesia as may be determined necessary by my physician: Operation/Procedure name (s) COLONOSCOPY with colonic stent placement on Arnaldo Gutierrez   2.   I recognize that during the surgical operation/procedure, unforeseen conditions may necessitate additional or different procedures than those listed above.  I, therefore, further authorize and request that the above-named surgeon, assistants, or designees perform such procedures as are, in their judgment, necessary and desirable.    3.   My surgeon/physician has discussed prior to my surgery the potential benefits, risks and side effects of this procedure; the likelihood of achieving goals; and potential problems that might occur during recuperation.  They also discussed reasonable alternatives to the procedure, including risks, benefits, and side effects related to the alternatives and risks related to not receiving this procedure.  I have had all my questions answered and I acknowledge that no guarantee has been made as to the result that may be obtained.    4.   Should the need arise during my operation/procedure, which includes change of level of care prior to discharge, I also consent to the administration of blood and/or blood products.  Further, I understand that despite careful testing and screening of blood or blood products by collecting agencies, I may still be subject to ill effects as a result of receiving a blood transfusion and/or blood products.  The following are some, but not all, of the potential risks that can occur: fever and allergic reactions, hemolytic  reactions, transmission of diseases such as Hepatitis, AIDS and Cytomegalovirus (CMV) and fluid overload.  In the event that I wish to have an autologous transfusion of my own blood, or a directed donor transfusion, I will discuss this with my physician.  Check only if Refusing Blood or Blood Products  I understand refusal of blood or blood products as deemed necessary by my physician may have serious consequences to my condition to include possible death. I hereby assume responsibility for my refusal and release the hospital, its personnel, and my physicians from any responsibility for the consequences of my refusal.    o  Refuse   5.   I authorize the use of any specimen, organs, tissues, body parts or foreign objects that may be removed from my body during the operation/procedure for diagnosis, research or teaching purposes and their subsequent disposal by hospital authorities.  I also authorize the release of specimen test results and/or written reports to my treating physician on the hospital medical staff or other referring or consulting physicians involved in my care, at the discretion of the Pathologist or my treating physician.    6.   I consent to the photographing or videotaping of the operations or procedures to be performed, including appropriate portions of my body for medical, scientific, or educational purposes, provided my identity is not revealed by the pictures or by descriptive texts accompanying them.  If the procedure has been photographed/videotaped, the surgeon will obtain the original picture, image, videotape or CD.  The hospital will not be responsible for storage, release or maintenance of the picture, image, tape or CD.    7.   I consent to the presence of a  or observers in the operating room as deemed necessary by my physician or their designees.    8.   I recognize that in the event my procedure results in extended X-Ray/fluoroscopy time, I may develop a skin  reaction.    9. If I have a Do Not Attempt Resuscitation (DNAR) order in place, that status will be suspended while in the operating room, procedural suite, and during the recovery period unless otherwise explicitly stated by me (or a person authorized to consent on my behalf). The surgeon or my attending physician will determine when the applicable recovery period ends for purposes of reinstating the DNAR order.  10. Patients having a sterilization procedure: I understand that if the procedure is successful the results will be permanent and it will therefore be impossible for me to inseminate, conceive, or bear children.  I also understand that the procedure is intended to result in sterility, although the result has not been guaranteed.   11. I acknowledge that my physician has explained sedation/analgesia administration to me including the risk and benefits I consent to the administration of sedation/analgesia as may be necessary or desirable in the judgment of my physician.    I CERTIFY THAT I HAVE READ AND FULLY UNDERSTAND THE ABOVE CONSENT TO OPERATION and/or OTHER PROCEDURE.     ____________________________________  _________________________________        ______________________________  Signature of Patient    Signature of Responsible Person                Printed Name of Responsible Person                                      ____________________________________  _____________________________                ________________________________  Signature of Witness        Date  Time         Relationship to Patient    STATEMENT OF PHYSICIAN My signature below affirms that prior to the time of the procedure; I have explained to the patient and/or his/her legal representative, the risks and benefits involved in the proposed treatment and any reasonable alternative to the proposed treatment. I have also explained the risks and benefits involved in refusal of the proposed treatment and alternatives to the proposed  treatment and have answered the patient's questions. If I have a significant financial interest in a co-management agreement or a significant financial interest in any product or implant, or other significant relationship used in this procedure/surgery, I have disclosed this and had a discussion with my patient.     _____________________________________________________              _____________________________  (Signature of Physician)                                                                                         (Date)                                   (Time)  Patient Name: Arnaldo Gutierrez      : 4/15/1945      Printed: 2025     Medical Record #: [id number]                                      Page 1 of 1

## (undated) NOTE — LETTER
[facility] [address], [place], [place]    Authorization for Surgical Operation and Procedure                               I hereby authorize Peripheral Inserted Central Catheter (PICC) qualified RN, my physician and his/her assistants (if applicable), which may include medical students, residents, and/or fellows, to perform the following surgical operation/ procedure and administer such anesthesia as may be determined necessary by my physician: Operation/Procedure name (s) Peripherally inserted central catheter on Arnaldo Gutierrez   2.   I recognize that during the surgical operation/procedure, unforeseen conditions may necessitate additional or different procedures than those listed above.  I, therefore, further authorize and request that the above-named surgeon, assistants, or designees perform such procedures as are, in their judgment, necessary and desirable.    3.   My surgeon/physician has discussed prior to my surgery the potential benefits, risks and side effects of this procedure; the likelihood of achieving goals; and potential problems that might occur during recuperation.  They also discussed reasonable alternatives to the procedure, including risks, benefits, and side effects related to the alternatives and risks related to not receiving this procedure.  I have had all my questions answered and I acknowledge that no guarantee has been made as to the result that may be obtained.    4.   Should the need arise during my operation/procedure, which includes change of level of care prior to discharge, I also consent to the administration of blood and/or blood products.  Further, I understand that despite careful testing and screening of blood or blood products by collecting agencies, I may still be subject to ill effects as a result of receiving a blood transfusion and/or blood products.  The following are some, but not all, of the potential risks that can occur: fever and allergic  reactions, hemolytic reactions, transmission of diseases such as Hepatitis, AIDS and Cytomegalovirus (CMV) and fluid overload.  In the event that I wish to have an autologous transfusion of my own blood, or a directed donor transfusion, I will discuss this with my physician.  Check only if Refusing Blood or Blood Products  I understand refusal of blood or blood products as deemed necessary by my physician may have serious consequences to my condition to include possible death. I hereby assume responsibility for my refusal and release the hospital, its personnel, and my physicians from any responsibility for the consequences of my refusal.    o  Refuse   5.   I authorize the use of any specimen, organs, tissues, body parts or foreign objects that may be removed from my body during the operation/procedure for diagnosis, research or teaching purposes and their subsequent disposal by hospital authorities.  I also authorize the release of specimen test results and/or written reports to my treating physician on the hospital medical staff or other referring or consulting physicians involved in my care, at the discretion of the Pathologist or my treating physician.    6.   I consent to the photographing or videotaping of the operations or procedures to be performed, including appropriate portions of my body for medical, scientific, or educational purposes, provided my identity is not revealed by the pictures or by descriptive texts accompanying them.  If the procedure has been photographed/videotaped, the surgeon will obtain the original picture, image, videotape or CD.  The hospital will not be responsible for storage, release or maintenance of the picture, image, tape or CD.    7.   I consent to the presence of a  or observers in the operating room as deemed necessary by my physician or their designees.    8.   I recognize that in the event my procedure results in extended X-Ray/fluoroscopy time, I may  develop a skin reaction.    9. If I have a Do Not Attempt Resuscitation (DNAR) order in place, that status will be suspended while in the operating room, procedural suite, and during the recovery period unless otherwise explicitly stated by me (or a person authorized to consent on my behalf). The surgeon or my attending physician will determine when the applicable recovery period ends for purposes of reinstating the DNAR order.  10. Patients having a sterilization procedure: I understand that if the procedure is successful the results will be permanent and it will therefore be impossible for me to inseminate, conceive, or bear children.  I also understand that the procedure is intended to result in sterility, although the result has not been guaranteed.   11. I acknowledge that my physician has explained sedation/analgesia administration to me including the risk and benefits I consent to the administration of sedation/analgesia as may be necessary or desirable in the judgment of my physician.    I CERTIFY THAT I HAVE READ AND FULLY UNDERSTAND THE ABOVE CONSENT TO OPERATION and/or OTHER PROCEDURE.     ____________________________________  _________________________________        ______________________________  Signature of Patient    Signature of Responsible Person                Printed Name of Responsible Person                                      ____________________________________  _____________________________                ________________________________  Signature of Witness        Date  Time         Relationship to Patient    STATEMENT OF PHYSICIAN My signature below affirms that prior to the time of the procedure; I have explained to the patient and/or his/her legal representative, the risks and benefits involved in the proposed treatment and any reasonable alternative to the proposed treatment. I have also explained the risks and benefits involved in refusal of the proposed treatment and alternatives to  the proposed treatment and have answered the patient's questions. If I have a significant financial interest in a co-management agreement or a significant financial interest in any product or implant, or other significant relationship used in this procedure/surgery, I have disclosed this and had a discussion with my patient.     _____________________________________________________              _____________________________  (Signature of Physician)                                                                                         (Date)                                   (Time)  Patient Name: Arnaldo Gutierrez      : 4/15/1945      Printed: 2025     Medical Record #: [id number]                                      Page 1 of 1

## (undated) NOTE — LETTER
[facility]   Administracion de Anestesia  Yo, Arnaldo Gutierrez, acepto ser atendido por un anestesiólogo, quien está especialmente capacitado para monitorearme y darme medicamento para hacerme dormir o mantenerme cómodo nasima mi procedimiento.   Entiendo que mi anestesiólogo no es un empleado o agente de [facility]. Él o hugh trabaja para [facility]  Yasir el paciente que solicita los servicios de anestesia, estoy de acuerdo con lo siguiente:  Permitir al anestesiólogo (médico de anestesia) que me suministre el medicamento y hacer los procedimientos adicionales que yariel necesarios. Algunos ejemplos son: Al iniciar o utilizar eduardo “IV” para suministrarme medicamentos, fluidos o edilia nasima mi procedimiento, y colocarme eduardo sonda de respiración, me ayudará a respirar cuando esté dormido (intubación). En el naresh de que mi corazón deje de funcionar correctamente, entiendo que mi anestesiólogo hará todo lo posible para salvar mi benitez, a menos que los documentos de No resucitar de [facility] lo indiquen de otra manera.  Informar a mi anestesista antes del procedimiento:  Si estoy embarazada.  La última vez que comí o bebí algo.  Todos los medicamentos que rupesh (incluyendo medicamentos recetados, suplementos herbales y pastillas que puedo comprar sin receta médica (incluyendo drogas en la lowery [medicamentos ilegales]). No informar a mi anestesiólogo acerca de estos medicamentos puede aumentar mi riesgo de complicaciones con la anestesia.  Si soy alérgico a cualquier cosa o he tenido previamente eduardo reacción adversa a la anestesia.  Entiendo cómo la anestesia me ayudará (beneficios).  Entiendo que con cualquier tipo de anestesia hay riesgos:  Los riesgos más comunes son: náuseas, vómitos, dolor de garganta, dolor muscular, daño a los ojos, la boca o los dientes (por la colocación de la sonda de respiración).  Los riesgos poco comunes incluyen:  recordar lo que sucedió nasima mi procedimiento, reacciones alérgicas a los medicamentos, lesiones en las vías respiratorias, el corazón, los pulmones, la visión, los nervios o músculos, y en casos sumamente raros, la muerte.  Mii médico me ha explicado otras opciones de atención disponibles para mí (alternativas).  Pacientes embarazadas (“epidural”):    Entiendo que los riesgos de recibir eduardo inyección epidural (medicamento que se aplica en la espalda para ayudar a controlar el dolor nasima el parto), incluyen picazón, presión arterial baja, dificultad para orinar, dolor de jenifer o disminución en el ritmo del corazón del bebé. Los riesgos muy poco comunes incluyen infección, hemorragia, convulsiones, ritmo cardíaco irregular y lesión del nervio.  Anestesia regional (“columna vertebral”, “epidural” y “bloqueo de los nervios”):  Entiendo que hay complicaciones poco frecuentes stephany posibles, e incluyen dolor de jenifer, sangrado, infección, convulsiones, ritmo cardíaco irregular y la lesión del nervio.  .   Puedo cambiar de opinión acerca de recibir servicios de anestesia en cualquier momento antes de que se me administre el medicamento.         Paciente (o representante) Firma/Relación con el paciente  Fecha  Hora  Patient Signature/Signature of Responsible person Date Time           Nombre del intérprete (en mcmanus naresh)  Idioma/Organización  Hora  Name of  Language/Organization Time          Firma del anestesiólogo Fecha  Hora  Signature of anesthesiologist Date Time    He hablado del procedimiento y la información anterior con el paciente (o el representante del paciente) y respondí riya preguntas. El paciente o mcmanus representante ha aceptado recibir los servicios de anestesia.         Mary Haha  Hora  Witness Date Time  He verificado que la firma es la del paciente o del representante del paciente, y que se firmó antes del procedimiento.    Nombre del paciente: Arnaldo Gutierrez  Fec. de Nac.: 4/15/1945                  En letra de imprenta: June 6, 2025  Expediente médico No. [id number]                         Página 1 de 2  ----------ANESTHESIA CONSENT----------

## (undated) NOTE — LETTER
Por la presente, autorizo al doctor CHACHO, mi médico y al asistente a realizar la operación/procedimiento quirúrgico a continuación, así matty a administrar la anestesia que determine necesaria mi médico Nombre (s) de la operación/procedimiento: COLONOSCOPY WITH COLONIC STENT PLACEMENT en Arnaldo Gutierrez            Reconozco que nasima la operación/procedimiento quirúrgico, las condiciones imprevistas pueden requerir de procedimientos adicionales o diferentes a aquellos mencionados anteriormente.  Por lo tanto, autorizo y solicito además que el cirujano antes mencionado, los asistentes o las personas designadas realicen los procedimientos que, a mcmanus juicio, yariel necesarios y convenientes.    Mi cirujano/médico lam discutido antes de mi cirugía los posibles beneficios, riesgos y efectos secundarios de mary procedimiento, la probabilidad de alcanzar las metas y los posibles problemas que puedan ocurrir nasima la recuperación.  Ellos también lao discutido las alternativas razonables al procedimiento, incluso los riesgos, beneficios y efectos secundarios relacionados con las alternativas y los riesgos relacionados con no realizar mary procedimiento.  Lao respondido a todas mis preguntas y confirmo que no se ha dado ninguna garantía en cuanto a los resultados que pueda obtener.    En naresh de que surja la necesidad nasima mi operación o nasima el periodo postoperatorio, también autorizo se aplique edilia y/o productos sanguíneos.  Asimismo, entiendo que a pesar de las cuidadosas pruebas y análisis de edilia o de los productos sanguíneos que realizan las entidades recolectoras, todavía puedo estar sujeto a efectos adversos matty resultado de recibir eduardo transfusión de edilia y/o productos sanguíneos.  A continuación se mencionan algunos, aunque no todos, los riesgos potenciales que pueden ocurrir: fiebre y reacciones alérgicas, reacciones hemolíticas, trasmisión de enfermedades matty hepatitis, SIDA y citomegalovirus  (CMV), así matty sobrecarga de líquidos.   En naresh de que desee tener eduardo transfusión autóloga de mi propia edilia o eduardo transfusión de un donante dirigido.  Lo discutiré con mi médico.   Check only if Refusing Blood or Blood Products  I understand refusal of blood or blood products as deemed necessary by my physician may have serious consequences to my condition to include possible death. I hereby assume responsibility for my refusal and release the hospital, its personnel, and my physicians from any responsibility for the consequences of my refusal.           o  Refuse   Autorizo el uso de cualquier muestra, órgano, tejido, parte del cuerpo u objeto extraño que pueda ser extraído de mi cuerpo nasima la operación/procedimiento para fines de diagnóstico, investigación o de enseñanza y mcmanus desecho posterior por las autoridades del hospital.  También, autorizo la revelación de los resultados de las pruebas de muestras y/o los informes escritos al médico tratante matty personal médico del hospital u otros médicos de referencia o consulta involucrados en mi atención, a discreción del patólogo o de mi médico tratante.    Doy consentimiento para que se fotografíen o graben vídeos de las operaciones o procedimientos a realizarse, incluidas las partes de mi cuerpo que yariel adecuadas para propósitos médicos, científicos o educativos, en el entendido de que, mi identidad no sea revelada por las fotografías o por textos descriptivos que las acompañen.  Si el procedimiento es fotografiado o grabado en vídeo, el cirujano obtendrá la imagen, cinta de vídeo o CD original.  El hospital no se hará responsable por el almacenamiento, la revelación o el mantenimiento de la imagen, cinta o CD.    Autorizo la presencia de un especialista de producto u observadores en el quirófano, según lo considere necesario mi médico o las personas que éste designe.     Reconozco que en naresh de que mi procedimiento resulte en un tiempo prolongado de  radiografía/fluoroscopia, puedo desarrollar eduardo reacción en la piel.    Si tengo eduardo orden de No intentar la reanimación (NICOL[place]S), adam estado se suspenderá mientras esté en el quirófano, la alise de procedimientos y nasima el periodo de recuperación a menos que yo indique lo contrario explícitamente (o eduardo persona autorizada a valerio el consentimiento en mi nombre). El cirujano o mi médico tratante determinarán cuándo termina el periodo de recuperación aplicable a los efectos de restablecer la orden de NICOL[place]S.  Pacientes que se realizan un procedimiento de esterilización: Entiendo que si el procedimiento tiene éxito, los resultados serán permanentes y que, por lo tanto, me será imposible inseminar, concebir o tener hijos.  Asimismo, entiendo que el procedimiento tiene matty propósito la esterilidad, aunque el resultado no está garantizado.   Admito que mi médico me ha explicado la aplicación de sedación/analgesia, incluidos los riesgos y beneficios y, consiento a la administración de sedación/analgesia conforme sea necesario o conveniente a juicio de mi médico.    Patient Name: Arnaldo Gutierrez  : 4/15/1945    Reviewed: 2024   Printed: 2025  Medical Record #: [id number] Page 1 of 2                   CERTIFICO QUE HE LEÍDO Y COMPRENDIDO EL CONSENTIMIENTO ANTERIOR PARA LA OPERACIÓN y/o PROCEDIMIENTO.      ___________________________________________________________________________________________________________________   (Firma del paciente)             (Fecha)     (Hora)    ___________________________________________________________________________________________________________________   (Persona responsable del paciente becky de edad o inconsciente) (Relación con el paciente)  ___________________________________________________________________________________________________________________  (Firma del testigo)             (Fecha)     (Hora)      DECLARACIÓN DEL MÉDICO: Mi firma a continuación ratifica que,  antes de la hora del procedimiento, he explicado al paciente y/o a mcmanus representante legal los riesgos y beneficios que comprende el tratamiento propuesto y cualquier alternativa razonable al tratamiento propuesto. También he explicado los riesgos y beneficios que comprende rechazar el tratamiento propuesto y las alternativas al tratamiento propuesto, y he respondido las preguntas del paciente. Si tengo algún interés financiero importante en un acuerdo de gestión compartida o un interés financiero importante en cualquier producto o implante, u otra relación importante usada en mary procedimiento/cirugía, lo he divulgado y discutido con mi paciente.      _______________________________________________________________________________________________________                (Firma del médico)                                                                                    (Fecha)                                       (Hora)    Patient Name: Arnaldo Gutierrez  : 4/15/1945    Reviewed: 2024   Printed: 2025  Medical Record #: [id number] Page 2 of 2